# Patient Record
Sex: MALE | Race: WHITE
[De-identification: names, ages, dates, MRNs, and addresses within clinical notes are randomized per-mention and may not be internally consistent; named-entity substitution may affect disease eponyms.]

---

## 2017-07-02 NOTE — HP
CC:  Dr. Vicente*

 

HISTORY AND PHYSICAL:

 

DATE OF ADMISSION:  07/02/17

 

PRIMARY CARE PROVIDER:  Dr. Vicente.

 

ATTENDING PHYSICIAN WHILE IN THE HOSPITAL:  Disha Duncan DO* (report 
dictated by Manolo Garcia NP).

 

CHIEF COMPLAINT:  Altered mental status.

 

HISTORY OF PRESENT ILLNESS:  Mr. Pryor is a 72-year-old male patient.  He has 
a history of ependymoma tumor of the spinal cord, wheelchair bound and self-
cathed and has chronic pain secondary to this and is on chronic narcotics.  He 
has a history of hypertension, GERD, anemia, BONIFACIO.  In addition to this, he has 
a history of carotid artery disease, hyperlipidemia, and BPH.  He came into the 
ER today. Wednesday, he has been complaining of having increasing pain in his 
back.  He saw his primary pain specialist and the patient was changed from 
Opana to morphine ER 30 mg b.i.d.  The patient did well on Wednesday according 
to the wife, he was tolerating it well but then on Friday, she noticed that he 
was a little bit more confused, having trouble with the month which he normally 
does, but he was not as quick to respond and he was not acting himself.  On 
Saturday, he was very drowsy. She felt that she probably should have brought 
him in last night, but she was hopeful that he would get better.  What really 
made her concerned is today they were talking about him self-cathing and 
apparently she believes he had not self- cathed in probably 24 hours because he 
forgot.  He could not do it and that was alarming to her because he has been 
self-cathing for at least 10 years and because of this, they were concerned.  
Yesterday, he was complaining that he just could not get warm.  He needed extra 
blankets throughout the day.  She was worried that maybe he had a fever.  There 
was no nausea or vomiting.  He denied having any changes in his urine of it 
being cloudy or having any foul odor, but nonetheless, there was concern 
because his mentation was not at his baseline.  There was no facial droop. 
There was no difficulty with word finding.  No weakness to one side.  The 
patient's wife brought him in today via the private vehicle.  He was evaluated 
and it was noted that he appeared to be in acute renal failure, it looked like 
he had a urinary tract infection and because of these findings, we were asked 
to evaluate for admission.

 

PAST MEDICAL HISTORY:  Significant for:

 

1.  Hypertension.

2.  GERD.

3.  Anemia.

4.  BONIFACIO.

5.  Carotid artery disease.

6.  Ependymoma tumor of the cord.

7.  Hyperlipidemia.

8.  BPH.

 

PAST SURGICAL HISTORY:

1.  He has had an appendectomy.

2.  Back surgery x4.

3.  Tonsillectomy.

 

HOME MEDICATIONS:  According to the list that we were able to obtain include:

 

1.  Norvasc 5 mg p.o. daily.

2.  Metamucil 500 mg p.o. 4 times a day as needed.

3.  Lyrica 150 mg p.o. t.i.d.

4.  Potassium 10 mEq p.o. daily.

5.  Prilosec 40 mg daily.

6.  Benicar 5 mg daily.

7.  Morphine ER 30 mg p.o. b.i.d.

8.  Garlic 1 capsule p.o. b.i.d.

9.  Lasix 2 tablets p.o. b.i.d.

10.  Folic acid 400 mcg p.o. daily.

11.  Fish oil 2000 units p.o. b.i.d.

12.  B12 1000 mcg p.o. daily.

13.  Vitamin D3 2000 units p.o. b.i.d.

14.  Calcium polycarbophil tablet 825 mg p.o. b.i.d.

15.  Aspirin 81 mg daily.

 

ALLERGIES:  To medications include CHICKEN, VANILLA EXTRACT and ATORVASTATIN.

 

FAMILY HISTORY:  Father did have a history of Parkinson's.  Mother who is at 
the age of 98 and is still alive, she is actually hospitalized currently 
according to the patient.

 

SOCIAL HISTORY:  The patient does not smoke.  He does not drink.  Surrogate 
decision maker is his wife.

 

REVIEW OF SYSTEMS:  There was complaints of chills.  There is a documented 
fever here.  There is no significant weight change.  There was no double 
vision.  There is no ear discharge.  No rhinorrhea.  There was no sore throat.  
There was no thyroid enlargement.  There was no report of chest pain.  There 
was no orthopnea. There was no nocturnal dyspnea.  There was no abdominal pain.
  There was no back pain.  There was no flank pain.  There was no dysuria or 
frequency.  No loss of consciousness, no pruritus and no skin ulcerations.  
Review of 14 systems completed, all others negative.

 

                               PHYSICAL EXAMINATION

 

GENERAL:  At this time, Mr. Pryor is a 72-year-old male patient, he appears to 
be well nourished, well developed.  He does not appear to be in any acute 
distress. He is awake and he is alert.

 

VITAL SIGNS:  Blood pressure 139/70, pulse 86, respirations 20, O2 sat 96%, 
initial temperature was 100.

 

HEENT:  Head is atraumatic and normocephalic.  Eyes:  EOMs are intact.  Sclerae 
anicteric and not pale.  Throat: Oral mucosa appears to be dry.  No 
oropharyngeal erythema.

 

NECK:  Supple.

 

LUNGS:  Clear to auscultation bilaterally.  No wheezes, rales, or rhonchi.

 

HEART:  Sounds S1 and S2.  Regular rate and rhythm.  No murmurs, rubs, or 
gallops.

 

ABDOMEN:  Soft.  It was flat.  Nontender.  Bowel sounds present.

 

EXTREMITIES:  Pulses +2 throughout.  He can move the lower extremities with the 
upper extremities with 5/5 strength.

 

NEUROLOGIC:  The patient is awake.  He is alert.  He is oriented x3.  Tongue is 
midline.   were equal.  No gross focal deficits.

 

SKIN:  Intact.

 

 LABORATORY DATA:  Labs today revealed WBC 7.1, RBC of 4.56, hemoglobin 12.6, 
hematocrit 38, platelet count 189,000.  The INR was 0.94.  PTT is 32.1.  Sodium 
was 135, potassium 4.5, chloride 99, bicarbonate was 26, BUN was 96, creatinine 
was 3.37 and baseline is 2.4, glucose 109, lactic 0.6, calcium 10.4.  Total 
bilirubin 0.5.  AST 13, ALT 12, alkaline phosphatase 21, troponin 0.01, albumin 
of 4.1. Urine showed 1+ leukocyte esterase, 3+ bacteria.  He did have a chest x-
ray obtained today which under my review, I do not appreciate any acute 
infiltrates. Official report is pending.  Old medical records were reviewed.

 

ASSESSMENT AND PLAN:  Mr. Pryor is a 72-year-old male patient coming into the 
ER today with complaints of altered mental status.  He will be admitted under 
observation status for:

 

1.  Altered mental status.  I suspect that this is multifactorial.  It is 
probably related to the morphine ER.  With his renal failure, the metabolites 
probably built up and then he became confused.  In addition to this, he stopped 
straight cathing, it looks like he developed a urinary tract infection.  He had 
a low grade fever here and that probably threw him into acute renal failure, so 
at this point I am going to hold his morphine ER.  We can touch base with Dr. Reynoso tomorrow to see what we should do if we should reduce this dose.  I am 
going to put him on Rocephin and hydrate the patient and we will continue to 
follow.  He is returning to his baseline slowly.  I will get neuro checks every 
4 hours, but it sounds like this was a delirium most likely secondary to 
polypharmacy and maybe urinary tract infection.

2.  Urinary tract infection.  At this point, again I will put him on Rocephin.

3.  Acute renal failure.  This probably is postobstructive because he cannot 
empty his bladder from neurogenic bladder.  I am going to send off the FeNa.  
He is draining urine now.  We will repeat his labs tomorrow.  His potassium is 
fine.  If this is not coming down, we could consider imaging the kidneys, but 
at this point he is not having any pain and he is making urine, so we will 
monitor this and get a FeNa.

4.  Hypertension.  Continue medications as prescribed.

5.  Gastroesophageal reflux disease.  Continue medications as prescribed.

6.  History of anemia.  The H and H is stable.

7.  Obstructive sleep apnea.  I have ordered CPAP.

9.  Carotid artery disease.  Continue with secondary prevention.  He is on an 
aspirin, continue this.

10.  Chronic pain secondary to the spinal tumor.  Again, we will hold the 
morphine ER today.  We will probably touch base with Dr. Reynoso tomorrow and 
see if he has any recommendations for dose reduction.

11.  Benign prostatic hypertrophy.  He can follow with Dr. Wallis.

12.  Hyperlipidemia.  Continue his current medical regimen.

13.  DVT prophylaxis.  He is high risk.  He was placed on heparin subcu.

14.  Code status.  Full code.

14.  Fluids, electrolytes, and nutrition.  He can have a regular diet.

 

TIME SPENT:  Time spent on the admission was 60 minutes, greater than half the 
time was spent face-to-face with the patient obtaining my history of physical; 
the other half time was spent going over the plan of care with the patient and 
implementing plan of care.

 

I did discuss the plan of care with my attending, Dr. Duncan, she is in 
agreement.

 

 ____________________________________ 

MANOLO GARCIA NP

 

[id number]/CPS #: [id number]

[facility] HospitalJENNIFER

## 2017-07-02 NOTE — RAD
INDICATION: Fever  



COMPARISON: Chest x-ray December 19, 2014

 

TECHNIQUE: An AP portable view obtained at 1020 hours is submitted.



FINDINGS: 



Bones/Soft Tissues: There are no acute bony findings.    



Cardiomediastinal: The cardiomediastinal silhouette is normal. 



Lungs: There are no infiltrates.



Pleura: There are no pleural effusions. 



Other: None



IMPRESSION:  NO ACTIVE DISEASE.

## 2017-07-03 NOTE — DCNOTE
Patient seen a number of times throughout the day. Pain medications changed to 

Oxycodone 15 mg as per Dr. Reynoso and then to 20 mg with good effect. Also 

tolerated flexeril. Rodriguez was removed and patient was able to straight cath 

himself. 


On exam, RRR, s1 and s2 present, no m/g/r, abd obese, mild distension, soft, BS+

, lungs CTA B/L


Renal function returned to baseline. Will discharge home with Oxycodone 20 mg 

PO q4h and low dose flexeril. Will f/u with PCP and Dr. Miller as an 

outpatient. Has appt in late July for spinal cord stimulator.

## 2017-07-04 NOTE — DS
CC:  Dr. Vicente; Dr. Luis Felipe Reynoso

 

DISCHARGE SUMMARY:

 

DATE OF ADMISSION:  07/02/17

 

DATE OF DISCHARGE:  07/03/17

 

PRIMARY CARE PHYSICIAN:  Dr. Vicente.

 

PRINCIPAL DISCHARGE DIAGNOSES:

1.  Acute on chronic kidney disease secondary to urinary retention.

2.  Altered mental status likely due to narcotics.

 

SECONDARY DIAGNOSES:

1.  Chronic back pain due to ependymoma of the spinal cord.

2.  Hypertension.

3.  Gastroesophageal reflux disease.

4.  Anemia.

5.  Obstructive sleep apnea.

6.  Coronary artery disease.

7.  Hyperlipidemia.

8.  Benign prostatic hypertrophy.

 

DISCHARGE MEDICATION REGIMEN:

1.  Tylenol 650 mg by mouth every 4 hours as needed for pain.

2.  Flexeril 5 mg by mouth 3 times daily as needed for pain.

3.  Oxycodone 20 mg by mouth every 4 hours as needed for pain.

4.  Amlodipine 5 mg by mouth daily.

5.  Potassium chloride 10 mEq by mouth daily.

6.  Lyrica 150 mg by mouth 3 times daily.

7.  Omeprazole 40 mg by mouth daily.

8.  Garlic 1 capsule by mouth 3 times daily.

9.  Lasix 40 mg by mouth 3 times daily.

10.  Aspirin 81 mg by mouth daily.

11.  Olmesartan 5 mg by mouth daily.

12.  Folic acid 400 mcg by mouth daily.

13.  Cholecalciferol 2000 units by mouth 2 times daily.

14.  Vitamin B12 1000 mcg by mouth daily.

15.  Calcium polycarbophil 825 mg by mouth 3 times daily.

16.  Psyllium 500 mg by mouth 4 times daily as needed for constipation.

17.  Fish oil 2000 mg by mouth 2 times daily.

 

STUDIES DONE DURING HOSPITALIZATION:  Chest x-ray, impression:  No active disease.

 

HISTORY OF PRESENT ILLNESS AND HOSPITAL SUMMARY:  Please see the full history and physical by Manolo vail NP for full details.  Briefly, Mr. Pryor is a 72-year- old male with a past medical histor
y as above, who presented to the hospital after a few days of increasing confusion, drowsiness, and 
inability to self-cath.  This was in the context of a recent change in patient's long-term pain medi
cations from Opana to extended release morphine.  This was after the patient's wife called Dr. Quita parr and stated that he was having significant increase in his pain.  On admission to the hospital, t
he patient's mental status was starting to clear.  He was noted to have increased BUN and creatinine
 from his baseline, which was likely due to the patient's inability to self-cath at home due to his 
altered mental status and confusion.  A Rodriguez was placed with improvement in the patient's renal fun
ction, which was nearly back to baseline.  I spoke with Dr. Reynoso on the phone and decision was m
gilma to transition the patient to oxycodone 15 mg, short acting to see how that handle the pain.  It 
seemed to make it nearly tolerable and the decision was made to increase the dose to 20 mg every 4 h
ours as the discharge regimen.  We will also send the patient home on Flexeril.

 

The patient was able to help transfer himself to his wheelchair, was able to straight cath on his ow
n after the Rodriguez was removed and also had a bowel movement here in the hospital.  The patient and h
is family are agreeable to discharge home with this current regimen, and the patient will need to fo
llow up with Dr. Reynoso as well as the PCP, Dr. Vicente as an outpatient.

 

TIME SPENT:  Total time spent on this discharge 45 minutes.

 

This is the summary of the hospitalization, please see the full medical record for further details.

 

 [id number]/CPS #: [id number]

## 2017-07-09 NOTE — ED
Keyur DUQUE Auryana, scribed for Lyle Gaines MD on 07/02/17 at 1121 .





Altered Mental Status





- HPI Summary


HPI Summary: 





72 year old male presents with AMS. Wife reports that he found the patient 

sleeping in his wheelchair by the toliet - and hadn't catheterized - reports 

that patient hasn't been catheterizing himself as much as he should. Wife is 

primary historian. Wife reports that 5 days ago his medication was changed to 

morphine PO once a day 30 mg - reports pain was worsening.  She also reports 

that the patient has a decreased appetite and chills - unsure of fever. He 

reports back pain. He denies vomiting, diarrhea, cough, SOB/trouble breathing, 

or any chest pain or tightness.  PMHx is significant for hemiplegia secondary 

due to spinal tumor.





- History Of Current Complaint


Chief Complaint: EDAltMentalStatus


Stated Complaint: AMS


Time Seen by Provider: 07/02/17 09:58


Hx Obtained From: Patient, Family/Caretaker - wife


Onset/Duration: Still Present


Timing: Constant


Severity Initially: Mild


Severity Currently: Mild


Character: Confusion, Responsiveness - INCREASED SLEEPING


Aggravating Factor(s): Medication Change - NOW ON MORPHINE


Associated Signs And Symptoms: Negative: Negative - DECREASED APPETITE, CHILLS, 

AND INCREASED SLEEPINESS AND CONFUSION, BACK PAIN





- Allergies/Home Medications


Allergies/Adverse Reactions: 


 Allergies











Allergy/AdvReac Type Severity Reaction Status Date / Time


 


Chicken Allergy Allergy Severe Hives Verified 07/02/17 10:20


 


Vanilla Allergy Severe Hives Verified 07/02/17 10:20


 


Wheat Extract Allergy Severe Hives Verified 07/02/17 10:20


 


Atorvastatin [From Lipitor] Allergy  See Comment Verified 07/02/17 10:20


 


GRAPES Allergy Severe Hives Uncoded 07/02/17 10:20











Home Medications: 


 Home Medications





Calcium Polycarbophil TAB* 825 mg PO BID 07/02/17 [History Confirmed 07/02/17]


Fish Oil 2,000 mg PO BID 07/02/17 [History Confirmed 07/02/17]











PMH/Surg Hx/FS Hx/Imm Hx


Endocrine/Hematology History: 


   Denies: Hx Diabetes


Cardiovascular History: Reports: Hx Hypercholesterolemia, Hx Hypertension


   Denies: Hx Pacemaker/ICD


Respiratory History: Reports: Hx Sleep Apnea - new DX severe BONIFACIO 8/2013, Other 

Respiratory Problems/Disorders - LATENT TB


GI History: Reports: Other GI Disorders - neurogenic bowel


 History: Reports: Hx Renal Disease, Other  Problems/Disorders - neurogenic 

bladder


Musculoskeletal History: Reports: Other Musculoskeletal History - chronic back 

pain


   Denies: Hx Rheumatoid Arthritis, Hx Osteoporosis


Sensory History: Reports: Hx Contacts or Glasses, Hx Hearing Problem


   Denies: Hx Hearing Aid


Opthamlomology History: Reports: Hx Contacts or Glasses


Neurological History: Reports: Hx Spinal Cord Injury, Other Neuro Impairments/

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


Psychiatric History: 


   Denies: Hx Panic Disorder





- Cancer History


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


Hx Chemotherapy: No


Hx Radiation Therapy: Yes - 1971





- Surgical History


Surgery Procedure, Year, and Place: SPINAL SURGERY - 4 SURGERIES FROM [id number] 

(EPENDYMOMA), APPENDECTOMY 06/15 - Rt ENDARTERECTOMY 2015, TONSILLECTOMY ( as a 

child)


Hx Anesthesia Reactions: No





- Immunization History


Date of Tetanus Vaccine: PT STATES UNSURE


Infectious Disease History: No


Infectious Disease History: Reports: Hx Hepatitis - as a child


   Denies: History Other Infectious Disease, Traveled Outside the [place] in Last 30 

Days





- Family History


Known Family History: Positive: Other - NO GI ISSUES





- Social History


Occupation: Retired


Alcohol Use: Weekly


Alcohol Amount: one a week


Substance Use Type: Reports: None


Substance Use Comment - Amount & Last Used: tramadol


Hx Tobacco Use: No


Smoking Status (MU): Never Smoked Tobacco


Have You Smoked in the Last Year: No





Review of Systems


Positive: Chills.  Negative: Fever


Eyes: Negative


Negative: Erythema


ENT: Negative


Negative: Sore Throat


Cardiovascular: Negative


Negative: Chest Pain


Respiratory: Negative


Negative: Shortness Of Breath, Cough


Positive: Other - decreased appetite .  Negative: Abdominal Pain, Vomiting, 

Nausea


Genitourinary: Negative


Positive: no symptoms reported.  Negative: dysuria, hematuria


Musculoskeletal: Negative


Negative: Myalgia, Edema


Skin: Negative


Negative: Rash


Neurological: Negative, Other - no dizziness


Positive: Other - AMS 


All Other Systems Reviewed And Are Negative: Yes





Physical Exam





- Summary


Physical Exam Summary: 





Constitutional: Well-developed, Well-nourished, Alert. (-) Distressed


Skin: Warm, Dry


HENT: Normocephalic; Atraumatic


Eyes: Conjunctiva normal


Neck: Musculoskeletal ROM normal neck. (-) JVD, (-) Stridor, (-) Tracheal 

deviation


Cardio: Rhythm regular, rate normal, Heart sounds normal; Intact distal pulses; 

The pedal pulses are 2+ and symmetric. Radial pulses are 2+ and symmetric. (-) 

Murmur


Pulmonary/Chest wall: Effort normal. (-) Respiratory distress, (-) Wheezes, (-) 

Rales


Abd: Soft, (-) Tenderness, (-) Guarding, (-) Rebound. ABD somewhat distended.


Musculoskeletal: (-) Edema. Back is hot to touch. No movement in the Lower ext.


Lymph: (-) Cervical adenopathy


Neuro: Alert, Oriented x3


Psych: Mood and affect Normal


 


Triage Information Reviewed: Yes


Vital Signs On Initial Exam: 


 Initial Vitals











Temp Pulse Resp BP Pulse Ox


 


 100 F   100   17   159/90   93 


 


 07/02/17 08:59  07/02/17 08:59  07/02/17 08:59  07/02/17 08:59  07/02/17 08:59











Vital Signs Reviewed: Yes


Head/Face: Positive: Normal Head/Face Inspection


Eyes: Positive: Normal


ENT: Positive: Normal ENT inspection


Neck: Positive: Supple, Nontender, No Lymphadenopathy


Respiratory/Lung Sounds: Positive: Clear to Auscultation


Cardiovascular: Positive: Normal


Abdomen Description: Positive: Nontender


Bowel Sounds: Positive: Present


Male Genital Exam: Positive: normal genitalia, normal prostate, no hernia


Musculoskeletal: Positive: Normal, Strength/ROM Intact


Neurological: Positive: Normal


Psychiatric: Positive: Normal





- Shameka Coma Scale


Best Eye Response: 4 - Spontaneous


Best Motor Response: 6 - Obeys Commands


Best Verbal Response: 5 - Oriented





Diagnostics





- Vital Signs


 Vital Signs











  Temp Pulse Resp BP Pulse Ox


 


 07/02/17 09:30     156/74 


 


 07/02/17 09:21   95   168/64  93


 


 07/02/17 09:19   83    90


 


 07/02/17 08:59  100 F  100  17  159/90  93














- Laboratory


Lab Results: 


 Lab Results











  07/02/17 07/02/17 07/02/17 Range/Units





  11:10 11:10 11:10 


 


WBC  7.1    (3.5-10.8)  10^3/ul


 


RBC  4.56    (4.0-5.4)  10^6/ul


 


Hgb  12.6 L    (14.0-18.0)  g/dl


 


Hct  38 L    (42-52)  %


 


MCV  83    (80-94)  fL


 


MCH  28    (27-31)  pg


 


MCHC  33    (31-36)  g/dl


 


RDW  15    (10.5-15)  %


 


Plt Count  189    (150-450)  10^3/ul


 


MPV  9    (7.4-10.4)  um3


 


Neut % (Auto)  64.6    (38-83)  %


 


Lymph % (Auto)  14.5 L    (25-47)  %


 


Mono % (Auto)  17.6 H    (1-9)  %


 


Eos % (Auto)  1.8    (0-6)  %


 


Baso % (Auto)  1.5    (0-2)  %


 


Absolute Neuts (auto)  4.6    (1.5-7.7)  10^3/ul


 


Absolute Lymphs (auto)  1.0    (1.0-4.8)  10^3/ul


 


Absolute Monos (auto)  1.2 H    (0-0.8)  10^3/ul


 


Absolute Eos (auto)  0.1    (0-0.6)  10^3/ul


 


Absolute Basos (auto)  0.1    (0-0.2)  10^3/ul


 


Absolute Nucleated RBC  0    10^3/ul


 


Nucleated RBC %  0    


 


INR (Anticoag Therapy)   0.94   (0.89-1.11)  


 


APTT   32.1   (26.0-36.3)  seconds


 


Sodium    135  (133-145)  mmol/L


 


Potassium    4.5  (3.5-5.0)  mmol/L


 


Chloride    99 L  (101-111)  mmol/L


 


Carbon Dioxide    26  (22-32)  mmol/L


 


Anion Gap    10  (2-11)  mmol/L


 


BUN    96 H  (6-24)  mg/dL


 


Creatinine    3.37 H  (0.67-1.17)  mg/dL


 


Est GFR ( Amer)    23.3  (>60)  


 


Est GFR (Non-Af Amer)    18.1  (>60)  


 


BUN/Creatinine Ratio    28.5 H  (8-20)  


 


Glucose    109 H  ()  mg/dL


 


Lactic Acid     (0.5-2.0)  mmol/L


 


Calcium    10.4 H  (8.6-10.3)  mg/dL


 


Total Bilirubin    0.50  (0.2-1.0)  mg/dL


 


AST    13  (13-39)  U/L


 


ALT    12  (7-52)  U/L


 


Alkaline Phosphatase    71  ()  U/L


 


Troponin I    0.01  (<0.04)  ng/mL


 


Total Protein    7.7  (6.4-8.9)  g/dL


 


Albumin    4.1  (3.2-5.2)  g/dL


 


Globulin    3.6  (2-4)  g/dL


 


Albumin/Globulin Ratio    1.1  (1-3)  


 


Urine Color     


 


Urine Appearance     


 


Urine pH     (5-9)  


 


Ur Specific Gravity     (1.010-1.030)  


 


Urine Protein     (Negative)  


 


Urine Ketones     (Negative)  


 


Urine Blood     (Negative)  


 


Urine Nitrate     (Negative)  


 


Urine Bilirubin     (Negative)  


 


Urine Urobilinogen     (Negative)  


 


Ur Leukocyte Esterase     (Negative)  


 


Urine WBC (Auto)     (Absent)  


 


Urine RBC (Auto)     (Absent)  


 


Ur Squamous Epith Cells     (Absent)  


 


Urine Bacteria     (Absent)  


 


Urine Glucose     (Negative)  














  07/02/17 07/02/17 Range/Units





  11:10 12:05 


 


WBC    (3.5-10.8)  10^3/ul


 


RBC    (4.0-5.4)  10^6/ul


 


Hgb    (14.0-18.0)  g/dl


 


Hct    (42-52)  %


 


MCV    (80-94)  fL


 


MCH    (27-31)  pg


 


MCHC    (31-36)  g/dl


 


RDW    (10.5-15)  %


 


Plt Count    (150-450)  10^3/ul


 


MPV    (7.4-10.4)  um3


 


Neut % (Auto)    (38-83)  %


 


Lymph % (Auto)    (25-47)  %


 


Mono % (Auto)    (1-9)  %


 


Eos % (Auto)    (0-6)  %


 


Baso % (Auto)    (0-2)  %


 


Absolute Neuts (auto)    (1.5-7.7)  10^3/ul


 


Absolute Lymphs (auto)    (1.0-4.8)  10^3/ul


 


Absolute Monos (auto)    (0-0.8)  10^3/ul


 


Absolute Eos (auto)    (0-0.6)  10^3/ul


 


Absolute Basos (auto)    (0-0.2)  10^3/ul


 


Absolute Nucleated RBC    10^3/ul


 


Nucleated RBC %    


 


INR (Anticoag Therapy)    (0.89-1.11)  


 


APTT    (26.0-36.3)  seconds


 


Sodium    (133-145)  mmol/L


 


Potassium    (3.5-5.0)  mmol/L


 


Chloride    (101-111)  mmol/L


 


Carbon Dioxide    (22-32)  mmol/L


 


Anion Gap    (2-11)  mmol/L


 


BUN    (6-24)  mg/dL


 


Creatinine    (0.67-1.17)  mg/dL


 


Est GFR ( Amer)    (>60)  


 


Est GFR (Non-Af Amer)    (>60)  


 


BUN/Creatinine Ratio    (8-20)  


 


Glucose    ()  mg/dL


 


Lactic Acid  0.6   (0.5-2.0)  mmol/L


 


Calcium    (8.6-10.3)  mg/dL


 


Total Bilirubin    (0.2-1.0)  mg/dL


 


AST    (13-39)  U/L


 


ALT    (7-52)  U/L


 


Alkaline Phosphatase    ()  U/L


 


Troponin I    (<0.04)  ng/mL


 


Total Protein    (6.4-8.9)  g/dL


 


Albumin    (3.2-5.2)  g/dL


 


Globulin    (2-4)  g/dL


 


Albumin/Globulin Ratio    (1-3)  


 


Urine Color   Yellow  


 


Urine Appearance   Clear  


 


Urine pH   5.0  (5-9)  


 


Ur Specific Gravity   1.009 L  (1.010-1.030)  


 


Urine Protein   Negative  (Negative)  


 


Urine Ketones   Negative  (Negative)  


 


Urine Blood   Negative  (Negative)  


 


Urine Nitrate   Negative  (Negative)  


 


Urine Bilirubin   Negative  (Negative)  


 


Urine Urobilinogen   Negative  (Negative)  


 


Ur Leukocyte Esterase   1+ H  (Negative)  


 


Urine WBC (Auto)   Trace(0-5/hpf)  (Absent)  


 


Urine RBC (Auto)   Absent  (Absent)  


 


Ur Squamous Epith Cells   Present H  (Absent)  


 


Urine Bacteria   3+ H  (Absent)  


 


Urine Glucose   Negative  (Negative)  











Result Diagrams: 


 07/03/17 06:18





 07/03/17 06:18


Lab Statement: Any lab studies that have been ordered have been reviewed, and 

results considered in the medical decision making process.





- Radiology


  ** CXR


Xray Interpretation: No Acute Changes


Radiology Interpretation Completed By: Radiologist





Altered Mental Statu Course/Dx





- Course


Course Of Treatment: 72 year old male presents with AMS. Wife reports that he 

found the patient sleeping in his wheelchair by the toliet - and hadn't 

catheterized - reports that patient hasn't been catheterizing himself as much 

as he should. Wife is primary historian. Wife reports that 5 days ago his 

medication was changed to morphine PO once a day 30 mg - reports pain was 

worsening.  She also reports that the patient has a decreased appetite and 

chills - unsure of fever. He reports back pain. He denies vomiting, diarrhea, 

cough, SOB/trouble breathing, or any chest pain or tightness.  PMHx is 

significant for hemiplegia secondary due to spinal tumor.  Blood work WNL 

except Low HGB/HCT, Ca 10.4, Glucose 109, Cl- 99. NML coagulation studies. NML 

lactic acid.  UA  contaminated.  CXR - NAD.  DDx: U.T.I., sepsis, obstructive 

renal failure, medication adverse effect.  Dx: post obstructive renal failure 

and delirium.  Patient will be admitted to [facility] [place] - by Manolo Garcia.  Patient 

refused Spinal tap offered by Manolo Garcia due to spinal tumor and multiple 

surgeries.





- Diagnoses


Differential Diagnosis/HQI/PQRI: Sepsis, Other - U.T.I. OBSTRUCTIVE RENAL 

FAILURE, MEDICATION ADVERSE EFFECT


Discharge Diagnoses: 


 OBSTRUCTIVE RENAL FAILURE, Delirium








- Provider Notifications


Discussed Care Of Patient With: Manolo Garcia


Time Discussed With Above Provider: 14:44 - AGREES TO ADMIT





Discharge





- Discharge Plan


Condition: Good


Disposition: ADMITTED TO [facility]





The documentation as recorded by the Keyur julien Auryana accurately 

reflects the service I personally performed and the decisions made by me, Lyle Gaines MD.

## 2017-08-07 NOTE — RAD
Indication: Weakness.



Single frontal view of the chest performed at 1005 hours was reviewed.



Comparison is made with previous exam dated July 02, 2017.



Cardiomegaly is noted. Airspace disease is noted in the right lung field which may

represent pneumonia.



IMPRESSION: SUGGESTED OF OF RIGHT-SIDED PNEUMONIA. LEFT LUNG FIELD IS CLEAR.

## 2017-08-07 NOTE — ED
Marques DUQUE Rebecca, scribed for Bari Murphy MD on 08/07/17 at 0949 .





Altered Mental Status





- HPI Summary


HPI Summary: 


Pt is a 73 y/o M BIBA who presents to ED for concerns over AMS characterized as 

disorientation. Wife reports she woke him up this morning at approximately 0700 

because he is scheduled to have a lumbar spinal stimulator put in place in 

[place] today. Upon waking up he was disoriented and wife noted chills. Wife 

states his sx have improved since onset. Denies cough and fever.  Wife reports 

that the only change in his routine is that he recently had Immodium per PCP 

orders. Has had a visitng nurse the last 4 weeks. PMHx ependymoma tumor.








- History Of Current Complaint


Chief Complaint: EDAltMentalStatus


Stated Complaint: AMS/WEAKNESS


Time Seen by Provider: 08/07/17 09:36


Hx Obtained From: Patient, Family/Caretaker - Wife


Onset/Duration: Suddenly - 0700 this morning


Timing: Constant


Severity Initially: Moderate


Severity Currently: Mild


Character: Confusion


Aggravating Factor(s): Nothing


Alleviating Factor(s): Nothing


Associated Signs And Symptoms: Negative: Fever





- Allergies/Home Medications


Allergies/Adverse Reactions: 


 Allergies











Allergy/AdvReac Type Severity Reaction Status Date / Time


 


Chicken Allergy Allergy Severe Hives Verified 07/02/17 10:20


 


Vanilla Allergy Severe Hives Verified 07/02/17 10:20


 


Wheat Extract Allergy Severe Hives Verified 07/02/17 10:20


 


Atorvastatin [From Lipitor] Allergy  See Comment Verified 07/02/17 10:20


 


GRAPES Allergy Severe Hives Uncoded 07/02/17 10:20











Home Medications: 


 Home Medications





Acetaminophen [Acetaminophen ER] 650 mg PO QID PRN 08/07/17 [History Confirmed 

08/07/17]


Cholecalciferol [Vitamin D3 Ultra Strength] 5,000 unit PO DAILY 08/07/17 [

History Confirmed 08/07/17]


Cyanocobalamin TAB* [Vitamin B12 TAB*] 1,000 mcg PO DAILY 08/07/17 [History 

Confirmed 08/07/17]


Ferrous Gluconate TAB* [Fergon TAB*] 324 mg PO BID 08/07/17 [History Confirmed 

08/07/17]


Folic Acid 400 mcg PO DAILY 08/07/17 [History Confirmed 08/07/17]


Loperamide CAP* [Imodium CAP*] 2 mg PO Q4H PRN 08/07/17 [History Confirmed 08/07 /17]


Olmesartan (NF) [Benicar (NF)] 5 mg PO DAILY 08/07/17 [History Confirmed 08/07/ 17]


Omeprazole CAP* [Prilosec CAP* 20 MG] 40 mg PO DAILY 08/07/17 [History 

Confirmed 08/07/17]


Potassium Chloride [Klor-Con Sprinkle] 10 meq PO DAILY 08/07/17 [History 

Confirmed 08/07/17]


Pregabalin CAP(*) [Lyrica CAP(*)] 150 mg PO TID 08/07/17 [History Confirmed 08/ 07/17]











PMH/Surg Hx/FS Hx/Imm Hx


Endocrine/Hematology History: 


   Denies: Hx Diabetes


Cardiovascular History: Reports: Hx Hypercholesterolemia, Hx Hypertension


   Denies: Hx Pacemaker/ICD


Respiratory History: Reports: Hx Sleep Apnea - new DX severe BONIFACIO 8/2013, Other 

Respiratory Problems/Disorders - LATENT TB


GI History: Reports: Other GI Disorders - neurogenic bowel


 History: Reports: Hx Renal Disease, Other  Problems/Disorders - neurogenic 

bladder


Musculoskeletal History: Reports: Other Musculoskeletal History - chronic back 

pain


   Denies: Hx Rheumatoid Arthritis, Hx Osteoporosis


Sensory History: Reports: Hx Contacts or Glasses, Hx Hearing Problem


   Denies: Hx Hearing Aid


Opthamlomology History: Reports: Hx Contacts or Glasses


Neurological History: Reports: Hx Spinal Cord Injury, Other Neuro Impairments/

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


Psychiatric History: 


   Denies: Hx Panic Disorder





- Cancer History


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


Hx Chemotherapy: No


Hx Radiation Therapy: Yes - 1971





- Surgical History


Surgery Procedure, Year, and Place: SPINAL SURGERY - 4 SURGERIES FROM [id number] 

(EPENDYMOMA), APPENDECTOMY 06/15 - Rt ENDARTERECTOMY 2015, TONSILLECTOMY ( as a 

child)


Hx Anesthesia Reactions: No





- Immunization History


Date of Tetanus Vaccine: PT STATES UNSURE


Infectious Disease History: Reports: Hx Hepatitis - as a child


   Denies: History Other Infectious Disease, Traveled Outside the [place] in Last 30 

Days





- Family History


Known Family History: Positive: Other - NO GI ISSUES





- Social History


Alcohol Use: Weekly


Alcohol Amount: one a week


Substance Use Type: Reports: None


Substance Use Comment - Amount & Last Used: tramadol


Hx Tobacco Use: No


Smoking Status (MU): Never Smoked Tobacco


Have You Smoked in the Last Year: No





Review of Systems


Positive: Chills.  Negative: Fever


Negative: Cough


Neurological: Other - AMS - disorientation/confusion


All Other Systems Reviewed And Are Negative: Yes





Physical Exam





- Summary


Physical Exam Summary: 


VITAL SIGNS: Reviewed.


GENERAL: ~Patient is a well-developed and nourished male who is lying 

comfortable in the stretcher. ~Patient is not in any acute respiratory distress.


HEAD AND FACE: No signs of trauma. ~No ecchymosis, hematomas or skull 

depressions. No sinus tenderness.


EYES: PERRLA, EOMI x 2, No injected conjunctiva, no nystagmus.


EARS: Hearing grossly intact. Ear canals and tympanic membranes are within 

normal limits.


MOUTH: Oropharynx within normal limits.


NECK: Supple, trachea is midline, no adenopathy, no JVD, no carotid bruit, no c-

spine tenderness, neck with full ROM.


CHEST: Symmetric, no tenderness at palpation


LUNGS: Clear to auscultation bilaterally. No wheezing or crackles.


CVS: Regular rate and rhythm, S1 and S2 present, no murmurs or gallops 

appreciated.


ABDOMEN: Soft, non-tender. No signs of distention. No rebound no guarding, and 

no masses palpated. Bowel sounds are normal. Has an indwelling london catheter. 


EXTREMITIES: FROM in all major joints, no edema, no cyanosis or clubbing.


NEURO: Alert and oriented x 2. No acute neurological deficits. Speech is normal 

and follows commands. Paralysis frm the waist down. 


SKIN: Dry and warm


GCS: 15


Triage Information Reviewed: Yes


Vital Signs On Initial Exam: 


 Initial Vitals











Temp Pulse Resp BP Pulse Ox


 


 99.9 F   97   20   102/48   90 


 


 08/07/17 09:09  08/07/17 09:09  08/07/17 09:09  08/07/17 09:09  08/07/17 09:09











Vital Signs Reviewed: Yes





Diagnostics





- Vital Signs


 Vital Signs











  Temp Pulse Resp BP Pulse Ox


 


 08/07/17 09:09  99.9 F  97  20  102/48  90














- Laboratory


Lab Results: 


 Lab Results











  08/07/17 08/07/17 08/07/17 Range/Units





  09:30 09:30 09:30 


 


WBC  9.6    (3.5-10.8)  10^3/ul


 


RBC  3.88 L    (4.0-5.4)  10^6/ul


 


Hgb  10.5 L    (14.0-18.0)  g/dl


 


Hct  32 L    (42-52)  %


 


MCV  82    (80-94)  fL


 


MCH  27    (27-31)  pg


 


MCHC  33    (31-36)  g/dl


 


RDW  16 H    (10.5-15)  %


 


Plt Count  198    (150-450)  10^3/ul


 


MPV  9    (7.4-10.4)  um3


 


Neut % (Auto)  83.1 H    (38-83)  %


 


Lymph % (Auto)  3.9 L    (25-47)  %


 


Mono % (Auto)  12.5 H    (1-9)  %


 


Eos % (Auto)  0.2    (0-6)  %


 


Baso % (Auto)  0.3    (0-2)  %


 


Absolute Neuts (auto)  8.0 H    (1.5-7.7)  10^3/ul


 


Absolute Lymphs (auto)  0.4 L    (1.0-4.8)  10^3/ul


 


Absolute Monos (auto)  1.2 H    (0-0.8)  10^3/ul


 


Absolute Eos (auto)  0    (0-0.6)  10^3/ul


 


Absolute Basos (auto)  0    (0-0.2)  10^3/ul


 


Absolute Nucleated RBC  0.01    10^3/ul


 


Nucleated RBC %  0.1    


 


ESR  83 H    (0-40)  mm/Hr


 


INR (Anticoag Therapy)   0.98   (0.89-1.11)  


 


APTT   26.9   (26.0-36.3)  seconds


 


Sodium    128 L  (133-145)  mmol/L


 


Potassium    4.7  (3.5-5.0)  mmol/L


 


Chloride    95 L  (101-111)  mmol/L


 


Carbon Dioxide    25  (22-32)  mmol/L


 


Anion Gap    8  (2-11)  mmol/L


 


BUN    79 H  (6-24)  mg/dL


 


Creatinine    3.07 H  (0.67-1.17)  mg/dL


 


Est GFR ( Amer)    26.0  (>60)  


 


Est GFR (Non-Af Amer)    20.2  (>60)  


 


BUN/Creatinine Ratio    25.7 H  (8-20)  


 


Glucose    133 H  ()  mg/dL


 


Lactic Acid     (0.5-2.0)  mmol/L


 


Calcium    9.4  (8.6-10.3)  mg/dL


 


Total Bilirubin    0.50  (0.2-1.0)  mg/dL


 


AST    25  (13-39)  U/L


 


ALT    18  (7-52)  U/L


 


Alkaline Phosphatase    123 H  ()  U/L


 


Total Creatine Kinase    19  ()  U/L


 


Troponin I    0.03  (<0.04)  ng/mL


 


C-Reactive Protein    95.45 H  (< 5.00)  mg/L


 


B-Natriuretic Peptide    ( - 100) pg/mL


 


Total Protein    5.9 L  (6.4-8.9)  g/dL


 


Albumin    2.9 L  (3.2-5.2)  g/dL


 


Globulin    3.0  (2-4)  g/dL


 


Albumin/Globulin Ratio    1.0  (1-3)  


 


Urine Color     


 


Urine Appearance     


 


Urine pH     (5-9)  


 


Ur Specific Gravity     (1.010-1.030)  


 


Urine Protein     (Negative)  


 


Urine Ketones     (Negative)  


 


Urine Blood     (Negative)  


 


Urine Nitrate     (Negative)  


 


Urine Bilirubin     (Negative)  


 


Urine Urobilinogen     (Negative)  


 


Ur Leukocyte Esterase     (Negative)  


 


Urine WBC (Auto)     (Absent)  


 


Urine RBC (Auto)     (Absent)  


 


Urine Bacteria     (Absent)  


 


Urine Glucose     (Negative)  


 


Urine Opiates Screen     (None Detect)  


 


Ur Barbiturates Screen     (None Detect)  


 


Ur Phencyclidine Scrn     (None Detect)  


 


Ur Amphetamines Screen     (None Detect)  


 


U Benzodiazepines Scrn     (None Detect)  


 


Urine Cocaine Screen     (None Detect)  


 


U Cannabinoids Screen     (None Detect)  














  08/07/17 08/07/17 08/07/17 Range/Units





  09:30 09:30 10:15 


 


WBC     (3.5-10.8)  10^3/ul


 


RBC     (4.0-5.4)  10^6/ul


 


Hgb     (14.0-18.0)  g/dl


 


Hct     (42-52)  %


 


MCV     (80-94)  fL


 


MCH     (27-31)  pg


 


MCHC     (31-36)  g/dl


 


RDW     (10.5-15)  %


 


Plt Count     (150-450)  10^3/ul


 


MPV     (7.4-10.4)  um3


 


Neut % (Auto)     (38-83)  %


 


Lymph % (Auto)     (25-47)  %


 


Mono % (Auto)     (1-9)  %


 


Eos % (Auto)     (0-6)  %


 


Baso % (Auto)     (0-2)  %


 


Absolute Neuts (auto)     (1.5-7.7)  10^3/ul


 


Absolute Lymphs (auto)     (1.0-4.8)  10^3/ul


 


Absolute Monos (auto)     (0-0.8)  10^3/ul


 


Absolute Eos (auto)     (0-0.6)  10^3/ul


 


Absolute Basos (auto)     (0-0.2)  10^3/ul


 


Absolute Nucleated RBC     10^3/ul


 


Nucleated RBC %     


 


ESR     (0-40)  mm/Hr


 


INR (Anticoag Therapy)     (0.89-1.11)  


 


APTT     (26.0-36.3)  seconds


 


Sodium     (133-145)  mmol/L


 


Potassium     (3.5-5.0)  mmol/L


 


Chloride     (101-111)  mmol/L


 


Carbon Dioxide     (22-32)  mmol/L


 


Anion Gap     (2-11)  mmol/L


 


BUN     (6-24)  mg/dL


 


Creatinine     (0.67-1.17)  mg/dL


 


Est GFR ( Amer)     (>60)  


 


Est GFR (Non-Af Amer)     (>60)  


 


BUN/Creatinine Ratio     (8-20)  


 


Glucose     ()  mg/dL


 


Lactic Acid  1.2    (0.5-2.0)  mmol/L


 


Calcium     (8.6-10.3)  mg/dL


 


Total Bilirubin     (0.2-1.0)  mg/dL


 


AST     (13-39)  U/L


 


ALT     (7-52)  U/L


 


Alkaline Phosphatase     ()  U/L


 


Total Creatine Kinase     ()  U/L


 


Troponin I     (<0.04)  ng/mL


 


C-Reactive Protein     (< 5.00)  mg/L


 


B-Natriuretic Peptide   30  ( - 100) pg/mL


 


Total Protein     (6.4-8.9)  g/dL


 


Albumin     (3.2-5.2)  g/dL


 


Globulin     (2-4)  g/dL


 


Albumin/Globulin Ratio     (1-3)  


 


Urine Color    Yellow  


 


Urine Appearance    Cloudy  


 


Urine pH    5.0  (5-9)  


 


Ur Specific Gravity    1.008 L  (1.010-1.030)  


 


Urine Protein    Negative  (Negative)  


 


Urine Ketones    Negative  (Negative)  


 


Urine Blood    1+ H  (Negative)  


 


Urine Nitrate    Negative  (Negative)  


 


Urine Bilirubin    Negative  (Negative)  


 


Urine Urobilinogen    Negative  (Negative)  


 


Ur Leukocyte Esterase    3+ H  (Negative)  


 


Urine WBC (Auto)    Trace(0-5/hpf)  (Absent)  


 


Urine RBC (Auto)    Absent  (Absent)  


 


Urine Bacteria    1+ H  (Absent)  


 


Urine Glucose    Negative  (Negative)  


 


Urine Opiates Screen     (None Detect)  


 


Ur Barbiturates Screen     (None Detect)  


 


Ur Phencyclidine Scrn     (None Detect)  


 


Ur Amphetamines Screen     (None Detect)  


 


U Benzodiazepines Scrn     (None Detect)  


 


Urine Cocaine Screen     (None Detect)  


 


U Cannabinoids Screen     (None Detect)  














  08/07/17 Range/Units





  10:15 


 


WBC   (3.5-10.8)  10^3/ul


 


RBC   (4.0-5.4)  10^6/ul


 


Hgb   (14.0-18.0)  g/dl


 


Hct   (42-52)  %


 


MCV   (80-94)  fL


 


MCH   (27-31)  pg


 


MCHC   (31-36)  g/dl


 


RDW   (10.5-15)  %


 


Plt Count   (150-450)  10^3/ul


 


MPV   (7.4-10.4)  um3


 


Neut % (Auto)   (38-83)  %


 


Lymph % (Auto)   (25-47)  %


 


Mono % (Auto)   (1-9)  %


 


Eos % (Auto)   (0-6)  %


 


Baso % (Auto)   (0-2)  %


 


Absolute Neuts (auto)   (1.5-7.7)  10^3/ul


 


Absolute Lymphs (auto)   (1.0-4.8)  10^3/ul


 


Absolute Monos (auto)   (0-0.8)  10^3/ul


 


Absolute Eos (auto)   (0-0.6)  10^3/ul


 


Absolute Basos (auto)   (0-0.2)  10^3/ul


 


Absolute Nucleated RBC   10^3/ul


 


Nucleated RBC %   


 


ESR   (0-40)  mm/Hr


 


INR (Anticoag Therapy)   (0.89-1.11)  


 


APTT   (26.0-36.3)  seconds


 


Sodium   (133-145)  mmol/L


 


Potassium   (3.5-5.0)  mmol/L


 


Chloride   (101-111)  mmol/L


 


Carbon Dioxide   (22-32)  mmol/L


 


Anion Gap   (2-11)  mmol/L


 


BUN   (6-24)  mg/dL


 


Creatinine   (0.67-1.17)  mg/dL


 


Est GFR ( Amer)   (>60)  


 


Est GFR (Non-Af Amer)   (>60)  


 


BUN/Creatinine Ratio   (8-20)  


 


Glucose   ()  mg/dL


 


Lactic Acid   (0.5-2.0)  mmol/L


 


Calcium   (8.6-10.3)  mg/dL


 


Total Bilirubin   (0.2-1.0)  mg/dL


 


AST   (13-39)  U/L


 


ALT   (7-52)  U/L


 


Alkaline Phosphatase   ()  U/L


 


Total Creatine Kinase   ()  U/L


 


Troponin I   (<0.04)  ng/mL


 


C-Reactive Protein   (< 5.00)  mg/L


 


B-Natriuretic Peptide  ( - 100) pg/mL


 


Total Protein   (6.4-8.9)  g/dL


 


Albumin   (3.2-5.2)  g/dL


 


Globulin   (2-4)  g/dL


 


Albumin/Globulin Ratio   (1-3)  


 


Urine Color   


 


Urine Appearance   


 


Urine pH   (5-9)  


 


Ur Specific Gravity   (1.010-1.030)  


 


Urine Protein   (Negative)  


 


Urine Ketones   (Negative)  


 


Urine Blood   (Negative)  


 


Urine Nitrate   (Negative)  


 


Urine Bilirubin   (Negative)  


 


Urine Urobilinogen   (Negative)  


 


Ur Leukocyte Esterase   (Negative)  


 


Urine WBC (Auto)   (Absent)  


 


Urine RBC (Auto)   (Absent)  


 


Urine Bacteria   (Absent)  


 


Urine Glucose   (Negative)  


 


Urine Opiates Screen  Presumptive positive H  (None Detect)  


 


Ur Barbiturates Screen  None detected  (None Detect)  


 


Ur Phencyclidine Scrn  None detected  (None Detect)  


 


Ur Amphetamines Screen  None detected  (None Detect)  


 


U Benzodiazepines Scrn  None detected  (None Detect)  


 


Urine Cocaine Screen  None detected  (None Detect)  


 


U Cannabinoids Screen  None detected  (None Detect)  











Result Diagrams: 


 08/07/17 09:30





 08/07/17 09:30


Lab Statement: Any lab studies that have been ordered have been reviewed, and 

results considered in the medical decision making process.





- Radiology


  ** CXR


Xray Interpretation: Positive (See Comments) - SUGGESTED OF OF RIGHT-SIDED 

PNEUMONIA. LEFT LUNG FIELD IS CLEAR.


Radiology Interpretation Completed By: Radiologist





- CT


  ** Brain CT


CT Interpretation: No Acute Changes - NO ACUTE INTRACRANIAL PATHOLOGY


CT Interpretation Completed By: Radiologist





- EKG


  ** 1052


Cardiac Rate: NL - 83 bpm


EKG Rhythm: Sinus Rhythm


EKG Interpretation: Q waves in 3 and aVF, No ST elevation





Altered Mental Statu Course/Dx





- Course


Assessment/Plan: Pt is a 73 y/o M BIBA who presents to ED for concerns over AMS 

characterized as disorientation. Wife reports she woke him up this morning at 

approximately 0700 because he is scheduled to have a lumbar spinal stimulator 

put in place in [place] today. Upon waking up he was disoriented and wife 

noted chills. Wife states his sx have improved since onset. Denies cough and 

fever.  Wife reports that the only change in his routine is that he recently 

had Immodium per PCP orders. Has had a visitng nurse the last 4 weeks. PMHx 

ependymoma tumor. Test results shows acute on chronic renal failure, 

hyponatremia of 128, CRP of 95.45. UA positive for UTI. CXR shows right lower 

lobe PNA. Head CT shows no acute intracranial pathology. In the ER course, the 

pt was started on 2 L of IV fluids and given Zosyn for the PNA and UTI. At this 

point, I disclosed the case with Dr. Gupta who accepted pt for admission. At 

this point, the patient is hemodynamically stable and A&Ox3.





- Diagnoses


Differential Diagnosis/HQI/PQRI: Hypoglycemia, Hypoxia, Sepsis, Other - UTI, 

Pneumonia


Discharge Diagnoses: 


 UTI (urinary tract infection), Sepsis, PNA (pneumonia)








- Provider Notifications


Discussed Care Of Patient With: Rolando Gupta


Time Discussed With Above Provider: 11:30


Instructed by Provider To: Other - Accepts pt for admission





Discharge





- Discharge Plan


Condition: Stable


Disposition: ADMITTED TO [facility]





The documentation as recorded by the Marques julien Rebecca accurately 

reflects the service I personally performed and the decisions made by me, Bari Murphy MD.

## 2017-08-07 NOTE — HP
CC:  Dr. Vicente *

 

[facility] MEDICINE HISTORY AND PHYSICAL:

 

DATE OF ADMISSION:  17

 

PRIMARY CARE PHYSICIAN:  Dr. Vicente.

 

ATTENDING PHYSICIAN:  Rolando Gupta MD * (dictation provided by Luigi Light NP.).

 

CHIEF COMPLAINT:  Weakness and confusion.

 

HISTORY OF PRESENT ILLNESS:  Mr. Pryor is a 72-year-old male with a past 
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 
wheelchair bound.  He also has a neurogenic bladder with chronic Rodriguez; 
hypertension; GERD; asthma; obstructive sleep apnea, on BiPAP; coronary artery 
disease, and hyperlipidemia.  He was planned to go to [facility] for the 
implantation of a nerve stimulator to his back to help with chronic back pain.  
His wife reports that over the past few months, the patient has had increasing 
back pain.  He, in the past 3 weeks, has also been having diarrhea.  She 
reports that his stool is soft, but not watery.  He is incontinent of stool and 
will fill several Depends, 3 to 5 per day.  They have reviewed this with their 
neurologists, who instructed them to start Imodium.  Despite the diarrhea, he 
was feeling okay yesterday. However, today the patient's wife noted that he was 
confused and very weak. He was unable to help with any bed mobility per usual.  
She called EMS who confirmed the patient's weakness and noted that he was 
having difficulty even holding his head up when they arrived.

 

In the emergency room, Mr. Pryor was given 2 L of IV fluids and with that his 
mentation has been improved and he is much more awake and alert.  He is 
oriented x3.  His white count is normal.  He has anemia of 10.5, which is a bit 
less than he was back on 17, when he was 12.3.  However, his stool occult 
blood is negative.  His ESR is 83.  His CRP is 95.45.  Sodium is a low at 128.  
His BUN and creatinine are slightly up from baseline at 79 and 3.07 
respectively.  His chest x-ray shows a right-sided pneumonia.  His O2 
saturation is currently normal on room air.

 

PAST MEDICAL HISTORY:

1.  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 
now with chronic pain and inability to ambulate.

2.  Chronic Rodriguez, secondary to neurogenic bladder.

3.  Hypertension.

4.  GERD.

5.  Asthma.

6.  Obstructive sleep apnea with BiPAP.

7.  Coronary artery disease.

8.  Hyperlipidemia.

9.  BPH.

 

MEDICATIONS:

1.  Tylenol 650 mg p.o. q.i.d. p.r.n.

2.  Folic acid 400 mcg p.o. daily.

3.  Furosemide 40 mg p.o. b.i.d.

4.  Olmesartan 5 mg p.o. daily.

5.  Potassium chloride 10 mEq p.o. daily.

6.  Aspirin 81 mg p.o. daily.

7.  Cholecalciferol 5000 units p.o. daily.

8.  Cyanocobalamin 1000 mcg p.o. daily.

9.  Ferrous gluconate 325 mg p.o. b.i.d.

10.  Imodium 2 mg p.o. q. 4 hours p.r.n.

11.  Omeprazole 40 mg p.o. daily.

12.  Oxycodone 20 mg p.o. q. 4 hours p.r.n.

13.  Pregabalin 150 mg p.o. t.i.d.

14.  Amlodipine 5 mg p.o. daily.

 

ALLERGIES:  To CHICKEN, VANILLA, WEED EXTRACT, ATORVASTATIN, and GRAPES.

 

FAMILY HISTORY:  Father had a history of Parkinson's.  Mother is alive at 99.

 

SOCIAL HISTORY:  No reported alcohol, tobacco, or drug use.  The patient lives 
with his wife, who is is his healthcare proxy.

 

REVIEW OF SYSTEMS:  Constitutional:  No fevers, no chills, no unintended weight 
loss.  Cardiac:  No chest pain.  Positive for chronic lower extremity edema. 
Respiratory:  No cough.  No hemoptysis.  No shortness of breath.  GI:  No nausea
, or vomiting.  Positive for soft stools.  No abdominal pain.  Tolerating oral 
intake well.  :  No gross hematuria or dysuria.  The patient has a chronic 
indwelling Rodriguez.  Neuro:  Positive for weakness to bilateral lower 
extremities.  Eyes:  No visual complaints.  ENT:  No dysphagia.  Musculoskeletal
:  No arthralgias or myalgias.  Skin:  No rashes or lesions.  Psych:  No 
depression or anxiety.

 

                               PHYSICAL EXAMINATION

 

GENERAL:  Mr. Pryor is lying in the bed, he is in no acute distress.

 

VITAL SIGNS:  Blood pressure 108/64, heart rate 90, temperature 99.1, 
respiratory rate 15, O2 saturation 98% on 3 L nasal cannula, and down on 94% on 
room air.

 

LUNGS:  The patient has rhonchi on the right side in the base.

 

HEART:  S1 and S2.  No murmur, rub, or gallop and regular.

 

ABDOMEN:  Soft and nontender with bowel sounds present x4.

 

EXTREMITIES:  Positive for 1+ edema in the feet.

 

NEUROLOGIC:  He is alert.  He is oriented x3.  He moves his upper extremities 
well and has weakness in bilateral lower extremities.  There is no fascial 
asymmetry or focal weakness.  Extraocular movements are intact.

 

SKIN:  Is intact grossly.  He does have what appears to be a healing blood 
blister to his left heel, no open areas there are drainage.

 

 DIAGNOSTIC STUDIES/LABORATORY DATA:  Sodium 128, potassium 4.7, chloride 95, 
serum bicarbonate 25, BUN 79, creatinine 3.7, glucose 133, lactic acid 1.2, alk 
phos 123, CRP 95.45.  WBC 9.6, hemoglobin 10.5, hematocrit 32, platelet count 
198, ESR 83. Urine shows 3+ leuk esterase, 1+ bacteria.  Tox screen is positive 
for opiates.

 

Chest x-ray shows right-sided pneumonia.

 

CT of the brain shows no acute abnormality.

 

EKG shows sinus rhythm with heart rate of 80s and no overt evidence of ischemia.

 

ASSESSMENT:  Mr. Pryor is a 72-year-old male with a past medical history of 
ependymoma, which was found when he was in his 20s with multiple surgeries and 
now chronic back pain and inability to ambulate as well as a chronic Rodriguez due 
to neurogenic bladder, hypertension, asthma, obstructive sleep apnea, and 
coronary artery disease, who presents to hospital today with concern for 
weakness and confusion.  Our plans are for observation in the hospital for the 
followin.  Weakness and confusion:  I suspect that this secondary to pneumonia and 
dehydration.  The patient has improved greatly since being in the emergency 
room and receiving IV fluids.  His chest x-ray notes a pneumonia on the right 
and his lungs demonstrate rhonchi to ausculation as well.  He also appears 
dehydrated with a low sodium and a slight increase in his BUN and creatinine.  
Our plans will be to treat with Levaquin.  The patient has recent admission to 
the hospital back in July; however, he is not severely ill and I think Levaquin 
is an appropriate antibiotic based on the most recent guidelines for healthcare 
associated pneumonia and our pathogen profile here in the hospital.  The 
patient is non-hypoxic and not requiring any oxygen.  Blood cultures have been 
sent.

2.  Anemia:  The patient's hemoglobin and hematocrit are down slightly from his 
baseline.  His stool occult blood is negative.  He is on iron supplementation 
at home and folic acid as well as B12.  I think, this is likely secondary to 
chronic illness and I do not see any overt evidence of bleeding.

3.  Hyponatremia:  Again, I think this is secondary to dehydration.  Plan to 
replete with normal saline and recheck in the a.m.

4.  Acute-on-chronic kidney disease stage 3:  Plan is to treat with intravenous 
fluids and recheck in the a.m.  His home furosemide will be held.

5.  Question urinary tract infection:  The patient does have 3+ leuk esterase 
and 1+ bacteria; however, he has a chronic indwelling Rodriguez making it difficult 
to interpret these findings.  Regardless, he is being started on Levaquin for a 
suspected pneumonia which should cover most urinary pathogens.  Plan to monitor 
and adjust based on clinical course.

6.  Hypertension:  The patient's blood pressure is now 90s systolically.  Plan 
to hold amlodipine, furosemide, olmesartan, and provide IV fluids.

7.  Obstructive sleep apnea:  The patient will have his home BiPAP.

8.  Coronary artery disease:  The patient will continue on aspirin.

9.  Diarrhea:  The patient reports having multiple soft stools at home.  He 
does have Imodium available p.r.n.  He has no nausea.  No abdominal pain.  He 
is tolerating oral intake well.  I do not think any imaging is indicated at 
this point, but will be vigilant and monitor his diarrhea while he is inpatient.

10.  Chronic back pain:  Continue his oxycodone and Lyrica.

11.  DVT prophylaxis with heparin subcutaneous.

12.  Disposition to the telemetry floor for observation for pneumonia. 



TIME SPENT:  Approximately 60 minutes was spent in the admission of this patient
, more than half the time was spent with the patient at the bedside reviewing 
the events leading up to his hospitalization, performing the physical 
examination, and reviewing the plan of care.

 

____________________________________ 

LUIGI LIGHT NP

 

[id number]/CPS #: [id number]

ELISABET

## 2017-08-07 NOTE — RAD
HISTORY: Altered mental status



COMPARISONS: MRI of the brain dated August 13, 2015



TECHNIQUE: Multiple contiguous axial CT scans were obtained of the head without 

intravenous contrast. 



FINDINGS: 

HEMORRHAGE/INFARCT: There is no hemorrhage or acute infarct.

MASSES/SHIFT: There is no mass or shift.



EXTRA-AXIAL SPACES: There are no extra-axial fluid collections.

SULCI AND VENTRICLES: The sulci and ventricles are normal in size and position for the

patient's stated age.



CEREBRUM: There are no focal parenchymal abnormalities.

BRAINSTEM: There are no focal parenchymal abnormalities.

CEREBELLUM: There are no focal parenchymal abnormalities.



VESSELS: The vessels are grossly normal.

PARANASAL SINUSES: The paranasal sinuses are clear.

ORBITS: The orbits are unremarkable.

BONES AND SOFT TISSUE: No bone or soft tissue abnormalities are noted.



OTHER: None



IMPRESSION: 

NO ACUTE INTRACRANIAL PATHOLOGY.

## 2017-08-08 NOTE — PN
Subjective


Date of Service: 08/08/17


Interval History: 





Mr. 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 

with concern for weakness and confusion. He reports loose to watery stools over 

the past 2 weeks; his wife states he has to change his Depends 3-5 times a day 

due to significant incontinence. She has given immodium once or twice with 

little effect. No accompanying fever, abd pain, n/v. 





In regards to the pt's weakness and confusion, his wife reports improvement 

this AM. Patient denies CP, SOB. He has had 2-3 episodes of loose stool this 

morning. No other complaints at this time.





Family History: Unchanged from Admission


Social History: Unchanged from Admission


Past Medical History: Unchanged from Admission





Objective


Active Medications: 








Aspirin (Aspirin Low Dose Tab*)  81 mg PO DAILY [facility]


   Last Admin: 08/08/17 09:14 Dose:  81 mg


Cholecalciferol (Vitamin D Tab*)  5,000 units PO DAILY [facility]


   Last Admin: 08/08/17 09:10 Dose:  5,000 units


Cyanocobalamin (Vitamin B12 Tab*)  1,000 mcg PO DAILY [facility]


   Last Admin: 08/08/17 09:13 Dose:  1,000 mcg


Ferrous Gluconate (Fergon Tab*)  324 mg PO BID [facility]


   Last Admin: 08/08/17 09:10 Dose:  324 mg


Heparin Sodium (Porcine) (Heparin Vial(*))  5,000 units SUBCUT Q8HR [facility]


   Last Admin: 08/08/17 05:31 Dose:  5,000 units


Levofloxacin/Dextrose (Levaquin 750 Mg Ivpremix(*))  750 mg in 150 mls @ 100 mls

/hr IVPB Q48H [facility]


   Last Admin: 08/07/17 14:35 Dose:  100 mls/hr


Lactated Ringer's (Lactated Ringers 1000 Ml Bag*)  1,000 mls @ 100 mls/hr IV 

ONCE ONE


   Stop: 08/08/17 20:36


Lactobacillus Rhamnosus (Culturelle*)  1 cap PO BID [facility]


Loperamide HCl (Imodium Cap*)  2 mg PO Q4H PRN


   PRN Reason: DIARRHEA


Omeprazole (Prilosec Cap*)  40 mg PO DAILY@0730 [facility]


   Last Admin: 08/08/17 09:11 Dose:  40 mg


Oxycodone HCl (Roxycodone Tab*)  20 mg PO Q4H PRN


   PRN Reason: PAIN


   Last Admin: 08/07/17 15:48 Dose:  20 mg


Potassium Chloride (Klor Con Er Tab*)  10 meq PO DAILY [facility]


   Last Admin: 08/08/17 09:14 Dose:  10 meq


Pregabalin (Lyrica Cap(*))  150 mg PO TID [facility]


   Last Admin: 08/08/17 09:12 Dose:  150 mg








 Vital Signs











  08/07/17 08/07/17 08/07/17





  13:31 14:33 15:21


 


Temperature 97.7 F  97.7 F


 


Pulse Rate 89  114


 


Respiratory 16 16 16





Rate   


 


Blood Pressure 126/50  109/54





(mmHg)   


 


O2 Sat by Pulse 95  94





Oximetry   














  08/07/17 08/07/17 08/07/17





  15:48 16:33 17:39


 


Temperature   


 


Pulse Rate   


 


Respiratory 16 16 





Rate   


 


Blood Pressure   





(mmHg)   


 


O2 Sat by Pulse   94





Oximetry   














  08/07/17 08/07/17 08/07/17





  17:48 19:12 20:00


 


Temperature  98.4 F 


 


Pulse Rate  91 


 


Respiratory 18 16 





Rate   


 


Blood Pressure  116/52 





(mmHg)   


 


O2 Sat by Pulse  93 93





Oximetry   














  08/07/17 08/07/17 08/08/17





  22:05 23:29 00:05


 


Temperature  98.8 F 


 


Pulse Rate  88 


 


Respiratory 18 20 16





Rate   


 


Blood Pressure  132/52 





(mmHg)   


 


O2 Sat by Pulse  93 





Oximetry   














  08/08/17 08/08/17





  04:31 09:12


 


Temperature  


 


Pulse Rate 81 


 


Respiratory 20 18





Rate  


 


Blood Pressure 140/58 





(mmHg)  


 


O2 Sat by Pulse 94 





Oximetry  











Oxygen Devices in Use Now: None


Appearance: Male patient, OOB to wheelchair, in NAD


Eyes: No Scleral Icterus


Ears/Nose/Mouth/Throat: Clear Oropharnyx, Mucous Membranes Moist


Neck: NL Appearance and Movements; NL JVP


Respiratory: Symmetrical Chest Expansion and Respiratory Effort, - - rhonchi in 

right base


Cardiovascular: NL Sounds; No Murmurs; No JVD, RRR


Abdominal: NL Sounds; No Tenderness; No Distention


Extremities: - - +1 pedal edema


Neurological: Alert and Oriented x 3, - - good upper extremity strength, 

patient can pull self across for transfer, BLE weakness


Lines/Tubes/Other Access: Clean, Dry and Intact Peripheral IV


Result Diagrams: 


 08/08/17 05:14





 08/08/17 05:14


Additional Lab and Data: 


 Lab Results











  08/07/17 08/07/17 08/07/17 Range/Units





  09:30 09:30 09:30 


 


WBC  9.6    (3.5-10.8)  10^3/ul


 


RBC  3.88 L    (4.0-5.4)  10^6/ul


 


Hgb  10.5 L    (14.0-18.0)  g/dl


 


Hct  32 L    (42-52)  %


 


MCV  82    (80-94)  fL


 


MCH  27    (27-31)  pg


 


MCHC  33    (31-36)  g/dl


 


RDW  16 H    (10.5-15)  %


 


Plt Count  198    (150-450)  10^3/ul


 


MPV  9    (7.4-10.4)  um3


 


Neut % (Auto)  83.1 H    (38-83)  %


 


Lymph % (Auto)  3.9 L    (25-47)  %


 


Mono % (Auto)  12.5 H    (1-9)  %


 


Eos % (Auto)  0.2    (0-6)  %


 


Baso % (Auto)  0.3    (0-2)  %


 


Absolute Neuts (auto)  8.0 H    (1.5-7.7)  10^3/ul


 


Absolute Lymphs (auto)  0.4 L    (1.0-4.8)  10^3/ul


 


Absolute Monos (auto)  1.2 H    (0-0.8)  10^3/ul


 


Absolute Eos (auto)  0    (0-0.6)  10^3/ul


 


Absolute Basos (auto)  0    (0-0.2)  10^3/ul


 


Absolute Nucleated RBC  0.01    10^3/ul


 


Nucleated RBC %  0.1    


 


ESR  83 H    (0-40)  mm/Hr


 


INR (Anticoag Therapy)   0.98   (0.89-1.11)  


 


APTT   26.9   (26.0-36.3)  seconds


 


Sodium    128 L  (133-145)  mmol/L


 


Potassium    4.7  (3.5-5.0)  mmol/L


 


Chloride    95 L  (101-111)  mmol/L


 


Carbon Dioxide    25  (22-32)  mmol/L


 


Anion Gap    8  (2-11)  mmol/L


 


BUN    79 H  (6-24)  mg/dL


 


Creatinine    3.07 H  (0.67-1.17)  mg/dL


 


Est GFR ( Amer)    26.0  (>60)  


 


Est GFR (Non-Af Amer)    20.2  (>60)  


 


BUN/Creatinine Ratio    25.7 H  (8-20)  


 


Glucose    133 H  ()  mg/dL


 


Lactic Acid     (0.5-2.0)  mmol/L


 


Calcium    9.4  (8.6-10.3)  mg/dL


 


Total Bilirubin    0.50  (0.2-1.0)  mg/dL


 


AST    25  (13-39)  U/L


 


ALT    18  (7-52)  U/L


 


Alkaline Phosphatase    123 H  ()  U/L


 


Total Creatine Kinase    19  ()  U/L


 


Troponin I    0.03  (<0.04)  ng/mL


 


C-Reactive Protein    95.45 H  (< 5.00)  mg/L


 


B-Natriuretic Peptide    ( - 100) pg/mL


 


Total Protein    5.9 L  (6.4-8.9)  g/dL


 


Albumin    2.9 L  (3.2-5.2)  g/dL


 


Globulin    3.0  (2-4)  g/dL


 


Albumin/Globulin Ratio    1.0  (1-3)  


 


Urine Color     


 


Urine Appearance     


 


Urine pH     (5-9)  


 


Ur Specific Gravity     (1.010-1.030)  


 


Urine Protein     (Negative)  


 


Urine Ketones     (Negative)  


 


Urine Blood     (Negative)  


 


Urine Nitrate     (Negative)  


 


Urine Bilirubin     (Negative)  


 


Urine Urobilinogen     (Negative)  


 


Ur Leukocyte Esterase     (Negative)  


 


Urine WBC (Auto)     (Absent)  


 


Urine RBC (Auto)     (Absent)  


 


Urine Bacteria     (Absent)  


 


Urine Glucose     (Negative)  


 


Urine Opiates Screen     (None Detect)  


 


Ur Barbiturates Screen     (None Detect)  


 


Ur Phencyclidine Scrn     (None Detect)  


 


Ur Amphetamines Screen     (None Detect)  


 


U Benzodiazepines Scrn     (None Detect)  


 


Urine Cocaine Screen     (None Detect)  


 


U Cannabinoids Screen     (None Detect)  














  08/07/17 08/07/17 08/07/17 Range/Units





  09:30 09:30 10:15 


 


WBC     (3.5-10.8)  10^3/ul


 


RBC     (4.0-5.4)  10^6/ul


 


Hgb     (14.0-18.0)  g/dl


 


Hct     (42-52)  %


 


MCV     (80-94)  fL


 


MCH     (27-31)  pg


 


MCHC     (31-36)  g/dl


 


RDW     (10.5-15)  %


 


Plt Count     (150-450)  10^3/ul


 


MPV     (7.4-10.4)  um3


 


Neut % (Auto)     (38-83)  %


 


Lymph % (Auto)     (25-47)  %


 


Mono % (Auto)     (1-9)  %


 


Eos % (Auto)     (0-6)  %


 


Baso % (Auto)     (0-2)  %


 


Absolute Neuts (auto)     (1.5-7.7)  10^3/ul


 


Absolute Lymphs (auto)     (1.0-4.8)  10^3/ul


 


Absolute Monos (auto)     (0-0.8)  10^3/ul


 


Absolute Eos (auto)     (0-0.6)  10^3/ul


 


Absolute Basos (auto)     (0-0.2)  10^3/ul


 


Absolute Nucleated RBC     10^3/ul


 


Nucleated RBC %     


 


ESR     (0-40)  mm/Hr


 


INR (Anticoag Therapy)     (0.89-1.11)  


 


APTT     (26.0-36.3)  seconds


 


Sodium     (133-145)  mmol/L


 


Potassium     (3.5-5.0)  mmol/L


 


Chloride     (101-111)  mmol/L


 


Carbon Dioxide     (22-32)  mmol/L


 


Anion Gap     (2-11)  mmol/L


 


BUN     (6-24)  mg/dL


 


Creatinine     (0.67-1.17)  mg/dL


 


Est GFR ( Amer)     (>60)  


 


Est GFR (Non-Af Amer)     (>60)  


 


BUN/Creatinine Ratio     (8-20)  


 


Glucose     ()  mg/dL


 


Lactic Acid  1.2    (0.5-2.0)  mmol/L


 


Calcium     (8.6-10.3)  mg/dL


 


Total Bilirubin     (0.2-1.0)  mg/dL


 


AST     (13-39)  U/L


 


ALT     (7-52)  U/L


 


Alkaline Phosphatase     ()  U/L


 


Total Creatine Kinase     ()  U/L


 


Troponin I     (<0.04)  ng/mL


 


C-Reactive Protein     (< 5.00)  mg/L


 


B-Natriuretic Peptide   30  ( - 100) pg/mL


 


Total Protein     (6.4-8.9)  g/dL


 


Albumin     (3.2-5.2)  g/dL


 


Globulin     (2-4)  g/dL


 


Albumin/Globulin Ratio     (1-3)  


 


Urine Color    Yellow  


 


Urine Appearance    Cloudy  


 


Urine pH    5.0  (5-9)  


 


Ur Specific Gravity    1.008 L  (1.010-1.030)  


 


Urine Protein    Negative  (Negative)  


 


Urine Ketones    Negative  (Negative)  


 


Urine Blood    1+ H  (Negative)  


 


Urine Nitrate    Negative  (Negative)  


 


Urine Bilirubin    Negative  (Negative)  


 


Urine Urobilinogen    Negative  (Negative)  


 


Ur Leukocyte Esterase    3+ H  (Negative)  


 


Urine WBC (Auto)    Trace(0-5/hpf)  (Absent)  


 


Urine RBC (Auto)    Absent  (Absent)  


 


Urine Bacteria    1+ H  (Absent)  


 


Urine Glucose    Negative  (Negative)  


 


Urine Opiates Screen     (None Detect)  


 


Ur Barbiturates Screen     (None Detect)  


 


Ur Phencyclidine Scrn     (None Detect)  


 


Ur Amphetamines Screen     (None Detect)  


 


U Benzodiazepines Scrn     (None Detect)  


 


Urine Cocaine Screen     (None Detect)  


 


U Cannabinoids Screen     (None Detect)  














  08/07/17 Range/Units





  10:15 


 


WBC   (3.5-10.8)  10^3/ul


 


RBC   (4.0-5.4)  10^6/ul


 


Hgb   (14.0-18.0)  g/dl


 


Hct   (42-52)  %


 


MCV   (80-94)  fL


 


MCH   (27-31)  pg


 


MCHC   (31-36)  g/dl


 


RDW   (10.5-15)  %


 


Plt Count   (150-450)  10^3/ul


 


MPV   (7.4-10.4)  um3


 


Neut % (Auto)   (38-83)  %


 


Lymph % (Auto)   (25-47)  %


 


Mono % (Auto)   (1-9)  %


 


Eos % (Auto)   (0-6)  %


 


Baso % (Auto)   (0-2)  %


 


Absolute Neuts (auto)   (1.5-7.7)  10^3/ul


 


Absolute Lymphs (auto)   (1.0-4.8)  10^3/ul


 


Absolute Monos (auto)   (0-0.8)  10^3/ul


 


Absolute Eos (auto)   (0-0.6)  10^3/ul


 


Absolute Basos (auto)   (0-0.2)  10^3/ul


 


Absolute Nucleated RBC   10^3/ul


 


Nucleated RBC %   


 


ESR   (0-40)  mm/Hr


 


INR (Anticoag Therapy)   (0.89-1.11)  


 


APTT   (26.0-36.3)  seconds


 


Sodium   (133-145)  mmol/L


 


Potassium   (3.5-5.0)  mmol/L


 


Chloride   (101-111)  mmol/L


 


Carbon Dioxide   (22-32)  mmol/L


 


Anion Gap   (2-11)  mmol/L


 


BUN   (6-24)  mg/dL


 


Creatinine   (0.67-1.17)  mg/dL


 


Est GFR ( Amer)   (>60)  


 


Est GFR (Non-Af Amer)   (>60)  


 


BUN/Creatinine Ratio   (8-20)  


 


Glucose   ()  mg/dL


 


Lactic Acid   (0.5-2.0)  mmol/L


 


Calcium   (8.6-10.3)  mg/dL


 


Total Bilirubin   (0.2-1.0)  mg/dL


 


AST   (13-39)  U/L


 


ALT   (7-52)  U/L


 


Alkaline Phosphatase   ()  U/L


 


Total Creatine Kinase   ()  U/L


 


Troponin I   (<0.04)  ng/mL


 


C-Reactive Protein   (< 5.00)  mg/L


 


B-Natriuretic Peptide  ( - 100) pg/mL


 


Total Protein   (6.4-8.9)  g/dL


 


Albumin   (3.2-5.2)  g/dL


 


Globulin   (2-4)  g/dL


 


Albumin/Globulin Ratio   (1-3)  


 


Urine Color   


 


Urine Appearance   


 


Urine pH   (5-9)  


 


Ur Specific Gravity   (1.010-1.030)  


 


Urine Protein   (Negative)  


 


Urine Ketones   (Negative)  


 


Urine Blood   (Negative)  


 


Urine Nitrate   (Negative)  


 


Urine Bilirubin   (Negative)  


 


Urine Urobilinogen   (Negative)  


 


Ur Leukocyte Esterase   (Negative)  


 


Urine WBC (Auto)   (Absent)  


 


Urine RBC (Auto)   (Absent)  


 


Urine Bacteria   (Absent)  


 


Urine Glucose   (Negative)  


 


Urine Opiates Screen  Presumptive positive H  (None Detect)  


 


Ur Barbiturates Screen  None detected  (None Detect)  


 


Ur Phencyclidine Scrn  None detected  (None Detect)  


 


Ur Amphetamines Screen  None detected  (None Detect)  


 


U Benzodiazepines Scrn  None detected  (None Detect)  


 


Urine Cocaine Screen  None detected  (None Detect)  


 


U Cannabinoids Screen  None detected  (None Detect)  














Assess/Plan/Problems-Billing


Assessment: 





Mr. 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 

chronic back pain and inability to ambulate), neurogenic bladder with london, HTN

, GERD, asthma, BONIFACIO with BiPAP, CAD, HLD, and BPH who presented to the ED on 8/7 /17 with concern for weakness and confusion and was found to have a right 

pneumonia.


 





- Patient Problems


(1) Community acquired pneumonia


Code(s): J18.9 - PNEUMONIA, UNSPECIFIED ORGANISM   Comment: 


Right sided pneumonia


Afebrile, no supplemental O2 needs


Continue levofloxacin   





(2) Altered mental status


Code(s): R41.82 - ALTERED MENTAL STATUS, UNSPECIFIED   Comment: 


With weakness


Now improved


Suspect secondary to new pna as well as dehydration from diarrhea


Continue IV hydration


Levofloxacin for treatment of CAP   





(3) Anemia


Code(s): D64.9 - ANEMIA, UNSPECIFIED   Comment: 


No s/s of bleeding


HH stable, suspect mild drop secondary to dilution


Stool occult negative


Suspect anemia of chronic disease


Continue iron supplementation, B12, folate


Continue outpt follow-up   





(4) Hyponatremia


Code(s): E87.1 - HYPO-OSMOLALITY AND HYPONATREMIA   Comment: 


Improving


Suspect secondary to volume depletion


Continue IVF   





(5) Acute kidney injury


Code(s): N17.9 - ACUTE KIDNEY FAILURE, UNSPECIFIED   Comment: 


Improving


Acute on chronic injury (pt with stage III CKD)


Continue IVF


Suspect secondary to hypovolemia   





(6) CKD (chronic kidney disease), stage III


Code(s): N18.3 - CHRONIC KIDNEY DISEASE, STAGE 3 (MODERATE)   Comment: 


Baseline creatinine 2.1 to 2.5


Avoid nephrotoxic medications   





(7) UTI (urinary tract infection)


Status: Acute   Comment: 


UA with positive leukocyte esterase


Patient with chronic indwelling london catheter


Await urine cx


Levofloxacin for CAP will cover urinary pathogens   





(8) HTN (hypertension)


Code(s): I10 - ESSENTIAL (PRIMARY) HYPERTENSION   Comment: 


Normotensive


Resume furosemide, olmesartan, and amlodipine when BP allows   





(9) BONIFACIO (obstructive sleep apnea)


Code(s): G47.33 - OBSTRUCTIVE SLEEP APNEA (ADULT) (PEDIATRIC)   Comment: 


Continue home BiPAP use.   





(10) CAD (coronary artery disease)


Code(s): I25.10 - ATHSCL HEART DISEASE OF NATIVE CORONARY ARTERY W/O ANG PCTRS 

  Comment: 


Continue ASA.   





(11) Diarrhea


Code(s): R19.7 - DIARRHEA, UNSPECIFIED   Comment: 


Persistent diarrhea x 2-3 weeks


No concurrent fever, abd pain, n/v


Check legionella antigen, stool culture, ova and parasites


Continue prn loperamide.   





(12) Chronic back pain


Code(s): M54.9 - DORSALGIA, UNSPECIFIED; G89.29 - OTHER CHRONIC PAIN   Comment: 


Secondary to ependymoma


Continue oxycodone and pregabalin.   





(13) Ependymoma


Code(s): C71.9 - MALIGNANT NEOPLASM OF BRAIN, UNSPECIFIED   Comment: 


To the spine (dx in patient's 20s)


With concurrent chronic back pain and inability to ambulate


S/p multiple surgeries   





(14) DVT prophylaxis


Comment: 


SQ heparin   


Status and Disposition: 





OBV admit. Dc to home when medically stable.

## 2017-08-09 NOTE — RAD
HISTORY: Altered mental status



COMPARISONS: August 17, 2017



TECHNIQUE: Multiple contiguous axial CT scans were obtained of the head without 

intravenous contrast. 



FINDINGS: 

HEMORRHAGE/INFARCT: There is no hemorrhage or acute infarct.

MASSES/SHIFT: There is no mass or shift.



EXTRA-AXIAL SPACES: There are no extra-axial fluid collections.

SULCI AND VENTRICLES: The sulci and ventricles are normal in size and position for the

patient's stated age.



CEREBRUM: There are no focal parenchymal abnormalities.

BRAINSTEM: There are no focal parenchymal abnormalities.

CEREBELLUM: There are no focal parenchymal abnormalities.



VESSELS: The vessels are grossly normal.

PARANASAL SINUSES: The paranasal sinuses are clear.

ORBITS: The orbits are unremarkable.

BONES AND SOFT TISSUE: No bone or soft tissue abnormalities are noted.



OTHER: None



IMPRESSION: 

NO ACUTE INTRACRANIAL PATHOLOGY.

## 2017-08-09 NOTE — PN
Subjective


Date of Service: 08/09/17


Interval History: 





Called to patient's bedside for report of AMS. Mr. Pryor was reportedly at 

baseline mental status around 0800. Around 0820, patient noted to have pulled 

his IVs out and was trying to stand and ambulate. He was oriented x 0. 

Confusion appeared to resolve by the time I arrived, patient has no 

recollection of event, but is able to identify his wife and that he is in the 

hospital. His wife notes that the patient has had progressive confusion at home 

but this appeared different. No significant VS changes, but patient's wife did 

note that his pain medication is not the same as at home, where he takes around 

the clock Tylenol and oxycodone. Patient currently denies any complaints, 

including pain, but he is notably restless and continues to try to readjust 

himself in bed.








Family History: Unchanged from Admission


Social History: Unchanged from Admission


Past Medical History: Unchanged from Admission





Objective


Active Medications: 








Aspirin (Aspirin Low Dose Tab*)  81 mg PO DAILY [facility]


   Last Admin: 08/08/17 09:14 Dose:  81 mg


Cholecalciferol (Vitamin D Tab*)  5,000 units PO DAILY [facility]


   Last Admin: 08/08/17 09:10 Dose:  5,000 units


Cyanocobalamin (Vitamin B12 Tab*)  1,000 mcg PO DAILY [facility]


   Last Admin: 08/08/17 09:13 Dose:  1,000 mcg


Ferrous Gluconate (Fergon Tab*)  324 mg PO BID [facility]


   Last Admin: 08/08/17 22:48 Dose:  324 mg


Heparin Sodium (Porcine) (Heparin Vial(*))  5,000 units SUBCUT Q8HR [facility]


   Last Admin: 08/09/17 05:37 Dose:  5,000 units


Levofloxacin/Dextrose (Levaquin 750 Mg Ivpremix(*))  750 mg in 150 mls @ 100 mls

/hr IVPB Q48H [facility]


   Last Admin: 08/07/17 14:35 Dose:  100 mls/hr


Lactobacillus Rhamnosus (Culturelle*)  1 cap PO BID [facility]


   Last Admin: 08/08/17 22:47 Dose:  1 cap


Loperamide HCl (Imodium Cap*)  2 mg PO Q4H PRN


   PRN Reason: DIARRHEA


Omeprazole (Prilosec Cap*)  40 mg PO DAILY@0730 [facility]


   Last Admin: 08/09/17 05:39 Dose:  40 mg


Potassium Chloride (Klor Con Er Tab*)  10 meq PO DAILY [facility]


   Last Admin: 08/08/17 09:14 Dose:  10 meq


Pregabalin (Lyrica Cap(*))  150 mg PO TID [facility]


   Last Admin: 08/08/17 22:47 Dose:  150 mg








 Vital Signs











  08/08/17 08/08/17 08/08/17





  14:55 14:56 15:23


 


Temperature   98.3 F


 


Pulse Rate   92


 


Respiratory 16 16 25





Rate   


 


Blood Pressure   159/77





(mmHg)   


 


O2 Sat by Pulse   97





Oximetry   














  08/08/17 08/08/17 08/08/17





  16:55 20:00 20:07


 


Temperature   98.8 F


 


Pulse Rate   107


 


Respiratory 15 14 22





Rate   


 


Blood Pressure   119/65





(mmHg)   


 


O2 Sat by Pulse  97 93





Oximetry   














  08/08/17 08/08/17 08/09/17





  22:47 23:14 04:14


 


Temperature   98.5 F


 


Pulse Rate  76 76


 


Respiratory 12 15 17





Rate   


 


Blood Pressure  116/49 120/45





(mmHg)   


 


O2 Sat by Pulse  95 94





Oximetry   














  08/09/17 08/09/17 08/09/17





  07:17 08:35 08:37


 


Temperature  97.5 F 


 


Pulse Rate 83 80 


 


Respiratory 19 20 





Rate   


 


Blood Pressure 122/47 126/56 





(mmHg)   


 


O2 Sat by Pulse 95 95 93





Oximetry   











Oxygen Devices in Use Now: None


Appearance: Older male patient, sitting up in bed, restless


Eyes: No Scleral Icterus


Ears/Nose/Mouth/Throat: Clear Oropharnyx, Mucous Membranes Moist


Neck: NL Appearance and Movements; NL JVP


Respiratory: Symmetrical Chest Expansion and Respiratory Effort, Clear to 

Auscultation


Cardiovascular: NL Sounds; No Murmurs; No JVD, RRR


Abdominal: NL Sounds; No Tenderness; No Distention


Skin: - - left heel blister, weeping serous fluid


Neurological: - - Alert, oriented to self, place, following commands


Nutrition: Taking PO's


Result Diagrams: 


 08/09/17 06:44





 08/09/17 06:44


Additional Lab and Data: 


 Lab Results











  08/07/17 08/07/17 08/07/17 Range/Units





  09:30 09:30 09:30 


 


WBC  9.6    (3.5-10.8)  10^3/ul


 


RBC  3.88 L    (4.0-5.4)  10^6/ul


 


Hgb  10.5 L    (14.0-18.0)  g/dl


 


Hct  32 L    (42-52)  %


 


MCV  82    (80-94)  fL


 


MCH  27    (27-31)  pg


 


MCHC  33    (31-36)  g/dl


 


RDW  16 H    (10.5-15)  %


 


Plt Count  198    (150-450)  10^3/ul


 


MPV  9    (7.4-10.4)  um3


 


Neut % (Auto)  83.1 H    (38-83)  %


 


Lymph % (Auto)  3.9 L    (25-47)  %


 


Mono % (Auto)  12.5 H    (1-9)  %


 


Eos % (Auto)  0.2    (0-6)  %


 


Baso % (Auto)  0.3    (0-2)  %


 


Absolute Neuts (auto)  8.0 H    (1.5-7.7)  10^3/ul


 


Absolute Lymphs (auto)  0.4 L    (1.0-4.8)  10^3/ul


 


Absolute Monos (auto)  1.2 H    (0-0.8)  10^3/ul


 


Absolute Eos (auto)  0    (0-0.6)  10^3/ul


 


Absolute Basos (auto)  0    (0-0.2)  10^3/ul


 


Absolute Nucleated RBC  0.01    10^3/ul


 


Nucleated RBC %  0.1    


 


ESR  83 H    (0-40)  mm/Hr


 


INR (Anticoag Therapy)   0.98   (0.89-1.11)  


 


APTT   26.9   (26.0-36.3)  seconds


 


Sodium    128 L  (133-145)  mmol/L


 


Potassium    4.7  (3.5-5.0)  mmol/L


 


Chloride    95 L  (101-111)  mmol/L


 


Carbon Dioxide    25  (22-32)  mmol/L


 


Anion Gap    8  (2-11)  mmol/L


 


BUN    79 H  (6-24)  mg/dL


 


Creatinine    3.07 H  (0.67-1.17)  mg/dL


 


Est GFR ( Amer)    26.0  (>60)  


 


Est GFR (Non-Af Amer)    20.2  (>60)  


 


BUN/Creatinine Ratio    25.7 H  (8-20)  


 


Glucose    133 H  ()  mg/dL


 


Lactic Acid     (0.5-2.0)  mmol/L


 


Calcium    9.4  (8.6-10.3)  mg/dL


 


Total Bilirubin    0.50  (0.2-1.0)  mg/dL


 


AST    25  (13-39)  U/L


 


ALT    18  (7-52)  U/L


 


Alkaline Phosphatase    123 H  ()  U/L


 


Total Creatine Kinase    19  ()  U/L


 


Troponin I    0.03  (<0.04)  ng/mL


 


C-Reactive Protein    95.45 H  (< 5.00)  mg/L


 


B-Natriuretic Peptide    ( - 100) pg/mL


 


Total Protein    5.9 L  (6.4-8.9)  g/dL


 


Albumin    2.9 L  (3.2-5.2)  g/dL


 


Globulin    3.0  (2-4)  g/dL


 


Albumin/Globulin Ratio    1.0  (1-3)  


 


Urine Color     


 


Urine Appearance     


 


Urine pH     (5-9)  


 


Ur Specific Gravity     (1.010-1.030)  


 


Urine Protein     (Negative)  


 


Urine Ketones     (Negative)  


 


Urine Blood     (Negative)  


 


Urine Nitrate     (Negative)  


 


Urine Bilirubin     (Negative)  


 


Urine Urobilinogen     (Negative)  


 


Ur Leukocyte Esterase     (Negative)  


 


Urine WBC (Auto)     (Absent)  


 


Urine RBC (Auto)     (Absent)  


 


Urine Bacteria     (Absent)  


 


Urine Glucose     (Negative)  


 


Urine Opiates Screen     (None Detect)  


 


Ur Barbiturates Screen     (None Detect)  


 


Ur Phencyclidine Scrn     (None Detect)  


 


Ur Amphetamines Screen     (None Detect)  


 


U Benzodiazepines Scrn     (None Detect)  


 


Urine Cocaine Screen     (None Detect)  


 


U Cannabinoids Screen     (None Detect)  














  08/07/17 08/07/17 08/07/17 Range/Units





  09:30 09:30 10:15 


 


WBC     (3.5-10.8)  10^3/ul


 


RBC     (4.0-5.4)  10^6/ul


 


Hgb     (14.0-18.0)  g/dl


 


Hct     (42-52)  %


 


MCV     (80-94)  fL


 


MCH     (27-31)  pg


 


MCHC     (31-36)  g/dl


 


RDW     (10.5-15)  %


 


Plt Count     (150-450)  10^3/ul


 


MPV     (7.4-10.4)  um3


 


Neut % (Auto)     (38-83)  %


 


Lymph % (Auto)     (25-47)  %


 


Mono % (Auto)     (1-9)  %


 


Eos % (Auto)     (0-6)  %


 


Baso % (Auto)     (0-2)  %


 


Absolute Neuts (auto)     (1.5-7.7)  10^3/ul


 


Absolute Lymphs (auto)     (1.0-4.8)  10^3/ul


 


Absolute Monos (auto)     (0-0.8)  10^3/ul


 


Absolute Eos (auto)     (0-0.6)  10^3/ul


 


Absolute Basos (auto)     (0-0.2)  10^3/ul


 


Absolute Nucleated RBC     10^3/ul


 


Nucleated RBC %     


 


ESR     (0-40)  mm/Hr


 


INR (Anticoag Therapy)     (0.89-1.11)  


 


APTT     (26.0-36.3)  seconds


 


Sodium     (133-145)  mmol/L


 


Potassium     (3.5-5.0)  mmol/L


 


Chloride     (101-111)  mmol/L


 


Carbon Dioxide     (22-32)  mmol/L


 


Anion Gap     (2-11)  mmol/L


 


BUN     (6-24)  mg/dL


 


Creatinine     (0.67-1.17)  mg/dL


 


Est GFR ( Amer)     (>60)  


 


Est GFR (Non-Af Amer)     (>60)  


 


BUN/Creatinine Ratio     (8-20)  


 


Glucose     ()  mg/dL


 


Lactic Acid  1.2    (0.5-2.0)  mmol/L


 


Calcium     (8.6-10.3)  mg/dL


 


Total Bilirubin     (0.2-1.0)  mg/dL


 


AST     (13-39)  U/L


 


ALT     (7-52)  U/L


 


Alkaline Phosphatase     ()  U/L


 


Total Creatine Kinase     ()  U/L


 


Troponin I     (<0.04)  ng/mL


 


C-Reactive Protein     (< 5.00)  mg/L


 


B-Natriuretic Peptide   30  ( - 100) pg/mL


 


Total Protein     (6.4-8.9)  g/dL


 


Albumin     (3.2-5.2)  g/dL


 


Globulin     (2-4)  g/dL


 


Albumin/Globulin Ratio     (1-3)  


 


Urine Color    Yellow  


 


Urine Appearance    Cloudy  


 


Urine pH    5.0  (5-9)  


 


Ur Specific Gravity    1.008 L  (1.010-1.030)  


 


Urine Protein    Negative  (Negative)  


 


Urine Ketones    Negative  (Negative)  


 


Urine Blood    1+ H  (Negative)  


 


Urine Nitrate    Negative  (Negative)  


 


Urine Bilirubin    Negative  (Negative)  


 


Urine Urobilinogen    Negative  (Negative)  


 


Ur Leukocyte Esterase    3+ H  (Negative)  


 


Urine WBC (Auto)    Trace(0-5/hpf)  (Absent)  


 


Urine RBC (Auto)    Absent  (Absent)  


 


Urine Bacteria    1+ H  (Absent)  


 


Urine Glucose    Negative  (Negative)  


 


Urine Opiates Screen     (None Detect)  


 


Ur Barbiturates Screen     (None Detect)  


 


Ur Phencyclidine Scrn     (None Detect)  


 


Ur Amphetamines Screen     (None Detect)  


 


U Benzodiazepines Scrn     (None Detect)  


 


Urine Cocaine Screen     (None Detect)  


 


U Cannabinoids Screen     (None Detect)  














  08/07/17 Range/Units





  10:15 


 


WBC   (3.5-10.8)  10^3/ul


 


RBC   (4.0-5.4)  10^6/ul


 


Hgb   (14.0-18.0)  g/dl


 


Hct   (42-52)  %


 


MCV   (80-94)  fL


 


MCH   (27-31)  pg


 


MCHC   (31-36)  g/dl


 


RDW   (10.5-15)  %


 


Plt Count   (150-450)  10^3/ul


 


MPV   (7.4-10.4)  um3


 


Neut % (Auto)   (38-83)  %


 


Lymph % (Auto)   (25-47)  %


 


Mono % (Auto)   (1-9)  %


 


Eos % (Auto)   (0-6)  %


 


Baso % (Auto)   (0-2)  %


 


Absolute Neuts (auto)   (1.5-7.7)  10^3/ul


 


Absolute Lymphs (auto)   (1.0-4.8)  10^3/ul


 


Absolute Monos (auto)   (0-0.8)  10^3/ul


 


Absolute Eos (auto)   (0-0.6)  10^3/ul


 


Absolute Basos (auto)   (0-0.2)  10^3/ul


 


Absolute Nucleated RBC   10^3/ul


 


Nucleated RBC %   


 


ESR   (0-40)  mm/Hr


 


INR (Anticoag Therapy)   (0.89-1.11)  


 


APTT   (26.0-36.3)  seconds


 


Sodium   (133-145)  mmol/L


 


Potassium   (3.5-5.0)  mmol/L


 


Chloride   (101-111)  mmol/L


 


Carbon Dioxide   (22-32)  mmol/L


 


Anion Gap   (2-11)  mmol/L


 


BUN   (6-24)  mg/dL


 


Creatinine   (0.67-1.17)  mg/dL


 


Est GFR ( Amer)   (>60)  


 


Est GFR (Non-Af Amer)   (>60)  


 


BUN/Creatinine Ratio   (8-20)  


 


Glucose   ()  mg/dL


 


Lactic Acid   (0.5-2.0)  mmol/L


 


Calcium   (8.6-10.3)  mg/dL


 


Total Bilirubin   (0.2-1.0)  mg/dL


 


AST   (13-39)  U/L


 


ALT   (7-52)  U/L


 


Alkaline Phosphatase   ()  U/L


 


Total Creatine Kinase   ()  U/L


 


Troponin I   (<0.04)  ng/mL


 


C-Reactive Protein   (< 5.00)  mg/L


 


B-Natriuretic Peptide  ( - 100) pg/mL


 


Total Protein   (6.4-8.9)  g/dL


 


Albumin   (3.2-5.2)  g/dL


 


Globulin   (2-4)  g/dL


 


Albumin/Globulin Ratio   (1-3)  


 


Urine Color   


 


Urine Appearance   


 


Urine pH   (5-9)  


 


Ur Specific Gravity   (1.010-1.030)  


 


Urine Protein   (Negative)  


 


Urine Ketones   (Negative)  


 


Urine Blood   (Negative)  


 


Urine Nitrate   (Negative)  


 


Urine Bilirubin   (Negative)  


 


Urine Urobilinogen   (Negative)  


 


Ur Leukocyte Esterase   (Negative)  


 


Urine WBC (Auto)   (Absent)  


 


Urine RBC (Auto)   (Absent)  


 


Urine Bacteria   (Absent)  


 


Urine Glucose   (Negative)  


 


Urine Opiates Screen  Presumptive positive H  (None Detect)  


 


Ur Barbiturates Screen  None detected  (None Detect)  


 


Ur Phencyclidine Scrn  None detected  (None Detect)  


 


Ur Amphetamines Screen  None detected  (None Detect)  


 


U Benzodiazepines Scrn  None detected  (None Detect)  


 


Urine Cocaine Screen  None detected  (None Detect)  


 


U Cannabinoids Screen  None detected  (None Detect)  














Assess/Plan/Problems-Billing


Assessment: 





Mr. 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 

chronic back pain and inability to ambulate), neurogenic bladder with london, HTN

, GERD, asthma, BONIFACIO with BiPAP, CAD, HLD, and BPH who presented to the ED on 8/7 /17 with concern for weakness and confusion and was found to have a right 

pneumonia.


 





- Patient Problems


(1) Community acquired pneumonia


Code(s): J18.9 - PNEUMONIA, UNSPECIFIED ORGANISM   Comment: 


Right sided pneumonia


Afebrile, no supplemental O2 needs


Continue levofloxacin   





(2) Altered mental status


Code(s): R41.82 - ALTERED MENTAL STATUS, UNSPECIFIED   Comment: 


Recurrence of AMS


Suspect secondary to pain


Check CT brain, neuro checks


Continue supportive care


Levofloxacin for treatment of CAP   





(3) Anemia


Code(s): D64.9 - ANEMIA, UNSPECIFIED   Comment: 


No s/s of bleeding


HH stable, suspect mild drop secondary to dilution


Stool occult negative


Suspect anemia of chronic disease


Continue iron supplementation, B12, folate


Continue outpt follow-up   





(4) Hyponatremia


Code(s): E87.1 - HYPO-OSMOLALITY AND HYPONATREMIA   Comment: 


Resolved


Suspect secondary to volume depletion   





(5) Acute kidney injury


Code(s): N17.9 - ACUTE KIDNEY FAILURE, UNSPECIFIED   Comment: 


Resolved


Acute on chronic injury (pt with stage III CKD)


Suspect secondary to hypovolemia   





(6) CKD (chronic kidney disease), stage III


Code(s): N18.3 - CHRONIC KIDNEY DISEASE, STAGE 3 (MODERATE)   Comment: 


Creatinine within baseline


Baseline creatinine 2.1 to 2.5


Avoid nephrotoxic medications   





(7) UTI (urinary tract infection)


Status: Acute   Comment: 


UA with positive leukocyte esterase


Patient with chronic indwelling london catheter


Await urine cx


Levofloxacin for CAP will cover urinary pathogens   





(8) HTN (hypertension)


Code(s): I10 - ESSENTIAL (PRIMARY) HYPERTENSION   Comment: 


Normotensive


Resume furosemide, olmesartan, and amlodipine when BP allows   





(9) BONIFACIO (obstructive sleep apnea)


Code(s): G47.33 - OBSTRUCTIVE SLEEP APNEA (ADULT) (PEDIATRIC)   Comment: 


Continue home BiPAP use.   





(10) CAD (coronary artery disease)


Code(s): I25.10 - ATHSCL HEART DISEASE OF NATIVE CORONARY ARTERY W/O ANG PCTRS 

  Comment: 


Continue ASA.   





(11) Diarrhea


Code(s): R19.7 - DIARRHEA, UNSPECIFIED   Comment: 


Persistent diarrhea x 2-3 weeks


No concurrent fever, abd pain, n/v


Legionella antigen negative


Stool culture, ova and parasites pending


Continue prn loperamide.   





(12) Chronic back pain


Code(s): M54.9 - DORSALGIA, UNSPECIFIED; G89.29 - OTHER CHRONIC PAIN   Comment: 


Secondary to ependymoma


Continue oxycodone and pregabalin.


Patient takes standing acetaminophen and oxycodone q4h at home.   





(13) Ependymoma


Code(s): C71.9 - MALIGNANT NEOPLASM OF BRAIN, UNSPECIFIED   Comment: 


To the spine (dx in patient's 20s)


With concurrent chronic back pain and inability to ambulate


S/p multiple surgeries   





(14) DVT prophylaxis


Comment: 


SQ heparin   


Status and Disposition: 





Inpatient admit. Dc to home when medically stable.

## 2017-08-10 NOTE — DS
CC:  Dr. Vicente *

 

DISCHARGE SUMMARY:

 

DATE OF ADMISSION:  08/07/17

 

DATE OF DISCHARGE:  08/09/17

 

PROVIDER:  Fern Marino NP.

 

ATTENDING PHYSICIAN:  Coral Ramirez MD * (dictated by Fern Marino NP).

 

PRIMARY CARE PROVIDER:  Dr. Vicente.

 

PRIMARY DISCHARGE DIAGNOSES:

1.  Right-sided pneumonia.

2.  Weakness and confusion.

3.  Acute on chronic kidney injury.

 

SECONDARY DISCHARGE DIAGNOSES:

1.  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

2.  Neurogenic bladder with chronic London.

3.  Hypertension.

4.  Gastroesophageal reflux disease.

5.  Asthma.

6.  Obstructive sleep apnea with BiPAP.

7.  Coronary artery disease.

8.  Hyperlipidemia.

9.  Benign prostatic hypertrophy.

10.  Chronic kidney disease.

 

HOME MEDICATIONS AT DISCHARGE:

1.  Loperamide 2 mg q. 4 hours p.r.n.

2.  Vitamin B12 of 1000 mcg daily.

3.  Aspirin 81 mg daily.

4.  Potassium 10 mEq daily.

5.  Cholecalciferol 5000 units daily.

6.  Folic acid 400 mcg daily.

7.  Ferrous gluconate 324 mg b.i.d.

8.  Omeprazole 40 mg daily.

9.  Amlodipine 5 mg daily.

10.  Olmesartan 5 mg daily.

11.  Pregabalin 150 mg t.i.d.

12.  Oxycodone 20 mg q.4 hours around the clock.

13.  Acetaminophen 650 mg q.4 hours around the clock.

 

NEW MEDICATIONS AT DISCHARGE:  Levofloxacin 750 mg q.48 hours x1 additional 
dose.

 

Note:  The patient was previously on furosemide 40 mg b.i.d., which has been 
held.  At his discharge, the patient is to review resumption of this medication 
when he follows up with his PCP, given that he has diarrhea and has had reduced 
p.o. intake.  The patient has been normotensive here in the hospital, and there 
was concern for further dehydration with the furosemide.

 

HOSPITAL COURSE OF STAY:  For full details, please refer to the H and P 
provided by Yomaira Light on 08/07/17.  In summary, Mr. Pryor is a gentleman with 
a past medical history, as previously stated, who has had increasing back pain 
and, over the past 3 weeks, also been having diarrhea.  Prior to admission, the 
patient's wife noted that he was confused and very weak.  In the emergency room
, the patient was given 2 L of fluids, and it was noted his mentation improved.
  He had a negative stool occult; however, his CRP and ESR were elevated and he 
had a low sodium at 128.  The patient also had increase in renal function from 
his baseline and, hence, the patient was admitted with concern for weakness and 
confusion secondary to pneumonia and dehydration.  The patient was started on 
Levaquin, which has been renally dosed for q.48 hours.  He has been non-hypoxic 
on room air and not requiring any oxygen.  He has been afebrile.  No 
leukocytosis has been noted.

 

Here in the hospital, patient had some recurrent episodes of diarrhea.  We did 
send for some stool studies which included a stool culture with no growth thus 
far.  It was negative for cryptosporidium and Giardia as well as ova and 
parasites.  We also checked the Legionella and S-pneumoniae antigen from the 
urine which were both negative.  The patient has no abdominal pain, no 
associated nausea, vomiting and, again, no fever or leukocytosis.  It seems to 
be persistent diarrhea with an unclear etiology.  The patient has been given 
Imodium, which has decreased the frequency and severity of the diarrhea, and 
the patient has started a probiotic.  The patient's presentation is not 
consistent with a Clostridium difficile infection.  The patient is nontoxic 
appearing.

 

On the morning of 08/09/17, patient was noted to be alert and oriented.  Then a 
few minutes later, nurse came back in room and noted that the patient had 
pulled out his IVs and was markedly disoriented.  This lasted for a few minutes
, and the patient was able to be reoriented and returned back to his baseline 
state with no recollection of the incident.  CT of the brain was negative 
following this event and  further neurological checks over the course of the 
day have been negative as well.  The patient has remained within his baseline.  
The patient's wife states that he does have periods of confusion like this at 
home and he does not typically like tubes such as IVs and London catheters and 
frequently picks at these on a regular basis. Initially the plan was to keep 
the patient overnight until 08/10/17; however, his wife did request for him to 
come home.  She feels that the episode this morning is consistent with his 
previous episodes at home and, with the negative CT of the brain and no further 
episodes over the course of the day, she feels safe bringing him home.  She 
feels like he will recover better there, as opposed to a continued stay here in 
the hospital.

 

In regards to pneumonia, patient will be continued on Levaquin to complete a 5-
day course.  Prescription was sent to the patient's pharmacy.  In terms of 
patient's anemia, he has a mildly decreased H and H which may be reflective of 
some dilutional anemia here in the hospital secondary to fluids, but they are 
roughly within patient's baseline.  Again, he has a negative stool occult, this 
appears to be anemia of chronic disease.  The patient is already on iron 
supplementation as well as vitamin B12 and London which he should continue.  The 
patient's hyponatremia has resolved.  This is likely secondary to dehydration.  
The patient's creatinine has returned to baseline.  There was a question of UTI 
given positive leukocyte esterase and bacteria in his UA; the patient's urine 
culture showed few (1 to 10,000) colony count of citrobacter, which likely 
represents a colonization, given the patient's chronic london and previous UTI 
with the same bacteria. Sensitivities shows intermediate susceptibility with 
levofloxacin. However, in weighing out the clinical significance of this 
finding with the patient's diarrhea, further antibiotic treatment for the urine 
culture has been held at this time. Also, the patient's wife feels that the 
patient's diarrhea may have coincided with discharge from his last hospital 
admission in July, where the patient received antibiotics and subsequently has 
had persistent diarrhea since then.

 

In regards to patient's hypertension, he has been normotensive and slightly 
hypotensive here.  We have held his home antihypertensives since his blood 
pressure started to drift back up to normal.  Patient's wife was advised to 
check his blood pressures at home and to call the PCP if concern for low blood 
pressure.  Again, we have held his furosemide, given his recent dehydration and 
low p.o. intake as well as diarrhea.

 

CONCERNS AT DISCHARGE:  Mr. Pryor will be discharged home under the care of 
his wife.  He is to follow up with his PCP within next 5 to 7 days.  Patient 
should continue his home Levaquin dosing until completion.

 

DIET:  May resume previous diet.

 

ACTIVITY:  As tolerated.

 

CONDITION:  Improved, stable.

 

DISPOSITION:  To home.

 

TIME SPENT:  Time spent on this discharge was approximately 45 minutes.

 

Again, this is only a brief summary of the patient's hospital course of stay.  
For full details, please refer to the full medical record.  If you have any 
further questions or need further assistance, please feel free to contact me at 
[phone].

 

____________________________________ 

FERN MARINO NP

 

[id number]/CPS #: [id number]

ELISABET

## 2017-09-01 ENCOUNTER — HOSPITAL ENCOUNTER (INPATIENT)
Dept: HOSPITAL 25 - ED | Age: 72
LOS: 12 days | Discharge: NURSING, REHAB OR LONG-TERM CARE | DRG: 853 | End: 2017-09-13
Attending: INTERNAL MEDICINE | Admitting: HOSPITALIST
Payer: MEDICARE

## 2017-09-01 DIAGNOSIS — Z74.01: ICD-10-CM

## 2017-09-01 DIAGNOSIS — G81.94: ICD-10-CM

## 2017-09-01 DIAGNOSIS — Z86.19: ICD-10-CM

## 2017-09-01 DIAGNOSIS — G62.9: ICD-10-CM

## 2017-09-01 DIAGNOSIS — J45.909: ICD-10-CM

## 2017-09-01 DIAGNOSIS — N31.9: ICD-10-CM

## 2017-09-01 DIAGNOSIS — E78.5: ICD-10-CM

## 2017-09-01 DIAGNOSIS — H91.90: ICD-10-CM

## 2017-09-01 DIAGNOSIS — B96.6: ICD-10-CM

## 2017-09-01 DIAGNOSIS — E86.0: ICD-10-CM

## 2017-09-01 DIAGNOSIS — A04.7: ICD-10-CM

## 2017-09-01 DIAGNOSIS — N40.0: ICD-10-CM

## 2017-09-01 DIAGNOSIS — I25.10: ICD-10-CM

## 2017-09-01 DIAGNOSIS — Z79.01: ICD-10-CM

## 2017-09-01 DIAGNOSIS — A41.9: Primary | ICD-10-CM

## 2017-09-01 DIAGNOSIS — C41.2: ICD-10-CM

## 2017-09-01 DIAGNOSIS — G89.29: ICD-10-CM

## 2017-09-01 DIAGNOSIS — B95.2: ICD-10-CM

## 2017-09-01 DIAGNOSIS — Z88.8: ICD-10-CM

## 2017-09-01 DIAGNOSIS — Z86.11: ICD-10-CM

## 2017-09-01 DIAGNOSIS — D53.9: ICD-10-CM

## 2017-09-01 DIAGNOSIS — K59.2: ICD-10-CM

## 2017-09-01 DIAGNOSIS — I12.9: ICD-10-CM

## 2017-09-01 DIAGNOSIS — Z92.3: ICD-10-CM

## 2017-09-01 DIAGNOSIS — L89.629: ICD-10-CM

## 2017-09-01 DIAGNOSIS — N18.3: ICD-10-CM

## 2017-09-01 DIAGNOSIS — M86.18: ICD-10-CM

## 2017-09-01 DIAGNOSIS — M54.9: ICD-10-CM

## 2017-09-01 DIAGNOSIS — E66.3: ICD-10-CM

## 2017-09-01 DIAGNOSIS — L89.154: ICD-10-CM

## 2017-09-01 DIAGNOSIS — G47.33: ICD-10-CM

## 2017-09-01 DIAGNOSIS — G82.20: ICD-10-CM

## 2017-09-01 DIAGNOSIS — L89.153: ICD-10-CM

## 2017-09-01 DIAGNOSIS — Z99.3: ICD-10-CM

## 2017-09-01 DIAGNOSIS — H53.50: ICD-10-CM

## 2017-09-01 DIAGNOSIS — Z79.82: ICD-10-CM

## 2017-09-01 DIAGNOSIS — J18.9: ICD-10-CM

## 2017-09-01 DIAGNOSIS — K21.9: ICD-10-CM

## 2017-09-01 DIAGNOSIS — R20.8: ICD-10-CM

## 2017-09-01 LAB
ADD DIFF/SLIDE REVIEW?: (no result)
ADD PATH REVIEW?: YES
ALBUMIN SERPL BCG-MCNC: 3.1 G/DL (ref 3.2–5.2)
ALP SERPL-CCNC: 190 U/L (ref 34–104)
ALT SERPL W P-5'-P-CCNC: 9 U/L (ref 7–52)
ANION GAP SERPL CALC-SCNC: 7 MMOL/L (ref 2–11)
AST SERPL-CCNC: 12 U/L (ref 13–39)
BUN SERPL-MCNC: 42 MG/DL (ref 6–24)
BUN/CREAT SERPL: 23.7 (ref 8–20)
CALCIUM SERPL-MCNC: 10 MG/DL (ref 8.6–10.3)
CHLORIDE SERPL-SCNC: 97 MMOL/L (ref 101–111)
GLOBULIN SER CALC-MCNC: 3.4 G/DL (ref 2–4)
GLUCOSE SERPL-MCNC: 125 MG/DL (ref 70–100)
HCO3 SERPL-SCNC: 29 MMOL/L (ref 22–32)
HCT VFR BLD AUTO: 29 % (ref 42–52)
HGB BLD-MCNC: 9.6 G/DL (ref 14–18)
MCH RBC QN AUTO: 26 PG (ref 27–31)
MCHC RBC AUTO-ENTMCNC: 33 G/DL (ref 31–36)
MCV RBC AUTO: 79 FL (ref 80–94)
MICROCYTES BLD QL SMEAR: (no result)
POTASSIUM SERPL-SCNC: 4.2 MMOL/L (ref 3.5–5)
PROT SERPL-MCNC: 6.5 G/DL (ref 6.4–8.9)
RBC # BLD AUTO: 3.72 10^6/UL (ref 4–5.4)
SODIUM SERPL-SCNC: 133 MMOL/L (ref 133–145)
TROPONIN I SERPL-MCNC: 0.01 NG/ML (ref ?–0.04)
WBC # BLD AUTO: 16 10^3/UL (ref 3.5–10.8)

## 2017-09-01 PROCEDURE — 87186 SC STD MICRODIL/AGAR DIL: CPT

## 2017-09-01 PROCEDURE — 94640 AIRWAY INHALATION TREATMENT: CPT

## 2017-09-01 PROCEDURE — P9040 RBC LEUKOREDUCED IRRADIATED: HCPCS

## 2017-09-01 PROCEDURE — 36415 COLL VENOUS BLD VENIPUNCTURE: CPT

## 2017-09-01 PROCEDURE — 86900 BLOOD TYPING SEROLOGIC ABO: CPT

## 2017-09-01 PROCEDURE — 93005 ELECTROCARDIOGRAM TRACING: CPT

## 2017-09-01 PROCEDURE — 83605 ASSAY OF LACTIC ACID: CPT

## 2017-09-01 PROCEDURE — 71010: CPT

## 2017-09-01 PROCEDURE — 80202 ASSAY OF VANCOMYCIN: CPT

## 2017-09-01 PROCEDURE — 85014 HEMATOCRIT: CPT

## 2017-09-01 PROCEDURE — 84484 ASSAY OF TROPONIN QUANT: CPT

## 2017-09-01 PROCEDURE — 87070 CULTURE OTHR SPECIMN AEROBIC: CPT

## 2017-09-01 PROCEDURE — 87185 SC STD ENZYME DETCJ PER NZM: CPT

## 2017-09-01 PROCEDURE — 87205 SMEAR GRAM STAIN: CPT

## 2017-09-01 PROCEDURE — 81015 MICROSCOPIC EXAM OF URINE: CPT

## 2017-09-01 PROCEDURE — 82270 OCCULT BLOOD FECES: CPT

## 2017-09-01 PROCEDURE — 87640 STAPH A DNA AMP PROBE: CPT

## 2017-09-01 PROCEDURE — 87077 CULTURE AEROBIC IDENTIFY: CPT

## 2017-09-01 PROCEDURE — 87076 CULTURE ANAEROBE IDENT EACH: CPT

## 2017-09-01 PROCEDURE — 87641 MR-STAPH DNA AMP PROBE: CPT

## 2017-09-01 PROCEDURE — C1751 CATH, INF, PER/CENT/MIDLINE: HCPCS

## 2017-09-01 PROCEDURE — 86850 RBC ANTIBODY SCREEN: CPT

## 2017-09-01 PROCEDURE — 86140 C-REACTIVE PROTEIN: CPT

## 2017-09-01 PROCEDURE — 82272 OCCULT BLD FECES 1-3 TESTS: CPT

## 2017-09-01 PROCEDURE — 85610 PROTHROMBIN TIME: CPT

## 2017-09-01 PROCEDURE — 86901 BLOOD TYPING SEROLOGIC RH(D): CPT

## 2017-09-01 PROCEDURE — 81003 URINALYSIS AUTO W/O SCOPE: CPT

## 2017-09-01 PROCEDURE — 87899 AGENT NOS ASSAY W/OPTIC: CPT

## 2017-09-01 PROCEDURE — 80048 BASIC METABOLIC PNL TOTAL CA: CPT

## 2017-09-01 PROCEDURE — 71250 CT THORAX DX C-: CPT

## 2017-09-01 PROCEDURE — 87086 URINE CULTURE/COLONY COUNT: CPT

## 2017-09-01 PROCEDURE — 86922 COMPATIBILITY TEST ANTIGLOB: CPT

## 2017-09-01 PROCEDURE — 80053 COMPREHEN METABOLIC PANEL: CPT

## 2017-09-01 PROCEDURE — 85060 BLOOD SMEAR INTERPRETATION: CPT

## 2017-09-01 PROCEDURE — 87040 BLOOD CULTURE FOR BACTERIA: CPT

## 2017-09-01 PROCEDURE — 85018 HEMOGLOBIN: CPT

## 2017-09-01 PROCEDURE — 83735 ASSAY OF MAGNESIUM: CPT

## 2017-09-01 PROCEDURE — 94760 N-INVAS EAR/PLS OXIMETRY 1: CPT

## 2017-09-01 PROCEDURE — 85025 COMPLETE CBC W/AUTO DIFF WBC: CPT

## 2017-09-01 PROCEDURE — 87493 C DIFF AMPLIFIED PROBE: CPT

## 2017-09-01 NOTE — ED
Valentina DUQUE Alfonso, scribed for Donnie Archuleta MD on 09/01/17 at 2110 .





Complex/Multi-Sys Presentation





- HPI Summary


HPI Summary: 


 This patient is a 72 year [facility] wife [facility] 

since this morning. Per wife, he was completely unresponsive between 1200 and 

1900 and barely made sense. The patient rates the pain 0/10 in severity. 

Symptoms alleviated by removing fentanyl patch. Wife reports fever, 

intermittent unresponsiveness, and bed sores. Pt started on fentanyl patches 

one week ago.





- History Of Current Complaint


Chief Complaint: EDGeneral


Time Seen by Provider: 09/01/17 20:52


Hx Obtained From: Patient, Family/Caretaker


Onset/Duration: Sudden Onset, Lasting Hours, Still Present


Timing: Constant


Severity Currently: Moderate


Severity Initially: Mild


Alleviating Factor(s): removing fentanyl patch


Associated Signs And Symptoms: Positive: Other - fever, intermittent 

unresponsiveness, and bed sores


Related History: Recent Hospitalization





- Allergies/Home Medications


Allergies/Adverse Reactions: 


 Allergies











Allergy/AdvReac Type Severity Reaction Status Date / Time


 


Atorvastatin [From Lipitor] Allergy  See Comment Verified 08/23/17 14:18














PMH/Surg Hx/FS Hx/Imm Hx


Endocrine/Hematology History: 


   Denies: Hx Diabetes


Cardiovascular History: Reports: Hx Hypercholesterolemia, Hx Hypertension


   Denies: Hx Pacemaker/ICD


Respiratory History: Reports: Hx Sleep Apnea - new DX severe BONIFACIO 8/2013, Other 

Respiratory Problems/Disorders - LATENT TB


GI History: Reports: Hx Gastroesophageal Reflux Disease, Other GI Disorders - 

neurogenic bowel


 History: Reports: Hx Renal Disease, Other  Problems/Disorders - neurogenic 

bladder


Musculoskeletal History: Reports: Hx Back Problems, Other Musculoskeletal 

History - chronic back pain


   Denies: Hx Rheumatoid Arthritis, Hx Osteoporosis


Sensory History: Reports: Hx Contacts or Glasses, Hx Hearing Problem, Other 

Sensory Impairments - color blind


   Denies: Hx Hearing Aid


Opthamlomology History: Reports: Hx Contacts or Glasses, Other Sensory 

Impairments - color blind


Neurological History: Reports: Hx Spinal Cord Injury, Other Neuro Impairments/

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


Psychiatric History: 


   Denies: Hx Panic Disorder





- Cancer History


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


Hx Chemotherapy: No


Hx Radiation Therapy: Yes - 1971





- Surgical History


Surgery Procedure, Year, and Place: SPINAL SURGERY - 4 SURGERIES FROM [id number] 

(EPENDYMOMA), APPENDECTOMY 06/15 - Rt ENDARTERECTOMY 2015, TONSILLECTOMY ( as a 

child)


Hx Anesthesia Reactions: No





- Immunization History


Date of Tetanus Vaccine: PT STATES UNSURE


Infectious Disease History: No


Infectious Disease History: Reports: Hx Hepatitis - as a child


   Denies: Hx of Known/Suspected MRSA, History Other Infectious Disease, 

Traveled Outside the [place] in Last 30 Days





- Family History


Known Family History: Positive: Other - NO GI ISSUES





- Social History


Alcohol Use: None


Alcohol Amount: one a week


Substance Use Type: Reports: Prescribed


Substance Use Comment - Amount & Last Used: tramadol


Hx Tobacco Use: No


Smoking Status (MU): Unknown if Ever Smoked


Have You Smoked in the Last Year: No





Review of Systems


Positive: Fever


Positive: Other - bed sores


Neurological: Other - AMS, intermittent unresponsiveness


All Other Systems Reviewed And Are Negative: Yes





Physical Exam


Triage Information Reviewed: Yes


Vital Signs On Initial Exam: 


 Initial Vitals











Temp


 


 101.5 F 


 


 09/01/17 20:47











Vital Signs Reviewed: Yes


Appearance: Positive: Well-Appearing, No Pain Distress


Skin: Positive: Warm, Skin Color Reflects Adequate Perfusion, Dry, Other - 

Extensive sacral decubitus ulcer


Head/Face: Positive: Normal Head/Face Inspection


Eyes: Positive: Normal


ENT: Positive: Normal ENT inspection


Neck: Positive: Supple, Nontender


Respiratory/Lung Sounds: Positive: Clear to Auscultation, Breath Sounds Present


Cardiovascular: Positive: RRR


Abdomen Description: Positive: Nontender, Soft


Bowel Sounds: Positive: Present


Musculoskeletal: Positive: Normal


Neurological: Positive: Normal, Alert, Oriented to Person Place, Time, CN 

Intact II-III


Psychiatric: Positive: Affect/Mood Appropriate





Diagnostics





- Vital Signs


 Vital Signs











  Temp Pulse Resp BP Pulse Ox


 


 09/01/17 20:55  102.1 F  106  21  103/40  93


 


 09/01/17 20:47  101.5 F    














- Laboratory


Lab Results: 


 Lab Results











  09/01/17 09/01/17 09/01/17 Range/Units





  21:35 21:35 21:35 


 


WBC   16.0 H   (3.5-10.8)  10^3/ul


 


RBC   3.72 L   (4.0-5.4)  10^6/ul


 


Hgb   9.6 L   (14.0-18.0)  g/dl


 


Hct   29 L   (42-52)  %


 


MCV   79 L   (80-94)  fL


 


MCH   26 L   (27-31)  pg


 


MCHC   33   (31-36)  g/dl


 


RDW   16 H   (10.5-15)  %


 


Plt Count   437   (150-450)  10^3/ul


 


MPV   7 L   (7.4-10.4)  um3


 


Immature Gran % (Auto)   1   (0-9)  %


 


Absolute Neuts (auto)   12.3 H   (1.5-7.7)  10^3/ul


 


Absolute Lymphs (auto)   1.3   (1.0-4.8)  10^3/ul


 


Absolute Monos (auto)   2.1 H   (0-0.8)  10^3/ul


 


Absolute Eos (auto)   0.2   (0-0.6)  10^3/ul


 


Absolute Basos (auto)   0.1   (0-0.2)  10^3/ul


 


Absolute Nucleated RBC   0   10^3/ul


 


Neutrophils %   79   (38-83)  %


 


Band Neutrophils %   1   (0-8)  %


 


Lymphocytes %   7 L   (25-47)  %


 


Monocytes %   11   (0-13)  %


 


Eosinophils %   2   (0-6)  %


 


Metamyelocytes %   Not Reportable   


 


Normal RBC Morphology   Not Reportable   


 


Microcytosis   1+   


 


Hem Pathologist Commnt   Pending   


 


INR (Anticoag Therapy)    1.05  (0.89-1.11)  


 


Sodium  133    (133-145)  mmol/L


 


Potassium  4.2    (3.5-5.0)  mmol/L


 


Chloride  97 L    (101-111)  mmol/L


 


Carbon Dioxide  29    (22-32)  mmol/L


 


Anion Gap  7    (2-11)  mmol/L


 


BUN  42 H    (6-24)  mg/dL


 


Creatinine  1.77 H    (0.67-1.17)  mg/dL


 


Est GFR ( Amer)  49.0    (>60)  


 


Est GFR (Non-Af Amer)  38.1    (>60)  


 


BUN/Creatinine Ratio  23.7 H    (8-20)  


 


Glucose  125 H    ()  mg/dL


 


Lactic Acid     (0.5-2.0)  mmol/L


 


Calcium  10.0    (8.6-10.3)  mg/dL


 


Total Bilirubin  0.60    (0.2-1.0)  mg/dL


 


AST  12 L    (13-39)  U/L


 


ALT  9    (7-52)  U/L


 


Alkaline Phosphatase  190 H    ()  U/L


 


Troponin I  0.01    (<0.04)  ng/mL


 


C-Reactive Protein  285.10 H    (< 5.00)  mg/L


 


Total Protein  6.5    (6.4-8.9)  g/dL


 


Albumin  3.1 L    (3.2-5.2)  g/dL


 


Globulin  3.4    (2-4)  g/dL


 


Albumin/Globulin Ratio  0.9 L    (1-3)  


 


Urine Color     


 


Urine Appearance     


 


Urine pH     (5-9)  


 


Ur Specific Gravity     (1.010-1.030)  


 


Urine Protein     (Negative)  


 


Urine Ketones     (Negative)  


 


Urine Blood     (Negative)  


 


Urine Nitrate     (Negative)  


 


Urine Bilirubin     (Negative)  


 


Urine Urobilinogen     (Negative)  


 


Ur Leukocyte Esterase     (Negative)  


 


Urine WBC (Auto)     (Absent)  


 


Urine RBC (Auto)     (Absent)  


 


Urine Bacteria     (Absent)  


 


Hyaline Casts     (Absent)  


 


Urine Glucose     (Negative)  














  09/01/17 09/01/17 Range/Units





  21:53 22:13 


 


WBC    (3.5-10.8)  10^3/ul


 


RBC    (4.0-5.4)  10^6/ul


 


Hgb    (14.0-18.0)  g/dl


 


Hct    (42-52)  %


 


MCV    (80-94)  fL


 


MCH    (27-31)  pg


 


MCHC    (31-36)  g/dl


 


RDW    (10.5-15)  %


 


Plt Count    (150-450)  10^3/ul


 


MPV    (7.4-10.4)  um3


 


Immature Gran % (Auto)    (0-9)  %


 


Absolute Neuts (auto)    (1.5-7.7)  10^3/ul


 


Absolute Lymphs (auto)    (1.0-4.8)  10^3/ul


 


Absolute Monos (auto)    (0-0.8)  10^3/ul


 


Absolute Eos (auto)    (0-0.6)  10^3/ul


 


Absolute Basos (auto)    (0-0.2)  10^3/ul


 


Absolute Nucleated RBC    10^3/ul


 


Neutrophils %    (38-83)  %


 


Band Neutrophils %    (0-8)  %


 


Lymphocytes %    (25-47)  %


 


Monocytes %    (0-13)  %


 


Eosinophils %    (0-6)  %


 


Metamyelocytes %    


 


Normal RBC Morphology    


 


Microcytosis    


 


Hem Pathologist Commnt    


 


INR (Anticoag Therapy)    (0.89-1.11)  


 


Sodium    (133-145)  mmol/L


 


Potassium    (3.5-5.0)  mmol/L


 


Chloride    (101-111)  mmol/L


 


Carbon Dioxide    (22-32)  mmol/L


 


Anion Gap    (2-11)  mmol/L


 


BUN    (6-24)  mg/dL


 


Creatinine    (0.67-1.17)  mg/dL


 


Est GFR ( Amer)    (>60)  


 


Est GFR (Non-Af Amer)    (>60)  


 


BUN/Creatinine Ratio    (8-20)  


 


Glucose    ()  mg/dL


 


Lactic Acid  0.8   (0.5-2.0)  mmol/L


 


Calcium    (8.6-10.3)  mg/dL


 


Total Bilirubin    (0.2-1.0)  mg/dL


 


AST    (13-39)  U/L


 


ALT    (7-52)  U/L


 


Alkaline Phosphatase    ()  U/L


 


Troponin I    (<0.04)  ng/mL


 


C-Reactive Protein    (< 5.00)  mg/L


 


Total Protein    (6.4-8.9)  g/dL


 


Albumin    (3.2-5.2)  g/dL


 


Globulin    (2-4)  g/dL


 


Albumin/Globulin Ratio    (1-3)  


 


Urine Color   Yellow  


 


Urine Appearance   Clear  


 


Urine pH   5.0  (5-9)  


 


Ur Specific Gravity   1.009 L  (1.010-1.030)  


 


Urine Protein   Negative  (Negative)  


 


Urine Ketones   Negative  (Negative)  


 


Urine Blood   Negative  (Negative)  


 


Urine Nitrate   Negative  (Negative)  


 


Urine Bilirubin   Negative  (Negative)  


 


Urine Urobilinogen   Negative  (Negative)  


 


Ur Leukocyte Esterase   1+ H  (Negative)  


 


Urine WBC (Auto)   2+(11-20/hpf) H  (Absent)  


 


Urine RBC (Auto)   Trace(0-2/hpf)  (Absent)  


 


Urine Bacteria   1+ H  (Absent)  


 


Hyaline Casts   Present H  (Absent)  


 


Urine Glucose   Negative  (Negative)  











Result Diagrams: 


 09/01/17 21:35





 09/01/17 21:35


Lab Statement: Any lab studies that have been ordered have been reviewed, and 

results considered in the medical decision making process.





- Radiology


  ** CXR


Radiology Interpretation Completed By: Radiologist - CARDIOMEGALY. NO DEFINITE 

PNEUMONIA IS IDENTIFIED. ED physician has reviewed this radiology report and 

agrees.





Complex Multi-Symp Course/Dx


Course Of Treatment: Mr. Pryor was brought into the ED by his wife.  He met 

sepsis criteria and was given IV NS as well as broad spectrum antibiotics.  He 

has bad sacral decubiti and this may be the source.





- Diagnoses


Provider Diagnoses: 


 Severe sepsis








- Physician Notifications


Discussed Care Of Patient With: Chito Harvey


Time Discussed With Above Provider: 22:39


Instructed by Provider To: Other - Consulted Dr. Harvey (hospitalist) who agrees 

to admit.





- Critical Care Time


Critical Care Time: 30-74 min





Discharge





- Discharge Plan


Condition: Stable


Disposition: ADMITTED TO [facility]


Referrals: 


Rafy Vicente MD [Primary Care Provider] - 





The documentation as recorded by the Valentina julien Alfonso accurately reflects 

the service I personally performed and the decisions made by me, Donnie Archuleta MD.

## 2017-09-01 NOTE — RAD
Indication: Fever, sepsis.



Single frontal view of the chest performed at 2118 hours was reviewed.



Comparison is made with previous exam dated August 07, 2017.



No mediastinal shift is noted. There is cardiomegaly noted. Lung fields appear clear.



IMPRESSION: CARDIOMEGALY. NO DEFINITE PNEUMONIA IS IDENTIFIED.

## 2017-09-02 PROCEDURE — 0JB70ZZ EXCISION OF BACK SUBCUTANEOUS TISSUE AND FASCIA, OPEN APPROACH: ICD-10-PCS | Performed by: SURGERY

## 2017-09-02 RX ADMIN — WATER SCH NOTE: 100 INJECTION, SOLUTION INTRAVENOUS at 18:52

## 2017-09-02 RX ADMIN — CYANOCOBALAMIN TAB 500 MCG SCH MCG: 500 TAB at 09:24

## 2017-09-02 RX ADMIN — PREGABALIN SCH MG: 50 CAPSULE ORAL at 21:56

## 2017-09-02 RX ADMIN — Medication SCH MG: at 21:56

## 2017-09-02 RX ADMIN — ENOXAPARIN SODIUM SCH MG: 40 INJECTION SUBCUTANEOUS at 03:07

## 2017-09-02 RX ADMIN — Medication SCH MG: at 09:25

## 2017-09-02 RX ADMIN — FENTANYL TRANSDERMAL SCH MCG: 75 PATCH, EXTENDED RELEASE TRANSDERMAL at 09:28

## 2017-09-02 RX ADMIN — OMEPRAZOLE SCH MG: 20 CAPSULE, DELAYED RELEASE ORAL at 09:24

## 2017-09-02 RX ADMIN — PREGABALIN SCH: 50 CAPSULE ORAL at 14:50

## 2017-09-02 RX ADMIN — ASPIRIN SCH MG: 81 TABLET, CHEWABLE ORAL at 09:24

## 2017-09-02 RX ADMIN — WATER SCH NOTE: 100 INJECTION, SOLUTION INTRAVENOUS at 07:06

## 2017-09-02 RX ADMIN — SODIUM CHLORIDE SCH MLS/HR: 900 IRRIGANT IRRIGATION at 11:35

## 2017-09-02 RX ADMIN — PREGABALIN SCH MG: 50 CAPSULE ORAL at 09:26

## 2017-09-02 RX ADMIN — Medication SCH: at 09:32

## 2017-09-02 RX ADMIN — Medication SCH UNITS: at 09:29

## 2017-09-02 RX ADMIN — SODIUM CHLORIDE SCH MLS/HR: 900 IRRIGANT IRRIGATION at 21:56

## 2017-09-02 RX ADMIN — SODIUM CHLORIDE SCH MLS/HR: 900 IRRIGANT IRRIGATION at 03:25

## 2017-09-02 RX ADMIN — POTASSIUM CHLORIDE SCH MEQ: 750 TABLET, FILM COATED, EXTENDED RELEASE ORAL at 09:29

## 2017-09-02 NOTE — PN
Subjective


Date of Service: 09/02/17


Interval History: 





Patient seen and examined at bedside. Pt states that he is feeling well today, 

Pt's wife reports that he had decreased responsiveness when she called EMS. 

Denies fever, chills, shortness of breath, chest discomfort, N/V. Pt has 

frequent lose stools that he is unaware of.





Pt's wife has been cleaning area with saline and then using Silvadene or Santyl 

on the wound.





Family History: Unchanged from Admission


Social History: Unchanged from Admission


Past Medical History: Unchanged from Admission





Objective


Active Medications: 





Acetaminophen (Tylenol Tab*)  650 mg PO Q4H PRN Reason: PAIN


Aspirin (Aspirin Low Dose Tab*)  81 mg PO DAILY [facility]SEA


Cholecalciferol (Vitamin D Tab*)  5,000 units PO DAILY [facility]


Cyanocobalamin (Vitamin B12 Tab*)  1,000 mcg PO DAILY [facility]


Enoxaparin Sodium (Lovenox(*))  40 mg SUBCUT Q24H [facility]


Fentanyl (Duragesic  Patch 75 Mcg/Hr*)  75 mcg TRANSDERM Q72HR [facility]


Ferrous Gluconate (Fergon Tab*)  324 mg PO BID [facility]SEA


Sodium Chloride (Ns 0.9% 1000 Ml*)  1,000 mls @ 125 mls/hr IV PER RATE [facility]


Piperacillin Sod/Tazobactam (Sod 3.375 gm/ Sodium Chloride)  100 mls @ 25 mls/

hr IVPB Q8H [facility]


Non Formulary Med* ( (Folic Acid 400 Mcg))  400 mcg PO DAILY [facility]HELSEA


Omeprazole (Prilosec Cap*)  40 mg PO DAILY@0730 [facility]


Oxycodone HCl (Roxycodone Tab*)  10 mg PO Q4H PRN Reason: PAIN


Pharmacy Profile Note (Fentanyl Patch Check Q Shift)  0 note N/A 0700,1900 [facility]


Potassium Chloride (Klor Con Er Tab*)  10 meq PO DAILY [facility]


Pregabalin (Lyrica Cap(*))  150 mg PO TID CHELSEA





 Vital Signs











  09/02/17 09/02/17 09/02/17





  01:00 01:22 01:31


 


Temperature   99.9 F


 


Pulse Rate 101 97 


 


Respiratory 17 16 





Rate   


 


Blood Pressure  98/35 





(mmHg)   


 


O2 Sat by Pulse 96 94 





Oximetry   














  09/02/17 09/02/17 09/02/17





  02:22 02:45 07:39


 


Temperature 98.6 F  99.0 F


 


Pulse Rate 104  88


 


Respiratory 20 20 16





Rate   


 


Blood Pressure 127/56  139/57





(mmHg)   


 


O2 Sat by Pulse 98  100





Oximetry   














  09/02/17 09/02/17 09/02/17





  09:26 09:30 11:27


 


Temperature   99.0 F


 


Pulse Rate   86


 


Respiratory 16 16 16





Rate   


 


Blood Pressure   146/61





(mmHg)   


 


O2 Sat by Pulse  100 99





Oximetry   











Oxygen Devices in Use Now: Nasal Cannula - 2 L


Appearance: NAD, laying in bed


Ears/Nose/Mouth/Throat: Mucous Membranes Moist


Respiratory: Symmetrical Chest Expansion and Respiratory Effort, Clear to 

Auscultation


Cardiovascular: NL Sounds; No Murmurs; No JVD, RRR


Abdominal: NL Sounds; No Tenderness; No Distention


Extremities: No Edema


Skin: - - Deep sacral wound ~ 8 (h) x 3 (w at the widest point) cm, with yellow 

sough and areas of eschar. Larger open area ~ 11 (h) x 10.5 (w) cm. Area of 

eschar at ~ 1 oclock 2 x2 cm. Erythema around open area. Left heel with 

healing ulcer and blackened callus. 


Neurological: Alert and Oriented x 3


Lines/Tubes/Other Access: Clean, Dry and Intact Peripheral IV - site benign


Nutrition: Taking PO's


Result Diagrams: 


 09/01/17 21:35





 09/01/17 21:35


Additional Lab and Data: 











Assess/Plan/Problems-Billing


Assessment: 





Mr. 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

now with chronic and inability to ambulate), neurogenic bladder with chronic 

indwelling [place], HTN, GERD, BONIFACIO with BiPAP, CAD, HLD and BPH who presented to 

the emergency room with concern for a sacral decubitus ulcer. 





- Patient Problems


(1) Sepsis


Comment: 


- Meeting SIRS criteria on admission (Tachycardia, tachypnea, fever, 

leukocytosis)


- Pt not meeting qSofa criteria on admission or at this time


- No longer meeting SIRS criteria (tachycardia and tachypnea resolved, afebrile

)   





(2) Pressure ulcer of sacral region, stage 3


Code(s): L89.153 - PRESSURE ULCER OF SACRAL REGION, STAGE 3   SNOMED Code(s): 

506740322


   Comment: 


- Wound consult pending


- Surgical consult pending


- Continue Zosyn   





(3) CKD (chronic kidney disease), stage III


Code(s): N18.3 - CHRONIC KIDNEY DISEASE, STAGE 3 (MODERATE)   SNOMED Code(s): 

319238998


   Comment: 


- Creatinine within baseline


- Baseline creatinine 2.1 to 2.5


- Avoid nephrotoxic medications   





(4) Anemia


Code(s): D64.9 - ANEMIA, UNSPECIFIED   SNOMED Code(s): 585936452


   Comment: 


- No s/s of bleeding


- Suspect anemia of chronic disease


- Continue iron supplementation, B12, folate   





(5) CAD (coronary artery disease)


Code(s): I25.10 - ATHSCL HEART DISEASE OF NATIVE CORONARY ARTERY W/O ANG PCTRS 

  SNOMED Code(s): 89041979


   Comment: 


- Continue ASA.   





(6) Ependymoma


Code(s): C71.9 - MALIGNANT NEOPLASM OF BRAIN, UNSPECIFIED   SNOMED Code(s): 

266640631


   Comment: 


- To the spine (dx in patient's 20s)


- With concurrent chronic back pain and inability to ambulate


- S/p multiple surgeries   





(7) Chronic back pain


Code(s): M54.9 - DORSALGIA, UNSPECIFIED; G89.29 - OTHER CHRONIC PAIN   SNOMED 

Code(s): 413388196


   Comment: 


- Secondary to ependymoma


- Continue oxycodone, pregabalin and fentanyl patch   





(8) HTN (hypertension)


Code(s): I10 - ESSENTIAL (PRIMARY) HYPERTENSION   SNOMED Code(s): 99533729


   Comment: 


- Normotensive


- Continue furosemide, olmesartan, and amlodipine   





(9) BONIFACIO (obstructive sleep apnea)


Code(s): G47.33 - OBSTRUCTIVE SLEEP APNEA (ADULT) (PEDIATRIC)   SNOMED Code(s): 

23027752


   Comment: 


- Continue home BiPAP use.   





(10) DVT prophylaxis


Code(s): HAA3297 -    SNOMED Code(s): 973925090


   Comment: 


- Lovenox   





(11) Full code status


Code(s): Z78.9 - OTHER SPECIFIED HEALTH STATUS   SNOMED Code(s): 326951041


   


Status and Disposition: 





Inpatient. Estimated length of stay >3 days, discharge to home when medically 

stable.

## 2017-09-02 NOTE — OP
CC:  Jose Miller MD; Dr. Vicente

 

OPERATIVE REPORT:

 

DATE OF OPERATION:  09/02/17

 

DATE OF BIRTH:  [birth year]

 

SURGEON:  Jose Miller MD.

 

PRE-OP DIAGNOSIS:  Sacral decubitus ulcer.

 

POST-OP DIAGNOSIS:  Sacral decubitus ulcer.

 

OPERATIVE PROCEDURE:  Debridement of sacral decubitus ulcer.

 

INDICATIONS:  The patient is a 72-year-old male, who has had multiple spinal problems and multiple s
urgeries in the past, who had a recent hospitalization, did spend a lot of time in bed and is now br
ought back with fevers and a decubitus ulcer.  I have been asked to consult on the wound by the hosp
italist service.  He has exhibited some signs of sepsis and so there was concern for underlying infe
ction.

 

On examination, he is an overweight appearing male with a sacral decubitus ulcer of approximately 3 
x 5 cm, although there are multiple areas surrounding this that have erythema and an area to the rig
ht of this that looks like an ulcer may form. At the center of this ulcer is an area of approximatel
y 2.5 x 4 cm of gray necrotic tissue that feels soft and soupy underneath.

 

I discussed this with the patient and with his wife and I think it is worth debriding this tissue to
 ensure that there is not an undrained pocket beneath, therefore sharp debridement is carried out ov
er approximately 2 x 4 cm and this piece of tissue is sent for culture.  Underneath, I encountered f
iber connective tissue.  The wound tracks laterally to the right side to a maximum of about 3 cm tow
ards this secondary area noted above.  It does not completely communicate with this area as yet.  I 
discussed with the hospitalist service the placement of appropriate wound care and we will have the 
wound clinic service weigh in, in the next couple of days.

 

Additionally, consideration should be given to MRI of the region to assess for possible osteomyeliti
s unless he seems to show a dramatic clinical improvement.

 

 [id number]/[facility] #: [id number]

## 2017-09-02 NOTE — HP
CC:  Dr. Vicente

 

HISTORY AND PHYSICAL:

 

DATE OF ADMISSION:  17

 

CHIEF COMPLAINT:  Confusion.

 

HISTORY OF PRESENT ILLNESS:  Mr. Pryor is a 72-year-old man with long history 
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 
extremities.  He is currently wheelchair bound.  Apparently, this whole process 
began in his 20s.  He lives at home with his wife who cares for him.  She is 
the main source of history.  He was brought in by ambulance this evening to the 
emergency department because of decreased responsiveness and decreased p.o. 
intake over about a 2-day period.  He has had also increased weakness and 
inability to transfer with transfer slide that she normally can use with him.  
She has not noticed any fevers at home.  He has not had any new pains but he is 
essentially insensate from the waist down.  His wife reports that he developed 
decubitus ulcers that began during his hospital stay on  less than a month 
ago here.  Visiting nurse have been helping with the pressure ulcers at home.  
He has been referred to the wound clinic but that visit has not occurred yet.

 

Patient has chronic pain and sees Dr. Reynoso at the pain clinic.  He is on a 
fentanyl patch and recently was started on oral oxycodone liquid.  She states 
the pain is 9/10 with movement and 4/10 to 5/10 at rest.

 

Review of the chart showed he was admitted from  to  this hospital 
with potential sepsis due to urinary tract infection caused by indwelling Rodriguez 
catheter.  He also had diarrhea at the time and acute kidney injury, which 
responded to fluid.

 

PAST MEDICAL HISTORY:

1.  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

2.  Hypertension.

3.  GERD.

4.  Obstructive sleep apnea, on BIPAP.

5.  Coronary artery disease.

6.  Hyperlipidemia.

7.  BPH.

8.  Asthma.

9.  Chronic kidney disease, stage 3.  Last creatinine clearance is 44.

 

PAST SURGICAL HISTORY:  He has had 4 spinal surgeries at [facility] for this ependymoma over the years.  The patient also had an 
appendectomy after a ruptured appendix and during that operation his bladder 
was nicked and required repair.  He had a right carotid endarterectomy in .

 

MEDICATIONS ON ADMISSION:

1.  Acetaminophen 650 q. 4 hours p.r.n. pain or fever.

2.  Aspirin 81 mg p.o. daily.

3.  Vitamin D 5000 units p.o. daily.

4.  Vitamin B12 1000 mcg p.o. daily.

5.  Ferrous gluconate 324 mg p.o. b.i.d.

6.  Folic acid 400 mcg p.o. daily.

7.  Furosemide 40 mg p.o. b.i.d.

8.  Omeprazole 40 mg p.o. daily.

9.  Oxycodone 10 mg p.o. q. 4 hours p.r.n. breakthrough pain.

10.  Potassium chloride 10 mEq p.o. daily.

11.  Lyrica 150 mg p.o. t.i.d.

12.  Fentanyl patch 75 mcg topically daily.

 

ALLERGIES:  LIPITOR, CHICKEN, VANILLA, and GRAPES.

 

FAMILY HISTORY:  Notable for father who  of Parkinson's disease.  Mother is 
alive at age 99.

 

SOCIAL HISTORY:  He is disabled.  He is .  His wife is his healthcare 
proxy. He does not smoke, no alcohol or drug use.

 

REVIEW OF SYSTEMS:  Patient denies any fevers, anorexia.  Patient denies any 
chest pain or palpitations.  Patient denies any cough or shortness of breath.  
Patient does admit to diarrhea for 6 weeks but denies any nausea, vomiting, or 
abdominal pain.  His neurologic complaints are stable.  He is insensate from 
the waist down. Remainder of all 14-point review of systems negative other than 
mentioned in the HPI.

 

                               PHYSICAL EXAMINATION

 

GENERAL:  He is alert, in no acute distress.

 

VITAL SIGNS:  Temperature is 38.9, pulse 106, respirations 21, blood pressure 
is 103/40, oxygen saturation is 92% on room air.

 

HEENT:  Head is normocephalic, atraumatic.  Sclerae anicteric.  Pupils equal, 
round, and reactive to light and accommodation.  Oropharynx is dry.  No lesions.

 

NECK:  No carotid bruit.  No thyromegaly.  No cervical adenopathy.

 

LUNGS:  Clear to auscultation and percussion bilaterally.

 

HEART:  Tachycardic.  Regular rhythm without murmurs.

 

ABDOMEN:  Soft, nontender, positive bowel sounds.  No hepatosplenomegaly.

 

EXTREMITIES:  There is trace edema in bilateral lower extremities.

 

NEUROLOGIC:  Muscle strength is absent in the lower extremities.  He has no 
sensation to light touch or pinprick from about L3 down.  He is alert.  He is 
oriented to hospital, his name, and date.

 

SKIN:  Notable for 2 x 5 x 1 cm deep sacral decubitus ulcer that appears to be 
stage III with purulent discharge.  There is also an approximately 7 x 7 cm 
area on the left heel that with a combination of a healing ulcer and callus 
with no open moist areas.

 

 ASSESSMENT AND PLAN:  A 72-year-old man presenting with sepsis.  The source of 
this sepsis may be the sacral decubitus ulcer versus urinary tract infection 
versus abdominal infection such as colitis or diverticulitis.  The patient will 
be admitted inpatient and given 30 mg/kg infusion of crystalloid fluid and then 
continuous maintenance fluids.  He is started empirically on Zosyn for 
polymicrobial infection and this will continue until wound or urine or blood 
cultures prove helpful.

 

The wound needs to be addressed by a wound consult in the hospital and referral 
to wound clinic on discharge.  The heel ulcer could also be debrided and 
addressed while he is here.

 

For his chronic kidney disease, his creatinine appears within his normal limit 
at this point.  We will continue his antihypertensives and he gets plenty of 
fluids, but hold off on diuretics.

 

Code status is full. He is high risk of deep venous thrombosis given his 
bedbound status and his weight, so he will have to continue his Lovenox while 
he is here in the hospital to prevent deep venous thrombosis.

 

 

 

[id number]/[facility] #: [id number]

ELISABET

## 2017-09-03 LAB
HCT VFR BLD AUTO: 24 % (ref 42–52)
HGB BLD-MCNC: 7.8 G/DL (ref 14–18)
MCH RBC QN AUTO: 26 PG (ref 27–31)
MCHC RBC AUTO-ENTMCNC: 33 G/DL (ref 31–36)
MCV RBC AUTO: 80 FL (ref 80–94)
RBC # BLD AUTO: 2.97 10^6/UL (ref 4–5.4)
WBC # BLD AUTO: 12.4 10^3/UL (ref 3.5–10.8)

## 2017-09-03 PROCEDURE — 30233N1 TRANSFUSION OF NONAUTOLOGOUS RED BLOOD CELLS INTO PERIPHERAL VEIN, PERCUTANEOUS APPROACH: ICD-10-PCS | Performed by: INTERNAL MEDICINE

## 2017-09-03 RX ADMIN — Medication SCH: at 08:49

## 2017-09-03 RX ADMIN — PREGABALIN SCH MG: 50 CAPSULE ORAL at 21:46

## 2017-09-03 RX ADMIN — Medication SCH MG: at 08:49

## 2017-09-03 RX ADMIN — SODIUM CHLORIDE SCH MLS/HR: 900 IRRIGANT IRRIGATION at 16:45

## 2017-09-03 RX ADMIN — ENOXAPARIN SODIUM SCH MG: 40 INJECTION SUBCUTANEOUS at 01:58

## 2017-09-03 RX ADMIN — OXYCODONE HYDROCHLORIDE PRN MG: 5 CAPSULE ORAL at 02:31

## 2017-09-03 RX ADMIN — ASPIRIN SCH MG: 81 TABLET, CHEWABLE ORAL at 08:49

## 2017-09-03 RX ADMIN — FOLIC ACID SCH MG: 1 TABLET ORAL at 10:36

## 2017-09-03 RX ADMIN — POTASSIUM CHLORIDE SCH MEQ: 750 TABLET, FILM COATED, EXTENDED RELEASE ORAL at 08:49

## 2017-09-03 RX ADMIN — PREGABALIN SCH MG: 50 CAPSULE ORAL at 08:49

## 2017-09-03 RX ADMIN — WATER SCH NOTE: 100 INJECTION, SOLUTION INTRAVENOUS at 18:46

## 2017-09-03 RX ADMIN — WATER SCH NOTE: 100 INJECTION, SOLUTION INTRAVENOUS at 07:27

## 2017-09-03 RX ADMIN — SODIUM CHLORIDE SCH MLS/HR: 900 IRRIGANT IRRIGATION at 06:05

## 2017-09-03 RX ADMIN — PREGABALIN SCH MG: 50 CAPSULE ORAL at 14:17

## 2017-09-03 RX ADMIN — Medication SCH UNITS: at 08:49

## 2017-09-03 RX ADMIN — OMEPRAZOLE SCH MG: 20 CAPSULE, DELAYED RELEASE ORAL at 08:48

## 2017-09-03 RX ADMIN — CYANOCOBALAMIN TAB 500 MCG SCH MCG: 500 TAB at 08:49

## 2017-09-03 RX ADMIN — Medication SCH MG: at 21:47

## 2017-09-03 NOTE — PN
Subjective


Date of Service: 09/03/17


Interval History: 





Patient seen and examined at bedside. No complaints at this time. Denies fever, 

chills, shortness of breath, chest discomfort, N/V/D. Pt is INC stool and has 

no sensation that he is going. He is being turned frequently. 








Family History: Unchanged from Admission


Social History: Unchanged from Admission


Past Medical History: Unchanged from Admission





Objective


Active Medications: 





Acetaminophen (Tylenol Tab*)  650 mg PO Q4H PRN Reason: PAIN


Aspirin (Aspirin Low Dose Tab*)  81 mg PO DAILY CHELSEA


Cholecalciferol (Vitamin D Tab*)  5,000 units PO DAILY CHELSEA


Cyanocobalamin (Vitamin B12 Tab*)  1,000 mcg PO DAILY CHELSEA


Diphenoxylate HCl/Atropine (Lomotil Tab*)  1 tab PO BID PRN Reason: DIARRHEA


Enoxaparin Sodium (Lovenox(*))  40 mg SUBCUT Q24H CHELSEA


Fentanyl (Duragesic  Patch 75 Mcg/Hr*)  75 mcg TRANSDERM Q72HR CHELSEA


Ferrous Gluconate (Fergon Tab*)  324 mg PO BID CHELSEA


Folic Acid (Folvite Tab*)  0.5 mg PO DAILY CHELSEA


Sodium Chloride (Ns 0.9% 1000 Ml*)  1,000 mls @ 125 mls/hr IV PER RATE CHELSEA


Piperacillin Sod/Tazobactam (Sod 3.375 gm/ Sodium Chloride)  100 mls @ 25 mls/

hr IVPB Q8H CHELSEA


Omeprazole (Prilosec Cap*)  40 mg PO DAILY@0730 CHELSEA


Oxycodone HCl (Roxycodone Tab*)  10 mg PO Q4H PRN Reason: PAIN


Pharmacy Profile Note (Fentanyl Patch Check Q Shift)  0 note N/A 0700,1900 CHELSEA


Potassium Chloride (Klor Con Er Tab*)  10 meq PO DAILY CHELSEA


Pregabalin (Lyrica Cap(*))  150 mg PO TID CHELSEA





 Vital Signs











  09/02/17 09/02/17 09/02/17





  14:50 15:38 19:38


 


Temperature  99.2 F 99.8 F


 


Pulse Rate  84 94


 


Respiratory 16 17 16





Rate   


 


Blood Pressure  124/53 125/38





(mmHg)   


 


O2 Sat by Pulse  98 98





Oximetry   

















  09/02/17 09/02/17 09/02/17





  22:49 23:26 23:56


 


Temperature  99.7 F 


 


Pulse Rate  85 


 


Respiratory 16 18 16





Rate   


 


Blood Pressure  127/47 





(mmHg)   


 


O2 Sat by Pulse  98 





Oximetry   














  09/03/17 09/03/17 09/03/17





  02:31 04:13 04:35


 


Temperature   99.4 F


 


Pulse Rate   73


 


Respiratory 16 18 18





Rate   


 


Blood Pressure   96/31





(mmHg)   


 


O2 Sat by Pulse   96





Oximetry   














  09/03/17 09/03/17 09/03/17





  07:24 08:35 08:49


 


Temperature 98.1 F  


 


Pulse Rate 78  


 


Respiratory 16 16 16





Rate   


 


Blood Pressure 131/50  





(mmHg)   


 


O2 Sat by Pulse 95  





Oximetry   














  09/03/17 09/03/17





  10:49 11:27


 


Temperature  98.4 F


 


Pulse Rate  77


 


Respiratory 16 16





Rate  


 


Blood Pressure  111/47





(mmHg)  


 


O2 Sat by Pulse  96





Oximetry  











Oxygen Devices in Use Now: None


Appearance: NAD, laying in bed


Ears/Nose/Mouth/Throat: Mucous Membranes Moist


Respiratory: Symmetrical Chest Expansion and Respiratory Effort, Clear to 

Auscultation


Cardiovascular: NL Sounds; No Murmurs; No JVD, RRR


Abdominal: NL Sounds; No Tenderness; No Distention


Extremities: No Edema


Neurological: Alert and Oriented x 3, NL Muscle Strength and Tone


Lines/Tubes/Other Access: Clean, Dry and Intact Peripheral IV - site benign


Nutrition: Taking PO's


Result Diagrams: 


 09/03/17 06:54





 09/01/17 21:35


Additional Lab and Data: 





 Picture taken 9/3 prior to bedside debridement


Microbiology and Other Data: 


 Microbiology











 09/02/17 13:40 Wound Gram Stain - Final





 Tissue - Other Tissue Culture - Preliminary





    Bacteroides Vulgatus





 Skin and Soft Tissue MRSA/MSSA (PCR - Final





    Mrsa Negative





    S.aureus Negative














Assess/Plan/Problems-Billing


Assessment: 





Mr. Pryor is a 73 yo male with PMH significant for ependymoma to the spine (

now with chronic and inability to ambulate), neurogenic bladder with chronic 

indwelling london, HTN, GERD, BONIFACIO with BiPAP, CAD, HLD and BPH who presented to 

the emergency room with concern for a sacral decubitus ulcer. 





- Patient Problems


(1) Sepsis


Comment: 


- Meeting SIRS criteria on admission (Tachycardia, tachypnea, fever, 

leukocytosis)


- Pt not meeting qSofa criteria on admission or at this time


- No longer meeting SIRS criteria (tachycardia and tachypnea resolved, afebrile)


- Leukocytosis improving   





(2) Pressure ulcer of sacral region, stage 3


Code(s): L89.153 - PRESSURE ULCER OF SACRAL REGION, STAGE 3   SNOMED Code(s): 

940604657


   Comment: 


- S/P bedside debridement 9/2


- Wound consult pending


- Surgical consult, appreciate input


- Continue Zosyn   





(3) CKD (chronic kidney disease), stage III


Code(s): N18.3 - CHRONIC KIDNEY DISEASE, STAGE 3 (MODERATE)   SNOMED Code(s): 

510367783


   Comment: 


- Creatinine within baseline


- Baseline creatinine 2.1 to 2.5


- Avoid nephrotoxic medications   





(4) Anemia


Code(s): D64.9 - ANEMIA, UNSPECIFIED   SNOMED Code(s): 042755665


   Comment: 


- No s/s of bleeding


- Suspect anemia of chronic disease


- Continue iron supplementation, B12, folate   





(5) CAD (coronary artery disease)


Code(s): I25.10 - ATHSCL HEART DISEASE OF NATIVE CORONARY ARTERY W/O ANG PCTRS 

  SNOMED Code(s): 97313332


   Comment: 


- Continue ASA.   





(6) Ependymoma


Code(s): C71.9 - MALIGNANT NEOPLASM OF BRAIN, UNSPECIFIED   SNOMED Code(s): 

441368145


   Comment: 


- To the spine (dx in patient's 20s)


- With concurrent chronic back pain and inability to ambulate


- S/p multiple surgeries   





(7) Chronic back pain


Code(s): M54.9 - DORSALGIA, UNSPECIFIED; G89.29 - OTHER CHRONIC PAIN   SNOMED 

Code(s): 965860916


   Comment: 


- Secondary to ependymoma


- Continue oxycodone, pregabalin and fentanyl patch   





(8) HTN (hypertension)


Code(s): I10 - ESSENTIAL (PRIMARY) HYPERTENSION   SNOMED Code(s): 36265880


   Comment: 


- Normotensive


- Continue furosemide and amlodipine   





(9) BONIFACIO (obstructive sleep apnea)


Code(s): G47.33 - OBSTRUCTIVE SLEEP APNEA (ADULT) (PEDIATRIC)   SNOMED Code(s): 

66206203


   Comment: 


- Continue home BiPAP use.   





(10) DVT prophylaxis


Code(s): FXX2793 -    SNOMED Code(s): 326458408


   Comment: 


- Lovenox   





(11) Full code status


Code(s): Z78.9 - OTHER SPECIFIED HEALTH STATUS   SNOMED Code(s): 344927599


   


Status and Disposition: 





Inpatient. Estimated length of stay >3 days, discharge to home when medically 

stable.

## 2017-09-04 LAB
ANION GAP SERPL CALC-SCNC: 7 MMOL/L (ref 2–11)
BUN SERPL-MCNC: 29 MG/DL (ref 6–24)
BUN/CREAT SERPL: 17.1 (ref 8–20)
CALCIUM SERPL-MCNC: 9.8 MG/DL (ref 8.6–10.3)
CHLORIDE SERPL-SCNC: 110 MMOL/L (ref 101–111)
GLUCOSE SERPL-MCNC: 102 MG/DL (ref 70–100)
HCO3 SERPL-SCNC: 25 MMOL/L (ref 22–32)
HCT VFR BLD AUTO: 26 % (ref 42–52)
HGB BLD-MCNC: 8.7 G/DL (ref 14–18)
MCH RBC QN AUTO: 26 PG (ref 27–31)
MCHC RBC AUTO-ENTMCNC: 33 G/DL (ref 31–36)
MCV RBC AUTO: 80 FL (ref 80–94)
POTASSIUM SERPL-SCNC: 3.9 MMOL/L (ref 3.5–5)
RBC # BLD AUTO: 3.3 10^6/UL (ref 4–5.4)
SODIUM SERPL-SCNC: 142 MMOL/L (ref 133–145)
WBC # BLD AUTO: 11.8 10^3/UL (ref 3.5–10.8)

## 2017-09-04 RX ADMIN — PREGABALIN SCH MG: 50 CAPSULE ORAL at 09:24

## 2017-09-04 RX ADMIN — FOLIC ACID SCH MG: 1 TABLET ORAL at 09:27

## 2017-09-04 RX ADMIN — SODIUM CHLORIDE SCH MLS/HR: 900 IRRIGANT IRRIGATION at 15:47

## 2017-09-04 RX ADMIN — PREGABALIN SCH MG: 50 CAPSULE ORAL at 21:16

## 2017-09-04 RX ADMIN — OXYCODONE HYDROCHLORIDE PRN MG: 5 CAPSULE ORAL at 09:27

## 2017-09-04 RX ADMIN — Medication SCH UNITS: at 09:26

## 2017-09-04 RX ADMIN — Medication SCH MG: at 21:15

## 2017-09-04 RX ADMIN — ENOXAPARIN SODIUM SCH MG: 40 INJECTION SUBCUTANEOUS at 00:37

## 2017-09-04 RX ADMIN — CYANOCOBALAMIN TAB 500 MCG SCH MCG: 500 TAB at 09:25

## 2017-09-04 RX ADMIN — OXYCODONE HYDROCHLORIDE PRN MG: 5 CAPSULE ORAL at 17:59

## 2017-09-04 RX ADMIN — POTASSIUM CHLORIDE SCH MEQ: 750 TABLET, FILM COATED, EXTENDED RELEASE ORAL at 09:26

## 2017-09-04 RX ADMIN — Medication SCH MG: at 09:31

## 2017-09-04 RX ADMIN — ASPIRIN SCH MG: 81 TABLET, CHEWABLE ORAL at 09:26

## 2017-09-04 RX ADMIN — WATER SCH NOTE: 100 INJECTION, SOLUTION INTRAVENOUS at 06:53

## 2017-09-04 RX ADMIN — OMEPRAZOLE SCH MG: 20 CAPSULE, DELAYED RELEASE ORAL at 09:26

## 2017-09-04 RX ADMIN — PREGABALIN SCH MG: 50 CAPSULE ORAL at 15:41

## 2017-09-04 RX ADMIN — WATER SCH NOTE: 100 INJECTION, SOLUTION INTRAVENOUS at 19:23

## 2017-09-04 NOTE — PN
Subjective


Date of Service: 09/04/17


Interval History: 





Patient seen and examined at bedside. Pt states that he feels ok. Denies fever, 

chills, shortness of breath, chest discomfort, N/V. Pt has frequent soft 

stools. 





Family History: Unchanged from Admission


Social History: Unchanged from Admission


Past Medical History: Unchanged from Admission





Objective


Active Medications: 





Acetaminophen (Tylenol Tab*)  650 mg PO Q4H PRN Reason: PAIN


Aspirin (Aspirin Low Dose Tab*)  81 mg PO DAILY CHELSEA


Cholecalciferol (Vitamin D Tab*)  5,000 units PO DAILY CHELSEA


Cyanocobalamin (Vitamin B12 Tab*)  1,000 mcg PO DAILY CHELSEA


Diphenoxylate HCl/Atropine (Lomotil Tab*)  1 tab PO BID PRN Reason: DIARRHEA


Enoxaparin Sodium (Lovenox(*))  40 mg SUBCUT Q24H CHELSEA


Fentanyl (Duragesic  Patch 75 Mcg/Hr*)  75 mcg TRANSDERM Q72HR CHELSEA


Ferrous Gluconate (Fergon Tab*)  324 mg PO BID CHELSEA


Folic Acid (Folvite Tab*)  0.5 mg PO DAILY CHELSEA


Piperacillin Sod/Tazobactam (Sod 3.375 gm/ Sodium Chloride)  100 mls @ 25 mls/

hr IVPB Q8H CHELSEA


Sodium Chloride (Ns 0.9% 1000 Ml*)  1,000 mls @ 75 mls/hr IV PER RATE CHELSEA


Omeprazole (Prilosec Cap*)  40 mg PO DAILY@0730 CHELSEA


Oxycodone HCl (Roxycodone Tab*)  10 mg PO Q4H PRN Reason: PAIN


Pharmacy Profile Note (Fentanyl Patch Check Q Shift)  0 note N/A 0700,1900 CHELSEA


Potassium Chloride (Klor Con Er Tab*)  10 meq PO DAILY CHELSEA


Pregabalin (Lyrica Cap(*))  150 mg PO TID CHELSEA





 Vital Signs











  09/03/17 09/03/17 09/03/17





  08:49 10:49 11:27


 


Temperature   98.4 F


 


Pulse Rate   77


 


Respiratory 16 16 16





Rate   


 


Blood Pressure   111/47





(mmHg)   


 


O2 Sat by Pulse   96





Oximetry   














  09/03/17 09/03/17 09/03/17





  14:17 15:36 16:17


 


Temperature  100.9 F 


 


Pulse Rate  92 


 


Respiratory 16 18 16





Rate   


 


Blood Pressure  146/55 





(mmHg)   


 


O2 Sat by Pulse  95 





Oximetry   














  09/03/17 09/03/17 09/03/17





  19:30 20:58 21:18


 


Temperature  99.6 F 100.0 F


 


Pulse Rate  87 86


 


Respiratory 16 16 16





Rate   


 


Blood Pressure  135/55 129/51





(mmHg)   


 


O2 Sat by Pulse  95 96





Oximetry   














  09/03/17 09/03/17 09/03/17





  21:46 23:46 23:53


 


Temperature   99.1 F


 


Pulse Rate   79


 


Respiratory 16 16 16





Rate   


 


Blood Pressure   134/41





(mmHg)   


 


O2 Sat by Pulse   95





Oximetry   














  09/04/17





  03:48


 


Temperature 98.5 F


 


Pulse Rate 78


 


Respiratory 16





Rate 


 


Blood Pressure 151/53





(mmHg) 


 


O2 Sat by Pulse 96





Oximetry 








Oxygen Devices in Use Now: None


Appearance: NAD, laying in bed


Ears/Nose/Mouth/Throat: Mucous Membranes Moist


Respiratory: Symmetrical Chest Expansion and Respiratory Effort, Clear to 

Auscultation


Cardiovascular: NL Sounds; No Murmurs; No JVD, RRR


Abdominal: NL Sounds; No Tenderness; No Distention


Extremities: No Edema


Skin: - - Large sacral wound, see attached pictures


Neurological: Alert and Oriented x 3


Lines/Tubes/Other Access: Clean, Dry and Intact Peripheral IV - site benign


Nutrition: Taking PO's


Result Diagrams: 


 09/04/17 06:29





 09/04/17 06:29


Additional Lab and Data: 








 Picture taken 9/4 





Microbiology and Other Data: 


 Microbiology











 09/02/17 13:40 Wound Gram Stain - Final





 Tissue - Other Tissue Culture - Preliminary





    Bacteroides Vulgatus





 Skin and Soft Tissue MRSA/MSSA (PCR - Final





    Mrsa Negative





    S.aureus Negative














Assess/Plan/Problems-Billing


Assessment: 





Mr. Pryor is a 73 yo male with PMH significant for ependymoma to the spine (

now with chronic and inability to ambulate), neurogenic bladder with chronic 

indwelling london, HTN, GERD, BONIFACIO with BiPAP, CAD, HLD and BPH who presented to 

the emergency room with concern for a sacral decubitus ulcer. 





- Patient Problems


(1) Sepsis


Comment: 


- Meeting SIRS criteria on admission (Tachycardia, tachypnea, fever, 

leukocytosis)


- Pt not meeting qSofa criteria on admission or at this time


- No longer meeting SIRS criteria (tachycardia and tachypnea resolved, afebrile)


- Leukocytosis and CRP improving   





(2) Pressure ulcer of sacral region, stage 3


Code(s): L89.153 - PRESSURE ULCER OF SACRAL REGION, STAGE 3   SNOMED Code(s): 

316178603


   Comment: 


- S/P bedside debridement 9/2


- Wound consult pending


- Surgical consult, appreciate input


- Continue Zosyn   





(3) CKD (chronic kidney disease), stage III


Code(s): N18.3 - CHRONIC KIDNEY DISEASE, STAGE 3 (MODERATE)   SNOMED Code(s): 

972390361


   Comment: 


- Creatinine within baseline


- Baseline creatinine 2.1 to 2.5


- Avoid nephrotoxic medications   





(4) Anemia


Code(s): D64.9 - ANEMIA, UNSPECIFIED   SNOMED Code(s): 544015948


   Comment: 


- No s/s of bleeding


- Suspect anemia of chronic disease


- Received 1 U PRBCs yesterday


- Continue iron supplementation, B12, folate   





(5) CAD (coronary artery disease)


Code(s): I25.10 - ATHSCL HEART DISEASE OF NATIVE CORONARY ARTERY W/O ANG PCTRS 

  SNOMED Code(s): 11589273


   Comment: 


- Continue ASA.   





(6) Ependymoma


Code(s): C71.9 - MALIGNANT NEOPLASM OF BRAIN, UNSPECIFIED   SNOMED Code(s): 

467640686


   Comment: 


- To the spine (dx in patient's 20s)


- With concurrent chronic back pain and inability to ambulate


- S/p multiple surgeries   





(7) Chronic back pain


Code(s): M54.9 - DORSALGIA, UNSPECIFIED; G89.29 - OTHER CHRONIC PAIN   SNOMED 

Code(s): 993096166


   Comment: 


- Secondary to ependymoma


- Continue oxycodone, pregabalin and fentanyl patch   





(8) HTN (hypertension)


Code(s): I10 - ESSENTIAL (PRIMARY) HYPERTENSION   SNOMED Code(s): 84412045


   Comment: 


- Normotensive


- Continue furosemide and amlodipine   





(9) BONIFACIO (obstructive sleep apnea)


Code(s): G47.33 - OBSTRUCTIVE SLEEP APNEA (ADULT) (PEDIATRIC)   SNOMED Code(s): 

43263426


   Comment: 


- Continue home BiPAP use.   





(10) DVT prophylaxis


Code(s): UHH5204 -    SNOMED Code(s): 467212271


   Comment: 


- Lovenox   





(11) Full code status


Code(s): Z78.9 - OTHER SPECIFIED HEALTH STATUS   SNOMED Code(s): 918878214


   


Status and Disposition: 





Inpatient. Estimated length of stay >3 days, discharge to home when medically 

stable.

## 2017-09-04 NOTE — CONS
CC:  [facility]; Dr. Vicente in [place] *

 

CONSULTATION REPORT:

 

DATE OF CONSULT:  09/04/17

 

REFERRING PROVIDER:  Chito Harvey MD

 

REASON FOR CONSULTATION:  Sacral decubitus.

 

HISTORY OF PRESENT ILLNESS:  Mr. Jose Pryor is a 72-year-old gentleman 
with a long history of an ependymoma of the lower spine that has resulted in 
chronic low back pain and paresthesia and neuropathy of the lower extremities.  
He is currently wheelchair bound.  He has had multiple lower back surgeries in 
[place] in [place]; however, most recently back in the 1980's.

 

He lives at home with his wife.  He says for the last 4 years now, he has been 
wheelchair bound and transfers only from bed to chair with her assistance as 
well as his upper body strength and a slider.

 

He, apparently, was admitted and treated for dehydration and diarrhea earlier 
this summer here at [facility] and developed a sacral decubitus.

 

He was at home and she noted that he had some mental status change with fevers 
and poor oral intake over a 48-hour period, presented to the emergency room, 
and was found to have septic parameters.

 

He was admitted and started on IV antibiotics, cultures were taken.  He was 
noted to have a sacral decubitus.

 

Dr. Miller performed a limited decubitus debridement upon admission.  This 
showed no undrained abscess.  There is still some nonviable tissue; however, 
and undermining.  Cultures were obtained, which showed multi-organisms and can 
be seen in the microbiology report.

 

He is presently on Zosyn and the wound is being dressed daily with an Aquacel 
silver dressing.

 

PAST MEDICAL HISTORY:

1.  Ependymoma of the lower spine.

2.  Neurogenic bladder.

3.  Hypertension.

4.  Gastroesophageal reflux.

5.  Obstructive sleep apnea.

6.  History of coronary artery disease.

7.  Hyperlipidemia.

8.  Asthma.

9.  Chronic kidney disease, stage 3.

 

PAST SURGICAL HISTORY:

1.  Four spinal surgeries at [facility] and [facility] in the 1980s.

2.  Appendectomy.

3.  Right carotid endarterectomy.

 

MEDICATIONS:  Included:

1.  Tylenol.

2.  Aspirin.

3.  Vitamin D.

4.  Lasix.

5.  Omeprazole.

6.  Oxycodone.

7.  Potassium.

8.  Lyrica.

9.  Fentanyl.

 

ALLERGIES:  LIPITOR, CHICKEN, VANILLA, and GRAPES.

 

SOCIAL HISTORY:  He is disabled.  He is .  His wife is his healthcare 
proxy. He is a retired .  He does not smoke or use alcohol.

 

PHYSICAL EXAM:  Temperature 99, pulse 74, blood pressure 144/56.  General:  He 
is an elderly male.  He is awake, alert, conversive, quite pleasant, appears to 
be in no apparent distress.  On the lower back, he has several midline 
incisions from about the L2 level down to the coccygeal area, which are quite 
deep and furrowed. There is a sacral decubitus at the lower midline with some 
necrotic nonviable tissue as well as some eschar as well as undermining.  There 
is no significant surrounding erythema or redness.  There is a mild odor, but 
there is no purulence noted or what appears to be undrained abscess.  There is 
no tracking that extends down towards the anus.

 

IMPRESSION:  Sacral decubitus.  This, apparently, developed from a recent 
hospitalization.  He is wheelchair bound and non-ambulatory and has no 
sensation from the upper waist area down.  The wound was minimally debrided, 
still has nonviable tissue, but this was done initially to rule out any 
underlying abscess or cause of his sepsis.

 

PLAN:  I discussed all the findings with the patient and his wife at the 
bedside in detail.  This is a complicated and chronic problem made worse by his 
neuropathy and wheelchair bound/bedbound status.

 

I emphasized to them that this will be a difficult and challenging wound to 
treat and what I would recommend initially would be a more extensive 
debridement to remove all of the nonviable tissue and adequately stage the 
ulcer so that we can begin adequate wound care such as either a wound VAC or 
more intensive wound care dressings.  This also will most certainly require a  
plastic surgical consultation, especially in light of his bedbound status to 
try to prevent further recurrence.

 

For now, the wound will be dressed with wet-to-dry 4x4 gauze.  Continue the 
offloading program at present.

 

We will keep him n.p.o. after midnight and hold his subcutaneous heparin and 
plan for an operative debridement tomorrow with limited anesthesia.

 

All of the above was discussed with the patient and his wife and their 
questions were answered.

 

 [id number]/[facility] #: [id number]

ELISABET

## 2017-09-04 NOTE — PN
Hospitalist Progress Note





1810: Received a call from the [facility] staff that the patient was complaining of 

shortness of breath. VS obtained: Pt noted to be febrile. Lung sounds with 

wheezing noted throughout. Pt in NAD.





Pt received an albuterol treatment and stated improvement in his SOB.





Chest xray obtained - RADIOLOGIST's IMPRESSION: The constellation of findings 

given the clinical context is most consistent


with bronchopneumonia.





Will start the patient on Vanco in addition to Zosyn for possible hospital 

acquired PNA. Will also check urine for S. Pneumo and Legionella antigens and 

attempt to obtain a sputum culture.

## 2017-09-04 NOTE — RAD
Indication: Fever, dyspnea, wheezing.



Comparison: September 01, 2017



Technique: Upright AP 1845 hours



Report: Bilateral alveolar consolidation most confluent in the RIGHT mid and bilateral

medial lower lung zones. Negative for pleural effusions. The heart, pulmonary vasculature,

and mediastinal contours are unremarkable.



IMPRESSION: The constellation of findings given the clinical context is most consistent

with bronchopneumonia.

## 2017-09-05 LAB
ANION GAP SERPL CALC-SCNC: 6 MMOL/L (ref 2–11)
BUN SERPL-MCNC: 26 MG/DL (ref 6–24)
BUN/CREAT SERPL: 17 (ref 8–20)
CALCIUM SERPL-MCNC: 9.7 MG/DL (ref 8.6–10.3)
CHLORIDE SERPL-SCNC: 112 MMOL/L (ref 101–111)
GLUCOSE SERPL-MCNC: 109 MG/DL (ref 70–100)
HCO3 SERPL-SCNC: 26 MMOL/L (ref 22–32)
HCT VFR BLD AUTO: 26 % (ref 42–52)
HGB BLD-MCNC: 8.6 G/DL (ref 14–18)
MCH RBC QN AUTO: 26 PG (ref 27–31)
MCHC RBC AUTO-ENTMCNC: 33 G/DL (ref 31–36)
MCV RBC AUTO: 79 FL (ref 80–94)
POTASSIUM SERPL-SCNC: 3.9 MMOL/L (ref 3.5–5)
RBC # BLD AUTO: 3.26 10^6/UL (ref 4–5.4)
SODIUM SERPL-SCNC: 144 MMOL/L (ref 133–145)
WBC # BLD AUTO: 11.5 10^3/UL (ref 3.5–10.8)

## 2017-09-05 PROCEDURE — 0KBN0ZZ EXCISION OF RIGHT HIP MUSCLE, OPEN APPROACH: ICD-10-PCS | Performed by: SURGERY

## 2017-09-05 RX ADMIN — OXYCODONE HYDROCHLORIDE PRN MG: 5 CAPSULE ORAL at 16:08

## 2017-09-05 RX ADMIN — FOLIC ACID SCH: 1 TABLET ORAL at 09:33

## 2017-09-05 RX ADMIN — WATER SCH NOTE: 100 INJECTION, SOLUTION INTRAVENOUS at 19:03

## 2017-09-05 RX ADMIN — ASPIRIN SCH: 81 TABLET, CHEWABLE ORAL at 09:33

## 2017-09-05 RX ADMIN — Medication SCH MG: at 21:34

## 2017-09-05 RX ADMIN — PREGABALIN SCH MG: 50 CAPSULE ORAL at 21:31

## 2017-09-05 RX ADMIN — WATER SCH NOTE: 100 INJECTION, SOLUTION INTRAVENOUS at 07:01

## 2017-09-05 RX ADMIN — PREGABALIN SCH MG: 50 CAPSULE ORAL at 09:46

## 2017-09-05 RX ADMIN — FENTANYL TRANSDERMAL SCH MCG: 75 PATCH, EXTENDED RELEASE TRANSDERMAL at 10:27

## 2017-09-05 RX ADMIN — Medication SCH: at 09:33

## 2017-09-05 RX ADMIN — ENOXAPARIN SODIUM SCH MG: 40 INJECTION SUBCUTANEOUS at 21:36

## 2017-09-05 RX ADMIN — VANCOMYCIN HYDROCHLORIDE SCH MLS/HR: 1 INJECTION, POWDER, LYOPHILIZED, FOR SOLUTION INTRAVENOUS at 11:10

## 2017-09-05 RX ADMIN — VANCOMYCIN HYDROCHLORIDE SCH MLS/HR: 1 INJECTION, POWDER, LYOPHILIZED, FOR SOLUTION INTRAVENOUS at 22:22

## 2017-09-05 RX ADMIN — CYANOCOBALAMIN TAB 500 MCG SCH: 500 TAB at 09:33

## 2017-09-05 RX ADMIN — OXYCODONE HYDROCHLORIDE PRN MG: 5 CAPSULE ORAL at 21:34

## 2017-09-05 RX ADMIN — POTASSIUM CHLORIDE SCH: 750 TABLET, FILM COATED, EXTENDED RELEASE ORAL at 09:33

## 2017-09-05 RX ADMIN — IPRATROPIUM BROMIDE AND ALBUTEROL SULFATE PRN NEB: .5; 3 SOLUTION RESPIRATORY (INHALATION) at 15:51

## 2017-09-05 RX ADMIN — OMEPRAZOLE SCH MG: 20 CAPSULE, DELAYED RELEASE ORAL at 09:46

## 2017-09-05 RX ADMIN — PREGABALIN SCH: 50 CAPSULE ORAL at 16:06

## 2017-09-05 NOTE — PN
Subjective


Date of Service: 09/05/17


Interval History: 





Started on vancomycin last night after SOB and CXR with right and b/l medial 

lower lobe alveolar infiltrates. No current SOB or coughing. to OR today for 

sharp debridement


Family History: Unchanged from Admission


Social History: Unchanged from Admission


Past Medical History: Unchanged from Admission





Objective


Active Medications: 








Acetaminophen (Tylenol Tab*)  650 mg PO Q4H PRN


   PRN Reason: PAIN


Albuterol/Ipratropium (Duoneb (Albuterol 2.5 Mg/Ipratropium 0.5 Mg))  1 neb INH 

Q4H PRN


   PRN Reason: SOB/WHEEZING


   Last Admin: 09/05/17 15:51 Dose:  1 neb


Aspirin (Aspirin Low Dose Tab*)  81 mg PO DAILY [facility]


   Last Admin: 09/05/17 09:33 Dose:  Not Given


Cholecalciferol (Vitamin D Tab*)  5,000 units PO DAILY [facility]


   Last Admin: 09/05/17 09:33 Dose:  Not Given


Cyanocobalamin (Vitamin B12 Tab*)  1,000 mcg PO DAILY [facility]


   Last Admin: 09/05/17 09:33 Dose:  Not Given


Diphenoxylate HCl/Atropine (Lomotil Tab*)  1 tab PO BID PRN


   PRN Reason: DIARRHEA


   Last Admin: 09/02/17 20:49 Dose:  1 tab


Enoxaparin Sodium (Lovenox(*))  40 mg SUBCUT Q24H [facility]


Fentanyl (Duragesic  Patch 75 Mcg/Hr*)  75 mcg TRANSDERM Q72HR [facility]


   Last Admin: 09/05/17 10:27 Dose:  75 mcg


Fentanyl Citrate (Fentanyl*)  25 mcg IV Q2M PRN


   PRN Reason: PAIN - MODERATE


   Stop: 09/06/17 17:00


Ferrous Gluconate (Fergon Tab*)  324 mg PO BID [facility]


   Last Admin: 09/05/17 09:33 Dose:  Not Given


Folic Acid (Folvite Tab*)  0.5 mg PO DAILY [facility]


   Last Admin: 09/05/17 09:33 Dose:  Not Given


Piperacillin Sod/Tazobactam (Sod 3.375 gm/ Sodium Chloride)  100 mls @ 200 mls/

hr IVPB Q6H [facility]


   Last Admin: 09/05/17 16:08 Dose:  200 mls/hr


Vancomycin HCl 1,000 mg/ (Sodium Chloride)  250 mls @ 166.667 mls/hr IVPB Q12H 

[facility]


   Last Admin: 09/05/17 11:10 Dose:  166.667 mls/hr


Omeprazole (Prilosec Cap*)  40 mg PO DAILY@0730 [facility]


   Last Admin: 09/05/17 09:46 Dose:  40 mg


Oxycodone HCl (Roxycodone Tab*)  10 mg PO Q4H PRN


   PRN Reason: PAIN


   Last Admin: 09/05/17 16:08 Dose:  10 mg


Pharmacy Consult (Vancomycin Per Pharmacy*)  1 note FOLLOW UP . PRN


   PRN Reason: PER PROTOCOL


Pharmacy Profile Note (Fentanyl Patch Check Q Shift)  0 note N/A 0700,1900 [facility]


   Last Admin: 09/05/17 07:01 Dose:  1 note


Pharmacy Profile Note (Vancomycin Trough Check)  1 note FOLLOW UP 0930 ONE


   Stop: 09/06/17 09:31


Potassium Chloride (Klor Con Er Tab*)  10 meq PO DAILY [facility]


   Last Admin: 09/05/17 09:33 Dose:  Not Given


Pregabalin (Lyrica Cap(*))  150 mg PO TID [facility]


   Last Admin: 09/05/17 16:06 Dose:  Not Given








 Vital Signs











  09/04/17 09/04/17 09/04/17





  17:41 17:59 18:11


 


Temperature   


 


Pulse Rate   98


 


Respiratory 16 16 18





Rate   


 


Blood Pressure   155/57





(mmHg)   


 


O2 Sat by Pulse   95





Oximetry   














  09/04/17 09/04/17 09/04/17





  18:35 19:44 19:59


 


Temperature  99.8 F 


 


Pulse Rate 100 91 


 


Respiratory  24 16





Rate   


 


Blood Pressure  145/57 





(mmHg)   


 


O2 Sat by Pulse  98 





Oximetry   














  09/04/17 09/04/17 09/04/17





  21:10 21:16 22:58


 


Temperature   


 


Pulse Rate   


 


Respiratory 16 18 15





Rate   


 


Blood Pressure   





(mmHg)   


 


O2 Sat by Pulse   





Oximetry   














  09/05/17 09/05/17 09/05/17





  00:10 03:29 07:20


 


Temperature 99.7 F 100.0 F 97.7 F


 


Pulse Rate 78 75 70


 


Respiratory 16 16 10





Rate   


 


Blood Pressure 147/54 152/54 157/59





(mmHg)   


 


O2 Sat by Pulse 96 94 95





Oximetry   














  09/05/17 09/05/17 09/05/17





  08:00 09:46 13:15


 


Temperature   99.1 F


 


Pulse Rate   76


 


Respiratory 16 16 16





Rate   


 


Blood Pressure   140/69





(mmHg)   


 


O2 Sat by Pulse   90





Oximetry   














  09/05/17 09/05/17 09/05/17





  13:30 14:00 14:15


 


Temperature   


 


Pulse Rate 77 76 80


 


Respiratory 18 16 16





Rate   


 


Blood Pressure 152/68 157/67 165/68





(mmHg)   


 


O2 Sat by Pulse 98 95 95





Oximetry   














  09/05/17 09/05/17 09/05/17





  14:42 15:06 15:17


 


Temperature  98.2 F 98.3 F


 


Pulse Rate 81 87 80


 


Respiratory 16 16 20





Rate   


 


Blood Pressure 173/79 153/78 175/80





(mmHg)   


 


O2 Sat by Pulse 95 96 100





Oximetry   














  09/05/17 09/05/17 09/05/17





  15:33 15:54 16:08


 


Temperature 98.3 F  


 


Pulse Rate 80 88 


 


Respiratory 20 18 16





Rate   


 


Blood Pressure 175/80  





(mmHg)   


 


O2 Sat by Pulse 100 95 





Oximetry   














  09/05/17





  16:24


 


Temperature 100.4 F


 


Pulse Rate 88


 


Respiratory 15





Rate 


 


Blood Pressure 151/71





(mmHg) 


 


O2 Sat by Pulse 97





Oximetry 











Oxygen Devices in Use Now: None


Appearance: chronically ill appearing. in pre-op wedged to right side. Pleasant 

in no acute distress


Eyes: No Scleral Icterus, PERRLA


Ears/Nose/Mouth/Throat: Mucous Membranes Moist


Neck: NL Appearance and Movements; NL JVP


Respiratory: Symmetrical Chest Expansion and Respiratory Effort, Clear to 

Auscultation, - - Anteriorly clear to auscultation bilaterally, no wheezing 

rales or rhonchi


Cardiovascular: NL Sounds; No Murmurs; No JVD, RRR


Abdominal: NL Sounds; No Tenderness; No Distention, No Hepatosplenomegaly


Extremities: No Edema, No Clubbing, Cyanosis


Skin: - - sacral decub. see photo from previous day


Neurological: Alert and Oriented x 3, NL Muscle Strength and Tone


Lines/Tubes/Other Access: Clean, Dry and Intact [place]


Nutrition: Taking PO's


Result Diagrams: 


 09/05/17 06:53





 09/05/17 06:53


Additional Lab and Data: 








 Picture taken 9/4 





Microbiology and Other Data: 


 Microbiology











 09/02/17 13:40 Wound Gram Stain - Final





 Tissue - Other Tissue Culture - Preliminary





    Bacteroides Vulgatus





 Skin and Soft Tissue MRSA/MSSA (PCR - Final





    Mrsa Negative





    S.aureus Negative














Assess/Plan/Problems-Billing


Assessment: 





Mr. 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 

4 surgeries c/b progressive paraparaplegia and debilitating parasthesias, 

neurogenic bladder with chronic indwelling london, HTN, GERD, BONIFACIO with BiPAP, CAD

, HLD and BPH who presented to the emergency room with stage IV sacral 

decubitus ulcer.  PNA. On zosyn and vancomycin





- Patient Problems


(1) Sacral decubitus ulcer, stage IV


Current Visit: Yes   Status: Acute   Code(s): L89.154 - PRESSURE ULCER OF 

SACRAL REGION, STAGE 4   SNOMED Code(s): 082188939


   Comment: - S/P OR sharp debridement 9/5 and bedside debridement 9/2


- Wound consult 


- appreciate Surgical assistance. May need plastic surgery 


- Continue Zosyn (also on vancomycin), f/u deep tissue cultures





- bacteroides, clostridium and E Faecalis from initial cultures (stool)   





(2) Pneumonia


Current Visit: Yes   Status: Acute   Code(s): J18.9 - PNEUMONIA, UNSPECIFIED 

ORGANISM   SNOMED Code(s): 774914188


   Comment: Continue vancomycin and zosyn for HCAP. 


Consider sputum culture.   





(3) Paraplegia


Current Visit: Yes   Status: Acute   Code(s): G82.20 - PARAPLEGIA, UNSPECIFIED 

  SNOMED Code(s): 66797465


   Comment: frequent turning, skin care. 


Pain control with home fentanyl 75mcg patch and oxycodone 10mg q4 prn, 

acetaminophen 650mg q6 prn.   





(4) Sepsis


Current Visit: Yes   Status: Acute   Comment: - Meeting SIRS criteria on 

admission (Tachycardia, tachypnea, fever, leukocytosis)


- Pt not meeting qSofa criteria on admission or at this time


- No longer meeting SIRS criteria (tachycardia and tachypnea resolved, afebrile)


- Leukocytosis and CRP improving   





(5) Anemia


Current Visit: No   Status: Chronic   Code(s): D64.9 - ANEMIA, UNSPECIFIED   

SNOMED Code(s): 246725788


   Comment: - No s/s of bleeding


- Suspect anemia of chronic disease


- Received 1 U PRBCs 9/3


- Continue iron supplementation, B12, folate   


Status and Disposition: 





Inpatient. Estimated length of stay >3 days, discharge to home when medically 

stable.





Attending: Nathaniel Coates

## 2017-09-05 NOTE — SURGPN
Brief Operative Note





- Surgery


Procedures: 


Procedures





OPERATIVE REPORT





PRE-OP: Sacral pressure ulcer





POST-OP: Same-grade IV





PROCEDURE: Debridement of sacral pressure ulcer with deep tissue culture





SURGEON: MD Leydi


ANESTHESIA:Local with MAC         Dr. Goodman


ASST: none


IVF: min


EBL: min


SPECIMEN: Deep tissue ulcer for culture


DRAIN: none


WOUND CLASS: 4


COMPLICATIONS: none


TO PACU

## 2017-09-05 NOTE — PN
Progress Note





- Progress Note


Date of Service: 09/05/17


SOAP: 


Subjective:





No changes overnight








Objective:





 











Temp Pulse Resp BP Pulse Ox


 


 97.7 F   70   16   157/59   95 


 


 09/05/17 07:20  09/05/17 07:20  09/05/17 09:46  09/05/17 07:20  09/05/17 07:20








Sacral dressing intact








Assessment:


Sacral decubitus








Plan:


Debridement of sacral decubitus today.  The procedure was discussed with the 

patient and his wife and the risks of, but not limited to, of bleeding, 

infection, pain, further surgical intervention depending on clinical course, 

the risks of anesthesia and DVT were all discussed.





I once again emphasized to them the challenging nature of treating and healing 

sacral pressure ulcers and prolonged course involved to achieve healing of 

these ulcers.

## 2017-09-06 LAB
ANION GAP SERPL CALC-SCNC: 8 MMOL/L (ref 2–11)
BUN SERPL-MCNC: 24 MG/DL (ref 6–24)
BUN/CREAT SERPL: 16.4 (ref 8–20)
CALCIUM SERPL-MCNC: 9.4 MG/DL (ref 8.6–10.3)
CHLORIDE SERPL-SCNC: 112 MMOL/L (ref 101–111)
GLUCOSE SERPL-MCNC: 139 MG/DL (ref 70–100)
HCO3 SERPL-SCNC: 22 MMOL/L (ref 22–32)
HCT VFR BLD AUTO: 28 % (ref 42–52)
HGB BLD-MCNC: 9.1 G/DL (ref 14–18)
MCH RBC QN AUTO: 27 PG (ref 27–31)
MCHC RBC AUTO-ENTMCNC: 33 G/DL (ref 31–36)
MCV RBC AUTO: 82 FL (ref 80–94)
POTASSIUM SERPL-SCNC: 3.9 MMOL/L (ref 3.5–5)
RBC # BLD AUTO: 3.42 10^6/UL (ref 4–5.4)
SODIUM SERPL-SCNC: 142 MMOL/L (ref 133–145)
WBC # BLD AUTO: 9.9 10^3/UL (ref 3.5–10.8)

## 2017-09-06 RX ADMIN — OXYCODONE HYDROCHLORIDE PRN MG: 5 CAPSULE ORAL at 17:52

## 2017-09-06 RX ADMIN — IPRATROPIUM BROMIDE AND ALBUTEROL SULFATE PRN NEB: .5; 3 SOLUTION RESPIRATORY (INHALATION) at 17:55

## 2017-09-06 RX ADMIN — OMEPRAZOLE SCH MG: 20 CAPSULE, DELAYED RELEASE ORAL at 07:50

## 2017-09-06 RX ADMIN — Medication SCH CAP: at 21:15

## 2017-09-06 RX ADMIN — FOLIC ACID SCH MG: 1 TABLET ORAL at 07:49

## 2017-09-06 RX ADMIN — ASPIRIN SCH MG: 81 TABLET, CHEWABLE ORAL at 07:50

## 2017-09-06 RX ADMIN — Medication SCH UNITS: at 07:49

## 2017-09-06 RX ADMIN — Medication SCH MG: at 07:50

## 2017-09-06 RX ADMIN — Medication SCH MG: at 21:15

## 2017-09-06 RX ADMIN — VANCOMYCIN HYDROCHLORIDE SCH MLS/HR: 1 INJECTION, POWDER, LYOPHILIZED, FOR SOLUTION INTRAVENOUS at 10:40

## 2017-09-06 RX ADMIN — WATER SCH NOTE: 100 INJECTION, SOLUTION INTRAVENOUS at 18:58

## 2017-09-06 RX ADMIN — VANCOMYCIN HYDROCHLORIDE SCH MLS/HR: 1 INJECTION, POWDER, LYOPHILIZED, FOR SOLUTION INTRAVENOUS at 23:11

## 2017-09-06 RX ADMIN — POTASSIUM CHLORIDE SCH MEQ: 750 TABLET, FILM COATED, EXTENDED RELEASE ORAL at 07:50

## 2017-09-06 RX ADMIN — WATER SCH NOTE: 100 INJECTION, SOLUTION INTRAVENOUS at 07:11

## 2017-09-06 RX ADMIN — PREGABALIN SCH MG: 50 CAPSULE ORAL at 07:50

## 2017-09-06 RX ADMIN — PREGABALIN SCH MG: 50 CAPSULE ORAL at 21:15

## 2017-09-06 RX ADMIN — ENOXAPARIN SODIUM SCH MG: 40 INJECTION SUBCUTANEOUS at 21:16

## 2017-09-06 RX ADMIN — PREGABALIN SCH MG: 50 CAPSULE ORAL at 15:30

## 2017-09-06 RX ADMIN — OXYCODONE HYDROCHLORIDE PRN MG: 5 CAPSULE ORAL at 07:50

## 2017-09-06 RX ADMIN — CYANOCOBALAMIN TAB 500 MCG SCH MCG: 500 TAB at 07:50

## 2017-09-06 NOTE — OP
CC:  Dr. Vicente *

 

DATE OF OPERATION:  09/05/17 - ROOM #349

 

DATE OF BIRTH:  [birth year]

 

SURGEON:  Alberto Holley MD.

 

ANESTHESIA:  Local with monitored anesthesia care.

 

ANESTHESIOLOGIST:  Dr. Goodman.

 

PRE-OP DIAGNOSIS:  Unstaged sacral pressure ulcer.

 

POST-OP DIAGNOSIS:  Stage IV sacral pressure ulcer.

 

OPERATIVE PROCEDURE:  Debridement of stage IV pressure ulcer with cultures.

 

ESTIMATED BLOOD LOSS:  Minimal.

 

SPECIMEN:  Deep tissue for Gram stain and culture.

 

DRAINS:  None.

 

WOUND CLASSIFICATION:  IV.

 

COMPLICATIONS:  None.

 

BRIEF HISTORY:  Mr. Jose Pryor is a 72-year-old gentleman with history of 
a benign lower spinal cord tumor, which has resulted in him becoming wheelchair 
and bedbound.  He developed a sacral pressure ulcer in the last 6 weeks, was 
admitted with sepsis, does have an open unstaged sacral decubitus, which is now 
to be taken to the operating room for debridement as initial portion of his 
treatment plan.

 

The procedure was discussed with the patient and his wife, the risk but not 
limited to bleeding, infection, recurrence, discomfort, further surgical 
intervention depending on clinical course and findings, prolonged healing with 
the understanding that plastic surgical consultation will most likely be 
necessary and once again emphasized them the difficulty and frustration of 
achieving healing in this type of situation.

 

DESCRIPTION OF PROCEDURE:  Written informed consent was obtained, preoperative 
antibiotics had been administered.  He was taken to the operating room.  I did 
not rick the site due to its midline location.

 

The patient was placed in the left lateral decubitus position and the lower 
back and buttocks were prepped and draped in the usual sterile fashion.

 

Time-out verification was completed.

 

I next proceeded to debride sharply as well as with cautery the eschar and 
nonviable tissue over the low midline.  There was a separate area of eschar to 
the right several centimeters and these tunneled together.  I thus removed the 
skin bridge between the two and carried down the debridement to some healthy 
muscle and fat which bled.  Several 3-0 Polysorb sutures were used to control 
bleeding throughout the dissection.  The wound extended down more inferiorly as 
well.  This was all debrided to healthy bleeding tissue.

 

I did not expose bone at the lower sacral coccygeal area; however, there was 
fibrous tissue at the base with scar tissue from his previous surgeries.  I did 
take a deep culture to send for Gram stain and culture.

 

Once I felt that an adequate debridement was performed and hemostasis was 
assured, the wound was irrigated with saline. It was then packed with a moist 4-
inch Kerlix and covered with dry ABD pads.

 

The patient tolerated the procedure well, was taken to the recovery room in 
stable condition.

 

 [id number]/CPS #: [id number]

MTDD

## 2017-09-06 NOTE — PN
Subjective


Date of Service: 09/06/17


Interval History: 





Tolerated sharp debridement. Attests to adequate pain control. No nausea. Hgb 

improved to 9.1/ Deep tissue positive for E Faecalis and Bacteroides vulgatus 

to date. GS with GPC in chains. SOB last night and got albuterol (wife notice 

head to be leaning to side)


Family History: Unchanged from Admission


Social History: Unchanged from Admission


Past Medical History: Unchanged from Admission





Objective


Active Medications: 








Acetaminophen (Tylenol Tab*)  650 mg PO Q4H PRN


   PRN Reason: PAIN


Albuterol/Ipratropium (Duoneb (Albuterol 2.5 Mg/Ipratropium 0.5 Mg))  1 neb INH 

Q4H PRN


   PRN Reason: SOB/WHEEZING


   Last Admin: 09/05/17 15:51 Dose:  1 neb


Aspirin (Aspirin Low Dose Tab*)  81 mg PO DAILY [facility]


   Last Admin: 09/06/17 07:50 Dose:  81 mg


Cholecalciferol (Vitamin D Tab*)  5,000 units PO DAILY [facility]


   Last Admin: 09/06/17 07:49 Dose:  5,000 units


Cyanocobalamin (Vitamin B12 Tab*)  1,000 mcg PO DAILY [facility]


   Last Admin: 09/06/17 07:50 Dose:  1,000 mcg


Diphenoxylate HCl/Atropine (Lomotil Tab*)  1 tab PO BID PRN


   PRN Reason: DIARRHEA


   Last Admin: 09/02/17 20:49 Dose:  1 tab


Enoxaparin Sodium (Lovenox(*))  40 mg SUBCUT Q24H [facility]


   Last Admin: 09/05/17 21:36 Dose:  40 mg


Fentanyl (Duragesic  Patch 75 Mcg/Hr*)  75 mcg TRANSDERM Q72HR [facility]


   Last Admin: 09/05/17 10:27 Dose:  75 mcg


Ferrous Gluconate (Fergon Tab*)  324 mg PO BID [facility]


   Last Admin: 09/06/17 07:50 Dose:  324 mg


Folic Acid (Folvite Tab*)  0.5 mg PO DAILY [facility]


   Last Admin: 09/06/17 07:49 Dose:  0.5 mg


Piperacillin Sod/Tazobactam (Sod 3.375 gm/ Sodium Chloride)  100 mls @ 200 mls/

hr IVPB Q6H [facility]


   Last Admin: 09/06/17 15:30 Dose:  200 mls/hr


Vancomycin HCl 1,000 mg/ (Sodium Chloride)  250 mls @ 166.667 mls/hr IVPB Q12H 

[facility]


   Last Admin: 09/06/17 10:40 Dose:  166.667 mls/hr


Omeprazole (Prilosec Cap*)  40 mg PO DAILY@0730 [facility]


   Last Admin: 09/06/17 07:50 Dose:  40 mg


Oxycodone HCl (Roxycodone Tab*)  10 mg PO Q4H PRN


   PRN Reason: PAIN


   Last Admin: 09/06/17 07:50 Dose:  10 mg


Pharmacy Consult (Vancomycin Per Pharmacy*)  1 note FOLLOW UP . PRN


   PRN Reason: PER PROTOCOL


Pharmacy Profile Note (Fentanyl Patch Check Q Shift)  0 note N/A 0700,1900 [facility]


   Last Admin: 09/06/17 07:11 Dose:  1 note


Pharmacy Profile Note (Vancomycin Trough Check)  1 note FOLLOW UP ONCE ONE


   Stop: 09/08/17 09:31


Potassium Chloride (Klor Con Er Tab*)  10 meq PO DAILY [facility]


   Last Admin: 09/06/17 07:50 Dose:  10 meq


Pregabalin (Lyrica Cap(*))  150 mg PO TID [facility]


   Last Admin: 09/06/17 15:30 Dose:  150 mg








 Vital Signs











  09/05/17 09/05/17 09/05/17





  17:16 18:08 19:35


 


Temperature 100.8 F  99.5 F


 


Pulse Rate 80  92


 


Respiratory 12 18 14





Rate   


 


Blood Pressure 137/59  161/68





(mmHg)   


 


O2 Sat by Pulse 96  93





Oximetry   














  09/05/17 09/05/17 09/05/17





  21:00 21:16 21:31


 


Temperature  99.4 F 


 


Pulse Rate  88 


 


Respiratory 18 20 18





Rate   


 


Blood Pressure  161/54 





(mmHg)   


 


O2 Sat by Pulse  93 





Oximetry   














  09/05/17 09/05/17 09/06/17





  21:34 23:31 00:42


 


Temperature   100.0 F


 


Pulse Rate   78


 


Respiratory 18 18 20





Rate   


 


Blood Pressure   157/72





(mmHg)   


 


O2 Sat by Pulse   93





Oximetry   














  09/06/17 09/06/17 09/06/17





  03:18 07:15 07:28


 


Temperature 100.3 F 98.5 F 


 


Pulse Rate 76  73


 


Respiratory 18 18 





Rate   


 


Blood Pressure 158/60 149/58 





(mmHg)   


 


O2 Sat by Pulse 94 94 94





Oximetry   














  09/06/17 09/06/17 09/06/17





  07:50 08:00 09:50


 


Temperature   


 


Pulse Rate   


 


Respiratory 16 16 16





Rate   


 


Blood Pressure   





(mmHg)   


 


O2 Sat by Pulse  94 





Oximetry   














  09/06/17 09/06/17 09/06/17





  11:39 15:30 15:39


 


Temperature 98.4 F  98.5 F


 


Pulse Rate 78  85


 


Respiratory 18 16 16





Rate   


 


Blood Pressure 126/40  123/45





(mmHg)   


 


O2 Sat by Pulse 93  94





Oximetry   











Oxygen Devices in Use Now: None


Appearance: chronically ill appearing


Eyes: No Scleral Icterus, PERRLA


Ears/Nose/Mouth/Throat: NL Teeth, Lips, Gums, Mucous Membranes Moist


Neck: NL Appearance and Movements; NL JVP, Trachea Midline


Respiratory: Symmetrical Chest Expansion and Respiratory Effort, Clear to 

Auscultation, - - no wheezing, rales or rhonchi appreciated. 


Cardiovascular: NL Sounds; No Murmurs; No JVD, RRR


Abdominal: NL Sounds; No Tenderness; No Distention


Extremities: No Edema, No Clubbing, Cyanosis


Skin: - - wound vac covering sacrum ~5cm diameter


Neurological: Alert and Oriented x 3, - - paraplegic at waist with no sensation 

or muscle stength. 


Lines/Tubes/Other Access: Clean, Dry and Intact [place], Clean, Dry and Intact 

Other Access - Wound vac


Result Diagrams: 


 09/06/17 13:30





 09/06/17 13:30


Additional Lab and Data: 





 Laboratory Results - last 24 hr











  09/06/17 09/06/17 09/06/17





  09:33 13:30 13:30


 


WBC   9.9 


 


RBC   3.42 L 


 


Hgb   9.1 L 


 


Hct   28 L 


 


MCV   82 


 


MCH   27 


 


MCHC   33 


 


RDW   17 H 


 


Plt Count   365 


 


MPV   8 


 


Neut % (Auto)   62.0 


 


Lymph % (Auto)   20.5 L 


 


Mono % (Auto)   12.6 H 


 


Eos % (Auto)   3.7 


 


Baso % (Auto)   1.2 


 


Absolute Neuts (auto)   6.2 


 


Absolute Lymphs (auto)   2.0 


 


Absolute Monos (auto)   1.3 H 


 


Absolute Eos (auto)   0.4 


 


Absolute Basos (auto)   0.1 


 


Absolute Nucleated RBC   0.01 


 


Nucleated RBC %   0.1 


 


Sodium    142


 


Potassium    3.9


 


Chloride    112 H


 


Carbon Dioxide    22


 


Anion Gap    8


 


BUN    24


 


Creatinine    1.46 H


 


Est GFR ( Amer)    61.2


 


Est GFR (Non-Af Amer)    47.6


 


BUN/Creatinine Ratio    16.4


 


Glucose    139 H


 


Calcium    9.4


 


Vancomycin Trough  15.0  











Microbiology and Other Data: 


 





 Microbiology











 09/05/17 12:52 Wound Gram Stain - Final





 Tissue - Other Tissue Culture - Preliminary





    Enterococcus Faecalis





    Bacteroides Vulgatus





 Skin and Soft Tissue MRSA/MSSA (PCR - Final





    Mrsa Negative





    S.aureus Negative


 


 09/01/17 22:00 Aerobic Blood Culture - Preliminary





 Blood Venous    No Growth Day 4





 Anaerobic Blood Culture - Preliminary





    No Growth Day 4





 Blood Culture - Final


 


 09/01/17 21:53 Aerobic Blood Culture - Preliminary





 Blood Venous    No Growth Day 4





 Anaerobic Blood Culture - Preliminary





    No Growth Day 4





 Blood Culture - Final




















Assess/Plan/Problems-Billing


Assessment: 





Mr. Pryor is a 71 yo male with PMH significant for ependymoma s/p 4 surgeries c

/b progressive paraparaplegia and debilitating parasthesias, neurogenic bladder 

with chronic indwelling london, HTN, GERD, BONIFACIO with BiPAP, CAD, HLD and BPH with 

recent copious diarrhea since tx with cipro for UTI, who presented to the 

emergency room with sepsis and stage IV sacral decubitus ulcer.  bilateral 

HCAP. On zosyn and vancomycin. s/p debridement and now with wound vac.





- Patient Problems


(1) Sacral decubitus ulcer, stage IV


Current Visit: Yes   Status: Acute   Code(s): L89.154 - PRESSURE ULCER OF 

SACRAL REGION, STAGE 4   SNOMED Code(s): 928101049


   Comment: - S/P OR sharp debridement 9/5 and bedside debridement 9/2


- Wound consult appreciated. Wound vacc applied, will try "veriflow" saline 

washes tomorrow,


- appreciate Surgical assistance.


- Plastic surgery Dr. Phillips consulted, will talkd to Dr. Holley. However 

seems like for wound flap may have to go to [place] or [facility] 


- Continue Zosyn (also on vancomycin), f/u deep tissue cultures


- bacteroides, clostridium and E Faecalis from initial cultures and E Faecalis 

+ Bacteroidies vulgatus in deep culture. 


- ID consulted (Mehnaz) as likely will need long course of antibiotics. 


- appreciate Nutrition recs to support healing   





(2) Pneumonia


Current Visit: Yes   Status: Acute   Code(s): J18.9 - PNEUMONIA, UNSPECIFIED 

ORGANISM   SNOMED Code(s): 173604605


   Comment: Continue vancomycin and zosyn for HCAP. 


MRSA nares pending   





(3) Diarrhea


Current Visit: No   Status: Acute   Code(s): R19.7 - DIARRHEA, UNSPECIFIED   

SNOMED Code(s): 05141589


   Comment: Persistent diarrhea ~6 weeks after ciprofloxacin course for UTI. 

Prior to that was never an issue and patient was not incontinent of stool. 


Given sepsis presentation and continued low grade fevers will check for C-diff. 


Continue prn loperamide for now, stop if C-diff positive.


probiotics/Culturelle


Consider rectal tube if interferring with wound vacc and healing   





(4) Paraplegia


Current Visit: Yes   Status: Acute   Code(s): G82.20 - PARAPLEGIA, UNSPECIFIED 

  SNOMED Code(s): 80663475


   Comment: frequent turning, skin care. 


Pain control with home fentanyl 75mcg patch and oxycodone 10mg q4 prn, 

acetaminophen 650mg q6 prn, lyrica.


Planned for spinal stimulator implantation with Dr. Huerta ([facility]) 

eventually   





(5) Sepsis


Current Visit: Yes   Status: Acute   Comment: - Meeting SIRS criteria on 

admission (Tachycardia, tachypnea, fever, leukocytosis)


- Pt not meeting qSofa criteria on admission or at this time


- tachycardia and tachypnea resolved, has had low grade fevers


- Leukocytosis improving   





(6) Anemia


Current Visit: No   Status: Chronic   Code(s): D64.9 - ANEMIA, UNSPECIFIED   

SNOMED Code(s): 012452103


   Comment: - No s/s of bleeding


- Received 1 U PRBCs 9/3


- Continue iron supplementation, B12, folate. However likely anemia of chronic 

diseases with elevated ferritin 365, iron 29 last month.   


Status and Disposition: 





Inpatient, surgical floor. 





Attending: Nathaniel Coates

## 2017-09-06 NOTE — PN
Progress Note





- Progress Note


Date of Service: 09/06/17


Note: 





cross cover note





notified for +c. diff


added IV flagyl TID to medications to begin now

## 2017-09-07 LAB
ANION GAP SERPL CALC-SCNC: 5 MMOL/L (ref 2–11)
BUN SERPL-MCNC: 23 MG/DL (ref 6–24)
BUN/CREAT SERPL: 17 (ref 8–20)
CALCIUM SERPL-MCNC: 9.8 MG/DL (ref 8.6–10.3)
CHLORIDE SERPL-SCNC: 112 MMOL/L (ref 101–111)
GLUCOSE SERPL-MCNC: 103 MG/DL (ref 70–100)
HCO3 SERPL-SCNC: 26 MMOL/L (ref 22–32)
HCT VFR BLD AUTO: 26 % (ref 42–52)
HGB BLD-MCNC: 8.8 G/DL (ref 14–18)
MCH RBC QN AUTO: 27 PG (ref 27–31)
MCHC RBC AUTO-ENTMCNC: 33 G/DL (ref 31–36)
MCV RBC AUTO: 80 FL (ref 80–94)
POTASSIUM SERPL-SCNC: 3.7 MMOL/L (ref 3.5–5)
RBC # BLD AUTO: 3.29 10^6/UL (ref 4–5.4)
SODIUM SERPL-SCNC: 143 MMOL/L (ref 133–145)
WBC # BLD AUTO: 10 10^3/UL (ref 3.5–10.8)

## 2017-09-07 RX ADMIN — Medication SCH CAP: at 21:14

## 2017-09-07 RX ADMIN — ASPIRIN SCH MG: 81 TABLET, CHEWABLE ORAL at 10:19

## 2017-09-07 RX ADMIN — WATER SCH NOTE: 100 INJECTION, SOLUTION INTRAVENOUS at 06:58

## 2017-09-07 RX ADMIN — Medication SCH UNITS: at 10:19

## 2017-09-07 RX ADMIN — Medication SCH CAP: at 10:19

## 2017-09-07 RX ADMIN — PREGABALIN SCH MG: 50 CAPSULE ORAL at 21:15

## 2017-09-07 RX ADMIN — PREGABALIN SCH MG: 50 CAPSULE ORAL at 13:44

## 2017-09-07 RX ADMIN — OMEPRAZOLE SCH MG: 20 CAPSULE, DELAYED RELEASE ORAL at 08:16

## 2017-09-07 RX ADMIN — WATER SCH NOTE: 100 INJECTION, SOLUTION INTRAVENOUS at 18:47

## 2017-09-07 RX ADMIN — VANCOMYCIN HYDROCHLORIDE SCH MLS/HR: 1 INJECTION, POWDER, LYOPHILIZED, FOR SOLUTION INTRAVENOUS at 10:15

## 2017-09-07 RX ADMIN — FOLIC ACID SCH MG: 1 TABLET ORAL at 10:19

## 2017-09-07 RX ADMIN — METRONIDAZOLE SCH MLS/HR: 5 INJECTION, SOLUTION INTRAVENOUS at 00:57

## 2017-09-07 RX ADMIN — Medication SCH MG: at 10:20

## 2017-09-07 RX ADMIN — Medication SCH MG: at 21:15

## 2017-09-07 RX ADMIN — CYANOCOBALAMIN TAB 500 MCG SCH MCG: 500 TAB at 10:20

## 2017-09-07 RX ADMIN — POTASSIUM CHLORIDE SCH MEQ: 750 TABLET, FILM COATED, EXTENDED RELEASE ORAL at 10:17

## 2017-09-07 RX ADMIN — VANCOMYCIN HYDROCHLORIDE SCH MLS/HR: 1 INJECTION, POWDER, LYOPHILIZED, FOR SOLUTION INTRAVENOUS at 22:24

## 2017-09-07 RX ADMIN — METRONIDAZOLE SCH MLS/HR: 5 INJECTION, SOLUTION INTRAVENOUS at 08:15

## 2017-09-07 RX ADMIN — PREGABALIN SCH MG: 50 CAPSULE ORAL at 10:20

## 2017-09-07 RX ADMIN — ENOXAPARIN SODIUM SCH MG: 40 INJECTION SUBCUTANEOUS at 19:55

## 2017-09-07 NOTE — PN
Progress Note





- Progress Note


Date of Service: 09/07/17


Note: 





Surgery Progress:





S: no c/o; had + C diff (though wife states his BMs are less frequent); VAC in 

place- plan for change later this afternoon





 Current Medications





Acetaminophen (Tylenol Tab*)  650 mg PO Q4H PRN


   PRN Reason: PAIN


Albuterol/Ipratropium (Duoneb (Albuterol 2.5 Mg/Ipratropium 0.5 Mg))  1 neb INH 

Q4H PRN


   PRN Reason: SOB/WHEEZING


   Last Admin: 09/06/17 17:55 Dose:  1 neb


Aspirin (Aspirin Low Dose Tab*)  [facility] mg PO DAILY [facility]


   Last Admin: 09/07/17 10:19 Dose:  81 mg


Cholecalciferol (Vitamin D Tab*)  5,000 units PO DAILY [facility]


   Last Admin: 09/07/17 10:19 Dose:  5,000 units


Cyanocobalamin (Vitamin B12 Tab*)  1,000 mcg PO DAILY [facility]


   Last Admin: 09/07/17 10:20 Dose:  1,000 mcg


Diphenoxylate HCl/Atropine (Lomotil Tab*)  1 tab PO BID PRN


   PRN Reason: DIARRHEA


   Last Admin: 09/02/17 20:49 Dose:  1 tab


Enoxaparin Sodium (Lovenox(*))  40 mg SUBCUT Q24H [facility]


   Last Admin: 09/06/17 21:16 Dose:  40 mg


Fentanyl (Duragesic  Patch 75 Mcg/Hr*)  75 mcg TRANSDERM Q72HR [facility]


   Last Admin: 09/05/17 10:27 Dose:  75 mcg


Ferrous Gluconate (Fergon Tab*)  324 mg PO BID [facility]


   Last Admin: 09/07/17 10:20 Dose:  324 mg


Folic Acid (Folvite Tab*)  0.5 mg PO DAILY [facility]


   Last Admin: 09/07/17 10:19 Dose:  0.5 mg


Piperacillin Sod/Tazobactam (Sod 3.375 gm/ Sodium Chloride)  100 mls @ 200 mls/

hr IVPB Q6H [facility]


   Last Admin: 09/07/17 10:26 Dose:  200 mls/hr


Vancomycin HCl 1,000 mg/ (Sodium Chloride)  250 mls @ 166.667 mls/hr IVPB Q12H 

[facility]


   Last Admin: 09/07/17 10:15 Dose:  166.667 mls/hr


Metronidazole/Sodium Chloride (Flagyl 500 Mg Ivpb*)  500 mg in 100 mls @ 100 mls

/hr IVPB Q8H [facility]


   Last Admin: 09/07/17 08:15 Dose:  100 mls/hr


Lactobacillus Rhamnosus (Culturelle*)  1 cap PO BID [facility]


   Last Admin: 09/07/17 10:19 Dose:  1 cap


Omeprazole (Prilosec Cap*)  40 mg PO DAILY@0730 [facility]


   Last Admin: 09/07/17 08:16 Dose:  40 mg


Oxycodone HCl (Roxycodone Tab*)  10 mg PO Q4H PRN


   PRN Reason: PAIN


   Last Admin: 09/06/17 17:52 Dose:  10 mg


Pharmacy Consult (Vancomycin Per Pharmacy*)  1 note FOLLOW UP . PRN


   PRN Reason: PER PROTOCOL


Pharmacy Profile Note (Fentanyl Patch Check Q Shift)  0 note N/A 0700,1900 [facility]


   Last Admin: 09/07/17 06:58 Dose:  1 note


Pharmacy Profile Note (Vancomycin Trough Check)  1 note FOLLOW UP ONCE ONE


   Stop: 09/08/17 09:31


Potassium Chloride (Klor Con Er Tab*)  10 meq PO DAILY [facility]


   Last Admin: 09/07/17 10:17 Dose:  10 meq


Pregabalin (Lyrica Cap(*))  150 mg PO TID [facility]


   Last Admin: 09/07/17 10:20 Dose:  150 mg











O:


 Vital Signs - 8 hr











  09/07/17 09/07/17 09/07/17





  04:24 07:50 10:20


 


Temperature 98.2 F  


 


Pulse Rate 70  


 


Respiratory 16 18 18





Rate   


 


Blood Pressure 145/67  





(mmHg)   


 


O2 Sat by Pulse 98  





Oximetry   








No PE performed (I will try to be available to look at wound at the time of VAC 

change)





A/P: s/p debridement of sacral decub


   Cont abx per hosp


   Wound care- VAC

## 2017-09-07 NOTE — CONS
CONSULTATION REPORT:

 

DATE OF CONSULTATION:  09/07/17.

 

REQUESTING PHYSICIAN:  Dr. Coates.

 

CONSULTING SERVICE:  Infectious Disease.

 

REASON FOR CONSULTATION:  Decubitus ulcer.

 

IMPRESSION:

1.  Sacral decubitus ulcer in the setting of neurologic deficits of the lower 
extremity.  He has been less mobile over approximately the last 6 or 8 weeks. 
There is a concern that these developed during his last hospital stay, I would 
say it is more likely this is something that developed over or longer period of 
time and may have become more evident soon after the last hospital stay.  He 
has had debridement of necrotic tissue, cultures deeper have grown Enterococcus 
bacteroides.  I do suspect a wound infection and acute osteomyelitis.

2.  He has had 6 to 8 weeks of diarrhea that has been getting better over the 
last couple of weeks.  C. diff test was done last night was positive, I think 
is a false positive given the consistency of small semi-formed stools, which is 
not consistent with C. diff diarrhea.

3.  Citrobacter in the urine is likely always colonized by his Rodriguez catheter.

4.  Right-sided infiltrate, persistent since August, this could be a resolving 
infiltrate, could be underlying mass.

 

RECOMMENDATIONS:  Continue Zosyn, although decrease it to every 8 hours.  We 
will stop his IV Flagyl.  Follow his bowel movements.  Also stop his 
antimotility agents.  I discussed with his wife that it would be reasonable to 
consider a course of IV antibiotics initially for acute osteomyelitis of the 
sacrum.

 

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 
spine with multiple surgeries, now with paresis and numbness of lower 
extremities, using wheelchair.  He is able to transfer, but over the last 6 to 
8 weeks he has had difficulty transferring and he has been less mobile, he has 
had episodes of unresponsiveness.  This required initially hospital stay, was 
felt to be morphine- related sedation.  He was discharged on oral antibiotics 
for a possible urinary tract infection, came back again in early August with 
unresponsiveness, treated for pneumonia with Levaquin.  Throughout that time, 
he had been having diarrhea, which was quite voluminous and liquid.  It is 
actually getting better over the last few days or couple of weeks actually with 
smaller semi-formed stools.  He came into the hospital again with weakness.  
Chest x-ray showed a right-sided infiltrate.  Urine culture grew citrobacter.  
He had a large decubitus ulcer, which has been debrided twice, once at the 
bedside and once in the OR.  Cultures as above.  It was near to bone, but 
clearly not all the way to bone.

 

PAST MEDICAL HISTORY:

1.  Ependymoma of the spine, status post multiple spinal surgeries.

2.  Hypertension.

3.  Gastroesophageal reflux disease.

4.  Obstructive sleep apnea, on BiPAP.

5.  Coronary artery disease.

6.  Hyperlipidemia.

7.  Benign prostatic hypertrophy.

8.  Chronic Rodriguez catheter.

9.  Asthma

10.  Chronic kidney disease stage 3.

11.  Status post 4 spine surgeries for ependymoma.

12.  Status post appendectomy, complicated by bladder injury, status post 
repair.

13.  Status post right carotid endarterectomy in 2015.

14.  Left extremity paresis.

 

MEDICATIONS:

1.  Tylenol.

2.  Albuterol.

3.  Aspirin.

4.  Cholecalciferol.

5.  Cyanocobalamin.

6.  Ferrous gluconate.

7.  Folic acid.

8.  Omeprazole.

9.  Lactobacillus.

10.  Zosyn 3.375 g IV every 6 hours.

11.  Vancomycin 1 g every 12 hours.

 

ALLERGIES:  LIPITOR.

 

FAMILY HISTORY:  Noncontributory.

 

SOCIAL HISTORY:  He lives outside of [place] with his wife.  No travel.

 

REVIEW OF SYSTEMS:  All negative to full review of systems, except as noted 
above.

 

PHYSICAL EXAM:  Vital Signs:  Temperature 36.8, heart rate 70, respiratory rate 
16, blood pressure 145/67, and O2 sat 98% on room air.  In general, he is awake 
and not in distress.  HEENT:  There is no conjunctival hemorrhage.  Oropharynx 
without lesions.  Neck is supple without nuchal rigidity.  Lymph Nodes:  No 
inguinal, axillary, or epitrochlear lymphadenopathy.  Heart has regular rate 
and rhythm without murmurs, rubs, or gallops.  Lungs:  Clear to auscultation 
bilaterally. Abdomen:  Soft, nontender, nondistended.  Bowel sounds present.  
Skin:  There is no rash or splinter hemorrhages.  There is a sacral decubitus 
ulcer, which is obscured by VAC dressing.  There is no surrounding erythema.  
Neurologic:  He does not have sensation in his lower extremities.

 

LABORATORY DATA:  Creatinine 1.3, , white blood cell count 10, 
hemoglobin 8, platelets 356.  Urinalysis shows no blood, nitrites, there is 1+ 
leukocyte esterase.  Vancomycin trough 15.

 

Please see impressions and recommendations as outlined above, which I have 
discussed with Dr. Coates.

 

Thanks for asking me to see Mr. Billingsson in consultation.

 

 [id number] #: [id number]

ELISABET

## 2017-09-07 NOTE — RAD
INDICATION:  Pneumonia versus mass.



COMPARISON: Comparison is made with a prior CT angiogram of the chest from May 04, 2015

and a prior chest x-ray study from September 04, 2017.



TECHNIQUE: A CT scan of the chest was performed without intravenous contrast. Contiguous

axial sections were obtained from the lung apices through the lung bases. Images were

reconstructed in the coronal and sagittal planes.



FINDINGS: There are small bilateral pleural effusions and small dependent bilateral lower

lobe infiltrates.



There are mildly enlarged mediastinal lymph nodes measuring up to 1.5 cm in transverse

dimension in the aorticopulmonary window region. These are slightly more prominent than on

the prior study.



The heart is within normal limits in size. There are coronary artery calcifications

present. No pericardial effusion is present.  The thoracic aorta is normal in caliber.

There is mild calcific plaque present.



Images of the upper abdomen demonstrate cholelithiasis.



No significant focal osseous abnormality is seen.



IMPRESSION:  

1. SMALL BILATERAL PLEURAL EFFUSIONS AND DEPENDENT BILATERAL LOWER LOBE INFILTRATES

SUGGESTIVE OF ATELECTASIS.

2. MILDLY ENLARGED MEDIASTINAL LYMPH NODES.

3. CHOLELITHIASIS.

## 2017-09-07 NOTE — PN
Subjective


Date of Service: 09/07/17


Interval History: 





Cdiff positive. IV flagyl started by crosscover. ID consultation obtained who 

thought false positive. CT chest obtained. 


Family History: Unchanged from Admission


Social History: Unchanged from Admission


Past Medical History: Unchanged from Admission





Objective


Active Medications: 








Acetaminophen (Tylenol Tab*)  650 mg PO Q4H PRN


   PRN Reason: PAIN


Albuterol/Ipratropium (Duoneb (Albuterol 2.5 Mg/Ipratropium 0.5 Mg))  1 neb INH 

Q4H PRN


   PRN Reason: SOB/WHEEZING


   Last Admin: 09/06/17 17:55 Dose:  1 neb


Aspirin (Aspirin Low Dose Tab*)  81 mg PO DAILY [facility]


   Last Admin: 09/07/17 10:19 Dose:  81 mg


Cholecalciferol (Vitamin D Tab*)  5,000 units PO DAILY [facility]


   Last Admin: 09/07/17 10:19 Dose:  5,000 units


Cyanocobalamin (Vitamin B12 Tab*)  1,000 mcg PO DAILY [facility]


   Last Admin: 09/07/17 10:20 Dose:  1,000 mcg


Enoxaparin Sodium (Lovenox(*))  40 mg SUBCUT Q24H [facility]


   Last Admin: 09/06/17 21:16 Dose:  40 mg


Fentanyl (Duragesic  Patch 75 Mcg/Hr*)  75 mcg TRANSDERM Q72HR [facility]


   Last Admin: 09/05/17 10:27 Dose:  75 mcg


Ferrous Gluconate (Fergon Tab*)  324 mg PO BID [facility]


   Last Admin: 09/07/17 10:20 Dose:  324 mg


Folic Acid (Folvite Tab*)  0.5 mg PO DAILY [facility]


   Last Admin: 09/07/17 10:19 Dose:  0.5 mg


Piperacillin Sod/Tazobactam (Sod 3.375 gm/ Sodium Chloride)  100 mls @ 200 mls/

hr IVPB Q6H [facility]


   Last Admin: 09/07/17 16:23 Dose:  200 mls/hr


Vancomycin HCl 1,000 mg/ (Sodium Chloride)  250 mls @ 166.667 mls/hr IVPB Q12H 

[facility]


   Last Admin: 09/07/17 10:15 Dose:  166.667 mls/hr


Lactobacillus Rhamnosus (Culturelle*)  1 cap PO BID [facility]


   Last Admin: 09/07/17 10:19 Dose:  1 cap


Omeprazole (Prilosec Cap*)  40 mg PO DAILY@0730 [facility]


   Last Admin: 09/07/17 08:16 Dose:  40 mg


Oxycodone HCl (Roxycodone Tab*)  10 mg PO Q4H PRN


   PRN Reason: PAIN


   Last Admin: 09/06/17 17:52 Dose:  10 mg


Pharmacy Consult (Vancomycin Per Pharmacy*)  1 note FOLLOW UP . PRN


   PRN Reason: PER PROTOCOL


Pharmacy Profile Note (Fentanyl Patch Check Q Shift)  0 note N/A 0700,1900 [facility]


   Last Admin: 09/07/17 18:47 Dose:  1 note


Pharmacy Profile Note (Vancomycin Trough Check)  1 note FOLLOW UP ONCE ONE


   Stop: 09/08/17 09:31


Potassium Chloride (Klor Con Er Tab*)  10 meq PO DAILY [facility]


   Last Admin: 09/07/17 10:17 Dose:  10 meq


Pregabalin (Lyrica Cap(*))  150 mg PO TID [facility]


   Last Admin: 09/07/17 13:44 Dose:  150 mg








 Vital Signs











  09/06/17 09/06/17 09/06/17





  19:22 19:41 19:52


 


Temperature  98.1 F 


 


Pulse Rate  87 


 


Respiratory 16 16 16





Rate   


 


Blood Pressure  148/52 





(mmHg)   


 


O2 Sat by Pulse  94 





Oximetry   














  09/06/17 09/06/17 09/06/17





  21:15 23:15 23:40


 


Temperature   98.5 F


 


Pulse Rate   78


 


Respiratory 16 20 20





Rate   


 


Blood Pressure   168/72





(mmHg)   


 


O2 Sat by Pulse   96





Oximetry   














  09/07/17 09/07/17 09/07/17





  04:24 07:50 08:00


 


Temperature 98.2 F  97.7 F


 


Pulse Rate 70  


 


Respiratory 16 18 





Rate   


 


Blood Pressure 145/67  





(mmHg)   


 


O2 Sat by Pulse 98  





Oximetry   














  09/07/17 09/07/17 09/07/17





  08:23 10:20 11:39


 


Temperature   


 


Pulse Rate 82  70


 


Respiratory 17 18 16





Rate   


 


Blood Pressure 140/57  





(mmHg)   


 


O2 Sat by Pulse 96  98





Oximetry   














  09/07/17 09/07/17 09/07/17





  11:40 12:19 13:44


 


Temperature  98.5 F 


 


Pulse Rate  84 


 


Respiratory  16 18





Rate   


 


Blood Pressure  156/61 





(mmHg)   


 


O2 Sat by Pulse 98 95 





Oximetry   














  09/07/17 09/07/17 09/07/17





  15:44 15:51 16:03


 


Temperature   100.5 F


 


Pulse Rate  79 


 


Respiratory 18 16 





Rate   


 


Blood Pressure  171/79 





(mmHg)   


 


O2 Sat by Pulse  95 





Oximetry   











Oxygen Devices in Use Now: None


Appearance: chronically ill appearing in no acute distress


Ears/Nose/Mouth/Throat: NL Teeth, Lips, Gums, Mucous Membranes Moist


Neck: NL Appearance and Movements; NL JVP, Trachea Midline


Respiratory: Symmetrical Chest Expansion and Respiratory Effort, Clear to 

Auscultation


Cardiovascular: NL Sounds; No Murmurs; No JVD, RRR, No Edema


Abdominal: NL Sounds; No Tenderness; No Distention


Extremities: No Edema, No Clubbing, Cyanosis


Skin: - - wound vacc on sacral decub


Neurological: Alert and Oriented x 3, - - paraplegic waist down


Nutrition: Taking PO's


Result Diagrams: 


 09/07/17 05:59





 09/07/17 05:59


Additional Lab and Data: 





 


 Laboratory Results - last 24 hr











  09/07/17 09/07/17





  05:59 05:59


 


WBC  10.0 


 


RBC  3.29 L 


 


Hgb  8.8 L 


 


Hct  26 L 


 


MCV  80 


 


MCH  27 


 


MCHC  33 


 


RDW  16 H 


 


Plt Count  356 


 


MPV  7 L 


 


Neut % (Auto)  60.8 


 


Lymph % (Auto)  18.9 L 


 


Mono % (Auto)  13.0 H 


 


Eos % (Auto)  6.1 H 


 


Baso % (Auto)  1.2 


 


Absolute Neuts (auto)  6.1 


 


Absolute Lymphs (auto)  1.9 


 


Absolute Monos (auto)  1.3 H 


 


Absolute Eos (auto)  0.6 


 


Absolute Basos (auto)  0.1 


 


Absolute Nucleated RBC  0 


 


Nucleated RBC %  0 


 


Sodium   143


 


Potassium   3.7


 


Chloride   112 H


 


Carbon Dioxide   26


 


Anion Gap   5


 


BUN   23


 


Creatinine   1.35 H


 


Est GFR ( Amer)   67.0


 


Est GFR (Non-Af Amer)   52.1


 


BUN/Creatinine Ratio   17.0


 


Glucose   103 H


 


Calcium   9.8














Microbiology and Other Data: 


 





 Microbiology











 09/05/17 12:52 Wound Gram Stain - Final





 Tissue - Other Tissue Culture - Preliminary





    Enterococcus Faecalis





    Bacteroides Vulgatus





 Skin and Soft Tissue MRSA/MSSA (PCR - Final





    Mrsa Negative





    S.aureus Negative


 


 09/06/17 21:10 Stool Gross Appearance - Final





 Stool C. difficile DNA Amplification - Final





    027 Presumptive NEGATIVE





    Toxigenic C.diff POSITIVE


 


 09/01/17 22:00 Aerobic Blood Culture - Final





 Blood Venous    No Growth Day 5





 Anaerobic Blood Culture - Final





    No Growth Day 5





 Blood Culture - Final


 


 09/01/17 21:53 Aerobic Blood Culture - Final





 Blood Venous    No Growth Day 5





 Anaerobic Blood Culture - Final





    No Growth Day 5





 Blood Culture - Final

















Assess/Plan/Problems-Billing


Assessment: 





Mr. Pryor is a 71 yo male with PMH significant for ependymoma s/p 4 surgeries c

/b progressive paraparaplegia and debilitating parasthesias, neurogenic bladder 

with chronic indwelling london, HTN, GERD, BONIFACIO with BiPAP, CAD, HLD and BPH with 

recent copious diarrhea since tx with cipro for UTI, who presented to the 

emergency room with sepsis and stage IV sacral decubitus ulcer.  Bilateral HCAP 

(improved on CT scan). On zosyn and vancomycin. s/p debridement and now with 

wound vac. Cdiff was positive but ID thinks false positive





- Patient Problems


(1) Sacral decubitus ulcer, stage IV


Current Visit: Yes   Status: Acute   Code(s): L89.154 - PRESSURE ULCER OF 

SACRAL REGION, STAGE 4   SNOMED Code(s): 774932466


   Comment: - S/P OR sharp debridement 9/5 and bedside debridement 9/2


- Wound consult appreciated. Wound vacc applied, will try "veriflow" saline 

washes today


- appreciate Surgical assistance.


- Plastic surgery Dr. Phillips consulted, will talkd to Dr. Holley. However 

seems like for wound flap may have to go to [facility] or [facility] in [place] 


- Continue Zosyn (also on vancomycin), f/u deep tissue cultures


- bacteroides, clostridium and E Faecalis from initial cultures and E Faecalis 

+ Bacteroidies vulgatus in deep culture. 


- appreciate ID recs, likely will need long course of IV antibiotics for 

potential osteo. 


- appreciate Nutrition recs to support healing   





(2) Pneumonia


Current Visit: Yes   Status: Acute   Code(s): J18.9 - PNEUMONIA, UNSPECIFIED 

ORGANISM   SNOMED Code(s): 282136460


   Comment: Continue vancomycin and zosyn for HCAP. 


MRSA nares pending   





(3) Diarrhea


Current Visit: No   Status: Acute   Code(s): R19.7 - DIARRHEA, UNSPECIFIED   

SNOMED Code(s): 17977113


   Comment: Persistent diarrhea ~6 weeks after ciprofloxacin course for UTI. 

Prior to that was never an issue and patient was not incontinent of stool. 


Given sepsis presentation and continued low grade fevers C-diff was checked and 

postive. ID thinks false positive, stopped flagyl. monitor stool. 


probiotics/Culturelle


Consider rectal tube if interferring with wound vacc and healing   





(4) Paraplegia


Current Visit: Yes   Status: Acute   Code(s): G82.20 - PARAPLEGIA, UNSPECIFIED 

  SNOMED Code(s): 79224269


   Comment: frequent turning, skin care. 


Pain control with home fentanyl 75mcg patch and oxycodone 10mg q4 prn, 

acetaminophen 650mg q6 prn, lyrica.


Planned for spinal stimulator implantation with Dr. Huerta ([facility]) 

eventually   





(5) Sepsis


Current Visit: Yes   Status: Acute   Comment: - Meeting SIRS criteria on 

admission (Tachycardia, tachypnea, fever, leukocytosis)


- Pt not meeting qSofa criteria on admission or at this time


- tachycardia and tachypnea resolved, has had low grade fevers


- Leukocytosis resolved   





(6) Anemia


Current Visit: No   Status: Chronic   Code(s): D64.9 - ANEMIA, UNSPECIFIED   

SNOMED Code(s): 588649391


   Comment: - No s/s of bleeding


- Received 1 U PRBCs 9/3


- Continue iron supplementation, B12, folate. However likely anemia of chronic 

diseases with elevated ferritin 365, iron 29 last month.   


Status and Disposition: 





Inpatient, surgical floor. [facility] discharge ?monday





Attending: Nathaniel Coates

## 2017-09-08 LAB
ANION GAP SERPL CALC-SCNC: 8 MMOL/L (ref 2–11)
BUN SERPL-MCNC: 22 MG/DL (ref 6–24)
BUN/CREAT SERPL: 16.3 (ref 8–20)
CALCIUM SERPL-MCNC: 10.3 MG/DL (ref 8.6–10.3)
CHLORIDE SERPL-SCNC: 110 MMOL/L (ref 101–111)
GLUCOSE SERPL-MCNC: 138 MG/DL (ref 70–100)
HCO3 SERPL-SCNC: 25 MMOL/L (ref 22–32)
HCT VFR BLD AUTO: 33 % (ref 42–52)
HGB BLD-MCNC: 10.8 G/DL (ref 14–18)
MCH RBC QN AUTO: 26 PG (ref 27–31)
MCHC RBC AUTO-ENTMCNC: 33 G/DL (ref 31–36)
MCV RBC AUTO: 80 FL (ref 80–94)
POTASSIUM SERPL-SCNC: 3.7 MMOL/L (ref 3.5–5)
RBC # BLD AUTO: 4.16 10^6/UL (ref 4–5.4)
SODIUM SERPL-SCNC: 143 MMOL/L (ref 133–145)
WBC # BLD AUTO: 11.3 10^3/UL (ref 3.5–10.8)

## 2017-09-08 RX ADMIN — PREGABALIN SCH MG: 50 CAPSULE ORAL at 09:01

## 2017-09-08 RX ADMIN — FENTANYL TRANSDERMAL SCH MCG: 75 PATCH, EXTENDED RELEASE TRANSDERMAL at 09:32

## 2017-09-08 RX ADMIN — Medication SCH CAP: at 21:42

## 2017-09-08 RX ADMIN — WATER SCH: 100 INJECTION, SOLUTION INTRAVENOUS at 11:39

## 2017-09-08 RX ADMIN — PREGABALIN SCH MG: 50 CAPSULE ORAL at 14:07

## 2017-09-08 RX ADMIN — ENOXAPARIN SODIUM SCH MG: 40 INJECTION SUBCUTANEOUS at 21:43

## 2017-09-08 RX ADMIN — Medication SCH MG: at 21:42

## 2017-09-08 RX ADMIN — FUROSEMIDE SCH MG: 40 TABLET ORAL at 16:49

## 2017-09-08 RX ADMIN — FOLIC ACID SCH MG: 1 TABLET ORAL at 09:02

## 2017-09-08 RX ADMIN — OMEPRAZOLE SCH MG: 20 CAPSULE, DELAYED RELEASE ORAL at 09:01

## 2017-09-08 RX ADMIN — VANCOMYCIN HYDROCHLORIDE SCH MLS/HR: 1 INJECTION, POWDER, LYOPHILIZED, FOR SOLUTION INTRAVENOUS at 09:38

## 2017-09-08 RX ADMIN — Medication SCH CAP: at 09:02

## 2017-09-08 RX ADMIN — ASPIRIN SCH MG: 81 TABLET, CHEWABLE ORAL at 09:02

## 2017-09-08 RX ADMIN — Medication SCH UNITS: at 09:02

## 2017-09-08 RX ADMIN — PREGABALIN SCH MG: 50 CAPSULE ORAL at 21:42

## 2017-09-08 RX ADMIN — PREGABALIN SCH: 50 CAPSULE ORAL at 16:44

## 2017-09-08 RX ADMIN — Medication SCH MG: at 09:02

## 2017-09-08 RX ADMIN — CYANOCOBALAMIN TAB 500 MCG SCH MCG: 500 TAB at 09:02

## 2017-09-08 RX ADMIN — WATER SCH NOTE: 100 INJECTION, SOLUTION INTRAVENOUS at 19:01

## 2017-09-08 RX ADMIN — HYDRALAZINE HYDROCHLORIDE PRN MG: 20 INJECTION INTRAMUSCULAR; INTRAVENOUS at 16:49

## 2017-09-08 RX ADMIN — HYDRALAZINE HYDROCHLORIDE PRN MG: 20 INJECTION INTRAMUSCULAR; INTRAVENOUS at 05:17

## 2017-09-08 RX ADMIN — FUROSEMIDE SCH MG: 40 TABLET ORAL at 09:03

## 2017-09-08 RX ADMIN — POTASSIUM CHLORIDE SCH MEQ: 750 TABLET, FILM COATED, EXTENDED RELEASE ORAL at 09:02

## 2017-09-08 NOTE — PN
Progress Note





- Progress Note


Date of Service: 09/07/17


Note: 





Surgery Note Addendum:





S: wound check at time of VAC change 9/7/17





O: wound was not measured by myself, but is approximately 8 x 12 cm w/ ~ 3cm 

undermining in the inferior aspect. There is some shaggy debris over the sacrum

; otherwise the wound is generally clean and viable. The surrounding skin does 

have some mild erythema and maceration. Nursing is applying barrier cream for 

protection.

## 2017-09-08 NOTE — PN
Subjective


Date of Service: 09/08/17


Interval History: 





Veriflow applied yesterday. 3 loose but not watery stools. CT chest w/ 

atelectasis but no clear evidence of pneumonia (vancomycin stopped). Denies SOB 

or cough. No chest pain. Tmax 100.3 overnight. Hgb improved to 10.8. Wife 

reports hx of h pylori infection last year that was treated. Was planning f/u 

with [facility]. Hypertensive to 198/98 and got prn hydralazine. 


Family History: Unchanged from Admission


Social History: Unchanged from Admission


Past Medical History: Unchanged from Admission





Objective


Active Medications: 








Acetaminophen (Tylenol Tab*)  650 mg PO Q4H PRN


   PRN Reason: PAIN


   Last Admin: 09/07/17 21:41 Dose:  650 mg


Albuterol/Ipratropium (Duoneb (Albuterol 2.5 Mg/Ipratropium 0.5 Mg))  1 neb INH 

Q4H PRN


   PRN Reason: SOB/WHEEZING


   Last Admin: 09/06/17 17:55 Dose:  1 neb


Aspirin (Aspirin Low Dose Tab*)  81 mg PO DAILY [facility]


   Last Admin: 09/08/17 09:02 Dose:  81 mg


Cholecalciferol (Vitamin D Tab*)  5,000 units PO DAILY [facility]


   Last Admin: 09/08/17 09:02 Dose:  5,000 units


Cyanocobalamin (Vitamin B12 Tab*)  1,000 mcg PO DAILY [facility]


   Last Admin: 09/08/17 09:02 Dose:  1,000 mcg


Enoxaparin Sodium (Lovenox(*))  40 mg SUBCUT Q24H [facility]


   Last Admin: 09/07/17 19:55 Dose:  40 mg


Fentanyl (Duragesic  Patch 75 Mcg/Hr*)  75 mcg TRANSDERM Q72HR [facility]


   Last Admin: 09/08/17 09:32 Dose:  75 mcg


Ferrous Gluconate (Fergon Tab*)  324 mg PO BID [facility]


   Last Admin: 09/08/17 09:02 Dose:  324 mg


Folic Acid (Folvite Tab*)  0.5 mg PO DAILY [facility]


   Last Admin: 09/08/17 09:02 Dose:  0.5 mg


Furosemide (Lasix Tab*)  40 mg PO 0800,1700 [facility]


   Last Admin: 09/08/17 09:03 Dose:  40 mg


Hydralazine HCl (Apresoline Iv*)  10 mg IV Q4H PRN


   PRN Reason: Systolic >170


   Last Admin: 09/08/17 05:17 Dose:  10 mg


Piperacillin Sod/Tazobactam (Sod 3.375 gm/ Sodium Chloride)  100 mls @ 25 mls/

hr IVPB 0600,1400,2200 [facility]


   Last Admin: 09/08/17 14:07 Dose:  25 mls/hr


Lactobacillus Rhamnosus (Culturelle*)  1 cap PO BID [facility]


   Last Admin: 09/08/17 09:02 Dose:  1 cap


Omeprazole (Prilosec Cap*)  40 mg PO DAILY@0730 [facility]


   Last Admin: 09/08/17 09:01 Dose:  40 mg


Ondansetron HCl (Zofran Inj*)  4 mg IV Q6H PRN


   PRN Reason: NAUSEA


Oxycodone HCl (Roxycodone Tab*)  10 mg PO Q4H PRN


   PRN Reason: PAIN


   Last Admin: 09/06/17 17:52 Dose:  10 mg


Pharmacy Profile Note (Fentanyl Patch Check Q Shift)  0 note N/A 0700,1900 [facility]


   Last Admin: 09/08/17 11:39 Dose:  Not Given


Potassium Chloride (Klor Con Er Tab*)  10 meq PO DAILY [facility]


   Last Admin: 09/08/17 09:02 Dose:  10 meq


Pregabalin (Lyrica Cap(*))  150 mg PO TID [facility]


   Last Admin: 09/08/17 16:44 Dose:  Not Given








 Vital Signs











  09/07/17 09/07/17 09/07/17





  20:00 21:15 21:37


 


Temperature   100.2 F


 


Pulse Rate   93


 


Respiratory 16 16 





Rate   


 


Blood Pressure   





(mmHg)   


 


O2 Sat by Pulse   97





Oximetry   














  09/07/17 09/08/17 09/08/17





  23:15 00:17 04:04


 


Temperature  98.2 F 


 


Pulse Rate  84 94


 


Respiratory 16  





Rate   


 


Blood Pressure  161/76 





(mmHg)   


 


O2 Sat by Pulse  91 95





Oximetry   














  09/08/17 09/08/17 09/08/17





  04:27 04:35 06:08


 


Temperature 100.3 F  


 


Pulse Rate 89  


 


Respiratory 17  





Rate   


 


Blood Pressure  198/98 175/78





(mmHg)   


 


O2 Sat by Pulse 94  





Oximetry   














  09/08/17 09/08/17 09/08/17





  08:00 09:01 11:01


 


Temperature 98.4 F  


 


Pulse Rate 77  


 


Respiratory 16 16 16





Rate   


 


Blood Pressure 142/52  





(mmHg)   


 


O2 Sat by Pulse 96  





Oximetry   














  09/08/17 09/08/17 09/08/17





  11:37 15:01 15:02


 


Temperature 98.0 F  


 


Pulse Rate 94 95 


 


Respiratory 16 16 





Rate   


 


Blood Pressure 150/41  





(mmHg)   


 


O2 Sat by Pulse 95 94 95





Oximetry   











Oxygen Devices in Use Now: None


Appearance: Chronically ill appearing, in no acute distress.


Eyes: No Scleral Icterus, PERRLA


Ears/Nose/Mouth/Throat: NL Teeth, Lips, Gums, Mucous Membranes Moist


Neck: NL Appearance and Movements; NL JVP, Trachea Midline


Respiratory: Symmetrical Chest Expansion and Respiratory Effort - slight rales 

at right base (laterally)


Cardiovascular: NL Sounds; No Murmurs; No JVD, RRR


Extremities: - - slight edema


Skin: - - ~5cm wound fac on sacral decub. Saline infusing. 


Neurological: Alert and Oriented x 3, - - paraplegic below waist. 


Lines/Tubes/Other Access: Clean, Dry and Intact [place]


Nutrition: Taking PO's


Result Diagrams: 


 09/08/17 09:49





 09/08/17 09:49


Additional Lab and Data: 





 


 


 Laboratory Results - last 24 hr











  09/08/17 09/08/17 09/08/17





  09:49 09:49 09:49


 


WBC    11.3 H


 


RBC    4.16


 


Hgb    10.8 L


 


Hct    33 L


 


MCV    80


 


MCH    26 L


 


MCHC    33


 


RDW    17 H


 


Plt Count    483 H D


 


MPV    7 L


 


Neut % (Auto)    67.5


 


Lymph % (Auto)    19.1 L


 


Mono % (Auto)    9.6 H


 


Eos % (Auto)    2.6


 


Baso % (Auto)    1.2


 


Absolute Neuts (auto)    7.7


 


Absolute Lymphs (auto)    2.2


 


Absolute Monos (auto)    1.1 H


 


Absolute Eos (auto)    0.3


 


Absolute Basos (auto)    0.1


 


Absolute Nucleated RBC    0.01


 


Nucleated RBC %    0.1


 


Sodium  143  


 


Potassium  3.7  


 


Chloride  110  


 


Carbon Dioxide  25  


 


Anion Gap  8  


 


BUN  22  


 


Creatinine  1.35 H  


 


Est GFR ( Amer)  67.0  


 


Est GFR (Non-Af Amer)  52.1  


 


BUN/Creatinine Ratio  16.3  


 


Glucose  138 H  


 


Calcium  10.3  


 


Vancomycin Trough   24.4 

















Microbiology and Other Data: 


 





 Microbiology











 09/05/17 12:52 Wound Gram Stain - Final





 Tissue - Other Tissue Culture - Preliminary





    Enterococcus Faecalis





    Bacteroides Vulgatus





 Skin and Soft Tissue MRSA/MSSA (PCR - Final





    Mrsa Negative





    S.aureus Negative

















Assess/Plan/Problems-Billing


Assessment: 





Mr. Pryor is a 71 yo male with PMH significant for ependymoma s/p 4 surgeries 

and spinal fractures after fall c/b progressive paraplegia and debilitating 

parasthesias, neurogenic bladder with chronic indwelling london, HTN, GERD, BONIFACIO 

with BiPAP, CAD, HLD and BPH with recent diarrhea since tx with cipro for UTI, 

who presented to the emergency room with sepsis and stage IV sacral decubitus 

ulcer.  On zosyn. s/p vancomycin for suspected HCAP, recent CT chest w/ 

atelectasis. s/p debridement and now with wound vac w/ saline infusion. Cdiff 

was positive but ID thinks false positive given character of stool. Low grade 

fevers. 





- Patient Problems


(1) Sacral decubitus ulcer, stage IV


Current Visit: Yes   Status: Acute   Code(s): L89.154 - PRESSURE ULCER OF 

SACRAL REGION, STAGE 4   SNOMED Code(s): 953366592


   Comment: - S/P OR sharp debridement 9/5 and bedside debridement 9/2


- Wound consult appreciated. Wound vacc applied w/ "veriflow" saline washes (

placed 9/7), possible change 9/9 vs 9/11. 


- appreciate Surgical assistance.


- Plastic surgery Dr. Phillips consulted, will talk to Dr. Holley. However 

seems like for wound flap may have to go to [place] or [place] in [place] 


- Continue Zosyn, likely multiweek course for potential osteo. PICC before 

discharge


- bacteroides, clostridium and E Faecalis from initial cultures and E Faecalis 

+ Bacteroidies vulgatus in deep culture. 


- appreciate Nutrition recs to support healing   





(2) Pneumonia


Current Visit: Yes   Status: Acute   Code(s): J18.9 - PNEUMONIA, UNSPECIFIED 

ORGANISM   SNOMED Code(s): 229970663


   Comment: suspected HCAP based on SOB and CXR R>L infiltrates. s/p 3 days 

vancomycin  (already on zosyn) but stopped 9/8 after CT chest more consistent 

with atelectasis. Appreciate ID recs.  


MRSA nares pending (?still)   





(3) Diarrhea


Current Visit: No   Status: Acute   Code(s): R19.7 - DIARRHEA, UNSPECIFIED   

SNOMED Code(s): 06423558


   Comment: Persistent diarrhea ~6 weeks after ciprofloxacin course for UTI. 

Prior to that was never an issue and patient was not incontinent of stool. 


Given sepsis presentation and continued low grade fevers C-diff was checked and 

postive. ID thinks false positive, stopped flagyl. monitor stool 


probiotics/Culturelle


Consider rectal tube if interferring with wound vacc and healing   





(4) Paraplegia


Current Visit: Yes   Status: Acute   Code(s): G82.20 - PARAPLEGIA, UNSPECIFIED 

  SNOMED Code(s): 86900229


   Comment: frequent turning, skin care. 


Pain control with home fentanyl 75mcg patch and oxycodone 10mg q4 prn, 

acetaminophen 650mg q6 prn, lyrica.


Planned for spinal stimulator implantation with Dr. Huerta ([place]) 

eventually   





(5) Sepsis


Current Visit: Yes   Status: Acute   Comment: - Meeting SIRS criteria on 

admission (Tachycardia, tachypnea, fever, leukocytosis)


- Pt not meeting qSofa criteria on admission or at this time


- tachycardia and tachypnea resolved 


- Leukocytosis and fevers (low grade) currently.   





(6) Anemia


Current Visit: No   Status: Chronic   Code(s): D64.9 - ANEMIA, UNSPECIFIED   

SNOMED Code(s): 170693826


   Comment: - improved


- Hx of h pyrlori infection last year s/p treatment


- No s/s of bleeding


- Received 1 U PRBCs 9/3


- Continue iron supplementation, B12, folate. However likely anemia of chronic 

diseases with elevated ferritin 365, iron 29 last month.   


Status and Disposition: 





Inpatient, surgical floor. [facility] discharge Monday 9/11. PMRU declined so 

likely to home. 





Attending: Nathaniel Coates

## 2017-09-09 LAB
ANION GAP SERPL CALC-SCNC: 6 MMOL/L (ref 2–11)
BUN SERPL-MCNC: 21 MG/DL (ref 6–24)
BUN/CREAT SERPL: 15.3 (ref 8–20)
CALCIUM SERPL-MCNC: 10 MG/DL (ref 8.6–10.3)
CHLORIDE SERPL-SCNC: 108 MMOL/L (ref 101–111)
GLUCOSE SERPL-MCNC: 114 MG/DL (ref 70–100)
HCO3 SERPL-SCNC: 28 MMOL/L (ref 22–32)
HCT VFR BLD AUTO: 30 % (ref 42–52)
HGB BLD-MCNC: 9.9 G/DL (ref 14–18)
MCH RBC QN AUTO: 26 PG (ref 27–31)
MCHC RBC AUTO-ENTMCNC: 33 G/DL (ref 31–36)
MCV RBC AUTO: 80 FL (ref 80–94)
POTASSIUM SERPL-SCNC: 3.7 MMOL/L (ref 3.5–5)
RBC # BLD AUTO: 3.73 10^6/UL (ref 4–5.4)
SODIUM SERPL-SCNC: 142 MMOL/L (ref 133–145)
WBC # BLD AUTO: 10.5 10^3/UL (ref 3.5–10.8)

## 2017-09-09 RX ADMIN — Medication SCH MG: at 08:54

## 2017-09-09 RX ADMIN — FOLIC ACID SCH MG: 1 TABLET ORAL at 08:51

## 2017-09-09 RX ADMIN — HYDRALAZINE HYDROCHLORIDE PRN MG: 20 INJECTION INTRAMUSCULAR; INTRAVENOUS at 00:02

## 2017-09-09 RX ADMIN — Medication SCH CAP: at 20:23

## 2017-09-09 RX ADMIN — PREGABALIN SCH MG: 50 CAPSULE ORAL at 14:47

## 2017-09-09 RX ADMIN — OMEPRAZOLE SCH MG: 20 CAPSULE, DELAYED RELEASE ORAL at 07:38

## 2017-09-09 RX ADMIN — ASPIRIN SCH MG: 81 TABLET, CHEWABLE ORAL at 08:58

## 2017-09-09 RX ADMIN — Medication SCH MG: at 20:22

## 2017-09-09 RX ADMIN — PREGABALIN SCH MG: 50 CAPSULE ORAL at 08:57

## 2017-09-09 RX ADMIN — Medication SCH UNITS: at 08:58

## 2017-09-09 RX ADMIN — WATER SCH NOTE: 100 INJECTION, SOLUTION INTRAVENOUS at 18:53

## 2017-09-09 RX ADMIN — POTASSIUM CHLORIDE SCH MEQ: 750 TABLET, FILM COATED, EXTENDED RELEASE ORAL at 08:56

## 2017-09-09 RX ADMIN — PREGABALIN SCH MG: 50 CAPSULE ORAL at 20:22

## 2017-09-09 RX ADMIN — FUROSEMIDE SCH MG: 40 TABLET ORAL at 17:41

## 2017-09-09 RX ADMIN — CYANOCOBALAMIN TAB 500 MCG SCH MCG: 500 TAB at 08:55

## 2017-09-09 RX ADMIN — FUROSEMIDE SCH MG: 40 TABLET ORAL at 08:52

## 2017-09-09 RX ADMIN — WATER SCH NOTE: 100 INJECTION, SOLUTION INTRAVENOUS at 07:23

## 2017-09-09 RX ADMIN — Medication SCH CAP: at 08:51

## 2017-09-09 RX ADMIN — ENOXAPARIN SODIUM SCH MG: 40 INJECTION SUBCUTANEOUS at 20:22

## 2017-09-09 NOTE — PN
Subjective


Date of Service: 09/09/17


Interval History: 








patient reports he is feeling better today - he experienced an episode of 

vomiting last night with nausea that resolved overnight with some mild nausea 

returning this am (no further vomiting), now nausea resolved this evening. 

Denies abdominal pain. Diarrhea is resolving - 2 formed small stools today. 

Reports little appetite. No fever or chills. Denies SOB/CP. Reports his pain is 

controlled





wife at bedside during visit with lots of questions. She reports hx of bleeding 

ulcer in the past. No current signs of bleeding - denies bloody/melena stools. 








Family History: Unchanged from Admission


Social History: Unchanged from Admission


Past Medical History: Unchanged from Admission





Objective


Active Medications: 








Acetaminophen (Tylenol Tab*)  650 mg PO Q4H PRN


   PRN Reason: PAIN


   Last Admin: 09/07/17 21:41 Dose:  650 mg


Albuterol/Ipratropium (Duoneb (Albuterol 2.5 Mg/Ipratropium 0.5 Mg))  1 neb INH 

Q4H PRN


   PRN Reason: SOB/WHEEZING


   Last Admin: 09/06/17 17:55 Dose:  1 neb


Aspirin (Aspirin Low Dose Tab*)  81 mg PO DAILY [facility]


   Last Admin: 09/09/17 08:58 Dose:  81 mg


Cholecalciferol (Vitamin D Tab*)  5,000 units PO DAILY [facility]


   Last Admin: 09/09/17 08:58 Dose:  5,000 units


Cyanocobalamin (Vitamin B12 Tab*)  1,000 mcg PO DAILY [facility]


   Last Admin: 09/09/17 08:55 Dose:  1,000 mcg


Enoxaparin Sodium (Lovenox(*))  40 mg SUBCUT Q24H [facility]


   Last Admin: 09/08/17 21:43 Dose:  40 mg


Ferrous Gluconate (Fergon Tab*)  324 mg PO BID [facility]


   Last Admin: 09/09/17 08:54 Dose:  324 mg


Folic Acid (Folvite Tab*)  0.5 mg PO DAILY [facility]


   Last Admin: 09/09/17 08:51 Dose:  0.5 mg


Furosemide (Lasix Tab*)  40 mg PO 0800,1700 [facility]


   Last Admin: 09/09/17 08:52 Dose:  40 mg


Hydralazine HCl (Apresoline Iv*)  10 mg IV Q4H PRN


   PRN Reason: Systolic >170


   Last Admin: 09/09/17 00:02 Dose:  10 mg


Piperacillin Sod/Tazobactam (Sod 3.375 gm/ Sodium Chloride)  100 mls @ 25 mls/

hr IVPB 0600,1400,2200 [facility]


   Last Admin: 09/09/17 14:47 Dose:  25 mls/hr


Lactobacillus Rhamnosus (Culturelle*)  1 cap PO BID [facility]


   Last Admin: 09/09/17 08:51 Dose:  1 cap


Omeprazole (Prilosec Cap*)  40 mg PO DAILY@0730 [facility]


   Last Admin: 09/09/17 07:38 Dose:  40 mg


Ondansetron HCl (Zofran Inj*)  4 mg IV Q6H PRN


   PRN Reason: NAUSEA


   Last Admin: 09/08/17 22:26 Dose:  4 mg


Pharmacy Profile Note (Fentanyl Patch Check Q Shift)  0 note N/A 0700,1900 [facility]


   Last Admin: 09/09/17 07:23 Dose:  1 note


Potassium Chloride (Klor Con Er Tab*)  10 meq PO DAILY [facility]


   Last Admin: 09/09/17 08:56 Dose:  10 meq


Pregabalin (Lyrica Cap(*))  150 mg PO TID [facility]


   Last Admin: 09/09/17 14:47 Dose:  150 mg








 Vital Signs











  09/08/17 09/08/17 09/08/17





  19:32 21:30 21:42


 


Temperature 98.6 F  


 


Pulse Rate 88  


 


Respiratory 18 16 16





Rate   


 


Blood Pressure 153/55  





(mmHg)   


 


O2 Sat by Pulse 96  





Oximetry   














  09/08/17 09/08/17 09/08/17





  23:16 23:23 23:42


 


Temperature 99.1 F  


 


Pulse Rate 93 92 


 


Respiratory 14  14





Rate   


 


Blood Pressure 191/85 176/71 





(mmHg)   


 


O2 Sat by Pulse 93  





Oximetry   














  09/09/17 09/09/17 09/09/17





  01:25 03:41 03:43


 


Temperature  98.6 F 


 


Pulse Rate 93 78 


 


Respiratory  16 





Rate   


 


Blood Pressure 154/62 178/78 160/72





(mmHg)   


 


O2 Sat by Pulse  95 





Oximetry   














  09/09/17 09/09/17 09/09/17





  08:00 08:06 08:57


 


Temperature  98.7 F 


 


Pulse Rate  84 


 


Respiratory 16 14 17





Rate   


 


Blood Pressure  185/85 





(mmHg)   


 


O2 Sat by Pulse  97 





Oximetry   














  09/09/17 09/09/17 09/09/17





  10:57 12:20 14:47


 


Temperature  98.1 F 


 


Pulse Rate  71 


 


Respiratory 16 16 16





Rate   


 


Blood Pressure  145/51 





(mmHg)   


 


O2 Sat by Pulse  96 





Oximetry   














  09/09/17





  15:47


 


Temperature 98.7 F


 


Pulse Rate 79


 


Respiratory 14





Rate 


 


Blood Pressure 161/63





(mmHg) 


 


O2 Sat by Pulse 97





Oximetry 











Oxygen Devices in Use Now: None


Appearance: chronically ill appearing 71 yo male A+O x3 in NAD


Eyes: No Scleral Icterus, PERRLA


Ears/Nose/Mouth/Throat: NL Teeth, Lips, Gums, Mucous Membranes Moist


Respiratory: Symmetrical Chest Expansion and Respiratory Effort, Clear to 

Auscultation


Cardiovascular: NL Sounds; No Murmurs; No JVD, RRR, - - trace LE edema b/l


Abdominal: - - obese, soft, nondistended, NL BS x4


Extremities: No Clubbing, Cyanosis


Skin: - - sacral decub with wound vac intact - no signs of redness surround 

wound vac noted


Neurological: Alert and Oriented x 3, - - paraplegia


Lines/Tubes/Other Access: Clean, Dry and Intact [place] - clear yellow urine, 

Clean, Dry and Intact Peripheral IV


Nutrition: Taking PO's


Result Diagrams: 


 09/09/17 07:47





 09/09/17 07:47


Additional Lab and Data: 





 


 


 Laboratory Results - last 24 hr











  09/08/17 09/08/17 09/08/17





  09:49 09:49 09:49


 


WBC    11.3 H


 


RBC    4.16


 


Hgb    10.8 L


 


Hct    33 L


 


MCV    80


 


MCH    26 L


 


MCHC    33


 


RDW    17 H


 


Plt Count    483 H D


 


MPV    7 L


 


Neut % (Auto)    67.5


 


Lymph % (Auto)    19.1 L


 


Mono % (Auto)    9.6 H


 


Eos % (Auto)    2.6


 


Baso % (Auto)    1.2


 


Absolute Neuts (auto)    7.7


 


Absolute Lymphs (auto)    2.2


 


Absolute Monos (auto)    1.1 H


 


Absolute Eos (auto)    0.3


 


Absolute Basos (auto)    0.1


 


Absolute Nucleated RBC    0.01


 


Nucleated RBC %    0.1


 


Sodium  143  


 


Potassium  3.7  


 


Chloride  110  


 


Carbon Dioxide  25  


 


Anion Gap  8  


 


BUN  22  


 


Creatinine  1.35 H  


 


Est GFR ( Amer)  67.0  


 


Est GFR (Non-Af Amer)  52.1  


 


BUN/Creatinine Ratio  16.3  


 


Glucose  138 H  


 


Calcium  10.3  


 


Vancomycin Trough   24.4 

















Microbiology and Other Data: 


 





 Microbiology











 09/05/17 12:52 Wound Gram Stain - Final





 Tissue - Other Tissue Culture - Preliminary





    Enterococcus Faecalis





    Bacteroides Vulgatus





 Skin and Soft Tissue MRSA/MSSA (PCR - Final





    Mrsa Negative





    S.aureus Negative

















Assess/Plan/Problems-Billing


Assessment: 





Mr. Pryor is a 71 yo male with PMH significant for ependymoma s/p 4 surgeries 

and spinal fractures after fall c/b progressive paraplegia and debilitating 

parasthesias, neurogenic bladder with chronic indwelling london, HTN, GERD, BONIFACIO 

with BiPAP, CAD, HLD and BPH with recent diarrhea since tx with cipro for UTI, 

who presented to the emergency room with sepsis and stage IV sacral decubitus 

ulcer.  On zosyn. s/p vancomycin for suspected HCAP, recent CT chest w/ 

atelectasis. s/p debridement and now with wound vac w/ saline infusion. Cdiff 

was positive but ID thinks false positive given character of stool. Low grade 

fevers. 





- Patient Problems


(1) Sepsis


Comment: - Meeting SIRS criteria on admission (Tachycardia, tachypnea, fever, 

leukocytosis)


- Pt not meeting qSofa criteria on admission or at this time


- tachycardia and tachypnea resolved 


- Leukocytosis and fevers resolved   





(2) Sacral decubitus ulcer, stage IV


Comment: - S/P OR sharp debridement 9/5 and bedside debridement 9/2


- Wound consult appreciated. Wound vacc applied w/ "veriflow" saline washes (

placed 9/7), possible change 9/10


- appreciate Surgical assistance.


- Plastic surgery Dr. Phillips consulted, will talk to Dr. Holley. However 

seems like for wound flap may have to go to [place] or [place] in [place] 


- Appreciate ID consult. Continue Zosyn, likely multiweek course for potential 

osteo. PICC before discharge


- bacteroides, clostridium and E Faecalis from initial cultures and E Faecalis 

+ Bacteroidies vulgatus in deep culture. 


- appreciate Nutrition recs to support healing   





(3) Diarrhea


Comment: Persistent diarrhea ~6 weeks after ciprofloxacin course for UTI. Prior 

to that was never an issue and patient was not incontinent of stool. 


Given sepsis presentation and continued low grade fevers C-diff was checked and 

postive. ID thinks false positive, stopped flagyl. monitor stool which are 

currently improving


Continue probiotics/Culturelle


   





(4) CAD (coronary artery disease)


Comment: 


- asymptomatic


- Continue ASA.   





(5) Pneumonia


Comment: suspected HCAP based on SOB and CXR R>L infiltrates. s/p 3 days 

vancomycin  (already on zosyn) but stopped 9/8 after CT chest more consistent 

with atelectasis. Appreciate ID consult  


   





(6) CKD (chronic kidney disease), stage III


Comment: 


- Creatinine within baseline


- Baseline creatinine 2.1 to 2.5


- Avoid nephrotoxic medications   





(7) Chronic back pain


Comment: 


- Secondary to ependymoma


- Continue oxycodone, pregabalin and fentanyl patch   





(8) HTN (hypertension)


Comment: 


- Normotensive


- Continue furosemide and amlodipine   





(9) BONIFACIO (obstructive sleep apnea)


Comment: 


- Continue home BiPAP use.   





(10) Paraplegia


Comment: frequent turning, skin care. 


Pain control with home fentanyl 75mcg patch and oxycodone 10mg q4 prn, 

acetaminophen 650mg q6 prn, lyrica.


Planned for spinal stimulator implantation with Dr. Huerta ([facility]) 

eventually   





(11) Anemia


Comment: - improved


- Hx of h pyrlori infection last year s/p treatment


- No s/s of bleeding


- Received 1 U PRBCs 9/3


- Continue iron supplementation, B12, folate. However likely anemia of chronic 

diseases with elevated ferritin 365, iron 29 last month.


-send stool for occult blood with hx of bleeding gastric ulcer   





(12) Full code status








(13) DVT prophylaxis


Comment: 


- Lovenox   


Status and Disposition: 





Inpatient, surgical floor. [facility] discharge Monday 9/11. PMRU declined; per 

wife she has a meeting with Dr. Marina Monday to discuss PMRU decline. Wife 

does not feel that she can care for him at home by herself and refuses nursing 

home placement for rehab

## 2017-09-10 LAB
ANION GAP SERPL CALC-SCNC: 9 MMOL/L (ref 2–11)
BUN SERPL-MCNC: 27 MG/DL (ref 6–24)
BUN/CREAT SERPL: 17.6 (ref 8–20)
CALCIUM SERPL-MCNC: 10.4 MG/DL (ref 8.6–10.3)
CHLORIDE SERPL-SCNC: 105 MMOL/L (ref 101–111)
GLUCOSE SERPL-MCNC: 112 MG/DL (ref 70–100)
HCO3 SERPL-SCNC: 24 MMOL/L (ref 22–32)
HCT VFR BLD AUTO: 36 % (ref 42–52)
HGB BLD-MCNC: 11.8 G/DL (ref 14–18)
MAGNESIUM SERPL-MCNC: 2.6 MG/DL (ref 1.9–2.7)
MCH RBC QN AUTO: 26 PG (ref 27–31)
MCHC RBC AUTO-ENTMCNC: 33 G/DL (ref 31–36)
MCV RBC AUTO: 81 FL (ref 80–94)
POTASSIUM SERPL-SCNC: 3.9 MMOL/L (ref 3.5–5)
RBC # BLD AUTO: 4.46 10^6/UL (ref 4–5.4)
SODIUM SERPL-SCNC: 138 MMOL/L (ref 133–145)
WBC # BLD AUTO: 10.8 10^3/UL (ref 3.5–10.8)

## 2017-09-10 RX ADMIN — Medication SCH MG: at 20:37

## 2017-09-10 RX ADMIN — FOLIC ACID SCH MG: 1 TABLET ORAL at 10:37

## 2017-09-10 RX ADMIN — WATER SCH NOTE: 100 INJECTION, SOLUTION INTRAVENOUS at 07:18

## 2017-09-10 RX ADMIN — CYANOCOBALAMIN TAB 500 MCG SCH MCG: 500 TAB at 10:37

## 2017-09-10 RX ADMIN — Medication SCH UNITS: at 10:37

## 2017-09-10 RX ADMIN — FUROSEMIDE SCH MG: 40 TABLET ORAL at 11:03

## 2017-09-10 RX ADMIN — OMEPRAZOLE SCH MG: 20 CAPSULE, DELAYED RELEASE ORAL at 10:37

## 2017-09-10 RX ADMIN — PREGABALIN SCH MG: 50 CAPSULE ORAL at 10:38

## 2017-09-10 RX ADMIN — ENOXAPARIN SODIUM SCH MG: 40 INJECTION SUBCUTANEOUS at 20:37

## 2017-09-10 RX ADMIN — HYDRALAZINE HYDROCHLORIDE PRN MG: 20 INJECTION INTRAMUSCULAR; INTRAVENOUS at 20:07

## 2017-09-10 RX ADMIN — Medication SCH CAP: at 20:37

## 2017-09-10 RX ADMIN — OXYCODONE HYDROCHLORIDE PRN MG: 5 CAPSULE ORAL at 23:31

## 2017-09-10 RX ADMIN — Medication SCH CAP: at 10:37

## 2017-09-10 RX ADMIN — POTASSIUM CHLORIDE SCH MEQ: 750 TABLET, FILM COATED, EXTENDED RELEASE ORAL at 10:38

## 2017-09-10 RX ADMIN — ASPIRIN SCH MG: 81 TABLET, CHEWABLE ORAL at 10:37

## 2017-09-10 RX ADMIN — PREGABALIN SCH MG: 50 CAPSULE ORAL at 20:37

## 2017-09-10 RX ADMIN — PREGABALIN SCH MG: 50 CAPSULE ORAL at 14:46

## 2017-09-10 RX ADMIN — FUROSEMIDE SCH MG: 40 TABLET ORAL at 17:45

## 2017-09-10 RX ADMIN — Medication SCH MG: at 10:37

## 2017-09-10 RX ADMIN — WATER SCH NOTE: 100 INJECTION, SOLUTION INTRAVENOUS at 18:34

## 2017-09-10 NOTE — PN
Progress Note





- Progress Note


Date of Service: 09/10/17


Note: 





Surgery





Mr. Pryor had the VAC changed today.


 Vital Signs











  09/09/17 09/09/17 09/09/17





  08:57 10:57 12:20


 


Temperature   98.1 F


 


Pulse Rate   71


 


Respiratory 17 16 16





Rate   


 


Blood Pressure   145/51





(mmHg)   


 


O2 Sat by Pulse   96





Oximetry   














  09/09/17 09/09/17 09/09/17





  14:47 15:47 16:00


 


Temperature  98.7 F 


 


Pulse Rate  79 


 


Respiratory 16 14 





Rate   


 


Blood Pressure  161/63 





(mmHg)   


 


O2 Sat by Pulse  97 97





Oximetry   














  09/09/17 09/09/17 09/09/17





  16:47 19:42 20:22


 


Temperature  98.7 F 


 


Pulse Rate  69 


 


Respiratory 16 14 14





Rate   


 


Blood Pressure  159/67 





(mmHg)   


 


O2 Sat by Pulse  97 





Oximetry   














  09/09/17 09/10/17 09/10/17





  21:53 00:16 03:07


 


Temperature  99.1 F 99.3 F


 


Pulse Rate  67 64


 


Respiratory 14 17 16





Rate   


 


Blood Pressure  163/75 138/51





(mmHg)   


 


O2 Sat by Pulse  98 96





Oximetry   











Wound has some adherent slough on base with granulation at wound edges.  

Compared to photo Mrs. Pryor had in phone, this is an improvement.  No 

undrained collections.  Measurements ~8 x 12 x 2 cm.


 Intake & Output











 09/09/17 09/10/17 09/10/17





 22:59 06:59 14:59


 


Intake Total 600 50 


 


Output Total 1600 1350 


 


Balance -1000 -1300 


 


Intake:   


 


  Oral 600 50 


 


Output:   


 


  Wound Vac  500 


 


  Rodriguez 1600 850 


 


Other:   


 


  Estimated Stool Amount  Small 











A/P:  Some improvement; continue current management.

## 2017-09-10 NOTE — PN
Subjective


Date of Service: 09/10/17


Interval History: 








Patient offers no complaints today. Wife at bedside, states he had a "good day"

. Pt reports less pain. Wound vac was changed and Dr. Wright surgeon evaluated 

wound - appears to be improving per wifes previous photo of wound. Plan to 

continue course of treatment. Wife is hoping he will be admitted to [facility] as he 

is too weak to go home and she refuses other rehabs. 





No fevers or chills. No further N/V. Reports decreased appetite but states this 

is not unusual for him. Little fluid PO intake - per wife she has to really 

encourage him. Appears dry on todays labs, pt reports his mouth is dry and 

tacky today. Denies SOB/CP. 








Family History: Unchanged from Admission


Social History: Unchanged from Admission


Past Medical History: Unchanged from Admission





Objective


Active Medications: 








Acetaminophen (Tylenol Tab*)  650 mg PO Q4H PRN


   PRN Reason: PAIN


   Last Admin: 09/07/17 21:41 Dose:  650 mg


Albuterol/Ipratropium (Duoneb (Albuterol 2.5 Mg/Ipratropium 0.5 Mg))  1 neb INH 

Q4H PRN


   PRN Reason: SOB/WHEEZING


   Last Admin: 09/06/17 17:55 Dose:  1 neb


Aspirin (Aspirin Low Dose Tab*)  81 mg PO DAILY [facility]


   Last Admin: 09/10/17 10:37 Dose:  81 mg


Cholecalciferol (Vitamin D Tab*)  5,000 units PO DAILY [facility]


   Last Admin: 09/10/17 10:37 Dose:  5,000 units


Cyanocobalamin (Vitamin B12 Tab*)  1,000 mcg PO DAILY [facility]


   Last Admin: 09/10/17 10:37 Dose:  1,000 mcg


Enoxaparin Sodium (Lovenox(*))  40 mg SUBCUT Q24H [facility]


   Last Admin: 09/09/17 20:22 Dose:  40 mg


Ferrous Gluconate (Fergon Tab*)  324 mg PO BID [facility]


   Last Admin: 09/10/17 10:37 Dose:  324 mg


Folic Acid (Folvite Tab*)  0.5 mg PO DAILY [facility]


   Last Admin: 09/10/17 10:37 Dose:  0.5 mg


Furosemide (Lasix Tab*)  40 mg PO 0800,1700 [facility]


   Last Admin: 09/10/17 17:45 Dose:  40 mg


Hydralazine HCl (Apresoline Iv*)  10 mg IV Q4H PRN


   PRN Reason: Systolic >170


   Last Admin: 09/09/17 00:02 Dose:  10 mg


Piperacillin Sod/Tazobactam (Sod 3.375 gm/ Sodium Chloride)  100 mls @ 25 mls/

hr IVPB 0600,1400,2200 [facility]


   Last Admin: 09/10/17 14:46 Dose:  25 mls/hr


Sodium Chloride (Ns 0.9% 1000 Ml*)  1,000 mls @ 100 mls/hr IV PER RATE [facility]


   Stop: 09/10/17 23:59


   Last Admin: 09/10/17 14:46 Dose:  100 mls/hr


Lactobacillus Rhamnosus (Culturelle*)  1 cap PO BID [facility]


   Last Admin: 09/10/17 10:37 Dose:  1 cap


Omeprazole (Prilosec Cap*)  40 mg PO DAILY@0730 [facility]


   Last Admin: 09/10/17 10:37 Dose:  40 mg


Ondansetron HCl (Zofran Inj*)  4 mg IV Q6H PRN


   PRN Reason: NAUSEA


   Last Admin: 09/08/17 22:26 Dose:  4 mg


Pharmacy Profile Note (Fentanyl Patch Check Q Shift)  0 note N/A 0700,1900 [facility]


   Last Admin: 09/10/17 07:18 Dose:  1 note


Potassium Chloride (Klor Con Er Tab*)  10 meq PO DAILY [facility]


   Last Admin: 09/10/17 10:38 Dose:  10 meq


Pregabalin (Lyrica Cap(*))  150 mg PO TID [facility]


   Last Admin: 09/10/17 14:46 Dose:  150 mg








 Vital Signs











  09/09/17 09/09/17 09/09/17





  19:42 20:22 21:53


 


Temperature 98.7 F  


 


Pulse Rate 69  


 


Respiratory 14 14 14





Rate   


 


Blood Pressure 159/67  





(mmHg)   


 


O2 Sat by Pulse 97  





Oximetry   














  09/10/17 09/10/17 09/10/17





  00:16 03:07 07:54


 


Temperature 99.1 F 99.3 F 98.5 F


 


Pulse Rate 67 64 63


 


Respiratory 17 16 14





Rate   


 


Blood Pressure 163/75 138/51 150/66





(mmHg)   


 


O2 Sat by Pulse 98 96 96





Oximetry   














  09/10/17 09/10/17 09/10/17





  08:00 10:38 11:23


 


Temperature   99.0 F


 


Pulse Rate   81


 


Respiratory 14 18 18





Rate   


 


Blood Pressure   169/51





(mmHg)   


 


O2 Sat by Pulse 96  96





Oximetry   














  09/10/17 09/10/17 09/10/17





  12:38 14:46 15:21


 


Temperature   98.6 F


 


Pulse Rate   108


 


Respiratory 18 16 18





Rate   


 


Blood Pressure   146/44





(mmHg)   


 


O2 Sat by Pulse   97





Oximetry   














  09/10/17 09/10/17





  16:00 16:46


 


Temperature  


 


Pulse Rate  


 


Respiratory  16





Rate  


 


Blood Pressure  





(mmHg)  


 


O2 Sat by Pulse 97 





Oximetry  











Oxygen Devices in Use Now: None


Appearance: chronically ill appearing 71 yo male laying in bed in NAD; A+O x3


Eyes: No Scleral Icterus, PERRLA


Ears/Nose/Mouth/Throat: NL Teeth, Lips, Gums, - - Dry MM


Neck: NL Appearance and Movements; NL JVP


Respiratory: Symmetrical Chest Expansion and Respiratory Effort, Clear to 

Auscultation


Cardiovascular: NL Sounds; No Murmurs; No JVD, RRR, No Edema


Abdominal: NL Sounds; No Tenderness; No Distention


Extremities: - - trace LE edema b/l 


Skin: - - sacral wound with wound vac intact - no noted foul odor or 

surrounding erythema. 


Neurological: Alert and Oriented x 3, - - paraplegia to LE's


Lines/Tubes/Other Access: Clean, Dry and Intact Peripheral IV


Nutrition: Taking PO's


Result Diagrams: 


 09/10/17 09:45





 09/10/17 09:45


Additional Lab and Data: 





 


 


 Laboratory Results - last 24 hr











  09/08/17 09/08/17 09/08/17





  09:49 09:49 09:49


 


WBC    11.3 H


 


RBC    4.16


 


Hgb    10.8 L


 


Hct    33 L


 


MCV    80


 


MCH    26 L


 


MCHC    33


 


RDW    17 H


 


Plt Count    483 H D


 


MPV    7 L


 


Neut % (Auto)    67.5


 


Lymph % (Auto)    19.1 L


 


Mono % (Auto)    9.6 H


 


Eos % (Auto)    2.6


 


Baso % (Auto)    1.2


 


Absolute Neuts (auto)    7.7


 


Absolute Lymphs (auto)    2.2


 


Absolute Monos (auto)    1.1 H


 


Absolute Eos (auto)    0.3


 


Absolute Basos (auto)    0.1


 


Absolute Nucleated RBC    0.01


 


Nucleated RBC %    0.1


 


Sodium  143  


 


Potassium  3.7  


 


Chloride  110  


 


Carbon Dioxide  25  


 


Anion Gap  8  


 


BUN  22  


 


Creatinine  1.35 H  


 


Est GFR ( Amer)  67.0  


 


Est GFR (Non-Af Amer)  52.1  


 


BUN/Creatinine Ratio  16.3  


 


Glucose  138 H  


 


Calcium  10.3  


 


Vancomycin Trough   24.4 

















Microbiology and Other Data: 


 





 Microbiology











 09/05/17 12:52 Wound Gram Stain - Final





 Tissue - Other Tissue Culture - Preliminary





    Enterococcus Faecalis





    Bacteroides Vulgatus





 Skin and Soft Tissue MRSA/MSSA (PCR - Final





    Mrsa Negative





    S.aureus Negative

















Assess/Plan/Problems-Billing


Assessment: 





Mr. Pryor is a 71 yo male with PMH significant for ependymoma s/p 4 surgeries 

and spinal fractures after fall c/b progressive paraplegia and debilitating 

parasthesias, neurogenic bladder with chronic indwelling london, HTN, GERD, OBNIFACIO 

with BiPAP, CAD, HLD and BPH with recent diarrhea since tx with cipro for UTI, 

who presented to the emergency room with sepsis and stage IV sacral decubitus 

ulcer.  On zosyn. s/p vancomycin for suspected HCAP, recent CT chest w/ 

atelectasis. s/p debridement and now with wound vac w/ saline infusion. Cdiff 

was positive but ID thinks false positive given character of stool. Low grade 

fevers. 





- Patient Problems


(1) Sepsis


Comment: - Resolved. Meeting SIRS criteria on admission (Tachycardia, tachypnea

, fever, leukocytosis)


- tachycardia and tachypnea resolved 


- Leukocytosis and fevers resolved   





(2) Sacral decubitus ulcer, stage IV


Comment: - S/P OR sharp debridement 9/5 and bedside debridement 9/2


- Wound consult appreciated. Wound vacc applied w/ "veriflow" saline washes (

placed 9/7), Change today 9/10 - wound appears to be improving


- appreciate Surgical assistance.


- Plastic surgery Dr. Phillips consulted, will talk to surgery. However seems 

like for wound flap may have to go to [place] or [place] in [place] 


- Appreciate ID consult. Continue Zosyn, likely multiweek course for potential 

osteo. PICC to be placed tomorrow


- bacteroides, clostridium and E Faecalis from initial cultures and E Faecalis 

+ Bacteroidies vulgatus in deep culture. 


- appreciate Nutrition recs to support healing   





(3) Dehydration


Comment: 


patient appaers dry today with Dry mm, elevated HH. platlets, calcium, 

creatinine - plan to hold lasix and given 1L NS. Recheck labs in am   





(4) Diarrhea


Comment: Persistent diarrhea ~6 weeks after ciprofloxacin course for UTI. Prior 

to that was never an issue and patient was not incontinent of stool. 


Given sepsis presentation and continued low grade fevers C-diff was checked and 

postive. ID thinks false positive, stopped flagyl. monitor stool which are 

currently improving and diarhhea has resolved


Continue probiotics/Culturelle


   





(5) CAD (coronary artery disease)


Comment: 


- asymptomatic


- Continue ASA.   





(6) Pneumonia


Comment: suspected HCAP based on SOB and CXR R>L infiltrates. s/p 3 days 

vancomycin  (already on zosyn) but stopped 9/8 after CT chest more consistent 

with atelectasis. Appreciate ID consult  


   





(7) CKD (chronic kidney disease), stage III


Comment: 


- Creatinine within baseline


- Baseline creatinine 2.1 to 2.5


- Avoid nephrotoxic medications   





(8) Chronic back pain


Comment: 


- Secondary to ependymoma


- Continue oxycodone, pregabalin and fentanyl patch   





(9) HTN (hypertension)


Comment: 


- Normotensive


- Home meds furosemide and amlodipine - hold lasix appears dry today    





(10) BONIFACIO (obstructive sleep apnea)


Comment: 


- Continue home BiPAP use.   





(11) Paraplegia


Comment: frequent turning, skin care. 


Pain control with home fentanyl 75mcg patch and oxycodone 10mg q4 prn, 

acetaminophen 650mg q6 prn, lyrica.


Planned for spinal stimulator implantation with Dr. Huerta ([place]) 

eventually   





(12) Anemia


Comment: - improved


- Hx of h pyrlori infection last year s/p treatment


- No s/s of bleeding


- Received 1 U PRBCs 9/3


- Continue iron supplementation, B12, folate. However likely anemia of chronic 

diseases with elevated ferritin 365, iron 29 last month.


-send stool for occult blood with hx of bleeding gastric ulcer   





(13) Full code status








(14) DVT prophylaxis


Comment: 


- Lovenox   


Status and Disposition: 





Inpatient, surgical floor. [facility] discharge 9/11 or 9/12?  PMRU declined; per 

wife she has a meeting with Dr. Marina Monday to discuss PMRU decline. Wife 

does not feel that she can care for him at home by herself and refuses nursing 

home placement for rehab

## 2017-09-11 LAB
ANION GAP SERPL CALC-SCNC: 8 MMOL/L (ref 2–11)
BUN SERPL-MCNC: 30 MG/DL (ref 6–24)
BUN/CREAT SERPL: 19.4 (ref 8–20)
CALCIUM SERPL-MCNC: 10.1 MG/DL (ref 8.6–10.3)
CHLORIDE SERPL-SCNC: 104 MMOL/L (ref 101–111)
GLUCOSE SERPL-MCNC: 104 MG/DL (ref 70–100)
HCO3 SERPL-SCNC: 27 MMOL/L (ref 22–32)
HCT VFR BLD AUTO: 32 % (ref 42–52)
HGB BLD-MCNC: 10.7 G/DL (ref 14–18)
MCH RBC QN AUTO: 26 PG (ref 27–31)
MCHC RBC AUTO-ENTMCNC: 33 G/DL (ref 31–36)
MCV RBC AUTO: 80 FL (ref 80–94)
POTASSIUM SERPL-SCNC: 3.2 MMOL/L (ref 3.5–5)
RBC # BLD AUTO: 4.03 10^6/UL (ref 4–5.4)
SODIUM SERPL-SCNC: 139 MMOL/L (ref 133–145)
WBC # BLD AUTO: 10.1 10^3/UL (ref 3.5–10.8)

## 2017-09-11 RX ADMIN — Medication SCH MG: at 09:29

## 2017-09-11 RX ADMIN — WATER SCH NOTE: 100 INJECTION, SOLUTION INTRAVENOUS at 07:07

## 2017-09-11 RX ADMIN — OXYCODONE HYDROCHLORIDE PRN MG: 5 CAPSULE ORAL at 20:32

## 2017-09-11 RX ADMIN — POTASSIUM CHLORIDE SCH MEQ: 750 TABLET, FILM COATED, EXTENDED RELEASE ORAL at 09:29

## 2017-09-11 RX ADMIN — Medication SCH: at 18:04

## 2017-09-11 RX ADMIN — Medication SCH CAP: at 09:29

## 2017-09-11 RX ADMIN — CYANOCOBALAMIN TAB 500 MCG SCH MCG: 500 TAB at 09:29

## 2017-09-11 RX ADMIN — Medication SCH CAP: at 20:33

## 2017-09-11 RX ADMIN — ASPIRIN SCH MG: 81 TABLET, CHEWABLE ORAL at 09:29

## 2017-09-11 RX ADMIN — PREGABALIN SCH MG: 50 CAPSULE ORAL at 09:29

## 2017-09-11 RX ADMIN — Medication SCH UNITS: at 09:29

## 2017-09-11 RX ADMIN — Medication SCH MG: at 20:33

## 2017-09-11 RX ADMIN — ENOXAPARIN SODIUM SCH MG: 40 INJECTION SUBCUTANEOUS at 20:33

## 2017-09-11 RX ADMIN — FOLIC ACID SCH MG: 1 TABLET ORAL at 09:30

## 2017-09-11 RX ADMIN — PREGABALIN SCH MG: 50 CAPSULE ORAL at 20:32

## 2017-09-11 RX ADMIN — HYDRALAZINE HYDROCHLORIDE PRN MG: 20 INJECTION INTRAMUSCULAR; INTRAVENOUS at 03:23

## 2017-09-11 RX ADMIN — PREGABALIN SCH MG: 50 CAPSULE ORAL at 14:31

## 2017-09-11 RX ADMIN — OMEPRAZOLE SCH MG: 20 CAPSULE, DELAYED RELEASE ORAL at 07:41

## 2017-09-11 NOTE — PN
Subjective


Date of Service: 09/11/17


Interval History: 





Patient seen and examined at bedside. Mr. Pryor reportedly had some nausea and 

vomiting overnight but is now resting comfortably. His wife, Kimmy Pryor, is 

at bedside. He denies any chest pain, difficulty breathing. He reports minimal 

pain to the wound vac site. 





Ms. Pryor expressed concern for patient's deconditioning; she has a plan to 

meet with Dr. Reynoso today to discuss PMRU admission possibility. She is in 

agreement with PT/OT but would like to see patient participate more. She is 

concerned that she will not be able to manage him independently at home if he 

is unable to help her. She does not want him in a nursing home.








Family History: Unchanged from Admission


Social History: Unchanged from Admission


Past Medical History: Unchanged from Admission





Objective


Active Medications: 








Acetaminophen (Tylenol Tab*)  650 mg PO Q4H PRN


   PRN Reason: PAIN


   Last Admin: 09/07/17 21:41 Dose:  650 mg


Albuterol/Ipratropium (Duoneb (Albuterol 2.5 Mg/Ipratropium 0.5 Mg))  1 neb INH 

Q4H PRN


   PRN Reason: SOB/WHEEZING


   Last Admin: 09/06/17 17:55 Dose:  1 neb


Aspirin (Aspirin Low Dose Tab*)  81 mg PO DAILY [facility]


   Last Admin: 09/10/17 10:37 Dose:  81 mg


Cholecalciferol (Vitamin D Tab*)  5,000 units PO DAILY [facility]


   Last Admin: 09/10/17 10:37 Dose:  5,000 units


Cyanocobalamin (Vitamin B12 Tab*)  1,000 mcg PO DAILY [facility]


   Last Admin: 09/10/17 10:37 Dose:  1,000 mcg


Enoxaparin Sodium (Lovenox(*))  40 mg SUBCUT Q24H [facility]


   Last Admin: 09/10/17 20:37 Dose:  40 mg


Ferrous Gluconate (Fergon Tab*)  324 mg PO BID [facility]


   Last Admin: 09/10/17 20:37 Dose:  324 mg


Folic Acid (Folvite Tab*)  0.5 mg PO DAILY [facility]


   Last Admin: 09/10/17 10:37 Dose:  0.5 mg


Hydralazine HCl (Apresoline Iv*)  10 mg IV Q4H PRN


   PRN Reason: Systolic >170


   Last Admin: 09/11/17 03:23 Dose:  10 mg


Piperacillin Sod/Tazobactam (Sod 3.375 gm/ Sodium Chloride)  100 mls @ 25 mls/

hr IVPB 0600,1400,2200 [facility]


   Last Admin: 09/11/17 05:39 Dose:  25 mls/hr


Lactobacillus Rhamnosus (Culturelle*)  1 cap PO BID [facility]


   Last Admin: 09/10/17 20:37 Dose:  1 cap


Omeprazole (Prilosec Cap*)  40 mg PO DAILY@0730 [facility]


   Last Admin: 09/11/17 07:41 Dose:  40 mg


Ondansetron HCl (Zofran Inj*)  4 mg IV Q6H PRN


   PRN Reason: NAUSEA


   Last Admin: 09/08/17 22:26 Dose:  4 mg


Oxycodone HCl (Roxycodone Tab*)  5 mg PO Q4H PRN


   PRN Reason: PAIN


   Last Admin: 09/10/17 23:31 Dose:  5 mg


Pharmacy Profile Note (Fentanyl Patch Check Q Shift)  0 note N/A 0700,1900 [facility]


   Last Admin: 09/11/17 07:07 Dose:  1 note


Potassium Chloride (Klor Con Er Tab*)  10 meq PO DAILY [facility]


   Last Admin: 09/10/17 10:38 Dose:  10 meq


Pregabalin (Lyrica Cap(*))  150 mg PO TID [facility]


   Last Admin: 09/10/17 20:37 Dose:  150 mg








 Vital Signs











  09/10/17 09/10/17 09/10/17





  10:38 11:23 12:38


 


Temperature  99.0 F 


 


Pulse Rate  81 


 


Respiratory 18 18 18





Rate   


 


Blood Pressure  169/51 





(mmHg)   


 


O2 Sat by Pulse  96 





Oximetry   














  09/10/17 09/10/17 09/10/17





  14:46 15:21 16:00


 


Temperature  98.6 F 


 


Pulse Rate  108 


 


Respiratory 16 18 





Rate   


 


Blood Pressure  146/44 





(mmHg)   


 


O2 Sat by Pulse  97 97





Oximetry   














  09/10/17 09/10/17 09/10/17





  16:46 20:01 20:37


 


Temperature  99.4 F 


 


Pulse Rate  100 


 


Respiratory 16 16 15





Rate   


 


Blood Pressure  205/88 





(mmHg)   


 


O2 Sat by Pulse  97 





Oximetry   














  09/10/17 09/10/17 09/10/17





  20:38 22:37 23:31


 


Temperature   


 


Pulse Rate 102  


 


Respiratory  15 15





Rate   


 


Blood Pressure 179/65  





(mmHg)   


 


O2 Sat by Pulse   





Oximetry   














  09/10/17 09/11/17 09/11/17





  23:37 01:31 03:13


 


Temperature 98.4 F  98.8 F


 


Pulse Rate 86  75


 


Respiratory 16 14 16





Rate   


 


Blood Pressure 160/65  190/75





(mmHg)   


 


O2 Sat by Pulse 97  97





Oximetry   














  09/11/17 09/11/17





  04:15 08:00


 


Temperature 98.5 F 


 


Pulse Rate 71 


 


Respiratory 16 16





Rate  


 


Blood Pressure 145/46 





(mmHg)  


 


O2 Sat by Pulse 97 97





Oximetry  











Oxygen Devices in Use Now: None


Appearance: Older male patient, lying in bed, in NAD


Eyes: No Scleral Icterus


Ears/Nose/Mouth/Throat: Clear Oropharnyx, - - oral mucosa somewhat dry


Neck: NL Appearance and Movements; NL JVP


Respiratory: Symmetrical Chest Expansion and Respiratory Effort, Clear to 

Auscultation


Cardiovascular: NL Sounds; No Murmurs; No JVD, RRR


Abdominal: NL Sounds; No Tenderness; No Distention


Extremities: No Clubbing, Cyanosis, - - trace LE edema


Skin: - - sacral wound with wound vac intact; serous drainage noted in wound 

vac collection container, no foul order or surrounding erythema noted


Neurological: - - Alert, oriented x 3 but forgetful. Paraplegia - BLE


Lines/Tubes/Other Access: Clean, Dry and Intact Peripheral IV


Nutrition: Taking PO's


Result Diagrams: 


 09/11/17 08:16





 09/11/17 08:16


Additional Lab and Data: 





 


 


 Laboratory Results - last 24 hr











  09/08/17 09/08/17 09/08/17





  09:49 09:49 09:49


 


WBC    11.3 H


 


RBC    4.16


 


Hgb    10.8 L


 


Hct    33 L


 


MCV    80


 


MCH    26 L


 


MCHC    33


 


RDW    17 H


 


Plt Count    483 H D


 


MPV    7 L


 


Neut % (Auto)    67.5


 


Lymph % (Auto)    19.1 L


 


Mono % (Auto)    9.6 H


 


Eos % (Auto)    2.6


 


Baso % (Auto)    1.2


 


Absolute Neuts (auto)    7.7


 


Absolute Lymphs (auto)    2.2


 


Absolute Monos (auto)    1.1 H


 


Absolute Eos (auto)    0.3


 


Absolute Basos (auto)    0.1


 


Absolute Nucleated RBC    0.01


 


Nucleated RBC %    0.1


 


Sodium  143  


 


Potassium  3.7  


 


Chloride  110  


 


Carbon Dioxide  25  


 


Anion Gap  8  


 


BUN  22  


 


Creatinine  1.35 H  


 


Est GFR ( Amer)  67.0  


 


Est GFR (Non-Af Amer)  52.1  


 


BUN/Creatinine Ratio  16.3  


 


Glucose  138 H  


 


Calcium  10.3  


 


Vancomycin Trough   24.4 

















Microbiology and Other Data: 


 





 Microbiology











 09/05/17 12:52 Wound Gram Stain - Final





 Tissue - Other Tissue Culture - Preliminary





    Enterococcus Faecalis





    Bacteroides Vulgatus





 Skin and Soft Tissue MRSA/MSSA (PCR - Final





    Mrsa Negative





    S.aureus Negative

















Assess/Plan/Problems-Billing


Assessment: 





Mr. Pryor is a 73 yo male with PMH significant for ependymoma s/p 4 surgeries 

and spinal fractures after fall c/b progressive paraplegia and debilitating 

parasthesias, neurogenic bladder with chronic indwelling london, HTN, GERD, BONIFACIO 

with BiPAP, CAD, HLD and BPH with recent diarrhea since tx with cipro for UTI, 

who presented to the emergency room with sepsis and stage IV sacral decubitus 

ulcer.  On zosyn. s/p vancomycin for suspected HCAP, recent CT chest w/ 

atelectasis. s/p debridement and now with wound vac w/ saline infusion. Cdiff 

was positive but ID thinks false positive given character of stool. Low grade 

fevers. 





- Patient Problems


(1) Sacral decubitus ulcer, stage IV


Code(s): L89.154 - PRESSURE ULCER OF SACRAL REGION, STAGE 4   Comment: 


s/p OR sharp debridement 9/5 and bedside debridement 9/2


Wound consult appreciated. Wound vac applied w/ "veriflow" saline washes (

placed 9/7) Wound appears to be improving, per re-eval 9/10


Appreciate surgery consult


Plastic surgery Dr. Phillips consulted, will talk to surgery. However, pt will 

likely need to go to [place] or [facility] for wound flap.


Appreciate ID consult - bacteroides, clostridium, and E. faecalis from wound cx


Continue Zosyn, likely multiweek course for potential osteo.


PICC placed.


Appreciate nutrition recommendations to support healing.   





(2) Sepsis


Comment: 


Resolved. 


Meets SIRS criteria on admission (tachycardia, tachypnea, fever, leukocytosis)


Meets SOFA criteria on admission with qSOFA of 2 for low SBP, tachypnea.   





(3) Dehydration


Code(s): E86.0 - DEHYDRATION   Comment: 


Patient still appears to be somewhat dry, given oral mucosa


Labs with concern for elevated HH, thrombocytosis, calcium, creatinine


Continue to hold furosemide, give additional liter fluid today.   





(4) Diarrhea


Code(s): R19.7 - DIARRHEA, UNSPECIFIED   Comment: 


Persistent diarrhea ~6 weeks after ciprofloxacin course for UTI. 


Prior to that was never an issue and patient was not incontinent of stool. 


Given sepsis presentation and cont low grade fevers C-diff was checked and 

postive. 


ID thinks false positive, stopped metronidazole. 


Stool is improving, repeat C. diff PCR sent.


Continue probiotic.


   





(5) CAD (coronary artery disease)


Code(s): I25.10 - ATHSCL HEART DISEASE OF NATIVE CORONARY ARTERY W/O ANG PCTRS 

  Comment: 


Asymptomatic


Continue ASA.   





(6) Pneumonia


Code(s): J18.9 - PNEUMONIA, UNSPECIFIED ORGANISM   Comment: 


Suspicion for HCAP, secondary to SOB and CXR R>L infiltrates. 


S/p 3 days vancomycin (already on Zosyn) but stopped 9/8 after CT chest more 

consistent with atelectasis. 


Appreciate ID consult.  


   





(7) CKD (chronic kidney disease), stage III


Code(s): N18.3 - CHRONIC KIDNEY DISEASE, STAGE 3 (MODERATE)   Comment: 


Creatinine within baseline


Baseline creatinine appears to be between 1.3 and 1.5


Avoid nephrotoxic medications   





(8) Chronic back pain


Code(s): M54.9 - DORSALGIA, UNSPECIFIED; G89.29 - OTHER CHRONIC PAIN   Comment: 


Secondary to ependymoma


Continue oxycodone, pregabalin, and acetaminophen.   





(9) HTN (hypertension)


Code(s): I10 - ESSENTIAL (PRIMARY) HYPERTENSION   Comment: 


Somewhat hypertensive


Furosemide held with concern for dehydration


Continue amlodipine


Prn hydralazine for SBP >180


Resume furosemide when patient hemodynamically stable   





(10) BONIFACIO (obstructive sleep apnea)


Code(s): G47.33 - OBSTRUCTIVE SLEEP APNEA (ADULT) (PEDIATRIC)   Comment: 


Continue home BiPAP use.   





(11) Paraplegia


Code(s): G82.20 - PARAPLEGIA, UNSPECIFIED   Comment: 


Secondary to ependymoma


Cont with skin precautions - frequent turning, skin care. 


Cont pain management


Plan for spinal stimulator implantation with Dr. Huerta ([place]) 

eventually   





(12) Anemia


Code(s): D64.9 - ANEMIA, UNSPECIFIED   Comment: 


Improved, s/p 1 unit PRBC 9/3


Hx of h pyrlori infection last year s/p treatment


No s/s of bleeding


Continue iron supplementation, B12, folate. 


Likely anemia of chronic disease, with elevated ferritin 365, iron 29 last 

month.


Stool occult negative 9/3   





(13) Ependymoma


Code(s): C71.9 - MALIGNANT NEOPLASM OF BRAIN, UNSPECIFIED   Comment: 


To the spine (dx in patient's 20s)


With concurrent chronic back pain and inability to ambulate


S/p multiple surgeries   





(14) DVT prophylaxis


Comment: 


Lovenox   





(15) Full code status


Code(s): Z78.9 - OTHER SPECIFIED HEALTH STATUS   


Status and Disposition: 





Inpatient, surgical floor. Discharge planning in progress; wife to meet with 

Dr. Reynoso regarding PMRU admission. Doesn't feel she can manage him at home 

by herself currently. Refusing nursing home placement.

## 2017-09-12 ENCOUNTER — HOSPITAL ENCOUNTER (INPATIENT)
Dept: HOSPITAL 25 - PMRU | Age: 72
LOS: 9 days | Discharge: NURSING, REHAB OR LONG-TERM CARE | DRG: 570 | End: 2017-09-21
Attending: PHYSICAL MEDICINE & REHABILITATION | Admitting: PHYSICAL MEDICINE & REHABILITATION
Payer: MEDICARE

## 2017-09-12 DIAGNOSIS — Z88.8: ICD-10-CM

## 2017-09-12 DIAGNOSIS — C41.2: ICD-10-CM

## 2017-09-12 DIAGNOSIS — K21.9: ICD-10-CM

## 2017-09-12 DIAGNOSIS — M46.28: ICD-10-CM

## 2017-09-12 DIAGNOSIS — Z79.82: ICD-10-CM

## 2017-09-12 DIAGNOSIS — G82.20: ICD-10-CM

## 2017-09-12 DIAGNOSIS — G47.33: ICD-10-CM

## 2017-09-12 DIAGNOSIS — L89.153: Primary | ICD-10-CM

## 2017-09-12 DIAGNOSIS — L89.154: ICD-10-CM

## 2017-09-12 DIAGNOSIS — K59.2: ICD-10-CM

## 2017-09-12 DIAGNOSIS — B96.89: ICD-10-CM

## 2017-09-12 DIAGNOSIS — N31.9: ICD-10-CM

## 2017-09-12 DIAGNOSIS — J45.909: ICD-10-CM

## 2017-09-12 DIAGNOSIS — Z79.891: ICD-10-CM

## 2017-09-12 DIAGNOSIS — L08.9: ICD-10-CM

## 2017-09-12 DIAGNOSIS — I25.10: ICD-10-CM

## 2017-09-12 DIAGNOSIS — N18.3: ICD-10-CM

## 2017-09-12 DIAGNOSIS — R21: ICD-10-CM

## 2017-09-12 DIAGNOSIS — I13.10: ICD-10-CM

## 2017-09-12 DIAGNOSIS — Z79.899: ICD-10-CM

## 2017-09-12 DIAGNOSIS — R19.7: ICD-10-CM

## 2017-09-12 DIAGNOSIS — B96.6: ICD-10-CM

## 2017-09-12 DIAGNOSIS — B95.2: ICD-10-CM

## 2017-09-12 LAB
ANION GAP SERPL CALC-SCNC: 4 MMOL/L (ref 2–11)
BUN SERPL-MCNC: 29 MG/DL (ref 6–24)
BUN/CREAT SERPL: 17.8 (ref 8–20)
CALCIUM SERPL-MCNC: 9.6 MG/DL (ref 8.6–10.3)
CHLORIDE SERPL-SCNC: 108 MMOL/L (ref 101–111)
GLUCOSE SERPL-MCNC: 98 MG/DL (ref 70–100)
HCO3 SERPL-SCNC: 27 MMOL/L (ref 22–32)
HCT VFR BLD AUTO: 28 % (ref 42–52)
HGB BLD-MCNC: 9.3 G/DL (ref 14–18)
MCH RBC QN AUTO: 27 PG (ref 27–31)
MCHC RBC AUTO-ENTMCNC: 33 G/DL (ref 31–36)
MCV RBC AUTO: 81 FL (ref 80–94)
POTASSIUM SERPL-SCNC: 3.8 MMOL/L (ref 3.5–5)
RBC # BLD AUTO: 3.48 10^6/UL (ref 4–5.4)
SODIUM SERPL-SCNC: 139 MMOL/L (ref 133–145)
WBC # BLD AUTO: 9.9 10^3/UL (ref 3.5–10.8)

## 2017-09-12 PROCEDURE — 90686 IIV4 VACC NO PRSV 0.5 ML IM: CPT

## 2017-09-12 PROCEDURE — 80048 BASIC METABOLIC PNL TOTAL CA: CPT

## 2017-09-12 PROCEDURE — 36415 COLL VENOUS BLD VENIPUNCTURE: CPT

## 2017-09-12 PROCEDURE — 85025 COMPLETE CBC W/AUTO DIFF WBC: CPT

## 2017-09-12 PROCEDURE — 86140 C-REACTIVE PROTEIN: CPT

## 2017-09-12 PROCEDURE — 80053 COMPREHEN METABOLIC PANEL: CPT

## 2017-09-12 RX ADMIN — Medication SCH MG: at 09:17

## 2017-09-12 RX ADMIN — Medication SCH CAP: at 20:38

## 2017-09-12 RX ADMIN — Medication SCH ML: at 23:49

## 2017-09-12 RX ADMIN — WATER SCH NOTE: 100 INJECTION, SOLUTION INTRAVENOUS at 19:12

## 2017-09-12 RX ADMIN — Medication SCH ML: at 22:29

## 2017-09-12 RX ADMIN — Medication SCH UNITS: at 09:16

## 2017-09-12 RX ADMIN — OXYCODONE HYDROCHLORIDE PRN MG: 5 CAPSULE ORAL at 11:50

## 2017-09-12 RX ADMIN — Medication SCH CAP: at 09:17

## 2017-09-12 RX ADMIN — OXYCODONE HYDROCHLORIDE PRN MG: 5 CAPSULE ORAL at 22:27

## 2017-09-12 RX ADMIN — POTASSIUM CHLORIDE SCH MEQ: 750 TABLET, FILM COATED, EXTENDED RELEASE ORAL at 09:17

## 2017-09-12 RX ADMIN — PREGABALIN SCH MG: 50 CAPSULE ORAL at 09:16

## 2017-09-12 RX ADMIN — WATER SCH NOTE: 100 INJECTION, SOLUTION INTRAVENOUS at 02:47

## 2017-09-12 RX ADMIN — CYANOCOBALAMIN TAB 500 MCG SCH MCG: 500 TAB at 09:16

## 2017-09-12 RX ADMIN — FOLIC ACID SCH MG: 1 TABLET ORAL at 09:17

## 2017-09-12 RX ADMIN — PREGABALIN SCH MG: 50 CAPSULE ORAL at 20:09

## 2017-09-12 RX ADMIN — PREGABALIN SCH MG: 50 CAPSULE ORAL at 14:18

## 2017-09-12 RX ADMIN — Medication SCH ML: at 10:30

## 2017-09-12 RX ADMIN — Medication SCH ML: at 16:51

## 2017-09-12 RX ADMIN — ASPIRIN SCH MG: 81 TABLET, CHEWABLE ORAL at 09:17

## 2017-09-12 RX ADMIN — Medication SCH ML: at 11:08

## 2017-09-12 RX ADMIN — OMEPRAZOLE SCH MG: 20 CAPSULE, DELAYED RELEASE ORAL at 07:09

## 2017-09-12 RX ADMIN — Medication SCH MG: at 20:09

## 2017-09-12 RX ADMIN — ENOXAPARIN SODIUM SCH MG: 40 INJECTION SUBCUTANEOUS at 20:10

## 2017-09-12 RX ADMIN — WATER SCH NOTE: 100 INJECTION, SOLUTION INTRAVENOUS at 07:14

## 2017-09-12 RX ADMIN — Medication SCH ML: at 05:40

## 2017-09-12 NOTE — PN
Subjective


Date of Service: 09/12/17


Interval History: 





Patient seen and examined at bedside.


Mr. Pryor is participating with OT in AM care.


Denies any significant pain, CP, SOB. No n/v overnight.


Discussed dc planning with wife. Possibility for PMRU admission, depending on PT

/OT evals this AM.





Family History: Unchanged from Admission


Social History: Unchanged from Admission


Past Medical History: Unchanged from Admission





Objective


Active Medications: 








Acetaminophen (Tylenol Tab*)  650 mg PO Q4H PRN


   PRN Reason: PAIN


   Last Admin: 09/07/17 21:41 Dose:  650 mg


Albuterol/Ipratropium (Duoneb (Albuterol 2.5 Mg/Ipratropium 0.5 Mg))  1 neb INH 

Q4H PRN


   PRN Reason: SOB/WHEEZING


   Last Admin: 09/06/17 17:55 Dose:  1 neb


Aspirin (Aspirin Low Dose Tab*)  81 mg PO DAILY [facility]


   Last Admin: 09/11/17 09:29 Dose:  81 mg


Cholecalciferol (Vitamin D Tab*)  5,000 units PO DAILY [facility]


   Last Admin: 09/11/17 09:29 Dose:  5,000 units


Cyanocobalamin (Vitamin B12 Tab*)  1,000 mcg PO DAILY [facility]


   Last Admin: 09/11/17 09:29 Dose:  1,000 mcg


Enoxaparin Sodium (Lovenox(*))  40 mg SUBCUT Q24H [facility]


   Last Admin: 09/11/17 20:33 Dose:  40 mg


Ferrous Gluconate (Fergon Tab*)  324 mg PO BID [facility]


   Last Admin: 09/11/17 20:33 Dose:  324 mg


Folic Acid (Folvite Tab*)  0.5 mg PO DAILY [facility]


   Last Admin: 09/11/17 09:30 Dose:  0.5 mg


Heparin Sodium (Porcine) (Heparin Flush Picc/Ml/Cvc(*))  1 - 3 ml FLUSH 0600,

1800 [facility]


   PRN Reason: Protocol


   Last Admin: 09/12/17 05:40 Dose:  1 ml


Hydralazine HCl (Apresoline Iv*)  10 mg IV Q4H PRN


   PRN Reason: Systolic >170


   Last Admin: 09/11/17 03:23 Dose:  10 mg


Piperacillin Sod/Tazobactam (Sod 3.375 gm/ Sodium Chloride)  100 mls @ 200 mls/

hr IVPB Q6H [facility]


   Last Admin: 09/12/17 03:09 Dose:  200 mls/hr


Lactobacillus Rhamnosus (Culturelle*)  1 cap PO BID [facility]


   Last Admin: 09/11/17 20:33 Dose:  1 cap


Omeprazole (Prilosec Cap*)  40 mg PO DAILY@0730 [facility]


   Last Admin: 09/12/17 07:09 Dose:  40 mg


Ondansetron HCl (Zofran Inj*)  4 mg IV Q6H PRN


   PRN Reason: NAUSEA


   Last Admin: 09/08/17 22:26 Dose:  4 mg


Oxycodone HCl (Roxycodone Tab*)  5 mg PO Q4H PRN


   PRN Reason: PAIN


   Last Admin: 09/11/17 20:32 Dose:  5 mg


Pharmacy Profile Note (Fentanyl Patch Check Q Shift)  0 note N/A 0700,1900 [facility]


   Last Admin: 09/12/17 07:14 Dose:  1 note


Potassium Chloride (Klor Con Er Tab*)  10 meq PO DAILY [facility]


   Last Admin: 09/11/17 09:29 Dose:  10 meq


Pregabalin (Lyrica Cap(*))  150 mg PO TID [facility]


   Last Admin: 09/11/17 20:32 Dose:  150 mg








 Vital Signs











  09/11/17 09/11/17 09/11/17





  09:29 11:12 11:29


 


Temperature  98.7 F 


 


Pulse Rate  90 


 


Respiratory 18 16 14





Rate   


 


Blood Pressure  143/39 





(mmHg)   


 


O2 Sat by Pulse  98 





Oximetry   














  09/11/17 09/11/17 09/11/17





  14:31 15:43 16:00


 


Temperature  99.3 F 


 


Pulse Rate  88 


 


Respiratory 16 16 





Rate   


 


Blood Pressure  145/54 





(mmHg)   


 


O2 Sat by Pulse  95 95





Oximetry   














  09/11/17 09/11/17 09/11/17





  16:31 19:58 20:00


 


Temperature  99.6 F 


 


Pulse Rate  96 


 


Respiratory 16 18 18





Rate   


 


Blood Pressure  131/58 





(mmHg)   


 


O2 Sat by Pulse  97 





Oximetry   














  09/11/17 09/11/17 09/11/17





  20:32 22:32 23:30


 


Temperature   98.7 F


 


Pulse Rate   88


 


Respiratory 18 14 14





Rate   


 


Blood Pressure   161/69





(mmHg)   


 


O2 Sat by Pulse   99





Oximetry   














  09/12/17 09/12/17 09/12/17





  03:47 04:22 08:26


 


Temperature 97.8 F  97.9 F


 


Pulse Rate 70 88 79


 


Respiratory 16 16 16





Rate   


 


Blood Pressure 138/56  147/51





(mmHg)   


 


O2 Sat by Pulse 98 99 100





Oximetry   











Oxygen Devices in Use Now: None


Appearance: Male patient, lying in bed, NAD


Eyes: No Scleral Icterus


Ears/Nose/Mouth/Throat: Mucous Membranes Moist


Neck: NL Appearance and Movements; NL JVP


Respiratory: Symmetrical Chest Expansion and Respiratory Effort, Clear to 

Auscultation


Cardiovascular: NL Sounds; No Murmurs; No JVD, RRR


Abdominal: NL Sounds; No Tenderness; No Distention


Extremities: No Edema


Skin: - - Wound vac in place with serous drainage


Neurological: Alert and Oriented x 3


Lines/Tubes/Other Access: Clean, Dry and Intact Peripheral IV


Nutrition: Taking PO's


Result Diagrams: 


 09/12/17 05:47





 09/12/17 05:47


Additional Lab and Data: 





 


 


 Laboratory Results - last 24 hr











  09/08/17 09/08/17 09/08/17





  09:49 09:49 09:49


 


WBC    11.3 H


 


RBC    4.16


 


Hgb    10.8 L


 


Hct    33 L


 


MCV    80


 


MCH    26 L


 


MCHC    33


 


RDW    17 H


 


Plt Count    483 H D


 


MPV    7 L


 


Neut % (Auto)    67.5


 


Lymph % (Auto)    19.1 L


 


Mono % (Auto)    9.6 H


 


Eos % (Auto)    2.6


 


Baso % (Auto)    1.2


 


Absolute Neuts (auto)    7.7


 


Absolute Lymphs (auto)    2.2


 


Absolute Monos (auto)    1.1 H


 


Absolute Eos (auto)    0.3


 


Absolute Basos (auto)    0.1


 


Absolute Nucleated RBC    0.01


 


Nucleated RBC %    0.1


 


Sodium  143  


 


Potassium  3.7  


 


Chloride  110  


 


Carbon Dioxide  25  


 


Anion Gap  8  


 


BUN  22  


 


Creatinine  1.35 H  


 


Est GFR ( Amer)  67.0  


 


Est GFR (Non-Af Amer)  52.1  


 


BUN/Creatinine Ratio  16.3  


 


Glucose  138 H  


 


Calcium  10.3  


 


Vancomycin Trough   24.4 

















Microbiology and Other Data: 


 





 Microbiology











 09/05/17 12:52 Wound Gram Stain - Final





 Tissue - Other Tissue Culture - Preliminary





    Enterococcus Faecalis





    Bacteroides Vulgatus





 Skin and Soft Tissue MRSA/MSSA (PCR - Final





    Mrsa Negative





    S.aureus Negative

















Assess/Plan/Problems-Billing


Assessment: 





Mr. Pryor is a 73 yo male with PMH significant for ependymoma s/p 4 surgeries 

and spinal fractures after fall c/b progressive paraplegia and debilitating 

parasthesias, neurogenic bladder with chronic indwelling london, HTN, GERD, BONIFACIO 

with BiPAP, CAD, HLD and BPH with recent diarrhea since tx with cipro for UTI, 

who presented to the emergency room with sepsis and stage IV sacral decubitus 

ulcer.  On zosyn. s/p vancomycin for suspected HCAP, recent CT chest w/ 

atelectasis. s/p debridement and now with wound vac w/ saline infusion. Cdiff 

was positive but ID thinks false positive given character of stool. Low grade 

fevers. 





- Patient Problems


(1) Sacral decubitus ulcer, stage IV


Code(s): L89.154 - PRESSURE ULCER OF SACRAL REGION, STAGE 4   Comment: 


s/p OR sharp debridement 9/5 and bedside debridement 9/2


Wound consult appreciated. Wound vac applied w/ "veriflow" saline washes (

placed 9/7) Wound appears to be improving, per re-eval 9/10


Appreciate surgery consult


Plastic surgery Dr. Phillips consulted, will talk to surgery. However, pt will 

likely need to go to [place] or [facility] in [place] for wound flap.


Appreciate ID consult - bacteroides, clostridium, and E. faecalis from wound cx


Continue Zosyn, likely multiweek course for potential osteo.


PICC placed.


Appreciate nutrition recommendations to support healing.   





(2) Sepsis


Comment: 


Resolved. 


Meets SIRS criteria on admission (tachycardia, tachypnea, fever, leukocytosis)


Meets SOFA criteria on admission with qSOFA of 2 for low SBP, tachypnea.   





(3) Dehydration


Code(s): E86.0 - DEHYDRATION   Comment: 


Resolving


Will hold furosemide


Resume tomorrow at daily dosing (previously BID)   





(4) Diarrhea


Code(s): R19.7 - DIARRHEA, UNSPECIFIED   Comment: 


Persistent diarrhea ~6 weeks after ciprofloxacin course for UTI. 


Prior to that was never an issue and patient was not incontinent of stool. 


Given sepsis presentation and cont low grade fevers C-diff was checked and 

postive. 


ID thinks false positive, stopped metronidazole. 


Stool is improving


Continue probiotic.


   





(5) CAD (coronary artery disease)


Code(s): I25.10 - ATHSCL HEART DISEASE OF NATIVE CORONARY ARTERY W/O ANG PCTRS 

  Comment: 


Asymptomatic


Continue ASA.   





(6) Pneumonia


Code(s): J18.9 - PNEUMONIA, UNSPECIFIED ORGANISM   Comment: 


Suspicion for HCAP, secondary to SOB and CXR R>L infiltrates. 


S/p 3 days vancomycin (already on Zosyn) but stopped 9/8 after CT chest more 

consistent with atelectasis. 


Appreciate ID consult.  


   





(7) CKD (chronic kidney disease), stage III


Code(s): N18.3 - CHRONIC KIDNEY DISEASE, STAGE 3 (MODERATE)   Comment: 


Creatinine within baseline


Baseline creatinine appears to be between 1.3 and 1.5


Avoid nephrotoxic medications   





(8) Chronic back pain


Code(s): M54.9 - DORSALGIA, UNSPECIFIED; G89.29 - OTHER CHRONIC PAIN   Comment: 


Secondary to ependymoma


Continue oxycodone, pregabalin, and acetaminophen.   





(9) HTN (hypertension)


Code(s): I10 - ESSENTIAL (PRIMARY) HYPERTENSION   Comment: 


Mostly normotensive


Continue amlodipine


Prn hydralazine for SBP >180


Resume furosemide when patient hemodynamically stable   





(10) BONIFACIO (obstructive sleep apnea)


Code(s): G47.33 - OBSTRUCTIVE SLEEP APNEA (ADULT) (PEDIATRIC)   Comment: 


Continue home BiPAP use.   





(11) Paraplegia


Code(s): G82.20 - PARAPLEGIA, UNSPECIFIED   Comment: 


Secondary to ependymoma


Cont with skin precautions - frequent turning, skin care. 


Cont pain management


Plan for spinal stimulator implantation with Dr. Huerta ([place]) 

eventually   





(12) Anemia


Code(s): D64.9 - ANEMIA, UNSPECIFIED   Comment: 


Improved, s/p 1 unit PRBC 9/3


Hx of h pyrlori infection last year s/p treatment


No s/s of bleeding


Continue iron supplementation, B12, folate. 


Likely anemia of chronic disease, with elevated ferritin 365, iron 29 last 

month.


Stool occult negative 9/3   





(13) Ependymoma


Code(s): C71.9 - MALIGNANT NEOPLASM OF BRAIN, UNSPECIFIED   Comment: 


To the spine (dx in patient's 20s)


With concurrent chronic back pain and inability to ambulate


S/p multiple surgeries   





(14) DVT prophylaxis


Comment: 


Lovenox   





(15) Full code status


Code(s): Z78.9 - OTHER SPECIFIED HEALTH STATUS   


Status and Disposition: 





Inpatient, surgical floor. Discharge planning in progress; wife to meet with 

Dr. Reynoso regarding PMRU admission. Doesn't feel she can manage him at home 

by herself currently. Refusing nursing home placement.

## 2017-09-13 VITALS — DIASTOLIC BLOOD PRESSURE: 60 MMHG | SYSTOLIC BLOOD PRESSURE: 186 MMHG

## 2017-09-13 LAB
HCT VFR BLD AUTO: 30 % (ref 42–52)
HGB BLD-MCNC: 9.9 G/DL (ref 14–18)

## 2017-09-13 PROCEDURE — 0JB70ZZ EXCISION OF BACK SUBCUTANEOUS TISSUE AND FASCIA, OPEN APPROACH: ICD-10-PCS | Performed by: SURGERY

## 2017-09-13 PROCEDURE — F08Z1ZZ DRESSING TECHNIQUES TREATMENT: ICD-10-PCS | Performed by: PHYSICAL MEDICINE & REHABILITATION

## 2017-09-13 PROCEDURE — F08Z0ZZ BATHING/SHOWERING TECHNIQUES TREATMENT: ICD-10-PCS | Performed by: PHYSICAL MEDICINE & REHABILITATION

## 2017-09-13 PROCEDURE — F07Z4ZZ WHEELCHAIR MOBILITY TREATMENT: ICD-10-PCS | Performed by: PHYSICAL MEDICINE & REHABILITATION

## 2017-09-13 PROCEDURE — 02HV33Z INSERTION OF INFUSION DEVICE INTO SUPERIOR VENA CAVA, PERCUTANEOUS APPROACH: ICD-10-PCS

## 2017-09-13 PROCEDURE — F07Z5ZZ BED MOBILITY TREATMENT: ICD-10-PCS | Performed by: PHYSICAL MEDICINE & REHABILITATION

## 2017-09-13 PROCEDURE — 2W15X6Z COMPRESSION OF BACK USING PRESSURE DRESSING: ICD-10-PCS | Performed by: SURGERY

## 2017-09-13 PROCEDURE — F07Z8ZZ TRANSFER TRAINING TREATMENT: ICD-10-PCS | Performed by: PHYSICAL MEDICINE & REHABILITATION

## 2017-09-13 PROCEDURE — F08Z3ZZ FEEDING/EATING TREATMENT: ICD-10-PCS | Performed by: PHYSICAL MEDICINE & REHABILITATION

## 2017-09-13 RX ADMIN — PREGABALIN SCH MG: 50 CAPSULE ORAL at 20:15

## 2017-09-13 RX ADMIN — ASPIRIN SCH MG: 81 TABLET, CHEWABLE ORAL at 08:24

## 2017-09-13 RX ADMIN — Medication SCH ML: at 04:15

## 2017-09-13 RX ADMIN — HYDRALAZINE HYDROCHLORIDE PRN MG: 20 INJECTION INTRAMUSCULAR; INTRAVENOUS at 14:15

## 2017-09-13 RX ADMIN — OMEPRAZOLE SCH MG: 20 CAPSULE, DELAYED RELEASE ORAL at 08:25

## 2017-09-13 RX ADMIN — Medication SCH ML: at 21:28

## 2017-09-13 RX ADMIN — Medication SCH CAP: at 21:28

## 2017-09-13 RX ADMIN — PREGABALIN SCH MG: 50 CAPSULE ORAL at 14:14

## 2017-09-13 RX ADMIN — OXYCODONE HYDROCHLORIDE PRN MG: 5 CAPSULE ORAL at 02:34

## 2017-09-13 RX ADMIN — FOLIC ACID SCH MG: 1 TABLET ORAL at 08:26

## 2017-09-13 RX ADMIN — Medication SCH MG: at 08:24

## 2017-09-13 RX ADMIN — POTASSIUM CHLORIDE SCH MEQ: 750 TABLET, FILM COATED, EXTENDED RELEASE ORAL at 08:25

## 2017-09-13 RX ADMIN — WATER SCH NOTE: 100 INJECTION, SOLUTION INTRAVENOUS at 23:12

## 2017-09-13 RX ADMIN — Medication SCH UNITS: at 08:25

## 2017-09-13 RX ADMIN — CYANOCOBALAMIN TAB 500 MCG SCH MCG: 500 TAB at 08:24

## 2017-09-13 RX ADMIN — Medication SCH CAP: at 08:24

## 2017-09-13 RX ADMIN — PREGABALIN SCH MG: 50 CAPSULE ORAL at 08:24

## 2017-09-13 NOTE — PN
Progress Note





- Progress Note


Date of Service: 09/13/17


SOAP: 


Subjective:


He is without complaint today








Objective:





 











Temp Pulse Resp BP Pulse Ox


 


 98.5 F   75   16   170/76   97 


 


 09/13/17 07:25  09/13/17 07:25  09/13/17 08:24  09/13/17 07:25  09/13/17 07:25








PEX:


Wound vac removed from sacral pressure ulcer.


Wound bed noted to have some dry and dessicated tissue at base overlying the mid

-line bony area and some necrotic fat more laterally that was debrided to 

healthy tissue.  There is bone exposed at the midline. No new tracking or 

abscess noted. No odor.








Assessment:


Sacral decubitus








Plan:


Continue Wound VAC


IV antibiotics


Transfer to rehab today


He is OK to sit up for periods of time in a chair for upper extremity exercises.

## 2017-09-13 NOTE — PN
Subjective


Date of Service: 09/13/17


Interval History: 





Patient seen and examined at bedside. Wife, Kimmy, is also at bedside.


Patient reportedly missed a Fentanyl patch change and became agitated overnight 

and pulled out his PICC line.


Fentanyl patch replaced and patient now reports good pain control.


Denies n/v. No other complaint. 





Family History: Unchanged from Admission


Social History: Unchanged from Admission


Past Medical History: Unchanged from Admission





Objective


Active Medications: 








Acetaminophen (Tylenol Tab*)  650 mg PO Q4H PRN


   PRN Reason: PAIN


   Last Admin: 09/07/17 21:41 Dose:  650 mg


Albuterol/Ipratropium (Duoneb (Albuterol 2.5 Mg/Ipratropium 0.5 Mg))  1 neb INH 

Q4H PRN


   PRN Reason: SOB/WHEEZING


   Last Admin: 09/06/17 17:55 Dose:  1 neb


Aspirin (Aspirin Low Dose Tab*)  [facility]ILY [facility]


   Last Admin: 09/13/17 08:24 Dose:  81 mg


Cholecalciferol (Vitamin D Tab*)  5,000 units PO DAILY [facility]


   Last Admin: 09/13/17 08:25 Dose:  5,000 units


Cyanocobalamin (Vitamin B12 Tab*)  1,000 mcg PO DAILY [facility]


   Last Admin: 09/13/17 08:24 Dose:  1,000 mcg


Enoxaparin Sodium (Lovenox(*))  40 mg SUBCUT Q24H [facility]


   Last Admin: 09/12/17 20:10 Dose:  40 mg


Fentanyl (Duragesic  Patch 75 Mcg/Hr*)  75 mcg TRANSDERM Q72H [facility]


   Last Admin: 09/12/17 20:35 Dose:  75 mcg


Ferrous Gluconate (Fergon Tab*)  324 mg PO BID [facility]


   Last Admin: 09/13/17 08:24 Dose:  324 mg


Folic Acid (Folvite Tab*)  0.5 mg PO DAILY [facility]


   Last Admin: 09/13/17 08:26 Dose:  0.5 mg


Furosemide (Lasix Tab*)  40 mg PO DAILY [facility]


   Last Admin: 09/13/17 08:25 Dose:  40 mg


Heparin Sodium (Porcine) (Heparin Flush Picc/Ml/Cvc(*))  1 - 3 ml FLUSH 0600,

1800 [facility]


   PRN Reason: Protocol


   Last Admin: 09/13/17 04:15 Dose:  1 ml


Hydralazine HCl (Apresoline Iv*)  10 mg IV Q4H PRN


   PRN Reason: Systolic >170


   Last Admin: 09/11/17 03:23 Dose:  10 mg


Piperacillin Sod/Tazobactam (Sod 3.375 gm/ Sodium Chloride)  100 mls @ 200 mls/

hr IVPB [facility]6H [facility]


   Last Admin: 09/13/17 02:35 Dose:  200 mls/hr


Lactobacillus Rhamnosus (Culturelle*)  1 cap PO BID [facility]


   Last Admin: 09/13/17 08:24 Dose:  1 cap


Omeprazole (Prilosec Cap*)  40 mg PO DAILY@0730 [facility]


   Last Admin: 09/13/17 08:25 Dose:  40 mg


Ondansetron HCl (Zofran Inj*)  4 mg IV Q6H PRN


   PRN Reason: NAUSEA


   Last Admin: 09/08/17 22:26 Dose:  4 mg


Oxycodone HCl (Roxycodone Tab*)  5 mg PO Q4H PRN


   PRN Reason: PAIN


   Last Admin: 09/13/17 02:34 Dose:  5 mg


Pharmacy Profile Note (Fentanyl Patch Check Q Shift)  1 note N/A 0700,1900 [facility]


Potassium Chloride (Klor Con Er Tab*)  10 meq PO DAILY [facility]


   Last Admin: 09/13/17 08:25 Dose:  10 meq


Pregabalin (Lyrica Cap(*))  150 mg PO TID [facility]


   Last Admin: 09/13/17 08:24 Dose:  150 mg








 Vital Signs











  09/12/17 09/12/17 09/12/17





  09:16 11:11 11:50


 


Temperature   


 


Pulse Rate   


 


Respiratory 16 16 16





Rate   


 


Blood Pressure   





(mmHg)   


 


O2 Sat by Pulse   





Oximetry   














  09/12/17 09/12/17 09/12/17





  12:00 13:50 14:18


 


Temperature 97.5 F  


 


Pulse Rate 72  


 


Respiratory 16 16 16





Rate   


 


Blood Pressure 138/50  





(mmHg)   


 


O2 Sat by Pulse 98  





Oximetry   














  09/12/17 09/12/17 09/12/17





  15:48 16:18 20:00


 


Temperature 99.9 F  


 


Pulse Rate 94  


 


Respiratory 14 16 16





Rate   


 


Blood Pressure 132/44  





(mmHg)   


 


O2 Sat by Pulse 96  





Oximetry   














  09/12/17 09/12/17 09/12/17





  20:09 20:15 22:09


 


Temperature  99.1 F 


 


Pulse Rate  91 


 


Respiratory 16 16 18





Rate   


 


Blood Pressure  163/55 





(mmHg)   


 


O2 Sat by Pulse  95 





Oximetry   














  09/12/17 09/12/17 09/13/17





  22:27 23:46 00:32


 


Temperature   99.4 F


 


Pulse Rate   80


 


Respiratory 18 16 18





Rate   


 


Blood Pressure   166/76





(mmHg)   


 


O2 Sat by Pulse   97





Oximetry   














  09/13/17 09/13/17 09/13/17





  00:46 02:34 02:40


 


Temperature   


 


Pulse Rate   80


 


Respiratory 16 18 16





Rate   


 


Blood Pressure   





(mmHg)   


 


O2 Sat by Pulse   97





Oximetry   














  09/13/17 09/13/17 09/13/17





  04:29 04:34 07:25


 


Temperature 98.5 F  98.5 F


 


Pulse Rate 79  75


 


Respiratory 18 18 16





Rate   


 


Blood Pressure 165/57  170/76





(mmHg)   


 


O2 Sat by Pulse 97  97





Oximetry   














  09/13/17





  08:24


 


Temperature 


 


Pulse Rate 


 


Respiratory 16





Rate 


 


Blood Pressure 





(mmHg) 


 


O2 Sat by Pulse 





Oximetry 











Oxygen Devices in Use Now: None


Appearance: Pleasant male patient, lying in bed, responding appropriately, NAD


Eyes: No Scleral Icterus


Ears/Nose/Mouth/Throat: Clear Oropharnyx, Mucous Membranes Moist


Neck: NL Appearance and Movements; NL JVP


Respiratory: Symmetrical Chest Expansion and Respiratory Effort, Clear to 

Auscultation


Cardiovascular: NL Sounds; No Murmurs; No JVD, RRR


Skin: - - Stage IV sacral ulcer with wound vac in place, serous drainage noted 

in wound vac collection container


Neurological: Alert and Oriented x 3


Nutrition: Taking PO's


Result Diagrams: 


 09/13/17 04:10





 09/12/17 05:47


Additional Lab and Data: 





 


 


 Laboratory Results - last 24 hr











  09/08/17 09/08/17 09/08/17





  09:49 09:49 09:49


 


WBC    11.3 H


 


RBC    4.16


 


Hgb    10.8 L


 


Hct    33 L


 


MCV    80


 


MCH    26 L


 


MCHC    33


 


RDW    17 H


 


Plt Count    483 H D


 


MPV    7 L


 


Neut % (Auto)    67.5


 


Lymph % (Auto)    19.1 L


 


Mono % (Auto)    9.6 H


 


Eos % (Auto)    2.6


 


Baso % (Auto)    1.2


 


Absolute Neuts (auto)    7.7


 


Absolute Lymphs (auto)    2.2


 


Absolute Monos (auto)    1.1 H


 


Absolute Eos (auto)    0.3


 


Absolute Basos (auto)    0.1


 


Absolute Nucleated RBC    0.01


 


Nucleated RBC %    0.1


 


Sodium  143  


 


Potassium  3.7  


 


Chloride  110  


 


Carbon Dioxide  25  


 


Anion Gap  8  


 


BUN  22  


 


Creatinine  1.35 H  


 


Est GFR ( Amer)  67.0  


 


Est GFR (Non-Af Amer)  52.1  


 


BUN/Creatinine Ratio  16.3  


 


Glucose  138 H  


 


Calcium  10.3  


 


Vancomycin Trough   24.4 

















Microbiology and Other Data: 


 





 Microbiology











 09/05/17 12:52 Wound Gram Stain - Final





 Tissue - Other Tissue Culture - Preliminary





    Enterococcus Faecalis





    Bacteroides Vulgatus





 Skin and Soft Tissue MRSA/MSSA (PCR - Final





    Mrsa Negative





    S.aureus Negative

















Assess/Plan/Problems-Billing


Assessment: 





Mr. Pryor is a 71 yo male with PMH significant for ependymoma s/p 4 surgeries 

and spinal fractures after fall c/b progressive paraplegia and debilitating 

parasthesias, neurogenic bladder with chronic indwelling london, HTN, GERD, BONIFACIO 

with BiPAP, CAD, HLD and BPH with recent diarrhea since tx with cipro for UTI, 

who presented to the emergency room with sepsis and stage IV sacral decubitus 

ulcer.  On zosyn. s/p vancomycin for suspected HCAP, recent CT chest w/ 

atelectasis. s/p debridement and now with wound vac w/ saline infusion. Cdiff 

was positive but ID thinks false positive given character of stool. Low grade 

fevers. 





- Patient Problems


(1) Sacral decubitus ulcer, stage IV


Code(s): L89.154 - PRESSURE ULCER OF SACRAL REGION, STAGE 4   Comment: 


s/p OR sharp debridement 9/5 and bedside debridement 9/2


Wound consult appreciated. Wound vac applied w/ "veriflow" saline washes (

placed 9/7) Wound appears to be improving


Appreciate surgery consult


Plastic surgery Dr. Phillips consulted, will talk to surgery. However, pt will 

likely need to go to [place] or [facility] for wound flap.


Appreciate ID consult - bacteroides, clostridium, and E. faecalis from wound cx


Continue Zosyn, likely multiweek course for potential osteo.


PICC placed.


Appreciate nutrition recommendations to support healing.   





(2) Sepsis


Comment: 


Resolved. 


Meets SIRS criteria on admission (tachycardia, tachypnea, fever, leukocytosis)


Meets SOFA criteria on admission with qSOFA of 2 for low SBP, tachypnea.   





(3) Diarrhea


Code(s): R19.7 - DIARRHEA, UNSPECIFIED   Comment: 


Persistent diarrhea ~6 weeks after ciprofloxacin course for UTI. 


Prior to that was never an issue and patient was not incontinent of stool. 


Given sepsis presentation and cont low grade fevers C-diff was checked and 

postive. 


ID thinks false positive, stopped metronidazole. 


Stool is improving


Continue probiotic.


   





(4) CAD (coronary artery disease)


Code(s): I25.10 - ATHSCL HEART DISEASE OF NATIVE CORONARY ARTERY W/O ANG PCTRS 

  Comment: 


Asymptomatic


Continue ASA.   





(5) CKD (chronic kidney disease), stage III


Code(s): N18.3 - CHRONIC KIDNEY DISEASE, STAGE 3 (MODERATE)   Comment: 


Creatinine within baseline


Baseline creatinine appears to be between 1.3 and 1.5


Avoid nephrotoxic medications   





(6) Chronic back pain


Code(s): M54.9 - DORSALGIA, UNSPECIFIED; G89.29 - OTHER CHRONIC PAIN   Comment: 


Secondary to ependymoma


Continue oxycodone, pregabalin, and acetaminophen.   





(7) HTN (hypertension)


Code(s): I10 - ESSENTIAL (PRIMARY) HYPERTENSION   Comment: 


Mostly normotensive


Continue amlodipine


Prn hydralazine for SBP >180


Resume furosemide, dosing decreased from BID to daily - monitor for dehydration

   





(8) BONIFACIO (obstructive sleep apnea)


Code(s): G47.33 - OBSTRUCTIVE SLEEP APNEA (ADULT) (PEDIATRIC)   Comment: 


Continue home BiPAP use.   





(9) Paraplegia


Code(s): G82.20 - PARAPLEGIA, UNSPECIFIED   Comment: 


Secondary to ependymoma


Cont with skin precautions - frequent turning, skin care. 


Cont pain management


Plan for spinal stimulator implantation with Dr. Huerta ([place]) 

eventually   





(10) Anemia


Code(s): D64.9 - ANEMIA, UNSPECIFIED   Comment: 


Stable HH


s/p 1 unit PRBC 9/3


Hx of h pyrlori infection last year s/p treatment


No s/s of bleeding


Continue iron supplementation, B12, folate. 


Likely anemia of chronic disease, with elevated ferritin 365, iron 29 last 

month.


Stool occult negative 9/3   





(11) Ependymoma


Code(s): C71.9 - MALIGNANT NEOPLASM OF BRAIN, UNSPECIFIED   Comment: 


To the spine (dx in patient's 20s)


With concurrent chronic back pain and inability to ambulate


S/p multiple surgeries   





(12) DVT prophylaxis


Comment: 


Lovenox   





(13) Full code status


Code(s): Z78.9 - OTHER SPECIFIED HEALTH STATUS   


Status and Disposition: 





Inpatient, surgical floor. Plan for dc to [facility] pending approval.

## 2017-09-13 NOTE — HP
ADMISSION HISTORY AND PHYSICAL:

 

DATE OF ADMISSION:  09/13/17

 

REASON FOR ADMISSION:  Sepsis secondary to an infected decubitus ulcer in a 
paraplegic.

 

HISTORY OF PRESENT ILLNESS:  Jose Pryor is a 72-year-old man.  He 
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 
was originally diagnosed in 1971.  He had debulking surgeries done many years 
ago.  He had 2 debulking surgeries.  Radiation and chemotherapy were tried, 
which did not help.  He then had 2 subsequent debulking surgeries.  He has been 
known to me since 2012.  He has paraplegia secondary to the tumor.  When I 
first met him in 2012, he was able to walk, but over the years, has become less 
and less ambulatory as the tumor has progressed.  He has significant pain in 
his back, which radiates down both legs.  This past summer, his pain has gotten 
significantly worse.  I had seen him in late June and had increased his pain 
medications.  Unfortunately, the increase in pain medications left him confused 
and the patient stopped catheterizing himself.  He went into acute renal 
failure and was hospitalized over July 4th weekend.  His medications were 
adjusted and he had an indwelling Rodriguez shortly thereafter.  The patient has 
had a trial of a spinal cord stimulator, which was effective.  The permanent 
pacemaker was supposed to be placed, but because of his illnesses this summer, 
he was not able to get to [place] to have the implantable stim done.  The 
patient has seen multiple pain clinics including the one in [place], as well as 
Dr. Palma, and the pain clinic in [place]. When he last saw me on 08/23/17, 
he was still having significant pain and we decided to try him on a Duragesic 
patch.  We initially tried him on 25 mcg an hour.  His primary care doctor then 
doubled the dose to 50 mcg an hour.  He was then increased again to 75 mcg an 
hour.  This seemed to provide him some level of relief.  The patient, however, 
developed a bedsore in early August 2017.  The bedsore increased in size as he 
became more bedridden when it became very very painful for him to transfer out 
of bed.  The patient was brought to the hospital acutely on 09/02/17 by his 
wife when he was febrile and confused.  The wife was having trouble keeping the 
bedsore clean as the patient was having diarrhea at home.  The patient was 
brought to the emergency room on 09/02/17 and was admitted with probable sepsis
, which was felt to be secondary to an infected decubitus ulcer.  The patient 
was admitted to [facility] and started on broad-spectrum 
antibiotics.  He was seen by the surgeons as well as by Dr. Aaron Darby.  
The surgeons debrided the wound on 3 separate occasions.  A wound VAC was 
placed.  The patient was positive for C. difficile on the acute medical service
, but Dr. Darby felt that it was a false positive given that his stools were 
becoming more formed.  Dr. Darby recommended decreasing the Zosyn to every 8 
hours and stopping his IV Flagyl.  The patient was weak and given the course of 
the past 2 weeks and lost a great deal of strength in his upper body and was 
not able to transfer him out of bed.  He is now being admitted to inpatient 
rehab to work on transfers and sitting balance so they might go home.  His wife 
will be trained in helping manage the patient in bed, in keeping his bottom 
clean.

 

PAST MEDICAL HISTORY:  As mentioned is significant for the ependymoma.  He also 
has hypertension, gastro-esophageal reflux disease, obstructive sleep apnea, 
coronary artery disease, chronic kidney disease stage 3.  He has had carotid 
endarterectomy on the right side in 2015.  He is paraplegic with neurogenic 
bowel and bladder as a result of his tumor.

 

CURRENT MEDICATIONS:  Include:

1.  Aspirin a day.

2.  He is on Culturelle.

3.  Duragesic patch.

4.  He is on folic acid.

5.  Lasix.

6.  Klor-Con.

7.  Lovenox.

8.  Lyrica.

9.  Prilosec.

10.  Zosyn.

11.  He is also on oxycodone for pain.

 

ALLERGIES:  To LIPITOR.

 

SOCIAL HISTORY:  He is a nonsmoker, nondrinker.  Lives with his wife in a one-
story house.  His wife does most of the caregiving, but Raad prior to 
admission was independent in transferring.  He has a wheelchair at home as well 
as a Roho cushion and a gel cushion.  They were planning to get an electric 
wheelchair.

 

REVIEW OF SYSTEMS:  The patient reports no current shortness of breath or chest 
pain.

 

                               PHYSICAL EXAMINATION

 

VITAL SIGNS:  The patient's temperature is 100.1, blood pressure is 159/64, 
pulse is 88, respirations 16.

 

HEENT:  His extraocular movements are intact.  Tongue is midline.

 

NECK:  Supple.

 

LUNGS:  Sound clear to auscultation bilaterally.

 

HEART:  Sounds are regular.  S1 and S2 audible.

 

ABDOMEN:  Soft and nontender.

 

BACK:  His back was examined.  He has a wound VAC in place over his buttock 
region.

 

EXTREMITIES:  They have decreased tone in his lower extremities.  Upper 
extremities have normal muscle, bulk and tone.  Peripheral pulses are intact.

 

NEUROLOGIC:  He is awake, alert and oriented. Muscle strength in his upper 
extremities is about 4+/5. Lower extremities is trace to 0.

 

FUNCTIONAL EXAM:  The patient transfers independent.

 

 ASSESSMENT:  Paraplegia with superimposed weakness secondary to sepsis from an 
infected decubitus ulcer.

 

PLAN:  Integrate him into a comprehensive and therapeutic rehab program with 
the following goals:

 

1.  Physical Therapy will see the patient.  They are going to work on 
functional transfer training, wheelchair mobilities and train the wife on use 
of a Bill.

2.  Occupational Therapy will see the patient, work on his activities of daily 
living, upper body strengthening and again training the wife in activities of 
daily living and keeping his bottom clean.

3.  Lovenox for DVT prophylaxis.

4.  Adequate analgesia, we are going to continue his fentanyl as well as his 
p.r.n. oxycodone.

5.  Continue the Lyrica for neuropathic pain.

6.  Continue IV Zosyn for now.

7.  ID followup as indicated.

8.   will be closely involved to make sure any equipment and 
home IV antibiotics were in place prior to discharge.

9.  Training of the wife and other nursing needs such as changing Rodriguez 
catheter and flushing PICC line.

10.  Advance directives:  The patient's wife is his surrogate decision maker.  
He is a full code.

11.  Home with appropriate services.

 

ESTIMATED LENGTH OF STAY:  14 days.

 

[id number]/CPS #: [id number]

[facility]

## 2017-09-14 RX ADMIN — PREGABALIN SCH MG: 50 CAPSULE ORAL at 09:21

## 2017-09-14 RX ADMIN — OMEPRAZOLE SCH MG: 20 CAPSULE, DELAYED RELEASE ORAL at 06:44

## 2017-09-14 RX ADMIN — WATER SCH NOTE: 100 INJECTION, SOLUTION INTRAVENOUS at 15:38

## 2017-09-14 RX ADMIN — CYANOCOBALAMIN TAB 500 MCG SCH MCG: 500 TAB at 09:21

## 2017-09-14 RX ADMIN — FOLIC ACID SCH MG: 1 TABLET ORAL at 09:22

## 2017-09-14 RX ADMIN — Medication SCH ML: at 19:13

## 2017-09-14 RX ADMIN — FUROSEMIDE SCH MG: 40 TABLET ORAL at 09:21

## 2017-09-14 RX ADMIN — WATER SCH NOTE: 100 INJECTION, SOLUTION INTRAVENOUS at 07:04

## 2017-09-14 RX ADMIN — Medication SCH CAP: at 20:52

## 2017-09-14 RX ADMIN — WATER SCH NOTE: 100 INJECTION, SOLUTION INTRAVENOUS at 22:58

## 2017-09-14 RX ADMIN — Medication SCH MG: at 09:21

## 2017-09-14 RX ADMIN — Medication SCH ML: at 01:39

## 2017-09-14 RX ADMIN — METRONIDAZOLE SCH MLS/HR: 5 INJECTION, SOLUTION INTRAVENOUS at 17:59

## 2017-09-14 RX ADMIN — Medication SCH UNITS: at 09:21

## 2017-09-14 RX ADMIN — POTASSIUM CHLORIDE SCH MEQ: 750 TABLET, FILM COATED, EXTENDED RELEASE ORAL at 09:22

## 2017-09-14 RX ADMIN — Medication SCH MG: at 20:51

## 2017-09-14 RX ADMIN — PREGABALIN SCH MG: 50 CAPSULE ORAL at 14:23

## 2017-09-14 RX ADMIN — Medication SCH CAP: at 09:22

## 2017-09-14 RX ADMIN — Medication SCH: at 05:47

## 2017-09-14 RX ADMIN — ENOXAPARIN SODIUM SCH MG: 40 INJECTION SUBCUTANEOUS at 09:19

## 2017-09-14 RX ADMIN — ASPIRIN SCH MG: 81 TABLET, COATED ORAL at 09:22

## 2017-09-14 RX ADMIN — PREGABALIN SCH MG: 50 CAPSULE ORAL at 20:51

## 2017-09-14 NOTE — DS
CC:  Dr. Vicente *

 

MEDICINE DISCHARGE SUMMARY:

 

DATE OF ADMISSION:  09/02/17

 

DATE OF DISCHARGE:  09/13/17

 

PRIMARY CARE PHYSICIAN:  Dr. Vicente.

 

PROVIDER:  Fern Marino NP

 

ATTENDING PHYSICIAN:  Marj Mcclain MD *(as dictated by Fern Marino NP
).

 

CONSULTING PHYSICIANS:

1.  Dr. Alberto Holley, Surgery.

2.  Dr. Araon Darby, Infectious Disease.

3.  Dr. Luis Felipe Reynoso, Physiatry.

 

PRIMARY DISCHARGE DIAGNOSES:

1.  Stage 4 decubitus ulcer.

2.  Sepsis.

3.  Persistent diarrhea.

 

SECONDARY DISCHARGE DIAGNOSES:

1.  History of ependymoma, status post multiple surgeries.

2.  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

3.  Neurogenic bladder.

4.  Chronic kidney disease, stage 3.

5.  Hypertension.

6.  Gastroesophageal reflux disease.

7.  Obstructive sleep apnea, on BiPAP.

8.  Coronary artery disease.

9.  Hyperlipidemia.

10.  Benign prostatic hypertrophy.

11.  Asthma.

 

CURRENT MEDICATIONS:

1.  Tylenol 650 mg q.4 hours p.r.n.

2.  DuoNebs 1 nebulizer treatment q.4 hours p.r.n.

3.  Aspirin 81 mg daily.

4.  Cholecalciferol 5000 units daily.

5.  Cyanocobalamin 1000 mcg daily.

6.  Lovenox 40 mg q.24 hours for DVT prophylaxis.

7.  Fentanyl 75 mcg transdermally q.72 hours.

8.  Ferrous gluconate 324 mg b.i.d.

9.  Folic acid 0.5 mg daily.

10.  Furosemide 40 mg daily.

11.  Heparin flushes per protocol for PICC line.

12.  Hydralazine 10 mg q.4 hours IV p.r.n. blood pressure.

13.  Culturelle 1 capsule b.i.d.

14.  Omeprazole 40 mg daily.

15.  Ondansetron 4 mg q.6 hours p.r.n.

16.  Oxycodone 5 mg q.4 hours p.r.n.

17.  Zosyn 3.375 g q.6 hours.

18.  Potassium chloride 10 mEq daily.

19.  Pregabalin 150 mg b.i.d.

 

NOTE:  These are medications patient received in the hospital.

 

HOSPITAL COURSE OF STAY:  For full details, please refer to the H and P 
provided by Dr. Harvey on 09/02/17.  In summary, Mr. Pryor is a 72-year-old male 
who has a history as previously indicated.  The patient is wheelchair bound.  
The patient has had recurrent diarrhea at home and was recently treated in the 
hospital from August 7th to 9th with UTI and pneumonia.  The patient went home 
with persistent diarrhea, came back to the hospital with concern for confusion 
and noted worsening wound to the sacrum.  Upon admission, the patient was noted 
to be septic and was started on antibiotic therapy.  Surgery was consulted for 
the patient's sacral wound, which had concern secondary to the extensive size 
and depth of the wound.  He was taken to the OR for debridement and was started 
on a wound VAC.  ID also consulted given the patient's multiple issues, which 
included persistent diarrhea that has been ongoing since July as well as the 
sacral decubitus ulcer and concern for urinary bacteria and a right-sided 
infiltrate.  He was treated with Flagyl for positive Clostridium difficile PCR 
but this was thought to be a false positive as patient's diarrhea has been 
improving spontaneously. Mr. Pryor has been continued on Zosyn primarily for 
treatment of his sacral decubitus ulcer as there is also concern for acute 
osteomyelitis of the sacrum.  The patient at this point continued antibiotic 
treatment and with the wound VAC, the wound has been improving.  The patient's 
sepsis has resolved with antibiotics and fluid resuscitation.  Plan is now for 
the patient to go to [facility] for further rehabilitation and to help maximize and 
optimize the patient's upper body strength, so that he may return home.  The 
patient's wife does not want the patient to go to a nursing home and is 
insistent that he go home and is in agreement with [facility] in the interim until 
the patient is able to be discharged to home.

 

DIET:  Heart healthy diet.

 

ACTIVITY:  As tolerated.

 

CONDITION:  Improved.

 

DISPOSITION:  To [facility].

 

TIME SPENT:  Time spent on this discharge was approximately 60 minutes.

 

This is only a brief summarization of a very complex and extended length of 
stay. Please feel free to call me with any questions.

 

____________________________________ 

FERN MARINO NP

 

[id number]/CPS #: [id number]

ELIS[facility]

## 2017-09-14 NOTE — PN
Progress Note





- Progress Note


Date of Service: 09/14/17


SOAP: 


Subjective:


CC: sacral wound


HPI: 72 year old man with sacral decubitus ulcer, s/p debridement and vac.  

Loose stool x1 today and yesterday, decreased appetite.  No fever or rash.  








Objective:


[]


 Vital Signs











Temp  37.2 C   09/14/17 06:26


 


Pulse  75   09/14/17 06:26


 


Resp  18   09/14/17 09:21


 


BP  148/62   09/14/17 06:26


 


Pulse Ox  98   09/14/17 06:26








 Intake & Output











 09/13/17 09/14/17 09/14/17





 18:59 06:59 18:59


 


Intake Total 100 125 360


 


Output Total 750 850 


 


Balance -650 -725 360


 


Weight 210 lb  


 


Intake:   


 


  IV Fluids  20 


 


    ABX - ZOSYN  20 


 


  IVPB  105 


 


    ABX - ZOSYN  105 


 


  Oral 100  360


 


Output:   


 


  Rodriguez 750 850 


 


Other:   


 


  Estimated Void  Medium 


 


  # Bowel Movements 1 1 


 


  Estimated Stool Amount Medium Small 


 


  # Voids 1  








Gen:awake, no distress


HEENT:PERRL, MMM


Neck:Supple


Heart:RRR no murmur


Lungs:CTA BL


Abd:+BS soft non distended


Skin: no rash


MSK: sacral vac dressing





sacral cx: Bacteroides, Enterococcus





Assessment:


1. polymicrobial sacral wound infection with acute osteomyelitis


2. diarrhea, most likely zosyn side effect


3. paraplegia








Plan:


1. change zosyn to ceftriaxone 2gm daily and flagyl 500 mg IV Q12hrs ; day 11/21

-28 of IV antibiotics followed by PO treatment


2. vacc dressing per surgery


25 minutes floor time >50% face to face counseling regarding diarrhea mgmt and 

abx treatment, coordinating with Dr Reynoso

## 2017-09-15 LAB
ALBUMIN SERPL BCG-MCNC: 3 G/DL (ref 3.2–5.2)
ALP SERPL-CCNC: 103 U/L (ref 34–104)
ALT SERPL W P-5'-P-CCNC: 18 U/L (ref 7–52)
ANION GAP SERPL CALC-SCNC: 6 MMOL/L (ref 2–11)
AST SERPL-CCNC: 13 U/L (ref 13–39)
BUN SERPL-MCNC: 32 MG/DL (ref 6–24)
BUN/CREAT SERPL: 21.6 (ref 8–20)
CALCIUM SERPL-MCNC: 9.8 MG/DL (ref 8.6–10.3)
CHLORIDE SERPL-SCNC: 107 MMOL/L (ref 101–111)
GLOBULIN SER CALC-MCNC: 2.8 G/DL (ref 2–4)
GLUCOSE SERPL-MCNC: 102 MG/DL (ref 70–100)
HCO3 SERPL-SCNC: 27 MMOL/L (ref 22–32)
HCT VFR BLD AUTO: 29 % (ref 42–52)
HGB BLD-MCNC: 9.6 G/DL (ref 14–18)
MCH RBC QN AUTO: 27 PG (ref 27–31)
MCHC RBC AUTO-ENTMCNC: 33 G/DL (ref 31–36)
MCV RBC AUTO: 82 FL (ref 80–94)
POTASSIUM SERPL-SCNC: 3.8 MMOL/L (ref 3.5–5)
PROT SERPL-MCNC: 5.8 G/DL (ref 6.4–8.9)
RBC # BLD AUTO: 3.57 10^6/UL (ref 4–5.4)
SODIUM SERPL-SCNC: 140 MMOL/L (ref 133–145)
WBC # BLD AUTO: 9.5 10^3/UL (ref 3.5–10.8)

## 2017-09-15 RX ADMIN — WATER SCH NOTE: 100 INJECTION, SOLUTION INTRAVENOUS at 06:55

## 2017-09-15 RX ADMIN — Medication SCH MG: at 07:59

## 2017-09-15 RX ADMIN — OMEPRAZOLE SCH MG: 20 CAPSULE, DELAYED RELEASE ORAL at 06:45

## 2017-09-15 RX ADMIN — ASPIRIN SCH MG: 81 TABLET, COATED ORAL at 07:58

## 2017-09-15 RX ADMIN — WATER SCH NOTE: 100 INJECTION, SOLUTION INTRAVENOUS at 15:18

## 2017-09-15 RX ADMIN — METRONIDAZOLE SCH MLS/HR: 5 INJECTION, SOLUTION INTRAVENOUS at 05:24

## 2017-09-15 RX ADMIN — OXYCODONE HYDROCHLORIDE PRN MG: 5 CAPSULE ORAL at 19:37

## 2017-09-15 RX ADMIN — Medication SCH CAP: at 07:58

## 2017-09-15 RX ADMIN — Medication SCH CAP: at 20:50

## 2017-09-15 RX ADMIN — FOLIC ACID SCH MG: 1 TABLET ORAL at 07:58

## 2017-09-15 RX ADMIN — FUROSEMIDE SCH MG: 40 TABLET ORAL at 07:59

## 2017-09-15 RX ADMIN — POTASSIUM CHLORIDE SCH MEQ: 750 TABLET, FILM COATED, EXTENDED RELEASE ORAL at 07:58

## 2017-09-15 RX ADMIN — Medication SCH UNITS: at 07:58

## 2017-09-15 RX ADMIN — CYANOCOBALAMIN TAB 500 MCG SCH MCG: 500 TAB at 07:58

## 2017-09-15 RX ADMIN — Medication SCH ML: at 06:45

## 2017-09-15 RX ADMIN — OXYCODONE HYDROCHLORIDE PRN MG: 5 CAPSULE ORAL at 13:37

## 2017-09-15 RX ADMIN — Medication SCH ML: at 19:36

## 2017-09-15 RX ADMIN — PREGABALIN SCH MG: 50 CAPSULE ORAL at 13:36

## 2017-09-15 RX ADMIN — PREGABALIN SCH MG: 50 CAPSULE ORAL at 20:50

## 2017-09-15 RX ADMIN — Medication SCH MG: at 20:50

## 2017-09-15 RX ADMIN — ENOXAPARIN SODIUM SCH MG: 40 INJECTION SUBCUTANEOUS at 07:56

## 2017-09-15 RX ADMIN — FENTANYL TRANSDERMAL SCH MCG: 75 PATCH, EXTENDED RELEASE TRANSDERMAL at 20:44

## 2017-09-15 RX ADMIN — WATER SCH NOTE: 100 INJECTION, SOLUTION INTRAVENOUS at 22:58

## 2017-09-15 RX ADMIN — METRONIDAZOLE SCH MLS/HR: 5 INJECTION, SOLUTION INTRAVENOUS at 18:03

## 2017-09-15 RX ADMIN — PREGABALIN SCH MG: 50 CAPSULE ORAL at 07:59

## 2017-09-16 RX ADMIN — Medication SCH MG: at 09:12

## 2017-09-16 RX ADMIN — Medication SCH ML: at 19:10

## 2017-09-16 RX ADMIN — Medication SCH CAP: at 20:49

## 2017-09-16 RX ADMIN — METRONIDAZOLE SCH MLS/HR: 5 INJECTION, SOLUTION INTRAVENOUS at 05:39

## 2017-09-16 RX ADMIN — Medication SCH ML: at 10:34

## 2017-09-16 RX ADMIN — PREGABALIN SCH MG: 50 CAPSULE ORAL at 09:12

## 2017-09-16 RX ADMIN — Medication SCH PKT: at 09:11

## 2017-09-16 RX ADMIN — FOLIC ACID SCH MG: 1 TABLET ORAL at 09:13

## 2017-09-16 RX ADMIN — CYANOCOBALAMIN TAB 500 MCG SCH MCG: 500 TAB at 09:12

## 2017-09-16 RX ADMIN — POTASSIUM CHLORIDE SCH MEQ: 750 TABLET, FILM COATED, EXTENDED RELEASE ORAL at 09:12

## 2017-09-16 RX ADMIN — Medication SCH UNITS: at 09:13

## 2017-09-16 RX ADMIN — OXYCODONE HYDROCHLORIDE PRN MG: 5 SOLUTION ORAL at 07:52

## 2017-09-16 RX ADMIN — PREGABALIN SCH MG: 50 CAPSULE ORAL at 20:48

## 2017-09-16 RX ADMIN — FUROSEMIDE SCH MG: 40 TABLET ORAL at 09:12

## 2017-09-16 RX ADMIN — PREGABALIN SCH MG: 50 CAPSULE ORAL at 13:56

## 2017-09-16 RX ADMIN — METRONIDAZOLE SCH MLS/HR: 5 INJECTION, SOLUTION INTRAVENOUS at 17:43

## 2017-09-16 RX ADMIN — WATER SCH: 100 INJECTION, SOLUTION INTRAVENOUS at 14:53

## 2017-09-16 RX ADMIN — Medication SCH MG: at 20:49

## 2017-09-16 RX ADMIN — WATER SCH NOTE: 100 INJECTION, SOLUTION INTRAVENOUS at 07:13

## 2017-09-16 RX ADMIN — WATER SCH: 100 INJECTION, SOLUTION INTRAVENOUS at 22:47

## 2017-09-16 RX ADMIN — ENOXAPARIN SODIUM SCH MG: 40 INJECTION SUBCUTANEOUS at 07:52

## 2017-09-16 RX ADMIN — WATER SCH NOTE: 100 INJECTION, SOLUTION INTRAVENOUS at 19:11

## 2017-09-16 RX ADMIN — Medication SCH ML: at 07:04

## 2017-09-16 RX ADMIN — Medication SCH CAP: at 09:12

## 2017-09-16 RX ADMIN — OXYCODONE HYDROCHLORIDE PRN MG: 5 CAPSULE ORAL at 12:21

## 2017-09-16 RX ADMIN — OMEPRAZOLE SCH MG: 20 CAPSULE, DELAYED RELEASE ORAL at 07:04

## 2017-09-16 RX ADMIN — ASPIRIN SCH MG: 81 TABLET, COATED ORAL at 09:12

## 2017-09-17 RX ADMIN — METRONIDAZOLE SCH MLS/HR: 5 INJECTION, SOLUTION INTRAVENOUS at 05:28

## 2017-09-17 RX ADMIN — FOLIC ACID SCH MG: 1 TABLET ORAL at 08:44

## 2017-09-17 RX ADMIN — Medication SCH ML: at 09:54

## 2017-09-17 RX ADMIN — POTASSIUM CHLORIDE SCH MEQ: 750 TABLET, FILM COATED, EXTENDED RELEASE ORAL at 08:44

## 2017-09-17 RX ADMIN — ENOXAPARIN SODIUM SCH MG: 40 INJECTION SUBCUTANEOUS at 08:42

## 2017-09-17 RX ADMIN — ASPIRIN SCH MG: 81 TABLET, COATED ORAL at 08:45

## 2017-09-17 RX ADMIN — Medication SCH ML: at 06:43

## 2017-09-17 RX ADMIN — OMEPRAZOLE SCH MG: 20 CAPSULE, DELAYED RELEASE ORAL at 06:43

## 2017-09-17 RX ADMIN — OXYCODONE HYDROCHLORIDE PRN MG: 5 SOLUTION ORAL at 17:13

## 2017-09-17 RX ADMIN — Medication SCH CAP: at 20:54

## 2017-09-17 RX ADMIN — OXYCODONE HYDROCHLORIDE PRN MG: 5 CAPSULE ORAL at 20:52

## 2017-09-17 RX ADMIN — Medication SCH UNITS: at 08:45

## 2017-09-17 RX ADMIN — WATER SCH NOTE: 100 INJECTION, SOLUTION INTRAVENOUS at 23:04

## 2017-09-17 RX ADMIN — FUROSEMIDE SCH MG: 40 TABLET ORAL at 08:45

## 2017-09-17 RX ADMIN — PREGABALIN SCH MG: 50 CAPSULE ORAL at 08:45

## 2017-09-17 RX ADMIN — PREGABALIN SCH MG: 50 CAPSULE ORAL at 13:26

## 2017-09-17 RX ADMIN — PREGABALIN SCH MG: 50 CAPSULE ORAL at 20:54

## 2017-09-17 RX ADMIN — Medication SCH CAP: at 08:45

## 2017-09-17 RX ADMIN — Medication SCH MG: at 20:54

## 2017-09-17 RX ADMIN — CYANOCOBALAMIN TAB 500 MCG SCH MCG: 500 TAB at 08:45

## 2017-09-17 RX ADMIN — WATER SCH NOTE: 100 INJECTION, SOLUTION INTRAVENOUS at 06:52

## 2017-09-17 RX ADMIN — Medication SCH PKT: at 08:43

## 2017-09-17 RX ADMIN — Medication SCH ML: at 19:16

## 2017-09-17 RX ADMIN — Medication SCH MG: at 08:45

## 2017-09-17 RX ADMIN — WATER SCH: 100 INJECTION, SOLUTION INTRAVENOUS at 14:18

## 2017-09-17 RX ADMIN — METRONIDAZOLE SCH MLS/HR: 5 INJECTION, SOLUTION INTRAVENOUS at 17:58

## 2017-09-18 RX ADMIN — NYSTATIN SCH APPLIC: 100000 CREAM TOPICAL at 21:38

## 2017-09-18 RX ADMIN — OMEPRAZOLE SCH MG: 20 CAPSULE, DELAYED RELEASE ORAL at 06:51

## 2017-09-18 RX ADMIN — Medication SCH UNITS: at 09:05

## 2017-09-18 RX ADMIN — OXYCODONE HYDROCHLORIDE PRN MG: 5 SOLUTION ORAL at 12:30

## 2017-09-18 RX ADMIN — CYANOCOBALAMIN TAB 500 MCG SCH MCG: 500 TAB at 09:04

## 2017-09-18 RX ADMIN — WATER SCH NOTE: 100 INJECTION, SOLUTION INTRAVENOUS at 07:10

## 2017-09-18 RX ADMIN — METRONIDAZOLE SCH MLS/HR: 5 INJECTION, SOLUTION INTRAVENOUS at 05:27

## 2017-09-18 RX ADMIN — OXYCODONE HYDROCHLORIDE PRN MG: 5 CAPSULE ORAL at 21:01

## 2017-09-18 RX ADMIN — PREGABALIN SCH MG: 50 CAPSULE ORAL at 19:58

## 2017-09-18 RX ADMIN — POTASSIUM CHLORIDE SCH MEQ: 750 TABLET, FILM COATED, EXTENDED RELEASE ORAL at 09:03

## 2017-09-18 RX ADMIN — METRONIDAZOLE SCH MLS/HR: 5 INJECTION, SOLUTION INTRAVENOUS at 17:56

## 2017-09-18 RX ADMIN — ASPIRIN SCH MG: 81 TABLET, COATED ORAL at 09:03

## 2017-09-18 RX ADMIN — Medication SCH ML: at 19:34

## 2017-09-18 RX ADMIN — WATER SCH NOTE: 100 INJECTION, SOLUTION INTRAVENOUS at 16:53

## 2017-09-18 RX ADMIN — WATER SCH NOTE: 100 INJECTION, SOLUTION INTRAVENOUS at 23:28

## 2017-09-18 RX ADMIN — Medication SCH PKT: at 09:01

## 2017-09-18 RX ADMIN — PREGABALIN SCH MG: 50 CAPSULE ORAL at 15:54

## 2017-09-18 RX ADMIN — NYSTATIN SCH APPLIC: 100000 CREAM TOPICAL at 12:23

## 2017-09-18 RX ADMIN — Medication SCH CAP: at 09:03

## 2017-09-18 RX ADMIN — FUROSEMIDE SCH MG: 40 TABLET ORAL at 09:04

## 2017-09-18 RX ADMIN — Medication SCH MG: at 09:03

## 2017-09-18 RX ADMIN — Medication SCH ML: at 11:18

## 2017-09-18 RX ADMIN — Medication SCH ML: at 06:51

## 2017-09-18 RX ADMIN — Medication SCH CAP: at 19:58

## 2017-09-18 RX ADMIN — FENTANYL TRANSDERMAL SCH MCG: 75 PATCH, EXTENDED RELEASE TRANSDERMAL at 19:40

## 2017-09-18 RX ADMIN — OXYCODONE HYDROCHLORIDE PRN MG: 5 CAPSULE ORAL at 17:04

## 2017-09-18 RX ADMIN — TRIAMCINOLONE ACETONIDE SCH APPLIC: 0.25 CREAM TOPICAL at 12:23

## 2017-09-18 RX ADMIN — Medication SCH MG: at 19:58

## 2017-09-18 RX ADMIN — FOLIC ACID SCH MG: 1 TABLET ORAL at 09:04

## 2017-09-18 RX ADMIN — PREGABALIN SCH MG: 50 CAPSULE ORAL at 09:05

## 2017-09-18 RX ADMIN — TRIAMCINOLONE ACETONIDE SCH APPLIC: 0.25 CREAM TOPICAL at 21:38

## 2017-09-18 RX ADMIN — ENOXAPARIN SODIUM SCH MG: 40 INJECTION SUBCUTANEOUS at 09:02

## 2017-09-18 NOTE — PN
Progress Note





- Progress Note


Date of Service: 09/18/17


SOAP: 


Subjective:


CC: sacral wound


HPI: 72 year old man with sacral decubitus ulcer, s/p debridement and vac.  

Loose stools , less frequent.  Rash on left hip and abdomen, not itchy.  Wife 

thinks fungal.  








Objective:


[]


 Vital Signs











Temp  36.8 C   09/18/17 05:54


 


Pulse  79   09/18/17 05:54


 


Resp  18   09/18/17 09:05


 


BP  137/55   09/18/17 05:54


 


Pulse Ox  97   09/18/17 05:54








 Intake & Output











 09/17/17 09/18/17 09/18/17





 18:59 06:59 18:59


 


Intake Total 993 358 230


 


Output Total 900 1300 


 


Balance 93 -942 230


 


Intake:   


 


  IV Fluids 15 60 


 


    ABX - CEFTRIAXONE  20 


 


    Flagyl 15 40 


 


  IVPB 98 298 


 


    ABX - CEFTRIAXONE  100 


 


    Flagyl 98 198 


 


  Oral 880  230


 


Output:   


 


  Rodriguez 900 1300 


 


Other:   


 


  # Bowel Movements  1 


 


  Estimated Stool Amount  Small 








 


Gen:awake, no distress


HEENT:PERRL, MMM


Neck:Supple


Heart:RRR no murmur


Lungs:CTA BL


Abd:+BS soft non distended


Skin: left hip there is an erythematous patch w scale, a couple of scattered 

erythematous macules on abdomen


MSK: sacral vac dressing





sacral cx: Bacteroides, Enterococcus





Assessment:


1. polymicrobial sacral wound infection with acute osteomyelitis


2. diarrhea, most likely zosyn side effect, improving


3. paraplegia


4. rash, dermatitis ?fungal








Plan:


1. ceftriaxone 2gm daily and flagyl 500 mg IV Q12hrs ; day 14/28 of IV 

antibiotics followed by PO treatment


2. vac dressing per surgery


3. antifungal/corticosteroid ointment


35 minutes floor time >50% face to face counseling regarding abx treatment

## 2017-09-19 LAB
ANION GAP SERPL CALC-SCNC: 6 MMOL/L (ref 2–11)
BUN SERPL-MCNC: 39 MG/DL (ref 6–24)
BUN/CREAT SERPL: 26.9 (ref 8–20)
CALCIUM SERPL-MCNC: 10.2 MG/DL (ref 8.6–10.3)
CHLORIDE SERPL-SCNC: 106 MMOL/L (ref 101–111)
GLUCOSE SERPL-MCNC: 90 MG/DL (ref 70–100)
HCO3 SERPL-SCNC: 29 MMOL/L (ref 22–32)
HCT VFR BLD AUTO: 30 % (ref 42–52)
HGB BLD-MCNC: 10 G/DL (ref 14–18)
MCH RBC QN AUTO: 27 PG (ref 27–31)
MCHC RBC AUTO-ENTMCNC: 33 G/DL (ref 31–36)
MCV RBC AUTO: 82 FL (ref 80–94)
POTASSIUM SERPL-SCNC: 4.1 MMOL/L (ref 3.5–5)
RBC # BLD AUTO: 3.69 10^6/UL (ref 4–5.4)
SODIUM SERPL-SCNC: 141 MMOL/L (ref 133–145)
WBC # BLD AUTO: 8.4 10^3/UL (ref 3.5–10.8)

## 2017-09-19 RX ADMIN — FOLIC ACID SCH MG: 1 TABLET ORAL at 08:54

## 2017-09-19 RX ADMIN — CYANOCOBALAMIN TAB 500 MCG SCH MCG: 500 TAB at 08:55

## 2017-09-19 RX ADMIN — PREGABALIN SCH MG: 50 CAPSULE ORAL at 08:53

## 2017-09-19 RX ADMIN — OXYCODONE HYDROCHLORIDE PRN MG: 5 CAPSULE ORAL at 13:07

## 2017-09-19 RX ADMIN — NYSTATIN SCH APPLIC: 100000 CREAM TOPICAL at 09:00

## 2017-09-19 RX ADMIN — METRONIDAZOLE SCH MLS/HR: 5 INJECTION, SOLUTION INTRAVENOUS at 18:40

## 2017-09-19 RX ADMIN — Medication SCH MG: at 08:52

## 2017-09-19 RX ADMIN — Medication SCH CAP: at 20:08

## 2017-09-19 RX ADMIN — NYSTATIN SCH APPLIC: 100000 CREAM TOPICAL at 22:05

## 2017-09-19 RX ADMIN — WATER SCH NOTE: 100 INJECTION, SOLUTION INTRAVENOUS at 07:23

## 2017-09-19 RX ADMIN — Medication SCH MG: at 08:54

## 2017-09-19 RX ADMIN — Medication SCH PKT: at 08:53

## 2017-09-19 RX ADMIN — FUROSEMIDE SCH MG: 40 TABLET ORAL at 08:54

## 2017-09-19 RX ADMIN — POTASSIUM CHLORIDE SCH MEQ: 750 TABLET, FILM COATED, EXTENDED RELEASE ORAL at 08:54

## 2017-09-19 RX ADMIN — ENOXAPARIN SODIUM SCH MG: 40 INJECTION SUBCUTANEOUS at 08:56

## 2017-09-19 RX ADMIN — OXYCODONE HYDROCHLORIDE AND ACETAMINOPHEN SCH MG: 500 TABLET ORAL at 08:54

## 2017-09-19 RX ADMIN — TRIAMCINOLONE ACETONIDE SCH APPLIC: 0.25 CREAM TOPICAL at 22:05

## 2017-09-19 RX ADMIN — PREGABALIN SCH MG: 50 CAPSULE ORAL at 20:08

## 2017-09-19 RX ADMIN — TRIAMCINOLONE ACETONIDE SCH APPLIC: 0.25 CREAM TOPICAL at 09:00

## 2017-09-19 RX ADMIN — Medication SCH UNITS: at 08:54

## 2017-09-19 RX ADMIN — WATER SCH NOTE: 100 INJECTION, SOLUTION INTRAVENOUS at 15:42

## 2017-09-19 RX ADMIN — METRONIDAZOLE SCH MLS/HR: 5 INJECTION, SOLUTION INTRAVENOUS at 05:33

## 2017-09-19 RX ADMIN — WATER SCH NOTE: 100 INJECTION, SOLUTION INTRAVENOUS at 23:19

## 2017-09-19 RX ADMIN — Medication SCH CAP: at 08:54

## 2017-09-19 RX ADMIN — Medication SCH MG: at 20:09

## 2017-09-19 RX ADMIN — Medication SCH ML: at 10:03

## 2017-09-19 RX ADMIN — Medication SCH ML: at 20:15

## 2017-09-19 RX ADMIN — PREGABALIN SCH MG: 50 CAPSULE ORAL at 13:08

## 2017-09-19 RX ADMIN — OXYCODONE HYDROCHLORIDE PRN MG: 5 SOLUTION ORAL at 17:26

## 2017-09-19 RX ADMIN — OMEPRAZOLE SCH MG: 20 CAPSULE, DELAYED RELEASE ORAL at 07:21

## 2017-09-19 RX ADMIN — ASPIRIN SCH MG: 81 TABLET, COATED ORAL at 08:55

## 2017-09-19 RX ADMIN — OXYCODONE HYDROCHLORIDE PRN MG: 5 SOLUTION ORAL at 08:58

## 2017-09-19 NOTE — PN
Progress Note





- Progress Note


Date of Service: 09/19/17


SOAP: 


Subjective:


CC: sacral wound


HPI: 72 year old man with sacral decubitus ulcer, s/p debridement and vac.  

Loose stools , more solid, 1-3 times per day.  Rash on left hip and abdomen, 

about the same.  Vac removed by Dr Holley today.  No fever or rash.








Objective:


[]


 


 Vital Signs











Temp  36.7 C   09/19/17 06:04


 


Pulse  96   09/19/17 15:48


 


Resp  16   09/19/17 15:48


 


BP  149/70   09/19/17 06:04


 


Pulse Ox  98   09/19/17 09:00








 Intake & Output











 09/18/17 09/19/17 09/19/17





 18:59 06:59 18:59


 


Intake Total 820 120 460


 


Output Total 1350 925 


 


Balance -530 -805 460


 


Intake:   


 


  IV Fluids  20 


 


    Flagyl  20 


 


  IVPB  100 


 


    Flagyl  100 


 


  Oral 820  460


 


Output:   


 


  Rodriguez 1350 925 


 


Other:   


 


  Estimated Void  Medium 


 


  # Bowel Movements 1 1 


 


  Estimated Stool Amount Small Small 


 


  # Voids  1 








 


Gen:awake, no distress


HEENT:PERRL, MMM


Neck:Supple


Heart:RRR no murmur


Lungs:CTA BL


Abd:+BS soft non distended


Skin: left hip there is an erythematous patch w scale


MSK: sacral ulcer no erythema or drainage





sacral cx: Bacteroides, Enterococcus





Assessment:


1. polymicrobial sacral wound infection with acute osteomyelitis


2. diarrhea, most likely zosyn side effect, improving


3. paraplegia


4. rash, dermatitis ?fungal








Plan:


1. ceftriaxone 2gm daily and flagyl 500 mg IV Q12hrs ; day 15/28 of IV 

antibiotics followed by PO treatment.  Orders written for outpatient IV 

antibiotics when ready for discharge.


2.wound dressing per surgery


3. antifungal/corticosteroid ointment

## 2017-09-19 NOTE — PN
Progress Note





- Progress Note


Date of Service: 09/18/17


SOAP: 


Subjective:





Out of sequence note-patient seen on 9/18/17, this note written 9/19





He is without complaint today


Was OOB for a little while today


His wife is present at the bedside








Objective:


Afebrile





Sacral decubiti--wound is clean without odor or purulence.  The surrounding 

skin shows some irritation.  The base of the wound and some of the walls are 

dry with dessicated tissue that is dry and fibrous. Exposed bone is present.





The ulcer was debrided sharply to bleeding tissue in some areas








Assessment:





Sacral decubitus with osteomyelitis.


I think the ulcer needs more frequent debridement and the VAC is not keeping 

the ulcer clean and it is drying out the ulcer bed.








Plan:


D/C VAC for now and start BIB wet to dry NS packing for wound care.


All discussed with wife at the bedside.

## 2017-09-20 PROCEDURE — 0JB70ZZ EXCISION OF BACK SUBCUTANEOUS TISSUE AND FASCIA, OPEN APPROACH: ICD-10-PCS | Performed by: SURGERY

## 2017-09-20 RX ADMIN — Medication SCH MG: at 09:37

## 2017-09-20 RX ADMIN — METRONIDAZOLE SCH MLS/HR: 5 INJECTION, SOLUTION INTRAVENOUS at 05:36

## 2017-09-20 RX ADMIN — PREGABALIN SCH MG: 50 CAPSULE ORAL at 21:43

## 2017-09-20 RX ADMIN — Medication SCH CAP: at 21:43

## 2017-09-20 RX ADMIN — OMEPRAZOLE SCH MG: 20 CAPSULE, DELAYED RELEASE ORAL at 09:38

## 2017-09-20 RX ADMIN — OXYCODONE HYDROCHLORIDE AND ACETAMINOPHEN SCH MG: 500 TABLET ORAL at 09:38

## 2017-09-20 RX ADMIN — Medication SCH MG: at 09:38

## 2017-09-20 RX ADMIN — NYSTATIN SCH APPLIC: 100000 CREAM TOPICAL at 09:46

## 2017-09-20 RX ADMIN — OXYCODONE HYDROCHLORIDE PRN MG: 5 SOLUTION ORAL at 17:13

## 2017-09-20 RX ADMIN — FOLIC ACID SCH MG: 1 TABLET ORAL at 09:38

## 2017-09-20 RX ADMIN — Medication SCH ML: at 19:15

## 2017-09-20 RX ADMIN — NYSTATIN SCH APPLIC: 100000 CREAM TOPICAL at 21:44

## 2017-09-20 RX ADMIN — WATER SCH NOTE: 100 INJECTION, SOLUTION INTRAVENOUS at 15:00

## 2017-09-20 RX ADMIN — PREGABALIN SCH MG: 50 CAPSULE ORAL at 14:23

## 2017-09-20 RX ADMIN — PREGABALIN SCH MG: 50 CAPSULE ORAL at 09:39

## 2017-09-20 RX ADMIN — Medication SCH UNITS: at 09:37

## 2017-09-20 RX ADMIN — Medication SCH ML: at 07:45

## 2017-09-20 RX ADMIN — WATER SCH NOTE: 100 INJECTION, SOLUTION INTRAVENOUS at 23:13

## 2017-09-20 RX ADMIN — WATER SCH NOTE: 100 INJECTION, SOLUTION INTRAVENOUS at 06:51

## 2017-09-20 RX ADMIN — OXYCODONE HYDROCHLORIDE PRN MG: 5 CAPSULE ORAL at 12:27

## 2017-09-20 RX ADMIN — FUROSEMIDE SCH MG: 40 TABLET ORAL at 09:38

## 2017-09-20 RX ADMIN — Medication SCH PKT: at 09:39

## 2017-09-20 RX ADMIN — ENOXAPARIN SODIUM SCH MG: 40 INJECTION SUBCUTANEOUS at 09:42

## 2017-09-20 RX ADMIN — Medication SCH CAP: at 09:38

## 2017-09-20 RX ADMIN — METRONIDAZOLE SCH MLS/HR: 5 INJECTION, SOLUTION INTRAVENOUS at 18:05

## 2017-09-20 RX ADMIN — CYANOCOBALAMIN TAB 500 MCG SCH MCG: 500 TAB at 09:38

## 2017-09-20 RX ADMIN — Medication SCH MG: at 21:43

## 2017-09-20 RX ADMIN — TRIAMCINOLONE ACETONIDE SCH APPLIC: 0.25 CREAM TOPICAL at 09:46

## 2017-09-20 RX ADMIN — ASPIRIN SCH MG: 81 TABLET, COATED ORAL at 09:37

## 2017-09-20 RX ADMIN — OXYCODONE HYDROCHLORIDE PRN MG: 5 SOLUTION ORAL at 07:48

## 2017-09-20 RX ADMIN — TRIAMCINOLONE ACETONIDE SCH APPLIC: 0.25 CREAM TOPICAL at 21:44

## 2017-09-20 RX ADMIN — POTASSIUM CHLORIDE SCH MEQ: 750 TABLET, FILM COATED, EXTENDED RELEASE ORAL at 09:38

## 2017-09-20 NOTE — PN
Progress Note





- Progress Note


Date of Service: 09/20/17


SOAP: 


Subjective:





Patient seen in Rehab Unit with wife at bedside


He has been tolerating dressing changes and has no complaints.





He is sitting up to eat and for short periods of exercise.








Objective:





 











Temp Pulse Resp BP Pulse Ox


 


 98.1 F   76   18   127/69   96 


 


 09/20/17 05:49  09/20/17 05:49  09/20/17 09:46  09/20/17 05:49  09/20/17 09:45











PEX:


Sacral wound is much  with less fibrin and less dessication of the 

tissues than noted on Monday.  There remains some areas of necrotic fat mainly 

on the right side.


There is no odor or purulence and there is no tracking noted.


There is bone exposed over the midline.


The surrounding skin is healthy without redness or breakdown.





Wound was sharply debrided-bleeding controlled with sutures and pressure. Ulcer 

repacked with wet to dry gauze and covered with ABD pad.





Assessment:


Sacral pressure ulcer.  The ulcer appears much healthier after 48 hours of wet 

to dry dressing and discontinuation of the wound vac. 








Plan:


Will continue wet to dry dressings bid for now as this seems to be debriding 

the ulcer well.


Continue IV antibiotics


Offloading-allowing sitting up only for meals and exercise.





I discussed all of the above with the patient and his wife and impressed upon 

them once again the complicated nature of wound care involved with pressure 

ulcers and the expected time frame for healing which can be up to 18 months 

with slow progress which can be frustrating to both patient and surgeon. I also 

discussed the importance of early Plastic Surgical consultation for opinion 

regarding wound care and his candidacy for possible wound flap or 

reconstruction.  This service is not available [place] at [facility] and he would need to 

be seen at a tertiary care center.





For now we will continue present daily wound care with sharp debridements as 

necessary and he will continue with his rehabilitation plan.





I discussed his care with both Dr. Darby and Dr. Faulkner this morning.

## 2017-09-20 NOTE — PN
Progress Note





- Progress Note


Date of Service: 09/20/17


SOAP: 


Subjective:


CC: sacral wound


HPI: 72 year old man with sacral decubitus ulcer, s/p debridement and vac.  

Soft stools 1-3 times per day.  Rash on left hip and abdomen, about the same.  

Seen and examined with Dr Holley.  No fever or rash.








Objective:


[]


 


 


Gen:awake, no distress


HEENT:PERRL, MMM


Neck:Supple


Heart:RRR no murmur


Lungs:CTA BL


Abd:+BS soft non distended


Skin: left hip there is an erythematous patch w scale


MSK: right sacral ulcer no erythema or drainage





sacral cx: Bacteroides, Enterococcus


 Laboratory Results - last 24 hr











  09/19/17





  05:35


 


C-Reactive Protein  28.74 H











Assessment:


1. polymicrobial sacral wound infection with acute osteomyelitis


2. diarrhea, improving


3. paraplegia


4. rash, dermatitis ?fungal








Plan:


1. ceftriaxone 2gm daily and flagyl 500 mg IV Q12hrs ; day 16/28 of IV 

antibiotics followed by PO treatment.  Orders written for outpatient IV 

antibiotics when ready for discharge.


2.imodium prn


3. antifungal/corticosteroid ointment


35 minutes floor time >50% with patient and wife in counseling regarding next 

steps in treatment including plastics evaluation.

## 2017-09-21 ENCOUNTER — HOSPITAL ENCOUNTER (INPATIENT)
Dept: HOSPITAL 25 - SSU | Age: 72
LOS: 21 days | Discharge: NURSING, REHAB OR LONG-TERM CARE | DRG: 463 | End: 2017-10-12
Attending: INTERNAL MEDICINE | Admitting: HOSPITALIST
Payer: MEDICARE

## 2017-09-21 VITALS — DIASTOLIC BLOOD PRESSURE: 61 MMHG | SYSTOLIC BLOOD PRESSURE: 129 MMHG

## 2017-09-21 DIAGNOSIS — B35.4: ICD-10-CM

## 2017-09-21 DIAGNOSIS — K21.9: ICD-10-CM

## 2017-09-21 DIAGNOSIS — L89.619: ICD-10-CM

## 2017-09-21 DIAGNOSIS — I25.10: ICD-10-CM

## 2017-09-21 DIAGNOSIS — C41.2: ICD-10-CM

## 2017-09-21 DIAGNOSIS — M31.9: ICD-10-CM

## 2017-09-21 DIAGNOSIS — I12.9: ICD-10-CM

## 2017-09-21 DIAGNOSIS — R19.7: ICD-10-CM

## 2017-09-21 DIAGNOSIS — J45.909: ICD-10-CM

## 2017-09-21 DIAGNOSIS — Z79.82: ICD-10-CM

## 2017-09-21 DIAGNOSIS — K59.2: ICD-10-CM

## 2017-09-21 DIAGNOSIS — G82.20: ICD-10-CM

## 2017-09-21 DIAGNOSIS — L89.629: ICD-10-CM

## 2017-09-21 DIAGNOSIS — E86.0: ICD-10-CM

## 2017-09-21 DIAGNOSIS — G89.29: ICD-10-CM

## 2017-09-21 DIAGNOSIS — G47.33: ICD-10-CM

## 2017-09-21 DIAGNOSIS — L89.154: ICD-10-CM

## 2017-09-21 DIAGNOSIS — M54.9: ICD-10-CM

## 2017-09-21 DIAGNOSIS — M46.28: Primary | ICD-10-CM

## 2017-09-21 DIAGNOSIS — N18.3: ICD-10-CM

## 2017-09-21 DIAGNOSIS — Z79.01: ICD-10-CM

## 2017-09-21 PROCEDURE — 80053 COMPREHEN METABOLIC PANEL: CPT

## 2017-09-21 PROCEDURE — 85027 COMPLETE CBC AUTOMATED: CPT

## 2017-09-21 PROCEDURE — 86140 C-REACTIVE PROTEIN: CPT

## 2017-09-21 PROCEDURE — 80048 BASIC METABOLIC PNL TOTAL CA: CPT

## 2017-09-21 PROCEDURE — 36415 COLL VENOUS BLD VENIPUNCTURE: CPT

## 2017-09-21 PROCEDURE — 85025 COMPLETE CBC W/AUTO DIFF WBC: CPT

## 2017-09-21 PROCEDURE — 97530 THERAPEUTIC ACTIVITIES: CPT

## 2017-09-21 RX ADMIN — PREGABALIN SCH MG: 50 CAPSULE ORAL at 09:03

## 2017-09-21 RX ADMIN — FENTANYL TRANSDERMAL SCH MCG: 75 PATCH, EXTENDED RELEASE TRANSDERMAL at 19:18

## 2017-09-21 RX ADMIN — Medication SCH MG: at 09:04

## 2017-09-21 RX ADMIN — OMEPRAZOLE SCH MG: 20 CAPSULE, DELAYED RELEASE ORAL at 06:45

## 2017-09-21 RX ADMIN — NYSTATIN SCH APPLIC: 100000 CREAM TOPICAL at 12:04

## 2017-09-21 RX ADMIN — OXYCODONE HYDROCHLORIDE AND ACETAMINOPHEN SCH MG: 500 TABLET ORAL at 09:06

## 2017-09-21 RX ADMIN — METRONIDAZOLE SCH MLS/HR: 5 INJECTION, SOLUTION INTRAVENOUS at 05:37

## 2017-09-21 RX ADMIN — TRIAMCINOLONE ACETONIDE SCH APPLIC: 0.25 CREAM TOPICAL at 12:04

## 2017-09-21 RX ADMIN — Medication SCH ML: at 19:22

## 2017-09-21 RX ADMIN — PREGABALIN SCH MG: 50 CAPSULE ORAL at 14:08

## 2017-09-21 RX ADMIN — WATER SCH NOTE: 100 INJECTION, SOLUTION INTRAVENOUS at 16:41

## 2017-09-21 RX ADMIN — TRIAMCINOLONE ACETONIDE SCH APPLIC: 0.25 CREAM TOPICAL at 21:45

## 2017-09-21 RX ADMIN — OXYCODONE HYDROCHLORIDE PRN MG: 5 SOLUTION ORAL at 09:11

## 2017-09-21 RX ADMIN — Medication SCH UNITS: at 09:06

## 2017-09-21 RX ADMIN — ASPIRIN SCH MG: 81 TABLET, COATED ORAL at 09:02

## 2017-09-21 RX ADMIN — Medication SCH MG: at 21:42

## 2017-09-21 RX ADMIN — WATER SCH NOTE: 100 INJECTION, SOLUTION INTRAVENOUS at 06:46

## 2017-09-21 RX ADMIN — METRONIDAZOLE SCH MG: 250 TABLET ORAL at 21:43

## 2017-09-21 RX ADMIN — FUROSEMIDE SCH MG: 40 TABLET ORAL at 09:04

## 2017-09-21 RX ADMIN — CYANOCOBALAMIN TAB 500 MCG SCH MCG: 500 TAB at 09:05

## 2017-09-21 RX ADMIN — WATER SCH NOTE: 100 INJECTION, SOLUTION INTRAVENOUS at 19:21

## 2017-09-21 RX ADMIN — NYSTATIN SCH APPLIC: 100000 CREAM TOPICAL at 21:45

## 2017-09-21 RX ADMIN — Medication SCH MG: at 12:02

## 2017-09-21 RX ADMIN — FOLIC ACID SCH MG: 1 TABLET ORAL at 09:03

## 2017-09-21 RX ADMIN — POTASSIUM CHLORIDE SCH MEQ: 750 TABLET, FILM COATED, EXTENDED RELEASE ORAL at 09:06

## 2017-09-21 RX ADMIN — Medication SCH ML: at 10:05

## 2017-09-21 RX ADMIN — PREGABALIN SCH MG: 50 CAPSULE ORAL at 21:43

## 2017-09-21 RX ADMIN — OXYCODONE HYDROCHLORIDE PRN MG: 5 SOLUTION ORAL at 08:09

## 2017-09-21 RX ADMIN — ENOXAPARIN SODIUM SCH MG: 40 INJECTION SUBCUTANEOUS at 09:09

## 2017-09-21 RX ADMIN — Medication SCH CAP: at 09:08

## 2017-09-21 RX ADMIN — Medication SCH ML: at 06:45

## 2017-09-21 RX ADMIN — Medication SCH CAP: at 21:42

## 2017-09-21 NOTE — PN
Progress Note





- Progress Note


Date of Service: 09/21/17


SOAP: 


Subjective:


CC: sacral wound


HPI: 72 year old man with sacral decubitus ulcer, s/p debridement and vac.  

Soft stools few times per day, no liquid stools.  Rash on left hip smaller.  No 

fever or rash.








Objective:


[]


 Vital Signs











Temp  36.9 C   09/21/17 06:51


 


Pulse  73   09/21/17 06:51


 


Resp  18   09/21/17 09:11


 


BP  129/61   09/21/17 06:51


 


Pulse Ox  97   09/21/17 06:51








 Intake & Output











 09/20/17 09/21/17 09/21/17





 18:59 06:59 18:59


 


Intake Total 280  200


 


Output Total  1250 


 


Balance 280 -1250 200


 


Intake:   


 


  Oral 280  200


 


Output:   


 


  Rodriguez  1250 


 


Other:   


 


  # Bowel Movements  2 


 


  Estimated Stool Amount  Small 








 


 


Gen:awake, no distress


HEENT:PERRL, MMM


Neck:Supple


Heart:RRR no murmur


Lungs:CTA BL


Abd:+BS soft non distended


Skin: left hip there is an erythematous patch w scale


MSK: right sacral ulcer no erythema or drainage





sacral cx: Bacteroides, Enterococcus


 Laboratory Results - last 24 hr











  09/19/17





  05:35


 


C-Reactive Protein  28.74 H











Assessment:


1. polymicrobial sacral wound infection with acute osteomyelitis


2. paraplegia


3. rash, dermatitis ?fungal








Plan:


1. ceftriaxone 2gm daily and flagyl 500 mg IV Q12hrs ; day 17/28 of IV 

antibiotics followed by PO treatment.  


2.imodium prn


3. antifungal/corticosteroid ointment


35 minutes floor time >50% with patient and wife in counseling regarding next 

steps in treatment and wound management.  Discussed with Dr Holley.

## 2017-09-22 LAB
ALBUMIN SERPL BCG-MCNC: 3 G/DL (ref 3.2–5.2)
ALP SERPL-CCNC: 73 U/L (ref 34–104)
ALT SERPL W P-5'-P-CCNC: 11 U/L (ref 7–52)
ANION GAP SERPL CALC-SCNC: 5 MMOL/L (ref 2–11)
AST SERPL-CCNC: 12 U/L (ref 13–39)
BUN SERPL-MCNC: 39 MG/DL (ref 6–24)
BUN/CREAT SERPL: 31 (ref 8–20)
CALCIUM SERPL-MCNC: 9.9 MG/DL (ref 8.6–10.3)
CHLORIDE SERPL-SCNC: 105 MMOL/L (ref 101–111)
GLOBULIN SER CALC-MCNC: 2.7 G/DL (ref 2–4)
GLUCOSE SERPL-MCNC: 95 MG/DL (ref 70–100)
HCO3 SERPL-SCNC: 31 MMOL/L (ref 22–32)
HCT VFR BLD AUTO: 30 % (ref 42–52)
HGB BLD-MCNC: 9.9 G/DL (ref 14–18)
MCH RBC QN AUTO: 27 PG (ref 27–31)
MCHC RBC AUTO-ENTMCNC: 33 G/DL (ref 31–36)
MCV RBC AUTO: 82 FL (ref 80–94)
POTASSIUM SERPL-SCNC: 4.1 MMOL/L (ref 3.5–5)
PROT SERPL-MCNC: 5.7 G/DL (ref 6.4–8.9)
RBC # BLD AUTO: 3.64 10^6/UL (ref 4–5.4)
SODIUM SERPL-SCNC: 141 MMOL/L (ref 133–145)
WBC # BLD AUTO: 7.9 10^3/UL (ref 3.5–10.8)

## 2017-09-22 RX ADMIN — METRONIDAZOLE SCH MG: 250 TABLET ORAL at 09:18

## 2017-09-22 RX ADMIN — PREGABALIN SCH MG: 50 CAPSULE ORAL at 20:52

## 2017-09-22 RX ADMIN — Medication SCH CAP: at 20:07

## 2017-09-22 RX ADMIN — Medication SCH ML: at 11:04

## 2017-09-22 RX ADMIN — NYSTATIN SCH APPLIC: 100000 CREAM TOPICAL at 09:27

## 2017-09-22 RX ADMIN — Medication SCH UNITS: at 09:17

## 2017-09-22 RX ADMIN — Medication SCH ML: at 05:48

## 2017-09-22 RX ADMIN — TRIAMCINOLONE ACETONIDE SCH APPLIC: 0.25 CREAM TOPICAL at 20:09

## 2017-09-22 RX ADMIN — LOPERAMIDE HYDROCHLORIDE PRN MG: 2 CAPSULE ORAL at 20:08

## 2017-09-22 RX ADMIN — Medication SCH PKT: at 09:19

## 2017-09-22 RX ADMIN — OXYCODONE HYDROCHLORIDE PRN MG: 5 CAPSULE ORAL at 18:26

## 2017-09-22 RX ADMIN — POTASSIUM CHLORIDE SCH MEQ: 750 TABLET, FILM COATED, EXTENDED RELEASE ORAL at 09:18

## 2017-09-22 RX ADMIN — Medication SCH MG: at 09:17

## 2017-09-22 RX ADMIN — CYANOCOBALAMIN TAB 500 MCG SCH MCG: 500 TAB at 09:18

## 2017-09-22 RX ADMIN — METRONIDAZOLE SCH MG: 250 TABLET ORAL at 20:07

## 2017-09-22 RX ADMIN — Medication SCH MG: at 20:07

## 2017-09-22 RX ADMIN — Medication SCH MG: at 09:25

## 2017-09-22 RX ADMIN — OXYCODONE HYDROCHLORIDE PRN MG: 5 SOLUTION ORAL at 20:08

## 2017-09-22 RX ADMIN — PREGABALIN SCH MG: 50 CAPSULE ORAL at 13:18

## 2017-09-22 RX ADMIN — OMEPRAZOLE SCH MG: 20 CAPSULE, DELAYED RELEASE ORAL at 05:48

## 2017-09-22 RX ADMIN — Medication SCH: at 17:49

## 2017-09-22 RX ADMIN — ASPIRIN SCH MG: 81 TABLET, COATED ORAL at 09:17

## 2017-09-22 RX ADMIN — OXYCODONE HYDROCHLORIDE PRN MG: 5 SOLUTION ORAL at 13:17

## 2017-09-22 RX ADMIN — TRIAMCINOLONE ACETONIDE SCH APPLIC: 0.25 CREAM TOPICAL at 09:27

## 2017-09-22 RX ADMIN — FUROSEMIDE SCH MG: 40 TABLET ORAL at 09:18

## 2017-09-22 RX ADMIN — WATER SCH NOTE: 100 INJECTION, SOLUTION INTRAVENOUS at 07:13

## 2017-09-22 RX ADMIN — WATER SCH NOTE: 100 INJECTION, SOLUTION INTRAVENOUS at 18:41

## 2017-09-22 RX ADMIN — FOLIC ACID SCH MG: 1 TABLET ORAL at 09:24

## 2017-09-22 RX ADMIN — PREGABALIN SCH MG: 50 CAPSULE ORAL at 09:18

## 2017-09-22 RX ADMIN — NYSTATIN SCH APPLIC: 100000 CREAM TOPICAL at 20:10

## 2017-09-22 RX ADMIN — Medication SCH CAP: at 09:18

## 2017-09-22 NOTE — TRS
TRANSFER SUMMARY:

 

DATE OF ADMISSION:  09/13/17

 

DATE OF TRANSFER:  09/21/17

 

DISCHARGE DIAGNOSES:

1.  Ependymoma with paraplegia.

2.  Decubitus ulcer.

3.  Sepsis.

4.  Neurogenic bladder.

5.  Neurogenic bowel.

6.  Diarrhea.

7.  Obstructive sleep apnea.

8.  Chronic kidney disease stage 3.

9.  Asthma.

 

HISTORY OF PRESENT ILLNESS AND HOSPITAL COURSE:  For complete history of the 
events leading up to his rehab stay, please see the history and physical 
dictated by me on 09/13/17.  While on the rehab unit, the patient was seen in 
followup by Dr. Darby from Infectious Disease.  His antibiotics were changed 
to ceftriaxone and Flagyl.  This was to help deal with his diarrhea.  This was 
successful in helping slow his diarrhea.  In addition, Metamucil was added as a 
bulking agent.  This also seemed to help with his diarrhea.  His wound did not 
change greatly while on the rehab unit.  It remained a large deep decubitus 
ulcer but looked clean in appearance.  His VAC dressing was discontinued by Dr. Holley who saw him in followup while on the rehab unit.  He was changed to 
wet to dry normal saline packing for wound care.  The patient had developed a 
rash over his skin which appeared to be either allergic or fungal in 
appearance.  He was given a steroid cream, triamcinolone cream, and later was 
tried on an antifungal agent for this. While on the rehab unit the patient 
worked on transferring out of bed and sitting up.  Because of pain he was only 
able to sit up for about half an hour.  Transfers were dependent using a Bill 
lift.  He did attempt a slide board transfer, but it was felt with decubitus 
ulcers slide boards were not in his best interest.  The patient worked with 
physical therapy and occupational therapy while on the rehab unit.  He made 
modest gains.  As mentioned, he was able to sit up for about a half an hour 
with therapists.  The patient worked on his activities of daily living as well.
  He was able to make sudden gains with upper body dressing, but remained 
totally dependent for lower body dressing.  Due to his diarrhea, he had a lot 
of difficulty with toileting himself and was not able to make gains in that 
area.  He remained with a Rodriguez catheter while on the rehab unit.  It was 
decided that the patient should go to a swing bed in order to undergo continued 
nursing care, as it did not seem that the patient's wife would be able to take 
him home at the present time.  Further arrangements for pressure relieving bed 
will be made on the swing bed unit.  The patient will continue on IV 
antibiotics as well as oral Flagyl.  He will also continue on wet to dry 
dressings.

 

DISCHARGE DIET:  Regular.

 

DISCHARGE MEDICATIONS:

1.  Vitamin C 500 mg daily.

2.  Aspirin 81 mg daily.

3.  Ceftriaxone 2 g intravenously daily.

4.  Vitamin D 5000 units daily.

5.  Vitamin B12 of 1000 units daily.

6.  Lovenox 40 mg subcutaneously every day.

7.  Fentanyl patch 75 mcg an hour applying to the skin and changing every 72 
hours.

8.  Folic acid 0.5 mg daily.

9.  Iron, ferrous gluconate 324 mg twice a day.

10.  Lasix 40 mg daily.

11.  Culturelle 1 capsule twice a day.

12.  Imodium 2 mg every 4 hours as needed.

13.  Flagyl 500 mg twice a day.

14.  Nystatin cream to his rash twice a day.

15.  Prilosec 40 mg daily.

16.  Oxycodone oral solution 5 mg every 6 hours prior to transfers and 
oxycodone 5 mg every 4 hours as needed for moderate to severe pain.

17.  Potassium chloride tablets 10 mEq daily.

18.  Lyrica 150 mg 3 times a day.

19.  Metamucil 1 packet daily.

20.  Zinc sulfate 220 mg daily.

21.  Triamcinolone cream to the rash twice a day.

 

SERVICES AFTER DISCHARGE:  Physical therapy will work with the patient at wound 
care.

 

FOLLOWUP:  The patient can follow up with Dr. Vicente his primary care 
doctor after discharge from the swing bed unit.  He will be seen by Dr. Holley and Dr. Darby as well as by the hospitalist service.

 

 [id number]/[facility] #: [id number]

MTDD

## 2017-09-22 NOTE — PN
Subjective


Date of Service: 09/22/17


Interval History: 


.


Extensive meetings with patient and family





Now on [facility] for continued rehabilitation, pain control, wound care





Nursing staff is vigilantly turning patient





Discussed a few items


- goal to use wheelchair to bring patient outside -- will discuss with rehab 

team


- ongoing wound care supervised/led by Dr. Holley


- ongoing PT/OT daily


- frequent (1-2 hr max) turning and positioning


- vigilant nutrition - protein is a must!


- DME arrangements for discharge -- I discussed a mobile wheelchair with 

patient today.


.





Family History: Unchanged from Admission


Social History: Unchanged from Admission


Past Medical History: Unchanged from Admission





Objective


Active Medications: 


.


Acetaminophen (Tylenol Tab*)  650 mg PO Q6H PRN


   PRN Reason: FEVER/PAIN


Aspirin (Aspirin Ec Low Dose*)  81 mg PO DAILY [facility]


   Last Admin: 09/22/17 09:17 Dose:  81 mg


Bisacodyl (Dulcolax Supp*)  10 mg MI DAILY PRN


   PRN Reason: CONSTIPATION


Cholecalciferol (Vitamin D Tab*)  5,000 units PO DAILY [facility]


   Last Admin: 09/22/17 09:17 Dose:  5,000 units


Cyanocobalamin (Vitamin B12 Tab*)  1,000 mcg PO DAILY [facility]


   Last Admin: 09/22/17 09:18 Dose:  1,000 mcg


Docusate Sodium (Colace Cap*)  100 mg PO BID PRN


   PRN Reason: CONSTIPATION


Fentanyl (Duragesic  Patch 75 Mcg/Hr*)  75 mcg TRANSDERM Q72H [facility]


   Last Admin: 09/21/17 19:18 Dose:  75 mcg


Ferrous Gluconate (Fergon Tab*)  324 mg PO BID [facility]


   Last Admin: 09/22/17 09:25 Dose:  324 mg


Folic Acid (Folvite Tab*)  0.5 mg PO DAILY [facility]


   Last Admin: 09/22/17 09:24 Dose:  0.5 mg


Furosemide (Lasix Tab*)  40 mg PO DAILY [facility]


   Last Admin: 09/22/17 09:18 Dose:  40 mg


Heparin Sodium (Porcine) (Heparin Flush Picc/Ml/Cvc(*))  1 ml FLUSH 0600,1800 

[facility]


   PRN Reason: Protocol


   Last Admin: 09/22/17 17:49 Dose:  Not Given


Ceftriaxone Sodium 2 gm/ (Sodium Chloride)  100 mls @ 200 mls/hr IVPB DAILY [facility]


   Last Admin: 09/22/17 09:18 Dose:  200 mls/hr


Lactobacillus Rhamnosus (Culturelle*)  1 cap PO BID [facility]


   Last Admin: 09/22/17 09:18 Dose:  1 cap


Loperamide HCl (Imodium Cap*)  2 mg PO .SEE DIRECTIONS PRN


   PRN Reason: DIARRHEA


Metronidazole (Flagyl Tab*)  500 mg PO BID [facility]


   Last Admin: 09/22/17 09:18 Dose:  500 mg


Nystatin (Nystatin Cream*)  1 applic TOPICAL BID [facility]


   Last Admin: 09/22/17 09:27 Dose:  1 applic


Omeprazole (Prilosec Cap*)  40 mg PO DAILY@0600 [facility]


   Last Admin: 09/22/17 05:48 Dose:  40 mg


Ondansetron HCl (Zofran Odt Tab*)  4 mg PO Q8H PRN


   PRN Reason: NAUSEA/VOMITING


Oxycodone HCl (Oxycodone Oral.Soln*)  5 mg PO Q6H PRN


   PRN Reason: PAIN - MODERATE TO SEVERE


   Last Admin: 09/22/17 13:17 Dose:  5 mg


Oxycodone HCl (Roxycodone Tab*)  5 mg PO Q4H PRN


   PRN Reason: PAIN - MODERATE TO SEVERE


Pharmacy Profile Note (Fentanyl Patch Check Q Shift)  1 note N/A 0700,1900 [facility]


   Last Admin: 09/22/17 07:13 Dose:  1 note


Potassium Chloride (Klor Con Er Tab*)  10 meq PO DAILY [facility]


   Last Admin: 09/22/17 09:18 Dose:  10 meq


Pregabalin (Lyrica Cap(*))  150 mg PO TID [facility]


   Last Admin: 09/22/17 13:18 Dose:  150 mg


Psyllium Hydrophilic Mucilloid (Metamucil Romain*)  1 pkt PO DAILY [facility]


   Last Admin: 09/22/17 09:19 Dose:  1 pkt


Triamcinolone Acetonide (Triamcinolone 0.025% Oint *)  1 applic TOPICAL BID [facility]


   Last Admin: 09/22/17 09:27 Dose:  1 applic


Zinc Sulfate (Zinc-220 Cap*)  220 mg PO DAILY [facility]


   Last Admin: 09/22/17 09:17 Dose:  220 mg


.


 Vital Signs











  09/21/17 09/21/17 09/21/17





  18:29 19:48 21:25


 


Temperature 98.6 F 99.0 F 


 


Pulse Rate 85 84 


 


Respiratory 18 16 16





Rate   


 


Blood Pressure 130/48 154/60 





(mmHg)   


 


O2 Sat by Pulse 97 97 





Oximetry   














  09/21/17 09/21/17 09/21/17





  21:43 21:50 23:40


 


Temperature   98.5 F


 


Pulse Rate   85


 


Respiratory 16 16 14





Rate   


 


Blood Pressure   133/52





(mmHg)   


 


O2 Sat by Pulse   93





Oximetry   








Oxygen Devices in Use Now: Nasal Cannula


Appearance: better spirits today. A&Ox3.


Eyes: No Scleral Icterus


Ears/Nose/Mouth/Throat: Clear Oropharnyx


Neck: Trachea Midline


Respiratory: Symmetrical Chest Expansion and Respiratory Effort


Cardiovascular: NL Sounds; No Murmurs; No JVD


Abdominal: NL Sounds; No Tenderness; No Distention


Lymphatic: No Cervical Adenopathy


Extremities: - - poor lower extremity muscle bulk.


Skin: - - large covered sacral decubitus ulcer. Not undressed on my visit. 


Neurological: Alert and Oriented x 3, NL Sensation


Lines/Tubes/Other Access: Clean, Dry and Intact PICC Line


Nutrition: Taking PO's


Result Diagrams: 


 09/22/17 05:40





 09/22/17 05:40





Assess/Plan/Problems-Billing


.


Assessment: 


71 yo man with extreme pain chronically secondary to ependymoma. Now with large 

sacral decubitus ulcer associated with pressure injury. Accompanying 

debilitation associated with recent hospitalization.








- Patient Problems


(1) Back pain


Current Visit: No   Status: Chronic   Priority: High   Code(s): M54.9 - 

DORSALGIA, UNSPECIFIED   Comment: 


- continue opiate regimen.


- assess for over sedation daily   





(2) Sacral decubitus ulcer, stage IV


Current Visit: No   Status: Chronic   Priority: High   Code(s): L89.154 - 

PRESSURE ULCER OF SACRAL REGION, STAGE 4   Comment: 


- continuing wound care


- appreciate nutrition recommendations to support healing.   





(3) CKD (chronic kidney disease), stage III


Current Visit: No   Status: Chronic   Code(s): N18.3 - CHRONIC KIDNEY DISEASE, 

STAGE 3 (MODERATE)   SNOMED Code(s): 034573985


   Comment: 


Creatinine within baseline


Baseline creatinine appears to be between 1.3 and 1.5


Avoid nephrotoxic medications   





(4) Ependymoma


Current Visit: No   Status: Chronic   Priority: High   Code(s): C71.9 - 

MALIGNANT NEOPLASM OF BRAIN, UNSPECIFIED   Comment: 


- To the spine (dx in patient's 20s)


- With concurrent chronic back pain and inability to ambulate


- s/p multiple surgeries


- back anatomy puts patient at much higher risk for pressure ulcer formation, 

as does high opiate requirements and fragility with easily atained infections (

pneumonia / urinary tract infections). UTI's in particular are a high risk 

given his neurogenic bladder and indwelling london catheter.

## 2017-09-23 RX ADMIN — FOLIC ACID SCH MG: 1 TABLET ORAL at 09:18

## 2017-09-23 RX ADMIN — METRONIDAZOLE SCH MG: 250 TABLET ORAL at 20:56

## 2017-09-23 RX ADMIN — FUROSEMIDE SCH MG: 40 TABLET ORAL at 09:19

## 2017-09-23 RX ADMIN — ASPIRIN SCH MG: 81 TABLET, COATED ORAL at 09:18

## 2017-09-23 RX ADMIN — Medication SCH ML: at 10:54

## 2017-09-23 RX ADMIN — Medication SCH MG: at 09:18

## 2017-09-23 RX ADMIN — POTASSIUM CHLORIDE SCH MEQ: 750 TABLET, FILM COATED, EXTENDED RELEASE ORAL at 09:18

## 2017-09-23 RX ADMIN — TRIAMCINOLONE ACETONIDE SCH APPLIC: 0.25 CREAM TOPICAL at 09:25

## 2017-09-23 RX ADMIN — PREGABALIN SCH MG: 50 CAPSULE ORAL at 13:31

## 2017-09-23 RX ADMIN — Medication SCH CAP: at 20:56

## 2017-09-23 RX ADMIN — WATER SCH NOTE: 100 INJECTION, SOLUTION INTRAVENOUS at 18:51

## 2017-09-23 RX ADMIN — CYANOCOBALAMIN TAB 500 MCG SCH MCG: 500 TAB at 09:18

## 2017-09-23 RX ADMIN — Medication SCH UNITS: at 09:18

## 2017-09-23 RX ADMIN — OXYCODONE HYDROCHLORIDE PRN MG: 5 SOLUTION ORAL at 13:30

## 2017-09-23 RX ADMIN — Medication SCH ML: at 06:04

## 2017-09-23 RX ADMIN — WATER SCH NOTE: 100 INJECTION, SOLUTION INTRAVENOUS at 06:49

## 2017-09-23 RX ADMIN — NYSTATIN SCH APPLIC: 100000 CREAM TOPICAL at 21:25

## 2017-09-23 RX ADMIN — METRONIDAZOLE SCH MG: 250 TABLET ORAL at 09:18

## 2017-09-23 RX ADMIN — NYSTATIN SCH APPLIC: 100000 CREAM TOPICAL at 09:24

## 2017-09-23 RX ADMIN — OMEPRAZOLE SCH MG: 20 CAPSULE, DELAYED RELEASE ORAL at 06:04

## 2017-09-23 RX ADMIN — Medication SCH CAP: at 09:18

## 2017-09-23 RX ADMIN — PREGABALIN SCH MG: 50 CAPSULE ORAL at 21:26

## 2017-09-23 RX ADMIN — Medication SCH MG: at 20:56

## 2017-09-23 RX ADMIN — Medication SCH: at 09:24

## 2017-09-23 RX ADMIN — Medication SCH MG: at 09:19

## 2017-09-23 RX ADMIN — PREGABALIN SCH MG: 50 CAPSULE ORAL at 09:19

## 2017-09-23 RX ADMIN — OXYCODONE HYDROCHLORIDE PRN MG: 5 SOLUTION ORAL at 20:10

## 2017-09-23 RX ADMIN — TRIAMCINOLONE ACETONIDE SCH APPLIC: 0.25 CREAM TOPICAL at 21:25

## 2017-09-23 RX ADMIN — Medication SCH: at 17:37

## 2017-09-23 NOTE — PN
Subjective


Date of Service: 09/23/17


Interval History: 


.


Rounded on patient in early AM.





Was sleeping peacefully -- had just been turned by nursing -- wound looked 

about the same, by report (did not see it today)





Plan for possibly using wheelchair to go outside for a short while...honoring 

the pressure-avoidance goal. He does have a wheelchair cushion that is quite 

effective, and that would be used.





Re-iterated that goal for next week is to work out how to attain plastic 

surgery consult as outpatient.


.


Family History: Unchanged from Admission


Social History: Unchanged from Admission - no changes noted


Past Medical History: Unchanged from Admission





Objective


Active Medications: 


.


Acetaminophen (Tylenol Tab*)  650 mg PO Q6H PRN


   PRN Reason: FEVER/PAIN


Aspirin (Aspirin Ec Low Dose*)  81 mg PO DAILY [facility]


   Last Admin: 09/23/17 09:18 Dose:  81 mg


Bisacodyl (Dulcolax Supp*)  10 mg IN DAILY PRN


   PRN Reason: CONSTIPATION


Cholecalciferol (Vitamin D Tab*)  5,000 units PO DAILY [facility]


   Last Admin: 09/23/17 09:18 Dose:  5,000 units


Cyanocobalamin (Vitamin B12 Tab*)  1,000 mcg PO DAILY [facility]


   Last Admin: 09/23/17 09:18 Dose:  1,000 mcg


Docusate Sodium (Colace Cap*)  100 mg PO BID PRN


   PRN Reason: CONSTIPATION


Fentanyl (Duragesic  Patch 75 Mcg/Hr*)  75 mcg TRANSDERM Q72H [facility]


   Last Admin: 09/21/17 19:18 Dose:  75 mcg


Ferrous Gluconate (Fergon Tab*)  324 mg PO BID [facility]


   Last Admin: 09/23/17 09:19 Dose:  324 mg


Folic Acid (Folvite Tab*)  0.5 mg PO DAILY [facility]


   Last Admin: 09/23/17 09:18 Dose:  0.5 mg


Furosemide (Lasix Tab*)  40 mg PO DAILY [facility]


   Last Admin: 09/23/17 09:19 Dose:  40 mg


Heparin Sodium (Porcine) (Heparin Flush Picc/Ml/Cvc(*))  1 ml FLUSH 0600,1800 

[facility]


   PRN Reason: Protocol


   Last Admin: 09/23/17 10:54 Dose:  1 ml


Ceftriaxone Sodium 2 gm/ (Sodium Chloride)  100 mls @ 200 mls/hr IVPB DAILY [facility]


   Last Admin: 09/23/17 09:24 Dose:  200 mls/hr


Lactobacillus Rhamnosus (Culturelle*)  1 cap PO BID [facility]


   Last Admin: 09/23/17 09:18 Dose:  1 cap


Loperamide HCl (Imodium Cap*)  2 mg PO .SEE DIRECTIONS PRN


   PRN Reason: DIARRHEA


   Last Admin: 09/22/17 20:08 Dose:  2 mg


Metronidazole (Flagyl Tab*)  500 mg PO BID [facility]


   Last Admin: 09/23/17 09:18 Dose:  500 mg


Nystatin (Nystatin Cream*)  1 applic TOPICAL BID [facility]


   Last Admin: 09/23/17 09:24 Dose:  1 applic


Omeprazole (Prilosec Cap*)  40 mg PO DAILY@0600 [facility]


   Last Admin: 09/23/17 06:04 Dose:  40 mg


Ondansetron HCl (Zofran Odt Tab*)  4 mg PO Q8H PRN


   PRN Reason: NAUSEA/VOMITING


Oxycodone HCl (Oxycodone Oral.Soln*)  5 mg PO Q6H PRN


   PRN Reason: PAIN - MODERATE TO SEVERE


   Last Admin: 09/22/17 20:08 Dose:  5 mg


Oxycodone HCl (Roxycodone Tab*)  5 mg PO Q4H PRN


   PRN Reason: PAIN - MODERATE TO SEVERE


   Last Admin: 09/22/17 18:26 Dose:  5 mg


Pharmacy Profile Note (Fentanyl Patch Check Q Shift)  1 note N/A 0700,1900 [facility]


   Last Admin: 09/23/17 06:49 Dose:  1 note


Potassium Chloride (Klor Con Er Tab*)  10 meq PO DAILY [facility]


   Last Admin: 09/23/17 09:18 Dose:  10 meq


Pregabalin (Lyrica Cap(*))  150 mg PO TID [facility]


   Last Admin: 09/23/17 09:19 Dose:  150 mg


Psyllium Hydrophilic Mucilloid (Metamucil Romain*)  1 pkt PO DAILY [facility]


   Last Admin: 09/23/17 09:24 Dose:  Not Given


Triamcinolone Acetonide (Triamcinolone 0.025% Oint *)  1 applic TOPICAL BID [facility]


   Last Admin: 09/23/17 09:25 Dose:  1 applic


Zinc Sulfate (Zinc-220 Cap*)  220 mg PO DAILY [facility]


   Last Admin: 09/23/17 09:18 Dose:  220 mg


.


 Vital Signs











  09/23/17 09/23/17 09/23/17





  00:08 03:20 07:27


 


Temperature 97.7 F 97.5 F 


 


Pulse Rate 74 72 


 


Respiratory 16 16 18





Rate   


 


Blood Pressure 120/56 111/55 





(mmHg)   


 


O2 Sat by Pulse 93 98 





Oximetry   














  09/23/17 09/23/17 09/23/17





  07:57 09:19 10:58


 


Temperature 98.2 F  


 


Pulse Rate 72  


 


Respiratory 14 18 18





Rate   


 


Blood Pressure 129/60  





(mmHg)   


 


O2 Sat by Pulse 91  





Oximetry   











Oxygen Devices in Use Now: Nasal Cannula


Appearance: NAD; lying on side, sleeping


Eyes: No Scleral Icterus


Ears/Nose/Mouth/Throat: Clear Oropharnyx


Neck: Trachea Midline


Respiratory: Symmetrical Chest Expansion and Respiratory Effort


Cardiovascular: NL Sounds; No Murmurs; No JVD


Abdominal: NL Sounds; No Tenderness; No Distention


Lymphatic: No Cervical Adenopathy


Extremities: No Edema, - - paraplegic


Skin: - - large Stage IV sacaral ulcer- dressed


Neurological: Alert and Oriented x 3 - but sleeping when I entered


Lines/Tubes/Other Access: Clean, Dry and Intact PICC Line


Nutrition: Taking PO's


Result Diagrams: 


 09/22/17 05:40





 09/22/17 05:40





Assess/Plan/Problems-Billing


.


Assessment: 


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 Now with large 

sacral decubitus ulcer associated with pressure injury. Accompanying 

debilitation associated with recent hospitalization.








- Patient Problems


(1) Back pain


Current Visit: No   Status: Chronic   Priority: High   Code(s): M54.9 - 

DORSALGIA, UNSPECIFIED   Comment: 


- continue opiate regimen.


- assess for over sedation daily   





(2) Sacral decubitus ulcer, stage IV


Current Visit: No   Status: Chronic   Priority: High   Code(s): L89.154 - 

PRESSURE ULCER OF SACRAL REGION, STAGE 4   Comment: 


- continuing wound care


- appreciate nutrition recommendations to support healing.   





(3) CKD (chronic kidney disease), stage III


Current Visit: No   Status: Chronic   Code(s): N18.3 - CHRONIC KIDNEY DISEASE, 

STAGE 3 (MODERATE)   SNOMED Code(s): 755220115


   Comment: 


Creatinine within baseline


Baseline creatinine appears to be between 1.3 and 1.5


Avoid nephrotoxic medications   





(4) Ependymoma


Current Visit: No   Status: Chronic   Priority: High   Code(s): C71.9 - 

MALIGNANT NEOPLASM OF BRAIN, UNSPECIFIED   Comment: 


- To the spine (dx in patient's 20s)


- With concurrent chronic back pain and inability to ambulate


- s/p multiple surgeries


- back anatomy puts patient at much higher risk for pressure ulcer formation, 

as does high opiate requirements and fragility with easily acquired infections (

pneumonia / urinary tract infections). UTI's in particular are a high risk 

given his neurogenic bladder and indwelling london catheter.   





(5) Neurogenic bowel


Current Visit: Yes   Status: Chronic   Priority: High   Code(s): K59.2 - 

NEUROGENIC BOWEL, NOT ELSEWHERE CLASSIFIED   Comment: 


- vigilant efforts to keep sacral area clean, conducive to wound healing.   





(6) Neurogenic bladder


Current Visit: Yes   Status: Chronic   Priority: High   Code(s): N31.9 - 

NEUROMUSCULAR DYSFUNCTION OF BLADDER, UNSPECIFIED   Comment: 


- Indwelling urinary catheter.   





(7) Chronic indwelling [place] catheter


Current Visit: Yes   Status: Chronic   Priority: High   Code(s): Z92.89 - 

PERSONAL HISTORY OF OTHER MEDICAL TREATMENT   Comment: 


- high risk of UTI - currently on ABX

## 2017-09-23 NOTE — ADMNOTE
Subjective


Date of Service: 09/21/17


Interval History: 


.


Extensive meeting with patient's wife, Kimmy Pryor, to outline the goals of 

this hospitalization.





In essence, the patient will receive ongoing wound care and engage in ongoing 

rehabilitation efforts for the ultimate goal of returning to the ambulatory 

environment. He is currently requiring a level of care that is not possible to 

deliver with his current home resources.





Large pressure ulcer in sacral area, well defined prior to now with aggressive 

nursing and surgical involvement. 





At this time, Mr. Pryor is depressed and debilitated, but otherwise at his 

baseline. He has a current pain regimen that is effective at achieving adequate 

pain control while not altering his sensorium and putting him at risk for 

associated complications.





His supine positioning has created tendency for vomiting, and so his daily 

intake will be split into 6 smaller meals.





Also, nutrition efforts ongoing to increase amount of protein for better wound 

healing.


he is on an array of vitamins to promote skin healing.





He is on an aggressive turning and positioning schedule (no longer than every 2 

hours, but more frequently if possible).





Uses night BiPAP for BONIFACIO.





Patient may be experiencing some adjustment disorder / demoralization / 

depression relating to his current situation. His wife reports she has never 

seen him cry in over 40 years, but has now seen him very tearful recently.





      PAST MEDICAL HISTORY





- Ependymoma


- Large, Stage IV sacral decubitus ulcer


- Sepsis secondary to decubitus ulcer, now resolved


- Neurogenic bowel and bladder


- History of multiple back surgeries


- Obstructive Sleep Apnea - on home BiPAP


- Chronic Renal failure - Stage III


- Intrinsic Asthma


- Diarrhea associated with antibiotics (not C. Diff)





Family History: Unchanged from Admission


Social History: Unchanged from Admission - no changes noted


Past Medical History: Unchanged from Admission





Review of Systems





- Measurements


Intake and Output: 


.


Intake and Output Last 24 Hours











 09/21/17 09/22/17 09/23/17 09/24/17





 06:59 06:59 06:59 06:59


 


Intake Total  600 1580 780


 


Output Total  725 1950 


 


Balance  -125 -370 780


 


Weight  97.522 kg  


 


Intake:    


 


  IV Fluids   40 


 


    NS (0.9%)   40 


 


  IVPB   50 


 


    ABX - CEFTRIAXONE   50 


 


  Oral  600 1490 780


 


Output:    


 


  [place]  725 1950 


 


Other:    


 


  Date of Last Bowel   9/22/17 





  Movement    


 


  # Bowel Movements   1 


 


  Estimated Stool Amount   Small 














- Review of Systems


Constitutional Symptoms: 


   Negative: Weight Gain


Dermatology: Positive: Skin Lesions - large sacral debubitus ulcer


HEENT: 


   Negative: Change in Hearing


Eyes: Positive: Normal


Thyroid: Positive: Normal


Pulmonary: Positive: Asthma


   Negative: Cough, Hemoptysis, Wheezing


Cardiology: Positive: Normal


Gastroenterology: Positive: Other - loose stools


Genital - Urinary: Positive: Other - urinary catheter in place


Endocrinology: Positive: Normal


Hematologic/Lymphatic: Positive: Anemia - baseline


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 

ependymoma


Allergic/Immunologic: Positive: Athsma


   Negative: Hx Anaphylaxis, Hx Angioedema





Objective


Active Medications: 








Acetaminophen (Tylenol Tab*)  650 mg PO Q6H PRN


   PRN Reason: FEVER/PAIN


Aspirin (Aspirin Ec Low Dose*)  81 mg PO DAILY [facility]


   Last Admin: 09/23/17 09:18 Dose:  81 mg


Bisacodyl (Dulcolax Supp*)  10 mg DC DAILY PRN


   PRN Reason: CONSTIPATION


Cholecalciferol (Vitamin D Tab*)  5,000 units PO DAILY [facility]


   Last Admin: 09/23/17 09:18 Dose:  5,000 units


Cyanocobalamin (Vitamin B12 Tab*)  1,000 mcg PO DAILY [facility]


   Last Admin: 09/23/17 09:18 Dose:  1,000 mcg


Docusate Sodium (Colace Cap*)  100 mg PO BID PRN


   PRN Reason: CONSTIPATION


Fentanyl (Duragesic  Patch 75 Mcg/Hr*)  75 mcg TRANSDERM Q72H [facility]


   Last Admin: 09/21/17 19:18 Dose:  75 mcg


Ferrous Gluconate (Fergon Tab*)  324 mg PO BID [facility]


   Last Admin: 09/23/17 09:19 Dose:  324 mg


Folic Acid (Folvite Tab*)  0.5 mg PO DAILY [facility]


   Last Admin: 09/23/17 09:18 Dose:  0.5 mg


Furosemide (Lasix Tab*)  40 mg PO DAILY [facility]


   Last Admin: 09/23/17 09:19 Dose:  40 mg


Heparin Sodium (Porcine) (Heparin Flush Picc/Ml/Cvc(*))  1 ml FLUSH 0600,1800 

[facility]


   PRN Reason: Protocol


   Last Admin: 09/23/17 10:54 Dose:  1 ml


Ceftriaxone Sodium 2 gm/ (Sodium Chloride)  100 mls @ 200 mls/hr IVPB DAILY [facility]


   Last Admin: 09/23/17 09:24 Dose:  200 mls/hr


Lactobacillus Rhamnosus (Culturelle*)  1 cap PO BID [facility]


   Last Admin: 09/23/17 09:18 Dose:  1 cap


Loperamide HCl (Imodium Cap*)  2 mg PO .SEE DIRECTIONS PRN


   PRN Reason: DIARRHEA


   Last Admin: 09/22/17 20:08 Dose:  2 mg


Metronidazole (Flagyl Tab*)  500 mg PO BID [facility]


   Last Admin: 09/23/17 09:18 Dose:  500 mg


Nystatin (Nystatin Cream*)  1 applic TOPICAL BID [facility]


   Last Admin: 09/23/17 09:24 Dose:  1 applic


Omeprazole (Prilosec Cap*)  40 mg PO DAILY@0600 [facility]


   Last Admin: 09/23/17 06:04 Dose:  40 mg


Ondansetron HCl (Zofran Odt Tab*)  4 mg PO Q8H PRN


   PRN Reason: NAUSEA/VOMITING


Oxycodone HCl (Oxycodone Oral.Soln*)  5 mg PO Q6H PRN


   PRN Reason: PAIN - MODERATE TO SEVERE


   Last Admin: 09/22/17 20:08 Dose:  5 mg


Oxycodone HCl (Roxycodone Tab*)  5 mg PO Q4H PRN


   PRN Reason: PAIN - MODERATE TO SEVERE


   Last Admin: 09/22/17 18:26 Dose:  5 mg


Pharmacy Profile Note (Fentanyl Patch Check Q Shift)  1 note N/A 0700,1900 [facility]


   Last Admin: 09/23/17 06:49 Dose:  1 note


Potassium Chloride (Klor Con Er Tab*)  10 meq PO DAILY [facility]


   Last Admin: 09/23/17 09:18 Dose:  10 meq


Pregabalin (Lyrica Cap(*))  150 mg PO TID [facility]


   Last Admin: 09/23/17 09:19 Dose:  150 mg


Psyllium Hydrophilic Mucilloid (Metamucil Romain*)  1 pkt PO DAILY [facility]


   Last Admin: 09/23/17 09:24 Dose:  Not Given


Triamcinolone Acetonide (Triamcinolone 0.025% Oint *)  1 applic TOPICAL BID [facility]


   Last Admin: 09/23/17 09:25 Dose:  1 applic


Zinc Sulfate (Zinc-220 Cap*)  220 mg PO DAILY [facility]


   Last Admin: 09/23/17 09:18 Dose:  220 mg








 Vital Signs











  09/22/17 09/22/17 09/22/17





  13:17 13:18 15:17


 


Temperature   


 


Pulse Rate   


 


Respiratory 18 18 18





Rate   


 


Blood Pressure   





(mmHg)   


 


O2 Sat by Pulse   





Oximetry   














  09/22/17 09/22/17 09/22/17





  15:18 15:57 18:26


 


Temperature  98.6 F 


 


Pulse Rate  81 


 


Respiratory 18 15 18





Rate   


 


Blood Pressure  145/55 





(mmHg)   


 


O2 Sat by Pulse  97 





Oximetry   








Oxygen Devices in Use Now: Nasal Cannula


Eyes: No Scleral Icterus


Ears/Nose/Mouth/Throat: Clear Oropharnyx


Neck: Trachea Midline


Respiratory: Symmetrical Chest Expansion and Respiratory Effort


Cardiovascular: NL Sounds; No Murmurs; No JVD


Abdominal: NL Sounds; No Tenderness; No Distention


Lymphatic: No Cervical Adenopathy


Extremities: - - sascral decubitus ulcer - stage IV


Skin: - - sacral decubitus ulcer- stage IV


Neurological: Alert and Oriented x 3


Lines/Tubes/Other Access: Clean, Dry and Intact Peripheral IV


Nutrition: Taking PO's


Result Diagrams: 


 09/22/17 05:40





 09/22/17 05:40





Assess/Plan/Problems-Billing


.


Assessment: 


73 yo man with extreme pain chronically secondary to ependymoma. Now with large 

sacral decubitus ulcer associated with pressure injury. Accompanying 

debilitation associated with recent hospitalization.








- Patient Problems


(1) Back pain


Current Visit: No   Status: Chronic   Priority: High   Code(s): M54.9 - 

DORSALGIA, UNSPECIFIED   Comment: 


- continue opiate regimen.


- assess for over sedation daily   





(2) Sacral decubitus ulcer, stage IV


Current Visit: No   Status: Chronic   Priority: High   Code(s): L89.154 - 

PRESSURE ULCER OF SACRAL REGION, STAGE 4   Comment: 


- continuing wound care


- appreciate nutrition recommendations to support healing.   





(3) CKD (chronic kidney disease), stage III


Current Visit: No   Status: Chronic   Code(s): N18.3 - CHRONIC KIDNEY DISEASE, 

STAGE 3 (MODERATE)   SNOMED Code(s): 772079709


   Comment: 


Creatinine within baseline


Baseline creatinine appears to be between 1.3 and 1.5


Avoid nephrotoxic medications   





(4) Ependymoma


Current Visit: No   Status: Chronic   Priority: High   Code(s): C71.9 - 

MALIGNANT NEOPLASM OF BRAIN, UNSPECIFIED   Comment: 


- To the spine (dx in patient's 20s)


- With concurrent chronic back pain and inability to ambulate


- s/p multiple surgeries


- back anatomy puts patient at much higher risk for pressure ulcer formation, 

as does high opiate requirements and fragility with easily acquired infections (

pneumonia / urinary tract infections). UTI's in particular are a high risk 

given his neurogenic bladder and indwelling [place] catheter.

## 2017-09-24 RX ADMIN — OXYCODONE HYDROCHLORIDE PRN MG: 5 SOLUTION ORAL at 17:28

## 2017-09-24 RX ADMIN — TRIAMCINOLONE ACETONIDE SCH APPLIC: 0.25 CREAM TOPICAL at 08:53

## 2017-09-24 RX ADMIN — OXYCODONE HYDROCHLORIDE PRN MG: 5 CAPSULE ORAL at 18:29

## 2017-09-24 RX ADMIN — NYSTATIN SCH APPLIC: 100000 CREAM TOPICAL at 08:53

## 2017-09-24 RX ADMIN — POTASSIUM CHLORIDE SCH MEQ: 750 TABLET, FILM COATED, EXTENDED RELEASE ORAL at 08:51

## 2017-09-24 RX ADMIN — Medication SCH ML: at 06:24

## 2017-09-24 RX ADMIN — ASPIRIN SCH MG: 81 TABLET, COATED ORAL at 08:51

## 2017-09-24 RX ADMIN — Medication SCH: at 16:19

## 2017-09-24 RX ADMIN — FOLIC ACID SCH MG: 1 TABLET ORAL at 08:51

## 2017-09-24 RX ADMIN — OXYCODONE HYDROCHLORIDE PRN MG: 5 SOLUTION ORAL at 06:32

## 2017-09-24 RX ADMIN — TRIAMCINOLONE ACETONIDE SCH APPLIC: 0.25 CREAM TOPICAL at 21:58

## 2017-09-24 RX ADMIN — WATER SCH NOTE: 100 INJECTION, SOLUTION INTRAVENOUS at 18:56

## 2017-09-24 RX ADMIN — OXYCODONE HYDROCHLORIDE PRN MG: 5 CAPSULE ORAL at 13:55

## 2017-09-24 RX ADMIN — PREGABALIN SCH MG: 50 CAPSULE ORAL at 08:51

## 2017-09-24 RX ADMIN — FENTANYL TRANSDERMAL SCH MCG: 75 PATCH, EXTENDED RELEASE TRANSDERMAL at 17:22

## 2017-09-24 RX ADMIN — OMEPRAZOLE SCH MG: 20 CAPSULE, DELAYED RELEASE ORAL at 06:24

## 2017-09-24 RX ADMIN — Medication SCH CAP: at 22:07

## 2017-09-24 RX ADMIN — Medication SCH MG: at 22:08

## 2017-09-24 RX ADMIN — NYSTATIN SCH APPLIC: 100000 CREAM TOPICAL at 21:58

## 2017-09-24 RX ADMIN — WATER SCH NOTE: 100 INJECTION, SOLUTION INTRAVENOUS at 07:28

## 2017-09-24 RX ADMIN — Medication SCH PKT: at 08:50

## 2017-09-24 RX ADMIN — METRONIDAZOLE SCH MG: 250 TABLET ORAL at 08:51

## 2017-09-24 RX ADMIN — OXYCODONE HYDROCHLORIDE PRN MG: 5 SOLUTION ORAL at 11:56

## 2017-09-24 RX ADMIN — Medication SCH UNITS: at 08:51

## 2017-09-24 RX ADMIN — Medication SCH ML: at 10:18

## 2017-09-24 RX ADMIN — Medication SCH MG: at 08:51

## 2017-09-24 RX ADMIN — FUROSEMIDE SCH MG: 40 TABLET ORAL at 08:52

## 2017-09-24 RX ADMIN — PREGABALIN SCH MG: 50 CAPSULE ORAL at 13:54

## 2017-09-24 RX ADMIN — METRONIDAZOLE SCH MG: 250 TABLET ORAL at 22:08

## 2017-09-24 RX ADMIN — CYANOCOBALAMIN TAB 500 MCG SCH MCG: 500 TAB at 08:50

## 2017-09-24 RX ADMIN — PREGABALIN SCH MG: 50 CAPSULE ORAL at 22:08

## 2017-09-24 RX ADMIN — Medication SCH CAP: at 08:51

## 2017-09-24 NOTE — PN
Progress Note





- Progress Note


Date of Service: 09/24/17


SOAP: 


Subjective:





Without complaint


Ate a good breakfast








Objective:





 











Temp Pulse Resp BP Pulse Ox


 


 98.3 F   77   20   153/71   96 


 


 09/24/17 07:22  09/24/17 07:22  09/24/17 08:51  09/24/17 07:22  09/24/17 07:22








PEX:


Sacral ulcer clean without odor or purulence.  Much improved granulation tissue 

and less necrotic fat. Still with bony exposure at midline.


Dimensions about the same.


Packing changed.








Assessment:


Sacral pressure ulcer








Plan:


Continue BID packing change


Will need some more debridement this week


IV abx

## 2017-09-24 NOTE — PN
Subjective


Date of Service: 09/24/17


Interval History: 


.


Pt in good spirits


sitting at side of bed eating breakfast


pain reasonably well-controlled





ulcer looked good as per surgery





will have family meeting later today.





may go outside daily in wheelchair...








Family History: Unchanged from Admission


Social History: Unchanged from Admission - no changes noted


Past Medical History: Unchanged from Admission





Objective


Active Medications: 


.


Acetaminophen (Tylenol Tab*)  650 mg PO Q6H PRN


   PRN Reason: FEVER/PAIN


Aspirin (Aspirin Ec Low Dose*)  81 mg PO DAILY [facility] include 234 beds at the [facility]


   Last Admin: 09/24/17 08:51 Dose:  81 mg


Bisacodyl (Dulcolax Supp*)  10 mg AL DAILY PRN


   PRN Reason: CONSTIPATION


Cholecalciferol (Vitamin D Tab*)  5,000 units PO DAILY Counts include 234 beds at the [facility]


   Last Admin: 09/24/17 08:51 Dose:  5,000 units


Cyanocobalamin (Vitamin B12 Tab*)  1,000 mcg PO DAILY Counts include 234 beds at the [facility]


   Last Admin: 09/24/17 08:50 Dose:  1,000 mcg


Docusate Sodium (Colace Cap*)  100 mg PO BID PRN


   PRN Reason: CONSTIPATION


Fentanyl (Duragesic  Patch 75 Mcg/Hr*)  75 mcg TRANSDERM Q72H Counts include 234 beds at the [facility]


   Last Admin: 09/21/17 19:18 Dose:  75 mcg


Ferrous Gluconate (Fergon Tab*)  324 mg PO BID Counts include 234 beds at the [facility]


   Last Admin: 09/24/17 08:51 Dose:  324 mg


Folic Acid (Folvite Tab*)  0.5 mg PO DAILY Counts include 234 beds at the [facility]


   Last Admin: 09/24/17 08:51 Dose:  0.5 mg


Furosemide (Lasix Tab*)  40 mg PO DAILY Counts include 234 beds at the [facility]


   Last Admin: 09/24/17 08:52 Dose:  40 mg


Heparin Sodium (Porcine) (Heparin Flush Picc/Ml/Cvc(*))  1 ml FLUSH 0600,1800 

Counts include 234 beds at the [facility]


   PRN Reason: Protocol


   Last Admin: 09/24/17 10:18 Dose:  1 ml


Ceftriaxone Sodium 2 gm/ (Sodium Chloride)  100 mls @ 200 mls/hr IVPB DAILY Counts include 234 beds at the [facility]


   Last Admin: 09/24/17 08:45 Dose:  200 mls/hr


Lactobacillus Rhamnosus (Culturelle*)  1 cap PO BID Counts include 234 beds at the [facility]


   Last Admin: 09/24/17 08:51 Dose:  1 cap


Loperamide HCl (Imodium Cap*)  2 mg PO .SEE DIRECTIONS PRN


   PRN Reason: DIARRHEA


   Last Admin: 09/22/17 20:08 Dose:  2 mg


Metronidazole (Flagyl Tab*)  500 mg PO BID Counts include 234 beds at the [facility]


   Last Admin: 09/24/17 08:51 Dose:  500 mg


Nystatin (Nystatin Cream*)  1 applic TOPICAL BID Counts include 234 beds at the [facility]


   Last Admin: 09/24/17 08:53 Dose:  1 applic


Omeprazole (Prilosec Cap*)  40 mg PO DAILY@0600 Counts include 234 beds at the [facility]


   Last Admin: 09/24/17 06:24 Dose:  40 mg


Ondansetron HCl (Zofran Odt Tab*)  4 mg PO Q8H PRN


   PRN Reason: NAUSEA/VOMITING


Oxycodone HCl (Oxycodone Oral.Soln*)  5 mg PO Q6H PRN


   PRN Reason: PAIN - MODERATE TO SEVERE


   Last Admin: 09/24/17 11:56 Dose:  5 mg


Oxycodone HCl (Roxycodone Tab*)  5 mg PO Q4H PRN


   PRN Reason: PAIN - MODERATE TO SEVERE


   Last Admin: 09/22/17 18:26 Dose:  5 mg


Pharmacy Profile Note (Fentanyl Patch Check Q Shift)  1 note N/A 0700,1900 Counts include 234 beds at the [facility]


   Last Admin: 09/24/17 07:28 Dose:  1 note


Potassium Chloride (Klor Con Er Tab*)  10 meq PO DAILY Counts include 234 beds at the [facility]


   Last Admin: 09/24/17 08:51 Dose:  10 meq


Pregabalin (Lyrica Cap(*))  150 mg PO TID Counts include 234 beds at the [facility]


   Last Admin: 09/24/17 08:51 Dose:  150 mg


Psyllium Hydrophilic Mucilloid (Metamucil Romain*)  1 pkt PO DAILY Counts include 234 beds at the [facility]


   Last Admin: 09/24/17 08:50 Dose:  1 pkt


Triamcinolone Acetonide (Triamcinolone 0.025% Oint *)  1 applic TOPICAL BID Counts include 234 beds at the [facility]


   Last Admin: 09/24/17 08:53 Dose:  1 applic


Zinc Sulfate (Zinc-220 Cap*)  220 mg PO DAILY Counts include 234 beds at the [facility]


   Last Admin: 09/24/17 08:51 Dose:  220 mg


.


 Vital Signs











  09/24/17 09/24/17 09/24/17





  08:29 08:51 10:47


 


Temperature   


 


Pulse Rate   


 


Respiratory 18 20 20





Rate   


 


Blood Pressure   





(mmHg)   


 


O2 Sat by Pulse   





Oximetry   














  09/24/17





  11:56


 


Temperature 


 


Pulse Rate 


 


Respiratory 18





Rate 


 


Blood Pressure 





(mmHg) 


 


O2 Sat by Pulse 





Oximetry 











Oxygen Devices in Use Now: Nasal Cannula


Appearance: NAD today


Eyes: No Scleral Icterus


Ears/Nose/Mouth/Throat: Clear Oropharnyx


Neck: NL Appearance and Movements; NL JVP


Respiratory: Symmetrical Chest Expansion and Respiratory Effort


Cardiovascular: NL Sounds; No Murmurs; No JVD


Extremities: - - paraplegic


Skin: - - sacral decubitus ulcer described by surgical team - packed - wet - 

dry dressing changes


Neurological: Alert and Oriented x 3


Lines/Tubes/Other Access: Clean, Dry and Intact PICC Line


Nutrition: Taking PO's


Result Diagrams: 


 09/22/17 05:40





 09/22/17 05:40





Assess/Plan/Problems-Billing


.


Assessment: 


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 Now with large 

sacral decubitus ulcer associated with pressure injury. Accompanying 

debilitation associated with recent hospitalization.








- Patient Problems


(1) Back pain


Current Visit: No   Status: Chronic   Priority: High   Code(s): M54.9 - 

DORSALGIA, UNSPECIFIED   Comment: 


- continue opiate regimen.


- assess for over sedation daily


- may give 7.5 mg oxycodone before wheelchair transfer   





(2) Sacral decubitus ulcer, stage IV


Current Visit: No   Status: Chronic   Priority: High   Code(s): L89.154 - 

PRESSURE ULCER OF SACRAL REGION, STAGE 4   Comment: 


- continuing wound care


- appreciate nutrition recommendations to support healing.   





(3) CKD (chronic kidney disease), stage III


Current Visit: No   Status: Chronic   Code(s): N18.3 - CHRONIC KIDNEY DISEASE, 

STAGE 3 (MODERATE)   SNOMED Code(s): 667453582


   Comment: 


Creatinine within baseline


Baseline creatinine appears to be between 1.3 and 1.5


Avoid nephrotoxic medications   





(4) Ependymoma


Current Visit: No   Status: Chronic   Priority: High   Code(s): C71.9 - 

MALIGNANT NEOPLASM OF BRAIN, UNSPECIFIED   Comment: 


- To the spine (dx in patient's 20s)


- With concurrent chronic back pain and inability to ambulate


- s/p multiple surgeries


- back anatomy puts patient at much higher risk for pressure ulcer formation, 

as does high opiate requirements and fragility with easily acquired infections (

pneumonia / urinary tract infections). UTI's in particular are a high risk 

given his neurogenic bladder and indwelling [place] catheter.   





(5) Neurogenic bowel


Current Visit: Yes   Status: Chronic   Priority: High   Code(s): K59.2 - 

NEUROGENIC BOWEL, NOT ELSEWHERE CLASSIFIED   Comment: 


- vigilant efforts to keep sacral area clean, conducive to wound healing.   





(6) Neurogenic bladder


Current Visit: Yes   Status: Chronic   Priority: High   Code(s): N31.9 - 

NEUROMUSCULAR DYSFUNCTION OF BLADDER, UNSPECIFIED   Comment: 


- Indwelling urinary catheter.   





(7) Chronic indwelling [place] catheter


Current Visit: Yes   Status: Chronic   Priority: High   Code(s): Z92.89 - 

PERSONAL HISTORY OF OTHER MEDICAL TREATMENT   Comment: 


- high risk of UTI - currently on ABX

## 2017-09-25 RX ADMIN — FUROSEMIDE SCH MG: 40 TABLET ORAL at 09:20

## 2017-09-25 RX ADMIN — FOLIC ACID SCH MG: 1 TABLET ORAL at 09:21

## 2017-09-25 RX ADMIN — NYSTATIN SCH APPLIC: 100000 CREAM TOPICAL at 11:52

## 2017-09-25 RX ADMIN — PREGABALIN SCH MG: 50 CAPSULE ORAL at 09:22

## 2017-09-25 RX ADMIN — ASPIRIN SCH MG: 81 TABLET, COATED ORAL at 09:21

## 2017-09-25 RX ADMIN — Medication SCH ML: at 06:16

## 2017-09-25 RX ADMIN — WATER SCH NOTE: 100 INJECTION, SOLUTION INTRAVENOUS at 06:56

## 2017-09-25 RX ADMIN — ENOXAPARIN SODIUM SCH MG: 40 INJECTION SUBCUTANEOUS at 18:21

## 2017-09-25 RX ADMIN — Medication SCH PKT: at 09:19

## 2017-09-25 RX ADMIN — Medication SCH ML: at 10:27

## 2017-09-25 RX ADMIN — METRONIDAZOLE SCH MG: 250 TABLET ORAL at 21:29

## 2017-09-25 RX ADMIN — TRIAMCINOLONE ACETONIDE SCH APPLIC: 0.25 CREAM TOPICAL at 11:52

## 2017-09-25 RX ADMIN — PREGABALIN SCH MG: 50 CAPSULE ORAL at 14:03

## 2017-09-25 RX ADMIN — Medication SCH CAP: at 21:30

## 2017-09-25 RX ADMIN — PREGABALIN SCH MG: 50 CAPSULE ORAL at 21:29

## 2017-09-25 RX ADMIN — TRIAMCINOLONE ACETONIDE SCH APPLIC: 0.25 CREAM TOPICAL at 21:35

## 2017-09-25 RX ADMIN — OXYCODONE HYDROCHLORIDE PRN MG: 5 SOLUTION ORAL at 12:32

## 2017-09-25 RX ADMIN — Medication SCH MG: at 09:21

## 2017-09-25 RX ADMIN — CYANOCOBALAMIN TAB 500 MCG SCH MCG: 500 TAB at 09:20

## 2017-09-25 RX ADMIN — OXYCODONE HYDROCHLORIDE PRN MG: 5 SOLUTION ORAL at 06:15

## 2017-09-25 RX ADMIN — Medication SCH UNITS: at 09:22

## 2017-09-25 RX ADMIN — METRONIDAZOLE SCH MG: 250 TABLET ORAL at 09:21

## 2017-09-25 RX ADMIN — Medication SCH ML: at 18:22

## 2017-09-25 RX ADMIN — OMEPRAZOLE SCH MG: 20 CAPSULE, DELAYED RELEASE ORAL at 06:16

## 2017-09-25 RX ADMIN — OXYCODONE HYDROCHLORIDE PRN MG: 5 SOLUTION ORAL at 00:13

## 2017-09-25 RX ADMIN — WATER SCH NOTE: 100 INJECTION, SOLUTION INTRAVENOUS at 18:42

## 2017-09-25 RX ADMIN — Medication SCH MG: at 21:29

## 2017-09-25 RX ADMIN — POTASSIUM CHLORIDE SCH MEQ: 750 TABLET, FILM COATED, EXTENDED RELEASE ORAL at 09:20

## 2017-09-25 RX ADMIN — NYSTATIN SCH APPLIC: 100000 CREAM TOPICAL at 21:35

## 2017-09-25 RX ADMIN — Medication SCH CAP: at 09:22

## 2017-09-25 RX ADMIN — Medication SCH MG: at 09:20

## 2017-09-25 NOTE — PN
Subjective


Date of Service: 09/25/17


Interval History: 





Patient has no acute complaints overnight. Pain in sacral wound well 

controlled. Patient denies CP, SOB, Abdominal Pain, N/V. Patient unable to feel 

body from waist down. Plan discussed at length with family and they are 

anxiously waiting the plastic surgery telemedicine consult and are moderately 

satisfied with the care here, but would like more turning and repositioning and 

would like a more concerted effort to accomplish the 6 meals a day plan.


Family History: Unchanged from Admission


Social History: Unchanged from Admission - no changes noted


Past Medical History: Unchanged from Admission





Objective


Active Medications: 








Acetaminophen (Tylenol Tab*)  650 mg PO Q6H PRN


   PRN Reason: FEVER/PAIN


Aspirin (Aspirin Ec Low Dose*)  81 mg PO DAILY [facility]


   Last Admin: 09/25/17 09:21 Dose:  81 mg


Bisacodyl (Dulcolax Supp*)  10 mg [place] DAILY PRN


   PRN Reason: CONSTIPATION


Cholecalciferol (Vitamin D Tab*)  5,000 units PO DAILY [facility]


   Last Admin: 09/25/17 09:22 Dose:  5,000 units


Cyanocobalamin (Vitamin B12 Tab*)  1,000 mcg PO DAILY [facility]


   Last Admin: 09/25/17 09:20 Dose:  1,000 mcg


Docusate Sodium (Colace Cap*)  100 mg PO BID PRN


   PRN Reason: CONSTIPATION


Fentanyl (Duragesic  Patch 75 Mcg/Hr*)  75 mcg TRANSDERM Q72H [facility]


   Last Admin: 09/24/17 17:22 Dose:  75 mcg


Ferrous Gluconate (Fergon Tab*)  324 mg PO BID [facility]


   Last Admin: 09/25/17 09:20 Dose:  324 mg


Folic Acid (Folvite Tab*)  0.5 mg PO DAILY [facility]


   Last Admin: 09/25/17 09:21 Dose:  0.5 mg


Furosemide (Lasix Tab*)  40 mg PO DAILY [facility]


   Last Admin: 09/25/17 09:20 Dose:  40 mg


Heparin Sodium (Porcine) (Heparin Flush Picc/Ml/Cvc(*))  1 ml FLUSH 0600,1800 

[facility]


   PRN Reason: Protocol


   Last Admin: 09/25/17 10:27 Dose:  1 ml


Ceftriaxone Sodium 2 gm/ (Sodium Chloride)  100 mls @ 200 mls/hr IVPB DAILY [facility]


   Last Admin: 09/25/17 09:19 Dose:  200 mls/hr


Lactobacillus Rhamnosus (Culturelle*)  1 cap PO BID [facility]


   Last Admin: 09/25/17 09:22 Dose:  1 cap


Loperamide HCl (Imodium Cap*)  2 mg PO .SEE DIRECTIONS PRN


   PRN Reason: DIARRHEA


   Last Admin: 09/22/17 20:08 Dose:  2 mg


Metronidazole (Flagyl Tab*)  500 mg PO BID [facility]


   Last Admin: 09/25/17 09:21 Dose:  500 mg


Nystatin (Nystatin Cream*)  1 applic TOPICAL BID [facility]


   Last Admin: 09/25/17 11:52 Dose:  1 applic


Omeprazole (Prilosec Cap*)  40 mg PO DAILY@0600 [facility]


   Last Admin: 09/25/17 06:16 Dose:  40 mg


Ondansetron HCl (Zofran Odt Tab*)  4 mg PO Q8H PRN


   PRN Reason: NAUSEA/VOMITING


Oxycodone HCl (Oxycodone Oral.Soln*)  5 mg PO Q6H PRN


   PRN Reason: PAIN - MODERATE TO SEVERE


   Last Admin: 09/25/17 12:32 Dose:  5 mg


Oxycodone HCl (Roxycodone Tab*)  5 mg PO Q4H PRN


   PRN Reason: PAIN - MODERATE TO SEVERE


   Last Admin: 09/24/17 18:29 Dose:  5 mg


Oxycodone HCl (Oxycodone Oral.Soln*)  7.5 mg PO Q24H PRN


   PRN Reason: SEE COMMENT


Pharmacy Profile Note (Fentanyl Patch Check Q Shift)  1 note N/A 0700,1900 [facility]


   Last Admin: 09/25/17 06:56 Dose:  1 note


Potassium Chloride (Klor Con Er Tab*)  10 meq PO DAILY [facility]


   Last Admin: 09/25/17 09:20 Dose:  10 meq


Pregabalin (Lyrica Cap(*))  150 mg PO TID [facility]


   Last Admin: 09/25/17 14:03 Dose:  150 mg


Psyllium Hydrophilic Mucilloid (Metamucil Romain*)  1 pkt PO DAILY [facility]


   Last Admin: 09/25/17 09:19 Dose:  1 pkt


Triamcinolone Acetonide (Triamcinolone 0.025% Oint *)  1 applic TOPICAL BID [facility]


   Last Admin: 09/25/17 11:52 Dose:  1 applic


Zinc Sulfate (Zinc-220 Cap*)  220 mg PO DAILY [facility]


   Last Admin: 09/25/17 09:21 Dose:  220 mg








 Vital Signs











  09/24/17 09/24/17 09/24/17





  18:29 19:25 19:28


 


Temperature   


 


Pulse Rate   


 


Respiratory 18 16 16





Rate   


 


Blood Pressure   





(mmHg)   


 


O2 Sat by Pulse   





Oximetry   














  09/24/17 09/24/17 09/24/17





  20:29 22:08 23:29


 


Temperature   98.6 F


 


Pulse Rate   91


 


Respiratory 16 16 16





Rate   


 


Blood Pressure   133/62





(mmHg)   


 


O2 Sat by Pulse   96





Oximetry   














  09/25/17 09/25/17 09/25/17





  00:08 00:13 02:13


 


Temperature   


 


Pulse Rate   


 


Respiratory 16 16 14





Rate   


 


Blood Pressure   





(mmHg)   


 


O2 Sat by Pulse   





Oximetry   














  09/25/17 09/25/17 09/25/17





  04:00 06:15 07:20


 


Temperature 98.9 F  98.0 F


 


Pulse Rate 76  71


 


Respiratory 14 14 18





Rate   


 


Blood Pressure 161/66  136/66





(mmHg)   


 


O2 Sat by Pulse 97  94





Oximetry   














  09/25/17 09/25/17 09/25/17





  07:58 08:15 09:22


 


Temperature   


 


Pulse Rate   


 


Respiratory 18 18 18





Rate   


 


Blood Pressure   





(mmHg)   


 


O2 Sat by Pulse   





Oximetry   














  09/25/17 09/25/17 09/25/17





  11:11 11:22 12:32


 


Temperature 97.2 F  


 


Pulse Rate 75  


 


Respiratory 18 18 18





Rate   


 


Blood Pressure 134/55  





(mmHg)   


 


O2 Sat by Pulse 97  





Oximetry   














  09/25/17 09/25/17 09/25/17





  14:03 14:32 16:23


 


Temperature   98.9 F


 


Pulse Rate   82


 


Respiratory 18 18 16





Rate   


 


Blood Pressure   111/34





(mmHg)   


 


O2 Sat by Pulse   95





Oximetry   











Oxygen Devices in Use Now: Nasal Cannula


Appearance: Patient is a 71yo male who appears stated age and is sitting 

alertly in the hospital bed propped up on pillows.


Eyes: No Scleral Icterus, PERRLA


Ears/Nose/Mouth/Throat: NL Teeth, Lips, Gums, Mucous Membranes Moist


Neck: NL Appearance and Movements; NL JVP


Respiratory: Symmetrical Chest Expansion and Respiratory Effort, Clear to 

Auscultation


Cardiovascular: NL Sounds; No Murmurs; No JVD, RRR, No Edema


Abdominal: No Hepatosplenomegaly, - - BS present and normoactive in all 4 

quadrants. Abdomen moderately distended. Nontender to palpation.


Lymphatic: No Cervical Adenopathy


Skin: - - Patient has an approximately 1tyt8tz (Did not measure) ulcer on his 

lower back with granulation tissue and a small amount of slough. Patient has an 

unstageable ulcer covered by black dissicated skin on his right foot. 


Neurological: Alert and Oriented x 3, - -  strength preserved in B/L upper 

extremities. Patient unable to move his legs or feel anything from the waist 

down.


Result Diagrams: 


 09/22/17 05:40





 09/22/17 05:40





Assess/Plan/Problems-Billing


.


Assessment: 


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 Now with large 

sacral decubitus ulcer which has been debrided and is healing nicely. 

Accompanying debilitation associated with recent hospitalization having a 

negative effect on patient's mental state. Patient currently euthymic.








- Patient Problems


(1) Acute osteomyelitis of sacrum


Current Visit: Yes   Status: Acute   Code(s): M46.28 - OSTEOMYELITIS OF VERTEBRA

, SACRAL AND SACROCOCCYGEAL REGION   SNOMED Code(s): 171559958


   Comment: 


Continue Ceftriaxone and flagyl at current doses per ID.   





(2) Chronic indwelling Rodriguez catheter


Current Visit: Yes   Status: Chronic   Priority: High   Code(s): Z92.89 - 

PERSONAL HISTORY OF OTHER MEDICAL TREATMENT   SNOMED Code(s): 751198842


   Comment: 


High risk of UTI - currently on ABX, no current evidence of UTI.   





(3) Neurogenic bladder


Current Visit: Yes   Status: Chronic   Priority: High   Code(s): N31.9 - 

NEUROMUSCULAR DYSFUNCTION OF BLADDER, UNSPECIFIED   SNOMED Code(s): 532872852


   Comment: 


Indwelling urinary catheter, no sign of current UTI.   





(4) Neurogenic bowel


Current Visit: Yes   Status: Chronic   Priority: High   Code(s): K59.2 - 

NEUROGENIC BOWEL, NOT ELSEWHERE CLASSIFIED   SNOMED Code(s): 412665293


   Comment: 


Having frequent loose bowel movements. Frequent pericare to prevent skin 

breakdown. Flagyl to help with diarrhea.   





(5) Sacral decubitus ulcer, stage IV


Current Visit: Yes   Status: Chronic   Priority: High   Code(s): L89.154 - 

PRESSURE ULCER OF SACRAL REGION, STAGE 4   SNOMED Code(s): 066954508


   Comment: 


Continuing wound care, will need continued debridement per surgery


Appreciate nutrition recommendations to support healing, attempt for 6 small 

protein rich meals daily and continue vitamins. 


   





(6) DVT prophylaxis


Current Visit: No   Status: Acute   Code(s): UTE5573 -    SNOMED Code(s): 

510833058


   Comment: 


Lovenox


SCDs in bed.   





(7) Diarrhea


Current Visit: No   Status: Acute   Code(s): R19.7 - DIARRHEA, UNSPECIFIED   

SNOMED Code(s): 71838565


   Comment: 


Persistent diarrhea, Likely ABX associated but not Cdiff. Continue Flagyl, 

Probiotic, Metamucil, and Immodium.


   





(8) Chronic back pain


Current Visit: No   Status: Chronic   Code(s): M54.9 - DORSALGIA, UNSPECIFIED; 

G89.29 - OTHER CHRONIC PAIN   SNOMED Code(s): 375423624


   Comment: 


Secondary to ependymoma


Continue pregabalin, and acetaminophen.


Continue opiate regimen. Assess for oversedation frequently.


May have hydrocodone 7.5mg for transfers.   





(9) BONIFACIO (obstructive sleep apnea)


Current Visit: No   Status: Chronic   Code(s): G47.33 - OBSTRUCTIVE SLEEP APNEA 

(ADULT) (PEDIATRIC)   SNOMED Code(s): 14528523


   Comment: 


Continue home BiPAP use.   





(10) Paraplegia


Current Visit: No   Status: Chronic   Code(s): G82.20 - PARAPLEGIA, UNSPECIFIED

   SNOMED Code(s): 04375709


   Comment: 


Secondary to ependymoma


Cont with skin precautions - frequent turning, skin care. 


Cont pain management


Plan for spinal stimulator implantation with Dr. Huerta ([place]) 

eventually   





(11) Full code status


Current Visit: No   Status: Acute   Code(s): Z78.9 - OTHER SPECIFIED HEALTH 

STATUS   SNOMED Code(s): 047247900


   


Status and Disposition: 





Awaiting plastic surgery consult. Plan is to return home when able to do so 

functionally. 


Attending: Disha Duncan

## 2017-09-25 NOTE — PN
Subjective





- Subjective


Reason for Note: Progress Note


History: 





72 year old man with sacral decubitus ulcer, no sacral pain, fever, rash.  

Bowel movements soft.  Eating fine.  No problems with PICC.  


Active Problems: 


 Active Problems





Acute osteomyelitis of sacrum (Acute) M46.28


Chronic indwelling Rodriguez catheter (Chronic) Z92.89


  - high risk of UTI - currently on ABX 


Neurogenic bladder (Chronic) N31.9


  - Indwelling urinary catheter. 


Neurogenic bowel (Chronic) K59.2


  - vigilant efforts to keep sacral area clean, conducive to wound healing. 


Sacral decubitus ulcer, stage IV (Chronic) L89.154


  - continuing wound care - appreciate nutrition recommendations to support 

healing. 








Current Medications: 


 Current Medications





Acetaminophen (Tylenol Tab*)  650 mg PO Q6H PRN


   PRN Reason: FEVER/PAIN


Aspirin (Aspirin Ec Low Dose*)  81 mg PO DAILY [facility]


   Last Admin: 09/25/17 09:21 Dose:  81 mg


Bisacodyl (Dulcolax Supp*)  10 mg [place] DAILY PRN


   PRN Reason: CONSTIPATION


Cholecalciferol (Vitamin D Tab*)  5,000 units PO DAILY [facility]


   Last Admin: 09/25/17 09:22 Dose:  5,000 units


Cyanocobalamin (Vitamin B12 Tab*)  1,000 mcg PO DAILY [facility]


   Last Admin: 09/25/17 09:20 Dose:  1,000 mcg


Docusate Sodium (Colace Cap*)  100 mg PO BID PRN


   PRN Reason: CONSTIPATION


Fentanyl (Duragesic  Patch 75 Mcg/Hr*)  75 mcg TRANSDERM Q72H [facility]


   Last Admin: 09/24/17 17:22 Dose:  75 mcg


Ferrous Gluconate (Fergon Tab*)  324 mg PO BID [facility]


   Last Admin: 09/25/17 09:20 Dose:  324 mg


Folic Acid (Folvite Tab*)  0.5 mg PO DAILY [facility]


   Last Admin: 09/25/17 09:21 Dose:  0.5 mg


Furosemide (Lasix Tab*)  40 mg PO DAILY [facility]


   Last Admin: 09/25/17 09:20 Dose:  40 mg


Heparin Sodium (Porcine) (Heparin Flush Picc/Ml/Cvc(*))  1 ml FLUSH 0600,1800 

[facility]


   PRN Reason: Protocol


   Last Admin: 09/25/17 10:27 Dose:  1 ml


Ceftriaxone Sodium 2 gm/ (Sodium Chloride)  100 mls @ 200 mls/hr IVPB DAILY [facility]


   Last Admin: 09/25/17 09:19 Dose:  200 mls/hr


Lactobacillus Rhamnosus (Culturelle*)  1 cap PO BID [facility]


   Last Admin: 09/25/17 09:22 Dose:  1 cap


Loperamide HCl (Imodium Cap*)  2 mg PO .SEE DIRECTIONS PRN


   PRN Reason: DIARRHEA


   Last Admin: 09/22/17 20:08 Dose:  2 mg


Metronidazole (Flagyl Tab*)  500 mg PO BID [facility]


   Last Admin: 09/25/17 09:21 Dose:  500 mg


Nystatin (Nystatin Cream*)  1 applic TOPICAL BID [facility]


   Last Admin: 09/25/17 11:52 Dose:  1 applic


Omeprazole (Prilosec Cap*)  40 mg PO DAILY@0600 [facility]


   Last Admin: 09/25/17 06:16 Dose:  40 mg


Ondansetron HCl (Zofran Odt Tab*)  4 mg PO Q8H PRN


   PRN Reason: NAUSEA/VOMITING


Oxycodone HCl (Oxycodone Oral.Soln*)  5 mg PO Q6H PRN


   PRN Reason: PAIN - MODERATE TO SEVERE


   Last Admin: 09/25/17 06:15 Dose:  5 mg


Oxycodone HCl (Roxycodone Tab*)  5 mg PO Q4H PRN


   PRN Reason: PAIN - MODERATE TO SEVERE


   Last Admin: 09/24/17 18:29 Dose:  5 mg


Oxycodone HCl (Oxycodone Oral.Soln*)  7.5 mg PO Q24H PRN


   PRN Reason: SEE COMMENT


Pharmacy Profile Note (Fentanyl Patch Check Q Shift)  1 note N/A 0700,1900 [facility]


   Last Admin: 09/25/17 06:56 Dose:  1 note


Potassium Chloride (Klor Con Er Tab*)  10 meq PO DAILY [facility]


   Last Admin: 09/25/17 09:20 Dose:  10 meq


Pregabalin (Lyrica Cap(*))  150 mg PO TID [facility]


   Last Admin: 09/25/17 09:22 Dose:  150 mg


Psyllium Hydrophilic Mucilloid (Metamucil Chuck*)  1 pkt PO DAILY [facility]


   Last Admin: 09/25/17 09:19 Dose:  1 pkt


Triamcinolone Acetonide (Triamcinolone 0.025% Oint *)  1 applic TOPICAL BID [facility]


   Last Admin: 09/25/17 11:52 Dose:  1 applic


Zinc Sulfate (Zinc-220 Cap*)  220 mg PO DAILY [facility]


   Last Admin: 09/25/17 09:21 Dose:  220 mg











- Review of Systems


Constitutional Symptoms: Yes: Weakness, No: Weight Gain


Dermatology: Normal: Yes, Rash: No


HEENT: Yes Normal, No Change in Hearing


Eyes: Positive: Normal


Thyroid: Positive: Normal


Pulmonary: Positive: Normal


Cardiology: Positive: Normal


Genital - Urinary: Positive: Normal


Endocrinology: Positive: Normal


Neurology: Positive: Normal


Psychiatry: Positive: Normal - Continue ceftriaxone/flagyl; CRP, CBC, CMP 

ordered for 9/26.


Home Medications: 


 Home Medications











 Medication  Instructions  Recorded  Confirmed  Type


 


Aspirin Low Dose CHEW TAB* 81 mg PO DAILY 09/12/14 09/22/17 History





[Aspirin Low Dose TAB*]    


 


Acetaminophen [Acetaminophen ER] 650 mg PO Q4HR PRN 08/07/17 09/22/17 History


 


Cholecalciferol [Vitamin D3 Ultra 5,000 unit PO DAILY 08/07/17 09/22/17 History





Strength]    


 


Cyanocobalamin TAB* [Vitamin B12 1,000 mcg PO DAILY 08/07/17 09/22/17 History





TAB*]    


 


Ferrous Gluconate TAB* [Fergon 324 mg PO BID 08/07/17 09/22/17 History





TAB*]    


 


Folic Acid 400 mcg PO DAILY 08/07/17 09/22/17 History


 


Omeprazole CAP* [Prilosec CAP* 20 40 mg PO DAILY 08/07/17 09/22/17 History





MG]    


 


Potassium Chloride [Klor-Con 10 meq PO DAILY 08/07/17 09/22/17 History





Sprinkle]    


 


Pregabalin CAP(*) [Lyrica CAP(*)] 150 mg PO TID 08/07/17 09/22/17 History


 


Furosemide TAB* [Lasix TAB*] 40 mg PO BID 08/23/17 09/22/17 History


 


Oxycodone TAB(NF) [Oxycodone HCl 10 mg PO Q4H PRN  mg 09/01/17 09/22/17 

History





10 MG]    


 


fentaNYL PATCH 75 MCG/HR* 1 applic TRANSDERM Q72HR 09/01/17 09/22/17 History





[Duragesic  PATCH 75 Mcg/Hr*]    


 


Acetaminophen TAB* [Tylenol TAB*] 650 mg PO Q6H PRN #0 tab 09/21/17 09/22/17 Rx


 


Ascorbic Acid TAB* [Vitamin C 500 mg PO DAILY  tab 09/21/17 09/22/17 Rx





TAB*]    


 


Aspirin EC Low Dose* [Ecotrin EC 81 mg PO DAILY  tab.ec 09/21/17 09/22/17 Rx





Low Dose 81 MG*]    


 


Cholecalciferol TAB* [Vitamin D 5,000 units PO DAILY  tab 09/21/17 09/22/17 Rx





TAB*]    


 


Cyanocobalamin TAB* [Vitamin B12 1,000 mcg PO DAILY  tab 09/21/17 09/22/17 Rx





TAB*]    


 


Docusate CAP* [Colace Cap*] 100 mg PO BID PRN #0 cap 09/21/17 09/22/17 Rx


 


Enoxaparin(*) [Lovenox(*)] 40 mg SUBCUT Q24H  syringe 09/21/17 09/22/17 Rx


 


Ferrous Gluconate TAB* [Fergon 324 mg PO BID  tab 09/21/17 09/22/17 Rx





TAB*]    


 


Folic Acid TAB* [Folvite TAB*] 0.5 mg PO DAILY  tab 09/21/17 09/22/17 Rx


 


Furosemide TAB* [Lasix TAB*] 40 mg PO DAILY  tab 09/21/17 09/22/17 Rx


 


Heparin FLUSH PICC/ML/CVC(*) 1 ml FLUSH 0600,1800  syringe 09/21/17 09/22/17 Rx


 


Lactobacillus Acidophilu (GG)* 1 cap PO BID  cap 09/21/17 09/22/17 Rx





[Culturelle*]    


 


Loperamide CAP* [Imodium CAP*] 2 mg PO .SEE DIRECTIONS PRN #0 cap 09/21/17 09/22 /17 Rx


 


Nystatin CREAM* 1 applic TOPICAL BID  tube 09/21/17 09/22/17 Rx


 


Omeprazole CAP* [Prilosec CAP* 20 40 mg PO DAILY@0700  cap 09/21/17 09/22/17 Rx





MG]    


 


Ondansetron ODT TAB* [Zofran 4 MG 4 mg PO Q8H PRN #0 tab 09/21/17 09/22/17 Rx





Odt TAB*]    


 


Potassium Chlor TAB* [Klor Con ER 10 meq PO DAILY  tab.er 09/21/17 09/22/17 Rx





TAB 10 MEQ*]    


 


Pregabalin CAP(*) [Lyrica CAP(*)] 150 mg PO TID  cap  mg 09/21/17 09/22/ 17 Rx


 


Psyllium CHUCK* [Metamucil CHUCK*] 1 pkt PO DAILY  packet 09/21/17 09/22/17 Rx


 


Triamcinolone 0.025% OINT * 1 applic TOPICAL BID  gm 09/21/17 09/22/17 Rx


 


Zinc Sulfate CAP* [Zinc-220 CAP*] 220 mg PO DAILY  cap 09/21/17 09/22/17 Rx


 


fentaNYL PATCH 75 MCG/HR* 75 mcg TRANSDERM Q72H  patch MDD 1 09/21/17 09/22/17 

Rx





[Duragesic  PATCH 75 Mcg/Hr*] patch   


 


fentaNYL Patch Check Q Shift 1 note N/A 0700,1500,2300  note 09/21/17 09/22/17 

Rx





[Fentanyl Patch Check Q Shift]    


 


metroNIDAZOLE TAB* [Flagyl 250 mg 500 mg PO BID  tab 09/21/17 09/22/17 Rx





TAB*]    


 


oxyCODONE TAB* [Roxycodone TAB 5 5 mg PO Q4H PRN #0 tab MDD 6 pills 09/21/17 09/ 22/17 Rx





mg*]    











Allergies: 


 Allergies











Allergy/AdvReac Type Severity Reaction Status Date / Time


 


Atorvastatin [From Lipitor] Allergy  See Comment Verified 08/23/17 14:18














Objective





- Vital Signs


Vital Signs: 


 Vital Signs











  09/24/17 09/24/17 09/24/17





  13:54 13:55 13:56


 


Temperature   


 


Pulse Rate   


 


Respiratory 18 18 18





Rate   


 


Blood Pressure   





(mmHg)   


 


O2 Sat by Pulse   





Oximetry   














  09/24/17 09/24/17 09/24/17





  15:26 15:54 15:55


 


Temperature 37.3 C  


 


Pulse Rate 89  


 


Respiratory 18 18 18





Rate   


 


Blood Pressure 140/49  





(mmHg)   


 


O2 Sat by Pulse 96  





Oximetry   














  09/24/17 09/24/17 09/24/17





  17:28 18:29 19:25


 


Temperature   


 


Pulse Rate   


 


Respiratory 18 18 16





Rate   


 


Blood Pressure   





(mmHg)   


 


O2 Sat by Pulse   





Oximetry   














  09/24/17 09/24/17 09/24/17





  19:28 20:29 22:08


 


Temperature   


 


Pulse Rate   


 


Respiratory 16 16 16





Rate   


 


Blood Pressure   





(mmHg)   


 


O2 Sat by Pulse   





Oximetry   














  09/24/17 09/25/17 09/25/17





  23:29 00:08 00:13


 


Temperature 37.0 C  


 


Pulse Rate 91  


 


Respiratory 16 16 16





Rate   


 


Blood Pressure 133/62  





(mmHg)   


 


O2 Sat by Pulse 96  





Oximetry   














  09/25/17 09/25/17 09/25/17





  02:13 04:00 06:15


 


Temperature  37.2 C 


 


Pulse Rate  76 


 


Respiratory 14 14 14





Rate   


 


Blood Pressure  161/66 





(mmHg)   


 


O2 Sat by Pulse  97 





Oximetry   














  09/25/17 09/25/17 09/25/17





  07:20 07:58 08:15


 


Temperature 36.7 C  


 


Pulse Rate 71  


 


Respiratory 18 18 18





Rate   


 


Blood Pressure 136/66  





(mmHg)   


 


O2 Sat by Pulse 94  





Oximetry   














  09/25/17 09/25/17





  09:22 11:22


 


Temperature  


 


Pulse Rate  


 


Respiratory 18 18





Rate  


 


Blood Pressure  





(mmHg)  


 


O2 Sat by Pulse  





Oximetry  














- Intake and Output


Intake and Output: 


 Intake & Output











 09/23/17 09/24/17 09/25/17 09/26/17





 06:59 06:59 06:59 06:59


 


Intake Total 1580 1950 1940 620


 


Output Total 1950 1900 1700 


 


Balance -370 50 240 620


 


Intake:    


 


  IV Fluids 40   


 


    NS (0.9%) 40   


 


  IVPB 50   


 


    ABX - CEFTRIAXONE 50   


 


  Oral 1490 1950 1940 620


 


Output:    


 


  Rodriguez 1950 1900 1700 


 


Other:    


 


  Date of Last Bowel 9/22/17   





  Movement    


 


  # Bowel Movements 1 3 3 


 


  Estimated Stool Amount Small Small Medium Small





 





ADLs: Meal  Record                                         Start:  09/21/17 18:

29


Freq:                                                      Status: Active      

  


 Created      09/21/17 18:29  System  (Rec: 09/21/17 18:29  System  [id number])


 Document     09/22/17 09:55  [id number]  (Rec: 09/22/17 09:55  [id number]  SSU-C19)


 Document     09/22/17 13:42  [id number]  (Rec: 09/22/17 13:42  [id number]  SSU-C19)


 Document     09/23/17 10:00  [id number]  (Rec: 09/23/17 10:16  [id number]  SSU-C06)


 Document     09/24/17 10:39  [id number]  (Rec: 09/24/17 10:39  [id number]  SSU-C11)


 Document     09/24/17 14:01  [id number]  (Rec: 09/24/17 14:02  [id number]  SSU-C11)


 Document     09/24/17 18:57  [id number]  (Rec: 09/24/17 18:57  [id number]  SSU-C19)


 Document     09/25/17 09:28  [id number]  (Rec: 09/25/17 09:29  [id number]  SSU-C01)


Intake and Output                                          Start:  09/21/17 18:

29


Freq:   DAILY@0600,1400,2200                               Status: Active      

  


 Created      09/21/17 18:29  System  (Rec: 09/21/17 18:29  System  [id number])


 Document     09/21/17 22:35  [id number]  (Rec: 09/21/17 22:35  [id number]  SSU-C19)


 Document     09/21/17 22:45  [id number]  (Rec: 09/22/17 02:40  [id number]  SSU-C44)


 Document     09/22/17 01:45  [id number]  (Rec: 09/22/17 02:39  [id number]  SSU-C44)


 Document     09/22/17 05:56  [id number]  (Rec: 09/22/17 05:57  [id number]  SSU-C03)


 Document     09/22/17 08:00  [id number]  (Rec: 09/22/17 12:33  [id number]  [facility]-C19)


 Document     09/22/17 12:45  [id number]  (Rec: 09/22/17 12:46  [id number]  [facility]C19)


 Document     09/22/17 14:00  [id number]  (Rec: 09/22/17 14:40  [id number]  [facility]C19)


 Document     09/22/17 20:00  [id number]  (Rec: 09/22/17 20:57  [id number]  [facility]-C01)


 Document     09/22/17 22:00  [id number]  (Rec: 09/22/17 22:23  [id number]  [facility]-C11)


 Document     09/23/17 06:00  [id number]  (Rec: 09/23/17 06:22  [id number]  [facility]-M13)


 Document     09/23/17 11:15  [id number]  (Rec: 09/23/17 14:12  [id number]  [facility]-C06)


 Document     09/23/17 17:42  [id number]  (Rec: 09/23/17 17:43  [id number]  [facility]-C04)


 Document     09/23/17 22:00  [id number]  (Rec: 09/23/17 22:18  [id number]  [facility]C19)


 Document     09/24/17 05:35  [id number]  (Rec: 09/24/17 05:35  [id number]  [facility]-C20)


 Document     09/24/17 14:00  [id number]  (Rec: 09/24/17 14:26  [id number]  [facility]-C11)


 Document     09/24/17 22:00  [id number]  (Rec: 09/24/17 22:51  [id number]  [facility]C19)


 Document     09/25/17 06:00  [id number]  (Rec: 09/25/17 06:18  [id number]  [facility]-C03)











Assessment





- Problem List


Assessment: 


Patient Problems





Acute osteomyelitis of sacrum (Acute)


Chronic indwelling Rodriguez catheter (Chronic)


Neurogenic bladder (Chronic)


Neurogenic bowel (Chronic)


Sacral decubitus ulcer, stage IV (Chronic)


DVT prophylaxis (Acute)


Dehydration (Acute)


Diarrhea (Acute)


Fever (Acute)


Full code status (Acute)


Hyponatremia (Acute)


Pneumonia (Acute)


Pressure ulcer of sacral region, stage 3 (Acute)


Sepsis (Acute)


Anemia (Chronic)


Back pain (Chronic)


CAD (coronary artery disease) (Chronic)


CKD (chronic kidney disease), stage III (Chronic)


Chronic back pain (Chronic)


Ependymoma (Chronic)


HTN (hypertension) (Chronic)


BONIFACIO (obstructive sleep apnea) (Chronic)


Paraplegia (Chronic)











Assessment/Plan





- Plan


Plan: 





same antibiotics, labs 9/26


35 minutes floor time >50% face to face discussing antibiotic treatment plans

## 2017-09-26 LAB
ALBUMIN SERPL BCG-MCNC: 3.2 G/DL (ref 3.2–5.2)
ALP SERPL-CCNC: 69 U/L (ref 34–104)
ALT SERPL W P-5'-P-CCNC: 9 U/L (ref 7–52)
ANION GAP SERPL CALC-SCNC: 6 MMOL/L (ref 2–11)
AST SERPL-CCNC: 12 U/L (ref 13–39)
BUN SERPL-MCNC: 39 MG/DL (ref 6–24)
BUN/CREAT SERPL: 28.1 (ref 8–20)
CALCIUM SERPL-MCNC: 9.9 MG/DL (ref 8.6–10.3)
CHLORIDE SERPL-SCNC: 104 MMOL/L (ref 101–111)
GLOBULIN SER CALC-MCNC: 2.7 G/DL (ref 2–4)
GLUCOSE SERPL-MCNC: 103 MG/DL (ref 70–100)
HCO3 SERPL-SCNC: 29 MMOL/L (ref 22–32)
HCT VFR BLD AUTO: 34 % (ref 42–52)
HGB BLD-MCNC: 11 G/DL (ref 14–18)
MCH RBC QN AUTO: 27 PG (ref 27–31)
MCHC RBC AUTO-ENTMCNC: 33 G/DL (ref 31–36)
MCV RBC AUTO: 83 FL (ref 80–94)
POTASSIUM SERPL-SCNC: 4 MMOL/L (ref 3.5–5)
PROT SERPL-MCNC: 5.9 G/DL (ref 6.4–8.9)
RBC # BLD AUTO: 4.07 10^6/UL (ref 4–5.4)
SODIUM SERPL-SCNC: 139 MMOL/L (ref 133–145)
WBC # BLD AUTO: 8.1 10^3/UL (ref 3.5–10.8)

## 2017-09-26 RX ADMIN — NYSTATIN SCH APPLIC: 100000 CREAM TOPICAL at 11:15

## 2017-09-26 RX ADMIN — OXYCODONE HYDROCHLORIDE SCH MG: 5 SOLUTION ORAL at 16:57

## 2017-09-26 RX ADMIN — PREGABALIN SCH MG: 50 CAPSULE ORAL at 09:27

## 2017-09-26 RX ADMIN — NYSTATIN SCH APPLIC: 100000 CREAM TOPICAL at 22:32

## 2017-09-26 RX ADMIN — FUROSEMIDE SCH MG: 40 TABLET ORAL at 09:25

## 2017-09-26 RX ADMIN — PREGABALIN SCH MG: 50 CAPSULE ORAL at 14:07

## 2017-09-26 RX ADMIN — Medication SCH MG: at 09:27

## 2017-09-26 RX ADMIN — LOPERAMIDE HYDROCHLORIDE PRN MG: 2 CAPSULE ORAL at 14:06

## 2017-09-26 RX ADMIN — METRONIDAZOLE SCH MG: 250 TABLET ORAL at 09:28

## 2017-09-26 RX ADMIN — Medication SCH MG: at 22:32

## 2017-09-26 RX ADMIN — LOPERAMIDE HYDROCHLORIDE PRN MG: 2 CAPSULE ORAL at 09:25

## 2017-09-26 RX ADMIN — OXYCODONE HYDROCHLORIDE PRN MG: 5 CAPSULE ORAL at 09:48

## 2017-09-26 RX ADMIN — Medication SCH MG: at 09:29

## 2017-09-26 RX ADMIN — POTASSIUM CHLORIDE SCH MEQ: 750 TABLET, FILM COATED, EXTENDED RELEASE ORAL at 09:25

## 2017-09-26 RX ADMIN — Medication SCH ML: at 10:45

## 2017-09-26 RX ADMIN — OXYCODONE HYDROCHLORIDE PRN MG: 5 SOLUTION ORAL at 04:26

## 2017-09-26 RX ADMIN — WATER SCH NOTE: 100 INJECTION, SOLUTION INTRAVENOUS at 06:59

## 2017-09-26 RX ADMIN — Medication SCH PKT: at 09:27

## 2017-09-26 RX ADMIN — Medication SCH CAP: at 09:25

## 2017-09-26 RX ADMIN — METRONIDAZOLE SCH MG: 250 TABLET ORAL at 22:31

## 2017-09-26 RX ADMIN — OXYCODONE HYDROCHLORIDE PRN MG: 5 CAPSULE ORAL at 14:08

## 2017-09-26 RX ADMIN — FENTANYL TRANSDERMAL SCH MCG: 75 PATCH, EXTENDED RELEASE TRANSDERMAL at 18:47

## 2017-09-26 RX ADMIN — Medication SCH: at 16:31

## 2017-09-26 RX ADMIN — ASPIRIN SCH MG: 81 TABLET, COATED ORAL at 09:25

## 2017-09-26 RX ADMIN — LOPERAMIDE HYDROCHLORIDE PRN MG: 2 CAPSULE ORAL at 22:31

## 2017-09-26 RX ADMIN — TRIAMCINOLONE ACETONIDE SCH APPLIC: 0.25 CREAM TOPICAL at 11:10

## 2017-09-26 RX ADMIN — FOLIC ACID SCH MG: 1 TABLET ORAL at 09:47

## 2017-09-26 RX ADMIN — TRIAMCINOLONE ACETONIDE SCH APPLIC: 0.25 CREAM TOPICAL at 22:33

## 2017-09-26 RX ADMIN — FENTANYL SCH MCG: 100 PATCH TRANSDERMAL at 18:35

## 2017-09-26 RX ADMIN — Medication SCH ML: at 06:10

## 2017-09-26 RX ADMIN — CYANOCOBALAMIN TAB 500 MCG SCH MCG: 500 TAB at 09:25

## 2017-09-26 RX ADMIN — Medication SCH UNITS: at 09:28

## 2017-09-26 RX ADMIN — OXYCODONE HYDROCHLORIDE SCH MG: 5 SOLUTION ORAL at 12:20

## 2017-09-26 RX ADMIN — ENOXAPARIN SODIUM SCH MG: 40 INJECTION SUBCUTANEOUS at 18:54

## 2017-09-26 RX ADMIN — FENTANYL SCH MCG: 100 PATCH TRANSDERMAL at 14:40

## 2017-09-26 RX ADMIN — OMEPRAZOLE SCH MG: 20 CAPSULE, DELAYED RELEASE ORAL at 06:08

## 2017-09-26 RX ADMIN — PREGABALIN SCH MG: 50 CAPSULE ORAL at 22:31

## 2017-09-26 RX ADMIN — WATER SCH NOTE: 100 INJECTION, SOLUTION INTRAVENOUS at 19:03

## 2017-09-26 RX ADMIN — OXYCODONE HYDROCHLORIDE PRN MG: 5 SOLUTION ORAL at 10:37

## 2017-09-26 RX ADMIN — Medication SCH CAP: at 22:31

## 2017-09-27 LAB
ANION GAP SERPL CALC-SCNC: 5 MMOL/L (ref 2–11)
BUN SERPL-MCNC: 40 MG/DL (ref 6–24)
BUN/CREAT SERPL: 30.5 (ref 8–20)
CALCIUM SERPL-MCNC: 9.5 MG/DL (ref 8.6–10.3)
CHLORIDE SERPL-SCNC: 106 MMOL/L (ref 101–111)
GLUCOSE SERPL-MCNC: 90 MG/DL (ref 70–100)
HCO3 SERPL-SCNC: 31 MMOL/L (ref 22–32)
HCT VFR BLD AUTO: 31 % (ref 42–52)
HGB BLD-MCNC: 10.1 G/DL (ref 14–18)
MCH RBC QN AUTO: 28 PG (ref 27–31)
MCHC RBC AUTO-ENTMCNC: 33 G/DL (ref 31–36)
MCV RBC AUTO: 83 FL (ref 80–94)
POTASSIUM SERPL-SCNC: 4.1 MMOL/L (ref 3.5–5)
RBC # BLD AUTO: 3.68 10^6/UL (ref 4–5.4)
SODIUM SERPL-SCNC: 142 MMOL/L (ref 133–145)
WBC # BLD AUTO: 8.2 10^3/UL (ref 3.5–10.8)

## 2017-09-27 RX ADMIN — WATER SCH NOTE: 100 INJECTION, SOLUTION INTRAVENOUS at 19:11

## 2017-09-27 RX ADMIN — PREGABALIN SCH MG: 50 CAPSULE ORAL at 14:39

## 2017-09-27 RX ADMIN — DIPHENOXYLATE HYDROCHLORIDE AND ATROPINE SULFATE SCH TAB: 2.5; .025 TABLET ORAL at 14:40

## 2017-09-27 RX ADMIN — Medication SCH CAP: at 20:09

## 2017-09-27 RX ADMIN — CYANOCOBALAMIN TAB 500 MCG SCH MCG: 500 TAB at 09:11

## 2017-09-27 RX ADMIN — LOPERAMIDE HYDROCHLORIDE PRN MG: 2 CAPSULE ORAL at 08:27

## 2017-09-27 RX ADMIN — WATER SCH NOTE: 100 INJECTION, SOLUTION INTRAVENOUS at 07:00

## 2017-09-27 RX ADMIN — TRIAMCINOLONE ACETONIDE SCH APPLIC: 0.25 CREAM TOPICAL at 09:27

## 2017-09-27 RX ADMIN — Medication SCH UNITS: at 09:11

## 2017-09-27 RX ADMIN — Medication SCH CAP: at 09:11

## 2017-09-27 RX ADMIN — Medication SCH ML: at 17:55

## 2017-09-27 RX ADMIN — TRIAMCINOLONE ACETONIDE SCH APPLIC: 0.25 CREAM TOPICAL at 20:11

## 2017-09-27 RX ADMIN — Medication SCH ML: at 05:54

## 2017-09-27 RX ADMIN — OXYCODONE HYDROCHLORIDE PRN MG: 5 CAPSULE ORAL at 18:27

## 2017-09-27 RX ADMIN — LOPERAMIDE HYDROCHLORIDE PRN MG: 2 CAPSULE ORAL at 12:50

## 2017-09-27 RX ADMIN — Medication SCH MG: at 09:11

## 2017-09-27 RX ADMIN — POTASSIUM CHLORIDE SCH MEQ: 750 TABLET, FILM COATED, EXTENDED RELEASE ORAL at 09:11

## 2017-09-27 RX ADMIN — NYSTATIN SCH APPLIC: 100000 CREAM TOPICAL at 20:11

## 2017-09-27 RX ADMIN — PREGABALIN SCH MG: 50 CAPSULE ORAL at 09:26

## 2017-09-27 RX ADMIN — NYSTATIN SCH APPLIC: 100000 CREAM TOPICAL at 09:27

## 2017-09-27 RX ADMIN — FOLIC ACID SCH MG: 1 TABLET ORAL at 09:11

## 2017-09-27 RX ADMIN — Medication SCH MG: at 09:12

## 2017-09-27 RX ADMIN — Medication SCH ML: at 10:06

## 2017-09-27 RX ADMIN — OXYCODONE HYDROCHLORIDE SCH MG: 5 SOLUTION ORAL at 07:33

## 2017-09-27 RX ADMIN — ENOXAPARIN SODIUM SCH MG: 40 INJECTION SUBCUTANEOUS at 17:54

## 2017-09-27 RX ADMIN — OXYCODONE HYDROCHLORIDE SCH MG: 5 SOLUTION ORAL at 16:33

## 2017-09-27 RX ADMIN — OXYCODONE HYDROCHLORIDE SCH MG: 5 SOLUTION ORAL at 11:53

## 2017-09-27 RX ADMIN — DIPHENOXYLATE HYDROCHLORIDE AND ATROPINE SULFATE SCH TAB: 2.5; .025 TABLET ORAL at 17:00

## 2017-09-27 RX ADMIN — PREGABALIN SCH MG: 50 CAPSULE ORAL at 20:09

## 2017-09-27 RX ADMIN — Medication SCH MG: at 20:10

## 2017-09-27 RX ADMIN — METRONIDAZOLE SCH MG: 250 TABLET ORAL at 09:11

## 2017-09-27 RX ADMIN — OXYCODONE HYDROCHLORIDE PRN MG: 5 CAPSULE ORAL at 09:54

## 2017-09-27 RX ADMIN — FUROSEMIDE SCH MG: 40 TABLET ORAL at 09:12

## 2017-09-27 RX ADMIN — DIPHENOXYLATE HYDROCHLORIDE AND ATROPINE SULFATE SCH TAB: 2.5; .025 TABLET ORAL at 20:10

## 2017-09-27 RX ADMIN — METRONIDAZOLE SCH MG: 250 TABLET ORAL at 20:10

## 2017-09-27 RX ADMIN — ASPIRIN SCH MG: 81 TABLET, COATED ORAL at 09:11

## 2017-09-27 RX ADMIN — OXYCODONE HYDROCHLORIDE AND ACETAMINOPHEN SCH MG: 500 TABLET ORAL at 11:53

## 2017-09-27 RX ADMIN — OMEPRAZOLE SCH MG: 20 CAPSULE, DELAYED RELEASE ORAL at 05:54

## 2017-09-27 RX ADMIN — Medication SCH PKT: at 09:10

## 2017-09-27 RX ADMIN — COLLAGENASE SANTYL SCH APPLIC: 250 OINTMENT TOPICAL at 20:10

## 2017-09-27 NOTE — PN
Subjective


Date of Service: 09/27/17


Interval History: 





Called in evening on 6/27 about oversedation in patient with decreases in 

appetite around the evening meal. Fentanyl patch decreased mendez to 75mcg at 

that time. No new complaints overnight, pain levels with movement consistent 

with previous days with no subjective improvement with increase in oxycodone 

dose. Wife in agreement with increase in dose again.


Family History: Unchanged from Admission


Social History: Unchanged from Admission - no changes noted


Past Medical History: Unchanged from Admission





Objective


Active Medications: 








Acetaminophen (Tylenol Tab*)  650 mg PO Q6H PRN


   PRN Reason: FEVER/PAIN


Ascorbic Acid (Vitamin C  Tab*)  500 mg PO DAILY [facility]


   Last Admin: 09/27/17 11:53 Dose:  500 mg


Aspirin (Aspirin Ec Low Dose*)  81 mg PO DAILY [facility]


   Last Admin: 09/27/17 09:11 Dose:  81 mg


Bisacodyl (Dulcolax Supp*)  10 mg [place] DAILY PRN


   PRN Reason: CONSTIPATION


Cholecalciferol (Vitamin D Tab*)  5,000 units PO DAILY [facility]


   Last Admin: 09/27/17 09:11 Dose:  5,000 units


Collagenase (Santyl 250 Mg/Gm Oint*)  1 applic TOPICAL .SEE INSTRUCTIONS [facility]


Cyanocobalamin (Vitamin B12 Tab*)  1,000 mcg PO DAILY [facility]


   Last Admin: 09/27/17 09:11 Dose:  1,000 mcg


Docusate Sodium (Colace Cap*)  100 mg PO BID PRN


   PRN Reason: CONSTIPATION


Enoxaparin Sodium (Lovenox(*))  40 mg SUBCUT Q24H [facility]


   Last Admin: 09/26/17 18:54 Dose:  40 mg


Fentanyl (Duragesic  Patch 75 Mcg/Hr*)  75 mcg TRANSDERM Q72H [facility]


   Last Admin: 09/26/17 18:47 Dose:  75 mcg


Ferrous Gluconate (Fergon Tab*)  324 mg PO BID [facility]


   Last Admin: 09/27/17 09:12 Dose:  324 mg


Folic Acid (Folvite Tab*)  0.5 mg PO DAILY [facility]


   Last Admin: 09/27/17 09:11 Dose:  0.5 mg


Furosemide (Lasix Tab*)  40 mg PO DAILY [facility]


   Last Admin: 09/27/17 09:12 Dose:  40 mg


Heparin Sodium (Porcine) (Heparin Flush Picc/Ml/Cvc(*))  1 ml FLUSH 0600,1800 

[facility]


   PRN Reason: Protocol


   Last Admin: 09/27/17 10:06 Dose:  1 ml


Ceftriaxone Sodium 2 gm/ (Sodium Chloride)  100 mls @ 200 mls/hr IVPB DAILY [facility]


   Last Admin: 09/27/17 09:12 Dose:  200 mls/hr


Lactobacillus Rhamnosus (Culturelle*)  1 cap PO BID [facility]


   Last Admin: 09/27/17 09:11 Dose:  1 cap


Loperamide HCl (Imodium Cap*)  2 mg PO .SEE DIRECTIONS PRN


   PRN Reason: DIARRHEA


   Last Admin: 09/27/17 08:27 Dose:  2 mg


Metronidazole (Flagyl Tab*)  500 mg PO BID [facility]


   Last Admin: 09/27/17 09:11 Dose:  500 mg


Nystatin (Nystatin Cream*)  1 applic TOPICAL BID [facility]


   Last Admin: 09/27/17 09:27 Dose:  1 applic


Omeprazole (Prilosec Cap*)  40 mg PO DAILY@0600 [facility]


   Last Admin: 09/27/17 05:54 Dose:  40 mg


Ondansetron HCl (Zofran Odt Tab*)  4 mg PO Q8H PRN


   PRN Reason: NAUSEA/VOMITING


Oxycodone HCl (Roxycodone Tab*)  5 mg PO Q4H PRN


   PRN Reason: PAIN - MODERATE TO SEVERE


   Last Admin: 09/27/17 09:54 Dose:  5 mg


Oxycodone HCl (Oxycodone Oral.Soln*)  10 mg PO DAILY PRN


   PRN Reason: PAIN


Oxycodone HCl (Oxycodone Oral.Soln*)  10 mg PO AC [facility]


   Last Admin: 09/27/17 11:53 Dose:  10 mg


Pharmacy Profile Note (Fentanyl Patch Check Q Shift)  1 note N/A 0700,1900 [facility]


   Last Admin: 09/27/17 07:00 Dose:  1 note


Potassium Chloride (Klor Con Er Tab*)  10 meq PO DAILY [facility]


   Last Admin: 09/27/17 09:11 Dose:  10 meq


Pregabalin (Lyrica Cap(*))  150 mg PO TID [facility]


   Last Admin: 09/27/17 09:26 Dose:  150 mg


Psyllium Hydrophilic Mucilloid (Metamucil Romain*)  1 pkt PO DAILY [facility]


   Last Admin: 09/27/17 09:10 Dose:  1 pkt


Triamcinolone Acetonide (Triamcinolone 0.025% Oint *)  1 applic TOPICAL BID [facility]


   Last Admin: 09/27/17 09:27 Dose:  1 applic


Zinc Sulfate (Zinc-220 Cap*)  220 mg PO DAILY [facility]


   Last Admin: 09/27/17 09:11 Dose:  220 mg








 Vital Signs











  09/26/17 09/26/17 09/26/17





  13:50 14:06 14:07


 


Temperature 99.4 F  


 


Pulse Rate 93  


 


Respiratory 18 16 16





Rate   


 


Blood Pressure 161/78  





(mmHg)   


 


O2 Sat by Pulse 98  





Oximetry   














  09/26/17 09/26/17 09/26/17





  14:08 14:40 15:37


 


Temperature   98.3 F


 


Pulse Rate   88


 


Respiratory 16 16 14





Rate   


 


Blood Pressure   131/62





(mmHg)   


 


O2 Sat by Pulse   94





Oximetry   














  09/26/17 09/26/17 09/26/17





  16:08 16:57 18:35


 


Temperature   


 


Pulse Rate   


 


Respiratory 16 14 16





Rate   


 


Blood Pressure   





(mmHg)   


 


O2 Sat by Pulse   





Oximetry   














  09/26/17 09/26/17 09/26/17





  18:57 20:15 20:39


 


Temperature  98.9 F 


 


Pulse Rate  92 


 


Respiratory 16 15 16





Rate   


 


Blood Pressure  149/67 





(mmHg)   


 


O2 Sat by Pulse  96 





Oximetry   














  09/26/17 09/27/17 09/27/17





  22:31 00:09 00:31


 


Temperature  98.4 F 


 


Pulse Rate  85 


 


Respiratory 16 16 14





Rate   


 


Blood Pressure  139/47 





(mmHg)   


 


O2 Sat by Pulse  93 





Oximetry   














  09/27/17 09/27/17 09/27/17





  04:38 07:32 07:33


 


Temperature 98.1 F 97.8 F 


 


Pulse Rate 70 81 


 


Respiratory 16 16 18





Rate   


 


Blood Pressure 131/55 135/66 





(mmHg)   


 


O2 Sat by Pulse 94 96 





Oximetry   














  09/27/17 09/27/17 09/27/17





  08:00 08:27 09:26


 


Temperature   


 


Pulse Rate   


 


Respiratory 18 16 16





Rate   


 


Blood Pressure   





(mmHg)   


 


O2 Sat by Pulse   





Oximetry   














  09/27/17 09/27/17 09/27/17





  09:33 09:54 10:10


 


Temperature   


 


Pulse Rate   


 


Respiratory 18 18 18





Rate   


 


Blood Pressure   





(mmHg)   


 


O2 Sat by Pulse   





Oximetry   














  09/27/17 09/27/17 09/27/17





  11:04 11:26 11:53


 


Temperature 97.7 F  


 


Pulse Rate 81  


 


Respiratory 18 16 18





Rate   


 


Blood Pressure 151/55  





(mmHg)   


 


O2 Sat by Pulse 97  





Oximetry   














  09/27/17





  11:54


 


Temperature 


 


Pulse Rate 


 


Respiratory 18





Rate 


 


Blood Pressure 





(mmHg) 


 


O2 Sat by Pulse 





Oximetry 











Oxygen Devices in Use Now: None


Appearance: Patient is a 73yo male who appears stated age sitting comfortably 

in the hospital bed in [facility].


Eyes: No Scleral Icterus, PERRLA


Ears/Nose/Mouth/Throat: NL Teeth, Lips, Gums, Clear Oropharnyx, Mucous 

Membranes Moist, - - No carotid bruit auscultated.


Neck: NL Appearance and Movements; NL JVP, Trachea Midline


Respiratory: Symmetrical Chest Expansion and Respiratory Effort, Clear to 

Auscultation


Cardiovascular: NL Sounds; No Murmurs; No JVD, RRR, No Edema


Abdominal: No Hepatosplenomegaly, - - Abdomen distended with normal BS in all 4 

quadrants. Non-tender to palpation.


Lymphatic: No Cervical Adenopathy


Extremities: No Edema, No Clubbing, Cyanosis


Skin: No Nodules or Sclerosis, - - Unable to see wound today. Ecchymosis noted 

on abdomen. Skin on heels beginning to peel without bleeding or breakdown.


Neurological: Alert and Oriented x 3, - - Patient paralyzed and insensate from 

waist down.


Result Diagrams: 


 09/27/17 06:00





 09/27/17 06:00


Additional Lab and Data: 





 











  09/26/17 09/26/17





  06:37 07:56


 


WBC   8.1


 


RBC   4.07


 


Hgb   11.0 L


 


Hct   34 L


 


MCV   83


 


MCH   27


 


MCHC   33


 


RDW   20 H


 


Plt Count   210


 


MPV   8


 


Sodium  139 


 


Potassium  4.0 


 


Chloride  104 


 


Carbon Dioxide  29 


 


Anion Gap  6 


 


BUN  39 H 


 


Creatinine  1.39 H 


 


Est GFR ( Amer)  64.8 


 


Est GFR (Non-Af Amer)  50.4 


 


BUN/Creatinine Ratio  28.1 H 


 


Glucose  103 H 


 


Calcium  9.9 


 


Total Bilirubin  0.30 


 


AST  12 L 


 


ALT  9 


 


Alkaline Phosphatase  69 


 


C-Reactive Protein  16.92 H 


 


Total Protein  5.9 L 


 


Albumin  3.2 


 


Globulin  2.7 


 


Albumin/Globulin Ratio  1.2 














Assess/Plan/Problems-Billing


.


Assessment: 


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 Now with large 

sacral decubitus ulcer which has been debrided and is healing nicely. 

Accompanying debilitation associated with recent hospitalization having a 

negative effect on patient's mental state. Patient currently euthymic.








- Patient Problems


(1) Acute osteomyelitis of sacrum


Current Visit: Yes   Status: Acute   Code(s): M46.28 - OSTEOMYELITIS OF VERTEBRA

, SACRAL AND SACROCOCCYGEAL REGION   SNOMED Code(s): 351912736


   Comment: 


Continue Ceftriaxone and flagyl at current doses per ID.


no signs of systemic or local infection    





(2) Chronic indwelling Rodriguez catheter


Current Visit: Yes   Status: Chronic   Priority: High   Code(s): Z92.89 - 

PERSONAL HISTORY OF OTHER MEDICAL TREATMENT   SNOMED Code(s): 481845828


   Comment: 


High risk of UTI - currently on ABX, no current evidence of UTI.   





(3) Neurogenic bladder


Current Visit: Yes   Status: Chronic   Priority: High   Code(s): N31.9 - 

NEUROMUSCULAR DYSFUNCTION OF BLADDER, UNSPECIFIED   SNOMED Code(s): 770618857


   Comment: 


Indwelling urinary catheter, no sign of current UTI.   





(4) Neurogenic bowel


Current Visit: Yes   Status: Chronic   Priority: High   Code(s): K59.2 - 

NEUROGENIC BOWEL, NOT ELSEWHERE CLASSIFIED   SNOMED Code(s): 240058467


   Comment: 


Having frequent loose bowel movements. Frequent pericare to prevent skin 

breakdown. Flagyl to help with diarrhea. Immodium after loose bowel movements. 

Metamucil for bulking. Stool becoming more formed per nursing staff.   





(5) Sacral decubitus ulcer, stage IV


Current Visit: Yes   Status: Chronic   Priority: High   Code(s): L89.154 - 

PRESSURE ULCER OF SACRAL REGION, STAGE 4   SNOMED Code(s): 018124721


   Comment: 


Continuing wound care, debrided today by Dr Holley, will start Santyl 

treatment. Continue wet to dry dressings. Appreciate input from surgery.


Appreciate nutrition recommendations to support healing, attempt for 6 small 

protein rich meals daily and continue vitamins. Vitamin C started today per 

nutrition and wife.


   





(6) DVT prophylaxis


Current Visit: No   Status: Acute   Code(s): QON0374 -    SNOMED Code(s): 

512889852


   Comment: 


Lovenox


SCDs in bed.   





(7) Diarrhea


Current Visit: No   Status: Acute   Code(s): R19.7 - DIARRHEA, UNSPECIFIED   

SNOMED Code(s): 22267119


   Comment: 


Persistent diarrhea, Likely ABX associated but not Cdiff. Continue Flagyl, 

Probiotic, Metamucil, and Immodium.


   





(8) Chronic back pain


Current Visit: No   Status: Chronic   Code(s): M54.9 - DORSALGIA, UNSPECIFIED; 

G89.29 - OTHER CHRONIC PAIN   SNOMED Code(s): 886186961


   Comment: 


Secondary to ependymoma


Continue pregabalin, and acetaminophen.


Change oxycodone liquid to 10mg AC with one dose PRN for transfer.


Reduce Fentanyl patch to 75mcg. 


   





(9) BONIFACIO (obstructive sleep apnea)


Current Visit: No   Status: Chronic   Code(s): G47.33 - OBSTRUCTIVE SLEEP APNEA 

(ADULT) (PEDIATRIC)   SNOMED Code(s): 36149628


   Comment: 


Continue home BiPAP use.   





(10) Paraplegia


Current Visit: No   Status: Chronic   Code(s): G82.20 - PARAPLEGIA, UNSPECIFIED

   SNOMED Code(s): 35614753


   Comment: 


Secondary to ependymoma


Cont with skin precautions - frequent turning, skin care. 


Cont pain management


Plan for spinal stimulator implantation with Dr. Huerta ([facility]) 

eventually   





(11) Full code status


Current Visit: No   Status: Acute   Code(s): Z78.9 - OTHER SPECIFIED HEALTH 

STATUS   SNOMED Code(s): 880613863


   


Status and Disposition: 





Awaiting plastic surgery consult. Plan is to return home when able to do so 

functionally.

## 2017-09-27 NOTE — PN
Progress Note





- Progress Note


Date of Service: 09/27/17


SOAP: 


Subjective:





He is without complaint today


Nurses report no new problems








Objective:





 











Temp Pulse Resp BP Pulse Ox


 


 97.8 F   81   18   135/66   96 


 


 09/27/17 07:32  09/27/17 07:32  09/27/17 10:10  09/27/17 07:32  09/27/17 07:32








PEX:


Sacral pressure ulcer is clean without odor or purulence.  The surrounding skin 

is clean and pink without maceration or breakdown.


At the midline there is exposed fascia over the bone which is clean and shows 

no evidence of granulation tissue overlying this area.


The remainder of the ulcer has some areas of dried and necrotic fat that 

requires debridement but is superficial and does not track or significantly 

undermine.





Sharp debridement was performed using scissors of any non-viable tissue and 

bleeding was moderate and was controlled with pressure.





 Laboratory Results - last 24 hr











  09/27/17 09/27/17





  06:00 06:00


 


WBC   8.2


 


RBC   3.68 L


 


Hgb   10.1 L


 


Hct   31 L


 


MCV   83


 


MCH   28


 


MCHC   33


 


RDW   20 H


 


Plt Count   208


 


MPV   9


 


Neut % (Auto)   49.5


 


Lymph % (Auto)   31.1


 


Mono % (Auto)   14.2 H


 


Eos % (Auto)   3.8


 


Baso % (Auto)   1.4


 


Absolute Neuts (auto)   4.0


 


Absolute Lymphs (auto)   2.5


 


Absolute Monos (auto)   1.2 H


 


Absolute Eos (auto)   0.3


 


Absolute Basos (auto)   0.1


 


Absolute Nucleated RBC   0


 


Nucleated RBC %   0


 


Sodium  142 


 


Potassium  4.1 


 


Chloride  106 


 


Carbon Dioxide  31 


 


Anion Gap  5 


 


BUN  40 H 


 


Creatinine  1.31 H 


 


Est GFR ( Amer)  69.4 


 


Est GFR (Non-Af Amer)  53.9 


 


BUN/Creatinine Ratio  30.5 H 


 


Glucose  90 


 


Calcium  9.5 

















Assessment:


Sacral pressure ulcer-Stage 4--slow continued improvement.








Plan:


Will add Santyl twice daily to wound bed to assist with debridement and 

continue wet-to-dry NS moist gauze packing bid.


The ulcer shows continued improvement and size measurements were also taken at 

this time.


I will discuss with the patient's wife.

## 2017-09-28 LAB
ANION GAP SERPL CALC-SCNC: 3 MMOL/L (ref 2–11)
BUN SERPL-MCNC: 37 MG/DL (ref 6–24)
BUN/CREAT SERPL: 26.8 (ref 8–20)
CALCIUM SERPL-MCNC: 9.6 MG/DL (ref 8.6–10.3)
CHLORIDE SERPL-SCNC: 105 MMOL/L (ref 101–111)
GLUCOSE SERPL-MCNC: 98 MG/DL (ref 70–100)
HCO3 SERPL-SCNC: 32 MMOL/L (ref 22–32)
HCT VFR BLD AUTO: 30 % (ref 42–52)
HGB BLD-MCNC: 10.1 G/DL (ref 14–18)
MCH RBC QN AUTO: 28 PG (ref 27–31)
MCHC RBC AUTO-ENTMCNC: 34 G/DL (ref 31–36)
MCV RBC AUTO: 83 FL (ref 80–94)
POTASSIUM SERPL-SCNC: 4 MMOL/L (ref 3.5–5)
RBC # BLD AUTO: 3.6 10^6/UL (ref 4–5.4)
SODIUM SERPL-SCNC: 140 MMOL/L (ref 133–145)
WBC # BLD AUTO: 7.9 10^3/UL (ref 3.5–10.8)

## 2017-09-28 RX ADMIN — WATER SCH NOTE: 100 INJECTION, SOLUTION INTRAVENOUS at 19:11

## 2017-09-28 RX ADMIN — Medication SCH MG: at 09:37

## 2017-09-28 RX ADMIN — ASPIRIN SCH MG: 81 TABLET, COATED ORAL at 09:37

## 2017-09-28 RX ADMIN — OXYCODONE HYDROCHLORIDE AND ACETAMINOPHEN SCH MG: 500 TABLET ORAL at 09:39

## 2017-09-28 RX ADMIN — OXYCODONE HYDROCHLORIDE SCH MG: 5 SOLUTION ORAL at 16:55

## 2017-09-28 RX ADMIN — Medication SCH ML: at 17:41

## 2017-09-28 RX ADMIN — NYSTATIN SCH APPLIC: 100000 CREAM TOPICAL at 20:30

## 2017-09-28 RX ADMIN — Medication SCH UNITS: at 09:37

## 2017-09-28 RX ADMIN — Medication SCH ML: at 05:55

## 2017-09-28 RX ADMIN — NYSTATIN SCH APPLIC: 100000 CREAM TOPICAL at 08:06

## 2017-09-28 RX ADMIN — DIPHENOXYLATE HYDROCHLORIDE AND ATROPINE SULFATE SCH TAB: 2.5; .025 TABLET ORAL at 13:52

## 2017-09-28 RX ADMIN — OXYCODONE HYDROCHLORIDE SCH MG: 5 SOLUTION ORAL at 07:48

## 2017-09-28 RX ADMIN — Medication SCH ML: at 10:48

## 2017-09-28 RX ADMIN — WATER SCH NOTE: 100 INJECTION, SOLUTION INTRAVENOUS at 06:49

## 2017-09-28 RX ADMIN — DIPHENOXYLATE HYDROCHLORIDE AND ATROPINE SULFATE SCH TAB: 2.5; .025 TABLET ORAL at 16:54

## 2017-09-28 RX ADMIN — Medication SCH CAP: at 20:05

## 2017-09-28 RX ADMIN — Medication SCH MG: at 20:05

## 2017-09-28 RX ADMIN — DIPHENOXYLATE HYDROCHLORIDE AND ATROPINE SULFATE SCH TAB: 2.5; .025 TABLET ORAL at 09:38

## 2017-09-28 RX ADMIN — Medication SCH CAP: at 09:37

## 2017-09-28 RX ADMIN — OXYCODONE HYDROCHLORIDE SCH MG: 5 SOLUTION ORAL at 11:58

## 2017-09-28 RX ADMIN — COLLAGENASE SANTYL SCH APPLIC: 250 OINTMENT TOPICAL at 08:07

## 2017-09-28 RX ADMIN — POTASSIUM CHLORIDE SCH MEQ: 750 TABLET, FILM COATED, EXTENDED RELEASE ORAL at 09:38

## 2017-09-28 RX ADMIN — PREGABALIN SCH MG: 50 CAPSULE ORAL at 13:52

## 2017-09-28 RX ADMIN — OMEPRAZOLE SCH MG: 20 CAPSULE, DELAYED RELEASE ORAL at 05:55

## 2017-09-28 RX ADMIN — TRIAMCINOLONE ACETONIDE SCH APPLIC: 0.25 CREAM TOPICAL at 08:05

## 2017-09-28 RX ADMIN — DIPHENOXYLATE HYDROCHLORIDE AND ATROPINE SULFATE SCH TAB: 2.5; .025 TABLET ORAL at 20:05

## 2017-09-28 RX ADMIN — METRONIDAZOLE SCH MG: 250 TABLET ORAL at 20:05

## 2017-09-28 RX ADMIN — Medication SCH MG: at 09:39

## 2017-09-28 RX ADMIN — FUROSEMIDE SCH MG: 40 TABLET ORAL at 09:39

## 2017-09-28 RX ADMIN — CYANOCOBALAMIN TAB 500 MCG SCH MCG: 500 TAB at 09:39

## 2017-09-28 RX ADMIN — FOLIC ACID SCH MG: 1 TABLET ORAL at 09:38

## 2017-09-28 RX ADMIN — TRIAMCINOLONE ACETONIDE SCH APPLIC: 0.25 CREAM TOPICAL at 20:30

## 2017-09-28 RX ADMIN — PREGABALIN SCH MG: 50 CAPSULE ORAL at 20:05

## 2017-09-28 RX ADMIN — ENOXAPARIN SODIUM SCH MG: 40 INJECTION SUBCUTANEOUS at 17:40

## 2017-09-28 RX ADMIN — PREGABALIN SCH MG: 50 CAPSULE ORAL at 09:39

## 2017-09-28 RX ADMIN — METRONIDAZOLE SCH MG: 250 TABLET ORAL at 09:39

## 2017-09-28 RX ADMIN — COLLAGENASE SANTYL SCH APPLIC: 250 OINTMENT TOPICAL at 20:30

## 2017-09-28 NOTE — PN
Progress Note





- Progress Note


Date of Service: 09/28/17


SOAP: 


Subjective:


CC: decubitus ulcer


HPI: 72 year old man with decubitus ulcer, feeling better and eating.  No fever

, rash, or diarrhea.  








Objective:


[]


 Vital Signs











Temp  36.8 C   09/28/17 06:51


 


Pulse  75   09/28/17 06:51


 


Resp  18   09/28/17 09:39


 


BP  157/62   09/28/17 06:51


 


Pulse Ox  98   09/28/17 06:51








 Intake & Output











 09/27/17 09/28/17 09/28/17





 18:59 06:59 18:59


 


Intake Total 1260 200 129


 


Output Total 650 800 


 


Balance 610 -600 129


 


Intake:   


 


  IV Fluids 20  129


 


    ABX - CEFTRIAXONE   109


 


    NS (0.9%) 20  20


 


  IVPB 110  


 


    ABX - CEFTRIAXONE 110  


 


  Oral 1130 200 


 


Output:   


 


  Rodriguez 650 800 


 


Other:   


 


  # Bowel Movements 1  


 


  Estimated Stool Amount Medium  








Gen:awake, no distress


HEENT:no thrush


Heart:RRR no murmur


Lungs:CTA BL


Abd:+BS NTND soft


Skin: no rash


 Laboratory Results - last 24 hr











  09/28/17 09/28/17





  06:00 06:00


 


WBC  7.9 


 


RBC  3.60 L 


 


Hgb  10.1 L 


 


Hct  30 L 


 


MCV  83 


 


MCH  28 


 


MCHC  34 


 


RDW  20 H 


 


Plt Count  174 


 


MPV  8 


 


Neut % (Auto)  46.3 


 


Lymph % (Auto)  34.0 


 


Mono % (Auto)  14.0 H 


 


Eos % (Auto)  4.2 


 


Baso % (Auto)  1.5 


 


Absolute Neuts (auto)  3.6 


 


Absolute Lymphs (auto)  2.7 


 


Absolute Monos (auto)  1.1 H 


 


Absolute Eos (auto)  0.3 


 


Absolute Basos (auto)  0.1 


 


Absolute Nucleated RBC  0 


 


Nucleated RBC %  0.1 


 


Sodium   140


 


Potassium   4.0


 


Chloride   105


 


Carbon Dioxide   32


 


Anion Gap   3


 


BUN   37 H


 


Creatinine   1.38 H


 


Est GFR ( Amer)   65.3


 


Est GFR (Non-Af Amer)   50.8


 


BUN/Creatinine Ratio   26.8 H


 


Glucose   98


 


Calcium   9.6

















Assessment:


1. acute osteomyelitis, sacrum


2. sacral decubitus ulcer


3. elevated CRP, improving








Plan:


1. continue ceftriaxone and flagyl day 21/28 and then PO antibiotics.

## 2017-09-29 PROCEDURE — 0JB70ZZ EXCISION OF BACK SUBCUTANEOUS TISSUE AND FASCIA, OPEN APPROACH: ICD-10-PCS | Performed by: INTERNAL MEDICINE

## 2017-09-29 RX ADMIN — DIPHENOXYLATE HYDROCHLORIDE AND ATROPINE SULFATE SCH TAB: 2.5; .025 TABLET ORAL at 09:23

## 2017-09-29 RX ADMIN — PREGABALIN SCH MG: 50 CAPSULE ORAL at 13:40

## 2017-09-29 RX ADMIN — WATER SCH NOTE: 100 INJECTION, SOLUTION INTRAVENOUS at 06:58

## 2017-09-29 RX ADMIN — DIPHENOXYLATE HYDROCHLORIDE AND ATROPINE SULFATE SCH TAB: 2.5; .025 TABLET ORAL at 13:39

## 2017-09-29 RX ADMIN — CYANOCOBALAMIN TAB 500 MCG SCH MCG: 500 TAB at 09:25

## 2017-09-29 RX ADMIN — COLLAGENASE SANTYL SCH APPLIC: 250 OINTMENT TOPICAL at 11:20

## 2017-09-29 RX ADMIN — FUROSEMIDE SCH MG: 40 TABLET ORAL at 09:24

## 2017-09-29 RX ADMIN — DIPHENOXYLATE HYDROCHLORIDE AND ATROPINE SULFATE SCH TAB: 2.5; .025 TABLET ORAL at 21:58

## 2017-09-29 RX ADMIN — Medication SCH ML: at 18:27

## 2017-09-29 RX ADMIN — Medication SCH UNITS: at 09:25

## 2017-09-29 RX ADMIN — NYSTATIN SCH APPLIC: 100000 CREAM TOPICAL at 22:00

## 2017-09-29 RX ADMIN — Medication SCH CAP: at 22:00

## 2017-09-29 RX ADMIN — METRONIDAZOLE SCH MG: 250 TABLET ORAL at 09:25

## 2017-09-29 RX ADMIN — TRIAMCINOLONE ACETONIDE SCH APPLIC: 0.25 CREAM TOPICAL at 10:22

## 2017-09-29 RX ADMIN — OMEPRAZOLE SCH MG: 20 CAPSULE, DELAYED RELEASE ORAL at 05:49

## 2017-09-29 RX ADMIN — FOLIC ACID SCH MG: 1 TABLET ORAL at 09:26

## 2017-09-29 RX ADMIN — NYSTATIN SCH APPLIC: 100000 CREAM TOPICAL at 10:22

## 2017-09-29 RX ADMIN — PREGABALIN SCH MG: 50 CAPSULE ORAL at 21:59

## 2017-09-29 RX ADMIN — METRONIDAZOLE SCH MG: 250 TABLET ORAL at 21:58

## 2017-09-29 RX ADMIN — POTASSIUM CHLORIDE SCH MEQ: 750 TABLET, FILM COATED, EXTENDED RELEASE ORAL at 09:25

## 2017-09-29 RX ADMIN — ECONAZOLE NITRATE SCH: 10 CREAM TOPICAL at 17:34

## 2017-09-29 RX ADMIN — CLOBETASOL PROPIONATE SCH: 0.5 OINTMENT TOPICAL at 17:34

## 2017-09-29 RX ADMIN — Medication SCH MG: at 09:23

## 2017-09-29 RX ADMIN — OXYCODONE HYDROCHLORIDE SCH MG: 5 SOLUTION ORAL at 11:47

## 2017-09-29 RX ADMIN — WATER SCH NOTE: 100 INJECTION, SOLUTION INTRAVENOUS at 18:53

## 2017-09-29 RX ADMIN — OXYCODONE HYDROCHLORIDE SCH MG: 5 SOLUTION ORAL at 16:45

## 2017-09-29 RX ADMIN — ASPIRIN SCH MG: 81 TABLET, COATED ORAL at 09:23

## 2017-09-29 RX ADMIN — HYDROCORTISONE SCH: 25 CREAM TOPICAL at 23:01

## 2017-09-29 RX ADMIN — Medication SCH CAP: at 09:25

## 2017-09-29 RX ADMIN — ENOXAPARIN SODIUM SCH MG: 40 INJECTION SUBCUTANEOUS at 18:27

## 2017-09-29 RX ADMIN — Medication SCH ML: at 10:23

## 2017-09-29 RX ADMIN — Medication SCH MG: at 21:59

## 2017-09-29 RX ADMIN — DIPHENOXYLATE HYDROCHLORIDE AND ATROPINE SULFATE SCH TAB: 2.5; .025 TABLET ORAL at 16:45

## 2017-09-29 RX ADMIN — PREGABALIN SCH MG: 50 CAPSULE ORAL at 09:25

## 2017-09-29 RX ADMIN — FENTANYL TRANSDERMAL SCH MCG: 75 PATCH, EXTENDED RELEASE TRANSDERMAL at 18:24

## 2017-09-29 RX ADMIN — Medication SCH ML: at 05:49

## 2017-09-29 RX ADMIN — OXYCODONE HYDROCHLORIDE SCH MG: 5 SOLUTION ORAL at 08:32

## 2017-09-29 RX ADMIN — OXYCODONE HYDROCHLORIDE AND ACETAMINOPHEN SCH MG: 500 TABLET ORAL at 09:25

## 2017-09-29 NOTE — PN
Subjective


Date of Service: 09/29/17


Interval History: 





minimal sharp debridement at bedside by Dr. Holley. no exposed bone seen.  

Wife brought in some home creams for his hemorrhoids, chest rash and toe 

fungus. 


Family History: Unchanged from Admission


Social History: Unchanged from Admission - no changes noted


Past Medical History: Unchanged from Admission





Objective


Active Medications: 








Acetaminophen (Tylenol Tab*)  650 mg PO Q6H PRN


   PRN Reason: FEVER/PAIN


Ascorbic Acid (Vitamin C  Tab*)  500 mg PO DAILY [facility]


   Last Admin: 09/29/17 09:25 Dose:  500 mg


Aspirin (Aspirin Ec Low Dose*)  81 mg PO DAILY [facility]


   Last Admin: 09/29/17 09:23 Dose:  81 mg


Bisacodyl (Dulcolax Supp*)  10 mg SD DAILY PRN


   PRN Reason: CONSTIPATION


Cholecalciferol (Vitamin D Tab*)  5,000 units PO DAILY [facility]


   Last Admin: 09/29/17 09:25 Dose:  5,000 units


Clobetasol Propionate (Clobetasol 0.05% Oint*)  1 applic TOPICAL DAILY [facility]


   Last Admin: 09/29/17 17:34 Dose:  Not Given


Collagenase (Santyl 250 Mg/Gm Oint*)  1 applic TOPICAL .SEE INSTRUCTIONS [facility]


   Last Admin: 09/29/17 11:20 Dose:  1 applic


Cyanocobalamin (Vitamin B12 Tab*)  1,000 mcg PO DAILY [facility]


   Last Admin: 09/29/17 09:25 Dose:  1,000 mcg


Diphenoxylate HCl/Atropine (Lomotil Tab*)  1 tab PO QID [facility]


   Last Admin: 09/29/17 16:45 Dose:  1 tab


Docusate Sodium (Colace Cap*)  100 mg PO BID PRN


   PRN Reason: CONSTIPATION


Econazole Nitrate (Econazole 1 % Cream (Nf))  1 applic TOPICAL DAILY [facility]


   Last Admin: 09/29/17 17:34 Dose:  Not Given


Enoxaparin Sodium (Lovenox(*))  40 mg SUBCUT Q24H [facility]


   Last Admin: 09/29/17 18:27 Dose:  40 mg


Fentanyl (Duragesic  Patch 75 Mcg/Hr*)  75 mcg TRANSDERM Q72H [facility]


   Last Admin: 09/29/17 18:24 Dose:  75 mcg


Ferrous Gluconate (Fergon Tab*)  324 mg PO BID [facility]


   Last Admin: 09/29/17 09:23 Dose:  324 mg


Folic Acid (Folvite Tab*)  0.5 mg PO DAILY [facility]


   Last Admin: 09/29/17 09:26 Dose:  0.5 mg


Furosemide (Lasix Tab*)  40 mg PO DAILY [facility]


   Last Admin: 09/29/17 09:24 Dose:  40 mg


Heparin Sodium (Porcine) (Heparin Flush Picc/Ml/Cvc(*))  1 ml FLUSH 0600,1800 

[facility]


   PRN Reason: Protocol


   Last Admin: 09/29/17 18:27 Dose:  1 ml


Hydrocortisone (Hydrocortisone 2.5% Cream(Nf))  1 applic TOPICAL TID [facility]


Ceftriaxone Sodium 2 gm/ (Sodium Chloride)  100 mls @ 200 mls/hr IVPB DAILY [facility]


   Last Admin: 09/29/17 09:19 Dose:  200 mls/hr


Lactobacillus Rhamnosus (Culturelle*)  1 cap PO BID [facility]


   Last Admin: 09/29/17 09:25 Dose:  1 cap


Metronidazole (Flagyl Tab*)  500 mg PO BID [facility]


   Last Admin: 09/29/17 09:25 Dose:  500 mg


Nystatin (Nystatin Cream*)  1 applic TOPICAL BID [facility]


   Last Admin: 09/29/17 10:22 Dose:  1 applic


Omeprazole (Prilosec Cap*)  40 mg PO DAILY@0600 [facility]


   Last Admin: 09/29/17 05:49 Dose:  40 mg


Ondansetron HCl (Zofran Odt Tab*)  4 mg PO Q8H PRN


   PRN Reason: NAUSEA/VOMITING


Oxycodone HCl (Oxycodone Oral.Soln*)  10 mg PO DAILY [facility]


   PRN Reason: PAIN


Oxycodone HCl (Oxycodone Oral.Soln*)  10 mg PO AC [facility]


   Last Admin: 09/29/17 16:45 Dose:  10 mg


Oxycodone HCl (Roxycodone Tab*)  5 mg PO Q4H PRN


   PRN Reason: PAIN


Pharmacy Profile Note (Fentanyl Patch Check Q Shift)  1 note N/A 0700,1900 [facility]


   Last Admin: 09/29/17 18:53 Dose:  1 note


Potassium Chloride (Klor Con Er Tab*)  10 meq PO DAILY [facility]


   Last Admin: 09/29/17 09:25 Dose:  10 meq


Pregabalin (Lyrica Cap(*))  150 mg PO TID [facility]


   Last Admin: 09/29/17 13:40 Dose:  150 mg


Zinc Sulfate (Zinc-220 Cap*)  220 mg PO DAILY [facility]


   Last Admin: 09/29/17 09:23 Dose:  220 mg








 Vital Signs











  09/28/17 09/28/17 09/28/17





  20:00 20:05 20:08


 


Temperature   98.6 F


 


Pulse Rate   83


 


Respiratory 18 16 18





Rate   


 


Blood Pressure   142/55





(mmHg)   


 


O2 Sat by Pulse   97





Oximetry   














  09/28/17 09/29/17 09/29/17





  22:05 00:10 05:55


 


Temperature  98.4 F 98.2 F


 


Pulse Rate  83 73


 


Respiratory 16 16 16





Rate   


 


Blood Pressure  159/67 137/60





(mmHg)   


 


O2 Sat by Pulse  95 95





Oximetry   














  09/29/17 09/29/17 09/29/17





  06:01 07:45 08:20


 


Temperature 98.2 F 98.1 F 


 


Pulse Rate 73 74 


 


Respiratory 16 15 18





Rate   


 


Blood Pressure 137/60 126/52 





(mmHg)   


 


O2 Sat by Pulse 95 96 





Oximetry   














  09/29/17 09/29/17 09/29/17





  08:32 09:23 09:25


 


Temperature   


 


Pulse Rate   


 


Respiratory 18 16 16





Rate   


 


Blood Pressure   





(mmHg)   


 


O2 Sat by Pulse   





Oximetry   














  09/29/17 09/29/17 09/29/17





  10:32 11:23 11:47


 


Temperature   


 


Pulse Rate   


 


Respiratory 16 18 18





Rate   


 


Blood Pressure   





(mmHg)   


 


O2 Sat by Pulse   





Oximetry   














  09/29/17 09/29/17 09/29/17





  12:07 13:39 13:40


 


Temperature 98.0 F  


 


Pulse Rate 76  


 


Respiratory 16 18 18





Rate   


 


Blood Pressure 155/68  





(mmHg)   


 


O2 Sat by Pulse 98  





Oximetry   














  09/29/17 09/29/17 09/29/17





  15:35 15:37 16:45


 


Temperature  97.7 F 


 


Pulse Rate  81 


 


Respiratory 16 17 16





Rate   


 


Blood Pressure  132/47 





(mmHg)   


 


O2 Sat by Pulse  98 





Oximetry   














  09/29/17





  18:45


 


Temperature 


 


Pulse Rate 


 


Respiratory 18





Rate 


 


Blood Pressure 





(mmHg) 


 


O2 Sat by Pulse 





Oximetry 











Oxygen Devices in Use Now: None


Appearance: No acute distress.


Ears/Nose/Mouth/Throat: NL Teeth, Lips, Gums, Mucous Membranes Moist


Respiratory: Symmetrical Chest Expansion and Respiratory Effort, Clear to 

Auscultation


Cardiovascular: NL Sounds; No Murmurs; No JVD, RRR, - - 1+ edema


Abdominal: NL Sounds; No Tenderness; No Distention


Extremities: - - 1+ edema


Skin: - - lacy faint rash on abdomen. pink rectangular scaley patch on left 

hip. sacral stage IV decub


Neurological: Alert and Oriented x 3, - - paraplegic


Result Diagrams: 


 09/28/17 06:00





 09/28/17 06:00


Additional Lab and Data: 





 


 Laboratory Results - last 24 hr











  09/28/17 09/28/17





  06:00 06:00


 


WBC  7.9 


 


RBC  3.60 L 


 


Hgb  10.1 L 


 


Hct  30 L 


 


MCV  83 


 


MCH  28 


 


MCHC  34 


 


RDW  20 H 


 


Plt Count  174 


 


MPV  8 


 


Neut % (Auto)  46.3 


 


Lymph % (Auto)  34.0 


 


Mono % (Auto)  14.0 H 


 


Eos % (Auto)  4.2 


 


Baso % (Auto)  1.5 


 


Absolute Neuts (auto)  3.6 


 


Absolute Lymphs (auto)  2.7 


 


Absolute Monos (auto)  1.1 H 


 


Absolute Eos (auto)  0.3 


 


Absolute Basos (auto)  0.1 


 


Absolute Nucleated RBC  0 


 


Nucleated RBC %  0.1 


 


Sodium   140


 


Potassium   4.0


 


Chloride   105


 


Carbon Dioxide   32


 


Anion Gap   3


 


BUN   37 H


 


Creatinine   1.38 H


 


Est GFR ( Amer)   65.3


 


Est GFR (Non-Af Amer)   50.8


 


BUN/Creatinine Ratio   26.8 H


 


Glucose   98


 


Calcium   9.6

















Assess/Plan/Problems-Billing


.


Assessment: 


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 

Now with large Stage IV sacral decubitus ulcer which has been debrided and is 

slowly healing. Plastic surgery consultation at [facility] for possible 

flap.  








- Patient Problems


(1) Acute osteomyelitis of sacrum


Current Visit: Yes   Status: Acute   Code(s): M46.28 - OSTEOMYELITIS OF VERTEBRA

, SACRAL AND SACROCOCCYGEAL REGION   SNOMED Code(s): 721128189


   Comment: Continue Ceftriaxone and flagyl at current doses per ID.


no signs of systemic or local infection    





(2) Chronic back pain


Current Visit: No   Status: Chronic   Code(s): M54.9 - DORSALGIA, UNSPECIFIED; 

G89.29 - OTHER CHRONIC PAIN   SNOMED Code(s): 434179105


   Comment: 


Secondary to ependymoma and sacral decub


Continue pregabalin, and acetaminophen.


oxycodone liquid to 10mg AC with one dose PRN for transfer.


Fentanyl patch to 75mcg. 


   





(3) Chronic indwelling Rodriguez catheter


Current Visit: Yes   Status: Chronic   Priority: High   Code(s): Z92.89 - 

PERSONAL HISTORY OF OTHER MEDICAL TREATMENT   SNOMED Code(s): 571442849


   Comment: 


High risk of UTI - currently on ABX, no current evidence of UTI.   





(4) Neurogenic bladder


Current Visit: Yes   Status: Chronic   Priority: High   Code(s): N31.9 - 

NEUROMUSCULAR DYSFUNCTION OF BLADDER, UNSPECIFIED   SNOMED Code(s): 714526195


   Comment: 


Indwelling urinary catheter, no sign of current UTI.   





(5) Neurogenic bowel


Current Visit: Yes   Status: Chronic   Priority: High   Code(s): K59.2 - 

NEUROGENIC BOWEL, NOT ELSEWHERE CLASSIFIED   SNOMED Code(s): 064793247


   Comment: 


loose bowel movements with repositiong. Frequent pericare to prevent skin 

breakdown. Flagyl to help with diarrhea. Immodium after loose bowel movements. 

Metamucil for bulking.    





(6) Sacral decubitus ulcer, stage IV


Current Visit: Yes   Status: Chronic   Priority: High   Code(s): L89.154 - 

PRESSURE ULCER OF SACRAL REGION, STAGE 4   SNOMED Code(s): 047776525


   Comment: Seeking plastic surgery consultation to [facility] (Luis Felipe Chirinos MD)


Continuing wound care, debrided 9/27 by Dr Hloley, continue Santyl 

treatment. Continue wet to dry dressings. Appreciate input from surgery.


Appreciate nutrition recommendations to support healing, attempt for 6 small 

protein rich meals daily and continue vitamins. Vitamin C started per nutrition 

and wife.


   





(7) DVT prophylaxis


Current Visit: No   Status: Acute   Code(s): HLC4329 -    SNOMED Code(s): 

217379000


   Comment: 


Lovenox


SCDs in bed.   





(8) Diarrhea


Current Visit: No   Status: Acute   Code(s): R19.7 - DIARRHEA, UNSPECIFIED   

SNOMED Code(s): 49879639


   Comment: 


Persistent diarrhea, Likely ABX associated. ID thinks not Cdiff. Continue Flagyl

, Probiotic, Metamucil, and Immodium.


   


Status and Disposition: 





f/u plastic surgery consult. Plan is to return home when able to do so 

functionally. 


Attending: Nathaniel Coates

## 2017-09-29 NOTE — PN
Progress Note





- Progress Note


Date of Service: 09/29/17


SOAP: 


Subjective:


CC: decubitus ulcer


HPI: 72 year old man with sacral decubitus ulcer, feeling better and eating.  

No fever, rash, or diarrhea, small soft stool 2-3 times per day.  Appetite 

good.  Bilateral heel pressure ulcers, present  a few weeks, no pain or 

redness.  








Objective:


[]


 Vital Signs











Temp  36.8 C   09/29/17 06:01


 


Pulse  74   09/29/17 07:45


 


Resp  16   09/29/17 09:25


 


BP  126/52   09/29/17 07:45


 


Pulse Ox  96   09/29/17 07:45








 Intake & Output











 09/28/17 09/29/17 09/29/17





 18:59 06:59 18:59


 


Intake Total 1509 240 


 


Output Total 1250 500 


 


Balance 259 -260 


 


Intake:   


 


  IV Fluids 129  


 


    ABX - CEFTRIAXONE 109  


 


    NS (0.9%) 20  


 


  Oral 1380 240 


 


Output:   


 


  Rodriguez 1250 500 


 


Other:   


 


  # Bowel Movements 1  


 


  Estimated Stool Amount Small  








 


Gen:awake, no distress


HEENT:no thrush


Heart:RRR no murmur


Lungs:CTA BL


Abd:+BS NTND soft


Skin: no rash


MSK: sacrum bandaged; BL medial heal 4 cm purple discoloration and eschar


 


Assessment:


1. acute osteomyelitis, sacrum


2. 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


3. elevated CRP, improving








Plan:


1. continue ceftriaxone and flagyl day 22/28 and then PO antibiotics.  Labs 

next week.  Wound care.

## 2017-09-29 NOTE — PN
Progress Note





- Progress Note


Date of Service: 09/29/17


SOAP: 


Subjective:





No complaints today








Objective:





 











Temp Pulse Resp BP Pulse Ox


 


 98.2 F   74   16   126/52   96 


 


 09/29/17 06:01  09/29/17 07:45  09/29/17 10:32  09/29/17 07:45  09/29/17 07:45








PEX:


Sacral decubitus remains clean with evidence of granulation tissue formation.  

No odor or purulence.  Midline still with fibrous tissue overlying bone, no 

exposed bone noted now. Still with some fibrin in areas.  





Minimal sharp debridement today at bedside. Minimal bleeding controlled with 

pressure.














Assessment:


Sacral pressure ulcer








Plan:


Continue present wound care regimen-Santyl and wet to dry BID.


IV anbx.

## 2017-09-30 LAB
ALBUMIN SERPL BCG-MCNC: 2.9 G/DL (ref 3.2–5.2)
ALP SERPL-CCNC: 60 U/L (ref 34–104)
ALT SERPL W P-5'-P-CCNC: 8 U/L (ref 7–52)
ANION GAP SERPL CALC-SCNC: 6 MMOL/L (ref 2–11)
AST SERPL-CCNC: 12 U/L (ref 13–39)
BUN SERPL-MCNC: 37 MG/DL (ref 6–24)
BUN/CREAT SERPL: 28 (ref 8–20)
CALCIUM SERPL-MCNC: 9.5 MG/DL (ref 8.6–10.3)
CHLORIDE SERPL-SCNC: 104 MMOL/L (ref 101–111)
GLOBULIN SER CALC-MCNC: 2.7 G/DL (ref 2–4)
GLUCOSE SERPL-MCNC: 92 MG/DL (ref 70–100)
HCO3 SERPL-SCNC: 30 MMOL/L (ref 22–32)
HCT VFR BLD AUTO: 30 % (ref 42–52)
HGB BLD-MCNC: 10 G/DL (ref 14–18)
MCH RBC QN AUTO: 27 PG (ref 27–31)
MCHC RBC AUTO-ENTMCNC: 33 G/DL (ref 31–36)
MCV RBC AUTO: 83 FL (ref 80–94)
POTASSIUM SERPL-SCNC: 4.1 MMOL/L (ref 3.5–5)
PROT SERPL-MCNC: 5.6 G/DL (ref 6.4–8.9)
RBC # BLD AUTO: 3.64 10^6/UL (ref 4–5.4)
SODIUM SERPL-SCNC: 140 MMOL/L (ref 133–145)
WBC # BLD AUTO: 8.3 10^3/UL (ref 3.5–10.8)

## 2017-09-30 RX ADMIN — Medication SCH MG: at 09:28

## 2017-09-30 RX ADMIN — Medication SCH MG: at 21:01

## 2017-09-30 RX ADMIN — WATER SCH NOTE: 100 INJECTION, SOLUTION INTRAVENOUS at 07:19

## 2017-09-30 RX ADMIN — HYDROCORTISONE SCH APPLIC: 25 CREAM TOPICAL at 08:22

## 2017-09-30 RX ADMIN — OXYCODONE HYDROCHLORIDE SCH MG: 5 SOLUTION ORAL at 07:59

## 2017-09-30 RX ADMIN — NYSTATIN SCH APPLIC: 100000 CREAM TOPICAL at 21:10

## 2017-09-30 RX ADMIN — DIPHENOXYLATE HYDROCHLORIDE AND ATROPINE SULFATE SCH TAB: 2.5; .025 TABLET ORAL at 09:30

## 2017-09-30 RX ADMIN — Medication SCH CAP: at 21:00

## 2017-09-30 RX ADMIN — DIPHENOXYLATE HYDROCHLORIDE AND ATROPINE SULFATE SCH TAB: 2.5; .025 TABLET ORAL at 17:07

## 2017-09-30 RX ADMIN — Medication SCH CAP: at 09:27

## 2017-09-30 RX ADMIN — METRONIDAZOLE SCH MG: 250 TABLET ORAL at 21:01

## 2017-09-30 RX ADMIN — POTASSIUM CHLORIDE SCH MEQ: 750 TABLET, FILM COATED, EXTENDED RELEASE ORAL at 09:30

## 2017-09-30 RX ADMIN — OXYCODONE HYDROCHLORIDE SCH MG: 5 SOLUTION ORAL at 17:13

## 2017-09-30 RX ADMIN — METRONIDAZOLE SCH MG: 250 TABLET ORAL at 09:30

## 2017-09-30 RX ADMIN — ASPIRIN SCH MG: 81 TABLET, COATED ORAL at 09:28

## 2017-09-30 RX ADMIN — PREGABALIN SCH MG: 50 CAPSULE ORAL at 14:33

## 2017-09-30 RX ADMIN — OXYCODONE HYDROCHLORIDE SCH MG: 5 SOLUTION ORAL at 11:50

## 2017-09-30 RX ADMIN — FOLIC ACID SCH MG: 1 TABLET ORAL at 09:27

## 2017-09-30 RX ADMIN — Medication SCH ML: at 06:13

## 2017-09-30 RX ADMIN — PREGABALIN SCH MG: 50 CAPSULE ORAL at 21:01

## 2017-09-30 RX ADMIN — PREGABALIN SCH MG: 50 CAPSULE ORAL at 09:28

## 2017-09-30 RX ADMIN — ECONAZOLE NITRATE SCH APPLIC: 10 CREAM TOPICAL at 08:12

## 2017-09-30 RX ADMIN — ENOXAPARIN SODIUM SCH MG: 40 INJECTION SUBCUTANEOUS at 18:20

## 2017-09-30 RX ADMIN — Medication SCH UNITS: at 09:30

## 2017-09-30 RX ADMIN — OMEPRAZOLE SCH MG: 20 CAPSULE, DELAYED RELEASE ORAL at 06:29

## 2017-09-30 RX ADMIN — OXYCODONE HYDROCHLORIDE AND ACETAMINOPHEN SCH MG: 500 TABLET ORAL at 09:30

## 2017-09-30 RX ADMIN — Medication SCH: at 17:51

## 2017-09-30 RX ADMIN — NYSTATIN SCH APPLIC: 100000 CREAM TOPICAL at 08:14

## 2017-09-30 RX ADMIN — HYDROCORTISONE SCH APPLIC: 25 CREAM TOPICAL at 15:55

## 2017-09-30 RX ADMIN — DIPHENOXYLATE HYDROCHLORIDE AND ATROPINE SULFATE SCH TAB: 2.5; .025 TABLET ORAL at 22:09

## 2017-09-30 RX ADMIN — WATER SCH NOTE: 100 INJECTION, SOLUTION INTRAVENOUS at 18:46

## 2017-09-30 RX ADMIN — CYANOCOBALAMIN TAB 500 MCG SCH MCG: 500 TAB at 09:29

## 2017-09-30 RX ADMIN — FUROSEMIDE SCH MG: 40 TABLET ORAL at 09:29

## 2017-09-30 RX ADMIN — COLLAGENASE SANTYL SCH APPLIC: 250 OINTMENT TOPICAL at 09:55

## 2017-09-30 RX ADMIN — Medication SCH ML: at 10:28

## 2017-09-30 RX ADMIN — CLOBETASOL PROPIONATE SCH APPLIC: 0.5 OINTMENT TOPICAL at 08:17

## 2017-09-30 RX ADMIN — DIPHENOXYLATE HYDROCHLORIDE AND ATROPINE SULFATE SCH TAB: 2.5; .025 TABLET ORAL at 12:54

## 2017-09-30 RX ADMIN — HYDROCORTISONE SCH APPLIC: 25 CREAM TOPICAL at 22:08

## 2017-09-30 NOTE — PN
Subjective


Date of Service: 09/30/17


Interval History: 





Bowel movements slightly firmer, still incontinent with any transfer. Home 

creams being applied by RNs. 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 

regimen. 


Family History: Unchanged from Admission


Social History: Unchanged from Admission - no changes noted


Past Medical History: Unchanged from Admission





Objective


Active Medications: 








Acetaminophen (Tylenol Tab*)  650 mg PO Q6H PRN


   PRN Reason: FEVER/PAIN


Ascorbic Acid (Vitamin C  Tab*)  500 mg PO DAILY [facility]


   Last Admin: 09/30/17 09:30 Dose:  500 mg


Aspirin (Aspirin Ec Low Dose*)  81 mg PO DAILY [facility]


   Last Admin: 09/30/17 09:28 Dose:  81 mg


Bisacodyl (Dulcolax Supp*)  10 mg [place] DAILY [facility]


   PRN Reason: CONSTIPATION


Cholecalciferol (Vitamin D Tab*)  5,000 units PO DAILY [facility]


   Last Admin: 09/30/17 09:30 Dose:  5,000 units


Clobetasol Propionate (Clobetasol 0.05% Oint*)  1 applic TOPICAL DAILY [facility]


   Last Admin: 09/30/17 08:17 Dose:  1 applic


Collagenase (Santyl 250 Mg/Gm Oint*)  1 applic TOPICAL .SEE INSTRUCTIONS [facility]


   Last Admin: 09/30/17 09:55 Dose:  1 applic


Cyanocobalamin (Vitamin B12 Tab*)  1,000 mcg PO DAILY [facility]


   Last Admin: 09/30/17 09:29 Dose:  1,000 mcg


Diphenoxylate HCl/Atropine (Lomotil Tab*)  1 tab PO QID [facility]


   Last Admin: 09/30/17 12:54 Dose:  1 tab


Docusate Sodium (Colace Cap*)  100 mg PO BID [facility]


   PRN Reason: CONSTIPATION


Econazole Nitrate (Econazole 1 % Cream (Nf))  1 applic TOPICAL DAILY [facility]


   Last Admin: 09/30/17 08:12 Dose:  1 applic


Enoxaparin Sodium (Lovenox(*))  40 mg SUBCUT Q24H [facility]


   Last Admin: 09/29/17 18:27 Dose:  40 mg


Fentanyl (Duragesic  Patch 75 Mcg/Hr*)  75 mcg TRANSDERM Q72H [facility]


   Last Admin: 09/29/17 18:24 Dose:  75 mcg


Ferrous Gluconate (Fergon Tab*)  324 mg PO BID [facility]


   Last Admin: 09/30/17 09:28 Dose:  324 mg


Folic Acid (Folvite Tab*)  0.5 mg PO DAILY [facility]


   Last Admin: 09/30/17 09:27 Dose:  0.5 mg


Furosemide (Lasix Tab*)  40 mg PO DAILY [facility]


   Last Admin: 09/30/17 09:29 Dose:  40 mg


Heparin Sodium (Porcine) (Heparin Flush Picc/Ml/Cvc(*))  1 ml FLUSH 0600,1800 

[facility]


   PRN Reason: Protocol


   Last Admin: 09/30/17 10:28 Dose:  1 ml


Hydrocortisone (Hydrocortisone 2.5% Cream(Nf))  1 applic TOPICAL TID [facility]


   Last Admin: 09/30/17 15:55 Dose:  1 applic


Ceftriaxone Sodium 2 gm/ (Sodium Chloride)  100 mls @ 200 mls/hr IVPB DAILY [facility]


   Last Admin: 09/30/17 09:28 Dose:  200 mls/hr


Lactobacillus Rhamnosus (Culturelle*)  1 cap PO BID [facility]


   Last Admin: 09/30/17 09:27 Dose:  1 cap


Metronidazole (Flagyl Tab*)  500 mg PO BID [facility]


   Last Admin: 09/30/17 09:30 Dose:  500 mg


Nystatin (Nystatin Cream*)  1 applic TOPICAL BID [facility]


   Last Admin: 09/30/17 08:14 Dose:  1 applic


Omeprazole (Prilosec Cap*)  40 mg PO DAILY@0600 [facility]


   Last Admin: 09/30/17 06:29 Dose:  40 mg


Ondansetron HCl (Zofran Odt Tab*)  4 mg PO Q8H PRN


   PRN Reason: NAUSEA/VOMITING


Oxycodone HCl (Oxycodone Oral.Soln*)  10 mg PO DAILY [facility]


   PRN Reason: PAIN


Oxycodone HCl (Oxycodone Oral.Soln*)  10 mg PO AC [facility]


   Last Admin: 09/30/17 11:50 Dose:  10 mg


Oxycodone HCl (Roxycodone Tab*)  5 mg PO Q4H PRN


   PRN Reason: PAIN


Pharmacy Profile Note (Fentanyl Patch Check Q Shift)  1 note N/A 0700,1900 [facility]


   Last Admin: 09/30/17 07:19 Dose:  1 note


Potassium Chloride (Klor Con Er Tab*)  10 meq PO DAILY [facility]


   Last Admin: 09/30/17 09:30 Dose:  10 meq


Pregabalin (Lyrica Cap(*))  150 mg PO TID [facility]


   Last Admin: 09/30/17 14:33 Dose:  150 mg


Zinc Sulfate (Zinc-220 Cap*)  220 mg PO DAILY [facility]


   Last Admin: 09/30/17 09:28 Dose:  220 mg








 Vital Signs











  09/29/17 09/29/17 09/29/17





  16:45 18:45 20:00


 


Temperature   


 


Pulse Rate   


 


Respiratory 16 18 18





Rate   


 


Blood Pressure   





(mmHg)   


 


O2 Sat by Pulse   





Oximetry   














  09/29/17 09/29/17 09/29/17





  21:58 21:59 23:52


 


Temperature   98.7 F


 


Pulse Rate   74


 


Respiratory 18 18 18





Rate   


 


Blood Pressure   137/55





(mmHg)   


 


O2 Sat by Pulse   96





Oximetry   














  09/29/17 09/29/17 09/30/17





  23:58 23:59 00:06


 


Temperature   97.7 F


 


Pulse Rate   74


 


Respiratory 16 16 15





Rate   


 


Blood Pressure   152/69





(mmHg)   


 


O2 Sat by Pulse   97





Oximetry   














  09/30/17 09/30/17 09/30/17





  03:56 07:34 07:59


 


Temperature 97.9 F 98.2 F 


 


Pulse Rate 74 72 


 


Respiratory 16 16 16





Rate   


 


Blood Pressure 140/57 136/63 





(mmHg)   


 


O2 Sat by Pulse 94 97 





Oximetry   














  09/30/17 09/30/17 09/30/17





  09:28 09:30 09:59


 


Temperature   


 


Pulse Rate   


 


Respiratory 16 16 16





Rate   


 


Blood Pressure   





(mmHg)   


 


O2 Sat by Pulse   





Oximetry   














  09/30/17 09/30/17 09/30/17





  11:50 12:54 13:50


 


Temperature   


 


Pulse Rate   


 


Respiratory 16 16 16





Rate   


 


Blood Pressure   





(mmHg)   


 


O2 Sat by Pulse   





Oximetry   














  09/30/17





  14:33


 


Temperature 


 


Pulse Rate 


 


Respiratory 16





Rate 


 


Blood Pressure 





(mmHg) 


 


O2 Sat by Pulse 





Oximetry 











Oxygen Devices in Use Now: None


Appearance: No acute distress.


Ears/Nose/Mouth/Throat: NL Teeth, Lips, Gums, Mucous Membranes Moist


Neck: NL Appearance and Movements; NL JVP, Trachea Midline


Respiratory: Symmetrical Chest Expansion and Respiratory Effort, Clear to 

Auscultation


Cardiovascular: NL Sounds; No Murmurs; No JVD, RRR


Abdominal: NL Sounds; No Tenderness; No Distention, No Hepatosplenomegaly


Extremities: No Clubbing, Cyanosis, - - trace edema, wearing NEW hose. 


Skin: - - sacral decub stage IV. improved rash on abdomen. 


Neurological: Alert and Oriented x 3, - - paraplegia below waist. 


Result Diagrams: 


 09/30/17 06:15





 09/30/17 06:15


Additional Lab and Data: 





 


 Laboratory Results - last 24 hr











  09/28/17 09/28/17





  06:00 06:00


 


WBC  7.9 


 


RBC  3.60 L 


 


Hgb  10.1 L 


 


Hct  30 L 


 


MCV  83 


 


MCH  28 


 


MCHC  34 


 


RDW  20 H 


 


Plt Count  174 


 


MPV  8 


 


Neut % (Auto)  46.3 


 


Lymph % (Auto)  34.0 


 


Mono % (Auto)  14.0 H 


 


Eos % (Auto)  4.2 


 


Baso % (Auto)  1.5 


 


Absolute Neuts (auto)  3.6 


 


Absolute Lymphs (auto)  2.7 


 


Absolute Monos (auto)  1.1 H 


 


Absolute Eos (auto)  0.3 


 


Absolute Basos (auto)  0.1 


 


Absolute Nucleated RBC  0 


 


Nucleated RBC %  0.1 


 


Sodium   140


 


Potassium   4.0


 


Chloride   105


 


Carbon Dioxide   32


 


Anion Gap   3


 


BUN   37 H


 


Creatinine   1.38 H


 


Est GFR ( Amer)   65.3


 


Est GFR (Non-Af Amer)   50.8


 


BUN/Creatinine Ratio   26.8 H


 


Glucose   98


 


Calcium   9.6

















Assess/Plan/Problems-Billing


.


Assessment: 


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 

P/w large Stage IV sacral decubitus ulcer which has been debrided multiple 

times. Failed wound vac in [facility] and back inpatient. Plastic surgery 

consultation at [facility] for possible flap.  





- Patient Problems


(1) Acute osteomyelitis of sacrum


Current Visit: Yes   Status: Acute   Code(s): M46.28 - OSTEOMYELITIS OF VERTEBRA

, SACRAL AND SACROCOCCYGEAL REGION   SNOMED Code(s): 171569016


   Comment: Continue Ceftriaxone and flagyl at current doses per ID.


no signs of systemic or local infection    





(2) Chronic back pain


Current Visit: No   Status: Chronic   Code(s): M54.9 - DORSALGIA, UNSPECIFIED; 

G89.29 - OTHER CHRONIC PAIN   SNOMED Code(s): 905719520


   Comment: 


Secondary to ependymoma and sacral decub


Continue pregabalin, and acetaminophen.


oxycodone liquid to 10mg AC with one dose PRN for transfer.


Fentanyl patch to 75mcg. 


   





(3) Chronic indwelling Rodriguez catheter


Current Visit: Yes   Status: Chronic   Priority: High   Code(s): Z92.89 - 

PERSONAL HISTORY OF OTHER MEDICAL TREATMENT   SNOMED Code(s): 552179861


   Comment: 


High risk of UTI - currently on ABX, no current evidence of UTI.   





(4) Neurogenic bladder


Current Visit: Yes   Status: Chronic   Priority: High   Code(s): N31.9 - 

NEUROMUSCULAR DYSFUNCTION OF BLADDER, UNSPECIFIED   SNOMED Code(s): 938149020


   Comment: 


Indwelling urinary catheter, no sign of current UTI.   





(5) Neurogenic bowel


Current Visit: Yes   Status: Chronic   Priority: High   Code(s): K59.2 - 

NEUROGENIC BOWEL, NOT ELSEWHERE CLASSIFIED   SNOMED Code(s): 595576607


   Comment: 


loose bowel movements with repositioning. Frequent pericare to prevent skin 

breakdown. Flagyl to help with diarrhea. Immodium after loose bowel movements. 

Metamucil for bulking.    





(6) Sacral decubitus ulcer, stage IV


Current Visit: Yes   Status: Chronic   Priority: High   Code(s): L89.154 - 

PRESSURE ULCER OF SACRAL REGION, STAGE 4   SNOMED Code(s): 044843766


   Comment: Plastic surgery consultation to [facility] (Luis Felipe Chirinos MD)


Continuing wound care, debrided 9/27 by Dr Holley, continue Santyl 

treatment. Continue wet to dry dressings. Appreciate input from surgery.


Appreciate nutrition recommendations to support healing, attempt for 6 small 

protein rich meals daily and continue vitamins. Vitamin C started per nutrition 

and wife.


   





(7) DVT prophylaxis


Current Visit: No   Status: Acute   Code(s): JCS5227 -    SNOMED Code(s): 

267569938


   Comment: 


Lovenox


SCDs in bed.   





(8) Diarrhea


Current Visit: No   Status: Acute   Code(s): R19.7 - DIARRHEA, UNSPECIFIED   

SNOMED Code(s): 72362521


   Comment: 


Persistent loose BMs though more formed today, Likely ABX associated. ID thinks 

not Cdiff. Continue Flagyl, Probiotic, Metamucil, and Immodium.


   


Status and Disposition: 





f/u plastic surgery consult. Plan is to return home vs potential transfer for 

plastic surgery flap surgery in [place]. 


Attending: Nathaniel Coates

## 2017-10-01 RX ADMIN — Medication SCH CAP: at 08:07

## 2017-10-01 RX ADMIN — CYANOCOBALAMIN TAB 500 MCG SCH MCG: 500 TAB at 08:10

## 2017-10-01 RX ADMIN — Medication SCH UNITS: at 08:06

## 2017-10-01 RX ADMIN — OXYCODONE HYDROCHLORIDE SCH MG: 5 SOLUTION ORAL at 08:03

## 2017-10-01 RX ADMIN — Medication SCH MG: at 20:56

## 2017-10-01 RX ADMIN — FUROSEMIDE SCH MG: 40 TABLET ORAL at 08:09

## 2017-10-01 RX ADMIN — FOLIC ACID SCH MG: 1 TABLET ORAL at 08:13

## 2017-10-01 RX ADMIN — ECONAZOLE NITRATE SCH APPLIC: 10 CREAM TOPICAL at 08:28

## 2017-10-01 RX ADMIN — Medication SCH MG: at 08:09

## 2017-10-01 RX ADMIN — CLOBETASOL PROPIONATE SCH APPLIC: 0.5 OINTMENT TOPICAL at 08:29

## 2017-10-01 RX ADMIN — PREGABALIN SCH MG: 50 CAPSULE ORAL at 08:10

## 2017-10-01 RX ADMIN — DIPHENOXYLATE HYDROCHLORIDE AND ATROPINE SULFATE SCH TAB: 2.5; .025 TABLET ORAL at 12:59

## 2017-10-01 RX ADMIN — POTASSIUM CHLORIDE SCH MEQ: 750 TABLET, FILM COATED, EXTENDED RELEASE ORAL at 08:08

## 2017-10-01 RX ADMIN — HYDROCORTISONE SCH APPLIC: 25 CREAM TOPICAL at 14:03

## 2017-10-01 RX ADMIN — DIPHENOXYLATE HYDROCHLORIDE AND ATROPINE SULFATE SCH TAB: 2.5; .025 TABLET ORAL at 08:08

## 2017-10-01 RX ADMIN — Medication SCH CAP: at 20:56

## 2017-10-01 RX ADMIN — Medication SCH ML: at 18:19

## 2017-10-01 RX ADMIN — ENOXAPARIN SODIUM SCH MG: 40 INJECTION SUBCUTANEOUS at 18:19

## 2017-10-01 RX ADMIN — OXYCODONE HYDROCHLORIDE SCH MG: 5 SOLUTION ORAL at 16:40

## 2017-10-01 RX ADMIN — METRONIDAZOLE SCH MG: 250 TABLET ORAL at 08:07

## 2017-10-01 RX ADMIN — OXYCODONE HYDROCHLORIDE SCH MG: 5 SOLUTION ORAL at 11:52

## 2017-10-01 RX ADMIN — WATER SCH NOTE: 100 INJECTION, SOLUTION INTRAVENOUS at 19:04

## 2017-10-01 RX ADMIN — HYDROCORTISONE SCH APPLIC: 25 CREAM TOPICAL at 20:57

## 2017-10-01 RX ADMIN — Medication SCH ML: at 06:02

## 2017-10-01 RX ADMIN — OXYCODONE HYDROCHLORIDE AND ACETAMINOPHEN SCH MG: 500 TABLET ORAL at 08:13

## 2017-10-01 RX ADMIN — PREGABALIN SCH MG: 50 CAPSULE ORAL at 20:56

## 2017-10-01 RX ADMIN — HYDROCORTISONE SCH APPLIC: 25 CREAM TOPICAL at 08:34

## 2017-10-01 RX ADMIN — PREGABALIN SCH MG: 50 CAPSULE ORAL at 14:07

## 2017-10-01 RX ADMIN — METRONIDAZOLE SCH MG: 250 TABLET ORAL at 20:56

## 2017-10-01 RX ADMIN — DIPHENOXYLATE HYDROCHLORIDE AND ATROPINE SULFATE SCH TAB: 2.5; .025 TABLET ORAL at 20:56

## 2017-10-01 RX ADMIN — WATER SCH NOTE: 100 INJECTION, SOLUTION INTRAVENOUS at 07:04

## 2017-10-01 RX ADMIN — Medication SCH ML: at 11:17

## 2017-10-01 RX ADMIN — ASPIRIN SCH MG: 81 TABLET, COATED ORAL at 08:10

## 2017-10-01 RX ADMIN — OMEPRAZOLE SCH MG: 20 CAPSULE, DELAYED RELEASE ORAL at 06:02

## 2017-10-01 RX ADMIN — DIPHENOXYLATE HYDROCHLORIDE AND ATROPINE SULFATE SCH TAB: 2.5; .025 TABLET ORAL at 16:38

## 2017-10-01 RX ADMIN — NYSTATIN SCH APPLIC: 100000 CREAM TOPICAL at 20:59

## 2017-10-01 RX ADMIN — NYSTATIN SCH APPLIC: 100000 CREAM TOPICAL at 08:32

## 2017-10-01 NOTE — PN
Subjective


Date of Service: 10/01/17


Interval History: 





Patient has no acute complaints. Pain 4/10 when seated on 2 different 

occasions. Patient states that his diarrhea has much improved. Patient will 

talk [facility] tomorrow and they are pleased with the plan of 

action. 


Family History: Unchanged from Admission


Social History: Unchanged from Admission - no changes noted


Past Medical History: Unchanged from Admission





Objective


Active Medications: 








Acetaminophen (Tylenol Tab*)  650 mg PO Q6H PRN


   PRN Reason: FEVER/PAIN


Ascorbic Acid (Vitamin C  Tab*)  500 mg PO DAILY [facility]


   Last Admin: 10/01/17 08:13 Dose:  500 mg


Aspirin (Aspirin Ec Low Dose*)  81 mg PO DAILY [facility]


   Last Admin: 10/01/17 08:10 Dose:  81 mg


Bisacodyl (Dulcolax Supp*)  10 mg [place] DAILY PRN


   PRN Reason: CONSTIPATION


Cholecalciferol (Vitamin D Tab*)  5,000 units PO DAILY [facility]


   Last Admin: 10/01/17 08:06 Dose:  5,000 units


Clobetasol Propionate (Clobetasol 0.05% Oint*)  1 applic TOPICAL DAILY [facility]


   Last Admin: 10/01/17 08:29 Dose:  1 applic


Collagenase (Santyl 250 Mg/Gm Oint*)  1 applic TOPICAL .SEE INSTRUCTIONS [facility]


   Last Admin: 09/30/17 09:55 Dose:  1 applic


Cyanocobalamin (Vitamin B12 Tab*)  1,000 mcg PO DAILY [facility]


   Last Admin: 10/01/17 08:10 Dose:  1,000 mcg


Diphenoxylate HCl/Atropine (Lomotil Tab*)  1 tab PO QID [facility]


   Last Admin: 10/01/17 12:59 Dose:  1 tab


Docusate Sodium (Colace Cap*)  100 mg PO BID PRN


   PRN Reason: CONSTIPATION


Econazole Nitrate (Econazole 1 % Cream (Nf))  1 applic TOPICAL DAILY [facility]


   Last Admin: 10/01/17 08:28 Dose:  1 applic


Enoxaparin Sodium (Lovenox(*))  40 mg SUBCUT Q24H [facility]


   Last Admin: 09/30/17 18:20 Dose:  40 mg


Fentanyl (Duragesic  Patch 75 Mcg/Hr*)  75 mcg TRANSDERM Q72H [facility]


   Last Admin: 09/29/17 18:24 Dose:  75 mcg


Ferrous Gluconate (Fergon Tab*)  324 mg PO BID [facility]


   Last Admin: 10/01/17 08:09 Dose:  324 mg


Folic Acid (Folvite Tab*)  0.5 mg PO DAILY [facility]


   Last Admin: 10/01/17 08:13 Dose:  0.5 mg


Furosemide (Lasix Tab*)  40 mg PO DAILY [facility]


   Last Admin: 10/01/17 08:09 Dose:  40 mg


Heparin Sodium (Porcine) (Heparin Flush Picc/Ml/Cvc(*))  1 ml FLUSH 0600,1800 

[facility]


   PRN Reason: Protocol


   Last Admin: 10/01/17 11:17 Dose:  1 ml


Hydrocortisone (Hydrocortisone 2.5% Cream(Nf))  1 applic TOPICAL TID [facility]


   Last Admin: 10/01/17 14:03 Dose:  1 applic


Ceftriaxone Sodium 2 gm/ (Sodium Chloride)  100 mls @ 200 mls/hr IVPB DAILY [facility]


   Last Admin: 10/01/17 08:23 Dose:  200 mls/hr


Lactobacillus Rhamnosus (Culturelle*)  1 cap PO BID [facility]


   Last Admin: 10/01/17 08:07 Dose:  1 cap


Metronidazole (Flagyl Tab*)  500 mg PO BID [facility]


   Last Admin: 10/01/17 08:07 Dose:  500 mg


Nystatin (Nystatin Cream*)  1 applic TOPICAL BID [facility]


   Last Admin: 10/01/17 08:32 Dose:  1 applic


Omeprazole (Prilosec Cap*)  40 mg PO DAILY@0600 [facility]


   Last Admin: 10/01/17 06:02 Dose:  40 mg


Ondansetron HCl (Zofran Odt Tab*)  4 mg PO Q8H PRN


   PRN Reason: NAUSEA/VOMITING


Oxycodone HCl (Oxycodone Oral.Soln*)  10 mg PO DAILY PRN


   PRN Reason: PAIN


Oxycodone HCl (Oxycodone Oral.Soln*)  10 mg PO AC [facility]


   Last Admin: 10/01/17 11:52 Dose:  10 mg


Oxycodone HCl (Roxycodone Tab*)  5 mg PO Q4H PRN


   PRN Reason: PAIN


Pharmacy Profile Note (Fentanyl Patch Check Q Shift)  1 note N/A 0700,1900 [facility]


   Last Admin: 10/01/17 07:04 Dose:  1 note


Potassium Chloride (Klor Con Er Tab*)  10 meq PO DAILY [facility]


   Last Admin: 10/01/17 08:08 Dose:  10 meq


Pregabalin (Lyrica Cap(*))  150 mg PO TID [facility]


   Last Admin: 10/01/17 14:07 Dose:  150 mg


Zinc Sulfate (Zinc-220 Cap*)  220 mg PO DAILY [facility]


   Last Admin: 10/01/17 08:09 Dose:  220 mg








 Vital Signs











  09/30/17 09/30/17 09/30/17





  15:29 17:07 17:13


 


Temperature 98.6 F  


 


Pulse Rate 82  


 


Respiratory 12 16 18





Rate   


 


Blood Pressure 137/65  





(mmHg)   


 


O2 Sat by Pulse 97  





Oximetry   














  09/30/17 09/30/17 09/30/17





  19:07 19:13 19:36


 


Temperature   98.0 F


 


Pulse Rate   80


 


Respiratory 17 17 18





Rate   


 


Blood Pressure   156/66





(mmHg)   


 


O2 Sat by Pulse   96





Oximetry   














  09/30/17 09/30/17 09/30/17





  20:00 21:01 22:09


 


Temperature   


 


Pulse Rate   


 


Respiratory 17 17 17





Rate   


 


Blood Pressure   





(mmHg)   


 


O2 Sat by Pulse   





Oximetry   














  09/30/17 10/01/17 10/01/17





  23:01 00:09 00:26


 


Temperature   98.7 F


 


Pulse Rate   77


 


Respiratory 16 16 16





Rate   


 


Blood Pressure   118/64





(mmHg)   


 


O2 Sat by Pulse   98





Oximetry   














  10/01/17 10/01/17 10/01/17





  04:46 07:59 08:00


 


Temperature 97.9 F 98.8 F 


 


Pulse Rate 72 72 


 


Respiratory 17 16 16





Rate   


 


Blood Pressure 143/67 147/73 





(mmHg)   


 


O2 Sat by Pulse 96 98 





Oximetry   














  10/01/17 10/01/17 10/01/17





  08:03 08:08 08:10


 


Temperature   


 


Pulse Rate   


 


Respiratory 16 16 16





Rate   


 


Blood Pressure   





(mmHg)   


 


O2 Sat by Pulse   





Oximetry   














  10/01/17 10/01/17 10/01/17





  10:03 11:24 11:52


 


Temperature  98.5 F 


 


Pulse Rate  81 


 


Respiratory 16 16 16





Rate   


 


Blood Pressure  158/65 





(mmHg)   


 


O2 Sat by Pulse  96 





Oximetry   














  10/01/17 10/01/17 10/01/17





  12:59 13:52 14:07


 


Temperature   


 


Pulse Rate   


 


Respiratory 16 16 16





Rate   


 


Blood Pressure   





(mmHg)   


 


O2 Sat by Pulse   





Oximetry   











Oxygen Devices in Use Now: None


Appearance: Patient is a 71yo male who appears stated age laying confortably in 

the bed in NAD.


Eyes: No Scleral Icterus, PERRLA


Ears/Nose/Mouth/Throat: NL Teeth, Lips, Gums, Clear Oropharnyx, Mucous 

Membranes Moist


Neck: NL Appearance and Movements; NL JVP


Respiratory: Symmetrical Chest Expansion and Respiratory Effort, Clear to 

Auscultation


Cardiovascular: NL Sounds; No Murmurs; No JVD, RRR, No Edema


Abdominal: NL Sounds; No Tenderness; No Distention, No Hepatosplenomegaly


Lymphatic: No Cervical Adenopathy


Extremities: No Edema, No Clubbing, Cyanosis, - - CDI bandage on lower back. 


Neurological: Alert and Oriented x 3, - - paralyzed and insensate from waist 

down.


Result Diagrams: 


 09/30/17 06:15





 09/30/17 06:15


Additional Lab and Data: 





 


 Laboratory Results - last 24 hr











  09/28/17 09/28/17





  06:00 06:00


 


WBC  7.9 


 


RBC  3.60 L 


 


Hgb  10.1 L 


 


Hct  30 L 


 


MCV  83 


 


MCH  28 


 


MCHC  34 


 


RDW  20 H 


 


Plt Count  174 


 


MPV  8 


 


Neut % (Auto)  46.3 


 


Lymph % (Auto)  34.0 


 


Mono % (Auto)  14.0 H 


 


Eos % (Auto)  4.2 


 


Baso % (Auto)  1.5 


 


Absolute Neuts (auto)  3.6 


 


Absolute Lymphs (auto)  2.7 


 


Absolute Monos (auto)  1.1 H 


 


Absolute Eos (auto)  0.3 


 


Absolute Basos (auto)  0.1 


 


Absolute Nucleated RBC  0 


 


Nucleated RBC %  0.1 


 


Sodium   140


 


Potassium   4.0


 


Chloride   105


 


Carbon Dioxide   32


 


Anion Gap   3


 


BUN   37 H


 


Creatinine   1.38 H


 


Est GFR ( Amer)   65.3


 


Est GFR (Non-Af Amer)   50.8


 


BUN/Creatinine Ratio   26.8 H


 


Glucose   98


 


Calcium   9.6

















Assess/Plan/Problems-Billing


.


Assessment: 


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 

P/w large Stage IV sacral decubitus ulcer which has been debrided multiple 

times. Failed wound vac in PMRU and back inpatient. Plastic surgery 

consultation at [facility] for possible flap.  





- Patient Problems


(1) Acute osteomyelitis of sacrum


Current Visit: Yes   Status: Acute   Code(s): M46.28 - OSTEOMYELITIS OF VERTEBRA

, SACRAL AND SACROCOCCYGEAL REGION   SNOMED Code(s): 034676854


   Comment: Continue Ceftriaxone and flagyl at current doses per ID.


no signs of systemic or local infection    





(2) Chronic indwelling Rodriguez catheter


Current Visit: Yes   Status: Chronic   Priority: High   Code(s): Z92.89 - 

PERSONAL HISTORY OF OTHER MEDICAL TREATMENT   SNOMED Code(s): 362508881


   Comment: 


High risk of UTI - currently on ABX, no current evidence of UTI.   





(3) Neurogenic bladder


Current Visit: Yes   Status: Chronic   Priority: High   Code(s): N31.9 - 

NEUROMUSCULAR DYSFUNCTION OF BLADDER, UNSPECIFIED   SNOMED Code(s): 606835430


   Comment: 


Indwelling urinary catheter, no sign of current UTI.   





(4) Neurogenic bowel


Current Visit: Yes   Status: Chronic   Priority: High   Code(s): K59.2 - 

NEUROGENIC BOWEL, NOT ELSEWHERE CLASSIFIED   SNOMED Code(s): 013868484


   Comment: 


loose bowel movements with repositioning. Frequent pericare to prevent skin 

breakdown. Flagyl to help with diarrhea. Lomotil after loose bowel movements.  

  





(5) Sacral decubitus ulcer, stage IV


Current Visit: Yes   Status: Chronic   Priority: High   Code(s): L89.154 - 

PRESSURE ULCER OF SACRAL REGION, STAGE 4   SNOMED Code(s): 065770098


   Comment: Plastic surgery consultation to [facility] (Luis Felipe Chirinos MD)


Continuing wound care, debrided 9/27 by Dr Holley, continue Santyl 

treatment. Continue wet to dry dressings. Appreciate input from surgery.


Appreciate nutrition recommendations to support healing, attempt for 6 small 

protein rich meals daily and continue vitamins. Vitamin C started per nutrition 

and wife.


   





(6) DVT prophylaxis


Current Visit: No   Status: Acute   Code(s): YDY9124 -    SNOMED Code(s): 

441068653


   Comment: 


Lovenox


TEDs in bed.   





(7) Diarrhea


Current Visit: No   Status: Acute   Code(s): R19.7 - DIARRHEA, UNSPECIFIED   

SNOMED Code(s): 25225960


   Comment: 


BMs more formed per patient. Diarrhea Likely ABX associated. ID thinks not 

Cdiff. Continue Flagyl, Probiotic, and Lomotil


   





(8) Chronic back pain


Current Visit: No   Status: Chronic   Code(s): M54.9 - DORSALGIA, UNSPECIFIED; 

G89.29 - OTHER CHRONIC PAIN   SNOMED Code(s): 485479471


   Comment: 


Secondary to ependymoma and sacral decub


Continue pregabalin, and acetaminophen.


oxycodone liquid to 10mg AC with one dose PRN for transfer.


Fentanyl patch to 75mcg. 


Pain better controlled with sitting


   





(9) BONIFACIO (obstructive sleep apnea)


Current Visit: No   Status: Chronic   Code(s): G47.33 - OBSTRUCTIVE SLEEP APNEA 

(ADULT) (PEDIATRIC)   SNOMED Code(s): 07936888


   Comment: 


Continue home BiPAP use.   





(10) Paraplegia


Current Visit: No   Status: Chronic   Code(s): G82.20 - PARAPLEGIA, UNSPECIFIED

   SNOMED Code(s): 74322184


   Comment: 


Secondary to ependymoma


Cont with skin precautions - frequent turning, skin care. 


Cont pain management


Plan for spinal stimulator implantation with Dr. Huerta ([place]) 

eventually   





(11) Full code status


Current Visit: No   Status: Acute   Code(s): Z78.9 - OTHER SPECIFIED HEALTH 

STATUS   SNOMED Code(s): 795692561


   


Status and Disposition: 





f/u plastic surgery consult. Plan is to return home vs potential transfer for 

plastic surgery flap surgery in [place].

## 2017-10-02 RX ADMIN — Medication SCH ML: at 06:10

## 2017-10-02 RX ADMIN — Medication SCH ML: at 17:32

## 2017-10-02 RX ADMIN — Medication SCH UNITS: at 09:03

## 2017-10-02 RX ADMIN — CYANOCOBALAMIN TAB 500 MCG SCH MCG: 500 TAB at 09:01

## 2017-10-02 RX ADMIN — FOLIC ACID SCH MG: 1 TABLET ORAL at 09:03

## 2017-10-02 RX ADMIN — HYDROCORTISONE SCH APPLIC: 25 CREAM TOPICAL at 10:54

## 2017-10-02 RX ADMIN — FENTANYL TRANSDERMAL SCH MCG: 75 PATCH, EXTENDED RELEASE TRANSDERMAL at 17:28

## 2017-10-02 RX ADMIN — PREGABALIN SCH MG: 50 CAPSULE ORAL at 09:03

## 2017-10-02 RX ADMIN — DIPHENOXYLATE HYDROCHLORIDE AND ATROPINE SULFATE SCH TAB: 2.5; .025 TABLET ORAL at 13:44

## 2017-10-02 RX ADMIN — OXYCODONE HYDROCHLORIDE AND ACETAMINOPHEN SCH MG: 500 TABLET ORAL at 09:01

## 2017-10-02 RX ADMIN — PREGABALIN SCH MG: 50 CAPSULE ORAL at 13:44

## 2017-10-02 RX ADMIN — DIPHENOXYLATE HYDROCHLORIDE AND ATROPINE SULFATE SCH TAB: 2.5; .025 TABLET ORAL at 21:27

## 2017-10-02 RX ADMIN — Medication SCH ML: at 10:11

## 2017-10-02 RX ADMIN — WATER SCH NOTE: 100 INJECTION, SOLUTION INTRAVENOUS at 19:09

## 2017-10-02 RX ADMIN — ASPIRIN SCH MG: 81 TABLET, COATED ORAL at 09:01

## 2017-10-02 RX ADMIN — METRONIDAZOLE SCH MG: 250 TABLET ORAL at 21:29

## 2017-10-02 RX ADMIN — CLOBETASOL PROPIONATE SCH APPLIC: 0.5 OINTMENT TOPICAL at 10:42

## 2017-10-02 RX ADMIN — ECONAZOLE NITRATE SCH APPLIC: 10 CREAM TOPICAL at 10:54

## 2017-10-02 RX ADMIN — ENOXAPARIN SODIUM SCH MG: 40 INJECTION SUBCUTANEOUS at 17:31

## 2017-10-02 RX ADMIN — WATER SCH NOTE: 100 INJECTION, SOLUTION INTRAVENOUS at 07:03

## 2017-10-02 RX ADMIN — DIPHENOXYLATE HYDROCHLORIDE AND ATROPINE SULFATE SCH TAB: 2.5; .025 TABLET ORAL at 09:02

## 2017-10-02 RX ADMIN — OMEPRAZOLE SCH MG: 20 CAPSULE, DELAYED RELEASE ORAL at 06:09

## 2017-10-02 RX ADMIN — METRONIDAZOLE SCH MG: 250 TABLET ORAL at 09:01

## 2017-10-02 RX ADMIN — FUROSEMIDE SCH MG: 40 TABLET ORAL at 09:01

## 2017-10-02 RX ADMIN — PREGABALIN SCH MG: 50 CAPSULE ORAL at 21:29

## 2017-10-02 RX ADMIN — Medication SCH MG: at 21:29

## 2017-10-02 RX ADMIN — ECONAZOLE NITRATE SCH APPLIC: 10 CREAM TOPICAL at 17:00

## 2017-10-02 RX ADMIN — OXYCODONE HYDROCHLORIDE SCH MG: 5 SOLUTION ORAL at 12:01

## 2017-10-02 RX ADMIN — OXYCODONE HYDROCHLORIDE SCH MG: 5 SOLUTION ORAL at 07:25

## 2017-10-02 RX ADMIN — HYDROCORTISONE SCH APPLIC: 25 CREAM TOPICAL at 21:34

## 2017-10-02 RX ADMIN — DIPHENOXYLATE HYDROCHLORIDE AND ATROPINE SULFATE SCH TAB: 2.5; .025 TABLET ORAL at 16:56

## 2017-10-02 RX ADMIN — Medication SCH MG: at 09:02

## 2017-10-02 RX ADMIN — POTASSIUM CHLORIDE SCH MEQ: 750 TABLET, FILM COATED, EXTENDED RELEASE ORAL at 09:02

## 2017-10-02 RX ADMIN — Medication SCH CAP: at 09:01

## 2017-10-02 RX ADMIN — Medication SCH CAP: at 21:28

## 2017-10-02 RX ADMIN — NYSTATIN SCH APPLIC: 100000 CREAM TOPICAL at 21:42

## 2017-10-02 RX ADMIN — COLLAGENASE SANTYL SCH APPLIC: 250 OINTMENT TOPICAL at 10:54

## 2017-10-02 RX ADMIN — HYDROCORTISONE SCH APPLIC: 25 CREAM TOPICAL at 13:44

## 2017-10-02 RX ADMIN — OXYCODONE HYDROCHLORIDE SCH MG: 5 SOLUTION ORAL at 16:56

## 2017-10-02 RX ADMIN — NYSTATIN SCH APPLIC: 100000 CREAM TOPICAL at 10:55

## 2017-10-02 NOTE — PN
Subjective


Date of Service: 10/02/17


Interval History: 





Patient has no acute complaints. Patient's pain is consistent with previous 

days with no sedation. Patient's wound is healing well and the process of 

consulting with the plastic surgeon in [place] is ongoing through Dr. Faulkner.


Family History: Unchanged from Admission


Social History: Unchanged from Admission - no changes noted


Past Medical History: Unchanged from Admission





Objective


Active Medications: 








Acetaminophen (Tylenol Tab*)  650 mg PO Q6H PRN


   PRN Reason: FEVER/PAIN


Ascorbic Acid (Vitamin C  Tab*)  500 mg PO DAILY [facility]


   Last Admin: 10/02/17 09:01 Dose:  500 mg


Aspirin (Aspirin Ec Low Dose*)  81 mg PO DAILY [facility]


   Last Admin: 10/02/17 09:01 Dose:  81 mg


Bisacodyl (Dulcolax Supp*)  10 mg [place] DAILY PRN


   PRN Reason: CONSTIPATION


Cholecalciferol (Vitamin D Tab*)  5,000 units PO DAILY [facility]


   Last Admin: 10/02/17 09:03 Dose:  5,000 units


Clobetasol Propionate (Clobetasol 0.05% Oint*)  1 applic TOPICAL DAILY [facility]


   Last Admin: 10/02/17 10:42 Dose:  1 applic


Collagenase (Santyl 250 Mg/Gm Oint*)  1 applic TOPICAL .SEE INSTRUCTIONS [facility]


   Last Admin: 10/02/17 10:54 Dose:  1 applic


Cyanocobalamin (Vitamin B12 Tab*)  1,000 mcg PO DAILY [facility]


   Last Admin: 10/02/17 09:01 Dose:  1,000 mcg


Diphenoxylate HCl/Atropine (Lomotil Tab*)  1 tab PO QID [facility]


   Last Admin: 10/02/17 13:44 Dose:  1 tab


Docusate Sodium (Colace Cap*)  100 mg PO BID PRN


   PRN Reason: CONSTIPATION


Econazole Nitrate (Econazole 1 % Cream (Nf))  1 applic TOPICAL DAILY@1700 [facility]


Enoxaparin Sodium (Lovenox(*))  40 mg SUBCUT Q24H [facility]


   Last Admin: 10/01/17 18:19 Dose:  40 mg


Fentanyl (Duragesic  Patch 75 Mcg/Hr*)  75 mcg TRANSDERM Q72H [facility]


   Last Admin: 09/29/17 18:24 Dose:  75 mcg


Ferrous Gluconate (Fergon Tab*)  324 mg PO BID [facility]


   Last Admin: 10/02/17 09:02 Dose:  324 mg


Folic Acid (Folvite Tab*)  0.5 mg PO DAILY [facility]


   Last Admin: 10/02/17 09:03 Dose:  0.5 mg


Furosemide (Lasix Tab*)  40 mg PO DAILY [facility]


   Last Admin: 10/02/17 09:01 Dose:  40 mg


Heparin Sodium (Porcine) (Heparin Flush Picc/Ml/Cvc(*))  1 ml FLUSH 0600,1800 

[facility]


   PRN Reason: Protocol


   Last Admin: 10/02/17 10:11 Dose:  1 ml


Hydrocortisone (Hydrocortisone 2.5% Cream(Nf))  1 applic TOPICAL TID [facility]


   Last Admin: 10/02/17 13:44 Dose:  1 applic


Ceftriaxone Sodium 2 gm/ (Sodium Chloride)  100 mls @ 200 mls/hr IVPB DAILY [facility]


   Last Admin: 10/02/17 09:04 Dose:  200 mls/hr


Lactobacillus Rhamnosus (Culturelle*)  1 cap PO BID [facility]


   Last Admin: 10/02/17 09:01 Dose:  1 cap


Metronidazole (Flagyl Tab*)  500 mg PO BID [facility]


   Last Admin: 10/02/17 09:01 Dose:  500 mg


Nystatin (Nystatin Cream*)  1 applic TOPICAL BID [facility]


   Last Admin: 10/02/17 10:55 Dose:  1 applic


Omeprazole (Prilosec Cap*)  40 mg PO DAILY@0600 [facility]


   Last Admin: 10/02/17 06:09 Dose:  40 mg


Ondansetron HCl (Zofran Odt Tab*)  4 mg PO Q8H PRN


   PRN Reason: NAUSEA/VOMITING


Oxycodone HCl (Oxycodone Oral.Soln*)  10 mg PO DAILY PRN


   PRN Reason: PAIN


Oxycodone HCl (Oxycodone Oral.Soln*)  10 mg PO AC [facility]


   Last Admin: 10/02/17 12:01 Dose:  10 mg


Oxycodone HCl (Roxycodone Tab*)  5 mg PO Q4H PRN


   PRN Reason: PAIN


Pharmacy Profile Note (Fentanyl Patch Check Q Shift)  1 note N/A 0700,1900 [facility]


   Last Admin: 10/02/17 07:03 Dose:  1 note


Potassium Chloride (Klor Con Er Tab*)  10 meq PO DAILY [facility]


   Last Admin: 10/02/17 09:02 Dose:  10 meq


Pregabalin (Lyrica Cap(*))  150 mg PO TID [facility]


   Last Admin: 10/02/17 13:44 Dose:  150 mg


Zinc Sulfate (Zinc-220 Cap*)  220 mg PO DAILY [facility]


   Last Admin: 10/02/17 09:02 Dose:  220 mg








 Vital Signs











  10/01/17 10/01/17 10/01/17





  14:52 15:37 16:07


 


Temperature  98.6 F 


 


Pulse Rate  85 


 


Respiratory 16 16 16





Rate   


 


Blood Pressure  151/57 





(mmHg)   


 


O2 Sat by Pulse  96 





Oximetry   














  10/01/17 10/01/17 10/01/17





  16:38 16:40 18:38


 


Temperature   


 


Pulse Rate   


 


Respiratory 16 16 16





Rate   


 


Blood Pressure   





(mmHg)   


 


O2 Sat by Pulse   





Oximetry   














  10/01/17 10/01/17 10/01/17





  20:00 20:56 21:36


 


Temperature   98.6 F


 


Pulse Rate   82


 


Respiratory 16 16 16





Rate   


 


Blood Pressure   148/65





(mmHg)   


 


O2 Sat by Pulse   95





Oximetry   














  10/01/17 10/02/17 10/02/17





  22:56 00:12 04:08


 


Temperature  99.0 F 97.9 F


 


Pulse Rate  77 71


 


Respiratory 16 16 16





Rate   


 


Blood Pressure  166/79 133/59





(mmHg)   


 


O2 Sat by Pulse  94 95





Oximetry   














  10/02/17 10/02/17 10/02/17





  07:25 07:43 08:10


 


Temperature   98.0 F


 


Pulse Rate   70


 


Respiratory 16 16 16





Rate   


 


Blood Pressure   149/70





(mmHg)   


 


O2 Sat by Pulse   98





Oximetry   














  10/02/17 10/02/17 10/02/17





  09:02 09:03 09:25


 


Temperature   


 


Pulse Rate   


 


Respiratory 18 18 18





Rate   


 


Blood Pressure   





(mmHg)   


 


O2 Sat by Pulse   





Oximetry   














  10/02/17 10/02/17 10/02/17





  11:02 11:03 12:01


 


Temperature   


 


Pulse Rate   


 


Respiratory 18 18 18





Rate   


 


Blood Pressure   





(mmHg)   


 


O2 Sat by Pulse   





Oximetry   














  10/02/17 10/02/17





  13:44 14:01


 


Temperature  


 


Pulse Rate  


 


Respiratory 18 18





Rate  


 


Blood Pressure  





(mmHg)  


 


O2 Sat by Pulse  





Oximetry  











Oxygen Devices in Use Now: None


Appearance: Patient is a 73yo male who appears stated age sitting comfortably 

in bed in NAD.


Eyes: No Scleral Icterus, PERRLA


Ears/Nose/Mouth/Throat: NL Teeth, Lips, Gums, Clear Oropharnyx, Mucous 

Membranes Moist


Neck: NL Appearance and Movements; NL JVP, Trachea Midline


Respiratory: Symmetrical Chest Expansion and Respiratory Effort, Clear to 

Auscultation


Cardiovascular: NL Sounds; No Murmurs; No JVD, RRR, No Edema


Abdominal: No Hepatosplenomegaly, - - Normal sounds, nontender, slightly 

distended with no interval change from previous examinations.


Lymphatic: No Cervical Adenopathy


Extremities: No Edema


Skin: - - Intertriginous redness in groin. Scaly annular ulcers on left hip.


Neurological: Alert and Oriented x 3, - - Paralyzed and insensate from waist 

down. CN II-XII intact. No other weakness.


Result Diagrams: 


 09/30/17 06:15





 09/30/17 06:15


Additional Lab and Data: 





 


 Laboratory Results - last 24 hr











  09/28/17 09/28/17





  06:00 06:00


 


WBC  7.9 


 


RBC  3.60 L 


 


Hgb  10.1 L 


 


Hct  30 L 


 


MCV  83 


 


MCH  28 


 


MCHC  34 


 


RDW  20 H 


 


Plt Count  174 


 


MPV  8 


 


Neut % (Auto)  46.3 


 


Lymph % (Auto)  34.0 


 


Mono % (Auto)  14.0 H 


 


Eos % (Auto)  4.2 


 


Baso % (Auto)  1.5 


 


Absolute Neuts (auto)  3.6 


 


Absolute Lymphs (auto)  2.7 


 


Absolute Monos (auto)  1.1 H 


 


Absolute Eos (auto)  0.3 


 


Absolute Basos (auto)  0.1 


 


Absolute Nucleated RBC  0 


 


Nucleated RBC %  0.1 


 


Sodium   140


 


Potassium   4.0


 


Chloride   105


 


Carbon Dioxide   32


 


Anion Gap   3


 


BUN   37 H


 


Creatinine   1.38 H


 


Est GFR ( Amer)   65.3


 


Est GFR (Non-Af Amer)   50.8


 


BUN/Creatinine Ratio   26.8 H


 


Glucose   98


 


Calcium   9.6

















Assess/Plan/Problems-Billing


.


Assessment: 


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 

P/w large Stage IV sacral decubitus ulcer which has been debrided multiple 

times. Failed wound vac in PMRU and back inpatient. Plastic surgery 

consultation at [facility] for possible flap.  





- Patient Problems


(1) Acute osteomyelitis of sacrum


Current Visit: Yes   Status: Acute   Code(s): M46.28 - OSTEOMYELITIS OF VERTEBRA

, SACRAL AND SACROCOCCYGEAL REGION   SNOMED Code(s): 212333578


   Comment: Continue Ceftriaxone and flagyl at current doses per ID.


no signs of systemic or local infection    





(2) Chronic indwelling Rodriguez catheter


Current Visit: Yes   Status: Chronic   Priority: High   Code(s): Z92.89 - 

PERSONAL HISTORY OF OTHER MEDICAL TREATMENT   SNOMED Code(s): 306660416


   Comment: 


High risk of UTI - currently on ABX, no current evidence of UTI.   





(3) Neurogenic bladder


Current Visit: Yes   Status: Chronic   Priority: High   Code(s): N31.9 - 

NEUROMUSCULAR DYSFUNCTION OF BLADDER, UNSPECIFIED   SNOMED Code(s): 501913458


   Comment: 


Indwelling urinary catheter, no sign of current UTI.   





(4) Neurogenic bowel


Current Visit: Yes   Status: Chronic   Priority: High   Code(s): K59.2 - 

NEUROGENIC BOWEL, NOT ELSEWHERE CLASSIFIED   SNOMED Code(s): 177856784


   Comment: 


loose bowel movements with repositioning. Frequent pericare to prevent skin 

breakdown. Flagyl to help with diarrhea. Lomotil after loose bowel movements. 

Patient states that he feels the diarrhea is slowing down, but this is not the 

impression of the nursing staff.   





(5) Sacral decubitus ulcer, stage IV


Current Visit: Yes   Status: Chronic   Priority: High   Code(s): L89.154 - 

PRESSURE ULCER OF SACRAL REGION, STAGE 4   SNOMED Code(s): 977462133


   Comment: Plastic surgery consultation to [facility] (Luis Felipe Chirinos MD)


Continuing wound care, debrided 9/29 by Dr Holley, continue Santyl 

treatment. Continue wet to dry dressings. Appreciate input from surgery.


Appreciate nutrition recommendations to support healing, attempt for 6 small 

protein rich meals daily and continue vitamins. Vitamin C started per nutrition 

and wife.


   





(6) DVT prophylaxis


Current Visit: No   Status: Acute   Code(s): VRC3452 -    SNOMED Code(s): 

648335114


   Comment: 


Lovenox


TEDs in bed.   





(7) Diarrhea


Current Visit: No   Status: Acute   Code(s): R19.7 - DIARRHEA, UNSPECIFIED   

SNOMED Code(s): 61695717


   Comment: 


BMs more formed per patient. Diarrhea Likely ABX associated. ID thinks not 

Cdiff. Continue Flagyl, Probiotic, and Lomotil


   





(8) Chronic back pain


Current Visit: No   Status: Chronic   Code(s): M54.9 - DORSALGIA, UNSPECIFIED; 

G89.29 - OTHER CHRONIC PAIN   SNOMED Code(s): 360802284


   Comment: 


Secondary to ependymoma and sacral decub


Continue pregabalin, and acetaminophen.


oxycodone liquid to 10mg AC with one dose PRN for transfer.


Fentanyl patch to 75mcg. 


Pain better controlled with sitting


   





(9) BONIFACIO (obstructive sleep apnea)


Current Visit: No   Status: Chronic   Code(s): G47.33 - OBSTRUCTIVE SLEEP APNEA 

(ADULT) (PEDIATRIC)   SNOMED Code(s): 22948787


   Comment: 


Continue home BiPAP use.   





(10) Paraplegia


Current Visit: No   Status: Chronic   Code(s): G82.20 - PARAPLEGIA, UNSPECIFIED

   SNOMED Code(s): 46447004


   Comment: 


Secondary to ependymoma


Cont with skin precautions - frequent turning, skin care. 


Cont pain management


Plan for spinal stimulator implantation with Dr. Huerta ([place]) 

eventually   





(11) Full code status


Current Visit: No   Status: Acute   Code(s): Z78.9 - OTHER SPECIFIED HEALTH 

STATUS   SNOMED Code(s): 507289080


   





(12) Tinea corporis


Current Visit: Yes   Status: Acute   Code(s): B35.4 - TINEA CORPORIS   SNOMED 

Code(s): 93413346


   Comment: 


Annular scaly lesion on hip spreading consistent with tinea corporis. Nystatin 

cream not effective against lesion. Will trial patient's home antifungal cream.

   


Status and Disposition: 





f/u plastic surgery consult. Plan is to return home vs potential transfer for 

plastic surgery flap surgery in [place].

## 2017-10-03 RX ADMIN — Medication SCH ML: at 17:16

## 2017-10-03 RX ADMIN — NYSTATIN SCH APPLIC: 100000 CREAM TOPICAL at 22:54

## 2017-10-03 RX ADMIN — HYDROCORTISONE SCH APPLIC: 25 CREAM TOPICAL at 10:45

## 2017-10-03 RX ADMIN — NYSTATIN SCH APPLIC: 100000 CREAM TOPICAL at 10:46

## 2017-10-03 RX ADMIN — METRONIDAZOLE SCH MG: 250 TABLET ORAL at 22:38

## 2017-10-03 RX ADMIN — WATER SCH NOTE: 100 INJECTION, SOLUTION INTRAVENOUS at 18:37

## 2017-10-03 RX ADMIN — Medication SCH ML: at 05:28

## 2017-10-03 RX ADMIN — DIPHENOXYLATE HYDROCHLORIDE AND ATROPINE SULFATE SCH TAB: 2.5; .025 TABLET ORAL at 17:15

## 2017-10-03 RX ADMIN — DIPHENOXYLATE HYDROCHLORIDE AND ATROPINE SULFATE SCH TAB: 2.5; .025 TABLET ORAL at 22:39

## 2017-10-03 RX ADMIN — Medication SCH CAP: at 22:39

## 2017-10-03 RX ADMIN — ENOXAPARIN SODIUM SCH MG: 40 INJECTION SUBCUTANEOUS at 17:16

## 2017-10-03 RX ADMIN — PREGABALIN SCH MG: 50 CAPSULE ORAL at 08:14

## 2017-10-03 RX ADMIN — Medication SCH ML: at 10:22

## 2017-10-03 RX ADMIN — Medication SCH MG: at 08:15

## 2017-10-03 RX ADMIN — WATER SCH NOTE: 100 INJECTION, SOLUTION INTRAVENOUS at 07:17

## 2017-10-03 RX ADMIN — DIPHENOXYLATE HYDROCHLORIDE AND ATROPINE SULFATE SCH TAB: 2.5; .025 TABLET ORAL at 14:21

## 2017-10-03 RX ADMIN — Medication SCH CAP: at 08:13

## 2017-10-03 RX ADMIN — HYDROCORTISONE SCH APPLIC: 25 CREAM TOPICAL at 14:30

## 2017-10-03 RX ADMIN — Medication SCH MG: at 08:12

## 2017-10-03 RX ADMIN — CLOBETASOL PROPIONATE SCH APPLIC: 0.5 OINTMENT TOPICAL at 10:22

## 2017-10-03 RX ADMIN — CYANOCOBALAMIN TAB 500 MCG SCH MCG: 500 TAB at 08:13

## 2017-10-03 RX ADMIN — OXYCODONE HYDROCHLORIDE AND ACETAMINOPHEN SCH MG: 500 TABLET ORAL at 08:12

## 2017-10-03 RX ADMIN — PREGABALIN SCH MG: 50 CAPSULE ORAL at 14:21

## 2017-10-03 RX ADMIN — OMEPRAZOLE SCH MG: 20 CAPSULE, DELAYED RELEASE ORAL at 05:25

## 2017-10-03 RX ADMIN — ECONAZOLE NITRATE SCH APPLIC: 10 CREAM TOPICAL at 17:24

## 2017-10-03 RX ADMIN — OXYCODONE HYDROCHLORIDE SCH MG: 5 SOLUTION ORAL at 08:08

## 2017-10-03 RX ADMIN — FOLIC ACID SCH MG: 1 TABLET ORAL at 08:13

## 2017-10-03 RX ADMIN — Medication SCH MG: at 22:39

## 2017-10-03 RX ADMIN — Medication SCH UNITS: at 08:12

## 2017-10-03 RX ADMIN — OXYCODONE HYDROCHLORIDE SCH MG: 5 SOLUTION ORAL at 11:26

## 2017-10-03 RX ADMIN — DIPHENOXYLATE HYDROCHLORIDE AND ATROPINE SULFATE SCH TAB: 2.5; .025 TABLET ORAL at 08:13

## 2017-10-03 RX ADMIN — ASPIRIN SCH MG: 81 TABLET, COATED ORAL at 08:13

## 2017-10-03 RX ADMIN — HYDROCORTISONE SCH APPLIC: 25 CREAM TOPICAL at 22:46

## 2017-10-03 RX ADMIN — FUROSEMIDE SCH MG: 40 TABLET ORAL at 08:14

## 2017-10-03 RX ADMIN — POTASSIUM CHLORIDE SCH MEQ: 750 TABLET, FILM COATED, EXTENDED RELEASE ORAL at 08:15

## 2017-10-03 RX ADMIN — OXYCODONE HYDROCHLORIDE SCH MG: 5 SOLUTION ORAL at 17:15

## 2017-10-03 RX ADMIN — PREGABALIN SCH MG: 50 CAPSULE ORAL at 22:38

## 2017-10-03 RX ADMIN — METRONIDAZOLE SCH MG: 250 TABLET ORAL at 08:13

## 2017-10-03 NOTE — PN
Subjective


Date of Service: 10/03/17


Interval History: 





This is a 71 yo paraplegic gentleman with a prolonged hospital stay for 

treatment of a large sacral decubitus ulcer complicated by sacral 

osteomyelitis.  s/p debridement by Dr Holley 9/29.  Pending plastics 

consultation at [facility] for possible skin flap.





Patient offers no new complaints today.  His pain is adequately controlled. He 

has started to take his meals outside his room which he seems to appreciate.





Recent wound pictures reviewed with his wife.





Objective


Active Medications: 








Acetaminophen (Tylenol Tab*)  650 mg PO Q6H PRN


   PRN Reason: FEVER/PAIN


Ascorbic Acid (Vitamin C  Tab*)  500 mg PO DAILY [facility]


   Last Admin: 10/03/17 08:12 Dose:  500 mg


Aspirin (Aspirin Ec Low Dose*)  81 mg PO DAILY [facility]


   Last Admin: 10/03/17 08:13 Dose:  81 mg


Bisacodyl (Dulcolax Supp*)  10 mg ND DAILY PRN


   PRN Reason: CONSTIPATION


Cholecalciferol (Vitamin D Tab*)  5,000 units PO DAILY [facility]


   Last Admin: 10/03/17 08:12 Dose:  5,000 units


Clobetasol Propionate (Clobetasol 0.05% Oint*)  1 applic TOPICAL DAILY [facility]


   Last Admin: 10/03/17 10:22 Dose:  1 applic


Collagenase (Santyl 250 Mg/Gm Oint*)  1 applic TOPICAL .SEE INSTRUCTIONS [facility]


   Last Admin: 10/02/17 10:54 Dose:  1 applic


Cyanocobalamin (Vitamin B12 Tab*)  1,000 mcg PO DAILY [facility]


   Last Admin: 10/03/17 08:13 Dose:  1,000 mcg


Diphenoxylate HCl/Atropine (Lomotil Tab*)  1 tab PO QID [facility]


   Last Admin: 10/03/17 08:13 Dose:  1 tab


Docusate Sodium (Colace Cap*)  100 mg PO BID PRN


   PRN Reason: CONSTIPATION


Econazole Nitrate (Econazole 1 % Cream (Nf))  1 applic TOPICAL DAILY@1700 [facility]


   Last Admin: 10/02/17 17:00 Dose:  1 applic


Enoxaparin Sodium (Lovenox(*))  40 mg SUBCUT Q24H [facility]


   Last Admin: 10/02/17 17:31 Dose:  40 mg


Fentanyl (Duragesic  Patch 75 Mcg/Hr*)  75 mcg TRANSDERM Q72H [facility]


   Last Admin: 10/02/17 17:28 Dose:  75 mcg


Ferrous Gluconate (Fergon Tab*)  324 mg PO BID [facility]


   Last Admin: 10/03/17 08:12 Dose:  324 mg


Folic Acid (Folvite Tab*)  0.5 mg PO DAILY [facility]


   Last Admin: 10/03/17 08:13 Dose:  0.5 mg


Furosemide (Lasix Tab*)  40 mg PO DAILY [facility]


   Last Admin: 10/03/17 08:14 Dose:  40 mg


Heparin Sodium (Porcine) (Heparin Flush Picc/Ml/Cvc(*))  1 ml FLUSH 0600,1800 

[facility]


   PRN Reason: Protocol


   Last Admin: 10/03/17 10:22 Dose:  1 ml


Hydrocortisone (Hydrocortisone 2.5% Cream(Nf))  1 applic TOPICAL TID [facility]


   Last Admin: 10/03/17 10:45 Dose:  1 applic


Ceftriaxone Sodium 2 gm/ (Sodium Chloride)  100 mls @ 200 mls/hr IVPB DAILY [facility]


   Last Admin: 10/03/17 08:24 Dose:  200 mls/hr


Lactobacillus Rhamnosus (Culturelle*)  1 cap PO BID [facility]


   Last Admin: 10/03/17 08:13 Dose:  1 cap


Metronidazole (Flagyl Tab*)  500 mg PO BID [facility]


   Last Admin: 10/03/17 08:13 Dose:  500 mg


Nystatin (Nystatin Cream*)  1 applic TOPICAL BID [facility]


   Last Admin: 10/03/17 10:46 Dose:  1 applic


Omeprazole (Prilosec Cap*)  40 mg PO DAILY@0600 [facility]


   Last Admin: 10/03/17 05:25 Dose:  40 mg


Ondansetron HCl (Zofran Odt Tab*)  4 mg PO Q8H PRN


   PRN Reason: NAUSEA/VOMITING


Oxycodone HCl (Oxycodone Oral.Soln*)  10 mg PO DAILY PRN


   PRN Reason: PAIN


Oxycodone HCl (Oxycodone Oral.Soln*)  10 mg PO AC [facility]


   Last Admin: 10/03/17 11:26 Dose:  10 mg


Oxycodone HCl (Roxycodone Tab*)  5 mg PO Q4H PRN


   PRN Reason: PAIN


Pharmacy Profile Note (Fentanyl Patch Check Q Shift)  1 note N/A 0700,1900 [facility]


   Last Admin: 10/03/17 07:17 Dose:  1 note


Potassium Chloride (Klor Con Er Tab*)  10 meq PO DAILY [facility]


   Last Admin: 10/03/17 08:15 Dose:  10 meq


Pregabalin (Lyrica Cap(*))  150 mg PO TID [facility]


   Last Admin: 10/03/17 08:14 Dose:  150 mg


Zinc Sulfate (Zinc-220 Cap*)  220 mg PO DAILY [facility]


   Last Admin: 10/03/17 08:15 Dose:  220 mg











Vital Signs:











Temp Pulse Resp BP Pulse Ox


 


 98.5 F   79   16   145/71   95 


 


 10/03/17 03:16  10/03/17 03:16  10/03/17 11:26  10/03/17 03:16  10/03/17 03:16











Oxygen Devices in Use Now: None


Appearance: Well appearing elderly gentleman in [facility].  Accompanied by his wife 

and other family members


Respiratory: Symmetrical Chest Expansion and Respiratory Effort, Clear to 

Auscultation


Cardiovascular: NL Sounds; No Murmurs; No JVD, RRR


Abdominal: NL Sounds; No Tenderness; No Distention


Extremities: No Edema


Skin: - - wound pictures reviewed, but not directly visualized


Neurological: Alert and Oriented x 3


Result Diagrams: 


 09/30/17 06:15





 09/30/17 06:15


Additional Lab and Data: 





.





Assess/Plan/Problems-Billing


.


Assessment: 


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 

P/w large Stage IV sacral decubitus ulcer which has been debrided multiple 

times. Failed wound vac in PMRU and back inpatient. Plastic surgery 

consultation at [facility] for possible flap.  





- Patient Problems


(1) Acute osteomyelitis of sacrum


Comment: 


Continue Ceftriaxone and flagyl at current doses per ID.


No evidence of new sepsis   





(2) Sacral decubitus ulcer, stage IV


Comment: 


Plastic surgery consultation to [facility] (Luis Felipe Chirinos MD)


Continuing wound care, debrided 9/29 by Dr Holley, continue Santyl 

treatment. 


Continue wet to dry dressings. Appreciate input from surgery.


Patient's wife had a question about using Medihoney as opposed to Santyl, 

deferred to surgery to answer


Appreciate nutrition recommendations to support healing, attempt for 6 small 

protein rich meals daily and continue vitamins. 


Vitamin C started per nutrition and wife.


   





(3) Neurogenic bladder


Comment: 


With chronic indwelling urinary catheter   





(4) Neurogenic bowel


Comment: 


loose bowel movements with repositioning. 


Frequent pericare to prevent skin breakdown. 


Flagyl and prn lomotil for diarrhea


Patient states that he feels the diarrhea is slowing down   





(5) CKD (chronic kidney disease), stage III


Comment: 


Cr near baseline, no acute exacerbation   





(6) Chronic back pain


Comment: 


Secondary to ependymoma and sacral decub


Continue pregabalin, and acetaminophen.


oxycodone liquid to 10mg AC with one dose PRN for transfer.


Fentanyl patch to 75mcg. 


Pain better controlled with sitting


   





(7) HTN (hypertension)


Comment: 


Normotensive, cont home anthypertensive medications   





(8) BONIFACIO (obstructive sleep apnea)


Comment: 


Continue home BiPAP use.   





(9) Paraplegia


Comment: 


Secondary to ependymoma   





(10) DVT prophylaxis


Comment: 


Lovenox


TEDs in bed.   





(11) Full code status





Status and Disposition: 





Inpatient. Pending plastic surgery consult

## 2017-10-04 LAB
ANION GAP SERPL CALC-SCNC: 5 MMOL/L (ref 2–11)
BUN SERPL-MCNC: 40 MG/DL (ref 6–24)
BUN/CREAT SERPL: 32.5 (ref 8–20)
CALCIUM SERPL-MCNC: 9.6 MG/DL (ref 8.6–10.3)
CHLORIDE SERPL-SCNC: 105 MMOL/L (ref 101–111)
GLUCOSE SERPL-MCNC: 101 MG/DL (ref 70–100)
HCO3 SERPL-SCNC: 31 MMOL/L (ref 22–32)
HCT VFR BLD AUTO: 32 % (ref 42–52)
HGB BLD-MCNC: 10.5 G/DL (ref 14–18)
MCH RBC QN AUTO: 28 PG (ref 27–31)
MCHC RBC AUTO-ENTMCNC: 33 G/DL (ref 31–36)
MCV RBC AUTO: 84 FL (ref 80–94)
POTASSIUM SERPL-SCNC: 4 MMOL/L (ref 3.5–5)
RBC # BLD AUTO: 3.76 10^6/UL (ref 4–5.4)
SODIUM SERPL-SCNC: 141 MMOL/L (ref 133–145)
WBC # BLD AUTO: 7.7 10^3/UL (ref 3.5–10.8)

## 2017-10-04 RX ADMIN — ASPIRIN SCH MG: 81 TABLET, COATED ORAL at 09:41

## 2017-10-04 RX ADMIN — CYANOCOBALAMIN TAB 500 MCG SCH MCG: 500 TAB at 09:41

## 2017-10-04 RX ADMIN — DIPHENOXYLATE HYDROCHLORIDE AND ATROPINE SULFATE SCH TAB: 2.5; .025 TABLET ORAL at 17:10

## 2017-10-04 RX ADMIN — OXYCODONE HYDROCHLORIDE SCH MG: 5 SOLUTION ORAL at 17:09

## 2017-10-04 RX ADMIN — DIPHENOXYLATE HYDROCHLORIDE AND ATROPINE SULFATE SCH TAB: 2.5; .025 TABLET ORAL at 14:06

## 2017-10-04 RX ADMIN — Medication SCH CAP: at 20:11

## 2017-10-04 RX ADMIN — DIPHENOXYLATE HYDROCHLORIDE AND ATROPINE SULFATE SCH TAB: 2.5; .025 TABLET ORAL at 20:10

## 2017-10-04 RX ADMIN — OMEPRAZOLE SCH MG: 20 CAPSULE, DELAYED RELEASE ORAL at 06:10

## 2017-10-04 RX ADMIN — ECONAZOLE NITRATE SCH APPLIC: 10 CREAM TOPICAL at 17:13

## 2017-10-04 RX ADMIN — CLOBETASOL PROPIONATE SCH APPLIC: 0.5 OINTMENT TOPICAL at 09:47

## 2017-10-04 RX ADMIN — HYDROCORTISONE SCH APPLIC: 25 CREAM TOPICAL at 09:47

## 2017-10-04 RX ADMIN — FUROSEMIDE SCH MG: 40 TABLET ORAL at 09:41

## 2017-10-04 RX ADMIN — DIPHENOXYLATE HYDROCHLORIDE AND ATROPINE SULFATE SCH TAB: 2.5; .025 TABLET ORAL at 09:40

## 2017-10-04 RX ADMIN — Medication SCH CAP: at 09:40

## 2017-10-04 RX ADMIN — Medication SCH UNITS: at 09:39

## 2017-10-04 RX ADMIN — ENOXAPARIN SODIUM SCH MG: 40 INJECTION SUBCUTANEOUS at 18:36

## 2017-10-04 RX ADMIN — Medication SCH MG: at 20:11

## 2017-10-04 RX ADMIN — PREGABALIN SCH MG: 50 CAPSULE ORAL at 20:11

## 2017-10-04 RX ADMIN — HYDROCORTISONE SCH APPLIC: 25 CREAM TOPICAL at 20:10

## 2017-10-04 RX ADMIN — Medication SCH ML: at 06:10

## 2017-10-04 RX ADMIN — NYSTATIN SCH APPLIC: 100000 CREAM TOPICAL at 20:10

## 2017-10-04 RX ADMIN — METRONIDAZOLE SCH MG: 250 TABLET ORAL at 09:41

## 2017-10-04 RX ADMIN — HYDROCORTISONE SCH APPLIC: 25 CREAM TOPICAL at 13:55

## 2017-10-04 RX ADMIN — Medication SCH ML: at 18:36

## 2017-10-04 RX ADMIN — NYSTATIN SCH APPLIC: 100000 CREAM TOPICAL at 11:21

## 2017-10-04 RX ADMIN — PREGABALIN SCH MG: 50 CAPSULE ORAL at 14:06

## 2017-10-04 RX ADMIN — Medication SCH MG: at 09:40

## 2017-10-04 RX ADMIN — Medication SCH ML: at 10:58

## 2017-10-04 RX ADMIN — OXYCODONE HYDROCHLORIDE SCH MG: 5 SOLUTION ORAL at 07:45

## 2017-10-04 RX ADMIN — PREGABALIN SCH MG: 50 CAPSULE ORAL at 09:41

## 2017-10-04 RX ADMIN — METRONIDAZOLE SCH MG: 250 TABLET ORAL at 20:11

## 2017-10-04 RX ADMIN — OXYCODONE HYDROCHLORIDE AND ACETAMINOPHEN SCH MG: 500 TABLET ORAL at 09:42

## 2017-10-04 RX ADMIN — WATER SCH NOTE: 100 INJECTION, SOLUTION INTRAVENOUS at 18:58

## 2017-10-04 RX ADMIN — FOLIC ACID SCH MG: 1 TABLET ORAL at 09:42

## 2017-10-04 RX ADMIN — WATER SCH NOTE: 100 INJECTION, SOLUTION INTRAVENOUS at 06:56

## 2017-10-04 RX ADMIN — OXYCODONE HYDROCHLORIDE SCH MG: 5 SOLUTION ORAL at 11:45

## 2017-10-04 RX ADMIN — POTASSIUM CHLORIDE SCH MEQ: 750 TABLET, FILM COATED, EXTENDED RELEASE ORAL at 09:42

## 2017-10-04 NOTE — PN
Progress Note





- Progress Note


Date of Service: 10/04/17


SOAP: 


Subjective:





Without complaint








Objective:





 











Temp Pulse Resp BP Pulse Ox


 


 98.1 F   79   16   159/80   100 


 


 10/04/17 08:12  10/04/17 08:12  10/04/17 09:41  10/04/17 08:12  10/04/17 08:12








 Intake & Output











 10/02/17 10/03/17 10/04/17 10/05/17





 06:59 06:59 06:59 06:59


 


Intake Total 1230 1810 2120 


 


Output Total 1700 2050 1200 


 


Balance -470 -240 920 


 


Intake:    


 


  IVPB  130  


 


    ABX - CEFTRIAXONE  100  


 


    NS (0.9%)  30  


 


  Oral 1230 1680 2120 


 


Output:    


 


  Urine 750   


 


  Rodriguez 950 2050 1200 


 


Other:    


 


  Date of Last Bowel 10/1/17   





  Movement    


 


  # Bowel Movements 1 2 1 


 


  Estimated Stool Amount Small Medium Medium Small





 





PEX:


Comfortable


Sacral decubitus ulcer is clean without odor, non-viable tissue.  There is now 

evidence of granulation tissue present. Midline overlying the bony prominence 

remains with exposed fibrous tissue and a small amount of exposed bone.





Ulcer debrided sharply of a small amount of slough along the walls. Minimal 

bleeding controlled with pressure.











Assessment:


Sacral decubitus








Plan:


Will stop Santyl and start Medihoney to wound bed and cover with gauze packing 

and ABD pad. I do not think that Santyl is required anymore.


Will continue close observation


IV abx


The ulcer is now ready for wound vac that I think would be a good option but 

the patient's wife is reluctant to use the vac at this time.


Case discussed with Dr. Darby.





Patient's wife present in room during exam and care discussed with her.

## 2017-10-04 NOTE — PN
Subjective


Date of Service: 10/04/17


Interval History: 





Patient offers no acute complaints.  Pain control is adequate.





Objective


Active Medications: 








Acetaminophen (Tylenol Tab*)  650 mg PO Q6H PRN


   PRN Reason: FEVER/PAIN


Ascorbic Acid (Vitamin C  Tab*)  500 mg PO DAILY [facility]


   Last Admin: 10/04/17 09:42 Dose:  500 mg


Aspirin (Aspirin Ec Low Dose*)  81 mg PO DAILY [facility]


   Last Admin: 10/04/17 09:41 Dose:  81 mg


Bisacodyl (Dulcolax Supp*)  10 mg [place] DAILY PRN


   PRN Reason: CONSTIPATION


Cholecalciferol (Vitamin D Tab*)  5,000 units PO DAILY [facility]


   Last Admin: 10/04/17 09:39 Dose:  5,000 units


Clobetasol Propionate (Clobetasol 0.05% Oint*)  1 applic TOPICAL DAILY [facility]


   Last Admin: 10/04/17 09:47 Dose:  1 applic


Cyanocobalamin (Vitamin B12 Tab*)  1,000 mcg PO DAILY [facility]


   Last Admin: 10/04/17 09:41 Dose:  1,000 mcg


Diphenoxylate HCl/Atropine (Lomotil Tab*)  1 tab PO QID [facility]


   Last Admin: 10/04/17 09:40 Dose:  1 tab


Docusate Sodium (Colace Cap*)  100 mg PO BID PRN


   PRN Reason: CONSTIPATION


Econazole Nitrate (Econazole 1 % Cream (Nf))  1 applic TOPICAL DAILY@1700 [facility]


   Last Admin: 10/03/17 17:24 Dose:  1 applic


Enoxaparin Sodium (Lovenox(*))  40 mg SUBCUT Q24H [facility]


   Last Admin: 10/03/17 17:16 Dose:  40 mg


Fentanyl (Duragesic  Patch 75 Mcg/Hr*)  75 mcg TRANSDERM Q72H [facility]


   Last Admin: 10/02/17 17:28 Dose:  75 mcg


Ferrous Gluconate (Fergon Tab*)  324 mg PO BID [facility]


   Last Admin: 10/04/17 09:40 Dose:  324 mg


Folic Acid (Folvite Tab*)  0.5 mg PO DAILY [facility]


   Last Admin: 10/04/17 09:42 Dose:  0.5 mg


Furosemide (Lasix Tab*)  40 mg PO DAILY [facility]


   Last Admin: 10/04/17 09:41 Dose:  40 mg


Heparin Sodium (Porcine) (Heparin Flush Picc/Ml/Cvc(*))  1 ml FLUSH 0600,1800 

[facility]


   PRN Reason: Protocol


   Last Admin: 10/04/17 10:58 Dose:  1 ml


Hydrocortisone (Hydrocortisone 2.5% Cream(Nf))  1 applic TOPICAL TID [facility]


   Last Admin: 10/04/17 09:47 Dose:  1 applic


Ceftriaxone Sodium 2 gm/ (Sodium Chloride)  100 mls @ 200 mls/hr IVPB DAILY [facility]


   Last Admin: 10/04/17 09:42 Dose:  200 mls/hr


Lactobacillus Rhamnosus (Culturelle*)  1 cap PO BID [facility]


   Last Admin: 10/04/17 09:40 Dose:  1 cap


Metronidazole (Flagyl Tab*)  500 mg PO BID [facility]


   Last Admin: 10/04/17 09:41 Dose:  500 mg


Nystatin (Nystatin Cream*)  1 applic TOPICAL BID [facility]


   Last Admin: 10/04/17 11:21 Dose:  1 applic


Omeprazole (Prilosec Cap*)  40 mg PO DAILY@0600 [facility]


   Last Admin: 10/04/17 06:10 Dose:  40 mg


Ondansetron HCl (Zofran Odt Tab*)  4 mg PO Q8H PRN


   PRN Reason: NAUSEA/VOMITING


Oxycodone HCl (Oxycodone Oral.Soln*)  10 mg PO AC [facility]


   Last Admin: 10/04/17 11:45 Dose:  10 mg


Oxycodone HCl (Roxycodone Tab*)  5 mg PO Q4H PRN


   PRN Reason: PAIN


Pharmacy Profile Note (Fentanyl Patch Check Q Shift)  1 note N/A 0700,1900 [facility]


   Last Admin: 10/04/17 06:56 Dose:  1 note


Potassium Chloride (Klor Con Er Tab*)  10 meq PO DAILY [facility]


   Last Admin: 10/04/17 09:42 Dose:  10 meq


Pregabalin (Lyrica Cap(*))  150 mg PO TID [facility]


   Last Admin: 10/04/17 09:41 Dose:  150 mg


Zinc Sulfate (Zinc-220 Cap*)  220 mg PO DAILY [facility]


   Last Admin: 10/04/17 09:40 Dose:  220 mg











Vital Signs:











Temp Pulse Resp BP Pulse Ox


 


 98.1 F   80   16   148/67   99 


 


 10/04/17 12:03  10/04/17 12:03  10/04/17 12:03  10/04/17 12:03  10/04/17 12:03











Oxygen Devices in Use Now: None


Appearance: Well appearing elderly gentleman in [facility] accompanied by his wife


Respiratory: Symmetrical Chest Expansion and Respiratory Effort, Clear to 

Auscultation


Cardiovascular: NL Sounds; No Murmurs; No JVD, RRR


Abdominal: NL Sounds; No Tenderness; No Distention


Extremities: No Edema


Skin: - - see surgery notes, would not directly visualized


Neurological: Alert and Oriented x 3


Result Diagrams: 


 10/04/17 06:32





 10/04/17 06:32


Additional Lab and Data: 





.





Assess/Plan/Problems-Billing


.


Assessment: 


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 

P/w large Stage IV sacral decubitus ulcer which has been debrided multiple 

times. Failed wound vac in PMRU and back inpatient. Plastic surgery 

consultation at [facility] for possible flap.  





- Patient Problems


(1) Acute osteomyelitis of sacrum


Comment: 


Continue Ceftriaxone and flagyl at current doses per ID.


No evidence of new sepsis   





(2) Sacral decubitus ulcer, stage IV


Comment: 


Plastic surgery consultation to [facility] (Luis Felipe Chirinos MD) is pending


Continuing wound care per surgery recommendations, debrided 10/4 by Dr Holley

, recommend changing from Santyl to Medihoney, no residual non-viable tissue


Appreciate nutrition recommendations to support healing, attempt for 6 small 

protein rich meals daily and continue vitamins. 


Vitamin C started per nutrition and wife.


   





(3) Neurogenic bladder


Comment: 


With chronic indwelling urinary catheter   





(4) Neurogenic bowel


Comment: 


loose bowel movements with repositioning. 


Frequent pericare to prevent skin breakdown. 


Flagyl and prn lomotil for diarrhea


Patient states that he feels the diarrhea is slowing down   





(5) CKD (chronic kidney disease), stage III


Comment: 


Cr near baseline, no acute exacerbation   





(6) Chronic back pain


Comment: 


Secondary to ependymoma and sacral decub


Continue pregabalin, and acetaminophen.


oxycodone liquid to 10mg AC with one dose PRN for transfer.


Fentanyl patch to 75mcg. 


Pain better controlled with sitting


   





(7) HTN (hypertension)


Comment: 


Normotensive, cont home anthypertensive medications   





(8) BONIFACIO (obstructive sleep apnea)


Comment: 


Continue home BiPAP use.   





(9) Paraplegia


Comment: 


Secondary to ependymoma   





(10) DVT prophylaxis


Comment: 


Lovenox


TEDs in bed.   





(11) Full code status





Status and Disposition: 





Inpatient. Pending plastic surgery consult

## 2017-10-04 NOTE — PN
Progress Note





- Progress Note


Date of Service: 10/04/17


SOAP: 


Subjective:


CC: decubitus ulcer


HPI: 72 year old man with sacral decubitus ulcer, feeling better and eating.  

No fever, rash, or diarrhea.  Appetite good.  Wound care continues for sacral 

and calacaneal wounds.  Not painful in those locations.  








Objective:


[]


 Vital Signs











Temp  36.7 C   10/04/17 08:12


 


Pulse  79   10/04/17 08:12


 


Resp  18   10/04/17 11:45


 


BP  159/80   10/04/17 08:12


 


Pulse Ox  100   10/04/17 08:12








 Intake & Output











 10/03/17 10/04/17 10/04/17





 18:59 06:59 18:59


 


Intake Total 740 1380 


 


Output Total 300 900 


 


Balance 440 480 


 


Intake:   


 


  Oral 740 1380 


 


Output:   


 


  Rodriguez 300 900 


 


Other:   


 


  # Bowel Movements  1 


 


  Estimated Stool Amount  Medium Small








 


Gen:awake, no distress


HEENT:no thrush


Heart:RRR no murmur


Lungs:CTA BL


Abd:+BS NTND soft


Skin: no rash


MSK: sacral decubitus ulcer with granulation tissue; lateral fibrinous material 

over bone, no surrounding erythema, minimum drainage; BL medial heal 4 cm 

purple discoloration and eschar


 


Assessment:


1. acute osteomyelitis, sacrum


2. 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


3. elevated CRP








Plan:


1. continue ceftriaxone and flagyl day 28 today, had planned to change to oral 

antibiotics, case being reviewed by plastics in [place], if they are planning 

intervention, they may have a preference on IV vs PO antibiotics.  Seen and 

discussed with Dr Holley.

## 2017-10-05 RX ADMIN — ASPIRIN SCH MG: 81 TABLET, COATED ORAL at 09:27

## 2017-10-05 RX ADMIN — ENOXAPARIN SODIUM SCH MG: 40 INJECTION SUBCUTANEOUS at 16:52

## 2017-10-05 RX ADMIN — METRONIDAZOLE SCH MG: 250 TABLET ORAL at 09:27

## 2017-10-05 RX ADMIN — OXYCODONE HYDROCHLORIDE SCH MG: 5 SOLUTION ORAL at 16:51

## 2017-10-05 RX ADMIN — METRONIDAZOLE SCH MG: 250 TABLET ORAL at 21:56

## 2017-10-05 RX ADMIN — POTASSIUM CHLORIDE SCH MEQ: 750 TABLET, FILM COATED, EXTENDED RELEASE ORAL at 09:27

## 2017-10-05 RX ADMIN — Medication SCH ML: at 11:13

## 2017-10-05 RX ADMIN — HYDROCORTISONE SCH APPLIC: 25 CREAM TOPICAL at 08:10

## 2017-10-05 RX ADMIN — Medication SCH: at 16:48

## 2017-10-05 RX ADMIN — CYANOCOBALAMIN TAB 500 MCG SCH MCG: 500 TAB at 09:26

## 2017-10-05 RX ADMIN — DIPHENOXYLATE HYDROCHLORIDE AND ATROPINE SULFATE SCH TAB: 2.5; .025 TABLET ORAL at 09:26

## 2017-10-05 RX ADMIN — NYSTATIN SCH APPLIC: 100000 CREAM TOPICAL at 21:56

## 2017-10-05 RX ADMIN — Medication SCH CAP: at 09:29

## 2017-10-05 RX ADMIN — Medication SCH MG: at 09:29

## 2017-10-05 RX ADMIN — FENTANYL TRANSDERMAL SCH MCG: 75 PATCH, EXTENDED RELEASE TRANSDERMAL at 19:24

## 2017-10-05 RX ADMIN — PREGABALIN SCH MG: 50 CAPSULE ORAL at 21:57

## 2017-10-05 RX ADMIN — Medication SCH MG: at 21:57

## 2017-10-05 RX ADMIN — FUROSEMIDE SCH MG: 40 TABLET ORAL at 09:26

## 2017-10-05 RX ADMIN — WATER SCH NOTE: 100 INJECTION, SOLUTION INTRAVENOUS at 19:27

## 2017-10-05 RX ADMIN — OXYCODONE HYDROCHLORIDE AND ACETAMINOPHEN SCH MG: 500 TABLET ORAL at 09:27

## 2017-10-05 RX ADMIN — OXYCODONE HYDROCHLORIDE SCH MG: 5 SOLUTION ORAL at 08:04

## 2017-10-05 RX ADMIN — NYSTATIN SCH: 100000 CREAM TOPICAL at 08:10

## 2017-10-05 RX ADMIN — DIPHENOXYLATE HYDROCHLORIDE AND ATROPINE SULFATE SCH TAB: 2.5; .025 TABLET ORAL at 21:57

## 2017-10-05 RX ADMIN — Medication SCH UNITS: at 09:26

## 2017-10-05 RX ADMIN — DIPHENOXYLATE HYDROCHLORIDE AND ATROPINE SULFATE SCH TAB: 2.5; .025 TABLET ORAL at 14:10

## 2017-10-05 RX ADMIN — HYDROCORTISONE SCH APPLIC: 25 CREAM TOPICAL at 15:55

## 2017-10-05 RX ADMIN — ECONAZOLE NITRATE SCH APPLIC: 10 CREAM TOPICAL at 22:22

## 2017-10-05 RX ADMIN — PREGABALIN SCH MG: 50 CAPSULE ORAL at 14:10

## 2017-10-05 RX ADMIN — PREGABALIN SCH MG: 50 CAPSULE ORAL at 09:26

## 2017-10-05 RX ADMIN — WATER SCH NOTE: 100 INJECTION, SOLUTION INTRAVENOUS at 07:14

## 2017-10-05 RX ADMIN — Medication SCH ML: at 05:52

## 2017-10-05 RX ADMIN — OMEPRAZOLE SCH MG: 20 CAPSULE, DELAYED RELEASE ORAL at 05:52

## 2017-10-05 RX ADMIN — FOLIC ACID SCH MG: 1 TABLET ORAL at 09:26

## 2017-10-05 RX ADMIN — Medication SCH CAP: at 21:57

## 2017-10-05 RX ADMIN — HYDROCORTISONE SCH APPLIC: 25 CREAM TOPICAL at 21:55

## 2017-10-05 RX ADMIN — Medication SCH MG: at 09:27

## 2017-10-05 RX ADMIN — DIPHENOXYLATE HYDROCHLORIDE AND ATROPINE SULFATE SCH TAB: 2.5; .025 TABLET ORAL at 16:51

## 2017-10-05 RX ADMIN — CLOBETASOL PROPIONATE SCH APPLIC: 0.5 OINTMENT TOPICAL at 08:10

## 2017-10-05 RX ADMIN — OXYCODONE HYDROCHLORIDE SCH MG: 5 SOLUTION ORAL at 11:51

## 2017-10-05 NOTE — PN
Subjective


Date of Service: 10/05/17


Interval History: 





Patient offers no acute complaints.  Pain control is good.  No nausea, SOB.





Objective


Active Medications: 








Acetaminophen (Tylenol Tab*)  650 mg PO Q6H PRN


   PRN Reason: FEVER/PAIN


Ascorbic Acid (Vitamin C  Tab*)  [facility]ILY [facility]


   Last Admin: 10/05/17 09:27 Dose:  500 mg


Aspirin (Aspirin Ec Low Dose*)  81 mg PO DAILY [facility]


   Last Admin: 10/05/17 09:27 Dose:  81 mg


Bisacodyl (Dulcolax Supp*)  10 mg [place] DAILY [facility]


   PRN Reason: CONSTIPATION


Cholecalciferol (Vitamin D Tab*)  5,000 units PO [facility]ILY [facility]


   Last Admin: 10/05/17 09:26 Dose:  5,000 units


Clobetasol Propionate (Clobetasol 0.05% Oint*)  1 applic TOPICAL [facility]ILY [facility]


   Last Admin: 10/05/17 08:10 Dose:  1 applic


Cyanocobalamin (Vitamin B12 Tab*)  1,000 mcg PO DAILY [facility]


   Last Admin: 10/05/17 09:26 Dose:  1,000 mcg


Diphenoxylate HCl/Atropine (Lomotil Tab*)  1 tab PO QID [facility]


   Last Admin: 10/05/17 14:10 Dose:  1 tab


Docusate Sodium (Colace Cap*)  100 mg PO BID [facility]N


   PRN Reason: CONSTIPATION


Econazole Nitrate (Econazole 1 % Cream (Nf))  1 applic TOPICAL [facility] [facility]


Enoxaparin Sodium (Lovenox(*))  40 mg SUBCUT Q24H [facility]


   Last Admin: 10/04/17 18:36 Dose:  40 mg


Fentanyl (Duragesic  Patch 75 Mcg/Hr*)  75 mcg TRANSDERM Q72H [facility]


   Last Admin: 10/02/17 17:28 Dose:  75 mcg


Ferrous Gluconate (Fergon Tab*)  324 mg PO BID [facility]


   Last Admin: 10/05/17 09:27 Dose:  324 mg


Folic Acid (Folvite Tab*)  0.5 mg PO DAILY [facility]


   Last Admin: 10/05/17 09:26 Dose:  0.5 mg


Furosemide (Lasix Tab*)  40 mg PO DAILY [facility]


   Last Admin: 10/05/17 09:26 Dose:  40 mg


Heparin Sodium (Porcine) (Heparin Flush Picc/Ml/Cvc(*))  1 ml FLUSH 0600,1800 

[facility]


   PRN Reason: Protocol


   Last Admin: 10/05/17 11:13 Dose:  1 ml


Hydrocortisone (Hydrocortisone 2.5% Cream(Nf))  1 applic TOPICAL TID [facility]


   Last Admin: 10/05/17 08:10 Dose:  1 applic


Ceftriaxone Sodium 2 gm/ (Sodium Chloride)  100 mls @ 200 mls/hr IVPB DAILY [facility]


   Last Admin: 10/05/17 09:27 Dose:  200 mls/hr


Lactobacillus Rhamnosus (Culturelle*)  1 cap PO BID [facility]


   Last Admin: 10/05/17 09:29 Dose:  1 cap


Metronidazole (Flagyl Tab*)  500 mg PO BID [facility]


   Last Admin: 10/05/17 09:27 Dose:  500 mg


Nystatin (Nystatin Cream*)  1 applic TOPICAL BID [facility]


   Last Admin: 10/05/17 08:10 Dose:  Not Given


Omeprazole (Prilosec Cap*)  40 mg PO DAILY@0600 [facility]


   Last Admin: 10/05/17 05:52 Dose:  40 mg


Ondansetron HCl (Zofran Odt Tab*)  4 mg PO Q8H PRN


   PRN Reason: NAUSEA/VOMITING


Oxycodone HCl (Oxycodone Oral.Soln*)  10 mg PO AC [facility]


   Last Admin: 10/05/17 11:51 Dose:  10 mg


Oxycodone HCl (Roxycodone Tab*)  5 mg PO Q4H PRN


   PRN Reason: PAIN


Pharmacy Profile Note (Fentanyl Patch Check Q Shift)  1 note N/A 0700,1900 [facility]


   Last Admin: 10/05/17 07:14 Dose:  1 note


Potassium Chloride (Klor Con Er Tab*)  10 meq PO DAILY [facility]


   Last Admin: 10/05/17 09:27 Dose:  10 meq


Pregabalin (Lyrica Cap(*))  150 mg PO TID [facility]


   Last Admin: 10/05/17 14:10 Dose:  150 mg


Zinc Sulfate (Zinc-220 Cap*)  220 mg PO DAILY [facility]


   Last Admin: 10/05/17 09:29 Dose:  220 mg











Vital Signs:











Temp Pulse Resp BP Pulse Ox


 


 98.5 F   76   16   136/69   97 


 


 10/05/17 08:03  10/05/17 08:03  10/05/17 14:10  10/05/17 08:03  10/05/17 08:03











Oxygen Devices in Use Now: None


Appearance: Well appearing gentleman in NAD.


Respiratory: Symmetrical Chest Expansion and Respiratory Effort, Clear to 

Auscultation


Cardiovascular: NL Sounds; No Murmurs; No JVD, RRR


Abdominal: NL Sounds; No Tenderness; No Distention


Extremities: No Edema


Skin: No Rash or Ulcers


Neurological: Alert and Oriented x 3


Result Diagrams: 


 10/04/17 06:32





 10/04/17 06:32


Additional Lab and Data: 





.





Assess/Plan/Problems-Billing


.


Assessment: 


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 

P/w large Stage IV sacral decubitus ulcer which has been debrided multiple 

times. Failed wound vac in PMRU and back inpatient. Plastic surgery 

consultation at [facility] for possible flap.  





- Patient Problems


(1) Acute osteomyelitis of sacrum


Comment: 


Continue Ceftriaxone and flagyl at current doses per ID.


No evidence of new sepsis   





(2) Sacral decubitus ulcer, stage IV


Comment: 


Plastic surgery consultation to [facility] (Luis Felipe Chirinos MD) is pending


Continuing wound care per surgery recommendations, debrided 10/4 by Dr Holley

, recommend changing from Santyl to Medihoney, no residual non-viable tissue


Appreciate nutrition recommendations to support healing, attempt for 6 small 

protein rich meals daily and continue vitamins. 


Vitamin C started per nutrition and wife.


   





(3) Neurogenic bladder


Comment: 


With chronic indwelling urinary catheter   





(4) Neurogenic bowel


Comment: 


loose bowel movements with repositioning. 


Frequent pericare to prevent skin breakdown. 


Flagyl and prn lomotil for diarrhea   





(5) CKD (chronic kidney disease), stage III


Comment: 


Cr near baseline, no acute exacerbation   





(6) Chronic back pain


Comment: 


Secondary to ependymoma and sacral decub


Continue pregabalin, and acetaminophen.


oxycodone liquid to 10mg AC with one dose PRN for transfer.


Fentanyl patch to 75mcg. 


Pain better controlled with sitting


   





(7) HTN (hypertension)


Comment: 


Normotensive, cont home anthypertensive medications   





(8) BONIFACIO (obstructive sleep apnea)


Comment: 


Continue home BiPAP use.   





(9) Paraplegia


Comment: 


Secondary to ependymoma   





(10) DVT prophylaxis


Comment: 


Lovenox


TEDs in bed.   





(11) Full code status





Status and Disposition: 





Inpatient. Pending plastic surgery consult

## 2017-10-06 RX ADMIN — NYSTATIN SCH APPLIC: 100000 CREAM TOPICAL at 20:16

## 2017-10-06 RX ADMIN — DIPHENOXYLATE HYDROCHLORIDE AND ATROPINE SULFATE SCH TAB: 2.5; .025 TABLET ORAL at 17:31

## 2017-10-06 RX ADMIN — Medication SCH MG: at 09:11

## 2017-10-06 RX ADMIN — Medication SCH CAP: at 09:24

## 2017-10-06 RX ADMIN — Medication SCH CAP: at 20:20

## 2017-10-06 RX ADMIN — CLOBETASOL PROPIONATE SCH APPLIC: 0.5 OINTMENT TOPICAL at 11:06

## 2017-10-06 RX ADMIN — OXYCODONE HYDROCHLORIDE SCH MG: 5 SOLUTION ORAL at 08:52

## 2017-10-06 RX ADMIN — WATER SCH NOTE: 100 INJECTION, SOLUTION INTRAVENOUS at 20:06

## 2017-10-06 RX ADMIN — OXYCODONE HYDROCHLORIDE SCH MG: 5 SOLUTION ORAL at 17:30

## 2017-10-06 RX ADMIN — DIPHENOXYLATE HYDROCHLORIDE AND ATROPINE SULFATE SCH TAB: 2.5; .025 TABLET ORAL at 13:54

## 2017-10-06 RX ADMIN — ASPIRIN SCH MG: 81 TABLET, COATED ORAL at 09:17

## 2017-10-06 RX ADMIN — ECONAZOLE NITRATE SCH APPLIC: 10 CREAM TOPICAL at 20:12

## 2017-10-06 RX ADMIN — PREGABALIN SCH MG: 50 CAPSULE ORAL at 09:12

## 2017-10-06 RX ADMIN — METRONIDAZOLE SCH MG: 250 TABLET ORAL at 09:16

## 2017-10-06 RX ADMIN — Medication SCH ML: at 05:53

## 2017-10-06 RX ADMIN — NYSTATIN SCH APPLIC: 100000 CREAM TOPICAL at 11:06

## 2017-10-06 RX ADMIN — OXYCODONE HYDROCHLORIDE SCH MG: 5 SOLUTION ORAL at 11:54

## 2017-10-06 RX ADMIN — HYDROCORTISONE SCH APPLIC: 25 CREAM TOPICAL at 20:16

## 2017-10-06 RX ADMIN — Medication SCH UNITS: at 09:13

## 2017-10-06 RX ADMIN — FUROSEMIDE SCH MG: 40 TABLET ORAL at 09:12

## 2017-10-06 RX ADMIN — HYDROCORTISONE SCH APPLIC: 25 CREAM TOPICAL at 14:16

## 2017-10-06 RX ADMIN — PREGABALIN SCH MG: 50 CAPSULE ORAL at 20:21

## 2017-10-06 RX ADMIN — DIPHENOXYLATE HYDROCHLORIDE AND ATROPINE SULFATE SCH TAB: 2.5; .025 TABLET ORAL at 20:21

## 2017-10-06 RX ADMIN — METRONIDAZOLE SCH MG: 250 TABLET ORAL at 20:20

## 2017-10-06 RX ADMIN — WATER SCH NOTE: 100 INJECTION, SOLUTION INTRAVENOUS at 06:58

## 2017-10-06 RX ADMIN — CYANOCOBALAMIN TAB 500 MCG SCH MCG: 500 TAB at 09:18

## 2017-10-06 RX ADMIN — HYDROCORTISONE SCH APPLIC: 25 CREAM TOPICAL at 10:37

## 2017-10-06 RX ADMIN — Medication SCH MG: at 09:24

## 2017-10-06 RX ADMIN — OXYCODONE HYDROCHLORIDE AND ACETAMINOPHEN SCH MG: 500 TABLET ORAL at 09:17

## 2017-10-06 RX ADMIN — FOLIC ACID SCH MG: 1 TABLET ORAL at 09:18

## 2017-10-06 RX ADMIN — ENOXAPARIN SODIUM SCH MG: 40 INJECTION SUBCUTANEOUS at 18:36

## 2017-10-06 RX ADMIN — POTASSIUM CHLORIDE SCH MEQ: 750 TABLET, FILM COATED, EXTENDED RELEASE ORAL at 09:11

## 2017-10-06 RX ADMIN — PREGABALIN SCH MG: 50 CAPSULE ORAL at 14:15

## 2017-10-06 RX ADMIN — OMEPRAZOLE SCH MG: 20 CAPSULE, DELAYED RELEASE ORAL at 05:53

## 2017-10-06 RX ADMIN — Medication SCH MG: at 20:21

## 2017-10-06 RX ADMIN — Medication SCH ML: at 18:37

## 2017-10-06 RX ADMIN — DIPHENOXYLATE HYDROCHLORIDE AND ATROPINE SULFATE SCH TAB: 2.5; .025 TABLET ORAL at 09:20

## 2017-10-06 NOTE — PN
Progress Note





- Progress Note


Date of Service: 10/06/17


SOAP: 


Subjective:


CC: decubitus ulcer


HPI: 72 year old man with sacral decubitus ulcer, feeling better and eating.  

No fever, rash, or diarrhea.  No pain.  Up this morning for breakfast.  








Objective:


[]


 Vital Signs











Temp  36.3 C   10/06/17 11:35


 


Pulse  82   10/06/17 11:35


 


Resp  14   10/06/17 13:54


 


BP  146/62   10/06/17 11:35


 


Pulse Ox  96   10/06/17 11:35








 Intake & Output











 10/05/17 10/06/17 10/06/17





 18:59 06:59 18:59


 


Intake Total 1460 1100 240


 


Output Total 400 1350 


 


Balance 1060 -250 240


 


Intake:   


 


  Oral 1460 1100 240


 


Output:   


 


  Rodriguez 400 1350 


 


Other:   


 


  # Bowel Movements 1 1 


 


  Estimated Stool Amount Small Medium 








 


Gen:awake, no distress


HEENT:no thrush


Heart:RRR no murmur


Lungs:CTA BL


Abd:+BS NTND soft


Skin: no rash


 


Assessment:


1. acute osteomyelitis, sacrum


2. 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


3. elevated CRP








Plan:


1. continue ceftriaxone and flagyl day 30, CRP next week if still decreasing 

will change to PO.

## 2017-10-06 NOTE — PN
Subjective


Date of Service: 10/06/17


Interval History: 





Patient has no new complaints. Pain 4/10 at rest after repositioning. Patient 

denies any change in pain overall. Plastic surgery consult still pending. 


Family History: Unchanged from Admission


Social History: Unchanged from Admission - no changes noted


Past Medical History: Unchanged from Admission





Objective


Active Medications: 








Acetaminophen (Tylenol Tab*)  650 mg PO Q6H PRN


   PRN Reason: FEVER/PAIN


Ascorbic Acid (Vitamin C  Tab*)  500 mg PO DAILY [facility]


   Last Admin: 10/06/17 09:17 Dose:  500 mg


Aspirin (Aspirin Ec Low Dose*)  81 mg PO DAILY [facility]


   Last Admin: 10/06/17 09:17 Dose:  81 mg


Bisacodyl (Dulcolax Supp*)  10 mg [place] DAILY PRN


   PRN Reason: CONSTIPATION


Cholecalciferol (Vitamin D Tab*)  5,000 units PO DAILY [facility]


   Last Admin: 10/06/17 09:13 Dose:  5,000 units


Clobetasol Propionate (Clobetasol 0.05% Oint*)  1 applic TOPICAL DAILY [facility]


   Last Admin: 10/06/17 11:06 Dose:  1 applic


Cyanocobalamin (Vitamin B12 Tab*)  1,000 mcg PO DAILY [facility]


   Last Admin: 10/06/17 09:18 Dose:  1,000 mcg


Diphenoxylate HCl/Atropine (Lomotil Tab*)  1 tab PO QID [facility]


   Last Admin: 10/06/17 13:54 Dose:  1 tab


Docusate Sodium (Colace Cap*)  100 mg PO BID PRN


   PRN Reason: CONSTIPATION


Econazole Nitrate (Econazole 1 % Cream (Nf))  1 applic TOPICAL 2100 [facility]


   Last Admin: 10/05/17 22:22 Dose:  1 applic


Enoxaparin Sodium (Lovenox(*))  40 mg SUBCUT Q24H [facility]


   Last Admin: 10/05/17 16:52 Dose:  40 mg


Fentanyl (Duragesic  Patch 75 Mcg/Hr*)  75 mcg TRANSDERM Q72H [facility]


   Last Admin: 10/05/17 19:24 Dose:  75 mcg


Ferrous Gluconate (Fergon Tab*)  324 mg PO BID [facility]


   Last Admin: 10/06/17 09:11 Dose:  324 mg


Folic Acid (Folvite Tab*)  0.5 mg PO DAILY [facility]


   Last Admin: 10/06/17 09:18 Dose:  0.5 mg


Furosemide (Lasix Tab*)  40 mg PO DAILY [facility]


   Last Admin: 10/06/17 09:12 Dose:  40 mg


Heparin Sodium (Porcine) (Heparin Flush Picc/Ml/Cvc(*))  1 ml FLUSH 0600,1800 

[facility]


   PRN Reason: Protocol


   Last Admin: 10/06/17 05:53 Dose:  1 ml


Hydrocortisone (Hydrocortisone 2.5% Cream(Nf))  1 applic TOPICAL TID [facility]


   Last Admin: 10/06/17 14:16 Dose:  1 applic


Ceftriaxone Sodium 2 gm/ (Sodium Chloride)  100 mls @ 200 mls/hr IVPB DAILY [facility]


   Last Admin: 10/06/17 09:30 Dose:  200 mls/hr


Lactobacillus Rhamnosus (Culturelle*)  1 cap PO BID [facility]


   Last Admin: 10/06/17 09:24 Dose:  1 cap


Metronidazole (Flagyl Tab*)  500 mg PO BID [facility]


   Last Admin: 10/06/17 09:16 Dose:  500 mg


Nystatin (Nystatin Cream*)  1 applic TOPICAL BID [facility]


   Last Admin: 10/06/17 11:06 Dose:  1 applic


Omeprazole (Prilosec Cap*)  40 mg PO DAILY@0600 [facility]


   Last Admin: 10/06/17 05:53 Dose:  40 mg


Ondansetron HCl (Zofran Odt Tab*)  4 mg PO Q8H PRN


   PRN Reason: NAUSEA/VOMITING


Oxycodone HCl (Oxycodone Oral.Soln*)  10 mg PO AC [facility]


   Last Admin: 10/06/17 11:54 Dose:  10 mg


Pharmacy Profile Note (Fentanyl Patch Check Q Shift)  1 note N/A 0700,1900 [facility]


   Last Admin: 10/06/17 06:58 Dose:  1 note


Potassium Chloride (Klor Con Er Tab*)  10 meq PO DAILY [facility]


   Last Admin: 10/06/17 09:11 Dose:  10 meq


Pregabalin (Lyrica Cap(*))  150 mg PO TID [facility]


   Last Admin: 10/06/17 14:15 Dose:  150 mg


Zinc Sulfate (Zinc-220 Cap*)  220 mg PO DAILY [facility]


   Last Admin: 10/06/17 09:24 Dose:  220 mg








 Vital Signs











  10/05/17 10/05/17 10/05/17





  16:05 16:51 18:51


 


Temperature   


 


Pulse Rate   


 


Respiratory 18 16 16





Rate   


 


Blood Pressure   





(mmHg)   


 


O2 Sat by Pulse   





Oximetry   














  10/05/17 10/05/17 10/05/17





  20:22 21:50 21:57


 


Temperature 98.6 F  


 


Pulse Rate 81  


 


Respiratory 18 16 16





Rate   


 


Blood Pressure 160/65  





(mmHg)   


 


O2 Sat by Pulse 94  





Oximetry   














  10/05/17 10/06/17 10/06/17





  23:57 00:16 07:21


 


Temperature  98.2 F 97.8 F


 


Pulse Rate  73 70


 


Respiratory 16 16 16





Rate   


 


Blood Pressure  150/75 129/53





(mmHg)   


 


O2 Sat by Pulse  95 97





Oximetry   














  10/06/17 10/06/17 10/06/17





  08:00 08:52 09:12


 


Temperature   


 


Pulse Rate   


 


Respiratory 16 16 16





Rate   


 


Blood Pressure   





(mmHg)   


 


O2 Sat by Pulse   





Oximetry   














  10/06/17 10/06/17 10/06/17





  09:20 10:52 11:35


 


Temperature   97.3 F


 


Pulse Rate   82


 


Respiratory 16 16 16





Rate   


 


Blood Pressure   146/62





(mmHg)   


 


O2 Sat by Pulse   96





Oximetry   














  10/06/17 10/06/17 10/06/17





  11:54 13:54 14:15


 


Temperature   


 


Pulse Rate   


 


Respiratory 16 16 14





Rate   


 


Blood Pressure   





(mmHg)   


 


O2 Sat by Pulse   





Oximetry   














  10/06/17





  15:32


 


Temperature 


 


Pulse Rate 


 


Respiratory 14





Rate 


 


Blood Pressure 





(mmHg) 


 


O2 Sat by Pulse 





Oximetry 











Oxygen Devices in Use Now: None


Appearance: Patient is a 71yo male who appears stated age sitting comfortably 

in bed in NAD.


Eyes: No Scleral Icterus, PERRLA


Ears/Nose/Mouth/Throat: NL Teeth, Lips, Gums, Clear Oropharnyx, Mucous 

Membranes Moist


Neck: NL Appearance and Movements; NL JVP


Respiratory: Symmetrical Chest Expansion and Respiratory Effort, Clear to 

Auscultation


Cardiovascular: NL Sounds; No Murmurs; No JVD, RRR, No Edema


Abdominal: No Hepatosplenomegaly, - - Distended, unchanged from previous exams. 

BS present and normoactive in all 4 quadrants.


Lymphatic: No Cervical Adenopathy


Extremities: No Edema


Skin: No Nodules or Sclerosis, - - Rash consistent with tinea corporis present 

but diminished on left hip.


Neurological: Alert and Oriented x 3, - - Paralyzed and insensate from waist 

down.


Result Diagrams: 


 10/04/17 06:32





 10/04/17 06:32


Additional Lab and Data: 





.





Assess/Plan/Problems-Billing


.


Assessment: 


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 

P/w large Stage IV sacral decubitus ulcer which has been debrided multiple 

times. Failed wound vac in PMRU and back inpatient. Plastic surgery 

consultation at [facility] for possible flap.  





- Patient Problems


(1) Acute osteomyelitis of sacrum


Current Visit: Yes   Status: Acute   Code(s): M46.28 - OSTEOMYELITIS OF VERTEBRA

, SACRAL AND SACROCOCCYGEAL REGION   SNOMED Code(s): 623768704


   Comment: 


Continue Ceftriaxone and flagyl at current doses per ID.


No evidence of new sepsis   





(2) Chronic indwelling Rodriguez catheter


Current Visit: Yes   Status: Chronic   Priority: High   Code(s): Z92.89 - 

PERSONAL HISTORY OF OTHER MEDICAL TREATMENT   SNOMED Code(s): 942318633


   Comment: 


High risk of UTI - currently on ABX, no current evidence of UTI.   





(3) Neurogenic bladder


Current Visit: Yes   Status: Chronic   Priority: High   Code(s): N31.9 - 

NEUROMUSCULAR DYSFUNCTION OF BLADDER, UNSPECIFIED   SNOMED Code(s): 583970737


   Comment: 


With chronic indwelling urinary catheter   





(4) Neurogenic bowel


Current Visit: Yes   Status: Chronic   Priority: High   Code(s): K59.2 - 

NEUROGENIC BOWEL, NOT ELSEWHERE CLASSIFIED   SNOMED Code(s): 897859875


   Comment: 


loose bowel movements with repositioning. 


Frequent pericare to prevent skin breakdown. 


Flagyl and prn lomotil for diarrhea   





(5) Sacral decubitus ulcer, stage IV


Current Visit: Yes   Status: Chronic   Priority: High   Code(s): L89.154 - 

PRESSURE ULCER OF SACRAL REGION, STAGE 4   SNOMED Code(s): 318334827


   Comment: 


Plastic surgery consultation to [facility] (Luis Felipe Chirinos MD) is pending


Continuing wound care per surgery recommendations, debrided 10/4 by Dr Holley

, recommend changed from Santyl to Medihoney. Wound not visualized by author 

today.


Appreciate nutrition recommendations to support healing, attempt for 6 small 

protein rich meals daily and continue vitamins. 


Vitamin C started per nutrition and wife.


   





(6) DVT prophylaxis


Current Visit: No   Status: Acute   Code(s): JOG5911 -    SNOMED Code(s): 

123245169


   Comment: 


Lovenox


TEDs in bed.   





(7) Diarrhea


Current Visit: No   Status: Acute   Code(s): R19.7 - DIARRHEA, UNSPECIFIED   

SNOMED Code(s): 53653480


   Comment: 


BMs more formed per patient. Diarrhea Likely ABX associated. ID thinks not 

Cdiff. Continue Flagyl, Probiotic, and Lomotil


   





(8) Chronic back pain


Current Visit: No   Status: Chronic   Code(s): M54.9 - DORSALGIA, UNSPECIFIED; 

G89.29 - OTHER CHRONIC PAIN   SNOMED Code(s): 076990476


   Comment: 


Secondary to ependymoma and sacral decub


Continue pregabalin, and acetaminophen.


oxycodone liquid to 10mg AC with one dose PRN for transfer.


Fentanyl patch at 75mcg. 


Pain stable and tolerable with sitting.


   





(9) BONIFACIO (obstructive sleep apnea)


Current Visit: No   Status: Chronic   Code(s): G47.33 - OBSTRUCTIVE SLEEP APNEA 

(ADULT) (PEDIATRIC)   SNOMED Code(s): 50398070


   Comment: 


Continue home BiPAP use.   





(10) Paraplegia


Current Visit: No   Status: Chronic   Code(s): G82.20 - PARAPLEGIA, UNSPECIFIED

   SNOMED Code(s): 76612250


   Comment: 


Secondary to ependymoma


Patient able to transfer with slide board today.   





(11) Full code status


Current Visit: No   Status: Acute   Code(s): Z78.9 - OTHER SPECIFIED HEALTH 

STATUS   SNOMED Code(s): 834546449


   





(12) Tinea corporis


Current Visit: Yes   Status: Acute   Code(s): B35.4 - TINEA CORPORIS   SNOMED 

Code(s): 22099803


   Comment: 


Annular scaly lesion on hip spreading consistent with tinea corporis. Nystatin 

cream not effective against lesion. Will trial patient's home antifungal cream.

   


Status and Disposition: 





Inpatient. Pending plastic surgery consult

## 2017-10-07 RX ADMIN — ASPIRIN SCH MG: 81 TABLET, COATED ORAL at 09:06

## 2017-10-07 RX ADMIN — CYANOCOBALAMIN TAB 500 MCG SCH MCG: 500 TAB at 09:07

## 2017-10-07 RX ADMIN — ENOXAPARIN SODIUM SCH MG: 40 INJECTION SUBCUTANEOUS at 17:18

## 2017-10-07 RX ADMIN — DIPHENOXYLATE HYDROCHLORIDE AND ATROPINE SULFATE SCH TAB: 2.5; .025 TABLET ORAL at 17:18

## 2017-10-07 RX ADMIN — HYDROCORTISONE SCH APPLIC: 25 CREAM TOPICAL at 09:34

## 2017-10-07 RX ADMIN — POTASSIUM CHLORIDE SCH MEQ: 750 TABLET, FILM COATED, EXTENDED RELEASE ORAL at 09:06

## 2017-10-07 RX ADMIN — PREGABALIN SCH MG: 50 CAPSULE ORAL at 23:04

## 2017-10-07 RX ADMIN — FOLIC ACID SCH MG: 1 TABLET ORAL at 09:07

## 2017-10-07 RX ADMIN — PREGABALIN SCH MG: 50 CAPSULE ORAL at 14:19

## 2017-10-07 RX ADMIN — HYDROCORTISONE SCH APPLIC: 25 CREAM TOPICAL at 14:22

## 2017-10-07 RX ADMIN — Medication SCH CAP: at 09:06

## 2017-10-07 RX ADMIN — Medication SCH CAP: at 23:03

## 2017-10-07 RX ADMIN — NYSTATIN SCH APPLIC: 100000 CREAM TOPICAL at 20:30

## 2017-10-07 RX ADMIN — Medication SCH ML: at 06:02

## 2017-10-07 RX ADMIN — HYDROCORTISONE SCH APPLIC: 25 CREAM TOPICAL at 20:30

## 2017-10-07 RX ADMIN — DIPHENOXYLATE HYDROCHLORIDE AND ATROPINE SULFATE SCH TAB: 2.5; .025 TABLET ORAL at 12:50

## 2017-10-07 RX ADMIN — Medication SCH ML: at 10:07

## 2017-10-07 RX ADMIN — Medication SCH MG: at 09:06

## 2017-10-07 RX ADMIN — DIPHENOXYLATE HYDROCHLORIDE AND ATROPINE SULFATE SCH TAB: 2.5; .025 TABLET ORAL at 23:03

## 2017-10-07 RX ADMIN — FUROSEMIDE SCH MG: 40 TABLET ORAL at 09:07

## 2017-10-07 RX ADMIN — WATER SCH NOTE: 100 INJECTION, SOLUTION INTRAVENOUS at 18:53

## 2017-10-07 RX ADMIN — Medication SCH UNITS: at 09:05

## 2017-10-07 RX ADMIN — METRONIDAZOLE SCH MG: 250 TABLET ORAL at 09:07

## 2017-10-07 RX ADMIN — NYSTATIN SCH APPLIC: 100000 CREAM TOPICAL at 09:34

## 2017-10-07 RX ADMIN — METRONIDAZOLE SCH MG: 250 TABLET ORAL at 23:03

## 2017-10-07 RX ADMIN — OXYCODONE HYDROCHLORIDE SCH MG: 5 SOLUTION ORAL at 17:18

## 2017-10-07 RX ADMIN — PREGABALIN SCH MG: 50 CAPSULE ORAL at 09:07

## 2017-10-07 RX ADMIN — CLOBETASOL PROPIONATE SCH APPLIC: 0.5 OINTMENT TOPICAL at 09:34

## 2017-10-07 RX ADMIN — Medication SCH ML: at 17:19

## 2017-10-07 RX ADMIN — OMEPRAZOLE SCH MG: 20 CAPSULE, DELAYED RELEASE ORAL at 06:04

## 2017-10-07 RX ADMIN — ECONAZOLE NITRATE SCH APPLIC: 10 CREAM TOPICAL at 23:07

## 2017-10-07 RX ADMIN — OXYCODONE HYDROCHLORIDE SCH MG: 5 SOLUTION ORAL at 11:45

## 2017-10-07 RX ADMIN — DIPHENOXYLATE HYDROCHLORIDE AND ATROPINE SULFATE SCH TAB: 2.5; .025 TABLET ORAL at 09:06

## 2017-10-07 RX ADMIN — OXYCODONE HYDROCHLORIDE SCH MG: 5 SOLUTION ORAL at 07:49

## 2017-10-07 RX ADMIN — OXYCODONE HYDROCHLORIDE AND ACETAMINOPHEN SCH MG: 500 TABLET ORAL at 09:06

## 2017-10-07 RX ADMIN — WATER SCH NOTE: 100 INJECTION, SOLUTION INTRAVENOUS at 06:58

## 2017-10-07 RX ADMIN — Medication SCH MG: at 23:03

## 2017-10-07 NOTE — PN
Subjective


Date of Service: 10/07/17


Interval History: 





Patient has no new complaints overnight. Plastic surgeon from [facility] had emergency 

surgery and was unable to speak to family yesterday. No other concerns at this 

time.


Family History: Unchanged from Admission


Social History: Unchanged from Admission - no changes noted


Past Medical History: Unchanged from Admission





Objective


Active Medications: 








Acetaminophen (Tylenol Tab*)  650 mg PO Q6H PRN


   PRN Reason: FEVER/PAIN


Ascorbic Acid (Vitamin C  Tab*)  500 mg PO DAILY [facility]


   Last Admin: 10/07/17 09:06 Dose:  500 mg


Aspirin (Aspirin Ec Low Dose*)  81 mg PO DAILY [facility]


   Last Admin: 10/07/17 09:06 Dose:  81 mg


Bisacodyl (Dulcolax Supp*)  10 mg [place] DAILY PRN


   PRN Reason: CONSTIPATION


Cholecalciferol (Vitamin D Tab*)  5,000 units PO DAILY [facility]


   Last Admin: 10/07/17 09:05 Dose:  5,000 units


Clobetasol Propionate (Clobetasol 0.05% Oint*)  1 applic TOPICAL DAILY [facility]


   Last Admin: 10/07/17 09:34 Dose:  1 applic


Cyanocobalamin (Vitamin B12 Tab*)  1,000 mcg PO DAILY [facility]


   Last Admin: 10/07/17 09:07 Dose:  1,000 mcg


Diphenoxylate HCl/Atropine (Lomotil Tab*)  1 tab PO QID [facility]


   Last Admin: 10/07/17 12:50 Dose:  1 tab


Docusate Sodium (Colace Cap*)  100 mg PO BID PRN


   PRN Reason: CONSTIPATION


Econazole Nitrate (Econazole 1 % Cream (Nf))  1 applic TOPICAL 2100 [facility]


   Last Admin: 10/06/17 20:12 Dose:  1 applic


Enoxaparin Sodium (Lovenox(*))  40 mg SUBCUT Q24H [facility]


   Last Admin: 10/06/17 18:36 Dose:  40 mg


Fentanyl (Duragesic  Patch 75 Mcg/Hr*)  75 mcg TRANSDERM Q72H [facility]


   Last Admin: 10/05/17 19:24 Dose:  75 mcg


Ferrous Gluconate (Fergon Tab*)  324 mg PO BID [facility]


   Last Admin: 10/07/17 09:06 Dose:  324 mg


Folic Acid (Folvite Tab*)  0.5 mg PO DAILY [facility]


   Last Admin: 10/07/17 09:07 Dose:  0.5 mg


Furosemide (Lasix Tab*)  40 mg PO DAILY [facility]


   Last Admin: 10/07/17 09:07 Dose:  40 mg


Heparin Sodium (Porcine) (Heparin Flush Picc/Ml/Cvc(*))  1 ml FLUSH 0600,1800 

[facility]


   PRN Reason: Protocol


   Last Admin: 10/07/17 10:07 Dose:  1 ml


Hydrocortisone (Hydrocortisone 2.5% Cream(Nf))  1 applic TOPICAL TID [facility]


   Last Admin: 10/07/17 14:22 Dose:  1 applic


Ceftriaxone Sodium 2 gm/ (Sodium Chloride)  100 mls @ 200 mls/hr IVPB DAILY [facility]


   Last Admin: 10/07/17 09:05 Dose:  200 mls/hr


Lactobacillus Rhamnosus (Culturelle*)  1 cap PO BID [facility]


   Last Admin: 10/07/17 09:06 Dose:  1 cap


Metronidazole (Flagyl Tab*)  500 mg PO BID [facility]


   Last Admin: 10/07/17 09:07 Dose:  500 mg


Nystatin (Nystatin Cream*)  1 applic TOPICAL BID [facility]


   Last Admin: 10/07/17 09:34 Dose:  1 applic


Omeprazole (Prilosec Cap*)  40 mg PO DAILY@0600 [facility]


   Last Admin: 10/07/17 06:04 Dose:  40 mg


Ondansetron HCl (Zofran Odt Tab*)  4 mg PO Q8H PRN


   PRN Reason: NAUSEA/VOMITING


Oxycodone HCl (Oxycodone Oral.Soln*)  10 mg PO AC [facility]


   Last Admin: 10/07/17 11:45 Dose:  10 mg


Pharmacy Profile Note (Fentanyl Patch Check Q Shift)  1 note N/A 0700,1900 [facility]


   Last Admin: 10/07/17 06:58 Dose:  1 note


Potassium Chloride (Klor Con Er Tab*)  10 meq PO DAILY [facility]


   Last Admin: 10/07/17 09:06 Dose:  10 meq


Pregabalin (Lyrica Cap(*))  150 mg PO TID [facility]


   Last Admin: 10/07/17 14:19 Dose:  150 mg


Zinc Sulfate (Zinc-220 Cap*)  220 mg PO DAILY [facility]


   Last Admin: 10/07/17 09:06 Dose:  220 mg








 Vital Signs











  10/06/17 10/06/17 10/06/17





  17:30 17:31 19:30


 


Temperature   


 


Pulse Rate   


 


Respiratory 18 16 16





Rate   


 


Blood Pressure   





(mmHg)   


 


O2 Sat by Pulse   





Oximetry   














  10/06/17 10/06/17 10/06/17





  19:31 20:00 20:21


 


Temperature   


 


Pulse Rate   


 


Respiratory 16 16 16





Rate   


 


Blood Pressure   





(mmHg)   


 


O2 Sat by Pulse   





Oximetry   














  10/06/17 10/07/17 10/07/17





  22:21 00:09 04:06


 


Temperature  98.9 F 98.4 F


 


Pulse Rate  78 69


 


Respiratory 16 16 16





Rate   


 


Blood Pressure  139/55 120/52





(mmHg)   


 


O2 Sat by Pulse  96 95





Oximetry   














  10/07/17 10/07/17 10/07/17





  07:45 07:49 08:00


 


Temperature 97.5 F  


 


Pulse Rate 73  


 


Respiratory 16 16 18





Rate   


 


Blood Pressure 127/58  





(mmHg)   


 


O2 Sat by Pulse 96  





Oximetry   














  10/07/17 10/07/17 10/07/17





  09:06 09:07 09:49


 


Temperature   


 


Pulse Rate   


 


Respiratory 18 18 18





Rate   


 


Blood Pressure   





(mmHg)   


 


O2 Sat by Pulse   





Oximetry   














  10/07/17 10/07/17 10/07/17





  11:06 11:07 11:45


 


Temperature   


 


Pulse Rate   


 


Respiratory 18 18 16





Rate   


 


Blood Pressure   





(mmHg)   


 


O2 Sat by Pulse   





Oximetry   














  10/07/17 10/07/17 10/07/17





  12:50 13:45 14:19


 


Temperature   


 


Pulse Rate   


 


Respiratory 18 18 18





Rate   


 


Blood Pressure   





(mmHg)   


 


O2 Sat by Pulse   





Oximetry   











Oxygen Devices in Use Now: None


Appearance: Patient is a 71yo male who appears stated age sitting comfortably 

in the bed in NAD.


Eyes: No Scleral Icterus, PERRLA


Ears/Nose/Mouth/Throat: NL Teeth, Lips, Gums, Clear Oropharnyx, Mucous 

Membranes Moist


Neck: NL Appearance and Movements; NL JVP, Trachea Midline


Respiratory: Symmetrical Chest Expansion and Respiratory Effort, Clear to 

Auscultation


Cardiovascular: NL Sounds; No Murmurs; No JVD, RRR, No Edema


Abdominal: No Hepatosplenomegaly, - - Normal exam except for moderate 

distention which is stable from previous exam.


Lymphatic: No Cervical Adenopathy


Extremities: No Edema


Skin: No Nodules or Sclerosis, - - Improved ulcer consistent with tinea 

corporis on left hip.


Neurological: Alert and Oriented x 3, - - Paralyzed and insensate from waist 

down.


Result Diagrams: 


 10/04/17 06:32





 10/04/17 06:32


Additional Lab and Data: 





.





Assess/Plan/Problems-Billing


.


Assessment: 


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 

P/w large Stage IV sacral decubitus ulcer which has been debrided multiple 

times. Failed wound vac in PMRU and back inpatient. Plastic surgery 

consultation at [facility] for possible flap.  





- Patient Problems


(1) Acute osteomyelitis of sacrum


Current Visit: Yes   Status: Acute   Code(s): M46.28 - OSTEOMYELITIS OF VERTEBRA

, SACRAL AND SACROCOCCYGEAL REGION   SNOMED Code(s): 957409181


   Comment: 


Continue Ceftriaxone and flagyl at current doses per ID.


No evidence of new sepsis   





(2) Chronic indwelling Rodriguez catheter


Current Visit: Yes   Status: Chronic   Priority: High   Code(s): Z92.89 - 

PERSONAL HISTORY OF OTHER MEDICAL TREATMENT   SNOMED Code(s): 630226568


   Comment: 


High risk of UTI - currently on ABX, no current evidence of UTI.   





(3) Neurogenic bladder


Current Visit: Yes   Status: Chronic   Priority: High   Code(s): N31.9 - 

NEUROMUSCULAR DYSFUNCTION OF BLADDER, UNSPECIFIED   SNOMED Code(s): 642994793


   Comment: 


With chronic indwelling urinary catheter   





(4) Neurogenic bowel


Current Visit: Yes   Status: Chronic   Priority: High   Code(s): K59.2 - 

NEUROGENIC BOWEL, NOT ELSEWHERE CLASSIFIED   SNOMED Code(s): 151909055


   Comment: 


loose bowel movements with repositioning. 


Frequent pericare to prevent skin breakdown. 


Flagyl and prn lomotil for diarrhea   





(5) Sacral decubitus ulcer, stage IV


Current Visit: Yes   Status: Chronic   Priority: High   Code(s): L89.154 - 

PRESSURE ULCER OF SACRAL REGION, STAGE 4   SNOMED Code(s): 351095199


   Comment: 


Plastic surgery consultation to [facility] (Luis Felipe Chirinos MD) is pending


Continuing wound care per surgery recommendations, debrided 10/4 by Dr Holley

, recommend changed from Santyl to Medihoney. Wound not visualized by author 

today.


Appreciate nutrition recommendations to support healing, attempt for 6 small 

protein rich meals daily and continue vitamins. 


Vitamin C started per nutrition and wife.


   





(6) DVT prophylaxis


Current Visit: No   Status: Acute   Code(s): SDT1215 -    SNOMED Code(s): 

307055509


   Comment: 


Lovenox


TEDs in bed.   





(7) Diarrhea


Current Visit: No   Status: Acute   Code(s): R19.7 - DIARRHEA, UNSPECIFIED   

SNOMED Code(s): 14625276


   Comment: 


BMs consistently loose per nursing staff. Continued assiduous lidia-care. 

Diarrhea Likely ABX associated. ID thinks not Cdiff. Continue Flagyl, Probiotic

, and Lomotil


   





(8) Chronic back pain


Current Visit: No   Status: Chronic   Code(s): M54.9 - DORSALGIA, UNSPECIFIED; 

G89.29 - OTHER CHRONIC PAIN   SNOMED Code(s): 808310102


   Comment: 


Secondary to ependymoma and sacral decub


Continue pregabalin, and acetaminophen.


oxycodone liquid to 10mg AC with one dose PRN for transfer.


Fentanyl patch at 75mcg. 


Pain stable and tolerable with sitting.


   





(9) BONIFACIO (obstructive sleep apnea)


Current Visit: No   Status: Chronic   Code(s): G47.33 - OBSTRUCTIVE SLEEP APNEA 

(ADULT) (PEDIATRIC)   SNOMED Code(s): 37291871


   Comment: 


Continue home BiPAP use.   





(10) Paraplegia


Current Visit: No   Status: Chronic   Code(s): G82.20 - PARAPLEGIA, UNSPECIFIED

   SNOMED Code(s): 02165659


   Comment: 


Secondary to ependymoma


Patient able to transfer with slide board today.   





(11) Full code status


Current Visit: No   Status: Acute   Code(s): Z78.9 - OTHER SPECIFIED HEALTH 

STATUS   SNOMED Code(s): 645066088


   





(12) Tinea corporis


Current Visit: Yes   Status: Acute   Code(s): B35.4 - TINEA CORPORIS   SNOMED 

Code(s): 29429571


   Comment: 


Improving annular scaly lesion on hip spreading consistent with tinea corporis. 

Nystatin cream not effective against lesion. Will trial patient's home 

antifungal cream.   


Status and Disposition: 





Inpatient. Pending plastic surgery consult

## 2017-10-08 RX ADMIN — Medication SCH ML: at 06:26

## 2017-10-08 RX ADMIN — Medication SCH CAP: at 08:26

## 2017-10-08 RX ADMIN — PREGABALIN SCH MG: 50 CAPSULE ORAL at 14:43

## 2017-10-08 RX ADMIN — DIPHENOXYLATE HYDROCHLORIDE AND ATROPINE SULFATE SCH TAB: 2.5; .025 TABLET ORAL at 08:26

## 2017-10-08 RX ADMIN — METRONIDAZOLE SCH MG: 250 TABLET ORAL at 20:36

## 2017-10-08 RX ADMIN — CYANOCOBALAMIN TAB 500 MCG SCH MCG: 500 TAB at 08:27

## 2017-10-08 RX ADMIN — Medication SCH: at 16:51

## 2017-10-08 RX ADMIN — PREGABALIN SCH MG: 50 CAPSULE ORAL at 20:35

## 2017-10-08 RX ADMIN — OXYCODONE HYDROCHLORIDE SCH MG: 5 SOLUTION ORAL at 08:38

## 2017-10-08 RX ADMIN — ASPIRIN SCH MG: 81 TABLET, COATED ORAL at 08:26

## 2017-10-08 RX ADMIN — HYDROCORTISONE SCH APPLIC: 25 CREAM TOPICAL at 16:00

## 2017-10-08 RX ADMIN — Medication SCH ML: at 12:11

## 2017-10-08 RX ADMIN — NYSTATIN SCH APPLIC: 100000 CREAM TOPICAL at 20:36

## 2017-10-08 RX ADMIN — WATER SCH NOTE: 100 INJECTION, SOLUTION INTRAVENOUS at 18:56

## 2017-10-08 RX ADMIN — OXYCODONE HYDROCHLORIDE SCH MG: 5 SOLUTION ORAL at 17:12

## 2017-10-08 RX ADMIN — HYDROCORTISONE SCH APPLIC: 25 CREAM TOPICAL at 08:05

## 2017-10-08 RX ADMIN — Medication SCH MG: at 08:26

## 2017-10-08 RX ADMIN — CLOBETASOL PROPIONATE SCH APPLIC: 0.5 OINTMENT TOPICAL at 08:04

## 2017-10-08 RX ADMIN — OXYCODONE HYDROCHLORIDE AND ACETAMINOPHEN SCH MG: 500 TABLET ORAL at 08:26

## 2017-10-08 RX ADMIN — OMEPRAZOLE SCH MG: 20 CAPSULE, DELAYED RELEASE ORAL at 06:26

## 2017-10-08 RX ADMIN — Medication SCH MG: at 08:27

## 2017-10-08 RX ADMIN — WATER SCH NOTE: 100 INJECTION, SOLUTION INTRAVENOUS at 06:51

## 2017-10-08 RX ADMIN — Medication SCH MG: at 20:35

## 2017-10-08 RX ADMIN — DIPHENOXYLATE HYDROCHLORIDE AND ATROPINE SULFATE SCH TAB: 2.5; .025 TABLET ORAL at 17:12

## 2017-10-08 RX ADMIN — PREGABALIN SCH MG: 50 CAPSULE ORAL at 08:37

## 2017-10-08 RX ADMIN — FOLIC ACID SCH MG: 1 TABLET ORAL at 08:25

## 2017-10-08 RX ADMIN — OXYCODONE HYDROCHLORIDE SCH MG: 5 SOLUTION ORAL at 12:11

## 2017-10-08 RX ADMIN — Medication SCH UNITS: at 08:26

## 2017-10-08 RX ADMIN — DIPHENOXYLATE HYDROCHLORIDE AND ATROPINE SULFATE SCH TAB: 2.5; .025 TABLET ORAL at 14:43

## 2017-10-08 RX ADMIN — METRONIDAZOLE SCH MG: 250 TABLET ORAL at 08:26

## 2017-10-08 RX ADMIN — ENOXAPARIN SODIUM SCH MG: 40 INJECTION SUBCUTANEOUS at 17:13

## 2017-10-08 RX ADMIN — NYSTATIN SCH APPLIC: 100000 CREAM TOPICAL at 08:05

## 2017-10-08 RX ADMIN — Medication SCH CAP: at 20:36

## 2017-10-08 RX ADMIN — FENTANYL TRANSDERMAL SCH MCG: 75 PATCH, EXTENDED RELEASE TRANSDERMAL at 17:13

## 2017-10-08 RX ADMIN — FUROSEMIDE SCH MG: 40 TABLET ORAL at 08:27

## 2017-10-08 RX ADMIN — HYDROCORTISONE SCH APPLIC: 25 CREAM TOPICAL at 20:36

## 2017-10-08 RX ADMIN — DIPHENOXYLATE HYDROCHLORIDE AND ATROPINE SULFATE SCH TAB: 2.5; .025 TABLET ORAL at 20:36

## 2017-10-08 RX ADMIN — POTASSIUM CHLORIDE SCH MEQ: 750 TABLET, FILM COATED, EXTENDED RELEASE ORAL at 08:27

## 2017-10-08 RX ADMIN — ECONAZOLE NITRATE SCH APPLIC: 10 CREAM TOPICAL at 20:37

## 2017-10-08 NOTE — PN
Subjective


Date of Service: 10/08/17


Interval History: 





No new complaints. Patient's wife not in today yet. Pain controlled. Patient in 

good spirits.


Family History: Unchanged from Admission


Social History: Unchanged from Admission - no changes noted


Past Medical History: Unchanged from Admission





Objective


Active Medications: 








Acetaminophen (Tylenol Tab*)  650 mg PO Q6H PRN


   PRN Reason: FEVER/PAIN


Ascorbic Acid (Vitamin C  Tab*)  500 mg PO DAILY [facility]


   Last Admin: 10/08/17 08:26 Dose:  500 mg


Aspirin (Aspirin Ec Low Dose*)  81 mg PO DAILY [facility]


   Last Admin: 10/08/17 08:26 Dose:  81 mg


Bisacodyl (Dulcolax Supp*)  10 mg WA DAILY PRN


   PRN Reason: CONSTIPATION


Cholecalciferol (Vitamin D Tab*)  5,000 units PO DAILY [facility]


   Last Admin: 10/08/17 08:26 Dose:  5,000 units


Clobetasol Propionate (Clobetasol 0.05% Oint*)  1 applic TOPICAL DAILY [facility]


   Last Admin: 10/08/17 08:04 Dose:  1 applic


Cyanocobalamin (Vitamin B12 Tab*)  1,000 mcg PO DAILY [facility]


   Last Admin: 10/08/17 08:27 Dose:  1,000 mcg


Diphenoxylate HCl/Atropine (Lomotil Tab*)  1 tab PO QID [facility]


   Last Admin: 10/08/17 14:43 Dose:  1 tab


Docusate Sodium (Colace Cap*)  100 mg PO BID PRN


   PRN Reason: CONSTIPATION


Econazole Nitrate (Econazole 1 % Cream (Nf))  1 applic TOPICAL 2100 [facility]


   Last Admin: 10/07/17 23:07 Dose:  1 applic


Enoxaparin Sodium (Lovenox(*))  40 mg SUBCUT Q24H [facility]


   Last Admin: 10/07/17 17:18 Dose:  40 mg


Fentanyl (Duragesic  Patch 75 Mcg/Hr*)  75 mcg TRANSDERM Q72H [facility]


   Last Admin: 10/05/17 19:24 Dose:  75 mcg


Ferrous Gluconate (Fergon Tab*)  324 mg PO BID [facility]


   Last Admin: 10/08/17 08:26 Dose:  324 mg


Folic Acid (Folvite Tab*)  0.5 mg PO DAILY [facility]


   Last Admin: 10/08/17 08:25 Dose:  0.5 mg


Furosemide (Lasix Tab*)  40 mg PO DAILY [facility]


   Last Admin: 10/08/17 08:27 Dose:  40 mg


Heparin Sodium (Porcine) (Heparin Flush Picc/Ml/Cvc(*))  1 ml FLUSH 0600,1800 

[facility]


   PRN Reason: Protocol


   Last Admin: 10/08/17 12:11 Dose:  1 ml


Hydrocortisone (Hydrocortisone 2.5% Cream(Nf))  1 applic TOPICAL TID [facility]


   Last Admin: 10/08/17 08:05 Dose:  1 applic


Ceftriaxone Sodium 2 gm/ (Sodium Chloride)  100 mls @ 200 mls/hr IVPB DAILY [facility]


   Last Admin: 10/08/17 08:38 Dose:  200 mls/hr


Lactobacillus Rhamnosus (Culturelle*)  1 cap PO BID [facility]


   Last Admin: 10/08/17 08:26 Dose:  1 cap


Metronidazole (Flagyl Tab*)  500 mg PO BID [facility]


   Last Admin: 10/08/17 08:26 Dose:  500 mg


Nystatin (Nystatin Cream*)  1 applic TOPICAL BID [facility]


   Last Admin: 10/08/17 08:05 Dose:  1 applic


Omeprazole (Prilosec Cap*)  40 mg PO DAILY@0600 [facility]


   Last Admin: 10/08/17 06:26 Dose:  40 mg


Ondansetron HCl (Zofran Odt Tab*)  4 mg PO Q8H PRN


   PRN Reason: NAUSEA/VOMITING


Oxycodone HCl (Oxycodone Oral.Soln*)  10 mg PO AC [facility]


   Last Admin: 10/08/17 12:11 Dose:  10 mg


Pharmacy Profile Note (Fentanyl Patch Check Q Shift)  1 note N/A 0700,1900 [facility]


   Last Admin: 10/08/17 06:51 Dose:  1 note


Potassium Chloride (Klor Con Er Tab*)  10 meq PO DAILY [facility]


   Last Admin: 10/08/17 08:27 Dose:  10 meq


Pregabalin (Lyrica Cap(*))  150 mg PO TID [facility]


   Last Admin: 10/08/17 14:43 Dose:  150 mg


Zinc Sulfate (Zinc-220 Cap*)  220 mg PO DAILY [facility]


   Last Admin: 10/08/17 08:27 Dose:  220 mg








 Vital Signs











  10/07/17 10/07/17 10/07/17





  16:04 17:18 19:18


 


Temperature 98.5 F  


 


Pulse Rate 81  


 


Respiratory 14 17 16





Rate   


 


Blood Pressure 148/68  





(mmHg)   


 


O2 Sat by Pulse 97  





Oximetry   














  10/07/17 10/07/17 10/07/17





  20:03 23:03 23:04


 


Temperature 98.6 F  


 


Pulse Rate 86  


 


Respiratory 16 16 16





Rate   


 


Blood Pressure 151/51  





(mmHg)   


 


O2 Sat by Pulse 96  





Oximetry   














  10/07/17 10/08/17 10/08/17





  23:15 00:01 00:40


 


Temperature  98.3 F 


 


Pulse Rate  78 


 


Respiratory 16 14 14





Rate   


 


Blood Pressure  141/54 





(mmHg)   


 


O2 Sat by Pulse  96 





Oximetry   














  10/08/17 10/08/17 10/08/17





  04:00 08:07 08:26


 


Temperature  97.9 F 


 


Pulse Rate 73 75 


 


Respiratory 16 14 16





Rate   


 


Blood Pressure 141/64 142/56 





(mmHg)   


 


O2 Sat by Pulse 99 97 





Oximetry   














  10/08/17 10/08/17 10/08/17





  08:37 08:38 10:26


 


Temperature   


 


Pulse Rate   


 


Respiratory 16 16 16





Rate   


 


Blood Pressure   





(mmHg)   


 


O2 Sat by Pulse   





Oximetry   














  10/08/17 10/08/17 10/08/17





  10:38 12:11 12:16


 


Temperature   98.0 F


 


Pulse Rate   78


 


Respiratory 16 16 18





Rate   


 


Blood Pressure   171/74





(mmHg)   


 


O2 Sat by Pulse   98





Oximetry   














  10/08/17





  14:43


 


Temperature 


 


Pulse Rate 


 


Respiratory 16





Rate 


 


Blood Pressure 





(mmHg) 


 


O2 Sat by Pulse 





Oximetry 











Oxygen Devices in Use Now: None


Appearance: Patient is a 73yo male who appears stated age sitting comfortably 

in the bed in NAD.


Eyes: No Scleral Icterus, PERRLA


Ears/Nose/Mouth/Throat: NL Teeth, Lips, Gums, Clear Oropharnyx, Mucous 

Membranes Moist


Neck: NL Appearance and Movements; NL JVP, Trachea Midline


Respiratory: Symmetrical Chest Expansion and Respiratory Effort, Clear to 

Auscultation


Cardiovascular: NL Sounds; No Murmurs; No JVD, RRR, No Edema


Abdominal: No Hepatosplenomegaly, - - Distended, otherwise normal exam.


Lymphatic: No Cervical Adenopathy


Extremities: No Edema


Skin: - - Stable rash on left hip. Area covered by black dry skin on left 

ankle. Slight blanchable red area on right ankle.


Neurological: Alert and Oriented x 3, - - Paralyzed and insensate from waist 

down.


Result Diagrams: 


 10/04/17 06:32





 10/04/17 06:32


Additional Lab and Data: 





.





Assess/Plan/Problems-Billing


.


Assessment: 


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 

P/w large Stage IV sacral decubitus ulcer which has been debrided multiple 

times. Failed wound vac in PMRU and back inpatient. Plastic surgery 

consultation at [facility] for possible flap.  





- Patient Problems


(1) Acute osteomyelitis of sacrum


Current Visit: Yes   Status: Acute   Code(s): M46.28 - OSTEOMYELITIS OF VERTEBRA

, SACRAL AND SACROCOCCYGEAL REGION   SNOMED Code(s): 826676442


   Comment: 


Continue Ceftriaxone and flagyl at current doses per ID.


No evidence of new sepsis   





(2) Chronic indwelling Rodrigeuz catheter


Current Visit: Yes   Status: Chronic   Priority: High   Code(s): Z92.89 - 

PERSONAL HISTORY OF OTHER MEDICAL TREATMENT   SNOMED Code(s): 878804489


   Comment: 


High risk of UTI - currently on ABX, no current evidence of UTI.   





(3) Neurogenic bladder


Current Visit: Yes   Status: Chronic   Priority: High   Code(s): N31.9 - 

NEUROMUSCULAR DYSFUNCTION OF BLADDER, UNSPECIFIED   SNOMED Code(s): 226025338


   Comment: 


With chronic indwelling urinary catheter   





(4) Neurogenic bowel


Current Visit: Yes   Status: Chronic   Priority: High   Code(s): K59.2 - 

NEUROGENIC BOWEL, NOT ELSEWHERE CLASSIFIED   SNOMED Code(s): 346507020


   Comment: 


loose bowel movements with repositioning. 


Frequent pericare to prevent skin breakdown. 


Flagyl and prn lomotil for diarrhea   





(5) Sacral decubitus ulcer, stage IV


Current Visit: Yes   Status: Chronic   Priority: High   Code(s): L89.154 - 

PRESSURE ULCER OF SACRAL REGION, STAGE 4   SNOMED Code(s): 080866272


   Comment: 


Plastic surgery consultation to [facility] (Luis Felipe Chirinos MD) is pending


Continuing wound care per surgery recommendations, debrided 10/4 by Dr Holley

, recommend changed from Santyl to Medihoney. Wound not visualized by author 

today.


Appreciate nutrition recommendations to support healing, attempt for 6 small 

protein rich meals daily and continue vitamins. 


Vitamin C started per nutrition and wife.


   





(6) DVT prophylaxis


Current Visit: No   Status: Acute   Code(s): NKU5970 -    SNOMED Code(s): 

431674976


   Comment: 


Lovenox


TEDs in bed.   





(7) Diarrhea


Current Visit: No   Status: Acute   Code(s): R19.7 - DIARRHEA, UNSPECIFIED   

SNOMED Code(s): 70764607


   Comment: 


BMs consistently loose per nursing staff, single formed stool. Continued 

assiduous lidia-care. Diarrhea Likely ABX associated. ID thinks not Cdiff. 

Continue Flagyl, Probiotic, and Lomotil


   





(8) Chronic back pain


Current Visit: No   Status: Chronic   Code(s): M54.9 - DORSALGIA, UNSPECIFIED; 

G89.29 - OTHER CHRONIC PAIN   SNOMED Code(s): 030036136


   Comment: 


Secondary to ependymoma and sacral decub


Continue pregabalin, and acetaminophen.


oxycodone liquid to 10mg AC with one dose PRN for transfer.


Fentanyl patch at 75mcg. 


Pain stable and tolerable with sitting.


   





(9) BONIFACIO (obstructive sleep apnea)


Current Visit: No   Status: Chronic   Code(s): G47.33 - OBSTRUCTIVE SLEEP APNEA 

(ADULT) (PEDIATRIC)   SNOMED Code(s): 38323980


   Comment: 


Continue home BiPAP use.   





(10) Paraplegia


Current Visit: No   Status: Chronic   Code(s): G82.20 - PARAPLEGIA, UNSPECIFIED

   SNOMED Code(s): 64304746


   Comment: 


Secondary to ependymoma


Patient able to transfer with slide board.   





(11) Full code status


Current Visit: No   Status: Acute   Code(s): Z78.9 - OTHER SPECIFIED HEALTH 

STATUS   SNOMED Code(s): 488067469


   





(12) Tinea corporis


Current Visit: Yes   Status: Acute   Code(s): B35.4 - TINEA CORPORIS   SNOMED 

Code(s): 89151687


   Comment: 


Improving annular scaly lesion on hip spreading consistent with tinea corporis. 

Nystatin cream not effective against lesion. Will trial patient's home 

antifungal cream.   


Status and Disposition: 





Inpatient. Pending plastic surgery consult

## 2017-10-09 RX ADMIN — HYDROCORTISONE SCH APPLIC: 25 CREAM TOPICAL at 10:29

## 2017-10-09 RX ADMIN — Medication SCH MG: at 20:57

## 2017-10-09 RX ADMIN — OMEPRAZOLE SCH MG: 20 CAPSULE, DELAYED RELEASE ORAL at 05:55

## 2017-10-09 RX ADMIN — NYSTATIN SCH APPLIC: 100000 CREAM TOPICAL at 11:40

## 2017-10-09 RX ADMIN — HYDROCORTISONE SCH APPLIC: 25 CREAM TOPICAL at 21:05

## 2017-10-09 RX ADMIN — METRONIDAZOLE SCH MG: 250 TABLET ORAL at 09:33

## 2017-10-09 RX ADMIN — OXYCODONE HYDROCHLORIDE AND ACETAMINOPHEN SCH MG: 500 TABLET ORAL at 09:33

## 2017-10-09 RX ADMIN — Medication SCH UNITS: at 09:32

## 2017-10-09 RX ADMIN — DIPHENOXYLATE HYDROCHLORIDE AND ATROPINE SULFATE SCH TAB: 2.5; .025 TABLET ORAL at 09:32

## 2017-10-09 RX ADMIN — METRONIDAZOLE SCH MG: 250 TABLET ORAL at 20:56

## 2017-10-09 RX ADMIN — WATER SCH NOTE: 100 INJECTION, SOLUTION INTRAVENOUS at 07:17

## 2017-10-09 RX ADMIN — Medication SCH ML: at 18:28

## 2017-10-09 RX ADMIN — Medication SCH MG: at 09:33

## 2017-10-09 RX ADMIN — ECONAZOLE NITRATE SCH APPLIC: 10 CREAM TOPICAL at 21:01

## 2017-10-09 RX ADMIN — WATER SCH NOTE: 100 INJECTION, SOLUTION INTRAVENOUS at 18:49

## 2017-10-09 RX ADMIN — Medication SCH ML: at 11:00

## 2017-10-09 RX ADMIN — Medication SCH CAP: at 09:37

## 2017-10-09 RX ADMIN — PREGABALIN SCH MG: 50 CAPSULE ORAL at 20:56

## 2017-10-09 RX ADMIN — Medication SCH MG: at 09:38

## 2017-10-09 RX ADMIN — DIPHENOXYLATE HYDROCHLORIDE AND ATROPINE SULFATE SCH TAB: 2.5; .025 TABLET ORAL at 16:59

## 2017-10-09 RX ADMIN — CYANOCOBALAMIN TAB 500 MCG SCH MCG: 500 TAB at 09:33

## 2017-10-09 RX ADMIN — NYSTATIN SCH APPLIC: 100000 CREAM TOPICAL at 21:05

## 2017-10-09 RX ADMIN — ENOXAPARIN SODIUM SCH MG: 40 INJECTION SUBCUTANEOUS at 18:28

## 2017-10-09 RX ADMIN — PREGABALIN SCH MG: 50 CAPSULE ORAL at 14:18

## 2017-10-09 RX ADMIN — DIPHENOXYLATE HYDROCHLORIDE AND ATROPINE SULFATE SCH TAB: 2.5; .025 TABLET ORAL at 14:18

## 2017-10-09 RX ADMIN — Medication SCH ML: at 05:57

## 2017-10-09 RX ADMIN — Medication SCH CAP: at 20:56

## 2017-10-09 RX ADMIN — OXYCODONE HYDROCHLORIDE SCH MG: 5 SOLUTION ORAL at 08:04

## 2017-10-09 RX ADMIN — ASPIRIN SCH MG: 81 TABLET, COATED ORAL at 09:32

## 2017-10-09 RX ADMIN — HYDROCORTISONE SCH APPLIC: 25 CREAM TOPICAL at 14:20

## 2017-10-09 RX ADMIN — OXYCODONE HYDROCHLORIDE SCH MG: 5 SOLUTION ORAL at 16:59

## 2017-10-09 RX ADMIN — POTASSIUM CHLORIDE SCH MEQ: 750 TABLET, FILM COATED, EXTENDED RELEASE ORAL at 09:33

## 2017-10-09 RX ADMIN — FUROSEMIDE SCH MG: 40 TABLET ORAL at 09:33

## 2017-10-09 RX ADMIN — OXYCODONE HYDROCHLORIDE SCH MG: 5 SOLUTION ORAL at 12:02

## 2017-10-09 RX ADMIN — DIPHENOXYLATE HYDROCHLORIDE AND ATROPINE SULFATE SCH TAB: 2.5; .025 TABLET ORAL at 20:56

## 2017-10-09 RX ADMIN — FOLIC ACID SCH MG: 1 TABLET ORAL at 09:32

## 2017-10-09 RX ADMIN — CLOBETASOL PROPIONATE SCH APPLIC: 0.5 OINTMENT TOPICAL at 11:40

## 2017-10-09 RX ADMIN — PREGABALIN SCH MG: 50 CAPSULE ORAL at 09:33

## 2017-10-09 NOTE — PN
Subjective


Date of Service: 10/09/17


Interval History: 





Patient has no new complaints. No new on [facility] consult, tomorrow will hopefully 

be the day to answer questions.


Family History: Unchanged from Admission


Social History: Unchanged from Admission - no changes noted


Past Medical History: Unchanged from Admission





Objective


Active Medications: 








Acetaminophen (Tylenol Tab*)  650 mg PO Q6H PRN


   PRN Reason: FEVER/PAIN


Ascorbic Acid (Vitamin C  Tab*)  500 mg PO DAILY [facility]


   Last Admin: 10/09/17 09:33 Dose:  500 mg


Aspirin (Aspirin Ec Low Dose*)  81 mg PO DAILY [facility]


   Last Admin: 10/09/17 09:32 Dose:  81 mg


Bisacodyl (Dulcolax Supp*)  10 mg MD DAILY PRN


   PRN Reason: CONSTIPATION


Cholecalciferol (Vitamin D Tab*)  5,000 units PO DAILY [facility]


   Last Admin: 10/09/17 09:32 Dose:  5,000 units


Clobetasol Propionate (Clobetasol 0.05% Oint*)  1 applic TOPICAL DAILY [facility]


   Last Admin: 10/09/17 11:40 Dose:  1 applic


Cyanocobalamin (Vitamin B12 Tab*)  1,000 mcg PO DAILY [facility]


   Last Admin: 10/09/17 09:33 Dose:  1,000 mcg


Diphenoxylate HCl/Atropine (Lomotil Tab*)  1 tab PO QID [facility]


   Last Admin: 10/09/17 14:18 Dose:  1 tab


Docusate Sodium (Colace Cap*)  100 mg PO BID PRN


   PRN Reason: CONSTIPATION


Econazole Nitrate (Econazole 1 % Cream (Nf))  1 applic TOPICAL 2100 [facility]


   Last Admin: 10/08/17 20:37 Dose:  1 applic


Enoxaparin Sodium (Lovenox(*))  40 mg SUBCUT Q24H [facility]


   Last Admin: 10/08/17 17:13 Dose:  40 mg


Fentanyl (Duragesic  Patch 75 Mcg/Hr*)  75 mcg TRANSDERM Q72H [facility]


   Last Admin: 10/08/17 17:13 Dose:  75 mcg


Ferrous Gluconate (Fergon Tab*)  324 mg PO BID [facility]


   Last Admin: 10/09/17 09:33 Dose:  324 mg


Folic Acid (Folvite Tab*)  0.5 mg PO DAILY [facility]


   Last Admin: 10/09/17 09:32 Dose:  0.5 mg


Furosemide (Lasix Tab*)  40 mg PO DAILY [facility]


   Last Admin: 10/09/17 09:33 Dose:  40 mg


Heparin Sodium (Porcine) (Heparin Flush Picc/Ml/Cvc(*))  1 ml FLUSH 0600,1800 

[facility]


   PRN Reason: Protocol


   Last Admin: 10/09/17 11:00 Dose:  1 ml


Hydrocortisone (Hydrocortisone 2.5% Cream(Nf))  1 applic TOPICAL TID [facility]


   Last Admin: 10/09/17 14:20 Dose:  1 applic


Ceftriaxone Sodium 2 gm/ (Sodium Chloride)  100 mls @ 200 mls/hr IVPB DAILY [facility]


   Last Admin: 10/09/17 10:06 Dose:  200 mls/hr


Lactobacillus Rhamnosus (Culturelle*)  1 cap PO BID [facility]


   Last Admin: 10/09/17 09:37 Dose:  1 cap


Metronidazole (Flagyl Tab*)  500 mg PO BID [facility]


   Last Admin: 10/09/17 09:33 Dose:  500 mg


Nystatin (Nystatin Cream*)  1 applic TOPICAL BID [facility]


   Last Admin: 10/09/17 11:40 Dose:  1 applic


Omeprazole (Prilosec Cap*)  40 mg PO DAILY@0600 [facility]


   Last Admin: 10/09/17 05:55 Dose:  40 mg


Ondansetron HCl (Zofran Odt Tab*)  4 mg PO Q8H PRN


   PRN Reason: NAUSEA/VOMITING


Oxycodone HCl (Oxycodone Oral.Soln*)  10 mg PO AC [facility]


   Last Admin: 10/09/17 12:02 Dose:  10 mg


Pharmacy Profile Note (Fentanyl Patch Check Q Shift)  1 note N/A 0700,1900 [facility]


   Last Admin: 10/09/17 07:17 Dose:  1 note


Potassium Chloride (Klor Con Er Tab*)  10 meq PO DAILY [facility]


   Last Admin: 10/09/17 09:33 Dose:  10 meq


Pregabalin (Lyrica Cap(*))  150 mg PO TID [facility]


   Last Admin: 10/09/17 14:18 Dose:  150 mg


Zinc Sulfate (Zinc-220 Cap*)  220 mg PO DAILY [facility]


   Last Admin: 10/09/17 09:38 Dose:  220 mg








 Vital Signs











  10/08/17 10/08/17 10/08/17





  16:15 16:39 16:43


 


Temperature 98.0 F  


 


Pulse Rate 88  


 


Respiratory 16 16 16





Rate   


 


Blood Pressure 141/51  





(mmHg)   


 


O2 Sat by Pulse 96  





Oximetry   














  10/08/17 10/08/17 10/08/17





  17:12 19:12 20:15


 


Temperature   


 


Pulse Rate   


 


Respiratory 16 14 16





Rate   


 


Blood Pressure   





(mmHg)   


 


O2 Sat by Pulse   





Oximetry   














  10/08/17 10/08/17 10/08/17





  20:35 20:36 20:50


 


Temperature   98.0 F


 


Pulse Rate   78


 


Respiratory 16 16 14





Rate   


 


Blood Pressure   172/70





(mmHg)   


 


O2 Sat by Pulse   97





Oximetry   














  10/08/17 10/09/17 10/09/17





  22:35 00:05 03:56


 


Temperature  98.2 F 98.0 F


 


Pulse Rate  78 72


 


Respiratory 12 16 16





Rate   


 


Blood Pressure  162/81 138/57





(mmHg)   


 


O2 Sat by Pulse  96 96





Oximetry   














  10/09/17 10/09/17 10/09/17





  07:51 08:00 08:04


 


Temperature 97.5 F  


 


Pulse Rate 68  


 


Respiratory 18 14 14





Rate   


 


Blood Pressure 118/51  





(mmHg)   


 


O2 Sat by Pulse 96  





Oximetry   














  10/09/17 10/09/17 10/09/17





  09:32 09:33 10:04


 


Temperature   


 


Pulse Rate   


 


Respiratory 16 16 16





Rate   


 


Blood Pressure   





(mmHg)   


 


O2 Sat by Pulse   





Oximetry   














  10/09/17 10/09/17 10/09/17





  11:14 11:32 12:02


 


Temperature 98.2 F  


 


Pulse Rate 82  


 


Respiratory 17 16 16





Rate   


 


Blood Pressure 148/51  





(mmHg)   


 


O2 Sat by Pulse 97  





Oximetry   














  10/09/17 10/09/17





  14:02 14:18


 


Temperature  


 


Pulse Rate  


 


Respiratory 16 16





Rate  


 


Blood Pressure  





(mmHg)  


 


O2 Sat by Pulse  





Oximetry  











Oxygen Devices in Use Now: None


Appearance: Patient is a 73yo male who appears stated age sitting comfortably 

in the bed in NAD.


Eyes: No Scleral Icterus, PERRLA


Ears/Nose/Mouth/Throat: NL Teeth, Lips, Gums, Clear Oropharnyx, Mucous 

Membranes Moist


Neck: NL Appearance and Movements; NL JVP, Trachea Midline


Respiratory: Symmetrical Chest Expansion and Respiratory Effort, Clear to 

Auscultation


Cardiovascular: NL Sounds; No Murmurs; No JVD, RRR, No Edema


Abdominal: No Hepatosplenomegaly, - - Stable distention, no other abnormailities


Lymphatic: No Cervical Adenopathy


Extremities: No Edema


Skin: - - Improved rash on L hip


Neurological: Alert and Oriented x 3, - - Patient is paralzyed from the waist 

down and is insensate.


Result Diagrams: 


 10/04/17 06:32





 10/04/17 06:32


Additional Lab and Data: 





.





Assess/Plan/Problems-Billing


.


Assessment: 


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 

P/w large Stage IV sacral decubitus ulcer which has been debrided multiple 

times. Failed wound vac in PMRU and back inpatient. Plastic surgery 

consultation at [facility] for possible flap.  





- Patient Problems


(1) Acute osteomyelitis of sacrum


Current Visit: Yes   Status: Acute   Code(s): M46.28 - OSTEOMYELITIS OF VERTEBRA

, SACRAL AND SACROCOCCYGEAL REGION   SNOMED Code(s): 463737419


   Comment: 


Continue Ceftriaxone and flagyl at current doses per ID.


No evidence of new sepsis, CRP stable.   





(2) Chronic indwelling Rodriguez catheter


Current Visit: Yes   Status: Chronic   Priority: High   Code(s): Z92.89 - 

PERSONAL HISTORY OF OTHER MEDICAL TREATMENT   SNOMED Code(s): 247966372


   Comment: 


High risk of UTI - currently on ABX, no current evidence of UTI.   





(3) Neurogenic bladder


Current Visit: Yes   Status: Chronic   Priority: High   Code(s): N31.9 - 

NEUROMUSCULAR DYSFUNCTION OF BLADDER, UNSPECIFIED   SNOMED Code(s): 313092944


   Comment: 


With chronic indwelling urinary catheter   





(4) Neurogenic bowel


Current Visit: Yes   Status: Chronic   Priority: High   Code(s): K59.2 - 

NEUROGENIC BOWEL, NOT ELSEWHERE CLASSIFIED   SNOMED Code(s): 836208041


   Comment: 


loose bowel movements with repositioning. Becoming more formed.


Frequent pericare to prevent skin breakdown. 


Flagyl and prn lomotil for diarrhea   





(5) Sacral decubitus ulcer, stage IV


Current Visit: Yes   Status: Chronic   Priority: High   Code(s): L89.154 - 

PRESSURE ULCER OF SACRAL REGION, STAGE 4   SNOMED Code(s): 840963041


   Comment: 


Plastic surgery consultation to [facility] (Luis Felipe Chirinos MD) is pending


Continuing wound care per surgery recommendations, debrided 10/4 by Dr Holley

, recommend changed from Santyl to Medihoney. Wound not visualized by author 

today.


Appreciate nutrition recommendations to support healing, attempt for 6 small 

protein rich meals daily and continue vitamins. 


Vitamin C started per nutrition and wife.


   





(6) DVT prophylaxis


Current Visit: No   Status: Acute   Code(s): XNV5942 -    SNOMED Code(s): 

122700608


   Comment: 


Lovenox


TEDs in bed.   





(7) Diarrhea


Current Visit: No   Status: Acute   Code(s): R19.7 - DIARRHEA, UNSPECIFIED   

SNOMED Code(s): 27412107


   Comment: 


BMs consistently loose per nursing staff, single formed stool. Continued 

assiduous lidia-care. Diarrhea Likely ABX associated. ID thinks not Cdiff. 

Continue Flagyl, Probiotic, and Lomotil


   





(8) Chronic back pain


Current Visit: No   Status: Chronic   Code(s): M54.9 - DORSALGIA, UNSPECIFIED; 

G89.29 - OTHER CHRONIC PAIN   SNOMED Code(s): 418285861


   Comment: 


Secondary to ependymoma and sacral decub


Continue pregabalin, and acetaminophen.


oxycodone liquid to 10mg AC with one dose PRN for transfer.


Fentanyl patch at 75mcg. 


Pain stable and tolerable with sitting.


   





(9) BONIFACIO (obstructive sleep apnea)


Current Visit: No   Status: Chronic   Code(s): G47.33 - OBSTRUCTIVE SLEEP APNEA 

(ADULT) (PEDIATRIC)   SNOMED Code(s): 50372947


   Comment: 


Continue home BiPAP use.   





(10) Paraplegia


Current Visit: No   Status: Chronic   Code(s): G82.20 - PARAPLEGIA, UNSPECIFIED

   SNOMED Code(s): 91198829


   Comment: 


Secondary to ependymoma


Patient able to transfer with slide board.   





(11) Full code status


Current Visit: No   Status: Acute   Code(s): Z78.9 - OTHER SPECIFIED HEALTH 

STATUS   SNOMED Code(s): 682697221


   





(12) Tinea corporis


Current Visit: Yes   Status: Acute   Code(s): B35.4 - TINEA CORPORIS   SNOMED 

Code(s): 03448355


   Comment: 


Improving annular scaly lesion on hip  consistent with tinea corporis. Nystatin 

cream not effective against lesion. Will trial patient's home antifungal cream.

   


Status and Disposition: 





Inpatient. Pending plastic surgery consult

## 2017-10-10 RX ADMIN — DIPHENOXYLATE HYDROCHLORIDE AND ATROPINE SULFATE SCH TAB: 2.5; .025 TABLET ORAL at 17:15

## 2017-10-10 RX ADMIN — ASPIRIN SCH MG: 81 TABLET, COATED ORAL at 09:35

## 2017-10-10 RX ADMIN — CLOBETASOL PROPIONATE SCH APPLIC: 0.5 OINTMENT TOPICAL at 10:16

## 2017-10-10 RX ADMIN — NYSTATIN SCH APPLIC: 100000 CREAM TOPICAL at 10:16

## 2017-10-10 RX ADMIN — NYSTATIN SCH APPLIC: 100000 CREAM TOPICAL at 20:24

## 2017-10-10 RX ADMIN — OXYCODONE HYDROCHLORIDE SCH MG: 5 SOLUTION ORAL at 17:16

## 2017-10-10 RX ADMIN — FUROSEMIDE SCH MG: 40 TABLET ORAL at 09:36

## 2017-10-10 RX ADMIN — HYDROCORTISONE SCH APPLIC: 25 CREAM TOPICAL at 10:27

## 2017-10-10 RX ADMIN — Medication SCH MG: at 20:02

## 2017-10-10 RX ADMIN — POTASSIUM CHLORIDE SCH MEQ: 750 TABLET, FILM COATED, EXTENDED RELEASE ORAL at 09:36

## 2017-10-10 RX ADMIN — DIPHENOXYLATE HYDROCHLORIDE AND ATROPINE SULFATE SCH TAB: 2.5; .025 TABLET ORAL at 20:31

## 2017-10-10 RX ADMIN — OXYCODONE HYDROCHLORIDE SCH MG: 5 SOLUTION ORAL at 08:09

## 2017-10-10 RX ADMIN — Medication SCH CAP: at 20:02

## 2017-10-10 RX ADMIN — WATER SCH NOTE: 100 INJECTION, SOLUTION INTRAVENOUS at 07:14

## 2017-10-10 RX ADMIN — Medication SCH ML: at 17:15

## 2017-10-10 RX ADMIN — OXYCODONE HYDROCHLORIDE SCH MG: 5 SOLUTION ORAL at 11:33

## 2017-10-10 RX ADMIN — DIPHENOXYLATE HYDROCHLORIDE AND ATROPINE SULFATE SCH TAB: 2.5; .025 TABLET ORAL at 15:29

## 2017-10-10 RX ADMIN — CYANOCOBALAMIN TAB 500 MCG SCH MCG: 500 TAB at 09:35

## 2017-10-10 RX ADMIN — Medication SCH MG: at 09:35

## 2017-10-10 RX ADMIN — OXYCODONE HYDROCHLORIDE AND ACETAMINOPHEN SCH MG: 500 TABLET ORAL at 09:35

## 2017-10-10 RX ADMIN — FOLIC ACID SCH MG: 1 TABLET ORAL at 09:37

## 2017-10-10 RX ADMIN — METRONIDAZOLE SCH MG: 250 TABLET ORAL at 09:35

## 2017-10-10 RX ADMIN — Medication SCH ML: at 11:32

## 2017-10-10 RX ADMIN — HYDROCORTISONE SCH APPLIC: 25 CREAM TOPICAL at 22:14

## 2017-10-10 RX ADMIN — ENOXAPARIN SODIUM SCH MG: 40 INJECTION SUBCUTANEOUS at 17:20

## 2017-10-10 RX ADMIN — HYDROCORTISONE SCH APPLIC: 25 CREAM TOPICAL at 16:40

## 2017-10-10 RX ADMIN — Medication SCH CAP: at 09:35

## 2017-10-10 RX ADMIN — Medication SCH ML: at 05:52

## 2017-10-10 RX ADMIN — ECONAZOLE NITRATE SCH APPLIC: 10 CREAM TOPICAL at 20:24

## 2017-10-10 RX ADMIN — Medication SCH UNITS: at 09:36

## 2017-10-10 RX ADMIN — PREGABALIN SCH MG: 50 CAPSULE ORAL at 20:02

## 2017-10-10 RX ADMIN — OMEPRAZOLE SCH MG: 20 CAPSULE, DELAYED RELEASE ORAL at 05:51

## 2017-10-10 RX ADMIN — DIPHENOXYLATE HYDROCHLORIDE AND ATROPINE SULFATE SCH TAB: 2.5; .025 TABLET ORAL at 09:35

## 2017-10-10 RX ADMIN — PREGABALIN SCH MG: 50 CAPSULE ORAL at 15:29

## 2017-10-10 RX ADMIN — PREGABALIN SCH MG: 50 CAPSULE ORAL at 09:36

## 2017-10-10 RX ADMIN — METRONIDAZOLE SCH MG: 250 TABLET ORAL at 20:01

## 2017-10-10 RX ADMIN — WATER SCH NOTE: 100 INJECTION, SOLUTION INTRAVENOUS at 19:06

## 2017-10-10 NOTE — PN
Subjective


Date of Service: 10/10/17


Interval History: 





Patient seen and examined at bedside. Denies fever, chills, shortness of breath

, chest discomfort, N/V/D. Pt states that his pain is controlled.








Family History: Unchanged from Admission


Social History: Unchanged from Admission


Past Medical History: Unchanged from Admission





Objective


Active Medications: 





Acetaminophen (Tylenol Tab*)  650 mg PO Q6H PRN Reason: FEVER/PAIN


Ascorbic Acid (Vitamin C  Tab*)  500 mg PO DAILY [facility]


Aspirin (Aspirin Ec Low Dose*)  81 mg PO DAILY [facility]


Bisacodyl (Dulcolax Supp*)  10 mg FL DAILY PRN Reason: CONSTIPATION


Cholecalciferol (Vitamin D Tab*)  5,000 units PO DAILY [facility]


Clobetasol Propionate (Clobetasol 0.05% Oint*)  1 applic TOPICAL DAILY [facility]


Cyanocobalamin (Vitamin B12 Tab*)  1,000 mcg PO DAILY [facility]


Diphenoxylate HCl/Atropine (Lomotil Tab*)  1 tab PO QID [facility]


Docusate Sodium (Colace Cap*)  100 mg PO BID PRN Reason: CONSTIPATION


Econazole Nitrate (Econazole 1 % Cream (Nf))  1 applic TOPICAL 2100 [facility]


Enoxaparin Sodium (Lovenox(*))  40 mg SUBCUT Q24H [facility]


Fentanyl (Duragesic  Patch 75 Mcg/Hr*)  75 mcg TRANSDERM Q72H [facility]


Ferrous Gluconate (Fergon Tab*)  324 mg PO BID [facility]


Folic Acid (Folvite Tab*)  0.5 mg PO DAILY [facility]


Furosemide (Lasix Tab*)  40 mg PO DAILY [facility]


Heparin Sodium (Porcine) (Heparin Flush Picc/Ml/Cvc(*))  1 ml FLUSH 0600,1800 

[facility]


Hydrocortisone (Hydrocortisone 2.5% Cream(Nf))  1 applic TOPICAL TID [facility]


Ceftriaxone Sodium 2 gm/ (Sodium Chloride)  100 mls @ 200 mls/hr IVPB DAILY [facility]


Lactobacillus Rhamnosus (Culturelle*)  1 cap PO BID [facility]


Metronidazole (Flagyl Tab*)  500 mg PO BID [facility]


Nystatin (Nystatin Cream*)  1 applic TOPICAL BID [facility]


Omeprazole (Prilosec Cap*)  40 mg PO DAILY@0600 [facility]


Ondansetron HCl (Zofran Odt Tab*)  4 mg PO Q8H PRN Reason: NAUSEA/VOMITING


Oxycodone HCl (Oxycodone Oral.Soln*)  10 mg PO AC [facility]


Pharmacy Profile Note (Fentanyl Patch Check Q Shift)  1 note N/A 0700,1900 [facility]


Potassium Chloride (Klor Con Er Tab*)  10 meq PO DAILY [facility]


Pregabalin (Lyrica Cap(*))  150 mg PO TID [facility]


Zinc Sulfate (Zinc-220 Cap*)  220 mg PO DAILY [facility]





 Vital Signs











  10/09/17 10/09/17 10/09/17





  16:59 18:48 19:35


 


Temperature   99.2 F


 


Pulse Rate   84


 


Respiratory 16 16 16





Rate   


 


Blood Pressure   125/67





(mmHg)   


 


O2 Sat by Pulse   96





Oximetry   

















  10/10/17 10/10/17 10/10/17





  00:06 04:10 08:05


 


Temperature 98.7 F 98.7 F 


 


Pulse Rate 84 80 


 


Respiratory 16 17 16





Rate   


 


Blood Pressure 174/77 140/65 





(mmHg)   


 


O2 Sat by Pulse 97 95 





Oximetry   














  10/10/17 10/10/17 10/10/17





  08:09 08:20 09:35


 


Temperature  97.4 F 


 


Pulse Rate  79 


 


Respiratory 16 16 16





Rate   


 


Blood Pressure  161/64 





(mmHg)   


 


O2 Sat by Pulse  97 





Oximetry   




















Oxygen Devices in Use Now: None


Appearance: NAD, laying in bed


Respiratory: Symmetrical Chest Expansion and Respiratory Effort, Clear to 

Auscultation


Cardiovascular: NL Sounds; No Murmurs; No JVD, RRR


Abdominal: NL Sounds; No Tenderness; No Distention


Neurological: Alert and Oriented x 3, NL Muscle Strength and Tone


Lines/Tubes/Other Access: Clean, Dry and Intact PICC Line - site benign


Nutrition: Taking PO's


Result Diagrams: 


 10/04/17 06:32





 10/04/17 06:32





Assess/Plan/Problems-Billing





Assessment: 





Mr. Pryor is a 73 yo male with PMH significant for BONIFACIO, CKD stage 3, asthma, 

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 Who presented 

with a large Stage IV sacral decubitus ulcer which has been debrided multiple 

times. Failed wound vac in [facility] and back inpatient. Plastic surgery 

consultation at [facility] for possible flap.  











- Patient Problems


(1) Acute osteomyelitis of sacrum


Code(s): M46.28 - OSTEOMYELITIS OF VERTEBRA, SACRAL AND SACROCOCCYGEAL REGION   

SNOMED Code(s): 245571124


   Comment: 


- No evidence of new sepsis, CRP stable.


- Continue Ceftriaxone and flagyl at current doses per ID.   





(2) Sacral decubitus ulcer, stage IV


Code(s): L89.154 - PRESSURE ULCER OF SACRAL REGION, STAGE 4   SNOMED Code(s): 

445651446


   Comment: 


- Plastic surgery consultation to [facility] (Luis Felipe Chirinos MD) today 

with Kimmy (wife)


- Appreciate nutrition recommendations to support healing, attempt for 6 small 

protein rich meals daily and continue vitamins (Vitamin C). 


- Continue wound care per surgery recommendations, last debrided 10/4 by Dr Holley, recommend changed from Santyl to Medihoney.





   





(3) Tinea corporis


Code(s): B35.4 - TINEA CORPORIS   SNOMED Code(s): 64634029


   Comment: 


- Improving annular scaly lesion on hip consistent with tinea corporis. 


- Nystatin cream not effective against lesion. 


- Will trial patient's home antifungal cream.   





(4) Diarrhea


Code(s): R19.7 - DIARRHEA, UNSPECIFIED   SNOMED Code(s): 27893946


   Comment: 


- BMs consistently loose per nursing staff, single formed stool. 


- Continued assiduous lidia-care. 


- Diarrhea Likely ABX associated. ID thinks not Cdiff. 


- Continue Flagyl, Probiotic, and Lomotil


   





(5) Chronic indwelling [place] catheter


Code(s): Z92.89 - PERSONAL HISTORY OF OTHER MEDICAL TREATMENT   SNOMED Code(s): 

003393014


   Comment: 


- High risk of UTI 


- currently on ABX, no current evidence of UTI.   





(6) Neurogenic bladder


Code(s): N31.9 - NEUROMUSCULAR DYSFUNCTION OF BLADDER, UNSPECIFIED   SNOMED Code

(s): 332668623


   Comment: 


- With chronic indwelling urinary catheter   





(7) Neurogenic bowel


Code(s): K59.2 - NEUROGENIC BOWEL, NOT ELSEWHERE CLASSIFIED   SNOMED Code(s): 

295330311


   Comment: 


- Loose bowel movements with repositioning. Becoming more formed.


- Frequent pericare to prevent skin breakdown. 


- Continue Flagyl and prn lomotil for diarrhea   





(8) CAD (coronary artery disease)


Code(s): I25.10 - ATHSCL HEART DISEASE OF NATIVE CORONARY ARTERY W/O ANG PCTRS 

  SNOMED Code(s): 12920132


   Comment: 


- Asymptomatic


- Continue ASA.   





(9) CKD (chronic kidney disease), stage III


Code(s): N18.3 - CHRONIC KIDNEY DISEASE, STAGE 3 (MODERATE)   SNOMED Code(s): 

360592864


   Comment: 


- Creatinine baseline, no acute exacerbation


- Avoid nephrotoxic medications   





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


Code(s): C71.9 - MALIGNANT NEOPLASM OF BRAIN, UNSPECIFIED   SNOMED Code(s): 

435387180


   Comment: 


- To the spine (dx in patient's 20s)


- With concurrent chronic back pain and inability to ambulate


- s/p multiple surgeries


- Back anatomy puts patient at much higher risk for pressure ulcer formation, 

as does high opiate requirements and fragility with easily acquired infections (

pneumonia / urinary tract infections). UTI's in particular are a high risk 

given his neurogenic bladder and indwelling l[place] catheter.   





(11) Chronic back pain


Code(s): M54.9 - DORSALGIA, UNSPECIFIED; G89.29 - OTHER CHRONIC PAIN   SNOMED 

Code(s): 793829101


   Comment: 


- Secondary to ependymoma and sacral decub


- Continue pregabalin, acetaminophen, oxycodone liquid to 10mg AC with one dose 

PRN for transfer, and Fentanyl patch at 75mcg. 


- Pain stable and tolerable with sitting.


   





(12) HTN (hypertension)


Code(s): I10 - ESSENTIAL (PRIMARY) HYPERTENSION   SNOMED Code(s): 87099781


   Comment: 


- -170's


- Continue lasix   





(13) BONIFACIO (obstructive sleep apnea)


Code(s): G47.33 - OBSTRUCTIVE SLEEP APNEA (ADULT) (PEDIATRIC)   SNOMED Code(s): 

08654491


   Comment: 


Continue home BiPAP use.   





(14) DVT prophylaxis


Code(s): KWY4179 -    SNOMED Code(s): 189733854


   Comment: 


Lovenox


TEDs in bed.   





(15) Full code status


Code(s): Z78.9 - OTHER SPECIFIED HEALTH STATUS   SNOMED Code(s): 154707819


   


Status and Disposition: 





Inpatient. Pending transfer to [facility] for possible skin 

flap in the next few weeks.

## 2017-10-11 RX ADMIN — CLOBETASOL PROPIONATE SCH APPLIC: 0.5 OINTMENT TOPICAL at 10:56

## 2017-10-11 RX ADMIN — OMEPRAZOLE SCH MG: 20 CAPSULE, DELAYED RELEASE ORAL at 06:06

## 2017-10-11 RX ADMIN — Medication SCH ML: at 11:54

## 2017-10-11 RX ADMIN — DIPHENOXYLATE HYDROCHLORIDE AND ATROPINE SULFATE SCH TAB: 2.5; .025 TABLET ORAL at 09:22

## 2017-10-11 RX ADMIN — ENOXAPARIN SODIUM SCH MG: 40 INJECTION SUBCUTANEOUS at 17:56

## 2017-10-11 RX ADMIN — OXYCODONE HYDROCHLORIDE SCH MG: 5 SOLUTION ORAL at 17:56

## 2017-10-11 RX ADMIN — HYDROCORTISONE SCH APPLIC: 25 CREAM TOPICAL at 16:48

## 2017-10-11 RX ADMIN — OXYCODONE HYDROCHLORIDE SCH MG: 5 SOLUTION ORAL at 11:54

## 2017-10-11 RX ADMIN — OXYCODONE HYDROCHLORIDE SCH MG: 5 SOLUTION ORAL at 08:07

## 2017-10-11 RX ADMIN — HYDROCORTISONE SCH APPLIC: 25 CREAM TOPICAL at 22:26

## 2017-10-11 RX ADMIN — PREGABALIN SCH MG: 50 CAPSULE ORAL at 15:29

## 2017-10-11 RX ADMIN — WATER SCH NOTE: 100 INJECTION, SOLUTION INTRAVENOUS at 18:42

## 2017-10-11 RX ADMIN — ECONAZOLE NITRATE SCH APPLIC: 10 CREAM TOPICAL at 20:32

## 2017-10-11 RX ADMIN — DIPHENOXYLATE HYDROCHLORIDE AND ATROPINE SULFATE SCH: 2.5; .025 TABLET ORAL at 20:39

## 2017-10-11 RX ADMIN — FOLIC ACID SCH MG: 1 TABLET ORAL at 09:24

## 2017-10-11 RX ADMIN — FUROSEMIDE SCH MG: 40 TABLET ORAL at 09:23

## 2017-10-11 RX ADMIN — WATER SCH NOTE: 100 INJECTION, SOLUTION INTRAVENOUS at 07:21

## 2017-10-11 RX ADMIN — Medication SCH CAP: at 09:23

## 2017-10-11 RX ADMIN — FENTANYL TRANSDERMAL SCH MCG: 75 PATCH, EXTENDED RELEASE TRANSDERMAL at 18:41

## 2017-10-11 RX ADMIN — POTASSIUM CHLORIDE SCH MEQ: 750 TABLET, FILM COATED, EXTENDED RELEASE ORAL at 09:23

## 2017-10-11 RX ADMIN — ASPIRIN SCH MG: 81 TABLET, COATED ORAL at 09:23

## 2017-10-11 RX ADMIN — METRONIDAZOLE SCH MG: 250 TABLET ORAL at 09:23

## 2017-10-11 RX ADMIN — NYSTATIN SCH APPLIC: 100000 CREAM TOPICAL at 20:32

## 2017-10-11 RX ADMIN — OXYCODONE HYDROCHLORIDE AND ACETAMINOPHEN SCH MG: 500 TABLET ORAL at 09:23

## 2017-10-11 RX ADMIN — Medication SCH ML: at 17:56

## 2017-10-11 RX ADMIN — DIPHENOXYLATE HYDROCHLORIDE AND ATROPINE SULFATE SCH TAB: 2.5; .025 TABLET ORAL at 20:39

## 2017-10-11 RX ADMIN — HYDROCORTISONE SCH APPLIC: 25 CREAM TOPICAL at 09:27

## 2017-10-11 RX ADMIN — DIPHENOXYLATE HYDROCHLORIDE AND ATROPINE SULFATE SCH TAB: 2.5; .025 TABLET ORAL at 15:29

## 2017-10-11 RX ADMIN — Medication SCH ML: at 06:07

## 2017-10-11 RX ADMIN — METRONIDAZOLE SCH MG: 250 TABLET ORAL at 20:37

## 2017-10-11 RX ADMIN — Medication SCH MG: at 09:23

## 2017-10-11 RX ADMIN — PREGABALIN SCH MG: 50 CAPSULE ORAL at 20:37

## 2017-10-11 RX ADMIN — CYANOCOBALAMIN TAB 500 MCG SCH MCG: 500 TAB at 09:23

## 2017-10-11 RX ADMIN — Medication SCH MG: at 20:40

## 2017-10-11 RX ADMIN — Medication SCH UNITS: at 09:23

## 2017-10-11 RX ADMIN — PREGABALIN SCH MG: 50 CAPSULE ORAL at 09:23

## 2017-10-11 RX ADMIN — NYSTATIN SCH APPLIC: 100000 CREAM TOPICAL at 10:56

## 2017-10-11 NOTE — PN
Subjective


Date of Service: 10/11/17


Interval History: 





Patient seen and examined at bedside. Denies fever, chills, shortness of breath

, chest discomfort, N/V/D.








Family History: Unchanged from Admission


Social History: Unchanged from Admission


Past Medical History: Unchanged from Admission





Objective


Active Medications: 





Acetaminophen (Tylenol Tab*)  650 mg PO Q6H PRN Reason: FEVER/PAIN


Ascorbic Acid (Vitamin C  Tab*)  500 mg PO DAILY [facility]


Aspirin (Aspirin Ec Low Dose*)  81 mg PO DAILY [facility]


Bisacodyl (Dulcolax Supp*)  10 mg ID DAILY PRN Reason: CONSTIPATION


Cholecalciferol (Vitamin D Tab*)  5,000 units PO DAILY [facility]


Clobetasol Propionate (Clobetasol 0.05% Oint*)  1 applic TOPICAL DAILY [facility]


Cyanocobalamin (Vitamin B12 Tab*)  1,000 mcg PO DAILY [facility]


Diphenoxylate HCl/Atropine (Lomotil Tab*)  1 tab PO QID [facility]


Docusate Sodium (Colace Cap*)  100 mg PO BID PRN Reason: CONSTIPATION


Econazole Nitrate (Econazole 1 % Cream (Nf))  1 applic TOPICAL 2100 [facility]


Enoxaparin Sodium (Lovenox(*))  40 mg SUBCUT Q24H [facility]


Fentanyl (Duragesic  Patch 75 Mcg/Hr*)  75 mcg TRANSDERM Q72H [facility]


Ferrous Gluconate (Fergon Tab*)  324 mg PO BID [facility]


Folic Acid (Folvite Tab*)  0.5 mg PO DAILY [facility]


Furosemide (Lasix Tab*)  40 mg PO DAILY [facility]


Heparin Sodium (Porcine) (Heparin Flush Picc/Ml/Cvc(*))  1 ml FLUSH 0600,1800 

[facility]


Hydrocortisone (Hydrocortisone 2.5% Cream(Nf))  1 applic TOPICAL TID [facility]


Ceftriaxone Sodium 2 gm/ (Sodium Chloride)  100 mls @ 200 mls/hr IVPB DAILY [facility]


Lactobacillus Rhamnosus (Culturelle*)  1 cap PO BID [facility]


Metronidazole (Flagyl Tab*)  500 mg PO BID [facility]


Nystatin (Nystatin Cream*)  1 applic TOPICAL BID [facility]


Omeprazole (Prilosec Cap*)  40 mg PO DAILY@0600 [facility]


Ondansetron HCl (Zofran Odt Tab*)  4 mg PO Q8H PRN Reason: NAUSEA/VOMITING


Oxycodone HCl (Oxycodone Oral.Soln*)  10 mg PO AC [facility]


Pharmacy Profile Note (Fentanyl Patch Check Q Shift)  1 note N/A 0700,1900 [facility]


Potassium Chloride (Klor Con Er Tab*)  10 meq PO DAILY [facility]


Pregabalin (Lyrica Cap(*))  150 mg PO TID [facility]


Zinc Sulfate (Zinc-220 Cap*)  220 mg PO DAILY [facility]





 Vital Signs











  10/10/17 10/10/17 10/10/17





  20:00 20:02 20:31


 


Temperature  99.3 F 


 


Pulse Rate  89 


 


Respiratory 16 16 16





Rate   


 


Blood Pressure  138/53 





(mmHg)   


 


O2 Sat by Pulse  96 





Oximetry   














  10/10/17 10/10/17 10/11/17





  22:02 22:31 00:13


 


Temperature   98.5 F


 


Pulse Rate   79


 


Respiratory 16 16 16





Rate   


 


Blood Pressure   162/76





(mmHg)   


 


O2 Sat by Pulse   96





Oximetry   














  10/11/17 10/11/17 10/11/17





  04:02 07:42 08:05


 


Temperature 98.5 F 98.5 F 


 


Pulse Rate 71 72 


 


Respiratory 16 16 16





Rate   


 


Blood Pressure 125/66 119/49 





(mmHg)   


 


O2 Sat by Pulse 97 97 





Oximetry   




















  10/11/17 10/11/17 10/11/17





  11:32 11:54 13:54


 


Temperature 98.2 F  


 


Pulse Rate 83  


 


Respiratory 17 16 16





Rate   


 


Blood Pressure 140/54  





(mmHg)   


 


O2 Sat by Pulse 96  





Oximetry   














Oxygen Devices in Use Now: None


Appearance: NAD, sitting up on the side of the bed


Ears/Nose/Mouth/Throat: Mucous Membranes Moist


Respiratory: Symmetrical Chest Expansion and Respiratory Effort, Clear to 

Auscultation


Cardiovascular: NL Sounds; No Murmurs; No JVD, RRR


Abdominal: NL Sounds; No Tenderness; No Distention


Neurological: Alert and Oriented x 3, NL Muscle Strength and Tone


Lines/Tubes/Other Access: Clean, Dry and Intact PICC Line - site benign


Nutrition: Taking PO's


Result Diagrams: 


 10/04/17 06:32





 10/04/17 06:32





Assess/Plan/Problems-Billing





Assessment: 





Mr. Pryor is a 71 yo male with PMH significant for BONIFACIO, CKD stage 3, asthma, 

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 Who presented 

with a large Stage IV sacral decubitus ulcer which has been debrided multiple 

times. Failed wound vac in [facility] and back inpatient. Plastic surgery 

consultation at [facility] for possible flap.  











- Patient Problems


(1) Acute osteomyelitis of sacrum


Code(s): M46.28 - OSTEOMYELITIS OF VERTEBRA, SACRAL AND SACROCOCCYGEAL REGION   

SNOMED Code(s): 245075565


   Comment: 


- No evidence of new sepsis, CRP stable.


- Continue Ceftriaxone and flagyl at current doses per ID.   





(2) Sacral decubitus ulcer, stage IV


Code(s): L89.154 - PRESSURE ULCER OF SACRAL REGION, STAGE 4   SNOMED Code(s): 

488499230


   Comment: 


- Plastic surgery consultation with Dr. Luis Felipe Chirinos at [facility], plan 

for surgery on 10/25


- Appreciate nutrition recommendations to support healing, attempt for 6 small 

protein rich meals daily and continue vitamins (Vitamin C). 


- Continue wound care per surgery recommendations, last debrided 10/4 by Dr Holley, recommend changed from Santyl to Medihoney.





   





(3) Tinea corporis


Code(s): B35.4 - TINEA CORPORIS   SNOMED Code(s): 42028029


   Comment: 


- Improving annular scaly lesion on hip consistent with tinea corporis. 


- Nystatin cream not effective against lesion. 


- Will trial patient's home antifungal cream.   





(4) Diarrhea


Code(s): R19.7 - DIARRHEA, UNSPECIFIED   SNOMED Code(s): 27425127


   Comment: 


- BMs consistently loose per nursing staff, single formed stool. 


- Continued assiduous lidia-care. 


- Diarrhea Likely ABX associated. ID thinks not Cdiff. 


- Continue Flagyl, Probiotic, and Lomotil


   





(5) Chronic indwelling [place] catheter


Code(s): Z92.89 - PERSONAL HISTORY OF OTHER MEDICAL TREATMENT   SNOMED Code(s): 

838999756


   Comment: 


- High risk of UTI 


- currently on ABX, no current evidence of UTI.   





(6) Neurogenic bladder


Code(s): N31.9 - NEUROMUSCULAR DYSFUNCTION OF BLADDER, UNSPECIFIED   SNOMED Code

(s): 329520309


   Comment: 


- With chronic indwelling urinary catheter   





(7) Neurogenic bowel


Code(s): K59.2 - NEUROGENIC BOWEL, NOT ELSEWHERE CLASSIFIED   SNOMED Code(s): 

153710625


   Comment: 


- Loose bowel movements with repositioning. Becoming more formed.


- Frequent pericare to prevent skin breakdown. 


- Continue Flagyl and prn lomotil for diarrhea   





(8) CAD (coronary artery disease)


Code(s): I25.10 - ATHSCL HEART DISEASE OF NATIVE CORONARY ARTERY W/O ANG PCTRS 

  SNOMED Code(s): 11784337


   Comment: 


- Asymptomatic


- Continue ASA.   





(9) CKD (chronic kidney disease), stage III


Code(s): N18.3 - CHRONIC KIDNEY DISEASE, STAGE 3 (MODERATE)   SNOMED Code(s): 

530680338


   Comment: 


- Creatinine baseline, no acute exacerbation


- Avoid nephrotoxic medications   





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


Code(s): C71.9 - MALIGNANT NEOPLASM OF BRAIN, UNSPECIFIED   SNOMED Code(s): 

008730422


   Comment: 


- To the spine (dx in patient's 20s)


- With concurrent chronic back pain and inability to ambulate


- s/p multiple surgeries


- Back anatomy puts patient at much higher risk for pressure ulcer formation, 

as does high opiate requirements and fragility with easily acquired infections (

pneumonia / urinary tract infections). UTI's in particular are a high risk 

given his neurogenic bladder and indwelling [place] catheter.   





(11) Chronic back pain


Code(s): M54.9 - DORSALGIA, UNSPECIFIED; G89.29 - OTHER CHRONIC PAIN   SNOMED 

Code(s): 650326503


   Comment: 


- Secondary to ependymoma and sacral decub


- Continue pregabalin, acetaminophen, oxycodone liquid to 10mg AC with one dose 

PRN for transfer, and Fentanyl patch at 75mcg. 


- Pain stable and tolerable with sitting.


   





(12) HTN (hypertension)


Code(s): I10 - ESSENTIAL (PRIMARY) HYPERTENSION   SNOMED Code(s): 94248992


   Comment: 


- -160's


- Continue lasix   





(13) BONIFACIO (obstructive sleep apnea)


Code(s): G47.33 - OBSTRUCTIVE SLEEP APNEA (ADULT) (PEDIATRIC)   SNOMED Code(s): 

28120577


   Comment: 


Continue home BiPAP use.   





(14) DVT prophylaxis


Code(s): [id number] -    SNOMED Code(s): 360697428


   Comment: 


Lovenox


TEDs in bed.   





(15) Full code status


Code(s): Z78.9 - OTHER SPECIFIED HEALTH STATUS   SNOMED Code(s): 777809992


   


Status and Disposition: 





Inpatient. Pending transfer to Health system for possible skin 

flap in the next few weeks.

## 2017-10-11 NOTE — PN
Progress Note





- Progress Note


Date of Service: 10/11/17


SOAP: 


Subjective:


CC: decubitus ulcer


HPI: 72 year old man with sacral decubitus ulcer, appetite is good, no fever, 

rash, or diarrhea.  Formed stool today.    








Objective:


[]


 Vital Signs











Temp  36.9 C   10/11/17 04:02


 


Pulse  71   10/11/17 04:02


 


Resp  16   10/11/17 09:23


 


BP  125/66   10/11/17 04:02


 


Pulse Ox  97   10/11/17 04:02








 Intake & Output











 10/10/17 10/11/17 10/11/17





 18:59 06:59 18:59


 


Intake Total 590 225 


 


Output Total  1000 


 


Balance 590 -775 


 


Intake:   


 


  Oral 590 225 


 


Output:   


 


  Rodriguez  1000 


 


Other:   


 


  Date of Last Bowel  10/11/17 





  Movement   


 


  # Bowel Movements 2 3 


 


  Estimated Stool Amount Medium Small 








 


Gen:awake, no distress


HEENT:no thrush


Heart:RRR no murmur


Lungs:CTA BL


Abd:+BS NTND soft


Skin: no rash


MSK: decubitus ulcer without surrounding erythema, minimal drainage


 


Assessment:


1. acute osteomyelitis, sacrum


2. 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


3. elevated CRP








Plan:


1. continue ceftriaxone and flagyl day 35, continue weekly CRP, CBC, CMP.  

Plastics procedure eventually.

## 2017-10-12 ENCOUNTER — HOSPITAL ENCOUNTER (INPATIENT)
Dept: HOSPITAL 25 - SSU | Age: 72
LOS: 13 days | Discharge: ANOTHER HOSPITAL | DRG: 539 | End: 2017-10-25
Attending: INTERNAL MEDICINE | Admitting: HOSPITALIST
Payer: MEDICARE

## 2017-10-12 VITALS — SYSTOLIC BLOOD PRESSURE: 159 MMHG | DIASTOLIC BLOOD PRESSURE: 71 MMHG

## 2017-10-12 DIAGNOSIS — B35.4: ICD-10-CM

## 2017-10-12 DIAGNOSIS — R19.7: ICD-10-CM

## 2017-10-12 DIAGNOSIS — N18.3: ICD-10-CM

## 2017-10-12 DIAGNOSIS — B37.89: ICD-10-CM

## 2017-10-12 DIAGNOSIS — K21.9: ICD-10-CM

## 2017-10-12 DIAGNOSIS — M46.28: Primary | ICD-10-CM

## 2017-10-12 DIAGNOSIS — L89.154: ICD-10-CM

## 2017-10-12 DIAGNOSIS — Z92.3: ICD-10-CM

## 2017-10-12 DIAGNOSIS — D64.9: ICD-10-CM

## 2017-10-12 DIAGNOSIS — I65.29: ICD-10-CM

## 2017-10-12 DIAGNOSIS — N31.9: ICD-10-CM

## 2017-10-12 DIAGNOSIS — Z85.841: ICD-10-CM

## 2017-10-12 DIAGNOSIS — G47.33: ICD-10-CM

## 2017-10-12 DIAGNOSIS — Z92.21: ICD-10-CM

## 2017-10-12 DIAGNOSIS — I25.10: ICD-10-CM

## 2017-10-12 DIAGNOSIS — G82.20: ICD-10-CM

## 2017-10-12 DIAGNOSIS — J45.909: ICD-10-CM

## 2017-10-12 DIAGNOSIS — Z79.01: ICD-10-CM

## 2017-10-12 DIAGNOSIS — Z79.82: ICD-10-CM

## 2017-10-12 DIAGNOSIS — K59.2: ICD-10-CM

## 2017-10-12 DIAGNOSIS — I12.9: ICD-10-CM

## 2017-10-12 LAB
ANION GAP SERPL CALC-SCNC: 5 MMOL/L (ref 2–11)
BUN SERPL-MCNC: 40 MG/DL (ref 6–24)
BUN/CREAT SERPL: 32.5 (ref 8–20)
CALCIUM SERPL-MCNC: 9.6 MG/DL (ref 8.6–10.3)
CHLORIDE SERPL-SCNC: 104 MMOL/L (ref 101–111)
GLUCOSE SERPL-MCNC: 97 MG/DL (ref 70–100)
HCO3 SERPL-SCNC: 29 MMOL/L (ref 22–32)
HCT VFR BLD AUTO: 33 % (ref 42–52)
HGB BLD-MCNC: 10.8 G/DL (ref 14–18)
MCH RBC QN AUTO: 28 PG (ref 27–31)
MCHC RBC AUTO-ENTMCNC: 33 G/DL (ref 31–36)
MCV RBC AUTO: 83 FL (ref 80–94)
POTASSIUM SERPL-SCNC: 3.8 MMOL/L (ref 3.5–5)
RBC # BLD AUTO: 3.91 10^6/UL (ref 4–5.4)
SODIUM SERPL-SCNC: 138 MMOL/L (ref 133–145)
WBC # BLD AUTO: 7.9 10^3/UL (ref 3.5–10.8)

## 2017-10-12 PROCEDURE — 85025 COMPLETE CBC W/AUTO DIFF WBC: CPT

## 2017-10-12 PROCEDURE — 36415 COLL VENOUS BLD VENIPUNCTURE: CPT

## 2017-10-12 PROCEDURE — 97530 THERAPEUTIC ACTIVITIES: CPT

## 2017-10-12 PROCEDURE — 86140 C-REACTIVE PROTEIN: CPT

## 2017-10-12 PROCEDURE — 93005 ELECTROCARDIOGRAM TRACING: CPT

## 2017-10-12 PROCEDURE — 80048 BASIC METABOLIC PNL TOTAL CA: CPT

## 2017-10-12 PROCEDURE — 71020: CPT

## 2017-10-12 PROCEDURE — 82272 OCCULT BLD FECES 1-3 TESTS: CPT

## 2017-10-12 RX ADMIN — Medication SCH ML: at 05:46

## 2017-10-12 RX ADMIN — Medication SCH MG: at 08:15

## 2017-10-12 RX ADMIN — Medication SCH UNITS: at 08:12

## 2017-10-12 RX ADMIN — DIPHENOXYLATE HYDROCHLORIDE AND ATROPINE SULFATE SCH TAB: 2.5; .025 TABLET ORAL at 08:13

## 2017-10-12 RX ADMIN — HYDROCORTISONE SCH APPLIC: 1 CREAM TOPICAL at 14:32

## 2017-10-12 RX ADMIN — ENOXAPARIN SODIUM SCH MG: 40 INJECTION SUBCUTANEOUS at 22:33

## 2017-10-12 RX ADMIN — OXYCODONE HYDROCHLORIDE SCH MG: 5 SOLUTION ORAL at 08:11

## 2017-10-12 RX ADMIN — Medication SCH CAP: at 22:31

## 2017-10-12 RX ADMIN — ASPIRIN SCH MG: 81 TABLET, COATED ORAL at 08:12

## 2017-10-12 RX ADMIN — NYSTATIN SCH APPLIC: 100000 CREAM TOPICAL at 09:04

## 2017-10-12 RX ADMIN — HYDROCORTISONE SCH APPLIC: 1 CREAM TOPICAL at 10:51

## 2017-10-12 RX ADMIN — Medication SCH ML: at 18:45

## 2017-10-12 RX ADMIN — WATER SCH NOTE: 100 INJECTION, SOLUTION INTRAVENOUS at 06:47

## 2017-10-12 RX ADMIN — PREGABALIN SCH MG: 50 CAPSULE ORAL at 08:14

## 2017-10-12 RX ADMIN — NYSTATIN SCH APPLIC: 100000 CREAM TOPICAL at 22:36

## 2017-10-12 RX ADMIN — OXYCODONE HYDROCHLORIDE SCH MG: 5 SOLUTION ORAL at 11:40

## 2017-10-12 RX ADMIN — OXYCODONE HYDROCHLORIDE SCH: 5 SOLUTION ORAL at 18:43

## 2017-10-12 RX ADMIN — Medication SCH CAP: at 00:32

## 2017-10-12 RX ADMIN — HYDROCORTISONE SCH: 25 CREAM TOPICAL at 14:34

## 2017-10-12 RX ADMIN — PREGABALIN SCH MG: 50 CAPSULE ORAL at 14:29

## 2017-10-12 RX ADMIN — PREGABALIN SCH MG: 50 CAPSULE ORAL at 22:30

## 2017-10-12 RX ADMIN — Medication SCH CAP: at 08:14

## 2017-10-12 RX ADMIN — CLOBETASOL PROPIONATE SCH APPLIC: 0.5 OINTMENT TOPICAL at 10:52

## 2017-10-12 RX ADMIN — HYDROCORTISONE SCH: 25 CREAM TOPICAL at 10:55

## 2017-10-12 RX ADMIN — ECONAZOLE NITRATE SCH APPLIC: 10 CREAM TOPICAL at 22:36

## 2017-10-12 RX ADMIN — DIPHENOXYLATE HYDROCHLORIDE AND ATROPINE SULFATE SCH TAB: 2.5; .025 TABLET ORAL at 18:45

## 2017-10-12 RX ADMIN — FOLIC ACID SCH MG: 1 TABLET ORAL at 08:17

## 2017-10-12 RX ADMIN — CYANOCOBALAMIN TAB 500 MCG SCH MCG: 500 TAB at 08:14

## 2017-10-12 RX ADMIN — Medication SCH MG: at 22:31

## 2017-10-12 RX ADMIN — OMEPRAZOLE SCH MG: 20 CAPSULE, DELAYED RELEASE ORAL at 05:45

## 2017-10-12 RX ADMIN — OXYCODONE HYDROCHLORIDE AND ACETAMINOPHEN SCH MG: 500 TABLET ORAL at 08:16

## 2017-10-12 RX ADMIN — FUROSEMIDE SCH MG: 40 TABLET ORAL at 08:14

## 2017-10-12 RX ADMIN — DIPHENOXYLATE HYDROCHLORIDE AND ATROPINE SULFATE SCH TAB: 2.5; .025 TABLET ORAL at 22:31

## 2017-10-12 RX ADMIN — METRONIDAZOLE SCH MG: 250 TABLET ORAL at 08:16

## 2017-10-12 RX ADMIN — Medication SCH MG: at 08:14

## 2017-10-12 RX ADMIN — METRONIDAZOLE SCH MG: 250 TABLET ORAL at 22:31

## 2017-10-12 RX ADMIN — POTASSIUM CHLORIDE SCH MEQ: 750 TABLET, FILM COATED, EXTENDED RELEASE ORAL at 08:15

## 2017-10-12 RX ADMIN — DIPHENOXYLATE HYDROCHLORIDE AND ATROPINE SULFATE SCH TAB: 2.5; .025 TABLET ORAL at 14:30

## 2017-10-12 NOTE — PN
Subjective


Date of Service: 10/12/17


Interval History: 





Patient seen and examined at bedside. Denies fever, chills, shortness of breath

, chest discomfort, N/V/D. No complaints, Pt states that his pain is 

controlled. 








Family History: Unchanged from Admission


Social History: Unchanged from Admission


Past Medical History: Unchanged from Admission





Objective


 Vital Signs











  10/11/17 10/11/17 10/12/17





  22:15 22:39 00:20


 


Temperature   98.2 F


 


Pulse Rate   74


 


Respiratory 16 16 16





Rate   


 


Blood Pressure   134/63





(mmHg)   


 


O2 Sat by Pulse   97





Oximetry   














  10/12/17 10/12/17 10/12/17





  04:02 07:25 08:11


 


Temperature 98.1 F 98.0 F 


 


Pulse Rate 73 69 


 


Respiratory 16 16 16





Rate   


 


Blood Pressure 130/63 99/35 





(mmHg)   


 


O2 Sat by Pulse 98 97 





Oximetry   

















  10/12/17 10/12/17 10/12/17





  10:11 11:40 12:15


 


Temperature   98.4 F


 


Pulse Rate   73


 


Respiratory 16 16 14





Rate   


 


Blood Pressure   113/55





(mmHg)   


 


O2 Sat by Pulse   98





Oximetry   

















  10/12/17





  15:58


 


Temperature 97.2 F


 


Pulse Rate 77


 


Respiratory 18





Rate 


 


Blood Pressure 159/71





(mmHg) 


 


O2 Sat by Pulse 97





Oximetry 











Oxygen Devices in Use Now: None


Appearance: NAD, laying in bed


Ears/Nose/Mouth/Throat: Mucous Membranes Moist


Respiratory: Symmetrical Chest Expansion and Respiratory Effort, Clear to 

Auscultation


Cardiovascular: NL Sounds; No Murmurs; No JVD, RRR


Abdominal: NL Sounds; No Tenderness; No Distention


Extremities: No Edema


Neurological: Alert and Oriented x 3 - , forgetful


Lines/Tubes/Other Access: Clean, Dry and Intact [place] - patent, urine clear 

yellow, Clean, Dry and Intact PICC Line - site beign


Nutrition: Taking PO's


Result Diagrams: 


 10/12/17 05:53





 10/12/17 05:53


Additional Lab and Data: 





.





Assess/Plan/Problems-Billing





Assessment: 





Mr. Pryor is a 73 yo male with PMH significant for BONIFACIO, CKD stage 3, asthma, 

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 Who presented 

with a large Stage IV sacral decubitus ulcer which has been debrided multiple 

times. Failed wound vac in PM and back inpatient. Plastic surgery 

consultation at [facility] for possible flap.  











- Patient Problems


(1) Acute osteomyelitis of sacrum


Code(s): M46.28 - OSTEOMYELITIS OF VERTEBRA, SACRAL AND SACROCOCCYGEAL REGION   

SNOMED Code(s): 563226772


   Comment: 


- No evidence of new sepsis, CRP stable.


- Continue Ceftriaxone and flagyl at current doses per ID.   





(2) Sacral decubitus ulcer, stage IV


Code(s): L89.154 - PRESSURE ULCER OF SACRAL REGION, STAGE 4   SNOMED Code(s): 

921668991


   Comment: 


- Plastic surgery consultation with Dr. Luis Felipe Chirinos at [facility], plan 

for surgery on 10/25


- Appreciate nutrition recommendations to support healing, attempt for 6 small 

protein rich meals daily and continue vitamins (Vitamin C). 


- Continue wound care per surgery recommendations, last debrided 10/4 by Dr Holley, recommend changed from Santyl to Medihoney.





   





(3) Tinea corporis


Code(s): B35.4 - TINEA CORPORIS   SNOMED Code(s): 30188712


   Comment: 


- Improving annular scaly lesion on hip consistent with tinea corporis. 


- Nystatin cream not effective against lesion. 


- Will trial patient's home antifungal cream.   





(4) Diarrhea


Code(s): R19.7 - DIARRHEA, UNSPECIFIED   SNOMED Code(s): 74090125


   Comment: 


- BMs consistently loose per nursing staff, single formed stool. 


- Continued assiduous lidia-care. 


- Diarrhea Likely ABX associated. ID thinks not Cdiff. 


- Continue Flagyl, Probiotic, and Lomotil


   





(5) Chronic indwelling [place] catheter


Code(s): Z92.89 - PERSONAL HISTORY OF OTHER MEDICAL TREATMENT   SNOMED Code(s): 

244625287


   Comment: 


- High risk of UTI 


- currently on ABX, no current evidence of UTI.   





(6) Neurogenic bladder


Code(s): N31.9 - NEUROMUSCULAR DYSFUNCTION OF BLADDER, UNSPECIFIED   SNOMED Code

(s): 849517945


   Comment: 


- With chronic indwelling urinary catheter   





(7) Neurogenic bowel


Code(s): K59.2 - NEUROGENIC BOWEL, NOT ELSEWHERE CLASSIFIED   SNOMED Code(s): 

229991377


   Comment: 


- Loose bowel movements with repositioning. Becoming more formed.


- Frequent pericare to prevent skin breakdown. 


- Continue Flagyl and prn lomotil for diarrhea   





(8) CAD (coronary artery disease)


Code(s): I25.10 - ATHSCL HEART DISEASE OF NATIVE CORONARY ARTERY W/O ANG PCTRS 

  SNOMED Code(s): 49840020


   Comment: 


- Asymptomatic


- Continue ASA.   





(9) CKD (chronic kidney disease), stage III


Code(s): N18.3 - CHRONIC KIDNEY DISEASE, STAGE 3 (MODERATE)   SNOMED Code(s): 

987974022


   Comment: 


- Creatinine baseline, no acute exacerbation


- Avoid nephrotoxic medications   





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


Code(s): C71.9 - MALIGNANT NEOPLASM OF BRAIN, UNSPECIFIED   SNOMED Code(s): 

657524872


   Comment: 


- To the spine (dx in patient's 20s)


- With concurrent chronic back pain and inability to ambulate


- s/p multiple surgeries


- Back anatomy puts patient at much higher risk for pressure ulcer formation, 

as does high opiate requirements and fragility with easily acquired infections (

pneumonia / urinary tract infections). UTI's in particular are a high risk 

given his neurogenic bladder and indwelling [place] catheter.   





(11) Chronic back pain


Code(s): M54.9 - DORSALGIA, UNSPECIFIED; G89.29 - OTHER CHRONIC PAIN   SNOMED 

Code(s): 661248067


   Comment: 


- Secondary to ependymoma and sacral decub


- Continue pregabalin, acetaminophen, oxycodone liquid to 10mg AC with one dose 

PRN for transfer, and Fentanyl patch at 75mcg. 


- Pain stable and tolerable with sitting.


   





(12) HTN (hypertension)


Code(s): I10 - ESSENTIAL (PRIMARY) HYPERTENSION   SNOMED Code(s): 81845257


   Comment: 


- -160's


- Continue lasix   





(13) BONIFACIO (obstructive sleep apnea)


Code(s): G47.33 - OBSTRUCTIVE SLEEP APNEA (ADULT) (PEDIATRIC)   SNOMED Code(s): 

34973265


   Comment: 


Continue home BiPAP use.   





(14) DVT prophylaxis


Code(s): GIL9228 -    SNOMED Code(s): 329113154


   Comment: 


Lovenox


TEDs in bed.   





(15) Full code status


Code(s): Z78.9 - OTHER SPECIFIED HEALTH STATUS   SNOMED Code(s): 666601428


   


Status and Disposition: 





Inpatient. Pending transfer to [facility] for possible skin 

flap in the next few weeks. Stable for discharge to [facility] status today.

## 2017-10-13 RX ADMIN — DIPHENOXYLATE HYDROCHLORIDE AND ATROPINE SULFATE SCH TAB: 2.5; .025 TABLET ORAL at 20:55

## 2017-10-13 RX ADMIN — ASPIRIN SCH MG: 81 TABLET, COATED ORAL at 09:39

## 2017-10-13 RX ADMIN — Medication SCH UNITS: at 09:38

## 2017-10-13 RX ADMIN — Medication SCH CAP: at 09:40

## 2017-10-13 RX ADMIN — WATER SCH NOTE: 100 INJECTION, SOLUTION INTRAVENOUS at 07:00

## 2017-10-13 RX ADMIN — OXYCODONE HYDROCHLORIDE SCH MG: 5 SOLUTION ORAL at 12:16

## 2017-10-13 RX ADMIN — Medication SCH MG: at 09:39

## 2017-10-13 RX ADMIN — Medication SCH MG: at 20:55

## 2017-10-13 RX ADMIN — OXYCODONE HYDROCHLORIDE AND ACETAMINOPHEN SCH MG: 500 TABLET ORAL at 09:40

## 2017-10-13 RX ADMIN — NYSTATIN SCH: 100000 CREAM TOPICAL at 11:57

## 2017-10-13 RX ADMIN — METRONIDAZOLE SCH MG: 250 TABLET ORAL at 20:55

## 2017-10-13 RX ADMIN — DIPHENOXYLATE HYDROCHLORIDE AND ATROPINE SULFATE SCH TAB: 2.5; .025 TABLET ORAL at 12:16

## 2017-10-13 RX ADMIN — NYSTATIN SCH APPLIC: 100000 CREAM TOPICAL at 19:54

## 2017-10-13 RX ADMIN — DIPHENOXYLATE HYDROCHLORIDE AND ATROPINE SULFATE SCH TAB: 2.5; .025 TABLET ORAL at 09:39

## 2017-10-13 RX ADMIN — WATER SCH NOTE: 100 INJECTION, SOLUTION INTRAVENOUS at 18:44

## 2017-10-13 RX ADMIN — Medication SCH DOSE: at 09:44

## 2017-10-13 RX ADMIN — PREGABALIN SCH MG: 50 CAPSULE ORAL at 13:46

## 2017-10-13 RX ADMIN — POTASSIUM CHLORIDE SCH MEQ: 750 TABLET, FILM COATED, EXTENDED RELEASE ORAL at 09:40

## 2017-10-13 RX ADMIN — METRONIDAZOLE SCH MG: 250 TABLET ORAL at 09:40

## 2017-10-13 RX ADMIN — PREGABALIN SCH MG: 50 CAPSULE ORAL at 20:55

## 2017-10-13 RX ADMIN — OMEPRAZOLE SCH MG: 20 CAPSULE, DELAYED RELEASE ORAL at 05:55

## 2017-10-13 RX ADMIN — CYANOCOBALAMIN TAB 500 MCG SCH MCG: 500 TAB at 09:39

## 2017-10-13 RX ADMIN — ECONAZOLE NITRATE SCH APPLIC: 10 CREAM TOPICAL at 10:00

## 2017-10-13 RX ADMIN — FOLIC ACID SCH MG: 1 TABLET ORAL at 09:39

## 2017-10-13 RX ADMIN — DIPHENOXYLATE HYDROCHLORIDE AND ATROPINE SULFATE SCH TAB: 2.5; .025 TABLET ORAL at 17:11

## 2017-10-13 RX ADMIN — Medication SCH CAP: at 20:55

## 2017-10-13 RX ADMIN — Medication SCH DOSE: at 13:40

## 2017-10-13 RX ADMIN — FUROSEMIDE SCH MG: 40 TABLET ORAL at 09:40

## 2017-10-13 RX ADMIN — OXYCODONE HYDROCHLORIDE SCH MG: 5 SOLUTION ORAL at 17:11

## 2017-10-13 RX ADMIN — Medication SCH ML: at 05:55

## 2017-10-13 RX ADMIN — PREGABALIN SCH MG: 50 CAPSULE ORAL at 09:40

## 2017-10-13 RX ADMIN — OXYCODONE HYDROCHLORIDE SCH MG: 5 SOLUTION ORAL at 08:22

## 2017-10-13 RX ADMIN — Medication SCH DOSE: at 19:57

## 2017-10-13 RX ADMIN — Medication SCH ML: at 17:15

## 2017-10-13 RX ADMIN — ENOXAPARIN SODIUM SCH MG: 40 INJECTION SUBCUTANEOUS at 20:57

## 2017-10-13 NOTE — PN
Hospitalist Progress Note





Pt seen at bedside. No complaints at this time. Denies fever, chill, shortness 

of breath, chest discomfort, N/V/D. Reports that pain is controlled.

## 2017-10-14 RX ADMIN — WATER SCH NOTE: 100 INJECTION, SOLUTION INTRAVENOUS at 18:34

## 2017-10-14 RX ADMIN — Medication SCH ML: at 18:22

## 2017-10-14 RX ADMIN — DIPHENOXYLATE HYDROCHLORIDE AND ATROPINE SULFATE SCH TAB: 2.5; .025 TABLET ORAL at 08:21

## 2017-10-14 RX ADMIN — Medication SCH ML: at 06:11

## 2017-10-14 RX ADMIN — CYANOCOBALAMIN TAB 500 MCG SCH MCG: 500 TAB at 08:21

## 2017-10-14 RX ADMIN — Medication SCH ML: at 10:00

## 2017-10-14 RX ADMIN — Medication SCH MG: at 08:21

## 2017-10-14 RX ADMIN — Medication SCH DOSE: at 14:38

## 2017-10-14 RX ADMIN — METRONIDAZOLE SCH MG: 250 TABLET ORAL at 20:08

## 2017-10-14 RX ADMIN — PREGABALIN SCH MG: 50 CAPSULE ORAL at 14:37

## 2017-10-14 RX ADMIN — DIPHENOXYLATE HYDROCHLORIDE AND ATROPINE SULFATE SCH TAB: 2.5; .025 TABLET ORAL at 20:09

## 2017-10-14 RX ADMIN — NYSTATIN SCH APPLIC: 100000 CREAM TOPICAL at 08:26

## 2017-10-14 RX ADMIN — Medication SCH UNITS: at 08:21

## 2017-10-14 RX ADMIN — PREGABALIN SCH MG: 50 CAPSULE ORAL at 08:20

## 2017-10-14 RX ADMIN — DIPHENOXYLATE HYDROCHLORIDE AND ATROPINE SULFATE SCH TAB: 2.5; .025 TABLET ORAL at 16:39

## 2017-10-14 RX ADMIN — OXYCODONE HYDROCHLORIDE SCH MG: 5 SOLUTION ORAL at 12:28

## 2017-10-14 RX ADMIN — Medication SCH CAP: at 08:21

## 2017-10-14 RX ADMIN — WATER SCH NOTE: 100 INJECTION, SOLUTION INTRAVENOUS at 06:45

## 2017-10-14 RX ADMIN — OMEPRAZOLE SCH MG: 20 CAPSULE, DELAYED RELEASE ORAL at 07:33

## 2017-10-14 RX ADMIN — OXYCODONE HYDROCHLORIDE SCH MG: 5 SOLUTION ORAL at 08:21

## 2017-10-14 RX ADMIN — Medication SCH MG: at 20:08

## 2017-10-14 RX ADMIN — PREGABALIN SCH MG: 50 CAPSULE ORAL at 20:09

## 2017-10-14 RX ADMIN — Medication SCH CAP: at 20:08

## 2017-10-14 RX ADMIN — OXYCODONE HYDROCHLORIDE AND ACETAMINOPHEN SCH MG: 500 TABLET ORAL at 08:21

## 2017-10-14 RX ADMIN — Medication SCH DOSE: at 20:08

## 2017-10-14 RX ADMIN — ECONAZOLE NITRATE SCH APPLIC: 10 CREAM TOPICAL at 08:25

## 2017-10-14 RX ADMIN — Medication SCH MG: at 08:27

## 2017-10-14 RX ADMIN — Medication SCH DOSE: at 08:25

## 2017-10-14 RX ADMIN — DIPHENOXYLATE HYDROCHLORIDE AND ATROPINE SULFATE SCH TAB: 2.5; .025 TABLET ORAL at 12:28

## 2017-10-14 RX ADMIN — METRONIDAZOLE SCH MG: 250 TABLET ORAL at 08:21

## 2017-10-14 RX ADMIN — ENOXAPARIN SODIUM SCH MG: 40 INJECTION SUBCUTANEOUS at 20:08

## 2017-10-14 RX ADMIN — ASPIRIN SCH MG: 81 TABLET, COATED ORAL at 08:21

## 2017-10-14 RX ADMIN — NYSTATIN SCH APPLIC: 100000 CREAM TOPICAL at 20:22

## 2017-10-14 RX ADMIN — OXYCODONE HYDROCHLORIDE SCH MG: 5 SOLUTION ORAL at 16:39

## 2017-10-14 RX ADMIN — FUROSEMIDE SCH MG: 40 TABLET ORAL at 08:21

## 2017-10-14 RX ADMIN — POTASSIUM CHLORIDE SCH MEQ: 750 TABLET, FILM COATED, EXTENDED RELEASE ORAL at 08:20

## 2017-10-14 RX ADMIN — FOLIC ACID SCH MG: 1 TABLET ORAL at 08:22

## 2017-10-14 RX ADMIN — FENTANYL TRANSDERMAL SCH MCG: 75 PATCH, EXTENDED RELEASE TRANSDERMAL at 21:05

## 2017-10-15 RX ADMIN — OXYCODONE HYDROCHLORIDE SCH MG: 5 SOLUTION ORAL at 17:05

## 2017-10-15 RX ADMIN — ENOXAPARIN SODIUM SCH MG: 40 INJECTION SUBCUTANEOUS at 20:34

## 2017-10-15 RX ADMIN — OXYCODONE HYDROCHLORIDE AND ACETAMINOPHEN SCH MG: 500 TABLET ORAL at 09:19

## 2017-10-15 RX ADMIN — Medication SCH MG: at 09:19

## 2017-10-15 RX ADMIN — OMEPRAZOLE SCH MG: 20 CAPSULE, DELAYED RELEASE ORAL at 07:16

## 2017-10-15 RX ADMIN — Medication SCH MG: at 20:26

## 2017-10-15 RX ADMIN — Medication SCH UNITS: at 09:18

## 2017-10-15 RX ADMIN — PREGABALIN SCH MG: 50 CAPSULE ORAL at 09:18

## 2017-10-15 RX ADMIN — CYANOCOBALAMIN TAB 500 MCG SCH MCG: 500 TAB at 09:19

## 2017-10-15 RX ADMIN — Medication SCH MG: at 09:18

## 2017-10-15 RX ADMIN — ECONAZOLE NITRATE SCH APPLIC: 10 CREAM TOPICAL at 08:05

## 2017-10-15 RX ADMIN — ASPIRIN SCH MG: 81 TABLET, COATED ORAL at 09:18

## 2017-10-15 RX ADMIN — PREGABALIN SCH MG: 50 CAPSULE ORAL at 13:56

## 2017-10-15 RX ADMIN — Medication SCH CAP: at 20:26

## 2017-10-15 RX ADMIN — HYDROCORTISONE SCH APPLIC: 1 CREAM TOPICAL at 10:08

## 2017-10-15 RX ADMIN — POTASSIUM CHLORIDE SCH MEQ: 750 TABLET, FILM COATED, EXTENDED RELEASE ORAL at 09:18

## 2017-10-15 RX ADMIN — Medication SCH ML: at 10:18

## 2017-10-15 RX ADMIN — Medication SCH ML: at 06:02

## 2017-10-15 RX ADMIN — METRONIDAZOLE SCH MG: 250 TABLET ORAL at 09:18

## 2017-10-15 RX ADMIN — DIPHENOXYLATE HYDROCHLORIDE AND ATROPINE SULFATE SCH TAB: 2.5; .025 TABLET ORAL at 20:26

## 2017-10-15 RX ADMIN — OXYCODONE HYDROCHLORIDE SCH MG: 5 SOLUTION ORAL at 07:16

## 2017-10-15 RX ADMIN — PREGABALIN SCH MG: 50 CAPSULE ORAL at 20:26

## 2017-10-15 RX ADMIN — Medication SCH CAP: at 09:19

## 2017-10-15 RX ADMIN — WATER SCH NOTE: 100 INJECTION, SOLUTION INTRAVENOUS at 06:45

## 2017-10-15 RX ADMIN — DIPHENOXYLATE HYDROCHLORIDE AND ATROPINE SULFATE SCH TAB: 2.5; .025 TABLET ORAL at 09:19

## 2017-10-15 RX ADMIN — OXYCODONE HYDROCHLORIDE SCH MG: 5 SOLUTION ORAL at 11:49

## 2017-10-15 RX ADMIN — Medication SCH: at 10:29

## 2017-10-15 RX ADMIN — NYSTATIN SCH APPLIC: 100000 CREAM TOPICAL at 20:23

## 2017-10-15 RX ADMIN — HYDROCORTISONE SCH APPLIC: 1 CREAM TOPICAL at 13:56

## 2017-10-15 RX ADMIN — FOLIC ACID SCH MG: 1 TABLET ORAL at 09:19

## 2017-10-15 RX ADMIN — METRONIDAZOLE SCH MG: 250 TABLET ORAL at 20:26

## 2017-10-15 RX ADMIN — DIPHENOXYLATE HYDROCHLORIDE AND ATROPINE SULFATE SCH TAB: 2.5; .025 TABLET ORAL at 17:04

## 2017-10-15 RX ADMIN — DIPHENOXYLATE HYDROCHLORIDE AND ATROPINE SULFATE SCH TAB: 2.5; .025 TABLET ORAL at 13:56

## 2017-10-15 RX ADMIN — Medication SCH ML: at 17:06

## 2017-10-15 RX ADMIN — HYDROCORTISONE SCH APPLIC: 1 CREAM TOPICAL at 20:23

## 2017-10-15 RX ADMIN — WATER SCH NOTE: 100 INJECTION, SOLUTION INTRAVENOUS at 18:39

## 2017-10-15 RX ADMIN — NYSTATIN SCH APPLIC: 100000 CREAM TOPICAL at 08:07

## 2017-10-15 RX ADMIN — FUROSEMIDE SCH MG: 40 TABLET ORAL at 09:19

## 2017-10-16 RX ADMIN — METRONIDAZOLE SCH MG: 250 TABLET ORAL at 20:04

## 2017-10-16 RX ADMIN — OXYCODONE HYDROCHLORIDE AND ACETAMINOPHEN SCH MG: 500 TABLET ORAL at 09:16

## 2017-10-16 RX ADMIN — HYDROCORTISONE SCH APPLIC: 1 CREAM TOPICAL at 11:18

## 2017-10-16 RX ADMIN — ASPIRIN SCH MG: 81 TABLET, COATED ORAL at 09:15

## 2017-10-16 RX ADMIN — Medication SCH CAP: at 20:04

## 2017-10-16 RX ADMIN — Medication SCH MG: at 09:16

## 2017-10-16 RX ADMIN — CYANOCOBALAMIN TAB 500 MCG SCH MCG: 500 TAB at 09:17

## 2017-10-16 RX ADMIN — PREGABALIN SCH MG: 50 CAPSULE ORAL at 09:17

## 2017-10-16 RX ADMIN — OXYCODONE HYDROCHLORIDE SCH MG: 5 SOLUTION ORAL at 08:00

## 2017-10-16 RX ADMIN — Medication SCH UNITS: at 09:17

## 2017-10-16 RX ADMIN — DIPHENOXYLATE HYDROCHLORIDE AND ATROPINE SULFATE SCH TAB: 2.5; .025 TABLET ORAL at 20:04

## 2017-10-16 RX ADMIN — DIPHENOXYLATE HYDROCHLORIDE AND ATROPINE SULFATE SCH TAB: 2.5; .025 TABLET ORAL at 12:22

## 2017-10-16 RX ADMIN — FUROSEMIDE SCH MG: 40 TABLET ORAL at 09:15

## 2017-10-16 RX ADMIN — HYDROCORTISONE SCH APPLIC: 1 CREAM TOPICAL at 14:35

## 2017-10-16 RX ADMIN — HYDROCORTISONE SCH APPLIC: 1 CREAM TOPICAL at 20:09

## 2017-10-16 RX ADMIN — Medication SCH ML: at 11:15

## 2017-10-16 RX ADMIN — OXYCODONE HYDROCHLORIDE SCH MG: 5 SOLUTION ORAL at 12:22

## 2017-10-16 RX ADMIN — METRONIDAZOLE SCH MG: 250 TABLET ORAL at 09:15

## 2017-10-16 RX ADMIN — WATER SCH NOTE: 100 INJECTION, SOLUTION INTRAVENOUS at 18:49

## 2017-10-16 RX ADMIN — DIPHENOXYLATE HYDROCHLORIDE AND ATROPINE SULFATE SCH TAB: 2.5; .025 TABLET ORAL at 09:17

## 2017-10-16 RX ADMIN — Medication SCH MG: at 09:15

## 2017-10-16 RX ADMIN — POTASSIUM CHLORIDE SCH MEQ: 750 TABLET, FILM COATED, EXTENDED RELEASE ORAL at 09:15

## 2017-10-16 RX ADMIN — DIPHENOXYLATE HYDROCHLORIDE AND ATROPINE SULFATE SCH TAB: 2.5; .025 TABLET ORAL at 17:17

## 2017-10-16 RX ADMIN — PREGABALIN SCH MG: 50 CAPSULE ORAL at 20:04

## 2017-10-16 RX ADMIN — OXYCODONE HYDROCHLORIDE SCH MG: 5 SOLUTION ORAL at 17:18

## 2017-10-16 RX ADMIN — Medication SCH ML: at 18:48

## 2017-10-16 RX ADMIN — OMEPRAZOLE SCH MG: 20 CAPSULE, DELAYED RELEASE ORAL at 07:19

## 2017-10-16 RX ADMIN — PREGABALIN SCH MG: 50 CAPSULE ORAL at 14:32

## 2017-10-16 RX ADMIN — Medication SCH CAP: at 09:17

## 2017-10-16 RX ADMIN — WATER SCH NOTE: 100 INJECTION, SOLUTION INTRAVENOUS at 07:20

## 2017-10-16 RX ADMIN — Medication SCH ML: at 06:04

## 2017-10-16 RX ADMIN — ENOXAPARIN SODIUM SCH MG: 40 INJECTION SUBCUTANEOUS at 20:05

## 2017-10-16 RX ADMIN — Medication SCH MG: at 20:05

## 2017-10-16 RX ADMIN — ECONAZOLE NITRATE SCH APPLIC: 10 CREAM TOPICAL at 09:32

## 2017-10-16 RX ADMIN — NYSTATIN SCH APPLIC: 100000 CREAM TOPICAL at 09:21

## 2017-10-16 RX ADMIN — FOLIC ACID SCH MG: 1 TABLET ORAL at 09:16

## 2017-10-16 NOTE — PN
Progress Note





- Progress Note


Date of Service: 10/16/17


SOAP: 


Subjective:





Doing well without complaint


He says he is eating better


No pain at present








Objective:





 











Temp Pulse Resp BP Pulse Ox


 


 98.3 F   74   16   160/76   97 


 


 10/16/17 07:42  10/16/17 07:42  10/16/17 14:32  10/16/17 07:42  10/16/17 07:42








PEX:


Sacral decubitus is clean with healthy granulation tissue present except at the 

upper midline where there is still exposed bone and some fibrous tissue.


No odor or drainage, no signs of infection.








Assessment:


Sacral decubitus-healthy and clean.  No debridement needed at this point








Plan:


Continue present wound care


Will discuss with wife possible return to wound vac


Plastic surgical consult next week in [place].

## 2017-10-16 NOTE — PN
Hospitalist Progress Note





S:Patient seen today with no new complaints. Per wife the rash in patient's 

groin has been getting worse and is not responding to nystatin cream. Wife is 

also concerned about rash under the tape on patient's left upper buttock which 

she is concerned will interfere with the plans for a skin flap. PCP also 

concerned about minor and stable anemia and would like FOBT testing.





O: Red wet rash in intertriginous zones of groin and scrotum without central 

clearing with small lesions away from main rash.


Small rash under tape with scaly borders and central clearing.





A/P: Intertriginous Candidal Infection of Groin: Will trial patient's econazole 

cream on groin as well even though candida should be susceptible to nystatin, 

it has been unresponsive for 3 weeks of treatment.





Tinea Corporis Left Buttock: Looks similar to rash that is being treated with 

and is responsive to econazole cream.





Anemia, Mild: Patient has no obvious source of blood loss with FOBT being 

negative. Patient has mild iron deficiency as of 8/16 and is being treated with 

Iron and Vitamin C. Patient also has a chronic infection. May be a combination 

of Iron deficiency anemia and anemia of chronic disease. Patient is 

asymptomatic at this time. Will continue to monitor.

## 2017-10-17 LAB
ANION GAP SERPL CALC-SCNC: 5 MMOL/L (ref 2–11)
BUN SERPL-MCNC: 33 MG/DL (ref 6–24)
BUN/CREAT SERPL: 28.4 (ref 8–20)
CALCIUM SERPL-MCNC: 9.7 MG/DL (ref 8.6–10.3)
CHLORIDE SERPL-SCNC: 101 MMOL/L (ref 101–111)
GLUCOSE SERPL-MCNC: 131 MG/DL (ref 70–100)
HCO3 SERPL-SCNC: 31 MMOL/L (ref 22–32)
HCT VFR BLD AUTO: 37 % (ref 42–52)
HGB BLD-MCNC: 12.2 G/DL (ref 14–18)
MCH RBC QN AUTO: 27 PG (ref 27–31)
MCHC RBC AUTO-ENTMCNC: 33 G/DL (ref 31–36)
MCV RBC AUTO: 84 FL (ref 80–94)
POTASSIUM SERPL-SCNC: 4 MMOL/L (ref 3.5–5)
RBC # BLD AUTO: 4.47 10^6/UL (ref 4–5.4)
SODIUM SERPL-SCNC: 137 MMOL/L (ref 133–145)
WBC # BLD AUTO: 10 10^3/UL (ref 3.5–10.8)

## 2017-10-17 RX ADMIN — ASPIRIN SCH MG: 81 TABLET, COATED ORAL at 10:08

## 2017-10-17 RX ADMIN — DIPHENOXYLATE HYDROCHLORIDE AND ATROPINE SULFATE SCH TAB: 2.5; .025 TABLET ORAL at 16:54

## 2017-10-17 RX ADMIN — OMEPRAZOLE SCH MG: 20 CAPSULE, DELAYED RELEASE ORAL at 07:56

## 2017-10-17 RX ADMIN — HYDROCORTISONE SCH APPLIC: 1 CREAM TOPICAL at 21:33

## 2017-10-17 RX ADMIN — PREGABALIN SCH MG: 50 CAPSULE ORAL at 21:28

## 2017-10-17 RX ADMIN — ENOXAPARIN SODIUM SCH MG: 40 INJECTION SUBCUTANEOUS at 21:29

## 2017-10-17 RX ADMIN — CYANOCOBALAMIN TAB 500 MCG SCH MCG: 500 TAB at 10:08

## 2017-10-17 RX ADMIN — Medication SCH MG: at 10:08

## 2017-10-17 RX ADMIN — Medication SCH ML: at 11:12

## 2017-10-17 RX ADMIN — Medication SCH CAP: at 10:08

## 2017-10-17 RX ADMIN — Medication SCH ML: at 16:55

## 2017-10-17 RX ADMIN — ECONAZOLE NITRATE SCH APPLIC: 10 CREAM TOPICAL at 10:20

## 2017-10-17 RX ADMIN — Medication SCH CAP: at 21:29

## 2017-10-17 RX ADMIN — DIPHENOXYLATE HYDROCHLORIDE AND ATROPINE SULFATE SCH TAB: 2.5; .025 TABLET ORAL at 21:28

## 2017-10-17 RX ADMIN — FUROSEMIDE SCH MG: 40 TABLET ORAL at 10:08

## 2017-10-17 RX ADMIN — DIPHENOXYLATE HYDROCHLORIDE AND ATROPINE SULFATE SCH TAB: 2.5; .025 TABLET ORAL at 12:21

## 2017-10-17 RX ADMIN — METRONIDAZOLE SCH MG: 250 TABLET ORAL at 21:28

## 2017-10-17 RX ADMIN — OXYCODONE HYDROCHLORIDE SCH MG: 5 SOLUTION ORAL at 16:54

## 2017-10-17 RX ADMIN — FOLIC ACID SCH MG: 1 TABLET ORAL at 10:09

## 2017-10-17 RX ADMIN — PREGABALIN SCH MG: 50 CAPSULE ORAL at 15:15

## 2017-10-17 RX ADMIN — WATER SCH NOTE: 100 INJECTION, SOLUTION INTRAVENOUS at 06:57

## 2017-10-17 RX ADMIN — OXYCODONE HYDROCHLORIDE SCH MG: 5 SOLUTION ORAL at 12:22

## 2017-10-17 RX ADMIN — OXYCODONE HYDROCHLORIDE SCH MG: 5 SOLUTION ORAL at 07:56

## 2017-10-17 RX ADMIN — OXYCODONE HYDROCHLORIDE AND ACETAMINOPHEN SCH MG: 500 TABLET ORAL at 10:08

## 2017-10-17 RX ADMIN — DIPHENOXYLATE HYDROCHLORIDE AND ATROPINE SULFATE SCH TAB: 2.5; .025 TABLET ORAL at 10:08

## 2017-10-17 RX ADMIN — Medication SCH MG: at 21:29

## 2017-10-17 RX ADMIN — HYDROCORTISONE SCH APPLIC: 1 CREAM TOPICAL at 15:16

## 2017-10-17 RX ADMIN — HYDROCORTISONE SCH APPLIC: 1 CREAM TOPICAL at 10:18

## 2017-10-17 RX ADMIN — WATER SCH NOTE: 100 INJECTION, SOLUTION INTRAVENOUS at 19:20

## 2017-10-17 RX ADMIN — POTASSIUM CHLORIDE SCH MEQ: 750 TABLET, FILM COATED, EXTENDED RELEASE ORAL at 10:08

## 2017-10-17 RX ADMIN — METRONIDAZOLE SCH MG: 250 TABLET ORAL at 10:08

## 2017-10-17 RX ADMIN — FENTANYL TRANSDERMAL SCH MCG: 75 PATCH, EXTENDED RELEASE TRANSDERMAL at 21:23

## 2017-10-17 RX ADMIN — Medication SCH ML: at 06:09

## 2017-10-17 RX ADMIN — PREGABALIN SCH MG: 50 CAPSULE ORAL at 10:09

## 2017-10-17 RX ADMIN — Medication SCH UNITS: at 10:09

## 2017-10-17 NOTE — RAD
Indication: Preop chest x-ray.



2 views of the chest and shape no mediastinal shift. Heart is of normal size and

configuration. Lung fields appear clear.



IMPRESSION: No active cardiopulmonary disease is noted.

## 2017-10-18 RX ADMIN — Medication SCH CAP: at 10:03

## 2017-10-18 RX ADMIN — WATER SCH NOTE: 100 INJECTION, SOLUTION INTRAVENOUS at 18:39

## 2017-10-18 RX ADMIN — Medication SCH ML: at 05:58

## 2017-10-18 RX ADMIN — POTASSIUM CHLORIDE SCH MEQ: 750 TABLET, FILM COATED, EXTENDED RELEASE ORAL at 10:05

## 2017-10-18 RX ADMIN — METRONIDAZOLE SCH MG: 250 TABLET ORAL at 20:03

## 2017-10-18 RX ADMIN — OXYCODONE HYDROCHLORIDE SCH MG: 5 SOLUTION ORAL at 11:43

## 2017-10-18 RX ADMIN — DIPHENOXYLATE HYDROCHLORIDE AND ATROPINE SULFATE SCH TAB: 2.5; .025 TABLET ORAL at 17:12

## 2017-10-18 RX ADMIN — Medication SCH MG: at 10:05

## 2017-10-18 RX ADMIN — Medication SCH: at 16:50

## 2017-10-18 RX ADMIN — HYDROCORTISONE SCH APPLIC: 1 CREAM TOPICAL at 14:41

## 2017-10-18 RX ADMIN — HYDROCORTISONE SCH APPLIC: 1 CREAM TOPICAL at 08:10

## 2017-10-18 RX ADMIN — Medication SCH MG: at 10:04

## 2017-10-18 RX ADMIN — ASPIRIN SCH MG: 81 TABLET, COATED ORAL at 10:04

## 2017-10-18 RX ADMIN — Medication SCH CAP: at 20:05

## 2017-10-18 RX ADMIN — DIPHENOXYLATE HYDROCHLORIDE AND ATROPINE SULFATE SCH TAB: 2.5; .025 TABLET ORAL at 20:04

## 2017-10-18 RX ADMIN — PREGABALIN SCH MG: 50 CAPSULE ORAL at 20:05

## 2017-10-18 RX ADMIN — Medication SCH UNITS: at 10:03

## 2017-10-18 RX ADMIN — OMEPRAZOLE SCH MG: 20 CAPSULE, DELAYED RELEASE ORAL at 05:58

## 2017-10-18 RX ADMIN — ECONAZOLE NITRATE SCH APPLIC: 10 CREAM TOPICAL at 08:08

## 2017-10-18 RX ADMIN — PREGABALIN SCH MG: 50 CAPSULE ORAL at 10:03

## 2017-10-18 RX ADMIN — ENOXAPARIN SODIUM SCH MG: 40 INJECTION SUBCUTANEOUS at 20:03

## 2017-10-18 RX ADMIN — CYANOCOBALAMIN TAB 500 MCG SCH MCG: 500 TAB at 10:04

## 2017-10-18 RX ADMIN — METRONIDAZOLE SCH MG: 250 TABLET ORAL at 10:05

## 2017-10-18 RX ADMIN — PREGABALIN SCH MG: 50 CAPSULE ORAL at 14:40

## 2017-10-18 RX ADMIN — DIPHENOXYLATE HYDROCHLORIDE AND ATROPINE SULFATE SCH TAB: 2.5; .025 TABLET ORAL at 14:40

## 2017-10-18 RX ADMIN — HYDROCORTISONE SCH APPLIC: 1 CREAM TOPICAL at 20:05

## 2017-10-18 RX ADMIN — DIPHENOXYLATE HYDROCHLORIDE AND ATROPINE SULFATE SCH TAB: 2.5; .025 TABLET ORAL at 10:05

## 2017-10-18 RX ADMIN — Medication SCH MG: at 20:04

## 2017-10-18 RX ADMIN — FUROSEMIDE SCH MG: 40 TABLET ORAL at 10:04

## 2017-10-18 RX ADMIN — FOLIC ACID SCH MG: 1 TABLET ORAL at 10:04

## 2017-10-18 RX ADMIN — OXYCODONE HYDROCHLORIDE AND ACETAMINOPHEN SCH MG: 500 TABLET ORAL at 10:04

## 2017-10-18 RX ADMIN — Medication SCH ML: at 10:55

## 2017-10-18 RX ADMIN — OXYCODONE HYDROCHLORIDE SCH MG: 5 SOLUTION ORAL at 17:12

## 2017-10-18 RX ADMIN — WATER SCH NOTE: 100 INJECTION, SOLUTION INTRAVENOUS at 07:11

## 2017-10-18 RX ADMIN — OXYCODONE HYDROCHLORIDE SCH MG: 5 SOLUTION ORAL at 08:06

## 2017-10-18 NOTE — PN
Progress Note





- Progress Note


Date of Service: 10/18/17


SOAP: 


Subjective:





Without new complaint


Wife is present today at bedside








Objective:





 











Temp Pulse Resp BP Pulse Ox


 


 97.9 F   79   16   148/71   96 


 


 10/18/17 00:05  10/18/17 00:05  10/18/17 11:43  10/18/17 00:05  10/18/17 00:05








PEX:


Sacral ulcer remains clean and has healthy granulation tissue present except at 

area in midline where there remains some exposed bone and fibrous tissue.


No odor or drainage.  Surrounding skin with good integrity and no irritation.








Assessment:


Sacral pressure ulcer








Plan:


Will now apply wound vac-125 mm HG suction with white foam over exposed bone.  

I think this will now expedite healing and wound care.


All discussed with wife at bedside and her questions were answered.

## 2017-10-19 RX ADMIN — PREGABALIN SCH MG: 50 CAPSULE ORAL at 09:58

## 2017-10-19 RX ADMIN — OXYCODONE HYDROCHLORIDE SCH MG: 5 SOLUTION ORAL at 07:45

## 2017-10-19 RX ADMIN — DIPHENOXYLATE HYDROCHLORIDE AND ATROPINE SULFATE SCH TAB: 2.5; .025 TABLET ORAL at 10:01

## 2017-10-19 RX ADMIN — FUROSEMIDE SCH MG: 40 TABLET ORAL at 10:00

## 2017-10-19 RX ADMIN — ENOXAPARIN SODIUM SCH MG: 40 INJECTION SUBCUTANEOUS at 21:22

## 2017-10-19 RX ADMIN — Medication SCH ML: at 11:16

## 2017-10-19 RX ADMIN — POTASSIUM CHLORIDE SCH MEQ: 750 TABLET, FILM COATED, EXTENDED RELEASE ORAL at 10:00

## 2017-10-19 RX ADMIN — PREGABALIN SCH MG: 50 CAPSULE ORAL at 15:01

## 2017-10-19 RX ADMIN — DIPHENOXYLATE HYDROCHLORIDE AND ATROPINE SULFATE SCH TAB: 2.5; .025 TABLET ORAL at 21:21

## 2017-10-19 RX ADMIN — OXYCODONE HYDROCHLORIDE AND ACETAMINOPHEN SCH MG: 500 TABLET ORAL at 10:00

## 2017-10-19 RX ADMIN — Medication SCH CAP: at 21:21

## 2017-10-19 RX ADMIN — DIPHENOXYLATE HYDROCHLORIDE AND ATROPINE SULFATE SCH TAB: 2.5; .025 TABLET ORAL at 17:44

## 2017-10-19 RX ADMIN — Medication SCH MG: at 09:58

## 2017-10-19 RX ADMIN — OMEPRAZOLE SCH MG: 20 CAPSULE, DELAYED RELEASE ORAL at 06:07

## 2017-10-19 RX ADMIN — OXYCODONE HYDROCHLORIDE SCH MG: 5 SOLUTION ORAL at 17:44

## 2017-10-19 RX ADMIN — CYANOCOBALAMIN TAB 500 MCG SCH MCG: 500 TAB at 09:59

## 2017-10-19 RX ADMIN — Medication SCH MG: at 10:00

## 2017-10-19 RX ADMIN — METRONIDAZOLE SCH MG: 250 TABLET ORAL at 21:22

## 2017-10-19 RX ADMIN — ASPIRIN SCH MG: 81 TABLET, COATED ORAL at 10:00

## 2017-10-19 RX ADMIN — Medication SCH ML: at 17:51

## 2017-10-19 RX ADMIN — DIPHENOXYLATE HYDROCHLORIDE AND ATROPINE SULFATE SCH TAB: 2.5; .025 TABLET ORAL at 12:10

## 2017-10-19 RX ADMIN — HYDROCORTISONE SCH APPLIC: 1 CREAM TOPICAL at 08:15

## 2017-10-19 RX ADMIN — ECONAZOLE NITRATE SCH APPLIC: 10 CREAM TOPICAL at 08:15

## 2017-10-19 RX ADMIN — HYDROCORTISONE SCH APPLIC: 1 CREAM TOPICAL at 15:03

## 2017-10-19 RX ADMIN — Medication SCH CAP: at 10:00

## 2017-10-19 RX ADMIN — FOLIC ACID SCH MG: 1 TABLET ORAL at 09:59

## 2017-10-19 RX ADMIN — WATER SCH NOTE: 100 INJECTION, SOLUTION INTRAVENOUS at 19:06

## 2017-10-19 RX ADMIN — HYDROCORTISONE SCH APPLIC: 1 CREAM TOPICAL at 21:59

## 2017-10-19 RX ADMIN — Medication SCH MG: at 21:22

## 2017-10-19 RX ADMIN — WATER SCH NOTE: 100 INJECTION, SOLUTION INTRAVENOUS at 06:56

## 2017-10-19 RX ADMIN — METRONIDAZOLE SCH MG: 250 TABLET ORAL at 09:58

## 2017-10-19 RX ADMIN — OXYCODONE HYDROCHLORIDE SCH MG: 5 SOLUTION ORAL at 12:09

## 2017-10-19 RX ADMIN — Medication SCH ML: at 06:09

## 2017-10-19 RX ADMIN — HYDROCORTISONE SCH APPLIC: 1 CREAM TOPICAL at 15:04

## 2017-10-19 RX ADMIN — Medication SCH UNITS: at 09:58

## 2017-10-19 RX ADMIN — PREGABALIN SCH MG: 50 CAPSULE ORAL at 21:22

## 2017-10-20 RX ADMIN — PREGABALIN SCH MG: 50 CAPSULE ORAL at 19:58

## 2017-10-20 RX ADMIN — WATER SCH NOTE: 100 INJECTION, SOLUTION INTRAVENOUS at 18:49

## 2017-10-20 RX ADMIN — PREGABALIN SCH MG: 50 CAPSULE ORAL at 14:29

## 2017-10-20 RX ADMIN — METRONIDAZOLE SCH MG: 250 TABLET ORAL at 19:58

## 2017-10-20 RX ADMIN — DIPHENOXYLATE HYDROCHLORIDE AND ATROPINE SULFATE SCH TAB: 2.5; .025 TABLET ORAL at 17:30

## 2017-10-20 RX ADMIN — Medication SCH MG: at 10:02

## 2017-10-20 RX ADMIN — FENTANYL TRANSDERMAL SCH MCG: 75 PATCH, EXTENDED RELEASE TRANSDERMAL at 20:02

## 2017-10-20 RX ADMIN — OXYCODONE HYDROCHLORIDE AND ACETAMINOPHEN SCH MG: 500 TABLET ORAL at 09:59

## 2017-10-20 RX ADMIN — OXYCODONE HYDROCHLORIDE SCH MG: 5 SOLUTION ORAL at 17:30

## 2017-10-20 RX ADMIN — WATER SCH NOTE: 100 INJECTION, SOLUTION INTRAVENOUS at 07:00

## 2017-10-20 RX ADMIN — Medication SCH MG: at 19:58

## 2017-10-20 RX ADMIN — DIPHENOXYLATE HYDROCHLORIDE AND ATROPINE SULFATE SCH TAB: 2.5; .025 TABLET ORAL at 14:28

## 2017-10-20 RX ADMIN — OXYCODONE HYDROCHLORIDE SCH MG: 5 SOLUTION ORAL at 08:43

## 2017-10-20 RX ADMIN — Medication SCH MG: at 10:06

## 2017-10-20 RX ADMIN — ASPIRIN SCH MG: 81 TABLET, COATED ORAL at 09:59

## 2017-10-20 RX ADMIN — HYDROCORTISONE SCH APPLIC: 1 CREAM TOPICAL at 20:22

## 2017-10-20 RX ADMIN — DIPHENOXYLATE HYDROCHLORIDE AND ATROPINE SULFATE SCH TAB: 2.5; .025 TABLET ORAL at 10:04

## 2017-10-20 RX ADMIN — Medication SCH CAP: at 10:04

## 2017-10-20 RX ADMIN — Medication SCH UNITS: at 10:01

## 2017-10-20 RX ADMIN — FOLIC ACID SCH MG: 1 TABLET ORAL at 10:03

## 2017-10-20 RX ADMIN — ECONAZOLE NITRATE SCH APPLIC: 10 CREAM TOPICAL at 08:00

## 2017-10-20 RX ADMIN — ENOXAPARIN SODIUM SCH MG: 40 INJECTION SUBCUTANEOUS at 19:58

## 2017-10-20 RX ADMIN — Medication SCH CAP: at 19:58

## 2017-10-20 RX ADMIN — DIPHENOXYLATE HYDROCHLORIDE AND ATROPINE SULFATE SCH TAB: 2.5; .025 TABLET ORAL at 19:58

## 2017-10-20 RX ADMIN — Medication SCH ML: at 06:07

## 2017-10-20 RX ADMIN — FUROSEMIDE SCH MG: 40 TABLET ORAL at 10:03

## 2017-10-20 RX ADMIN — CYANOCOBALAMIN TAB 500 MCG SCH MCG: 500 TAB at 10:04

## 2017-10-20 RX ADMIN — OMEPRAZOLE SCH MG: 20 CAPSULE, DELAYED RELEASE ORAL at 06:07

## 2017-10-20 RX ADMIN — POTASSIUM CHLORIDE SCH MEQ: 750 TABLET, FILM COATED, EXTENDED RELEASE ORAL at 10:00

## 2017-10-20 RX ADMIN — METRONIDAZOLE SCH MG: 250 TABLET ORAL at 10:00

## 2017-10-20 RX ADMIN — HYDROCORTISONE SCH APPLIC: 1 CREAM TOPICAL at 08:00

## 2017-10-20 RX ADMIN — Medication SCH ML: at 18:00

## 2017-10-20 RX ADMIN — PREGABALIN SCH MG: 50 CAPSULE ORAL at 10:02

## 2017-10-20 RX ADMIN — OXYCODONE HYDROCHLORIDE SCH MG: 5 SOLUTION ORAL at 12:10

## 2017-10-20 RX ADMIN — HYDROCORTISONE SCH APPLIC: 1 CREAM TOPICAL at 14:28

## 2017-10-20 NOTE — PN
Subjective


Date of Service: 10/20/17


Interval History: 





Patient seen and examined at bedside. Denies fever, chills, shortness of breath

, chest discomfort, N/V/D. Pt states that he is feeling well and offers no 

complaints.








Family History: Unchanged from Admission


Social History: Unchanged from Admission


Past Medical History: Unchanged from Admission





Objective


Active Medications: 





Acetaminophen (Tylenol Tab*)  650 mg PO Q6H PRN Reason: FEVER/PAIN


Ascorbic Acid (Vitamin C  Tab*)  500 mg PO DAILY [facility]


Aspirin (Aspirin Ec Low Dose*)  81 mg PO DAILY [facility]


Bisacodyl (Dulcolax Supp*)  10 mg [place] DAILY PRN Reason: CONSTIPATION


Cholecalciferol (Vitamin D Tab*)  5,000 units PO DAILY [facility]


Cyanocobalamin (Vitamin B12 Tab*)  1,000 mcg PO DAILY [facility]


Diphenoxylate HCl/Atropine (Lomotil Tab*)  1 tab PO QID [facility]


Docusate Sodium (Colace Cap*)  100 mg PO BID PRN Reason: CONSTIPATION


Econazole Nitrate (Econazole 1 % Cream (Nf))  1 applic TOPICAL DAILY [facility]


Enoxaparin Sodium (Lovenox(*))  40 mg SUBCUT Q24H [facility]


Fentanyl (Duragesic  Patch 75 Mcg/Hr*)  75 mcg TRANSDERM Q72H [facility]


Ferrous Gluconate (Fergon Tab*)  324 mg PO BID [facility]


Folic Acid (Folvite Tab*)  0.5 mg PO DAILY [facility]


Furosemide (Lasix Tab*)  40 mg PO DAILY [facility]


Heparin Sodium (Porcine) (Heparin Flush Picc/Ml/Cvc(*))  1 ml FLUSH 0600,1800 

[facility]


Hydrocortisone (Hytone Cream 1%*)  1 applic TOPICAL TID [facility]


Ceftriaxone Sodium 2 gm/ (Sodium Chloride)  100 mls @ 200 mls/hr IVPB Q24H [facility]


Lactobacillus Rhamnosus (Culturelle*)  1 cap PO BID [facility]


Metronidazole (Flagyl Tab*)  500 mg PO BID [facility]


Omeprazole (Prilosec Cap*)  40 mg PO 0600 [facility]


Ondansetron HCl (Zofran Odt Tab*)  4 mg PO Q8H PRN Reason: NAUSEA/VOMITING


Oxycodone HCl (Oxycodone Oral.Soln*)  10 mg PO AC [facility]


Pharmacy Profile Note (Fentanyl Patch Check Q Shift)  1 note N/A 0700,1900 [facility]


Potassium Chloride (Klor Con Er Tab*)  10 meq PO DAILY [facility]


Pregabalin (Lyrica Cap(*))  150 mg PO TID [facility]


Zinc Sulfate (Zinc-220 Cap*)  220 mg PO DAILY [facility]





 Vital Signs














  10/19/17 10/19/17 10/19/17





  21:57 22:00 23:21


 


Temperature 99.1 F  


 


Pulse Rate 81  


 


Respiratory 14 14 18





Rate   


 


Blood Pressure 126/64  





(mmHg)   


 


O2 Sat by Pulse 94  





Oximetry   














  10/20/17 10/20/17 10/20/17





  08:00 08:07 08:43


 


Temperature  98.7 F 


 


Pulse Rate  74 


 


Respiratory 14 16 14





Rate   


 


Blood Pressure  156/76 





(mmHg)   


 


O2 Sat by Pulse  99 





Oximetry   




















  10/20/17 10/20/17 10/20/17





  14:29 15:24 17:30


 


Temperature   


 


Pulse Rate  91 


 


Respiratory 14 16 16





Rate   


 


Blood Pressure  145/69 





(mmHg)   


 


O2 Sat by Pulse  95 





Oximetry   














  10/20/17





  18:53


 


Temperature 98.6 F


 


Pulse Rate 82


 


Respiratory 14





Rate 


 


Blood Pressure 154/62





(mmHg) 


 


O2 Sat by Pulse 97





Oximetry 











Oxygen Devices in Use Now: None


Appearance: NAD, laying in bed


Ears/Nose/Mouth/Throat: Mucous Membranes Moist


Respiratory: Symmetrical Chest Expansion and Respiratory Effort, Clear to 

Auscultation


Cardiovascular: NL Sounds; No Murmurs; No JVD, RRR


Abdominal: NL Sounds; No Tenderness; No Distention


Extremities: No Edema


Neurological: Alert and Oriented x 3 - , forgetful, NL Muscle Strength and Tone


Lines/Tubes/Other Access: Clean, Dry and Intact PICC Line - site benign


Nutrition: Taking PO's


Result Diagrams: 


 10/17/17 14:11





 10/17/17 14:11


Microbiology and Other Data: 


 Microbiology











 10/16/17 12:15 Stool Occult Blood ([facility]J) - Final





 Stool 














Assess/Plan/Problems-Billing


Assessment: 





Mr. Pryor is a 73 yo male with PMH significant for who presented to the 

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 

3, neurogenic bowel and bladder, who presented initially to the emergency room 

with sepsis secondary to an infected sacral decubitus ulcer on 9/2/17 and has 

been hospitalized since, he is now a swing patient awaiting surgery.











- Patient Problems


(1) Acute osteomyelitis of sacrum


Code(s): M46.28 - OSTEOMYELITIS OF VERTEBRA, SACRAL AND SACROCOCCYGEAL REGION   

SNOMED Code(s): 596595455


   Comment: 


- No evidence of new sepsis, CRP stable.


- Continue Ceftriaxone and flagyl at current doses per ID.   





(2) Sacral decubitus ulcer, stage IV


Code(s): L89.154 - PRESSURE ULCER OF SACRAL REGION, STAGE 4   SNOMED Code(s): 

742889286


   Comment: 


- Plastic surgery consultation with Dr. Luis Felipe Chirinos at [facility], plan 

for surgery on 10/25


- Appreciate nutrition recommendations to support healing, attempt for 6 small 

protein rich meals daily and continue vitamins (Vitamin C). 


- Continue wound care per surgery recommendations, last debrided 10/4 by Dr Holley, now with a wound vac





According to the RCRI the patient has 0 points, placing him at a class I risk 

and a 0.4% risk of a major cardiac event. EKG - Sinus rhythm, no signs of acute 

ischemia. METs score - less than 1 due to paraglegia. The patient is medically 

optimized for surgery and requires no further cardiac work-up.





   





(3) Anemia


Code(s): D64.9 - ANEMIA, UNSPECIFIED   SNOMED Code(s): 574157157


   Comment: 





- Suspect anemia of chronic disease and AMY.


- Stool for occult blood negative.


- No s/s of bleeding.


- Continue iron supplementation, B12, folate.    





(4) Candidiasis


Code(s): B37.9 - CANDIDIASIS, UNSPECIFIED   SNOMED Code(s): 40085399


   Comment: 


- Groin


- Continue Econazole   





(5) Diarrhea


Code(s): R19.7 - DIARRHEA, UNSPECIFIED   SNOMED Code(s): 56293145


   Comment: 


- ~ 1 BM daily


- Continued assiduous lidia-care. 


- Continue Flagyl, Probiotic, and Lomotil


   





(6) Tinea corporis


Code(s): B35.4 - TINEA CORPORIS   SNOMED Code(s): 60759309


   Comment: 


- Improving annular scaly lesion on hip and new area on left buttock consistent 

with tinea corporis. 


- Continue home antifungal cream, econazole cream.   





(7) Chronic indwelling [place] catheter


Code(s): Z92.89 - PERSONAL HISTORY OF OTHER MEDICAL TREATMENT   SNOMED Code(s): 

269454386


   Comment: 


- High risk of UTI 


- currently on ABX, no current evidence of UTI.   





(8) Ependymoma


Code(s): C71.9 - MALIGNANT NEOPLASM OF BRAIN, UNSPECIFIED   SNOMED Code(s): 

467046496


   Comment: 


- To the spine (dx in patient's 20s)


- With concurrent chronic back pain and inability to ambulate


- s/p multiple surgeries


- Back anatomy puts patient at much higher risk for pressure ulcer formation, 

as does high opiate requirements and fragility with easily acquired infections (

pneumonia / urinary tract infections). UTI's in particular are a high risk 

given his neurogenic bladder and indwelling london catheter.   





(9) HTN (hypertension)


Code(s): I10 - ESSENTIAL (PRIMARY) HYPERTENSION   SNOMED Code(s): 85905949


   Comment: 


- -160's


- Continue lasix   





(10) BONIFACIO (obstructive sleep apnea)


Code(s): G47.33 - OBSTRUCTIVE SLEEP APNEA (ADULT) (PEDIATRIC)   SNOMED Code(s): 

26945609


   Comment: 


Continue home BiPAP use.   





(11) DVT prophylaxis


Code(s): LAE2515 -    SNOMED Code(s): 244531161


   Comment: 


Lovenox


TEDs in bed.   





(12) Full code status


Code(s): Z78.9 - OTHER SPECIFIED HEALTH STATUS   


Status and Disposition: 





Inpatient. Plan for transfer to [facility] on 10/25/17 for I+D, 

flap and wound vac with Dr. Luis Felipe Chirinos.

## 2017-10-21 RX ADMIN — FUROSEMIDE SCH MG: 40 TABLET ORAL at 09:00

## 2017-10-21 RX ADMIN — OXYCODONE HYDROCHLORIDE SCH MG: 5 SOLUTION ORAL at 16:29

## 2017-10-21 RX ADMIN — OXYCODONE HYDROCHLORIDE AND ACETAMINOPHEN SCH MG: 500 TABLET ORAL at 09:01

## 2017-10-21 RX ADMIN — Medication SCH ML: at 16:29

## 2017-10-21 RX ADMIN — HYDROCORTISONE SCH APPLIC: 1 CREAM TOPICAL at 08:00

## 2017-10-21 RX ADMIN — DIPHENOXYLATE HYDROCHLORIDE AND ATROPINE SULFATE SCH TAB: 2.5; .025 TABLET ORAL at 16:29

## 2017-10-21 RX ADMIN — Medication SCH ML: at 06:02

## 2017-10-21 RX ADMIN — METRONIDAZOLE SCH MG: 250 TABLET ORAL at 09:00

## 2017-10-21 RX ADMIN — Medication SCH MG: at 09:00

## 2017-10-21 RX ADMIN — PREGABALIN SCH MG: 50 CAPSULE ORAL at 09:01

## 2017-10-21 RX ADMIN — DIPHENOXYLATE HYDROCHLORIDE AND ATROPINE SULFATE SCH TAB: 2.5; .025 TABLET ORAL at 12:28

## 2017-10-21 RX ADMIN — CYANOCOBALAMIN TAB 500 MCG SCH MCG: 500 TAB at 09:00

## 2017-10-21 RX ADMIN — FOLIC ACID SCH MG: 1 TABLET ORAL at 09:00

## 2017-10-21 RX ADMIN — ECONAZOLE NITRATE SCH APPLIC: 10 CREAM TOPICAL at 11:00

## 2017-10-21 RX ADMIN — PREGABALIN SCH MG: 50 CAPSULE ORAL at 20:52

## 2017-10-21 RX ADMIN — Medication SCH CAP: at 09:00

## 2017-10-21 RX ADMIN — WATER SCH NOTE: 100 INJECTION, SOLUTION INTRAVENOUS at 19:04

## 2017-10-21 RX ADMIN — OXYCODONE HYDROCHLORIDE SCH MG: 5 SOLUTION ORAL at 08:45

## 2017-10-21 RX ADMIN — ENOXAPARIN SODIUM SCH MG: 40 INJECTION SUBCUTANEOUS at 20:12

## 2017-10-21 RX ADMIN — ASPIRIN SCH MG: 81 TABLET, COATED ORAL at 09:00

## 2017-10-21 RX ADMIN — POTASSIUM CHLORIDE SCH MEQ: 750 TABLET, FILM COATED, EXTENDED RELEASE ORAL at 09:00

## 2017-10-21 RX ADMIN — OMEPRAZOLE SCH MG: 20 CAPSULE, DELAYED RELEASE ORAL at 06:02

## 2017-10-21 RX ADMIN — Medication SCH CAP: at 20:54

## 2017-10-21 RX ADMIN — METRONIDAZOLE SCH MG: 250 TABLET ORAL at 20:53

## 2017-10-21 RX ADMIN — HYDROCORTISONE SCH APPLIC: 1 CREAM TOPICAL at 21:57

## 2017-10-21 RX ADMIN — PREGABALIN SCH MG: 50 CAPSULE ORAL at 12:27

## 2017-10-21 RX ADMIN — DIPHENOXYLATE HYDROCHLORIDE AND ATROPINE SULFATE SCH TAB: 2.5; .025 TABLET ORAL at 20:51

## 2017-10-21 RX ADMIN — DIPHENOXYLATE HYDROCHLORIDE AND ATROPINE SULFATE SCH TAB: 2.5; .025 TABLET ORAL at 09:00

## 2017-10-21 RX ADMIN — Medication SCH MG: at 20:51

## 2017-10-21 RX ADMIN — OXYCODONE HYDROCHLORIDE SCH MG: 5 SOLUTION ORAL at 12:28

## 2017-10-21 RX ADMIN — HYDROCORTISONE SCH APPLIC: 1 CREAM TOPICAL at 14:18

## 2017-10-21 RX ADMIN — WATER SCH NOTE: 100 INJECTION, SOLUTION INTRAVENOUS at 07:01

## 2017-10-21 RX ADMIN — Medication SCH UNITS: at 09:01

## 2017-10-21 NOTE — PN
Hospitalist Progress Note





Patient was briefly visited and examined today.  No acute complaints.  Wound 

vac in place.  Using a slide board for transfers with success.  Awaiting 

transfer for flap procedure.  





Vital Signs:











Temp Pulse Resp BP Pulse Ox


 


 98.6 F   82   14   154/62   97 


 


 10/20/17 18:53  10/20/17 18:53  10/21/17 14:28  10/20/17 18:53  10/20/17 18:53








Exam:


Well appearing, in NAD


RRR, no m/r/g


Lungs CTA


Abd soft


Wound not examined





A/P:


1. Acute osteomyelitis secondary to stage IV decub - awaiting transfer for flap 

closure





Swing status

## 2017-10-22 RX ADMIN — Medication SCH ML: at 10:10

## 2017-10-22 RX ADMIN — DIPHENOXYLATE HYDROCHLORIDE AND ATROPINE SULFATE SCH TAB: 2.5; .025 TABLET ORAL at 20:13

## 2017-10-22 RX ADMIN — Medication SCH ML: at 06:09

## 2017-10-22 RX ADMIN — Medication SCH UNITS: at 08:29

## 2017-10-22 RX ADMIN — METRONIDAZOLE SCH MG: 250 TABLET ORAL at 20:14

## 2017-10-22 RX ADMIN — Medication SCH MG: at 20:13

## 2017-10-22 RX ADMIN — ECONAZOLE NITRATE SCH APPLIC: 10 CREAM TOPICAL at 08:31

## 2017-10-22 RX ADMIN — WATER SCH NOTE: 100 INJECTION, SOLUTION INTRAVENOUS at 18:58

## 2017-10-22 RX ADMIN — WATER SCH NOTE: 100 INJECTION, SOLUTION INTRAVENOUS at 07:13

## 2017-10-22 RX ADMIN — PREGABALIN SCH MG: 50 CAPSULE ORAL at 20:15

## 2017-10-22 RX ADMIN — PREGABALIN SCH: 50 CAPSULE ORAL at 10:11

## 2017-10-22 RX ADMIN — METRONIDAZOLE SCH MG: 250 TABLET ORAL at 08:30

## 2017-10-22 RX ADMIN — OXYCODONE HYDROCHLORIDE SCH MG: 5 SOLUTION ORAL at 07:44

## 2017-10-22 RX ADMIN — OXYCODONE HYDROCHLORIDE SCH MG: 5 SOLUTION ORAL at 17:08

## 2017-10-22 RX ADMIN — HYDROCORTISONE SCH APPLIC: 1 CREAM TOPICAL at 22:21

## 2017-10-22 RX ADMIN — DIPHENOXYLATE HYDROCHLORIDE AND ATROPINE SULFATE SCH TAB: 2.5; .025 TABLET ORAL at 08:30

## 2017-10-22 RX ADMIN — ASPIRIN SCH MG: 81 TABLET, COATED ORAL at 08:30

## 2017-10-22 RX ADMIN — POTASSIUM CHLORIDE SCH MEQ: 750 TABLET, FILM COATED, EXTENDED RELEASE ORAL at 08:30

## 2017-10-22 RX ADMIN — CYANOCOBALAMIN TAB 500 MCG SCH MCG: 500 TAB at 08:30

## 2017-10-22 RX ADMIN — FOLIC ACID SCH MG: 1 TABLET ORAL at 08:30

## 2017-10-22 RX ADMIN — ENOXAPARIN SODIUM SCH MG: 40 INJECTION SUBCUTANEOUS at 20:15

## 2017-10-22 RX ADMIN — HYDROCORTISONE SCH APPLIC: 1 CREAM TOPICAL at 08:31

## 2017-10-22 RX ADMIN — PREGABALIN SCH MG: 50 CAPSULE ORAL at 14:45

## 2017-10-22 RX ADMIN — OXYCODONE HYDROCHLORIDE AND ACETAMINOPHEN SCH MG: 500 TABLET ORAL at 08:30

## 2017-10-22 RX ADMIN — OXYCODONE HYDROCHLORIDE SCH MG: 5 SOLUTION ORAL at 11:48

## 2017-10-22 RX ADMIN — Medication SCH: at 16:35

## 2017-10-22 RX ADMIN — Medication SCH CAP: at 08:30

## 2017-10-22 RX ADMIN — HYDROCORTISONE SCH APPLIC: 1 CREAM TOPICAL at 14:45

## 2017-10-22 RX ADMIN — DIPHENOXYLATE HYDROCHLORIDE AND ATROPINE SULFATE SCH TAB: 2.5; .025 TABLET ORAL at 17:08

## 2017-10-22 RX ADMIN — DIPHENOXYLATE HYDROCHLORIDE AND ATROPINE SULFATE SCH TAB: 2.5; .025 TABLET ORAL at 14:44

## 2017-10-22 RX ADMIN — FUROSEMIDE SCH MG: 40 TABLET ORAL at 08:29

## 2017-10-22 RX ADMIN — OMEPRAZOLE SCH MG: 20 CAPSULE, DELAYED RELEASE ORAL at 06:10

## 2017-10-22 RX ADMIN — Medication SCH MG: at 08:30

## 2017-10-22 RX ADMIN — Medication SCH CAP: at 20:13

## 2017-10-23 RX ADMIN — FENTANYL TRANSDERMAL SCH MCG: 75 PATCH, EXTENDED RELEASE TRANSDERMAL at 20:04

## 2017-10-23 RX ADMIN — PREGABALIN SCH MG: 50 CAPSULE ORAL at 20:18

## 2017-10-23 RX ADMIN — CYANOCOBALAMIN TAB 500 MCG SCH MCG: 500 TAB at 10:02

## 2017-10-23 RX ADMIN — ASPIRIN SCH MG: 81 TABLET, COATED ORAL at 10:02

## 2017-10-23 RX ADMIN — POTASSIUM CHLORIDE SCH MEQ: 750 TABLET, FILM COATED, EXTENDED RELEASE ORAL at 10:02

## 2017-10-23 RX ADMIN — DIPHENOXYLATE HYDROCHLORIDE AND ATROPINE SULFATE SCH TAB: 2.5; .025 TABLET ORAL at 17:35

## 2017-10-23 RX ADMIN — DIPHENOXYLATE HYDROCHLORIDE AND ATROPINE SULFATE SCH TAB: 2.5; .025 TABLET ORAL at 10:02

## 2017-10-23 RX ADMIN — ECONAZOLE NITRATE SCH APPLIC: 10 CREAM TOPICAL at 08:20

## 2017-10-23 RX ADMIN — OXYCODONE HYDROCHLORIDE SCH MG: 5 CAPSULE ORAL at 12:09

## 2017-10-23 RX ADMIN — FUROSEMIDE SCH MG: 40 TABLET ORAL at 10:01

## 2017-10-23 RX ADMIN — WATER SCH NOTE: 100 INJECTION, SOLUTION INTRAVENOUS at 06:49

## 2017-10-23 RX ADMIN — Medication SCH CAP: at 20:17

## 2017-10-23 RX ADMIN — PREGABALIN SCH MG: 50 CAPSULE ORAL at 10:03

## 2017-10-23 RX ADMIN — METRONIDAZOLE SCH MG: 250 TABLET ORAL at 20:17

## 2017-10-23 RX ADMIN — HYDROCORTISONE SCH APPLIC: 1 CREAM TOPICAL at 14:08

## 2017-10-23 RX ADMIN — OMEPRAZOLE SCH MG: 20 CAPSULE, DELAYED RELEASE ORAL at 06:12

## 2017-10-23 RX ADMIN — OXYCODONE HYDROCHLORIDE SCH MG: 5 CAPSULE ORAL at 08:18

## 2017-10-23 RX ADMIN — HYDROCORTISONE SCH APPLIC: 1 CREAM TOPICAL at 10:05

## 2017-10-23 RX ADMIN — ENOXAPARIN SODIUM SCH MG: 40 INJECTION SUBCUTANEOUS at 20:20

## 2017-10-23 RX ADMIN — Medication SCH ML: at 06:23

## 2017-10-23 RX ADMIN — WATER SCH NOTE: 100 INJECTION, SOLUTION INTRAVENOUS at 18:38

## 2017-10-23 RX ADMIN — FOLIC ACID SCH MG: 1 TABLET ORAL at 10:01

## 2017-10-23 RX ADMIN — HYDROCORTISONE SCH APPLIC: 1 CREAM TOPICAL at 20:23

## 2017-10-23 RX ADMIN — OXYCODONE HYDROCHLORIDE AND ACETAMINOPHEN SCH MG: 500 TABLET ORAL at 10:01

## 2017-10-23 RX ADMIN — METRONIDAZOLE SCH MG: 250 TABLET ORAL at 10:02

## 2017-10-23 RX ADMIN — Medication SCH UNITS: at 10:02

## 2017-10-23 RX ADMIN — Medication SCH CAP: at 10:01

## 2017-10-23 RX ADMIN — OXYCODONE HYDROCHLORIDE SCH MG: 5 CAPSULE ORAL at 17:22

## 2017-10-23 RX ADMIN — PREGABALIN SCH MG: 50 CAPSULE ORAL at 14:05

## 2017-10-23 RX ADMIN — DIPHENOXYLATE HYDROCHLORIDE AND ATROPINE SULFATE SCH TAB: 2.5; .025 TABLET ORAL at 14:05

## 2017-10-23 RX ADMIN — Medication SCH ML: at 17:30

## 2017-10-23 RX ADMIN — Medication SCH MG: at 10:02

## 2017-10-23 RX ADMIN — Medication SCH MG: at 20:16

## 2017-10-23 RX ADMIN — Medication SCH ML: at 10:47

## 2017-10-23 RX ADMIN — DIPHENOXYLATE HYDROCHLORIDE AND ATROPINE SULFATE SCH TAB: 2.5; .025 TABLET ORAL at 20:16

## 2017-10-23 NOTE — PN
Progress Note





- Progress Note


Date of Service: 10/23/17


Note: 





Face to Face Wheelchair Evaluation


Jose Pryor is known to me. He is a 72 year old male with an ependymoma of 

his lumbar spine, originally diagnosed in 1971. He had debulking surgeries many 

years ago, and had radiation therapy as well. Radiation and chemotherapy were 

both tried and did not help. 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 

When I first met him in 2012, he was still ambulatory with bilateral AFOs for 

short distances and used a manual wheelchair for longer distances. As time has 

progressed, he has become less and less able to ambulate even short distances. 

He has always had pain as a result of his tumor invading the lumbar vertebrae, 

but over the past two years his pain has increased. Starting last fall the pain 

increased quite a bit and starting in May, 2017,  the pain increased 

dramatically. His pain medications were increased, and this led to a 

hospitalization over the 4th of July when he forgot to catheterize himself  for 

a few days and went into renal failure. His medications were cut back, but then 

he had more pain. He became increasingly bed bound, as propelling a wheelchair 

and sitting in a wheelchair were too painful. He had a second hospitalization 

and developed a large bedsore. He will be transferring to [place] for a 

muscle flap to cover the area. He also has lost the ability to do a stand pivot 

transfer and is using a slide board. He has a neurogenic bowel and bladder as a 

rsult of the tumor. He was emptying his bladder with intermittent 

catheterization, but now has a london to keep the bedsore clean. He has been 

using a bedpan in the hospital, but was transferring on to a commode at home. 


As a result of the tumor. Raad has limited sensation below his waist and 

almost no movement in the legs. The tumor causes significant pain and limits 

his ability to propel a manual wheelchair around the house. He gets swelling 

and edema in his legs when he sits for a while. He is no longer able to 

ambulate or stand from a chair. 





PAST MEDICAL HISTORY: Coronary artery disease, Chronic kidney disease, BONIFACIO, HTN





SOCIAL HISTORY: He lives with his wife in a one story house. They have a 

wheelchair accessible van





 Current Medications





Acetaminophen (Tylenol Tab*)  650 mg PO Q6H PRN


   PRN Reason: FEVER/PAIN


Ascorbic Acid (Vitamin C  Tab*)  500 mg PO DAILY [facility]


   Last Admin: 10/23/17 10:01 Dose:  500 mg


Aspirin (Aspirin Ec Low Dose*)  81 mg PO DAILY [facility]


   Last Admin: 10/23/17 10:02 Dose:  81 mg


Bisacodyl (Dulcolax Supp*)  10 mg [place] DAILY [facility]


   PRN Reason: CONSTIPATION


Cholecalciferol (Vitamin D Tab*)  5,000 units PO DAILY [facility]


   Last Admin: 10/23/17 10:02 Dose:  5,000 units


Cyanocobalamin (Vitamin B12 Tab*)  1,000 mcg PO DAILY [facility]


   Last Admin: 10/23/17 10:02 Dose:  1,000 mcg


Diphenoxylate HCl/Atropine (Lomotil Tab*)  1 tab PO QID [facility]


   Last Admin: 10/23/17 17:35 Dose:  1 tab


Docusate Sodium (Colace Cap*)  100 mg PO BID PRN


   PRN Reason: CONSTIPATION


Econazole Nitrate (Econazole 1 % Cream (Nf))  1 applic TOPICAL DAILY [facility]


   Last Admin: 10/23/17 08:20 Dose:  1 applic


Enoxaparin Sodium (Lovenox(*))  40 mg SUBCUT Q24H [facility]


   Stop: 10/23/17 20:01


   Last Admin: 10/22/17 20:15 Dose:  40 mg


Fentanyl (Duragesic  Patch 75 Mcg/Hr*)  75 mcg TRANSDERM Q72H [facility]


   Last Admin: 10/20/17 20:02 Dose:  75 mcg


Ferrous Gluconate (Fergon Tab*)  324 mg PO BID [facility]


   Last Admin: 10/23/17 10:02 Dose:  324 mg


Folic Acid (Folvite Tab*)  0.5 mg PO DAILY [facility]


   Last Admin: 10/23/17 10:01 Dose:  0.5 mg


Furosemide (Lasix Tab*)  40 mg PO DAILY [facility]


   Last Admin: 10/23/17 10:01 Dose:  40 mg


Heparin Sodium (Porcine) (Heparin Flush Picc/Ml/Cvc(*))  1 ml FLUSH 0600,1800 

[facility]


   PRN Reason: Protocol


   Last Admin: 10/23/17 17:30 Dose:  1 ml


Hydrocortisone (Hytone Cream 1%*)  1 applic TOPICAL TID [facility]


   Last Admin: 10/23/17 14:08 Dose:  1 applic


Ceftriaxone Sodium 2 gm/ (Sodium Chloride)  100 mls @ 200 mls/hr IVPB Q24H [facility]


   Last Admin: 10/23/17 09:55 Dose:  200 mls/hr


Lactobacillus Rhamnosus (Culturelle*)  1 cap PO BID [facility]


   Last Admin: 10/23/17 10:01 Dose:  1 cap


Metronidazole (Flagyl Tab*)  500 mg PO BID [facility]


   Last Admin: 10/23/17 10:02 Dose:  500 mg


Omeprazole (Prilosec Cap*)  40 mg PO 0600 [facility]


   Last Admin: 10/23/17 06:12 Dose:  40 mg


Ondansetron HCl (Zofran Odt Tab*)  4 mg PO Q8H PRN


   PRN Reason: NAUSEA/VOMITING


Oxycodone HCl (Roxycodone Tab*)  10 mg PO AC [facility]


   Last Admin: 10/23/17 17:22 Dose:  10 mg


Pharmacy Profile Note (Fentanyl Patch Check Q Shift)  1 note N/A 0700,1900 [facility]


   Last Admin: 10/23/17 18:38 Dose:  1 note


Potassium Chloride (Klor Con Er Tab*)  10 meq PO DAILY [facility]


   Last Admin: 10/23/17 10:02 Dose:  10 meq


Pregabalin (Lyrica Cap(*))  150 mg PO TID [facility]


   Last Admin: 10/23/17 14:05 Dose:  150 mg


Zinc Sulfate (Zinc-220 Cap*)  220 mg PO DAILY [facility]


   Last Admin: 10/23/17 10:02 Dose:  220 mg





EXAM: 


HEENT: EOMI, NC/AT


LUNGS: CTA


HEART: S1, S2


ABDOMEN: Soft


EXTREMITIES: Decreased bulk and tone in both lower extremities. Normal in UEs


NEUROLOGIC: Alert, oriented. Muscle strength 5/5 in UEs. 2/5 hip flexion, quads 

trace, absent DF and PF. decreased sensation in legs


SKIN: Large pressure ulcer over buttock area





ASSESSMENT: 


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


Large Pressure Ulcer


Neurogenic bowel and bladder





PLAN:


I believe the patients deteriorating neurologic status qualifies him for a 

power wheelchair. His pain will not allow him to use a manual wheelchair to get 

around his house and to perform his ADLs. A scooter would not provide enough 

supprt, given his pressure ulcer history and inadequate core abdominal 

strength. He will need a power wheelchair with Tilt in Space to allow for 

pressure relief. To deal with his lower extremity edema, he will need power 

elevating leg rests. He will need power recline on the seat back to relieve 

pressure over the coccyx when legs are elevating/elevated. Given his pressure 

ulcer history, I would like him to have a high profile ROHO cushion. We will 

hope to order the chair for after his flap

## 2017-10-23 NOTE — HOSP.PREOP
Subjective


Date of Service: 10/23/17


Interval History: 





Patient has no complaints. Patient was able to transfer to the wheelchair today 

with moderate assistance from the staff and a slide-board.


Family History: Unchanged from Admission


Social History: Unchanged from Admission


Past Medical History: Findings - Patient has a Past Medical History significant 

for Empendymoma, paraplegia, neurogenic bladder and bowel, Stage IV Sacral 

decubitus ulcer, BONIFACIO, CKD Stage III, Asthma, HTN, HLD, BPH and Carotid Artery 

Stenosis, Patient has no history of Coronary Artery Disease as was previously 

stated.





Review of Systems





- Measurements


Intake and Output: 


Intake and Output Last 24 Hours











 10/21/17 10/22/17 10/23/17 10/24/17





 06:59 06:59 06:59 06:59


 


Intake Total 5811 480 3919 335


 


Output Total 2050 1375 2325 


 


Balance -880 -415 -1165 335


 


Intake:    


 


  IV Fluids 130   30


 


    NS (0.9%) 30   30


 


    abx 100   


 


  IVPB    105


 


    ceftriaxone    105


 


  Oral 0974 665 7196 200


 


Output:    


 


  Urine 700  925 


 


  Rodriguez 1350 1375 1400 


 


Other:    


 


  Date of Last Bowel   10/22/17 





  Movement    


 


  # Bowel Movements 1  1 


 


  Estimated Stool Amount Medium Small Large 














- Review of Systems


Constitutional Symptoms: 


   Negative: Weight Gain, Weight Loss, Weakness, Fatigue, Fever, Night Sweats


Dermatology: Positive: Rash - Improving rash on hip and in groin.


   Negative: Skin Lesions, Cancer, Skin Lumps


HEENT: 


   Negative: Vertigo, Dental Problems


Eyes: 


   Negative: Change in Vision, Eye Pain


Thyroid: Positive: Frequent Defecation - Improving


Pulmonary: Positive: Other - Sleep Apnea


   Negative: Cough, Sputum, Wheezing, Shortness of Breath, COPD, Asthma, 

Exercise Intolerance, Home Oxygen


Cardiology: 


   Negative: Chest Pain, Shortness of Breath, Palpitations, Swelling of Ankles, 

Peripheral Vascular Dis, Edema, Faintness, Syncope, Proximal NocturnalDyspnea, 

Orthopnoea


Gastroenterology: Positive: Diarrhea


   Negative: Abdominal Pain, Nausea, Vomiting, Anorexia, Indigestion, 

Difficulty Swallowing, Heartburn, Constipation, Blood in Stools, Haematemesis, 

Melena


Genital - Urinary: Positive: Other - Chronic Indwelling Catheter


   Negative: Hematuria


Genitourinary - Male: Negative: Family Hx of Prostate Cancer


Musculoskeletal: Positive: Low Back Pain


   Negative: Joint Pain, Joint Stiffness, Osteoporosis, Joint Deformities


Endocrinology: Positive: Obesity


   Negative: Diabetes Mellitus, Hyperglycemia


Hematologic/Lymphatic: Positive: Anemia, Use of Antiplatelet Drugs - Daily 

Aspirin


   Negative: Easy Brusing, Hx Leukemia, Hx Lymphoma, Use of Anticoagulant


Neurology: Positive: Other - Longstanding paraplegia. Insensate from the waist 

down. Neurogenic Bowel and Bladder.


   Negative: Headache, Migraines, Change in Vision, Dizziness, Change in Memory

, Change in Speech, Numbness\Paresthesiae, Unexplained Weakness, Hx of Stroke\

TIA, Hx of Seizures


Psychiatry: 


   Negative: Depression, Anxiety


Allergic/Immunologic: 


   Negative: Hx Anaphylaxis, Hx HIV, Immunocompromise, Swollen Glands LymphNodes





Objective


Active Medications: 








Acetaminophen (Tylenol Tab*)  650 mg PO Q6H PRN


   PRN Reason: FEVER/PAIN


Ascorbic Acid (Vitamin C  Tab*)  500 mg PO DAILY [facility]


   Last Admin: 10/23/17 10:01 Dose:  500 mg


Aspirin (Aspirin Ec Low Dose*)  81 mg PO DAILY [facility]


   Last Admin: 10/23/17 10:02 Dose:  81 mg


Bisacodyl (Dulcolax Supp*)  10 mg MT DAILY PRN


   PRN Reason: CONSTIPATION


Cholecalciferol (Vitamin D Tab*)  5,000 units PO DAILY [facility]


   Last Admin: 10/23/17 10:02 Dose:  5,000 units


Cyanocobalamin (Vitamin B12 Tab*)  1,000 mcg PO DAILY [facility]


   Last Admin: 10/23/17 10:02 Dose:  1,000 mcg


Diphenoxylate HCl/Atropine (Lomotil Tab*)  1 tab PO QID [facility]


   Last Admin: 10/23/17 10:02 Dose:  1 tab


Docusate Sodium (Colace Cap*)  100 mg PO BID PRN


   PRN Reason: CONSTIPATION


Econazole Nitrate (Econazole 1 % Cream (Nf))  1 applic TOPICAL DAILY [facility]


   Last Admin: 10/23/17 08:20 Dose:  1 applic


Enoxaparin Sodium (Lovenox(*))  40 mg SUBCUT Q24H [facility]


   Stop: 10/23/17 20:01


   Last Admin: 10/22/17 20:15 Dose:  40 mg


Fentanyl (Duragesic  Patch 75 Mcg/Hr*)  75 mcg TRANSDERM Q72H [facility]


   Last Admin: 10/20/17 20:02 Dose:  75 mcg


Ferrous Gluconate (Fergon Tab*)  324 mg PO BID [facility]


   Last Admin: 10/23/17 10:02 Dose:  324 mg


Folic Acid (Folvite Tab*)  0.5 mg PO DAILY [facility]


   Last Admin: 10/23/17 10:01 Dose:  0.5 mg


Furosemide (Lasix Tab*)  40 mg PO DAILY [facility]


   Last Admin: 10/23/17 10:01 Dose:  40 mg


Heparin Sodium (Porcine) (Heparin Flush Picc/Ml/Cvc(*))  1 ml FLUSH 0600,1800 

[facility]


   PRN Reason: Protocol


   Last Admin: 10/23/17 10:47 Dose:  1 ml


Hydrocortisone (Hytone Cream 1%*)  1 applic TOPICAL TID [facility]


   Last Admin: 10/23/17 10:05 Dose:  1 applic


Ceftriaxone Sodium 2 gm/ (Sodium Chloride)  100 mls @ 200 mls/hr IVPB Q24H [facility]


   Last Admin: 10/23/17 09:55 Dose:  200 mls/hr


Lactobacillus Rhamnosus (Culturelle*)  1 cap PO BID [facility]


   Last Admin: 10/23/17 10:01 Dose:  1 cap


Metronidazole (Flagyl Tab*)  500 mg PO BID [facility]


   Last Admin: 10/23/17 10:02 Dose:  500 mg


Omeprazole (Prilosec Cap*)  40 mg PO 0600 [facility]


   Last Admin: 10/23/17 06:12 Dose:  40 mg


Ondansetron HCl (Zofran Odt Tab*)  4 mg PO Q8H PRN


   PRN Reason: NAUSEA/VOMITING


Oxycodone HCl (Roxycodone Tab*)  10 mg PO AC [facility]


   Last Admin: 10/23/17 12:09 Dose:  10 mg


Pharmacy Profile Note (Fentanyl Patch Check Q Shift)  1 note N/A 0700,1900 [facility]


   Last Admin: 10/23/17 06:49 Dose:  1 note


Potassium Chloride (Klor Con Er Tab*)  10 meq PO DAILY [facility]


   Last Admin: 10/23/17 10:02 Dose:  10 meq


Pregabalin (Lyrica Cap(*))  150 mg PO TID [facility]


   Last Admin: 10/23/17 10:03 Dose:  150 mg


Zinc Sulfate (Zinc-220 Cap*)  220 mg PO DAILY [facility]


   Last Admin: 10/23/17 10:02 Dose:  220 mg








 Vital Signs











  10/22/17 10/22/17 10/22/17





  13:48 14:44 14:45


 


Temperature   


 


Pulse Rate   


 


Respiratory 18 16 16





Rate   


 


Blood Pressure   





(mmHg)   


 


O2 Sat by Pulse   





Oximetry   














  10/22/17 10/22/17 10/22/17





  16:44 16:45 17:08


 


Temperature   


 


Pulse Rate   


 


Respiratory 18 18 18





Rate   


 


Blood Pressure   





(mmHg)   


 


O2 Sat by Pulse   





Oximetry   














  10/22/17 10/22/17 10/22/17





  19:08 20:00 20:13


 


Temperature   


 


Pulse Rate   


 


Respiratory 18 20 20





Rate   


 


Blood Pressure   





(mmHg)   


 


O2 Sat by Pulse   





Oximetry   














  10/22/17 10/22/17 10/22/17





  20:15 22:13 22:15


 


Temperature   


 


Pulse Rate   


 


Respiratory 20 18 18





Rate   


 


Blood Pressure   





(mmHg)   


 


O2 Sat by Pulse   





Oximetry   














  10/23/17 10/23/17 10/23/17





  08:10 08:18 09:56


 


Temperature   98.4 F


 


Pulse Rate   87


 


Respiratory 18 18 16





Rate   


 


Blood Pressure   170/67





(mmHg)   


 


O2 Sat by Pulse   95





Oximetry   














  10/23/17 10/23/17 10/23/17





  10:02 10:03 12:02


 


Temperature   


 


Pulse Rate   


 


Respiratory 16 2 19





Rate   


 


Blood Pressure   





(mmHg)   


 


O2 Sat by Pulse   





Oximetry   














  10/23/17 10/23/17





  12:03 12:09


 


Temperature  


 


Pulse Rate  


 


Respiratory 19 18





Rate  


 


Blood Pressure  





(mmHg)  


 


O2 Sat by Pulse  





Oximetry  











Oxygen Devices in Use Now: None


Appearance: Patient is a 71yo male who appears stated age sitting up in the 

chair in NAD.


Eyes: No Scleral Icterus, PERRLA


Ears/Nose/Mouth/Throat: NL Teeth, Lips, Gums, Clear Oropharnyx, Mucous 

Membranes Moist


Neck: NL Appearance and Movements; NL JVP, Trachea Midline


Respiratory: Symmetrical Chest Expansion and Respiratory Effort, Clear to 

Auscultation


Cardiovascular: NL Sounds; No Murmurs; No JVD, RRR, No Edema, - - Pulses 2+ in 

the Bilateral Radial, Dorsalis Pedis, and Posterior Tibialis areas.


Abdominal: NL Sounds; No Tenderness; No Distention, No Hepatosplenomegaly


Lymphatic: No Cervical Adenopathy


Extremities: No Clubbing, Cyanosis


Skin: - - Dry red rash in intertriginous groin fold improving since last exam. 

Scaly rash with central clearing on left hip and buttock stable since last 

exam. Decubitus ulcer in sacral area covered by wound vac.


Neurological: Alert and Oriented x 3, - - Cranial Nerve II-XII intact. Strength 

5/5 in the bilateral upper extremities. Sensation to light touch preserved in 

upper extremities. Paralyzed and insensate from the waist down.


Result Diagrams: 


 10/17/17 14:11





 10/17/17 14:11


Additional Lab and Data: 





 Laboratory Tests











  10/17/17 10/17/17





  14:11 14:11


 


WBC  10.0 


 


RBC  4.47 


 


Hgb  12.2 L 


 


Hct  37 L 


 


MCV  84 


 


MCH  27 


 


MCHC  33 


 


RDW  19 H 


 


Plt Count  333 


 


MPV  8 


 


Neut % (Auto)  51.5 


 


Lymph % (Auto)  30.6 


 


Mono % (Auto)  14.4 H 


 


Eos % (Auto)  2.5 


 


Baso % (Auto)  1.0 


 


Absolute Neuts (auto)  5.1 


 


Absolute Lymphs (auto)  3.1 


 


Absolute Monos (auto)  1.4 H 


 


Absolute Eos (auto)  0.2 


 


Absolute Basos (auto)  0.1 


 


Absolute Nucleated RBC  0 


 


Nucleated RBC %  0 


 


Sodium   137


 


Potassium   4.0


 


Chloride   101


 


Carbon Dioxide   31


 


Anion Gap   5


 


BUN   33 H


 


Creatinine   1.16


 


Est GFR ( Amer)   79.8


 


Est GFR (Non-Af Amer)   62.1


 


BUN/Creatinine Ratio   28.4 H


 


Glucose   131 H


 


Calcium   9.7


 


C-Reactive Protein   22.35 H











Microbiology and Other Data: 


 Microbiology











 10/16/17 12:15 Stool Occult Blood (MJ) - Final





 Stool 














Assessment/Plan - Billing


Assessment: 





Mr. Pryor is a 73 yo male with PMH significant for who presented to the 

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 

Stenosis, CKD stage 3, neurogenic bowel and bladder, who presented initially to 

the emergency room with sepsis secondary to an infected sacral decubitus ulcer 

on 9/2/17 and has been hospitalized since, he is now a swing patient awaiting 

surgery.





Patient is medically optimized for the planned surgery. Chest X-Ray and EKG 

done for preoperative testing are unremarkable. Laboratory testing stable with 

a hemoglobin of 12.2. The remainder of the laboratory values were reviewed and 

have been sent previously with other preoperative testing. It has previously 

been stated that the patient had a history of coronary artery disease. This was 

an error in dictation and the patient has no history of coronary artery disease 

per the patient. This was a dictational mistake in a previous history and 

physical and meant to read Carotid Artery Disease with stenosis in the 

bilateral proximal carotid arteries from 50-69% . As such there is no need for 

additional preoperative testing at this time. According to the Surgical Risk 

Calculator from the [employer] website the patient's risk of 

a serious complication is 18.2 percent as compared to an average risk of 16.7%. 

Additional information from the risk calculator is attached.








Attending: Marj Mcclain

## 2017-10-24 VITALS — SYSTOLIC BLOOD PRESSURE: 146 MMHG | DIASTOLIC BLOOD PRESSURE: 60 MMHG

## 2017-10-24 LAB
ANION GAP SERPL CALC-SCNC: 5 MMOL/L (ref 2–11)
BUN SERPL-MCNC: 42 MG/DL (ref 6–24)
BUN/CREAT SERPL: 32.8 (ref 8–20)
CALCIUM SERPL-MCNC: 10.2 MG/DL (ref 8.6–10.3)
CHLORIDE SERPL-SCNC: 104 MMOL/L (ref 101–111)
GLUCOSE SERPL-MCNC: 99 MG/DL (ref 70–100)
HCO3 SERPL-SCNC: 31 MMOL/L (ref 22–32)
HCT VFR BLD AUTO: 34 % (ref 42–52)
HGB BLD-MCNC: 11.3 G/DL (ref 14–18)
MCH RBC QN AUTO: 28 PG (ref 27–31)
MCHC RBC AUTO-ENTMCNC: 33 G/DL (ref 31–36)
MCV RBC AUTO: 83 FL (ref 80–94)
POTASSIUM SERPL-SCNC: 4 MMOL/L (ref 3.5–5)
RBC # BLD AUTO: 4.09 10^6/UL (ref 4–5.4)
SODIUM SERPL-SCNC: 140 MMOL/L (ref 133–145)
WBC # BLD AUTO: 8.6 10^3/UL (ref 3.5–10.8)

## 2017-10-24 RX ADMIN — POTASSIUM CHLORIDE SCH MEQ: 750 TABLET, FILM COATED, EXTENDED RELEASE ORAL at 09:38

## 2017-10-24 RX ADMIN — Medication SCH CAP: at 09:37

## 2017-10-24 RX ADMIN — PREGABALIN SCH MG: 50 CAPSULE ORAL at 14:08

## 2017-10-24 RX ADMIN — OXYCODONE HYDROCHLORIDE AND ACETAMINOPHEN SCH MG: 500 TABLET ORAL at 09:38

## 2017-10-24 RX ADMIN — FUROSEMIDE SCH MG: 40 TABLET ORAL at 09:38

## 2017-10-24 RX ADMIN — WATER SCH NOTE: 100 INJECTION, SOLUTION INTRAVENOUS at 18:47

## 2017-10-24 RX ADMIN — OXYCODONE HYDROCHLORIDE SCH MG: 5 CAPSULE ORAL at 07:34

## 2017-10-24 RX ADMIN — OXYCODONE HYDROCHLORIDE SCH MG: 5 CAPSULE ORAL at 11:45

## 2017-10-24 RX ADMIN — WATER SCH NOTE: 100 INJECTION, SOLUTION INTRAVENOUS at 06:49

## 2017-10-24 RX ADMIN — Medication SCH UNITS: at 09:38

## 2017-10-24 RX ADMIN — Medication SCH MG: at 21:25

## 2017-10-24 RX ADMIN — CYANOCOBALAMIN TAB 500 MCG SCH MCG: 500 TAB at 09:39

## 2017-10-24 RX ADMIN — Medication SCH CAP: at 21:25

## 2017-10-24 RX ADMIN — DIPHENOXYLATE HYDROCHLORIDE AND ATROPINE SULFATE SCH TAB: 2.5; .025 TABLET ORAL at 21:24

## 2017-10-24 RX ADMIN — PREGABALIN SCH MG: 50 CAPSULE ORAL at 21:25

## 2017-10-24 RX ADMIN — HYDROCORTISONE SCH APPLIC: 1 CREAM TOPICAL at 11:14

## 2017-10-24 RX ADMIN — METRONIDAZOLE SCH MG: 250 TABLET ORAL at 09:37

## 2017-10-24 RX ADMIN — FOLIC ACID SCH MG: 1 TABLET ORAL at 09:37

## 2017-10-24 RX ADMIN — HYDROCORTISONE SCH APPLIC: 1 CREAM TOPICAL at 21:26

## 2017-10-24 RX ADMIN — DIPHENOXYLATE HYDROCHLORIDE AND ATROPINE SULFATE SCH TAB: 2.5; .025 TABLET ORAL at 09:38

## 2017-10-24 RX ADMIN — METRONIDAZOLE SCH MG: 250 TABLET ORAL at 21:25

## 2017-10-24 RX ADMIN — DIPHENOXYLATE HYDROCHLORIDE AND ATROPINE SULFATE SCH TAB: 2.5; .025 TABLET ORAL at 17:13

## 2017-10-24 RX ADMIN — OMEPRAZOLE SCH MG: 20 CAPSULE, DELAYED RELEASE ORAL at 06:09

## 2017-10-24 RX ADMIN — PREGABALIN SCH MG: 50 CAPSULE ORAL at 09:38

## 2017-10-24 RX ADMIN — Medication SCH MG: at 09:38

## 2017-10-24 RX ADMIN — DIPHENOXYLATE HYDROCHLORIDE AND ATROPINE SULFATE SCH TAB: 2.5; .025 TABLET ORAL at 13:06

## 2017-10-24 RX ADMIN — ECONAZOLE NITRATE SCH APPLIC: 10 CREAM TOPICAL at 11:14

## 2017-10-24 RX ADMIN — OXYCODONE HYDROCHLORIDE SCH MG: 5 CAPSULE ORAL at 16:46

## 2017-10-24 RX ADMIN — Medication SCH MG: at 09:39

## 2017-10-24 RX ADMIN — ASPIRIN SCH MG: 81 TABLET, COATED ORAL at 09:38

## 2017-10-24 RX ADMIN — Medication SCH ML: at 06:09

## 2017-10-24 RX ADMIN — Medication SCH ML: at 11:45

## 2017-10-24 RX ADMIN — HYDROCORTISONE SCH APPLIC: 1 CREAM TOPICAL at 14:10

## 2017-10-25 RX ADMIN — Medication SCH ML: at 05:16

## 2017-10-25 RX ADMIN — OMEPRAZOLE SCH: 20 CAPSULE, DELAYED RELEASE ORAL at 05:16

## 2017-10-25 RX ADMIN — WATER SCH NOTE: 100 INJECTION, SOLUTION INTRAVENOUS at 05:22

## 2017-10-25 NOTE — DS
DISCHARGE SUMMARY:

 

DATE OF ADMISSION:  10/12/17

 

DATE OF DISCHARGE:  10/25/17

 

PRIMARY CARE PHYSICIAN:  Dr. Vicente.

 

MY ATTENDING WHILE IN THE HOSPITAL:  Dr. Abdi Duval* (dictated by MATEUS Pearson).

 

PRIMARY DISCHARGE DIAGNOSES:  Acute osteomyelitis of the sacrum, stage IV 
sacral decubitus ulcers, sepsis, diarrhea, ependymoma, neurogenic bowel and 
bladder.

 

SECONDARY DIAGNOSES:  Obstructive sleep apnea, chronic renal failure stage 3, 
intrinsic asthma, as well as carotid artery stenosis.

 

CONSULTATIONS WHILE IN THE HOSPITAL:  Dr. Aaron Darby with Infectious 
Disease, Dr. Alberto Holley with General Surgery.

 

PROCEDURES:  None.

 

STUDIES DONE WHILE IN THE HOSPITAL:  Chest x-ray from 10/17/17 read as no 
active cardiopulmonary disease noted and electrocardiogram from 10/19/17 shows 
normal sinus rhythm, normal axis, no ST segment changes, no blocks, no other 
abnormalities.

 

MEDICATIONS AT DISCHARGE:

1.  Potassium chloride.

2.  Klor-Con sprinkle 10 mEq p.o. daily.

3.  Vitamin D3 5000 units p.o. daily.

4.  Tylenol 600 mg p.o. q. 6 hours as needed for pain.

5.  Vitamin C 500 mg p.o. daily.

6.  Aspirin 81 mg p.o. daily.

7.  Vitamin B12 1000 mg p.o. daily.

8.  Docusate 100 mg p.o. b.i.d. as needed for constipation.

9.  Ferrous gluconate 324 mg p.o. b.i.d.

10.  Folic acid 0.5 mg p.o. daily.

11.  Furosemide 40 mg p.o. daily.

12.  Culturelle 1 cap p.o. b.i.d.

13.  Nystatin cream 1 application topical b.i.d.

14.  Omeprazole 40 mg p.o. daily.

15.  Zofran 4 mg p.o. q. 8 hours as needed for nausea.

16.  Lyrica 150 mg p.o. t.i.d.

17.  Zinc sulfate 220 mg p.o. daily.

18.  Flagyl 500 mg p.o. b.i.d.

19.  Amaryl 1 tab p.o. q.i.d. as needed for diarrhea.

20.  Econazole 1 application topical daily.

21.  Heparin flush 1 flush 0600 and 1800.

22.  Hydrocortisone 1 application topical daily.

23.  Ceftriaxone 2 g IV q. 24 hours.

24.  Fentanyl patch 75 mg transdermally q. 72 hours.

25.  Oxycodone 10 mg p.o. a.c. scheduled.

 

HOSPITAL COURSE:  This is a brief summary of the patient's hospital course 
since going swing status.  For more detail, please see the admission notes from 
Dr. Bj Faulkner on 09/21/17 and the discharge summary from Kellie Jasmine on 10/12/17.  In brief, the patient is a 72-year-old male with past 
medical history significant as above, who has been admitted to the hospital in 
various capacities since 09/02/17.  Patient was initially admitted for 
confusion related to sepsis from a urinary tract infection and developed 
diarrhea from the antibiotics. Patient was then sent to [facility] within the 
hospital for rehab, but was then readmitted to the short-stay surgical floor on 
09/21/17 for intensive nursing care and management for his sacral decubitus 
ulcer, moving towards the plan of a flap at [place] at some point.  Since 
then patient has been stabilized with pain control and has continued to receive 
IV antibiotics consisting of ceftriaxone and oral antibiotics consisting of 
metronidazole daily for his osteomyelitis of the sacrum. Patient has improved 
dramatically and is able to transfer using a slide board at this point, which 
he was not able to do at admission.  The patient also has adequate pain control 
at all times, being able to sit up for all meals with oxycodone.  Patient's 
sacral decubitus ulcer has also improved dramatically with the use of Medihoney 
and wet-to-dry dressings, which were then transitioned to a wound VAC, which he 
is tolerating well.  Patient has denied any systemic symptoms since his 
transition to swing status, 09/21/17.  The patient has a rash in his groin and 
a rash on his hip which are consistent with candida and tinea respectively, and 
both of which are responding well to econazole cream.  Patient will be 
discharged at this time with ambulance transfer to [facility] 
for a muscle flap procedure for his sacral decubitus ulcer on 10/25/17 with Dr. Luis Felipe Chirinos, with the plan for a VAC placement afterwards.

 

PHYSICAL EXAM ON DAY OF DISCHARGE:  General:  Patient is a 72-year-old male who 
appears stated age and is lying comfortably in the bed in no acute distress.  
Vital signs from 10/24/17 at 1111 - temperature 98.3, pulse 85, respiratory 
rate 17, oxygen saturation 98% on room air, blood pressure 146/60.  HEENT:  
Head normocephalic, atraumatic.  Sclerae anicteric.  No conjunctival injection.
  Mucous membranes moist.  Clear oropharynx.  Neck:  Supple, nontender.  No 
lymphadenopathy. No carotid bruit auscultated.  Cardiac:  Regular rate and 
rhythm.  No clicks, murmurs, gallops, or rubs.  Pulses 2+ in the bilateral 
dorsalis pedis, posterior tibialis, and radial areas.  No edema.  Respiratory:  
Clear to auscultation bilaterally.  No wheezes, rales, or rhonchi.  Abdomen:  
Distended, similar with previous exam.  Bowel sounds are present and 
normoactive in all quadrants, nontender.  No hepatosplenomegaly.  No mass. 
Genitourinary:  No suprapubic tenderness or CVA tenderness.  Chronic indwelling 
Rodriguez present.  Draining clear yellow urine without blood.  Skin:  There is an 
almost resolved scaly rash with central clearing on the patient's left buttock, 
which has been improving significantly with treatment with econazole cream.  
There is also a wet rash on the patient's groin with satellite lesions 
consistent with candida, which has also been improving greatly with econazole 
cream.  The patient has a popped blister, that had at admission a black dry 
skin flap over it, which has mostly come off at this point, revealing pink skin 
without signs of inflammation or erosion.  Neuro: Cranial nerves II through XII 
grossly intact.  Her strength preserved 5/5 in the patient's upper extremities.
  The patient is paralyzed and insensate from the waist down.  Psychiatric:  
Calm, pleasant, and cooperative.

 

DIAGNOSTIC STUDIES/LABORATORY DATA:  Laboratory data from 10/24/17 white blood 
cell count 8.6, hemoglobin 11.3, RDW 18, platelet count 256.  Sodium 140, 
potassium 4.0, chloride 104, carbon dioxide 31, anion gap 5, BUN 42, creatinine 
1.28, glucose 99, CRP 17.5.

 

DISCHARGE PLAN:  Patient will be discharged directly to [facility] for the planned 
flap. Patient is medically optimized for the planned procedure and will be 
followed by the surgical team after the surgery is complete.  The patient will 
be continued on medications as above at this time including ceftriaxone 2 g IV 
daily and metronidazole 500 mg p.o. b.i.d.  Patient after the surgery can be 
transitioned to p.o. antibiotics entirely for the duration of his treatment, 
which is dependent on his response to antibiotic therapy.  Patient's chronic 
diarrhea has significantly improved since his admission, but he should receive 
Lomotil after every bowel movement.  The patient should receive oxycodone as 
scheduled before meals so he can sit up to eat his meal without pain. Patient 
should attempt to eat several small, high protein meals daily.  The patient 
should be attempted to get out of bed to chair once daily and taken outside 
preferably to help with his mood. Patient should be turned and positioned at 
least every 2 hours for help with the sacral ulcer and for monitoring of 
perineal hygiene.  Activity as tolerated. Patient should continue to work with 
PT and OT at the new facility.  Patient is on a regular unrestricted diet.

 

TIME SPENT:  Approximately 60 minutes was spent on this discharge, 20 minutes 
of which was spent face-to-face with the patient obtaining history and physical 
and discussing treatment options.

 

____________________________________ 

MATEUS PEARSON

 

[id number]/CPS #: [id number]

ELISABET

## 2017-11-06 ENCOUNTER — HOSPITAL ENCOUNTER (INPATIENT)
Dept: HOSPITAL 25 - SSU | Age: 72
LOS: 52 days | Discharge: HOME HEALTH | DRG: 540 | End: 2017-12-28
Attending: INTERNAL MEDICINE | Admitting: HOSPITALIST
Payer: MEDICARE

## 2017-11-06 DIAGNOSIS — B35.4: ICD-10-CM

## 2017-11-06 DIAGNOSIS — G89.29: ICD-10-CM

## 2017-11-06 DIAGNOSIS — D64.9: ICD-10-CM

## 2017-11-06 DIAGNOSIS — K21.9: ICD-10-CM

## 2017-11-06 DIAGNOSIS — M46.28: Primary | ICD-10-CM

## 2017-11-06 DIAGNOSIS — K52.9: ICD-10-CM

## 2017-11-06 DIAGNOSIS — K59.2: ICD-10-CM

## 2017-11-06 DIAGNOSIS — E78.5: ICD-10-CM

## 2017-11-06 DIAGNOSIS — G47.33: ICD-10-CM

## 2017-11-06 DIAGNOSIS — J45.909: ICD-10-CM

## 2017-11-06 DIAGNOSIS — Z86.010: ICD-10-CM

## 2017-11-06 DIAGNOSIS — G82.20: ICD-10-CM

## 2017-11-06 DIAGNOSIS — R15.9: ICD-10-CM

## 2017-11-06 DIAGNOSIS — N18.3: ICD-10-CM

## 2017-11-06 DIAGNOSIS — G57.93: ICD-10-CM

## 2017-11-06 DIAGNOSIS — I25.10: ICD-10-CM

## 2017-11-06 DIAGNOSIS — Z91.018: ICD-10-CM

## 2017-11-06 DIAGNOSIS — I12.9: ICD-10-CM

## 2017-11-06 DIAGNOSIS — Z79.82: ICD-10-CM

## 2017-11-06 DIAGNOSIS — Z99.3: ICD-10-CM

## 2017-11-06 DIAGNOSIS — Z88.8: ICD-10-CM

## 2017-11-06 DIAGNOSIS — N40.0: ICD-10-CM

## 2017-11-06 DIAGNOSIS — N31.9: ICD-10-CM

## 2017-11-06 DIAGNOSIS — E66.3: ICD-10-CM

## 2017-11-06 DIAGNOSIS — F11.20: ICD-10-CM

## 2017-11-06 PROCEDURE — 82565 ASSAY OF CREATININE: CPT

## 2017-11-06 PROCEDURE — 86141 C-REACTIVE PROTEIN HS: CPT

## 2017-11-06 PROCEDURE — 94660 CPAP INITIATION&MGMT: CPT

## 2017-11-06 PROCEDURE — 85014 HEMATOCRIT: CPT

## 2017-11-06 PROCEDURE — 85018 HEMOGLOBIN: CPT

## 2017-11-06 PROCEDURE — 84134 ASSAY OF PREALBUMIN: CPT

## 2017-11-06 PROCEDURE — 85025 COMPLETE CBC W/AUTO DIFF WBC: CPT

## 2017-11-06 PROCEDURE — 85049 AUTOMATED PLATELET COUNT: CPT

## 2017-11-06 PROCEDURE — 84238 ASSAY NONENDOCRINE RECEPTOR: CPT

## 2017-11-06 PROCEDURE — 85048 AUTOMATED LEUKOCYTE COUNT: CPT

## 2017-11-06 PROCEDURE — 5A09357 ASSISTANCE WITH RESPIRATORY VENTILATION, LESS THAN 24 CONSECUTIVE HOURS, CONTINUOUS POSITIVE AIRWAY PRESSURE: ICD-10-PCS | Performed by: HOSPITALIST

## 2017-11-06 PROCEDURE — 80048 BASIC METABOLIC PNL TOTAL CA: CPT

## 2017-11-06 PROCEDURE — 80202 ASSAY OF VANCOMYCIN: CPT

## 2017-11-06 PROCEDURE — 36415 COLL VENOUS BLD VENIPUNCTURE: CPT

## 2017-11-06 PROCEDURE — 80053 COMPREHEN METABOLIC PANEL: CPT

## 2017-11-06 PROCEDURE — 97530 THERAPEUTIC ACTIVITIES: CPT

## 2017-11-06 PROCEDURE — 86140 C-REACTIVE PROTEIN: CPT

## 2017-11-06 PROCEDURE — 80076 HEPATIC FUNCTION PANEL: CPT

## 2017-11-06 PROCEDURE — 84520 ASSAY OF UREA NITROGEN: CPT

## 2017-11-06 RX ADMIN — METRONIDAZOLE SCH MG: 250 TABLET ORAL at 20:04

## 2017-11-06 RX ADMIN — HYDROCORTISONE SCH APPLIC: 1 CREAM TOPICAL at 20:07

## 2017-11-06 RX ADMIN — NYSTATIN SCH UNITS: 100000 CREAM TOPICAL at 20:09

## 2017-11-06 RX ADMIN — CIPROFLOXACIN SCH MG: 750 TABLET, FILM COATED ORAL at 20:04

## 2017-11-06 RX ADMIN — WATER SCH NOTE: 100 INJECTION, SOLUTION INTRAVENOUS at 19:17

## 2017-11-06 RX ADMIN — PREGABALIN SCH MG: 50 CAPSULE ORAL at 20:04

## 2017-11-06 RX ADMIN — DIPHENOXYLATE HYDROCHLORIDE AND ATROPINE SULFATE PRN TAB: 2.5; .025 TABLET ORAL at 20:04

## 2017-11-06 RX ADMIN — Medication SCH MG: at 20:03

## 2017-11-06 RX ADMIN — Medication SCH CAP: at 20:03

## 2017-11-06 RX ADMIN — ENOXAPARIN SODIUM SCH MG: 40 INJECTION SUBCUTANEOUS at 17:23

## 2017-11-06 RX ADMIN — OXYCODONE HYDROCHLORIDE PRN MG: 5 CAPSULE ORAL at 18:40

## 2017-11-06 RX ADMIN — FUROSEMIDE SCH MG: 40 TABLET ORAL at 20:03

## 2017-11-06 RX ADMIN — Medication SCH ML: at 17:23

## 2017-11-06 RX ADMIN — FENTANYL TRANSDERMAL SCH MCG: 75 PATCH, EXTENDED RELEASE TRANSDERMAL at 17:16

## 2017-11-06 NOTE — HP
HISTORY AND PHYSICAL:

 

DATE OF ADMISSION TO SWING BED STATUS:  17

 

Admitted from [facility].

 

PRIMARY CARE PROVIDER:  Dr. Vicetne.

 

ATTENDING PHYSICIAN:  Dr. Coral Ramirez * (report dictated by Lili Wright NP).

 

REASON FOR ADMISSION:  Swing bed status for IV antibiotics and care while on 
bedrest.

 

HISTORY OF PRESENT ILLNESS:  Mr. Pryor is a 72-year-old paraplegic male, who 
had an extended hospitalization here at [facility] from 17 to 
10/12/17.  During that time, he was admitted and treated for acute 
osteomyelitis to the sacrum and a stage IV sacral decubitus ulcer as well as 
sepsis and antibiotic- associated diarrhea.  The patient is a paraplegic and 
has history of neurogenic bowel and bladder, multiple back surgeries, 
obstructive sleep apnea, and chronic renal insufficiency.  For full detail of 
that hospitalization, please refer to discharge summary dictated by Luisana Brian NP, on 10/12/17.  The patient was placed on swing bed 
status on 10/12/17 while awaiting transfer to [facility] for plastic 
surgery and flap to the decubitus ulcer.  On 10/25/17, the patient was 
discharged to [facility] and taken to the operating room by Dr. Chirinos, 
plastic surgeon, for a debridement of the sacral wound and right and left 
gluteus vladimir flaps.  The patient had a fairly uncomplicated hospitalization 
at [facility].  Infectious Disease was consulted and recommended 6 weeks 
of treatment, recommendations were to treat with vancomycin, ciprofloxacin as 
well as Flagyl, and these were started on 10/25/17.  In regards to activity, 
the patient was only able to use wedge pillows for meals and is on complete bed 
rest.  During the hospitalization, the patient's vancomycin had to be adjusted 
based on his creatinine.  Today, the patient was transferred back to our 
institution in stable condition.

 

Currently, the patient reports pain as during transit, his fentanyl patch was 
dislodged and he no longer has it on.  He denies any chest pain or shortness of 
breath.  Hospitalists were asked to admit this patient on swing status for 
further IV antibiotics and care prior to eventual discharge home.

 

PAST MEDICAL HISTORY:  Sacral osteomyelitis, currently receiving treatment; 
acute osteomyelitis of the sacrum; stage IV sacral decubitus ulcer; neurogenic 
bowel and bladder; obstructive sleep apnea; chronic renal failure; intrinsic 
asthma; carotid artery stenosis; ependymoma

 

PAST SURGICAL HISTORY:  Four spinal surgeries at [facility] for the 
ependymoma over the years, appendectomy after a ruptured appendix, right 
carotid endarterectomy.

 

MEDICATIONS:  At the time of transfer back to our institution:

1.  Vancomycin 1 g q.24 hours.

2.  Metronidazole 500 mg oral twice daily.

3.  Cipro 750 mg oral twice daily.

4.  Zinc 220 mg oral daily.

5.  Prilosec 40 mg oral daily.

6.  Nystatin 1 application topical twice daily.

7.  Culturelle 1 capsule oral twice daily.

8.  Hydrocortisone 1 application topical 3 times daily.

9.  Lasix 40 mg oral twice daily.

10.  Folic acid 0.5 mg oral daily.

11.  Fergon 324 mg oral twice daily.

12.  Econazole 1 application topical daily.

13.  Lomotil 1 tab 4 times a day as needed for diarrhea.

14.  Vitamin B12 1000 mcg oral daily.

15.  Vitamin D3 5000 units oral daily.

16.  Aspirin 81 mg oral daily.

17.  Vitamin C 500 mg oral daily.

18.  Tylenol 650 mg oral every 6 hours as needed.

19.  Oxycodone 10 mg oral 3 times daily as needed, max dose 40 mg.

20.  Fentanyl patch 75 mcg transdermal q.72 hours.

21.  Lyrica 150 mg oral 3 times daily.

 

ALLERGIES:  LIPITOR, CHICKEN, VANILLA, and GRAPES.

 

FAMILY HISTORY:  Notable for father, who  of Parkinson's disease.  Mother 
is alive, age 99.

 

SOCIAL HISTORY:  He is disabled, , his wife is the surrogate decision 
maker in the event the patient cannot make decisions for himself.  He does not 
smoke, use alcohol, or any other drugs.

 

REVIEW OF SYSTEMS:  I performed a 14-point review of systems.  All the 
pertinent positives and negatives are mentioned in the history of present 
illness.  The remaining review of systems is negative.

 

                               PHYSICAL EXAMINATION

 

GENERAL APPEARANCE:  The patient is alert, pleasant, and appeared to be in no 
apparent distress.

 

VITAL SIGNS:  Temperature 99.6, heart rate 80, respiratory rate 15, blood 
pressure 147/65, oxygen saturation 98%.

 

HEENT:  Normocephalic/atraumatic.  Pupils are equal and reactive to light. 
Extraocular movements were intact.

 

NECK:  Neck was supple.  There is no lymphadenopathy noted.

 

RESPIRATORY:  There was no accessory muscle use and lungs were clear to 
auscultation.

 

CARDIAC:  S1, S2 were crisp.  There were no murmurs, rubs, or gallops heard.

 

ABDOMEN:  Soft, nontender, nondistended.  There are bowel sounds x4.

 

EXTREMITIES:  There was no lower extremity edema.  DP and PT pulses were 2+ and 
symmetric.

 

MUSCULOSKELETAL:  There is no clubbing or cyanosis noted.  The patient 
exhibited equal strength in upper extremities, he has limited-to-no strength in 
lower extremities.

 

NEUROLOGICAL:  Cranial nerves II through XII are intact.  The patient moves all 
extremities.  No sensation on lower extremities.

 

PSYCH:  The patient is alert and oriented x3.

 

SKIN:  The patient has stapled incisions on his buttocks with bilateral MARK 
drains with sanguineous drainage.  Staples are present.  There is minimal 
erythema.  The patient does have stool present at rectum.  The patient also has 
an area at the base of his left ankle that appears to be a healing pressure 
ulcer.  The patient's wife states that this is present at the time of transfer 
to [facility].

 

 LABORATORY DATA:  No current laboratory data to report.

 

IMPRESSION:  This is a 72-year-old male, who has a long history of ependymoma 
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 
who is now admitted to swing bed status after a bilateral skin flap placement 
for sacral decubitus ulcer with osteomyelitis.

 

ASSESSMENT AND PLAN:

1.  Status post bilateral gluteal flap reconstruction of stage IV sacral 
pressure ulcer.  The patient needs to be on strict bed rest.  The patient can 
only have 2 wedges for meal.  This will last at least for 3 weeks.  The plan is 
to have telemedicine with his surgeon, Dr. Chirinos, from [facility] on a 
weekly basis. The incision should be kept clean, dry, and intact without any 
dressing.  Rectum should be maintained clean as possible. Wound care was 
consult so that we can document by photo his incision on a weekly basis.

2.  Sacral osteomyelitis.  Recommendations from Infectious Disease at [facility] was 6 weeks of treatment from 10/25/17.  Their recommendations were for 
vancomycin, Flagyl 500 q.12, and ciprofloxacin 750 q.12.  Cultures grew 
Enterococcus faecalis and Staphylococcus epidermidis. Continue this regimen for 
6 weeks totla (start 10/25/2017).

3.  Chronic pain.  Fentanyl patch and p.r.n. oxycodone will continue.  Lyrica 
will continue.

4.  Chronic kidney disease, stage 3.  Creatinine will be checked in the morning 
as well as vanco trough and vanco and other antibiotics will be dosed 
appropriately.

5.  Gastroesophageal reflux disease.  Prilosec will continue.

6.  Hypertension.  Blood pressure is controlled.  Twice a day Lasix will 
continue.

7.  Neurogenic bladder.  Rodriguez was inserted at the end of September and it will 
be changed today and every 30 days after today.

8.  Fluids, electrolytes, and nutrition.  The patient will have a high-protein 
diet.

9.  DVT prophylaxis.  The patient is high risk.  He will have SCDs and Lovenox.

10.  Code status is full.

11.  Disposition.  The plan is for the patient to remain on swing bed status 
until he has been given clearance by his plastic surgeon to have the mobility 
to do transverse independently.  At that point, he will be stable to be 
discharged to home.  In addition, the patient is here for IV antibiotics.

 

TIME SPENT:  Time for this admission was 60 minutes, and 35 minutes was spent 
with the patient discussing medications, history, and events that occurred at 
[facility] and current plan of care.

 

Reviewed by LILI WRIGHT NP 2017 1730

 

[id number] #: [id number]

ELISABET

## 2017-11-07 LAB
BASOPHILS # BLD AUTO: 0.1 10^3/UL (ref 0–0.2)
EOSINOPHIL # BLD AUTO: 0.3 10^3/UL (ref 0–0.6)
HCT VFR BLD AUTO: 38 % (ref 42–52)
HGB BLD-MCNC: 12.9 G/DL (ref 14–18)
LYMPHOCYTES # BLD AUTO: 3.3 10^3/UL (ref 1–4.8)
MCH RBC QN AUTO: 28 PG (ref 27–31)
MCHC RBC AUTO-ENTMCNC: 34 G/DL (ref 31–36)
MCV RBC AUTO: 83 FL (ref 80–94)
MONOCYTES # BLD AUTO: 1.4 10^3/UL (ref 0–0.8)
NEUTROPHILS # BLD AUTO: 5.8 10^3/UL (ref 1.5–7.7)
NRBC # BLD AUTO: 0 10^3/UL
NRBC BLD QL AUTO: 0
PLATELET # BLD AUTO: 244 10^3/UL (ref 150–450)
RBC # BLD AUTO: 4.61 10^6/UL (ref 4–5.4)
WBC # BLD AUTO: 10.9 10^3/UL (ref 3.5–10.8)

## 2017-11-07 RX ADMIN — CIPROFLOXACIN SCH MG: 750 TABLET, FILM COATED ORAL at 20:26

## 2017-11-07 RX ADMIN — NYSTATIN SCH: 100000 CREAM TOPICAL at 08:56

## 2017-11-07 RX ADMIN — Medication SCH MG: at 08:39

## 2017-11-07 RX ADMIN — ALTEPLASE PRN MG: 2.2 INJECTION, POWDER, LYOPHILIZED, FOR SOLUTION INTRAVENOUS at 14:13

## 2017-11-07 RX ADMIN — VANCOMYCIN HYDROCHLORIDE SCH MLS/HR: 1 INJECTION, POWDER, LYOPHILIZED, FOR SOLUTION INTRAVENOUS at 22:17

## 2017-11-07 RX ADMIN — METRONIDAZOLE SCH MG: 250 TABLET ORAL at 20:20

## 2017-11-07 RX ADMIN — FUROSEMIDE SCH MG: 40 TABLET ORAL at 20:19

## 2017-11-07 RX ADMIN — CIPROFLOXACIN SCH MG: 750 TABLET, FILM COATED ORAL at 08:40

## 2017-11-07 RX ADMIN — PREGABALIN SCH MG: 50 CAPSULE ORAL at 20:19

## 2017-11-07 RX ADMIN — VANCOMYCIN HYDROCHLORIDE SCH MLS/HR: 1 INJECTION, POWDER, LYOPHILIZED, FOR SOLUTION INTRAVENOUS at 11:40

## 2017-11-07 RX ADMIN — OXYCODONE HYDROCHLORIDE AND ACETAMINOPHEN SCH MG: 500 TABLET ORAL at 08:39

## 2017-11-07 RX ADMIN — METRONIDAZOLE SCH MG: 250 TABLET ORAL at 08:40

## 2017-11-07 RX ADMIN — OMEPRAZOLE SCH MG: 20 CAPSULE, DELAYED RELEASE ORAL at 08:40

## 2017-11-07 RX ADMIN — Medication SCH ML: at 06:14

## 2017-11-07 RX ADMIN — WATER SCH NOTE: 100 INJECTION, SOLUTION INTRAVENOUS at 19:12

## 2017-11-07 RX ADMIN — Medication SCH CAP: at 20:21

## 2017-11-07 RX ADMIN — PREGABALIN SCH MG: 50 CAPSULE ORAL at 08:41

## 2017-11-07 RX ADMIN — PREGABALIN SCH MG: 50 CAPSULE ORAL at 14:11

## 2017-11-07 RX ADMIN — HYDROCORTISONE SCH APPLIC: 1 CREAM TOPICAL at 10:00

## 2017-11-07 RX ADMIN — HYDROCORTISONE SCH APPLIC: 1 CREAM TOPICAL at 20:12

## 2017-11-07 RX ADMIN — HYDROCORTISONE SCH APPLIC: 1 CREAM TOPICAL at 15:30

## 2017-11-07 RX ADMIN — ECONAZOLE NITRATE SCH: 10 CREAM TOPICAL at 08:56

## 2017-11-07 RX ADMIN — Medication SCH CAP: at 08:39

## 2017-11-07 RX ADMIN — Medication SCH MG: at 20:21

## 2017-11-07 RX ADMIN — ASPIRIN SCH MG: 81 TABLET, COATED ORAL at 08:40

## 2017-11-07 RX ADMIN — OXYCODONE HYDROCHLORIDE PRN MG: 5 CAPSULE ORAL at 11:46

## 2017-11-07 RX ADMIN — CYANOCOBALAMIN TAB 500 MCG SCH MCG: 500 TAB at 08:40

## 2017-11-07 RX ADMIN — FUROSEMIDE SCH MG: 40 TABLET ORAL at 08:40

## 2017-11-07 RX ADMIN — WATER SCH NOTE: 100 INJECTION, SOLUTION INTRAVENOUS at 07:21

## 2017-11-07 RX ADMIN — OXYCODONE HYDROCHLORIDE PRN MG: 5 CAPSULE ORAL at 18:24

## 2017-11-07 RX ADMIN — FOLIC ACID SCH MG: 1 TABLET ORAL at 08:47

## 2017-11-07 RX ADMIN — NYSTATIN SCH: 100000 CREAM TOPICAL at 22:20

## 2017-11-07 RX ADMIN — OXYCODONE HYDROCHLORIDE PRN MG: 5 CAPSULE ORAL at 08:39

## 2017-11-07 RX ADMIN — ENOXAPARIN SODIUM SCH MG: 40 INJECTION SUBCUTANEOUS at 17:38

## 2017-11-07 RX ADMIN — Medication SCH: at 17:36

## 2017-11-07 RX ADMIN — Medication SCH UNITS: at 08:39

## 2017-11-07 RX ADMIN — Medication SCH ML: at 15:04

## 2017-11-08 RX ADMIN — WATER SCH NOTE: 100 INJECTION, SOLUTION INTRAVENOUS at 19:05

## 2017-11-08 RX ADMIN — PREGABALIN SCH MG: 50 CAPSULE ORAL at 20:16

## 2017-11-08 RX ADMIN — CYANOCOBALAMIN TAB 500 MCG SCH MCG: 500 TAB at 09:18

## 2017-11-08 RX ADMIN — Medication SCH UNITS: at 09:17

## 2017-11-08 RX ADMIN — HYDROCORTISONE SCH APPLIC: 1 CREAM TOPICAL at 09:19

## 2017-11-08 RX ADMIN — FUROSEMIDE SCH MG: 40 TABLET ORAL at 20:18

## 2017-11-08 RX ADMIN — ASPIRIN SCH MG: 81 TABLET, COATED ORAL at 09:20

## 2017-11-08 RX ADMIN — Medication SCH CAP: at 09:17

## 2017-11-08 RX ADMIN — NYSTATIN SCH APPLIC: 100000 CREAM TOPICAL at 20:17

## 2017-11-08 RX ADMIN — VANCOMYCIN HYDROCHLORIDE SCH MLS/HR: 1 INJECTION, POWDER, LYOPHILIZED, FOR SOLUTION INTRAVENOUS at 10:37

## 2017-11-08 RX ADMIN — Medication SCH CAP: at 20:17

## 2017-11-08 RX ADMIN — ENOXAPARIN SODIUM SCH MG: 40 INJECTION SUBCUTANEOUS at 17:24

## 2017-11-08 RX ADMIN — Medication SCH ML: at 17:28

## 2017-11-08 RX ADMIN — CIPROFLOXACIN SCH MG: 750 TABLET, FILM COATED ORAL at 09:17

## 2017-11-08 RX ADMIN — CIPROFLOXACIN SCH MG: 750 TABLET, FILM COATED ORAL at 20:16

## 2017-11-08 RX ADMIN — ECONAZOLE NITRATE SCH: 10 CREAM TOPICAL at 09:19

## 2017-11-08 RX ADMIN — HYDROCORTISONE SCH APPLIC: 1 CREAM TOPICAL at 14:26

## 2017-11-08 RX ADMIN — HYDROCORTISONE SCH APPLIC: 1 CREAM TOPICAL at 20:17

## 2017-11-08 RX ADMIN — Medication SCH MG: at 09:17

## 2017-11-08 RX ADMIN — Medication SCH MG: at 20:18

## 2017-11-08 RX ADMIN — OXYCODONE HYDROCHLORIDE AND ACETAMINOPHEN SCH MG: 500 TABLET ORAL at 09:17

## 2017-11-08 RX ADMIN — METRONIDAZOLE SCH MG: 250 TABLET ORAL at 09:18

## 2017-11-08 RX ADMIN — METRONIDAZOLE SCH MG: 250 TABLET ORAL at 20:17

## 2017-11-08 RX ADMIN — FOLIC ACID SCH MG: 1 TABLET ORAL at 09:18

## 2017-11-08 RX ADMIN — PREGABALIN SCH MG: 50 CAPSULE ORAL at 14:24

## 2017-11-08 RX ADMIN — FUROSEMIDE SCH MG: 40 TABLET ORAL at 09:17

## 2017-11-08 RX ADMIN — Medication SCH ML: at 06:25

## 2017-11-08 RX ADMIN — OMEPRAZOLE SCH MG: 20 CAPSULE, DELAYED RELEASE ORAL at 07:55

## 2017-11-08 RX ADMIN — WATER SCH NOTE: 100 INJECTION, SOLUTION INTRAVENOUS at 07:11

## 2017-11-08 RX ADMIN — PREGABALIN SCH MG: 50 CAPSULE ORAL at 09:18

## 2017-11-08 RX ADMIN — NYSTATIN SCH APPLIC: 100000 CREAM TOPICAL at 09:19

## 2017-11-09 RX ADMIN — NYSTATIN SCH APPLIC: 100000 CREAM TOPICAL at 20:53

## 2017-11-09 RX ADMIN — ECONAZOLE NITRATE SCH: 10 CREAM TOPICAL at 09:43

## 2017-11-09 RX ADMIN — FUROSEMIDE SCH MG: 40 TABLET ORAL at 08:10

## 2017-11-09 RX ADMIN — Medication SCH MG: at 20:49

## 2017-11-09 RX ADMIN — METRONIDAZOLE SCH MG: 250 TABLET ORAL at 08:11

## 2017-11-09 RX ADMIN — ASPIRIN SCH MG: 81 TABLET, COATED ORAL at 08:11

## 2017-11-09 RX ADMIN — Medication SCH ML: at 17:42

## 2017-11-09 RX ADMIN — Medication SCH MG: at 08:11

## 2017-11-09 RX ADMIN — METRONIDAZOLE SCH MG: 250 TABLET ORAL at 20:49

## 2017-11-09 RX ADMIN — NYSTATIN SCH APPLIC: 100000 CREAM TOPICAL at 08:16

## 2017-11-09 RX ADMIN — FENTANYL TRANSDERMAL SCH MCG: 75 PATCH, EXTENDED RELEASE TRANSDERMAL at 17:34

## 2017-11-09 RX ADMIN — PREGABALIN SCH MG: 50 CAPSULE ORAL at 20:49

## 2017-11-09 RX ADMIN — OXYCODONE HYDROCHLORIDE PRN MG: 5 CAPSULE ORAL at 17:55

## 2017-11-09 RX ADMIN — PREGABALIN SCH MG: 50 CAPSULE ORAL at 14:19

## 2017-11-09 RX ADMIN — OXYCODONE HYDROCHLORIDE AND ACETAMINOPHEN SCH MG: 500 TABLET ORAL at 08:12

## 2017-11-09 RX ADMIN — PREGABALIN SCH MG: 50 CAPSULE ORAL at 08:11

## 2017-11-09 RX ADMIN — WATER SCH NOTE: 100 INJECTION, SOLUTION INTRAVENOUS at 18:47

## 2017-11-09 RX ADMIN — HYDROCORTISONE SCH APPLIC: 1 CREAM TOPICAL at 17:43

## 2017-11-09 RX ADMIN — OMEPRAZOLE SCH MG: 20 CAPSULE, DELAYED RELEASE ORAL at 08:11

## 2017-11-09 RX ADMIN — Medication SCH UNITS: at 08:11

## 2017-11-09 RX ADMIN — FENTANYL TRANSDERMAL SCH MCG: 75 PATCH, EXTENDED RELEASE TRANSDERMAL at 17:36

## 2017-11-09 RX ADMIN — HYDROCORTISONE SCH APPLIC: 1 CREAM TOPICAL at 08:16

## 2017-11-09 RX ADMIN — WATER SCH NOTE: 100 INJECTION, SOLUTION INTRAVENOUS at 08:09

## 2017-11-09 RX ADMIN — CIPROFLOXACIN SCH MG: 750 TABLET, FILM COATED ORAL at 20:49

## 2017-11-09 RX ADMIN — CIPROFLOXACIN SCH MG: 750 TABLET, FILM COATED ORAL at 08:10

## 2017-11-09 RX ADMIN — ENOXAPARIN SODIUM SCH MG: 40 INJECTION SUBCUTANEOUS at 17:38

## 2017-11-09 RX ADMIN — Medication SCH ML: at 06:00

## 2017-11-09 RX ADMIN — VANCOMYCIN HYDROCHLORIDE SCH MLS/HR: 1 INJECTION, POWDER, LYOPHILIZED, FOR SOLUTION INTRAVENOUS at 09:39

## 2017-11-09 RX ADMIN — FOLIC ACID SCH MG: 1 TABLET ORAL at 08:12

## 2017-11-09 RX ADMIN — Medication SCH CAP: at 20:49

## 2017-11-09 RX ADMIN — Medication SCH CAP: at 08:10

## 2017-11-09 RX ADMIN — FUROSEMIDE SCH MG: 40 TABLET ORAL at 20:49

## 2017-11-09 RX ADMIN — Medication SCH ML: at 11:54

## 2017-11-09 RX ADMIN — CYANOCOBALAMIN TAB 500 MCG SCH MCG: 500 TAB at 08:11

## 2017-11-09 RX ADMIN — HYDROCORTISONE SCH APPLIC: 1 CREAM TOPICAL at 20:54

## 2017-11-10 RX ADMIN — HYDROCORTISONE SCH APPLIC: 1 CREAM TOPICAL at 08:10

## 2017-11-10 RX ADMIN — Medication SCH: at 16:33

## 2017-11-10 RX ADMIN — Medication SCH CAP: at 20:17

## 2017-11-10 RX ADMIN — OXYCODONE HYDROCHLORIDE PRN MG: 5 CAPSULE ORAL at 08:09

## 2017-11-10 RX ADMIN — PREGABALIN SCH MG: 50 CAPSULE ORAL at 08:09

## 2017-11-10 RX ADMIN — HYDROCORTISONE SCH APPLIC: 1 CREAM TOPICAL at 13:28

## 2017-11-10 RX ADMIN — Medication SCH CAP: at 08:09

## 2017-11-10 RX ADMIN — OMEPRAZOLE SCH MG: 20 CAPSULE, DELAYED RELEASE ORAL at 08:08

## 2017-11-10 RX ADMIN — OXYCODONE HYDROCHLORIDE PRN MG: 5 CAPSULE ORAL at 16:47

## 2017-11-10 RX ADMIN — NYSTATIN SCH APPLIC: 100000 CREAM TOPICAL at 08:10

## 2017-11-10 RX ADMIN — PREGABALIN SCH MG: 50 CAPSULE ORAL at 13:28

## 2017-11-10 RX ADMIN — OXYCODONE HYDROCHLORIDE AND ACETAMINOPHEN SCH MG: 500 TABLET ORAL at 08:09

## 2017-11-10 RX ADMIN — METRONIDAZOLE SCH MG: 250 TABLET ORAL at 08:08

## 2017-11-10 RX ADMIN — Medication SCH ML: at 09:29

## 2017-11-10 RX ADMIN — HYDROCORTISONE SCH APPLIC: 1 CREAM TOPICAL at 20:41

## 2017-11-10 RX ADMIN — CIPROFLOXACIN SCH MG: 750 TABLET, FILM COATED ORAL at 20:17

## 2017-11-10 RX ADMIN — CIPROFLOXACIN SCH MG: 750 TABLET, FILM COATED ORAL at 08:09

## 2017-11-10 RX ADMIN — METRONIDAZOLE SCH MG: 250 TABLET ORAL at 20:18

## 2017-11-10 RX ADMIN — WATER SCH NOTE: 100 INJECTION, SOLUTION INTRAVENOUS at 19:19

## 2017-11-10 RX ADMIN — Medication SCH MG: at 08:07

## 2017-11-10 RX ADMIN — FUROSEMIDE SCH MG: 40 TABLET ORAL at 20:17

## 2017-11-10 RX ADMIN — FUROSEMIDE SCH MG: 40 TABLET ORAL at 08:09

## 2017-11-10 RX ADMIN — CYANOCOBALAMIN TAB 500 MCG SCH MCG: 500 TAB at 08:08

## 2017-11-10 RX ADMIN — ASPIRIN SCH MG: 81 TABLET, COATED ORAL at 08:09

## 2017-11-10 RX ADMIN — Medication SCH ML: at 13:28

## 2017-11-10 RX ADMIN — NYSTATIN SCH APPLIC: 100000 CREAM TOPICAL at 20:41

## 2017-11-10 RX ADMIN — WATER SCH NOTE: 100 INJECTION, SOLUTION INTRAVENOUS at 07:04

## 2017-11-10 RX ADMIN — PREGABALIN SCH MG: 50 CAPSULE ORAL at 20:18

## 2017-11-10 RX ADMIN — ENOXAPARIN SODIUM SCH MG: 40 INJECTION SUBCUTANEOUS at 16:47

## 2017-11-10 RX ADMIN — Medication SCH MG: at 08:09

## 2017-11-10 RX ADMIN — FOLIC ACID SCH MG: 1 TABLET ORAL at 08:08

## 2017-11-10 RX ADMIN — VANCOMYCIN HYDROCHLORIDE SCH MLS/HR: 1 INJECTION, POWDER, LYOPHILIZED, FOR SOLUTION INTRAVENOUS at 10:49

## 2017-11-10 RX ADMIN — Medication SCH ML: at 05:08

## 2017-11-10 RX ADMIN — Medication SCH UNITS: at 08:08

## 2017-11-10 RX ADMIN — ECONAZOLE NITRATE SCH: 10 CREAM TOPICAL at 07:30

## 2017-11-10 RX ADMIN — Medication SCH MG: at 20:17

## 2017-11-11 RX ADMIN — OXYCODONE HYDROCHLORIDE AND ACETAMINOPHEN SCH MG: 500 TABLET ORAL at 10:04

## 2017-11-11 RX ADMIN — PREGABALIN SCH MG: 50 CAPSULE ORAL at 10:05

## 2017-11-11 RX ADMIN — FOLIC ACID SCH MG: 1 TABLET ORAL at 10:04

## 2017-11-11 RX ADMIN — FUROSEMIDE SCH MG: 40 TABLET ORAL at 10:03

## 2017-11-11 RX ADMIN — ASPIRIN SCH MG: 81 TABLET, COATED ORAL at 10:05

## 2017-11-11 RX ADMIN — PREGABALIN SCH MG: 50 CAPSULE ORAL at 20:27

## 2017-11-11 RX ADMIN — ENOXAPARIN SODIUM SCH MG: 40 INJECTION SUBCUTANEOUS at 17:08

## 2017-11-11 RX ADMIN — WATER SCH NOTE: 100 INJECTION, SOLUTION INTRAVENOUS at 07:02

## 2017-11-11 RX ADMIN — METRONIDAZOLE SCH MG: 250 TABLET ORAL at 20:28

## 2017-11-11 RX ADMIN — Medication SCH MG: at 10:03

## 2017-11-11 RX ADMIN — Medication SCH CAP: at 10:04

## 2017-11-11 RX ADMIN — NYSTATIN SCH: 100000 CREAM TOPICAL at 21:08

## 2017-11-11 RX ADMIN — FUROSEMIDE SCH MG: 40 TABLET ORAL at 20:27

## 2017-11-11 RX ADMIN — HYDROCORTISONE SCH: 1 CREAM TOPICAL at 10:15

## 2017-11-11 RX ADMIN — CIPROFLOXACIN SCH MG: 750 TABLET, FILM COATED ORAL at 20:28

## 2017-11-11 RX ADMIN — ACETAMINOPHEN PRN MG: 325 TABLET ORAL at 06:34

## 2017-11-11 RX ADMIN — PREGABALIN SCH MG: 50 CAPSULE ORAL at 15:05

## 2017-11-11 RX ADMIN — METRONIDAZOLE SCH MG: 250 TABLET ORAL at 10:04

## 2017-11-11 RX ADMIN — Medication SCH ML: at 06:26

## 2017-11-11 RX ADMIN — Medication SCH MG: at 20:28

## 2017-11-11 RX ADMIN — Medication SCH CAP: at 20:27

## 2017-11-11 RX ADMIN — OMEPRAZOLE SCH MG: 20 CAPSULE, DELAYED RELEASE ORAL at 10:03

## 2017-11-11 RX ADMIN — CIPROFLOXACIN SCH MG: 750 TABLET, FILM COATED ORAL at 10:04

## 2017-11-11 RX ADMIN — HYDROCORTISONE SCH: 1 CREAM TOPICAL at 14:19

## 2017-11-11 RX ADMIN — Medication SCH UNITS: at 10:05

## 2017-11-11 RX ADMIN — NYSTATIN SCH: 100000 CREAM TOPICAL at 10:15

## 2017-11-11 RX ADMIN — HYDROCORTISONE SCH: 1 CREAM TOPICAL at 21:08

## 2017-11-11 RX ADMIN — ECONAZOLE NITRATE SCH: 10 CREAM TOPICAL at 10:06

## 2017-11-11 RX ADMIN — VANCOMYCIN HYDROCHLORIDE SCH MLS/HR: 1 INJECTION, POWDER, LYOPHILIZED, FOR SOLUTION INTRAVENOUS at 11:12

## 2017-11-11 RX ADMIN — WATER SCH NOTE: 100 INJECTION, SOLUTION INTRAVENOUS at 18:35

## 2017-11-11 RX ADMIN — Medication SCH ML: at 17:08

## 2017-11-11 RX ADMIN — CYANOCOBALAMIN TAB 500 MCG SCH MCG: 500 TAB at 10:03

## 2017-11-11 NOTE — PN
Subjective


Date of Service: 11/11/17


Interval History: 





rounded on patient 


spoke with him and his wife, Kimmy.


good spirits


checking labs tomorrow


will pursue telemedicine consultation with Dr. Chirinos (plastic surgeon at [facility]) 

this week.








Objective


Active Medications: 








Acetaminophen (Tylenol Tab*)  650 mg PO Q6H PRN


   PRN Reason: FEVER/PAIN


   Last Admin: 11/11/17 06:34 Dose:  650 mg


Alteplase, Recombinant (Cathflo Activase*)  2 mg IV ONCE PRN


   PRN Reason: PICC line clog


   Last Admin: 11/07/17 14:13 Dose:  2 mg


Ascorbic Acid (Vitamin C  Tab*)  500 mg PO DAILY [facility]


   Last Admin: 11/11/17 10:04 Dose:  500 mg


Aspirin (Aspirin Ec Low Dose*)  81 mg PO DAILY [facility]


   Last Admin: 11/11/17 10:05 Dose:  81 mg


Cholecalciferol (Vitamin D Tab*)  5,000 units PO DAILY [facility]


   Last Admin: 11/11/17 10:05 Dose:  5,000 units


Ciprofloxacin (Cipro Tab*)  750 mg PO Q12HR [facility]


   Last Admin: 11/11/17 10:04 Dose:  750 mg


Cyanocobalamin (Vitamin B12 Tab*)  1,000 mcg PO DAILY [facility]


   Last Admin: 11/11/17 10:03 Dose:  1,000 mcg


Diphenoxylate HCl/Atropine (Lomotil Tab*)  1 tab PO QID PRN


   PRN Reason: DIARRHEA


   Last Admin: 11/06/17 20:04 Dose:  1 tab


Econazole Nitrate (Econazole 1 % Cream (Nf))  1 applic TOPICAL DAILY [facility]


   Last Admin: 11/11/17 10:06 Dose:  Not Given


Enoxaparin Sodium (Lovenox(*))  40 mg SUBCUT Q24H [facility]


   Last Admin: 11/11/17 17:08 Dose:  40 mg


Fentanyl (Duragesic  Patch 75 Mcg/Hr*)  75 mcg TRANSDERM Q72H [facility]


   Last Admin: 11/09/17 17:36 Dose:  75 mcg


Ferrous Gluconate (Fergon Tab*)  324 mg PO BID [facility]


   Last Admin: 11/11/17 10:03 Dose:  324 mg


Folic Acid (Folvite Tab*)  0.5 mg PO DAILY [facility]


   Last Admin: 11/11/17 10:04 Dose:  0.5 mg


Furosemide (Lasix Tab*)  40 mg PO BID [facility]


   Last Admin: 11/11/17 10:03 Dose:  40 mg


Heparin Sodium (Porcine) (Heparin Flush Picc/Ml/Cvc(*))  0 ml IV FLUSH 0600,

1800 [facility]


   PRN Reason: Protocol


   Last Admin: 11/11/17 17:08 Dose:  1 ml


Hydrocortisone (Hytone Cream 1%*)  1 applic TOPICAL TID [facility]


   Last Admin: 11/11/17 14:19 Dose:  Not Given


Vancomycin HCl 1,000 mg/ (Sodium Chloride)  250 mls @ 166.667 mls/hr IVPB Q24H 

[facility]


   Last Admin: 11/11/17 11:12 Dose:  166.667 mls/hr


Lactobacillus Rhamnosus (Culturelle*)  1 cap PO BID [facility]


   Last Admin: 11/11/17 10:04 Dose:  1 cap


Metronidazole (Flagyl Tab*)  500 mg PO BID [facility]


   Last Admin: 11/11/17 10:04 Dose:  500 mg


Nystatin (Nystatin Cream*)  1 applic TOPICAL BID [facility]


   Last Admin: 11/11/17 10:15 Dose:  Not Given


Omeprazole (Prilosec Cap*)  40 mg PO DAILY@0730 [facility]


   Last Admin: 11/11/17 10:03 Dose:  40 mg


Oxycodone HCl (Roxycodone Tab*)  10 mg PO AC PRN


   PRN Reason: PAIN


   Last Admin: 11/10/17 16:47 Dose:  10 mg


Pharmacy Consult (Vancomycin Per Pharmacy*)  1 note FOLLOW UP . PRN


   PRN Reason: PER PROTOCOL


Pharmacy Profile Note (Fentanyl Patch Check Q Shift)  1 note N/A 0700,1900 [facility]


   Last Admin: 11/11/17 07:02 Dose:  1 note


Pharmacy Profile Note (Vancomycin Trough Check)  1 note FOLLOW UP 0930 [facility]


   Stop: 11/12/17 09:31


Pregabalin (Lyrica Cap(*))  150 mg PO TID [facility]


   Last Admin: 11/11/17 15:05 Dose:  150 mg


Zinc Sulfate (Zinc-220 Cap*)  220 mg PO DAILY [facility]


   Last Admin: 11/11/17 10:03 Dose:  220 mg








 Vital Signs











  11/10/17 11/10/17 11/10/17





  19:11 19:41 20:15


 


Temperature  98.0 F 


 


Pulse Rate  75 


 


Respiratory 18 16 16





Rate   


 


Blood Pressure  139/58 





(mmHg)   


 


O2 Sat by Pulse  95 





Oximetry   














  11/10/17 11/11/17 11/11/17





  20:18 08:43 09:02


 


Temperature   


 


Pulse Rate   


 


Respiratory 14 16 16





Rate   


 


Blood Pressure   





(mmHg)   


 


O2 Sat by Pulse   





Oximetry   














  11/11/17 11/11/17 11/11/17





  10:05 14:18 15:05


 


Temperature   


 


Pulse Rate   


 


Respiratory 18 16 16





Rate   


 


Blood Pressure   





(mmHg)   


 


O2 Sat by Pulse   





Oximetry   














  11/11/17





  17:09


 


Temperature 


 


Pulse Rate 


 


Respiratory 16





Rate 


 


Blood Pressure 





(mmHg) 


 


O2 Sat by Pulse 





Oximetry 











Oxygen Devices in Use Now: None


Result Diagrams: 


 11/12/17 05:50





 11/12/17 05:50





Assess/Plan/Problems-Billing


Assessment: 











- Patient Problems


(1) S/P plastic surgery


Current Visit: Yes   Status: Acute   Priority: High   Code(s): Z98.890 - OTHER 

SPECIFIED POSTPROCEDURAL STATES   Comment: 


- in clinitron bed until otherwise directed


- careful T&P by nursing


- flap looks good.


   





(2) S/P flap graft


Current Visit: Yes   Status: Acute   Priority: High   Code(s): Z98.890 - OTHER 

SPECIFIED POSTPROCEDURAL STATES   Comment: 


- 10/25/2017   





(3) Acute osteomyelitis of sacrum


Current Visit: No   Status: Acute   Priority: High   Code(s): M46.28 - 

OSTEOMYELITIS OF VERTEBRA, SACRAL AND SACROCOCCYGEAL REGION   Comment: 


- s/p debridement at time of flap creation at [facility]...continue vancomycin and 

ciprofloxacin


- vanco by level & pharmacy protocol   





(4) CKD (chronic kidney disease), stage III


Current Visit: No   Status: Chronic   Priority: High   Code(s): N18.3 - CHRONIC 

KIDNEY DISEASE, STAGE 3 (MODERATE)   Comment: 


- Creatinine baseline, no acute exacerbation


- Avoid nephrotoxic medications   





(5) Chronic indwelling Rodriguez catheter


Current Visit: No   Status: Chronic   Priority: High   Code(s): Z92.89 - 

PERSONAL HISTORY OF OTHER MEDICAL TREATMENT   Comment: 


- High risk of UTI 


- currently on ABX, no current evidence of UTI.   





(6) Neurogenic bladder


Current Visit: No   Status: Chronic   Priority: High   Code(s): N31.9 - 

NEUROMUSCULAR DYSFUNCTION OF BLADDER, UNSPECIFIED   Comment: 


- With chronic indwelling urinary catheter   





(7) Paraplegia


Current Visit: No   Status: Chronic   Priority: High   Code(s): G82.20 - 

PARAPLEGIA, UNSPECIFIED   Comment: 


- Secondary to ependymoma


- PT / OT as allowed by plastics


- will discuss that this week during call with Dr. Chirinos.

## 2017-11-12 LAB
BASOPHILS # BLD AUTO: 0.1 10^3/UL (ref 0–0.2)
EOSINOPHIL # BLD AUTO: 0.4 10^3/UL (ref 0–0.6)
HCT VFR BLD AUTO: 33 % (ref 42–52)
HGB BLD-MCNC: 11.1 G/DL (ref 14–18)
LYMPHOCYTES # BLD AUTO: 2.5 10^3/UL (ref 1–4.8)
MCH RBC QN AUTO: 28 PG (ref 27–31)
MCHC RBC AUTO-ENTMCNC: 33 G/DL (ref 31–36)
MCV RBC AUTO: 84 FL (ref 80–94)
MONOCYTES # BLD AUTO: 1.1 10^3/UL (ref 0–0.8)
NEUTROPHILS # BLD AUTO: 4.4 10^3/UL (ref 1.5–7.7)
NRBC # BLD AUTO: 0 10^3/UL
NRBC BLD QL AUTO: 0
PLATELET # BLD AUTO: 201 10^3/UL (ref 150–450)
RBC # BLD AUTO: 3.99 10^6/UL (ref 4–5.4)
WBC # BLD AUTO: 8.5 10^3/UL (ref 3.5–10.8)

## 2017-11-12 RX ADMIN — ECONAZOLE NITRATE SCH: 10 CREAM TOPICAL at 10:58

## 2017-11-12 RX ADMIN — CIPROFLOXACIN SCH MG: 750 TABLET, FILM COATED ORAL at 09:31

## 2017-11-12 RX ADMIN — Medication SCH CAP: at 09:32

## 2017-11-12 RX ADMIN — PREGABALIN SCH MG: 50 CAPSULE ORAL at 14:34

## 2017-11-12 RX ADMIN — HYDROCORTISONE SCH: 1 CREAM TOPICAL at 20:18

## 2017-11-12 RX ADMIN — Medication SCH CAP: at 20:17

## 2017-11-12 RX ADMIN — HYDROCORTISONE SCH: 1 CREAM TOPICAL at 10:59

## 2017-11-12 RX ADMIN — METRONIDAZOLE SCH MG: 250 TABLET ORAL at 09:32

## 2017-11-12 RX ADMIN — WATER SCH NOTE: 100 INJECTION, SOLUTION INTRAVENOUS at 06:50

## 2017-11-12 RX ADMIN — ENOXAPARIN SODIUM SCH MG: 40 INJECTION SUBCUTANEOUS at 17:20

## 2017-11-12 RX ADMIN — OMEPRAZOLE SCH MG: 20 CAPSULE, DELAYED RELEASE ORAL at 07:50

## 2017-11-12 RX ADMIN — OXYCODONE HYDROCHLORIDE AND ACETAMINOPHEN SCH MG: 500 TABLET ORAL at 09:31

## 2017-11-12 RX ADMIN — CYANOCOBALAMIN TAB 500 MCG SCH MCG: 500 TAB at 09:32

## 2017-11-12 RX ADMIN — PREGABALIN SCH MG: 50 CAPSULE ORAL at 20:17

## 2017-11-12 RX ADMIN — Medication SCH UNITS: at 09:32

## 2017-11-12 RX ADMIN — PREGABALIN SCH MG: 50 CAPSULE ORAL at 09:32

## 2017-11-12 RX ADMIN — Medication SCH ML: at 05:50

## 2017-11-12 RX ADMIN — FUROSEMIDE SCH MG: 40 TABLET ORAL at 20:17

## 2017-11-12 RX ADMIN — FENTANYL TRANSDERMAL SCH MCG: 75 PATCH, EXTENDED RELEASE TRANSDERMAL at 17:14

## 2017-11-12 RX ADMIN — VANCOMYCIN HYDROCHLORIDE SCH MLS/HR: 1 INJECTION, POWDER, LYOPHILIZED, FOR SOLUTION INTRAVENOUS at 09:31

## 2017-11-12 RX ADMIN — Medication SCH ML: at 17:20

## 2017-11-12 RX ADMIN — Medication SCH MG: at 09:31

## 2017-11-12 RX ADMIN — ASPIRIN SCH MG: 81 TABLET, COATED ORAL at 09:32

## 2017-11-12 RX ADMIN — HYDROCORTISONE SCH: 1 CREAM TOPICAL at 14:36

## 2017-11-12 RX ADMIN — NYSTATIN SCH: 100000 CREAM TOPICAL at 20:18

## 2017-11-12 RX ADMIN — CIPROFLOXACIN SCH MG: 750 TABLET, FILM COATED ORAL at 20:17

## 2017-11-12 RX ADMIN — FUROSEMIDE SCH MG: 40 TABLET ORAL at 09:32

## 2017-11-12 RX ADMIN — Medication SCH MG: at 20:17

## 2017-11-12 RX ADMIN — FOLIC ACID SCH MG: 1 TABLET ORAL at 09:32

## 2017-11-12 RX ADMIN — METRONIDAZOLE SCH MG: 250 TABLET ORAL at 20:17

## 2017-11-12 RX ADMIN — WATER SCH NOTE: 100 INJECTION, SOLUTION INTRAVENOUS at 18:54

## 2017-11-12 RX ADMIN — NYSTATIN SCH: 100000 CREAM TOPICAL at 10:59

## 2017-11-13 RX ADMIN — NYSTATIN SCH: 100000 CREAM TOPICAL at 08:21

## 2017-11-13 RX ADMIN — Medication SCH ML: at 12:12

## 2017-11-13 RX ADMIN — FUROSEMIDE SCH MG: 40 TABLET ORAL at 08:17

## 2017-11-13 RX ADMIN — PREGABALIN SCH MG: 50 CAPSULE ORAL at 20:01

## 2017-11-13 RX ADMIN — VANCOMYCIN HYDROCHLORIDE SCH MLS/HR: 1 INJECTION, POWDER, LYOPHILIZED, FOR SOLUTION INTRAVENOUS at 10:03

## 2017-11-13 RX ADMIN — Medication SCH: at 16:38

## 2017-11-13 RX ADMIN — ECONAZOLE NITRATE SCH: 10 CREAM TOPICAL at 08:21

## 2017-11-13 RX ADMIN — PREGABALIN SCH MG: 50 CAPSULE ORAL at 08:14

## 2017-11-13 RX ADMIN — Medication SCH MG: at 20:01

## 2017-11-13 RX ADMIN — Medication SCH CAP: at 08:17

## 2017-11-13 RX ADMIN — OXYCODONE HYDROCHLORIDE AND ACETAMINOPHEN SCH MG: 500 TABLET ORAL at 08:15

## 2017-11-13 RX ADMIN — WATER SCH NOTE: 100 INJECTION, SOLUTION INTRAVENOUS at 18:45

## 2017-11-13 RX ADMIN — PREGABALIN SCH MG: 50 CAPSULE ORAL at 14:10

## 2017-11-13 RX ADMIN — CIPROFLOXACIN SCH MG: 750 TABLET, FILM COATED ORAL at 20:02

## 2017-11-13 RX ADMIN — CIPROFLOXACIN SCH MG: 750 TABLET, FILM COATED ORAL at 08:19

## 2017-11-13 RX ADMIN — Medication SCH MG: at 08:18

## 2017-11-13 RX ADMIN — Medication SCH ML: at 06:06

## 2017-11-13 RX ADMIN — CYANOCOBALAMIN TAB 500 MCG SCH MCG: 500 TAB at 08:18

## 2017-11-13 RX ADMIN — ALTEPLASE PRN MG: 2.2 INJECTION, POWDER, LYOPHILIZED, FOR SOLUTION INTRAVENOUS at 14:56

## 2017-11-13 RX ADMIN — METRONIDAZOLE SCH MG: 250 TABLET ORAL at 08:18

## 2017-11-13 RX ADMIN — HYDROCORTISONE SCH: 1 CREAM TOPICAL at 20:02

## 2017-11-13 RX ADMIN — HYDROCORTISONE SCH: 1 CREAM TOPICAL at 08:21

## 2017-11-13 RX ADMIN — METRONIDAZOLE SCH MG: 250 TABLET ORAL at 20:01

## 2017-11-13 RX ADMIN — POTASSIUM CHLORIDE SCH MEQ: 1500 TABLET, EXTENDED RELEASE ORAL at 08:19

## 2017-11-13 RX ADMIN — Medication SCH MG: at 08:16

## 2017-11-13 RX ADMIN — Medication SCH ML: at 15:32

## 2017-11-13 RX ADMIN — HYDROCORTISONE SCH: 1 CREAM TOPICAL at 13:48

## 2017-11-13 RX ADMIN — Medication SCH UNITS: at 08:15

## 2017-11-13 RX ADMIN — ENOXAPARIN SODIUM SCH MG: 40 INJECTION SUBCUTANEOUS at 16:44

## 2017-11-13 RX ADMIN — WATER SCH NOTE: 100 INJECTION, SOLUTION INTRAVENOUS at 06:53

## 2017-11-13 RX ADMIN — ACETAMINOPHEN PRN MG: 325 TABLET ORAL at 00:14

## 2017-11-13 RX ADMIN — FUROSEMIDE SCH MG: 40 TABLET ORAL at 20:01

## 2017-11-13 RX ADMIN — ASPIRIN SCH MG: 81 TABLET, COATED ORAL at 08:18

## 2017-11-13 RX ADMIN — OMEPRAZOLE SCH MG: 20 CAPSULE, DELAYED RELEASE ORAL at 08:17

## 2017-11-13 RX ADMIN — FOLIC ACID SCH MG: 1 TABLET ORAL at 08:18

## 2017-11-13 RX ADMIN — NYSTATIN SCH: 100000 CREAM TOPICAL at 20:02

## 2017-11-13 RX ADMIN — Medication SCH CAP: at 20:02

## 2017-11-14 RX ADMIN — FOLIC ACID SCH MG: 1 TABLET ORAL at 09:01

## 2017-11-14 RX ADMIN — FUROSEMIDE SCH MG: 40 TABLET ORAL at 21:34

## 2017-11-14 RX ADMIN — METRONIDAZOLE SCH MG: 250 TABLET ORAL at 09:03

## 2017-11-14 RX ADMIN — OXYCODONE HYDROCHLORIDE AND ACETAMINOPHEN SCH MG: 500 TABLET ORAL at 09:03

## 2017-11-14 RX ADMIN — OXYCODONE HYDROCHLORIDE PRN MG: 5 CAPSULE ORAL at 09:02

## 2017-11-14 RX ADMIN — Medication SCH CAP: at 21:34

## 2017-11-14 RX ADMIN — WATER SCH NOTE: 100 INJECTION, SOLUTION INTRAVENOUS at 06:37

## 2017-11-14 RX ADMIN — CIPROFLOXACIN SCH MG: 750 TABLET, FILM COATED ORAL at 09:01

## 2017-11-14 RX ADMIN — Medication SCH CAP: at 09:03

## 2017-11-14 RX ADMIN — Medication SCH MG: at 21:34

## 2017-11-14 RX ADMIN — Medication SCH ML: at 12:33

## 2017-11-14 RX ADMIN — Medication SCH ML: at 06:28

## 2017-11-14 RX ADMIN — NYSTATIN SCH: 100000 CREAM TOPICAL at 09:04

## 2017-11-14 RX ADMIN — OXYCODONE HYDROCHLORIDE PRN MG: 5 CAPSULE ORAL at 17:32

## 2017-11-14 RX ADMIN — ENOXAPARIN SODIUM SCH MG: 40 INJECTION SUBCUTANEOUS at 17:33

## 2017-11-14 RX ADMIN — OMEPRAZOLE SCH MG: 20 CAPSULE, DELAYED RELEASE ORAL at 07:53

## 2017-11-14 RX ADMIN — FUROSEMIDE SCH MG: 40 TABLET ORAL at 09:03

## 2017-11-14 RX ADMIN — POTASSIUM CHLORIDE SCH MEQ: 1500 TABLET, EXTENDED RELEASE ORAL at 09:03

## 2017-11-14 RX ADMIN — HYDROCORTISONE SCH APPLIC: 1 CREAM TOPICAL at 14:04

## 2017-11-14 RX ADMIN — HYDROCORTISONE SCH: 1 CREAM TOPICAL at 21:37

## 2017-11-14 RX ADMIN — METRONIDAZOLE SCH MG: 250 TABLET ORAL at 21:34

## 2017-11-14 RX ADMIN — Medication SCH UNITS: at 09:02

## 2017-11-14 RX ADMIN — CYANOCOBALAMIN TAB 500 MCG SCH MCG: 500 TAB at 09:03

## 2017-11-14 RX ADMIN — ASPIRIN SCH MG: 81 TABLET, COATED ORAL at 09:03

## 2017-11-14 RX ADMIN — ECONAZOLE NITRATE SCH: 10 CREAM TOPICAL at 09:04

## 2017-11-14 RX ADMIN — PREGABALIN SCH MG: 50 CAPSULE ORAL at 21:33

## 2017-11-14 RX ADMIN — PREGABALIN SCH MG: 50 CAPSULE ORAL at 09:02

## 2017-11-14 RX ADMIN — OXYCODONE HYDROCHLORIDE PRN MG: 5 CAPSULE ORAL at 12:32

## 2017-11-14 RX ADMIN — NYSTATIN SCH: 100000 CREAM TOPICAL at 21:37

## 2017-11-14 RX ADMIN — HYDROCORTISONE SCH: 1 CREAM TOPICAL at 09:04

## 2017-11-14 RX ADMIN — Medication SCH MG: at 09:00

## 2017-11-14 RX ADMIN — Medication SCH MG: at 09:03

## 2017-11-14 RX ADMIN — CIPROFLOXACIN SCH MG: 750 TABLET, FILM COATED ORAL at 21:33

## 2017-11-14 RX ADMIN — Medication SCH ML: at 17:33

## 2017-11-14 RX ADMIN — VANCOMYCIN HYDROCHLORIDE SCH MLS/HR: 1 INJECTION, POWDER, LYOPHILIZED, FOR SOLUTION INTRAVENOUS at 10:30

## 2017-11-14 RX ADMIN — PREGABALIN SCH MG: 50 CAPSULE ORAL at 14:02

## 2017-11-14 RX ADMIN — WATER SCH NOTE: 100 INJECTION, SOLUTION INTRAVENOUS at 18:43

## 2017-11-14 NOTE — PN
Subjective


Date of Service: 11/14/17


Doctor-Patient Communication Method: Telemedicine


Interval History: 


.


visit brokered by me between patient and plastic surgeon - Dr. Luis Felipe Chirinos


used iPAD with HIPAA compliant Zoom platform to perform face to face encounter 

with Mr. urias and his wife Kimmy. Nurses present.


General instructions received from Dr. Chirinos about flap , he was shown drains 

and instructed me to remove "every other suture". Bed restriction instructions 

given. IV antibiotics were reviewed.


general questions were answered from family.


total time = 35 minutes with 15 minutes of active face to face on camera.


I was in room 45 minutes.


.





Family History: Unchanged from Admission


Social History: Unchanged from Admission


Past Medical History: Unchanged from Admission





Review of Systems





- Measurements


Intake and Output: 


Intake and Output Last 24 Hours











 11/12/17 11/13/17 11/14/17 11/15/17





 06:59 06:59 06:59 06:59


 


Intake Total 1716 2090 960 999


 


Output Total 2431 2505 2773 1208


 


Balance -715 -415 -1813 -209


 


Intake:    


 


  IV Fluids 566   299


 


    ABX - VANCOMYCIN 566   269


 


    NS    30


 


  Oral 1150 2090 960 700


 


Output:    


 


  MARK #1 3 0 18 5


 


  MARK #2 3 5 5 3


 


  Rodriguez [id number] 1200


 


Other:    


 


  Date of Last Bowel  11/13/17  





  Movement    


 


  # Bowel Movements  4  1


 


  Estimated Stool Amount  Small  Small














Objective


Active Medications: 


.


Acetaminophen (Tylenol Tab*)  650 mg PO Q6H PRN


   PRN Reason: FEVER/PAIN


   Last Admin: 11/13/17 00:14 Dose:  650 mg


Ascorbic Acid (Vitamin C  Tab*)  500 mg PO DAILY [facility]


   Last Admin: 11/14/17 09:03 Dose:  500 mg


Aspirin (Aspirin Ec Low Dose*)  81 mg PO DAILY [facility]


   Last Admin: 11/14/17 09:03 Dose:  81 mg


Cholecalciferol (Vitamin D Tab*)  5,000 units PO DAILY [facility]


   Last Admin: 11/14/17 09:02 Dose:  5,000 units


Ciprofloxacin (Cipro Tab*)  750 mg PO Q12HR [facility]


   Last Admin: 11/14/17 09:01 Dose:  750 mg


Cyanocobalamin (Vitamin B12 Tab*)  1,000 mcg PO DAILY [facility]


   Last Admin: 11/14/17 09:03 Dose:  1,000 mcg


Diphenoxylate HCl/Atropine (Lomotil Tab*)  1 tab PO QID PRN


   PRN Reason: DIARRHEA


   Last Admin: 11/06/17 20:04 Dose:  1 tab


Econazole Nitrate (Econazole 1 % Cream (Nf))  1 applic TOPICAL DAILY [facility]


   Last Admin: 11/14/17 09:04 Dose:  Not Given


Enoxaparin Sodium (Lovenox(*))  40 mg SUBCUT Q24H [facility]


   Last Admin: 11/14/17 17:33 Dose:  40 mg


Fentanyl (Duragesic  Patch 75 Mcg/Hr*)  75 mcg TRANSDERM Q72H [facility]


   Last Admin: 11/12/17 17:14 Dose:  75 mcg


Ferrous Gluconate (Fergon Tab*)  324 mg PO BID [facility]


   Last Admin: 11/14/17 09:03 Dose:  324 mg


Folic Acid (Folvite Tab*)  0.5 mg PO DAILY [facility]


   Last Admin: 11/14/17 09:01 Dose:  0.5 mg


Furosemide (Lasix Tab*)  40 mg PO BID [facility]


   Last Admin: 11/14/17 09:03 Dose:  40 mg


Heparin Sodium (Porcine) (Heparin Flush Picc/Ml/Cvc(*))  0 ml IV FLUSH 0600,

1800 [facility]


   PRN Reason: Protocol


   Last Admin: 11/14/17 17:33 Dose:  1 ml


Hydrocortisone (Hytone Cream 1%*)  1 applic TOPICAL TID [facility]


   Last Admin: 11/14/17 14:04 Dose:  1 applic


Vancomycin HCl 1,000 mg/ (Sodium Chloride)  250 mls @ 166.667 mls/hr IVPB Q24H 

[facility]


   Last Admin: 11/14/17 10:30 Dose:  166.667 mls/hr


Lactobacillus Rhamnosus (Culturelle*)  1 cap PO BID [facility]


   Last Admin: 11/14/17 09:03 Dose:  1 cap


Metronidazole (Flagyl Tab*)  500 mg PO BID [facility]


   Last Admin: 11/14/17 09:03 Dose:  500 mg


Nystatin (Nystatin Cream*)  1 applic TOPICAL BID [facility]


   Last Admin: 11/14/17 09:04 Dose:  Not Given


Omeprazole (Prilosec Cap*)  40 mg PO DAILY@0730 [facility]


   Last Admin: 11/14/17 07:53 Dose:  40 mg


Oxycodone HCl (Roxycodone Tab*)  10 mg PO AC PRN


   PRN Reason: PAIN


   Last Admin: 11/14/17 17:32 Dose:  10 mg


Pharmacy Consult (Vancomycin Per Pharmacy*)  1 note FOLLOW UP . PRN


   PRN Reason: PER PROTOCOL


Pharmacy Profile Note (Fentanyl Patch Check Q Shift)  1 note N/A 0700,1900 [facility]


   Last Admin: 11/14/17 06:37 Dose:  1 note


Potassium Chloride (Klor Con Er Tab*)  20 meq PO DAILY [facility]


   Stop: 11/18/17 08:59


   Last Admin: 11/14/17 09:03 Dose:  20 meq


Pregabalin (Lyrica Cap(*))  150 mg PO TID [facility]


   Last Admin: 11/14/17 14:02 Dose:  150 mg


Zinc Sulfate (Zinc-220 Cap*)  220 mg PO DAILY [facility]


   Last Admin: 11/14/17 09:00 Dose:  220 mg


.


 Vital Signs











  11/13/17 11/13/17 11/13/17





  20:01 20:06 22:01


 


Temperature  97.9 F 


 


Pulse Rate  81 


 


Respiratory 14 16 17





Rate   


 


Blood Pressure  154/60 





(mmHg)   


 


O2 Sat by Pulse  94 





Oximetry   














  11/14/17 11/14/17 11/14/17





  07:30 08:12 09:02


 


Temperature  98.1 F 


 


Pulse Rate  71 


 


Respiratory 16 16 18





Rate   


 


Blood Pressure  151/64 





(mmHg)   


 


O2 Sat by Pulse  97 





Oximetry   








Oxygen Devices in Use Now: None


Appearance: NAD


Eyes: PERRLA


Ears/Nose/Mouth/Throat: NL Teeth, Lips, Gums, Clear Oropharnyx


Neck: NL Appearance and Movements; NL JVP


Respiratory: Symmetrical Chest Expansion and Respiratory Effort


Cardiovascular: NL Sounds; No Murmurs; No JVD


Abdominal: NL Sounds; No Tenderness; No Distention


Skin: - - flap looks good - no fluctuance. MARK x 2. sutures all ok. 1/2 were 

removed by me today during exam.


Neurological: Alert and Oriented x 3


Lines/Tubes/Other Access: Clean, Dry and Intact PICC Line, Clean, Dry and 

Intact Other Access - MARK drain x 2


Nutrition: Taking PO's


Result Diagrams: 


 11/12/17 05:50





 11/12/17 05:50





Assess/Plan/Problems-Billing


.


Assessment: 


71 yo man with paraplegia s/p plastic surgery for Stage IV sacral wound and 

acute osteomyelitis as well as chronic pain.








- Patient Problems


(1) S/P plastic surgery


Current Visit: Yes   Status: Acute   Priority: High   Code(s): Z98.890 - OTHER 

SPECIFIED POSTPROCEDURAL STATES   Comment: 


- In clinitron bed until otherwise directed - can sit up at 4 weeks after 

surgery, or ~ 11/22/2017 


- Careful T&P by nursing


- Flap looks good (Dr. Chirinos agrees)


- 1/2 of staples removed...every other one.


   





(2) S/P flap graft


Current Visit: Yes   Status: Acute   Priority: High   Code(s): Z98.890 - OTHER 

SPECIFIED POSTPROCEDURAL STATES   Comment: 


- 10/25/2017   





(3) Acute osteomyelitis of sacrum


Current Visit: No   Status: Acute   Priority: High   Code(s): M46.28 - 

OSTEOMYELITIS OF VERTEBRA, SACRAL AND SACROCOCCYGEAL REGION   Comment: 


- s/p debridement at time of flap creation at [facility]...continue vancomycin and 

ciprofloxacin and metronidazole


- vanco by level & pharmacy protocol   





(4) CKD (chronic kidney disease), stage III


Current Visit: No   Status: Chronic   Priority: High   Code(s): N18.3 - CHRONIC 

KIDNEY DISEASE, STAGE 3 (MODERATE)   Comment: 


- Creatinine baseline, no acute exacerbation


- Avoid nephrotoxic medications   





(5) Chronic indwelling Rodriguez catheter


Current Visit: No   Status: Chronic   Priority: High   Code(s): Z92.89 - 

PERSONAL HISTORY OF OTHER MEDICAL TREATMENT   Comment: 


- High risk of UTI 


- currently on ABX, no current evidence of UTI.   





(6) Neurogenic bladder


Current Visit: No   Status: Chronic   Priority: High   Code(s): N31.9 - 

NEUROMUSCULAR DYSFUNCTION OF BLADDER, UNSPECIFIED   Comment: 


- With chronic indwelling urinary catheter   





(7) Paraplegia


Current Visit: No   Status: Chronic   Priority: High   Code(s): G82.20 - 

PARAPLEGIA, UNSPECIFIED   Comment: 


- Secondary to ependymoma


- PT / OT as allowed by plastics

## 2017-11-15 RX ADMIN — Medication SCH MG: at 09:20

## 2017-11-15 RX ADMIN — Medication SCH ML: at 14:06

## 2017-11-15 RX ADMIN — Medication SCH UNITS: at 09:21

## 2017-11-15 RX ADMIN — HYDROCORTISONE SCH: 1 CREAM TOPICAL at 09:23

## 2017-11-15 RX ADMIN — PREGABALIN SCH MG: 50 CAPSULE ORAL at 09:21

## 2017-11-15 RX ADMIN — CIPROFLOXACIN SCH MG: 750 TABLET, FILM COATED ORAL at 20:17

## 2017-11-15 RX ADMIN — Medication SCH: at 16:59

## 2017-11-15 RX ADMIN — METRONIDAZOLE SCH MG: 250 TABLET ORAL at 09:21

## 2017-11-15 RX ADMIN — CIPROFLOXACIN SCH MG: 750 TABLET, FILM COATED ORAL at 09:22

## 2017-11-15 RX ADMIN — Medication SCH CAP: at 20:17

## 2017-11-15 RX ADMIN — CYANOCOBALAMIN TAB 500 MCG SCH MCG: 500 TAB at 09:20

## 2017-11-15 RX ADMIN — HYDROCORTISONE SCH APPLIC: 1 CREAM TOPICAL at 14:11

## 2017-11-15 RX ADMIN — POTASSIUM CHLORIDE SCH MEQ: 1500 TABLET, EXTENDED RELEASE ORAL at 09:21

## 2017-11-15 RX ADMIN — METRONIDAZOLE SCH MG: 250 TABLET ORAL at 20:18

## 2017-11-15 RX ADMIN — OXYCODONE HYDROCHLORIDE AND ACETAMINOPHEN SCH MG: 500 TABLET ORAL at 09:20

## 2017-11-15 RX ADMIN — NYSTATIN SCH APPLIC: 100000 CREAM TOPICAL at 09:22

## 2017-11-15 RX ADMIN — HYDROCORTISONE SCH: 1 CREAM TOPICAL at 20:18

## 2017-11-15 RX ADMIN — FUROSEMIDE SCH MG: 40 TABLET ORAL at 09:20

## 2017-11-15 RX ADMIN — ASPIRIN SCH MG: 81 TABLET, COATED ORAL at 09:21

## 2017-11-15 RX ADMIN — PREGABALIN SCH MG: 50 CAPSULE ORAL at 20:18

## 2017-11-15 RX ADMIN — Medication SCH ML: at 05:48

## 2017-11-15 RX ADMIN — WATER SCH NOTE: 100 INJECTION, SOLUTION INTRAVENOUS at 18:46

## 2017-11-15 RX ADMIN — WATER SCH NOTE: 100 INJECTION, SOLUTION INTRAVENOUS at 06:37

## 2017-11-15 RX ADMIN — Medication SCH MG: at 20:18

## 2017-11-15 RX ADMIN — NYSTATIN SCH: 100000 CREAM TOPICAL at 20:19

## 2017-11-15 RX ADMIN — ECONAZOLE NITRATE SCH: 10 CREAM TOPICAL at 09:28

## 2017-11-15 RX ADMIN — Medication SCH MG: at 09:21

## 2017-11-15 RX ADMIN — HYDROCORTISONE SCH: 1 CREAM TOPICAL at 14:12

## 2017-11-15 RX ADMIN — FOLIC ACID SCH MG: 1 TABLET ORAL at 09:22

## 2017-11-15 RX ADMIN — FUROSEMIDE SCH MG: 40 TABLET ORAL at 20:18

## 2017-11-15 RX ADMIN — FENTANYL TRANSDERMAL SCH MCG: 75 PATCH, EXTENDED RELEASE TRANSDERMAL at 16:54

## 2017-11-15 RX ADMIN — OMEPRAZOLE SCH MG: 20 CAPSULE, DELAYED RELEASE ORAL at 07:42

## 2017-11-15 RX ADMIN — Medication SCH CAP: at 09:20

## 2017-11-15 RX ADMIN — ENOXAPARIN SODIUM SCH MG: 40 INJECTION SUBCUTANEOUS at 16:56

## 2017-11-15 RX ADMIN — VANCOMYCIN HYDROCHLORIDE SCH MLS/HR: 1 INJECTION, POWDER, LYOPHILIZED, FOR SOLUTION INTRAVENOUS at 11:40

## 2017-11-15 RX ADMIN — PREGABALIN SCH MG: 50 CAPSULE ORAL at 14:06

## 2017-11-16 RX ADMIN — Medication SCH MG: at 22:42

## 2017-11-16 RX ADMIN — NYSTATIN SCH: 100000 CREAM TOPICAL at 09:16

## 2017-11-16 RX ADMIN — PREGABALIN SCH MG: 50 CAPSULE ORAL at 09:07

## 2017-11-16 RX ADMIN — Medication SCH CAP: at 22:41

## 2017-11-16 RX ADMIN — ENOXAPARIN SODIUM SCH MG: 40 INJECTION SUBCUTANEOUS at 17:24

## 2017-11-16 RX ADMIN — OXYCODONE HYDROCHLORIDE AND ACETAMINOPHEN SCH MG: 500 TABLET ORAL at 09:04

## 2017-11-16 RX ADMIN — PREGABALIN SCH MG: 50 CAPSULE ORAL at 14:24

## 2017-11-16 RX ADMIN — HYDROCORTISONE SCH: 1 CREAM TOPICAL at 14:21

## 2017-11-16 RX ADMIN — FOLIC ACID SCH MG: 1 TABLET ORAL at 09:03

## 2017-11-16 RX ADMIN — VANCOMYCIN HYDROCHLORIDE SCH MLS/HR: 1 INJECTION, POWDER, LYOPHILIZED, FOR SOLUTION INTRAVENOUS at 11:43

## 2017-11-16 RX ADMIN — ASPIRIN SCH MG: 81 TABLET, COATED ORAL at 09:03

## 2017-11-16 RX ADMIN — Medication SCH UNITS: at 09:06

## 2017-11-16 RX ADMIN — CIPROFLOXACIN SCH MG: 750 TABLET, FILM COATED ORAL at 09:03

## 2017-11-16 RX ADMIN — Medication SCH CAP: at 09:09

## 2017-11-16 RX ADMIN — OMEPRAZOLE SCH MG: 20 CAPSULE, DELAYED RELEASE ORAL at 07:37

## 2017-11-16 RX ADMIN — Medication SCH MG: at 09:10

## 2017-11-16 RX ADMIN — HYDROCORTISONE SCH: 1 CREAM TOPICAL at 09:11

## 2017-11-16 RX ADMIN — WATER SCH NOTE: 100 INJECTION, SOLUTION INTRAVENOUS at 07:01

## 2017-11-16 RX ADMIN — Medication SCH MG: at 09:03

## 2017-11-16 RX ADMIN — METRONIDAZOLE SCH MG: 250 TABLET ORAL at 22:41

## 2017-11-16 RX ADMIN — NYSTATIN SCH: 100000 CREAM TOPICAL at 22:42

## 2017-11-16 RX ADMIN — FUROSEMIDE SCH MG: 40 TABLET ORAL at 09:10

## 2017-11-16 RX ADMIN — HYDROCORTISONE SCH: 1 CREAM TOPICAL at 22:42

## 2017-11-16 RX ADMIN — CYANOCOBALAMIN TAB 500 MCG SCH MCG: 500 TAB at 09:10

## 2017-11-16 RX ADMIN — WATER SCH NOTE: 100 INJECTION, SOLUTION INTRAVENOUS at 19:20

## 2017-11-16 RX ADMIN — PREGABALIN SCH MG: 50 CAPSULE ORAL at 22:41

## 2017-11-16 RX ADMIN — Medication SCH ML: at 17:23

## 2017-11-16 RX ADMIN — METRONIDAZOLE SCH MG: 250 TABLET ORAL at 09:09

## 2017-11-16 RX ADMIN — Medication SCH ML: at 06:14

## 2017-11-16 RX ADMIN — CIPROFLOXACIN SCH MG: 750 TABLET, FILM COATED ORAL at 22:41

## 2017-11-16 RX ADMIN — FUROSEMIDE SCH MG: 40 TABLET ORAL at 22:41

## 2017-11-16 RX ADMIN — POTASSIUM CHLORIDE SCH MEQ: 1500 TABLET, EXTENDED RELEASE ORAL at 09:04

## 2017-11-17 RX ADMIN — Medication SCH CAP: at 20:55

## 2017-11-17 RX ADMIN — OXYCODONE HYDROCHLORIDE AND ACETAMINOPHEN SCH MG: 500 TABLET ORAL at 09:35

## 2017-11-17 RX ADMIN — Medication SCH ML: at 17:59

## 2017-11-17 RX ADMIN — NYSTATIN SCH: 100000 CREAM TOPICAL at 09:43

## 2017-11-17 RX ADMIN — PREGABALIN SCH MG: 50 CAPSULE ORAL at 15:06

## 2017-11-17 RX ADMIN — FUROSEMIDE SCH MG: 40 TABLET ORAL at 20:55

## 2017-11-17 RX ADMIN — HYDROCORTISONE SCH: 1 CREAM TOPICAL at 15:02

## 2017-11-17 RX ADMIN — Medication SCH ML: at 05:59

## 2017-11-17 RX ADMIN — WATER SCH NOTE: 100 INJECTION, SOLUTION INTRAVENOUS at 19:03

## 2017-11-17 RX ADMIN — HYDROCORTISONE SCH: 1 CREAM TOPICAL at 09:43

## 2017-11-17 RX ADMIN — METRONIDAZOLE SCH MG: 250 TABLET ORAL at 09:36

## 2017-11-17 RX ADMIN — NYSTATIN SCH: 100000 CREAM TOPICAL at 20:55

## 2017-11-17 RX ADMIN — PREGABALIN SCH MG: 50 CAPSULE ORAL at 20:54

## 2017-11-17 RX ADMIN — OMEPRAZOLE SCH MG: 20 CAPSULE, DELAYED RELEASE ORAL at 08:11

## 2017-11-17 RX ADMIN — HYDROCORTISONE SCH: 1 CREAM TOPICAL at 20:55

## 2017-11-17 RX ADMIN — PREGABALIN SCH MG: 50 CAPSULE ORAL at 09:36

## 2017-11-17 RX ADMIN — Medication SCH CAP: at 09:36

## 2017-11-17 RX ADMIN — FOLIC ACID SCH MG: 1 TABLET ORAL at 09:36

## 2017-11-17 RX ADMIN — WATER SCH NOTE: 100 INJECTION, SOLUTION INTRAVENOUS at 07:26

## 2017-11-17 RX ADMIN — ENOXAPARIN SODIUM SCH MG: 40 INJECTION SUBCUTANEOUS at 17:59

## 2017-11-17 RX ADMIN — Medication SCH ML: at 13:15

## 2017-11-17 RX ADMIN — ASPIRIN SCH MG: 81 TABLET, COATED ORAL at 09:35

## 2017-11-17 RX ADMIN — POTASSIUM CHLORIDE SCH MEQ: 1500 TABLET, EXTENDED RELEASE ORAL at 09:36

## 2017-11-17 RX ADMIN — Medication SCH MG: at 20:54

## 2017-11-17 RX ADMIN — VANCOMYCIN HYDROCHLORIDE SCH MLS/HR: 1 INJECTION, POWDER, LYOPHILIZED, FOR SOLUTION INTRAVENOUS at 10:30

## 2017-11-17 RX ADMIN — CYANOCOBALAMIN TAB 500 MCG SCH MCG: 500 TAB at 09:36

## 2017-11-17 RX ADMIN — Medication SCH MG: at 09:35

## 2017-11-17 RX ADMIN — Medication SCH MG: at 09:36

## 2017-11-17 RX ADMIN — FUROSEMIDE SCH MG: 40 TABLET ORAL at 09:36

## 2017-11-17 RX ADMIN — Medication SCH UNITS: at 09:35

## 2017-11-17 RX ADMIN — CIPROFLOXACIN SCH MG: 750 TABLET, FILM COATED ORAL at 20:54

## 2017-11-17 RX ADMIN — METRONIDAZOLE SCH MG: 250 TABLET ORAL at 20:55

## 2017-11-17 RX ADMIN — CIPROFLOXACIN SCH MG: 750 TABLET, FILM COATED ORAL at 09:36

## 2017-11-18 RX ADMIN — Medication SCH CAP: at 08:58

## 2017-11-18 RX ADMIN — CYANOCOBALAMIN TAB 500 MCG SCH MCG: 500 TAB at 08:58

## 2017-11-18 RX ADMIN — NYSTATIN SCH: 100000 CREAM TOPICAL at 20:09

## 2017-11-18 RX ADMIN — METRONIDAZOLE SCH MG: 250 TABLET ORAL at 08:57

## 2017-11-18 RX ADMIN — FUROSEMIDE SCH MG: 40 TABLET ORAL at 08:57

## 2017-11-18 RX ADMIN — PREGABALIN SCH MG: 50 CAPSULE ORAL at 13:14

## 2017-11-18 RX ADMIN — WATER SCH NOTE: 100 INJECTION, SOLUTION INTRAVENOUS at 19:30

## 2017-11-18 RX ADMIN — PREGABALIN SCH MG: 50 CAPSULE ORAL at 20:08

## 2017-11-18 RX ADMIN — CIPROFLOXACIN SCH MG: 750 TABLET, FILM COATED ORAL at 08:58

## 2017-11-18 RX ADMIN — OXYCODONE HYDROCHLORIDE PRN MG: 5 CAPSULE ORAL at 13:14

## 2017-11-18 RX ADMIN — OXYCODONE HYDROCHLORIDE AND ACETAMINOPHEN SCH MG: 500 TABLET ORAL at 08:58

## 2017-11-18 RX ADMIN — Medication SCH: at 16:10

## 2017-11-18 RX ADMIN — Medication SCH MG: at 20:08

## 2017-11-18 RX ADMIN — OXYCODONE HYDROCHLORIDE PRN MG: 5 CAPSULE ORAL at 07:54

## 2017-11-18 RX ADMIN — OXYCODONE HYDROCHLORIDE PRN MG: 5 CAPSULE ORAL at 17:01

## 2017-11-18 RX ADMIN — Medication SCH MG: at 08:57

## 2017-11-18 RX ADMIN — CIPROFLOXACIN SCH MG: 750 TABLET, FILM COATED ORAL at 20:08

## 2017-11-18 RX ADMIN — FUROSEMIDE SCH MG: 40 TABLET ORAL at 20:08

## 2017-11-18 RX ADMIN — ASPIRIN SCH MG: 81 TABLET, COATED ORAL at 08:58

## 2017-11-18 RX ADMIN — FENTANYL TRANSDERMAL SCH MCG: 75 PATCH, EXTENDED RELEASE TRANSDERMAL at 17:02

## 2017-11-18 RX ADMIN — ENOXAPARIN SODIUM SCH MG: 40 INJECTION SUBCUTANEOUS at 17:01

## 2017-11-18 RX ADMIN — WATER SCH NOTE: 100 INJECTION, SOLUTION INTRAVENOUS at 07:07

## 2017-11-18 RX ADMIN — Medication SCH ML: at 13:52

## 2017-11-18 RX ADMIN — HYDROCORTISONE SCH: 1 CREAM TOPICAL at 07:55

## 2017-11-18 RX ADMIN — HYDROCORTISONE SCH: 1 CREAM TOPICAL at 20:08

## 2017-11-18 RX ADMIN — HYDROCORTISONE SCH: 1 CREAM TOPICAL at 13:12

## 2017-11-18 RX ADMIN — OMEPRAZOLE SCH MG: 20 CAPSULE, DELAYED RELEASE ORAL at 07:54

## 2017-11-18 RX ADMIN — Medication SCH CAP: at 20:08

## 2017-11-18 RX ADMIN — FOLIC ACID SCH MG: 1 TABLET ORAL at 08:58

## 2017-11-18 RX ADMIN — NYSTATIN SCH: 100000 CREAM TOPICAL at 07:55

## 2017-11-18 RX ADMIN — Medication SCH UNITS: at 08:57

## 2017-11-18 RX ADMIN — Medication SCH ML: at 04:54

## 2017-11-18 RX ADMIN — VANCOMYCIN HYDROCHLORIDE SCH MLS/HR: 1 INJECTION, POWDER, LYOPHILIZED, FOR SOLUTION INTRAVENOUS at 10:31

## 2017-11-18 RX ADMIN — PREGABALIN SCH MG: 50 CAPSULE ORAL at 09:11

## 2017-11-18 RX ADMIN — METRONIDAZOLE SCH MG: 250 TABLET ORAL at 20:08

## 2017-11-19 LAB
BASOPHILS # BLD AUTO: 0.1 10^3/UL (ref 0–0.2)
EOSINOPHIL # BLD AUTO: 0.3 10^3/UL (ref 0–0.6)
HCT VFR BLD AUTO: 37 % (ref 42–52)
HGB BLD-MCNC: 12.3 G/DL (ref 14–18)
LYMPHOCYTES # BLD AUTO: 1.9 10^3/UL (ref 1–4.8)
MCH RBC QN AUTO: 28 PG (ref 27–31)
MCHC RBC AUTO-ENTMCNC: 34 G/DL (ref 31–36)
MCV RBC AUTO: 82 FL (ref 80–94)
MONOCYTES # BLD AUTO: 1.4 10^3/UL (ref 0–0.8)
NEUTROPHILS # BLD AUTO: 6.7 10^3/UL (ref 1.5–7.7)
NRBC # BLD AUTO: 0 10^3/UL
NRBC BLD QL AUTO: 0
PLATELET # BLD AUTO: 232 10^3/UL (ref 150–450)
RBC # BLD AUTO: 4.44 10^6/UL (ref 4–5.4)
WBC # BLD AUTO: 10.5 10^3/UL (ref 3.5–10.8)

## 2017-11-19 RX ADMIN — Medication SCH MG: at 20:00

## 2017-11-19 RX ADMIN — CIPROFLOXACIN SCH MG: 750 TABLET, FILM COATED ORAL at 20:00

## 2017-11-19 RX ADMIN — CIPROFLOXACIN SCH MG: 750 TABLET, FILM COATED ORAL at 09:31

## 2017-11-19 RX ADMIN — Medication SCH ML: at 13:35

## 2017-11-19 RX ADMIN — Medication SCH MG: at 09:30

## 2017-11-19 RX ADMIN — ENOXAPARIN SODIUM SCH MG: 40 INJECTION SUBCUTANEOUS at 17:02

## 2017-11-19 RX ADMIN — NYSTATIN SCH: 100000 CREAM TOPICAL at 20:09

## 2017-11-19 RX ADMIN — OMEPRAZOLE SCH MG: 20 CAPSULE, DELAYED RELEASE ORAL at 08:09

## 2017-11-19 RX ADMIN — HYDROCORTISONE SCH: 1 CREAM TOPICAL at 14:35

## 2017-11-19 RX ADMIN — Medication SCH UNITS: at 09:30

## 2017-11-19 RX ADMIN — WATER SCH NOTE: 100 INJECTION, SOLUTION INTRAVENOUS at 07:07

## 2017-11-19 RX ADMIN — PREGABALIN SCH MG: 50 CAPSULE ORAL at 20:00

## 2017-11-19 RX ADMIN — VANCOMYCIN HYDROCHLORIDE SCH MLS/HR: 1 INJECTION, POWDER, LYOPHILIZED, FOR SOLUTION INTRAVENOUS at 10:09

## 2017-11-19 RX ADMIN — HYDROCORTISONE SCH: 1 CREAM TOPICAL at 20:08

## 2017-11-19 RX ADMIN — ASPIRIN SCH MG: 81 TABLET, COATED ORAL at 09:31

## 2017-11-19 RX ADMIN — Medication SCH CAP: at 09:31

## 2017-11-19 RX ADMIN — Medication SCH CAP: at 19:59

## 2017-11-19 RX ADMIN — WATER SCH NOTE: 100 INJECTION, SOLUTION INTRAVENOUS at 18:27

## 2017-11-19 RX ADMIN — PREGABALIN SCH MG: 50 CAPSULE ORAL at 14:21

## 2017-11-19 RX ADMIN — CYANOCOBALAMIN TAB 500 MCG SCH MCG: 500 TAB at 09:31

## 2017-11-19 RX ADMIN — Medication SCH ML: at 06:14

## 2017-11-19 RX ADMIN — Medication SCH MG: at 09:31

## 2017-11-19 RX ADMIN — ACETAMINOPHEN PRN MG: 325 TABLET ORAL at 20:06

## 2017-11-19 RX ADMIN — FUROSEMIDE SCH MG: 40 TABLET ORAL at 09:32

## 2017-11-19 RX ADMIN — FUROSEMIDE SCH MG: 40 TABLET ORAL at 20:00

## 2017-11-19 RX ADMIN — METRONIDAZOLE SCH MG: 250 TABLET ORAL at 09:30

## 2017-11-19 RX ADMIN — NYSTATIN SCH: 100000 CREAM TOPICAL at 09:33

## 2017-11-19 RX ADMIN — PREGABALIN SCH MG: 50 CAPSULE ORAL at 09:31

## 2017-11-19 RX ADMIN — HYDROCORTISONE SCH: 1 CREAM TOPICAL at 09:32

## 2017-11-19 RX ADMIN — OXYCODONE HYDROCHLORIDE AND ACETAMINOPHEN SCH MG: 500 TABLET ORAL at 09:31

## 2017-11-19 RX ADMIN — METRONIDAZOLE SCH MG: 250 TABLET ORAL at 20:00

## 2017-11-19 RX ADMIN — FOLIC ACID SCH MG: 1 TABLET ORAL at 09:30

## 2017-11-20 RX ADMIN — Medication SCH CAP: at 08:56

## 2017-11-20 RX ADMIN — METRONIDAZOLE SCH MG: 250 TABLET ORAL at 08:56

## 2017-11-20 RX ADMIN — OMEPRAZOLE SCH MG: 20 CAPSULE, DELAYED RELEASE ORAL at 07:16

## 2017-11-20 RX ADMIN — METRONIDAZOLE SCH MG: 250 TABLET ORAL at 21:13

## 2017-11-20 RX ADMIN — ASPIRIN SCH MG: 81 TABLET, COATED ORAL at 08:55

## 2017-11-20 RX ADMIN — Medication SCH MG: at 21:13

## 2017-11-20 RX ADMIN — ACETAMINOPHEN PRN MG: 325 TABLET ORAL at 17:45

## 2017-11-20 RX ADMIN — CIPROFLOXACIN SCH MG: 750 TABLET, FILM COATED ORAL at 08:55

## 2017-11-20 RX ADMIN — Medication SCH ML: at 17:48

## 2017-11-20 RX ADMIN — Medication SCH MG: at 08:56

## 2017-11-20 RX ADMIN — VANCOMYCIN HYDROCHLORIDE SCH MLS/HR: 1 INJECTION, POWDER, LYOPHILIZED, FOR SOLUTION INTRAVENOUS at 09:52

## 2017-11-20 RX ADMIN — FUROSEMIDE SCH MG: 40 TABLET ORAL at 21:13

## 2017-11-20 RX ADMIN — CIPROFLOXACIN SCH MG: 750 TABLET, FILM COATED ORAL at 21:13

## 2017-11-20 RX ADMIN — Medication SCH ML: at 12:02

## 2017-11-20 RX ADMIN — WATER SCH NOTE: 100 INJECTION, SOLUTION INTRAVENOUS at 06:59

## 2017-11-20 RX ADMIN — FUROSEMIDE SCH MG: 40 TABLET ORAL at 08:56

## 2017-11-20 RX ADMIN — HYDROCORTISONE SCH: 1 CREAM TOPICAL at 13:40

## 2017-11-20 RX ADMIN — NYSTATIN SCH: 100000 CREAM TOPICAL at 21:22

## 2017-11-20 RX ADMIN — HYDROCORTISONE SCH: 1 CREAM TOPICAL at 08:55

## 2017-11-20 RX ADMIN — HYDROCORTISONE SCH APPLIC: 1 CREAM TOPICAL at 21:15

## 2017-11-20 RX ADMIN — PREGABALIN SCH MG: 50 CAPSULE ORAL at 13:43

## 2017-11-20 RX ADMIN — PREGABALIN SCH MG: 50 CAPSULE ORAL at 21:12

## 2017-11-20 RX ADMIN — Medication SCH CAP: at 21:13

## 2017-11-20 RX ADMIN — OXYCODONE HYDROCHLORIDE AND ACETAMINOPHEN SCH MG: 500 TABLET ORAL at 08:55

## 2017-11-20 RX ADMIN — NYSTATIN SCH: 100000 CREAM TOPICAL at 08:55

## 2017-11-20 RX ADMIN — WATER SCH NOTE: 100 INJECTION, SOLUTION INTRAVENOUS at 19:22

## 2017-11-20 RX ADMIN — Medication SCH UNITS: at 08:56

## 2017-11-20 RX ADMIN — CYANOCOBALAMIN TAB 500 MCG SCH MCG: 500 TAB at 08:56

## 2017-11-20 RX ADMIN — PREGABALIN SCH MG: 50 CAPSULE ORAL at 08:56

## 2017-11-20 RX ADMIN — FOLIC ACID SCH MG: 1 TABLET ORAL at 08:57

## 2017-11-20 RX ADMIN — Medication SCH ML: at 06:10

## 2017-11-20 RX ADMIN — ENOXAPARIN SODIUM SCH MG: 40 INJECTION SUBCUTANEOUS at 17:47

## 2017-11-21 RX ADMIN — HYDROCORTISONE SCH: 1 CREAM TOPICAL at 15:16

## 2017-11-21 RX ADMIN — FENTANYL TRANSDERMAL SCH MCG: 75 PATCH, EXTENDED RELEASE TRANSDERMAL at 18:20

## 2017-11-21 RX ADMIN — Medication SCH CAP: at 09:13

## 2017-11-21 RX ADMIN — HYDROCORTISONE SCH: 1 CREAM TOPICAL at 19:59

## 2017-11-21 RX ADMIN — Medication SCH CAP: at 19:59

## 2017-11-21 RX ADMIN — ENOXAPARIN SODIUM SCH MG: 40 INJECTION SUBCUTANEOUS at 18:24

## 2017-11-21 RX ADMIN — FUROSEMIDE SCH MG: 40 TABLET ORAL at 09:13

## 2017-11-21 RX ADMIN — Medication SCH MG: at 09:13

## 2017-11-21 RX ADMIN — Medication SCH UNITS: at 09:13

## 2017-11-21 RX ADMIN — METRONIDAZOLE SCH MG: 250 TABLET ORAL at 19:59

## 2017-11-21 RX ADMIN — OMEPRAZOLE SCH MG: 20 CAPSULE, DELAYED RELEASE ORAL at 09:13

## 2017-11-21 RX ADMIN — VANCOMYCIN HYDROCHLORIDE SCH MLS/HR: 1 INJECTION, POWDER, LYOPHILIZED, FOR SOLUTION INTRAVENOUS at 10:08

## 2017-11-21 RX ADMIN — WATER SCH NOTE: 100 INJECTION, SOLUTION INTRAVENOUS at 06:59

## 2017-11-21 RX ADMIN — Medication SCH ML: at 06:05

## 2017-11-21 RX ADMIN — METRONIDAZOLE SCH MG: 250 TABLET ORAL at 09:13

## 2017-11-21 RX ADMIN — CYANOCOBALAMIN TAB 500 MCG SCH MCG: 500 TAB at 09:12

## 2017-11-21 RX ADMIN — PREGABALIN SCH MG: 50 CAPSULE ORAL at 19:58

## 2017-11-21 RX ADMIN — Medication SCH ML: at 12:12

## 2017-11-21 RX ADMIN — NYSTATIN SCH: 100000 CREAM TOPICAL at 09:21

## 2017-11-21 RX ADMIN — HYDROCORTISONE SCH: 1 CREAM TOPICAL at 09:21

## 2017-11-21 RX ADMIN — Medication SCH ML: at 18:24

## 2017-11-21 RX ADMIN — ASPIRIN SCH MG: 81 TABLET, COATED ORAL at 09:13

## 2017-11-21 RX ADMIN — FUROSEMIDE SCH MG: 40 TABLET ORAL at 19:58

## 2017-11-21 RX ADMIN — WATER SCH NOTE: 100 INJECTION, SOLUTION INTRAVENOUS at 19:00

## 2017-11-21 RX ADMIN — PREGABALIN SCH MG: 50 CAPSULE ORAL at 09:13

## 2017-11-21 RX ADMIN — NYSTATIN SCH: 100000 CREAM TOPICAL at 19:59

## 2017-11-21 RX ADMIN — CIPROFLOXACIN SCH MG: 750 TABLET, FILM COATED ORAL at 09:12

## 2017-11-21 RX ADMIN — FOLIC ACID SCH MG: 1 TABLET ORAL at 09:12

## 2017-11-21 RX ADMIN — OXYCODONE HYDROCHLORIDE PRN MG: 5 CAPSULE ORAL at 04:04

## 2017-11-21 RX ADMIN — PREGABALIN SCH MG: 50 CAPSULE ORAL at 16:04

## 2017-11-21 RX ADMIN — Medication SCH MG: at 19:57

## 2017-11-21 RX ADMIN — CIPROFLOXACIN SCH MG: 750 TABLET, FILM COATED ORAL at 19:58

## 2017-11-21 RX ADMIN — OXYCODONE HYDROCHLORIDE AND ACETAMINOPHEN SCH MG: 500 TABLET ORAL at 09:12

## 2017-11-21 NOTE — PN
Subjective


Date of Service: 11/21/17


Interval History: 





Telemedicine conference today at noon with Dr. Chirinos (plastics) and Jose Pryor coordinated by Dr Faulkner who was present, also present was myself, 

Dari Davenport NP, Kimmy Pryor (wife) and Nat, nurse. 


Dr Chirinos felt that the surgical site continues to heal well. He recommended 

removing the remainder of the staples today as well as the drain which was done 

by Dari Davenport. He also felt the patient could sit up 90 degress TID (with 

meals) for 30 mins, trying to avoid shear forces on the site. Will plan for 

another teleconference in 2 weeks and potentially patient will be able to get 

out of bed at that time.


Family History: Unchanged from Admission


Social History: Unchanged from Admission


Past Medical History: Unchanged from Admission





Objective


Active Medications: 








Acetaminophen (Tylenol Tab*)  650 mg PO Q6H PRN


   PRN Reason: FEVER/PAIN


   Last Admin: 11/20/17 17:45 Dose:  650 mg


Ascorbic Acid (Vitamin C  Tab*)  500 mg PO DAILY [facility]


   Last Admin: 11/21/17 09:12 Dose:  500 mg


Aspirin (Aspirin Ec Low Dose*)  81 mg PO DAILY [facility]


   Last Admin: 11/21/17 09:13 Dose:  81 mg


Cholecalciferol (Vitamin D Tab*)  5,000 units PO DAILY [facility]


   Last Admin: 11/21/17 09:13 Dose:  5,000 units


Ciprofloxacin (Cipro Tab*)  750 mg PO Q12HR [facility]


   Last Admin: 11/21/17 09:12 Dose:  750 mg


Cyanocobalamin (Vitamin B12 Tab*)  1,000 mcg PO DAILY [facility]


   Last Admin: 11/21/17 09:12 Dose:  1,000 mcg


Diphenoxylate HCl/Atropine (Lomotil Tab*)  1 tab PO QID PRN


   PRN Reason: DIARRHEA


   Last Admin: 11/06/17 20:04 Dose:  1 tab


Enoxaparin Sodium (Lovenox(*))  40 mg SUBCUT Q24H [facility]


   Last Admin: 11/20/17 17:47 Dose:  40 mg


Fentanyl (Duragesic  Patch 75 Mcg/Hr*)  75 mcg TRANSDERM Q72H [facility]


   Stop: 11/26/17 15:59


   Last Admin: 11/18/17 17:02 Dose:  75 mcg


Ferrous Gluconate (Fergon Tab*)  324 mg PO BID [facility]


   Last Admin: 11/21/17 09:13 Dose:  324 mg


Folic Acid (Folvite Tab*)  0.5 mg PO DAILY [facility]


   Last Admin: 11/21/17 09:12 Dose:  0.5 mg


Furosemide (Lasix Tab*)  40 mg PO BID [facility]


   Last Admin: 11/21/17 09:13 Dose:  40 mg


Heparin Sodium (Porcine) (Heparin Flush Picc/Ml/Cvc(*))  0 ml IV FLUSH 0600,

1800 [facility]


   PRN Reason: Protocol


   Last Admin: 11/21/17 12:12 Dose:  1 ml


Hydrocortisone (Hytone Cream 1%*)  1 applic TOPICAL TID [facility]


   Last Admin: 11/21/17 15:16 Dose:  Not Given


Vancomycin HCl 1,000 mg/ (Sodium Chloride)  250 mls @ 166.667 mls/hr IVPB Q24H 

[facility]


   Last Admin: 11/21/17 10:08 Dose:  166.667 mls/hr


Lactobacillus Rhamnosus (Culturelle*)  1 cap PO BID [facility]


   Last Admin: 11/21/17 09:13 Dose:  1 cap


Metronidazole (Flagyl Tab*)  500 mg PO BID [facility]


   Last Admin: 11/21/17 09:13 Dose:  500 mg


Nystatin (Nystatin Cream*)  1 applic TOPICAL BID [facility]


   Last Admin: 11/21/17 09:21 Dose:  Not Given


Omeprazole (Prilosec Cap*)  40 mg PO DAILY@0730 [facility]


   Last Admin: 11/21/17 09:13 Dose:  40 mg


Oxycodone HCl (Roxycodone Tab*)  10 mg PO Q8H PRN


   PRN Reason: PAIN


   Last Admin: 11/21/17 04:04 Dose:  10 mg


Pharmacy Consult (Vancomycin Per Pharmacy*)  1 note FOLLOW UP . PRN


   PRN Reason: PER PROTOCOL


Pharmacy Profile Note (Fentanyl Patch Check Q Shift)  1 note N/A 0700,1900 [facility]


   Last Admin: 11/21/17 06:59 Dose:  1 note


Pregabalin (Lyrica Cap(*))  150 mg PO TID [facility]


   Last Admin: 11/21/17 09:13 Dose:  150 mg


Zinc Sulfate (Zinc-220 Cap*)  220 mg PO DAILY [facility]


   Last Admin: 11/21/17 09:13 Dose:  220 mg








 Vital Signs











  11/20/17 11/20/17 11/20/17





  17:38 21:12 21:15


 


Temperature   


 


Pulse Rate   


 


Respiratory 16 16 16





Rate   


 


Blood Pressure   





(mmHg)   


 


O2 Sat by Pulse   





Oximetry   














  11/20/17 11/21/17 11/21/17





  23:23 04:04 06:08


 


Temperature   


 


Pulse Rate   


 


Respiratory 16 16 16





Rate   


 


Blood Pressure   





(mmHg)   


 


O2 Sat by Pulse   





Oximetry   














  11/21/17 11/21/17 11/21/17





  07:45 08:30 09:13


 


Temperature 98.6 F  


 


Pulse Rate 84  


 


Respiratory 18 16 16





Rate   


 


Blood Pressure 133/57  





(mmHg)   


 


O2 Sat by Pulse 97  





Oximetry   











Oxygen Devices in Use Now: None


Appearance: Elderly,  M, laying in bed in NAD


Skin: - - Well healing surgical wound over the sacral area, staples and drains 

removed


Neurological: Alert and Oriented x 3


Result Diagrams: 


 11/19/17 06:23





 11/19/17 06:23





Assess/Plan/Problems-Billing


.


Assessment: 


71 yo man with paraplegia s/p plastic surgery for Stage IV sacral wound and 

acute osteomyelitis as well as chronic pain.








- Patient Problems


(1) S/P plastic surgery


Current Visit: Yes   Comment: 


- In clinitron bed until otherwise directed - can sit up 3x/day with meals for 

30 mins, plan for potentially OOB in 2 weeks (12/5) after next teleconference


- Careful T&P by nursing


- Flap looks good (Dr. Chirinos agrees)


- Remainder of staples removed as well as drains on 11/21





   





(2) S/P flap graft


Current Visit: Yes   Comment: 


- 10/25/2017   





(3) Acute osteomyelitis of sacrum


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 


- s/p debridement at time of flap creation at [facility]...continue vancomycin and 

ciprofloxacin and metronidazole


- Plan was for 6 weeks total of above regimen (should complete 12/6)


- vanco by level & pharmacy protocol   





(4) CKD (chronic kidney disease), stage III


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 


- Creatinine baseline, no acute exacerbation


- Avoid nephrotoxic medications   





(5) Chronic indwelling Rodriguez catheter


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 


- High risk of UTI 


- currently on ABX, no current evidence of UTI.   





(6) Neurogenic bladder


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 


- With chronic indwelling urinary catheter   





(7) Paraplegia


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 


- Secondary to ependymoma


- PT / OT as allowed by plastics   





(8) DVT prophylaxis


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 


Lovenox, SCDs

## 2017-11-22 RX ADMIN — NYSTATIN SCH: 100000 CREAM TOPICAL at 20:30

## 2017-11-22 RX ADMIN — Medication SCH MG: at 20:04

## 2017-11-22 RX ADMIN — CYANOCOBALAMIN TAB 500 MCG SCH MCG: 500 TAB at 08:30

## 2017-11-22 RX ADMIN — Medication SCH ML: at 06:14

## 2017-11-22 RX ADMIN — FOLIC ACID SCH MG: 1 TABLET ORAL at 08:30

## 2017-11-22 RX ADMIN — Medication SCH CAP: at 08:29

## 2017-11-22 RX ADMIN — OXYCODONE HYDROCHLORIDE AND ACETAMINOPHEN SCH MG: 500 TABLET ORAL at 08:30

## 2017-11-22 RX ADMIN — WATER SCH NOTE: 100 INJECTION, SOLUTION INTRAVENOUS at 06:50

## 2017-11-22 RX ADMIN — Medication SCH UNITS: at 08:29

## 2017-11-22 RX ADMIN — WATER SCH NOTE: 100 INJECTION, SOLUTION INTRAVENOUS at 15:44

## 2017-11-22 RX ADMIN — ASPIRIN SCH MG: 81 TABLET, COATED ORAL at 08:30

## 2017-11-22 RX ADMIN — CIPROFLOXACIN SCH MG: 750 TABLET, FILM COATED ORAL at 08:32

## 2017-11-22 RX ADMIN — OXYCODONE HYDROCHLORIDE PRN MG: 5 CAPSULE ORAL at 14:15

## 2017-11-22 RX ADMIN — HYDROCORTISONE SCH APPLIC: 1 CREAM TOPICAL at 20:30

## 2017-11-22 RX ADMIN — WATER SCH NOTE: 100 INJECTION, SOLUTION INTRAVENOUS at 18:30

## 2017-11-22 RX ADMIN — HYDROCORTISONE SCH: 1 CREAM TOPICAL at 07:17

## 2017-11-22 RX ADMIN — PREGABALIN SCH MG: 50 CAPSULE ORAL at 20:04

## 2017-11-22 RX ADMIN — VANCOMYCIN HYDROCHLORIDE SCH MLS/HR: 1 INJECTION, POWDER, LYOPHILIZED, FOR SOLUTION INTRAVENOUS at 10:41

## 2017-11-22 RX ADMIN — OMEPRAZOLE SCH MG: 20 CAPSULE, DELAYED RELEASE ORAL at 08:30

## 2017-11-22 RX ADMIN — Medication SCH MG: at 08:32

## 2017-11-22 RX ADMIN — FUROSEMIDE SCH MG: 40 TABLET ORAL at 20:04

## 2017-11-22 RX ADMIN — HYDROCORTISONE SCH: 1 CREAM TOPICAL at 12:18

## 2017-11-22 RX ADMIN — Medication SCH MG: at 08:30

## 2017-11-22 RX ADMIN — PREGABALIN SCH MG: 50 CAPSULE ORAL at 08:30

## 2017-11-22 RX ADMIN — METRONIDAZOLE SCH MG: 250 TABLET ORAL at 08:30

## 2017-11-22 RX ADMIN — Medication SCH CAP: at 20:04

## 2017-11-22 RX ADMIN — CIPROFLOXACIN SCH MG: 750 TABLET, FILM COATED ORAL at 20:04

## 2017-11-22 RX ADMIN — NYSTATIN SCH: 100000 CREAM TOPICAL at 07:17

## 2017-11-22 RX ADMIN — HYDROCORTISONE SCH APPLIC: 1 CREAM TOPICAL at 16:31

## 2017-11-22 RX ADMIN — Medication SCH: at 16:27

## 2017-11-22 RX ADMIN — METRONIDAZOLE SCH MG: 250 TABLET ORAL at 20:04

## 2017-11-22 RX ADMIN — PREGABALIN SCH MG: 50 CAPSULE ORAL at 14:15

## 2017-11-22 RX ADMIN — FUROSEMIDE SCH MG: 40 TABLET ORAL at 08:30

## 2017-11-22 RX ADMIN — ENOXAPARIN SODIUM SCH MG: 40 INJECTION SUBCUTANEOUS at 16:29

## 2017-11-22 RX ADMIN — Medication SCH ML: at 14:15

## 2017-11-23 RX ADMIN — CIPROFLOXACIN SCH MG: 750 TABLET, FILM COATED ORAL at 09:34

## 2017-11-23 RX ADMIN — HYDROCORTISONE SCH: 1 CREAM TOPICAL at 14:12

## 2017-11-23 RX ADMIN — PREGABALIN SCH MG: 50 CAPSULE ORAL at 09:23

## 2017-11-23 RX ADMIN — FOLIC ACID SCH MG: 1 TABLET ORAL at 09:21

## 2017-11-23 RX ADMIN — METRONIDAZOLE SCH MG: 250 TABLET ORAL at 09:21

## 2017-11-23 RX ADMIN — Medication SCH ML: at 05:57

## 2017-11-23 RX ADMIN — Medication SCH UNITS: at 09:22

## 2017-11-23 RX ADMIN — METRONIDAZOLE SCH MG: 250 TABLET ORAL at 20:10

## 2017-11-23 RX ADMIN — PREGABALIN SCH MG: 50 CAPSULE ORAL at 20:10

## 2017-11-23 RX ADMIN — Medication SCH CAP: at 09:22

## 2017-11-23 RX ADMIN — Medication SCH ML: at 11:22

## 2017-11-23 RX ADMIN — Medication SCH MG: at 20:10

## 2017-11-23 RX ADMIN — PREGABALIN SCH MG: 50 CAPSULE ORAL at 14:18

## 2017-11-23 RX ADMIN — Medication SCH: at 17:06

## 2017-11-23 RX ADMIN — Medication SCH MG: at 09:23

## 2017-11-23 RX ADMIN — CYANOCOBALAMIN TAB 500 MCG SCH MCG: 500 TAB at 09:21

## 2017-11-23 RX ADMIN — OXYCODONE HYDROCHLORIDE AND ACETAMINOPHEN SCH MG: 500 TABLET ORAL at 09:35

## 2017-11-23 RX ADMIN — Medication SCH MG: at 09:34

## 2017-11-23 RX ADMIN — WATER SCH NOTE: 100 INJECTION, SOLUTION INTRAVENOUS at 07:01

## 2017-11-23 RX ADMIN — NYSTATIN SCH APPLIC: 100000 CREAM TOPICAL at 10:20

## 2017-11-23 RX ADMIN — FUROSEMIDE SCH MG: 40 TABLET ORAL at 20:09

## 2017-11-23 RX ADMIN — WATER SCH NOTE: 100 INJECTION, SOLUTION INTRAVENOUS at 18:46

## 2017-11-23 RX ADMIN — NYSTATIN SCH: 100000 CREAM TOPICAL at 19:56

## 2017-11-23 RX ADMIN — OMEPRAZOLE SCH MG: 20 CAPSULE, DELAYED RELEASE ORAL at 09:22

## 2017-11-23 RX ADMIN — CIPROFLOXACIN SCH MG: 750 TABLET, FILM COATED ORAL at 20:09

## 2017-11-23 RX ADMIN — ENOXAPARIN SODIUM SCH MG: 40 INJECTION SUBCUTANEOUS at 17:04

## 2017-11-23 RX ADMIN — HYDROCORTISONE SCH: 1 CREAM TOPICAL at 19:56

## 2017-11-23 RX ADMIN — VANCOMYCIN HYDROCHLORIDE SCH MLS/HR: 1 INJECTION, POWDER, LYOPHILIZED, FOR SOLUTION INTRAVENOUS at 10:14

## 2017-11-23 RX ADMIN — FUROSEMIDE SCH MG: 40 TABLET ORAL at 09:21

## 2017-11-23 RX ADMIN — ASPIRIN SCH MG: 81 TABLET, COATED ORAL at 09:21

## 2017-11-23 RX ADMIN — Medication SCH CAP: at 20:09

## 2017-11-23 RX ADMIN — HYDROCORTISONE SCH: 1 CREAM TOPICAL at 10:32

## 2017-11-24 RX ADMIN — OMEPRAZOLE SCH MG: 20 CAPSULE, DELAYED RELEASE ORAL at 08:36

## 2017-11-24 RX ADMIN — Medication SCH UNITS: at 08:36

## 2017-11-24 RX ADMIN — HYDROCORTISONE SCH: 1 CREAM TOPICAL at 17:27

## 2017-11-24 RX ADMIN — HYDROCORTISONE SCH: 1 CREAM TOPICAL at 21:47

## 2017-11-24 RX ADMIN — METRONIDAZOLE SCH MG: 250 TABLET ORAL at 21:45

## 2017-11-24 RX ADMIN — PREGABALIN SCH MG: 50 CAPSULE ORAL at 21:45

## 2017-11-24 RX ADMIN — WATER SCH NOTE: 100 INJECTION, SOLUTION INTRAVENOUS at 18:58

## 2017-11-24 RX ADMIN — Medication SCH MG: at 08:36

## 2017-11-24 RX ADMIN — Medication SCH MG: at 21:45

## 2017-11-24 RX ADMIN — Medication SCH CAP: at 21:45

## 2017-11-24 RX ADMIN — OXYCODONE HYDROCHLORIDE PRN MG: 5 CAPSULE ORAL at 08:36

## 2017-11-24 RX ADMIN — FOLIC ACID SCH MG: 1 TABLET ORAL at 08:35

## 2017-11-24 RX ADMIN — NYSTATIN SCH: 100000 CREAM TOPICAL at 22:15

## 2017-11-24 RX ADMIN — HYDROCORTISONE SCH: 1 CREAM TOPICAL at 10:25

## 2017-11-24 RX ADMIN — ENOXAPARIN SODIUM SCH MG: 40 INJECTION SUBCUTANEOUS at 17:19

## 2017-11-24 RX ADMIN — Medication SCH ML: at 06:03

## 2017-11-24 RX ADMIN — PREGABALIN SCH MG: 50 CAPSULE ORAL at 14:37

## 2017-11-24 RX ADMIN — CYANOCOBALAMIN TAB 500 MCG SCH MCG: 500 TAB at 08:36

## 2017-11-24 RX ADMIN — VANCOMYCIN HYDROCHLORIDE SCH MLS/HR: 1 INJECTION, POWDER, LYOPHILIZED, FOR SOLUTION INTRAVENOUS at 10:30

## 2017-11-24 RX ADMIN — FUROSEMIDE SCH MG: 40 TABLET ORAL at 08:36

## 2017-11-24 RX ADMIN — FENTANYL TRANSDERMAL SCH MCG: 75 PATCH, EXTENDED RELEASE TRANSDERMAL at 17:18

## 2017-11-24 RX ADMIN — NYSTATIN SCH APPLIC: 100000 CREAM TOPICAL at 10:25

## 2017-11-24 RX ADMIN — OXYCODONE HYDROCHLORIDE AND ACETAMINOPHEN SCH MG: 500 TABLET ORAL at 08:36

## 2017-11-24 RX ADMIN — METRONIDAZOLE SCH MG: 250 TABLET ORAL at 08:36

## 2017-11-24 RX ADMIN — FUROSEMIDE SCH MG: 40 TABLET ORAL at 21:45

## 2017-11-24 RX ADMIN — Medication SCH ML: at 12:25

## 2017-11-24 RX ADMIN — Medication SCH CAP: at 08:36

## 2017-11-24 RX ADMIN — WATER SCH NOTE: 100 INJECTION, SOLUTION INTRAVENOUS at 07:00

## 2017-11-24 RX ADMIN — CIPROFLOXACIN SCH MG: 750 TABLET, FILM COATED ORAL at 21:45

## 2017-11-24 RX ADMIN — PREGABALIN SCH MG: 50 CAPSULE ORAL at 08:37

## 2017-11-24 RX ADMIN — Medication SCH: at 17:09

## 2017-11-24 RX ADMIN — CIPROFLOXACIN SCH MG: 750 TABLET, FILM COATED ORAL at 08:36

## 2017-11-24 RX ADMIN — ASPIRIN SCH MG: 81 TABLET, COATED ORAL at 08:33

## 2017-11-24 NOTE — PN
Progress Note





- Progress Note


Date of Service: 11/24/17


Note: 





Asked to see pt today as a staple was noted still in place. Additionally there 

were questions about the patient's turning and positioning schedule. The last 

staple removed by myself today without issue. The wound appears to be healing 

well. There is one area  medially that has not healed as well as the other 

areas but no signs of infection. The patient's wife states while the patient 

was in [place] he was inspected for a BM q3-4hr and turned at that time. 

Additionally overnight the patient is hoping to be left to sleep if he is 

already asleep. Will change to T&P order to the above request from the patient 

and his wife.

## 2017-11-25 RX ADMIN — FUROSEMIDE SCH MG: 40 TABLET ORAL at 20:45

## 2017-11-25 RX ADMIN — FOLIC ACID SCH MG: 1 TABLET ORAL at 09:05

## 2017-11-25 RX ADMIN — HYDROCORTISONE SCH APPLIC: 1 CREAM TOPICAL at 20:48

## 2017-11-25 RX ADMIN — PREGABALIN SCH MG: 50 CAPSULE ORAL at 15:14

## 2017-11-25 RX ADMIN — CYANOCOBALAMIN TAB 500 MCG SCH MCG: 500 TAB at 09:05

## 2017-11-25 RX ADMIN — ASPIRIN SCH MG: 81 TABLET, COATED ORAL at 09:07

## 2017-11-25 RX ADMIN — Medication SCH MG: at 09:05

## 2017-11-25 RX ADMIN — WATER SCH NOTE: 100 INJECTION, SOLUTION INTRAVENOUS at 18:53

## 2017-11-25 RX ADMIN — OXYCODONE HYDROCHLORIDE AND ACETAMINOPHEN SCH MG: 500 TABLET ORAL at 09:05

## 2017-11-25 RX ADMIN — PREGABALIN SCH MG: 50 CAPSULE ORAL at 20:45

## 2017-11-25 RX ADMIN — Medication SCH CAP: at 20:44

## 2017-11-25 RX ADMIN — NYSTATIN SCH APPLIC: 100000 CREAM TOPICAL at 16:51

## 2017-11-25 RX ADMIN — VANCOMYCIN HYDROCHLORIDE SCH MLS/HR: 1 INJECTION, POWDER, LYOPHILIZED, FOR SOLUTION INTRAVENOUS at 09:08

## 2017-11-25 RX ADMIN — WATER SCH NOTE: 100 INJECTION, SOLUTION INTRAVENOUS at 15:45

## 2017-11-25 RX ADMIN — Medication SCH ML: at 10:57

## 2017-11-25 RX ADMIN — Medication SCH MG: at 20:44

## 2017-11-25 RX ADMIN — PREGABALIN SCH MG: 50 CAPSULE ORAL at 09:07

## 2017-11-25 RX ADMIN — NYSTATIN SCH APPLIC: 100000 CREAM TOPICAL at 20:49

## 2017-11-25 RX ADMIN — HYDROCORTISONE SCH: 1 CREAM TOPICAL at 13:42

## 2017-11-25 RX ADMIN — Medication SCH UNITS: at 09:06

## 2017-11-25 RX ADMIN — Medication SCH ML: at 05:00

## 2017-11-25 RX ADMIN — CIPROFLOXACIN SCH MG: 750 TABLET, FILM COATED ORAL at 20:44

## 2017-11-25 RX ADMIN — Medication SCH CAP: at 09:05

## 2017-11-25 RX ADMIN — METRONIDAZOLE SCH MG: 250 TABLET ORAL at 20:44

## 2017-11-25 RX ADMIN — Medication SCH ML: at 16:52

## 2017-11-25 RX ADMIN — METRONIDAZOLE SCH MG: 250 TABLET ORAL at 09:07

## 2017-11-25 RX ADMIN — CIPROFLOXACIN SCH MG: 750 TABLET, FILM COATED ORAL at 09:04

## 2017-11-25 RX ADMIN — Medication SCH MG: at 09:06

## 2017-11-25 RX ADMIN — HYDROCORTISONE SCH: 1 CREAM TOPICAL at 09:10

## 2017-11-25 RX ADMIN — FUROSEMIDE SCH MG: 40 TABLET ORAL at 09:07

## 2017-11-25 RX ADMIN — ENOXAPARIN SODIUM SCH MG: 40 INJECTION SUBCUTANEOUS at 16:48

## 2017-11-25 RX ADMIN — WATER SCH NOTE: 100 INJECTION, SOLUTION INTRAVENOUS at 06:45

## 2017-11-25 RX ADMIN — NYSTATIN SCH: 100000 CREAM TOPICAL at 09:11

## 2017-11-25 RX ADMIN — OMEPRAZOLE SCH MG: 20 CAPSULE, DELAYED RELEASE ORAL at 09:06

## 2017-11-25 RX ADMIN — OXYCODONE HYDROCHLORIDE PRN MG: 5 CAPSULE ORAL at 16:46

## 2017-11-26 RX ADMIN — PREGABALIN SCH MG: 50 CAPSULE ORAL at 09:39

## 2017-11-26 RX ADMIN — ASPIRIN SCH MG: 81 TABLET, COATED ORAL at 09:39

## 2017-11-26 RX ADMIN — FUROSEMIDE SCH MG: 40 TABLET ORAL at 09:38

## 2017-11-26 RX ADMIN — WATER SCH NOTE: 100 INJECTION, SOLUTION INTRAVENOUS at 06:39

## 2017-11-26 RX ADMIN — VANCOMYCIN HYDROCHLORIDE SCH MLS/HR: 1 INJECTION, POWDER, LYOPHILIZED, FOR SOLUTION INTRAVENOUS at 09:54

## 2017-11-26 RX ADMIN — CIPROFLOXACIN SCH MG: 750 TABLET, FILM COATED ORAL at 09:39

## 2017-11-26 RX ADMIN — WATER SCH NOTE: 100 INJECTION, SOLUTION INTRAVENOUS at 18:39

## 2017-11-26 RX ADMIN — OMEPRAZOLE SCH MG: 20 CAPSULE, DELAYED RELEASE ORAL at 08:14

## 2017-11-26 RX ADMIN — PREGABALIN SCH MG: 50 CAPSULE ORAL at 21:06

## 2017-11-26 RX ADMIN — Medication SCH MG: at 09:38

## 2017-11-26 RX ADMIN — PREGABALIN SCH MG: 50 CAPSULE ORAL at 14:19

## 2017-11-26 RX ADMIN — METRONIDAZOLE SCH MG: 250 TABLET ORAL at 21:06

## 2017-11-26 RX ADMIN — HYDROCORTISONE SCH: 1 CREAM TOPICAL at 09:38

## 2017-11-26 RX ADMIN — HYDROCORTISONE SCH: 1 CREAM TOPICAL at 14:19

## 2017-11-26 RX ADMIN — FOLIC ACID SCH MG: 1 TABLET ORAL at 09:38

## 2017-11-26 RX ADMIN — Medication SCH ML: at 17:53

## 2017-11-26 RX ADMIN — HYDROCORTISONE SCH: 1 CREAM TOPICAL at 22:05

## 2017-11-26 RX ADMIN — CYANOCOBALAMIN TAB 500 MCG SCH MCG: 500 TAB at 09:40

## 2017-11-26 RX ADMIN — Medication SCH UNITS: at 09:38

## 2017-11-26 RX ADMIN — Medication SCH MG: at 09:39

## 2017-11-26 RX ADMIN — METRONIDAZOLE SCH MG: 250 TABLET ORAL at 09:39

## 2017-11-26 RX ADMIN — ENOXAPARIN SODIUM SCH MG: 40 INJECTION SUBCUTANEOUS at 17:43

## 2017-11-26 RX ADMIN — CIPROFLOXACIN SCH MG: 750 TABLET, FILM COATED ORAL at 21:06

## 2017-11-26 RX ADMIN — OXYCODONE HYDROCHLORIDE AND ACETAMINOPHEN SCH MG: 500 TABLET ORAL at 09:38

## 2017-11-26 RX ADMIN — Medication SCH ML: at 06:13

## 2017-11-26 RX ADMIN — FUROSEMIDE SCH MG: 40 TABLET ORAL at 21:06

## 2017-11-26 RX ADMIN — NYSTATIN SCH APPLIC: 100000 CREAM TOPICAL at 22:05

## 2017-11-26 RX ADMIN — Medication SCH CAP: at 09:39

## 2017-11-26 RX ADMIN — Medication SCH ML: at 11:46

## 2017-11-26 RX ADMIN — Medication SCH CAP: at 21:06

## 2017-11-26 RX ADMIN — NYSTATIN SCH APPLIC: 100000 CREAM TOPICAL at 09:38

## 2017-11-26 RX ADMIN — Medication SCH MG: at 21:06

## 2017-11-27 LAB
BASOPHILS # BLD AUTO: 0.1 10^3/UL (ref 0–0.2)
EOSINOPHIL # BLD AUTO: 0.3 10^3/UL (ref 0–0.6)
HCT VFR BLD AUTO: 36 % (ref 42–52)
HGB BLD-MCNC: 12.3 G/DL (ref 14–18)
LYMPHOCYTES # BLD AUTO: 2.2 10^3/UL (ref 1–4.8)
MCH RBC QN AUTO: 28 PG (ref 27–31)
MCHC RBC AUTO-ENTMCNC: 34 G/DL (ref 31–36)
MCV RBC AUTO: 82 FL (ref 80–94)
MONOCYTES # BLD AUTO: 1.1 10^3/UL (ref 0–0.8)
NEUTROPHILS # BLD AUTO: 3.8 10^3/UL (ref 1.5–7.7)
NRBC # BLD AUTO: 0 10^3/UL
NRBC BLD QL AUTO: 0
PLATELET # BLD AUTO: 246 10^3/UL (ref 150–450)
RBC # BLD AUTO: 4.41 10^6/UL (ref 4–5.4)
WBC # BLD AUTO: 7.4 10^3/UL (ref 3.5–10.8)

## 2017-11-27 RX ADMIN — FOLIC ACID SCH MG: 1 TABLET ORAL at 09:09

## 2017-11-27 RX ADMIN — FENTANYL TRANSDERMAL SCH MCG: 75 PATCH, EXTENDED RELEASE TRANSDERMAL at 17:07

## 2017-11-27 RX ADMIN — VANCOMYCIN HYDROCHLORIDE SCH MLS/HR: 1 INJECTION, POWDER, LYOPHILIZED, FOR SOLUTION INTRAVENOUS at 10:32

## 2017-11-27 RX ADMIN — OMEPRAZOLE SCH MG: 20 CAPSULE, DELAYED RELEASE ORAL at 09:09

## 2017-11-27 RX ADMIN — Medication SCH UNITS: at 09:08

## 2017-11-27 RX ADMIN — PREGABALIN SCH MG: 50 CAPSULE ORAL at 14:32

## 2017-11-27 RX ADMIN — Medication SCH ML: at 05:43

## 2017-11-27 RX ADMIN — PREGABALIN SCH MG: 50 CAPSULE ORAL at 09:08

## 2017-11-27 RX ADMIN — HYDROCORTISONE SCH: 1 CREAM TOPICAL at 07:27

## 2017-11-27 RX ADMIN — METRONIDAZOLE SCH MG: 250 TABLET ORAL at 09:09

## 2017-11-27 RX ADMIN — OXYCODONE HYDROCHLORIDE AND ACETAMINOPHEN SCH MG: 500 TABLET ORAL at 09:08

## 2017-11-27 RX ADMIN — WATER SCH NOTE: 100 INJECTION, SOLUTION INTRAVENOUS at 18:40

## 2017-11-27 RX ADMIN — Medication SCH MG: at 21:06

## 2017-11-27 RX ADMIN — Medication SCH CAP: at 09:09

## 2017-11-27 RX ADMIN — Medication SCH MG: at 09:09

## 2017-11-27 RX ADMIN — Medication SCH: at 16:45

## 2017-11-27 RX ADMIN — CIPROFLOXACIN SCH MG: 750 TABLET, FILM COATED ORAL at 21:06

## 2017-11-27 RX ADMIN — HYDROCORTISONE SCH: 1 CREAM TOPICAL at 14:32

## 2017-11-27 RX ADMIN — WATER SCH NOTE: 100 INJECTION, SOLUTION INTRAVENOUS at 06:56

## 2017-11-27 RX ADMIN — NYSTATIN SCH APPLIC: 100000 CREAM TOPICAL at 09:09

## 2017-11-27 RX ADMIN — HYDROCORTISONE SCH: 1 CREAM TOPICAL at 21:12

## 2017-11-27 RX ADMIN — FUROSEMIDE SCH MG: 40 TABLET ORAL at 09:09

## 2017-11-27 RX ADMIN — METRONIDAZOLE SCH MG: 250 TABLET ORAL at 21:06

## 2017-11-27 RX ADMIN — FUROSEMIDE SCH MG: 40 TABLET ORAL at 21:07

## 2017-11-27 RX ADMIN — ASPIRIN SCH MG: 81 TABLET, COATED ORAL at 09:09

## 2017-11-27 RX ADMIN — Medication SCH ML: at 13:27

## 2017-11-27 RX ADMIN — Medication SCH MG: at 09:08

## 2017-11-27 RX ADMIN — NYSTATIN SCH APPLIC: 100000 CREAM TOPICAL at 21:09

## 2017-11-27 RX ADMIN — CIPROFLOXACIN SCH MG: 750 TABLET, FILM COATED ORAL at 09:08

## 2017-11-27 RX ADMIN — ENOXAPARIN SODIUM SCH MG: 40 INJECTION SUBCUTANEOUS at 17:07

## 2017-11-27 RX ADMIN — Medication SCH CAP: at 21:06

## 2017-11-27 RX ADMIN — PREGABALIN SCH MG: 50 CAPSULE ORAL at 21:06

## 2017-11-27 RX ADMIN — CYANOCOBALAMIN TAB 500 MCG SCH MCG: 500 TAB at 09:09

## 2017-11-28 RX ADMIN — FOLIC ACID SCH MG: 1 TABLET ORAL at 08:55

## 2017-11-28 RX ADMIN — Medication SCH CAP: at 08:57

## 2017-11-28 RX ADMIN — Medication SCH MG: at 08:57

## 2017-11-28 RX ADMIN — NYSTATIN SCH APPLIC: 100000 CREAM TOPICAL at 21:04

## 2017-11-28 RX ADMIN — HYDROCORTISONE SCH: 1 CREAM TOPICAL at 09:03

## 2017-11-28 RX ADMIN — METRONIDAZOLE SCH MG: 250 TABLET ORAL at 08:57

## 2017-11-28 RX ADMIN — OXYCODONE HYDROCHLORIDE AND ACETAMINOPHEN SCH MG: 500 TABLET ORAL at 08:57

## 2017-11-28 RX ADMIN — FUROSEMIDE SCH MG: 40 TABLET ORAL at 20:47

## 2017-11-28 RX ADMIN — CYANOCOBALAMIN TAB 500 MCG SCH MCG: 500 TAB at 08:58

## 2017-11-28 RX ADMIN — PREGABALIN SCH MG: 50 CAPSULE ORAL at 14:32

## 2017-11-28 RX ADMIN — ACETAMINOPHEN PRN MG: 325 TABLET ORAL at 20:46

## 2017-11-28 RX ADMIN — HYDROCORTISONE SCH: 1 CREAM TOPICAL at 14:39

## 2017-11-28 RX ADMIN — METRONIDAZOLE SCH MG: 250 TABLET ORAL at 20:47

## 2017-11-28 RX ADMIN — OXYCODONE HYDROCHLORIDE PRN MG: 5 CAPSULE ORAL at 14:33

## 2017-11-28 RX ADMIN — OMEPRAZOLE SCH MG: 20 CAPSULE, DELAYED RELEASE ORAL at 07:36

## 2017-11-28 RX ADMIN — Medication SCH CAP: at 20:47

## 2017-11-28 RX ADMIN — Medication SCH ML: at 05:40

## 2017-11-28 RX ADMIN — CIPROFLOXACIN SCH MG: 750 TABLET, FILM COATED ORAL at 20:47

## 2017-11-28 RX ADMIN — Medication SCH: at 17:30

## 2017-11-28 RX ADMIN — ENOXAPARIN SODIUM SCH MG: 40 INJECTION SUBCUTANEOUS at 17:31

## 2017-11-28 RX ADMIN — Medication SCH MG: at 20:46

## 2017-11-28 RX ADMIN — Medication SCH UNITS: at 08:56

## 2017-11-28 RX ADMIN — ASPIRIN SCH MG: 81 TABLET, COATED ORAL at 08:58

## 2017-11-28 RX ADMIN — VANCOMYCIN HYDROCHLORIDE SCH MLS/HR: 1 INJECTION, POWDER, LYOPHILIZED, FOR SOLUTION INTRAVENOUS at 13:04

## 2017-11-28 RX ADMIN — PREGABALIN SCH MG: 50 CAPSULE ORAL at 20:46

## 2017-11-28 RX ADMIN — Medication SCH ML: at 14:45

## 2017-11-28 RX ADMIN — ACETAMINOPHEN PRN MG: 325 TABLET ORAL at 03:41

## 2017-11-28 RX ADMIN — WATER SCH NOTE: 100 INJECTION, SOLUTION INTRAVENOUS at 07:36

## 2017-11-28 RX ADMIN — NYSTATIN SCH APPLIC: 100000 CREAM TOPICAL at 09:04

## 2017-11-28 RX ADMIN — FUROSEMIDE SCH MG: 40 TABLET ORAL at 08:57

## 2017-11-28 RX ADMIN — PREGABALIN SCH MG: 50 CAPSULE ORAL at 08:58

## 2017-11-28 RX ADMIN — WATER SCH NOTE: 100 INJECTION, SOLUTION INTRAVENOUS at 18:53

## 2017-11-28 RX ADMIN — CIPROFLOXACIN SCH MG: 750 TABLET, FILM COATED ORAL at 08:58

## 2017-11-28 RX ADMIN — HYDROCORTISONE SCH: 1 CREAM TOPICAL at 21:06

## 2017-11-29 RX ADMIN — Medication SCH ML: at 06:02

## 2017-11-29 RX ADMIN — Medication SCH UNITS: at 10:00

## 2017-11-29 RX ADMIN — WATER SCH NOTE: 100 INJECTION, SOLUTION INTRAVENOUS at 18:49

## 2017-11-29 RX ADMIN — ENOXAPARIN SODIUM SCH MG: 40 INJECTION SUBCUTANEOUS at 18:48

## 2017-11-29 RX ADMIN — PREGABALIN SCH MG: 50 CAPSULE ORAL at 10:00

## 2017-11-29 RX ADMIN — VANCOMYCIN HYDROCHLORIDE SCH MLS/HR: 1 INJECTION, POWDER, LYOPHILIZED, FOR SOLUTION INTRAVENOUS at 11:36

## 2017-11-29 RX ADMIN — METRONIDAZOLE SCH MG: 250 TABLET ORAL at 09:59

## 2017-11-29 RX ADMIN — OXYCODONE HYDROCHLORIDE AND ACETAMINOPHEN SCH MG: 500 TABLET ORAL at 10:00

## 2017-11-29 RX ADMIN — METRONIDAZOLE SCH MG: 250 TABLET ORAL at 21:14

## 2017-11-29 RX ADMIN — Medication SCH MG: at 10:01

## 2017-11-29 RX ADMIN — WATER SCH NOTE: 100 INJECTION, SOLUTION INTRAVENOUS at 06:48

## 2017-11-29 RX ADMIN — Medication SCH: at 18:21

## 2017-11-29 RX ADMIN — FUROSEMIDE SCH MG: 40 TABLET ORAL at 10:00

## 2017-11-29 RX ADMIN — FOLIC ACID SCH MG: 1 TABLET ORAL at 09:59

## 2017-11-29 RX ADMIN — CYANOCOBALAMIN TAB 500 MCG SCH MCG: 500 TAB at 10:00

## 2017-11-29 RX ADMIN — Medication SCH CAP: at 09:59

## 2017-11-29 RX ADMIN — ASPIRIN SCH MG: 81 TABLET, COATED ORAL at 10:00

## 2017-11-29 RX ADMIN — PREGABALIN SCH MG: 50 CAPSULE ORAL at 13:26

## 2017-11-29 RX ADMIN — CIPROFLOXACIN SCH MG: 750 TABLET, FILM COATED ORAL at 09:59

## 2017-11-29 RX ADMIN — CIPROFLOXACIN SCH MG: 750 TABLET, FILM COATED ORAL at 21:15

## 2017-11-29 RX ADMIN — NYSTATIN SCH APPLIC: 100000 CREAM TOPICAL at 21:19

## 2017-11-29 RX ADMIN — NYSTATIN SCH APPLIC: 100000 CREAM TOPICAL at 10:01

## 2017-11-29 RX ADMIN — HYDROCORTISONE SCH: 1 CREAM TOPICAL at 13:28

## 2017-11-29 RX ADMIN — Medication SCH ML: at 13:30

## 2017-11-29 RX ADMIN — HYDROCORTISONE SCH: 1 CREAM TOPICAL at 10:02

## 2017-11-29 RX ADMIN — FUROSEMIDE SCH MG: 40 TABLET ORAL at 21:14

## 2017-11-29 RX ADMIN — OMEPRAZOLE SCH MG: 20 CAPSULE, DELAYED RELEASE ORAL at 09:59

## 2017-11-29 RX ADMIN — Medication SCH CAP: at 21:13

## 2017-11-29 RX ADMIN — Medication SCH MG: at 10:00

## 2017-11-29 RX ADMIN — HYDROCORTISONE SCH: 1 CREAM TOPICAL at 21:20

## 2017-11-29 RX ADMIN — PREGABALIN SCH MG: 50 CAPSULE ORAL at 21:15

## 2017-11-29 NOTE — PN
Subjective


Date of Service: 11/29/17


Interval History: 





Little pain.  Appetite OK.  No new c/o.


Family History: Unchanged from Admission


Social History: Unchanged from Admission


Past Medical History: Unchanged from Admission





Objective


Active Medications: 








Acetaminophen (Tylenol Tab*)  650 mg PO Q6H PRN


   PRN Reason: FEVER/PAIN


   Last Admin: 11/28/17 20:46 Dose:  650 mg


Ascorbic Acid (Vitamin C  Tab*)  500 mg PO DAILY [facility]


   Last Admin: 11/29/17 10:00 Dose:  500 mg


Aspirin (Aspirin Ec Low Dose*)  81 mg PO DAILY [facility]


   Last Admin: 11/29/17 10:00 Dose:  81 mg


Cholecalciferol (Vitamin D Tab*)  5,000 units PO DAILY [facility]


   Last Admin: 11/29/17 10:00 Dose:  5,000 units


Ciprofloxacin (Cipro Tab*)  750 mg PO Q12HR [facility]


   Last Admin: 11/29/17 09:59 Dose:  750 mg


Cyanocobalamin (Vitamin B12 Tab*)  1,000 mcg PO DAILY [facility]


   Last Admin: 11/29/17 10:00 Dose:  1,000 mcg


Diphenoxylate HCl/Atropine (Lomotil Tab*)  1 tab PO QID PRN


   PRN Reason: DIARRHEA


   Last Admin: 11/06/17 20:04 Dose:  1 tab


Enoxaparin Sodium (Lovenox(*))  40 mg SUBCUT Q24H [facility]


   Last Admin: 11/28/17 17:31 Dose:  40 mg


Fentanyl (Duragesic  Patch 75 Mcg/Hr*)  75 mcg TRANSDERM Q72H [facility]


   Last Admin: 11/27/17 17:07 Dose:  75 mcg


Ferrous Gluconate (Fergon Tab*)  324 mg PO BID [facility]


   Last Admin: 11/29/17 10:00 Dose:  324 mg


Folic Acid (Folvite Tab*)  0.5 mg PO DAILY [facility]


   Last Admin: 11/29/17 09:59 Dose:  0.5 mg


Furosemide (Lasix Tab*)  40 mg PO BID [facility]


   Last Admin: 11/29/17 10:00 Dose:  40 mg


Heparin Sodium (Porcine) (Heparin Flush Picc/Ml/Cvc(*))  0 ml IV FLUSH 0600,

1800 [facility]


   PRN Reason: Protocol


   Last Admin: 11/29/17 06:02 Dose:  1 ml


Hydrocortisone (Hytone Cream 1%*)  1 applic TOPICAL TID [facility]


   Last Admin: 11/29/17 10:02 Dose:  Not Given


Vancomycin HCl 1,000 mg/ (Sodium Chloride)  250 mls @ 166.667 mls/hr IVPB Q24H 

[facility]


   Last Admin: 11/28/17 13:04 Dose:  166.667 mls/hr


Lactobacillus Rhamnosus (Culturelle*)  1 cap PO BID [facility]


   Last Admin: 11/29/17 09:59 Dose:  1 cap


Metronidazole (Flagyl Tab*)  500 mg PO BID [facility]


   Last Admin: 11/29/17 09:59 Dose:  500 mg


Nystatin (Nystatin Cream*)  1 applic TOPICAL BID [facility]


   Last Admin: 11/29/17 10:01 Dose:  1 applic


Omeprazole (Prilosec Cap*)  40 mg PO DAILY@0730 [facility]


   Last Admin: 11/29/17 09:59 Dose:  40 mg


Oxycodone HCl (Roxycodone Tab*)  10 mg PO Q8H PRN


   PRN Reason: PAIN


   Last Admin: 11/28/17 14:33 Dose:  10 mg


Pharmacy Consult (Vancomycin Per Pharmacy*)  1 note FOLLOW UP . PRN


   PRN Reason: PER PROTOCOL


Pharmacy Profile Note (Fentanyl Patch Check Q Shift)  1 note N/A 0700,1900 [facility]


   Last Admin: 11/29/17 06:48 Dose:  1 note


Pregabalin (Lyrica Cap(*))  150 mg PO TID [facility]


   Last Admin: 11/29/17 10:00 Dose:  150 mg


Zinc Sulfate (Zinc-220 Cap*)  220 mg PO DAILY [facility]


   Last Admin: 11/29/17 10:01 Dose:  220 mg








 Vital Signs











  11/28/17 11/28/17 11/28/17





  12:57 14:32 14:33


 


Temperature   


 


Pulse Rate   


 


Respiratory 18 17 17





Rate   














  11/28/17 11/28/17 11/28/17





  17:27 17:28 19:59


 


Temperature   


 


Pulse Rate   


 


Respiratory 18 18 17





Rate   














  11/28/17 11/28/17 11/28/17





  20:00 20:46 21:07


 


Temperature   98.8 F


 


Pulse Rate   84


 


Respiratory 17 16 16





Rate   














  11/28/17 11/29/17





  22:52 10:00


 


Temperature  


 


Pulse Rate  


 


Respiratory 16 16





Rate  











Oxygen Devices in Use Now: None


Appearance: Supine on Clintron bed.  In good spirits.  Looks comfortable.


Eyes: No Scleral Icterus


Neck: NL Appearance and Movements; NL JVP, No Thyroid Enlargement, Masses


Respiratory: Symmetrical Chest Expansion and Respiratory Effort, Clear to 

Auscultation, Clear to Percussion


Cardiovascular: NL Sounds; No Murmurs; No JVD, RRR, No Edema


Extremities: No Edema, No Clubbing, Cyanosis, -


Skin: No Rash or Ulcers, No Nodules or Sclerosis, -


Neurological: Alert and Oriented x 3, NL Sensation


Result Diagrams: 


 11/27/17 05:45





 11/27/17 05:45





Assess/Plan/Problems-Billing


.


Assessment: 


71 yo man with paraplegia s/p plastic surgery for Stage IV sacral wound and 

acute osteomyelitis as well as chronic pain.








- Patient Problems


(1) S/P flap graft


Current Visit: Yes   Status: Acute   Priority: High   Code(s): Z98.890 - OTHER 

SPECIFIED POSTPROCEDURAL STATES   SNOMED Code(s): 878827090


   Comment: 


- 10/25/2017


Continue Clinitron bed, wound care, T&P.  Teleconference with his plastic 

surgeon scheduled for 12/5/17.


Continue fentanyl patch, PRN oxycodone, APAP.   





(2) Anemia


Current Visit: No   Status: Chronic   Code(s): D64.9 - ANEMIA, UNSPECIFIED   

SNOMED Code(s): 791636881


   Comment: - Suspect anemia of chronic disease and AMY.


- Stool for occult blood negative.


- No s/s of bleeding.


- Continue iron supplementation, B12, folate. 


Reduce iron to once daily.  Soluble transferrin factor ordered for 11/30/17.   





(3) CAD (coronary artery disease)


Current Visit: No   Status: Chronic   Code(s): I25.10 - ATHSCL HEART DISEASE OF 

NATIVE CORONARY ARTERY W/O ANG PCTRS   SNOMED Code(s): 98131029


   Comment: 


- Asymptomatic


- Continue ASA.   





(4) Neurogenic bladder


Current Visit: No   Status: Chronic   Priority: High   Code(s): N31.9 - 

NEUROMUSCULAR DYSFUNCTION OF BLADDER, UNSPECIFIED   SNOMED Code(s): 759681544


   Comment: 


- With chronic indwelling urinary catheter

## 2017-11-30 RX ADMIN — NYSTATIN SCH APPLIC: 100000 CREAM TOPICAL at 09:12

## 2017-11-30 RX ADMIN — WATER SCH NOTE: 100 INJECTION, SOLUTION INTRAVENOUS at 23:36

## 2017-11-30 RX ADMIN — CIPROFLOXACIN SCH MG: 750 TABLET, FILM COATED ORAL at 09:11

## 2017-11-30 RX ADMIN — METRONIDAZOLE SCH MG: 250 TABLET ORAL at 20:45

## 2017-11-30 RX ADMIN — Medication SCH ML: at 17:18

## 2017-11-30 RX ADMIN — Medication SCH CAP: at 09:11

## 2017-11-30 RX ADMIN — HYDROCORTISONE SCH APPLIC: 1 CREAM TOPICAL at 09:12

## 2017-11-30 RX ADMIN — PREGABALIN SCH MG: 50 CAPSULE ORAL at 13:38

## 2017-11-30 RX ADMIN — HYDROCORTISONE SCH APPLIC: 1 CREAM TOPICAL at 20:46

## 2017-11-30 RX ADMIN — FENTANYL TRANSDERMAL SCH MCG: 75 PATCH, EXTENDED RELEASE TRANSDERMAL at 17:12

## 2017-11-30 RX ADMIN — CYANOCOBALAMIN TAB 500 MCG SCH MCG: 500 TAB at 09:11

## 2017-11-30 RX ADMIN — Medication SCH ML: at 05:47

## 2017-11-30 RX ADMIN — VANCOMYCIN HYDROCHLORIDE SCH MLS/HR: 1 INJECTION, POWDER, LYOPHILIZED, FOR SOLUTION INTRAVENOUS at 11:07

## 2017-11-30 RX ADMIN — Medication SCH CAP: at 20:45

## 2017-11-30 RX ADMIN — Medication SCH MG: at 09:11

## 2017-11-30 RX ADMIN — FUROSEMIDE SCH MG: 40 TABLET ORAL at 09:11

## 2017-11-30 RX ADMIN — CIPROFLOXACIN SCH MG: 750 TABLET, FILM COATED ORAL at 20:45

## 2017-11-30 RX ADMIN — FOLIC ACID SCH MG: 1 TABLET ORAL at 09:11

## 2017-11-30 RX ADMIN — METRONIDAZOLE SCH MG: 250 TABLET ORAL at 09:11

## 2017-11-30 RX ADMIN — OMEPRAZOLE SCH MG: 20 CAPSULE, DELAYED RELEASE ORAL at 08:17

## 2017-11-30 RX ADMIN — PREGABALIN SCH MG: 50 CAPSULE ORAL at 20:45

## 2017-11-30 RX ADMIN — ASPIRIN SCH MG: 81 TABLET, COATED ORAL at 09:11

## 2017-11-30 RX ADMIN — FUROSEMIDE SCH MG: 40 TABLET ORAL at 20:45

## 2017-11-30 RX ADMIN — PREGABALIN SCH MG: 50 CAPSULE ORAL at 09:11

## 2017-11-30 RX ADMIN — Medication SCH UNITS: at 09:11

## 2017-11-30 RX ADMIN — HYDROCORTISONE SCH: 1 CREAM TOPICAL at 13:34

## 2017-11-30 RX ADMIN — WATER SCH NOTE: 100 INJECTION, SOLUTION INTRAVENOUS at 07:17

## 2017-11-30 RX ADMIN — NYSTATIN SCH: 100000 CREAM TOPICAL at 23:52

## 2017-11-30 RX ADMIN — OXYCODONE HYDROCHLORIDE AND ACETAMINOPHEN SCH MG: 500 TABLET ORAL at 09:11

## 2017-11-30 RX ADMIN — Medication SCH ML: at 13:39

## 2017-11-30 RX ADMIN — ENOXAPARIN SODIUM SCH MG: 40 INJECTION SUBCUTANEOUS at 17:14

## 2017-12-01 RX ADMIN — OXYCODONE HYDROCHLORIDE AND ACETAMINOPHEN SCH MG: 500 TABLET ORAL at 08:59

## 2017-12-01 RX ADMIN — Medication SCH MG: at 08:54

## 2017-12-01 RX ADMIN — ENOXAPARIN SODIUM SCH MG: 40 INJECTION SUBCUTANEOUS at 16:56

## 2017-12-01 RX ADMIN — VANCOMYCIN HYDROCHLORIDE SCH MLS/HR: 1 INJECTION, POWDER, LYOPHILIZED, FOR SOLUTION INTRAVENOUS at 10:16

## 2017-12-01 RX ADMIN — HYDROCORTISONE SCH: 1 CREAM TOPICAL at 09:53

## 2017-12-01 RX ADMIN — METRONIDAZOLE SCH MG: 250 TABLET ORAL at 08:54

## 2017-12-01 RX ADMIN — OMEPRAZOLE SCH MG: 20 CAPSULE, DELAYED RELEASE ORAL at 08:54

## 2017-12-01 RX ADMIN — WATER SCH NOTE: 100 INJECTION, SOLUTION INTRAVENOUS at 06:52

## 2017-12-01 RX ADMIN — CYANOCOBALAMIN TAB 500 MCG SCH MCG: 500 TAB at 08:59

## 2017-12-01 RX ADMIN — Medication SCH ML: at 12:00

## 2017-12-01 RX ADMIN — FOLIC ACID SCH MG: 1 TABLET ORAL at 08:59

## 2017-12-01 RX ADMIN — ASPIRIN SCH MG: 81 TABLET, COATED ORAL at 08:59

## 2017-12-01 RX ADMIN — FUROSEMIDE SCH MG: 40 TABLET ORAL at 21:32

## 2017-12-01 RX ADMIN — METRONIDAZOLE SCH MG: 250 TABLET ORAL at 21:32

## 2017-12-01 RX ADMIN — HYDROCORTISONE SCH APPLIC: 1 CREAM TOPICAL at 21:33

## 2017-12-01 RX ADMIN — Medication SCH UNITS: at 08:54

## 2017-12-01 RX ADMIN — NYSTATIN SCH APPLIC: 100000 CREAM TOPICAL at 09:50

## 2017-12-01 RX ADMIN — PREGABALIN SCH MG: 50 CAPSULE ORAL at 13:39

## 2017-12-01 RX ADMIN — CIPROFLOXACIN SCH MG: 750 TABLET, FILM COATED ORAL at 08:59

## 2017-12-01 RX ADMIN — Medication SCH ML: at 06:04

## 2017-12-01 RX ADMIN — FUROSEMIDE SCH MG: 40 TABLET ORAL at 08:59

## 2017-12-01 RX ADMIN — Medication SCH ML: at 16:56

## 2017-12-01 RX ADMIN — Medication SCH CAP: at 21:32

## 2017-12-01 RX ADMIN — PREGABALIN SCH MG: 50 CAPSULE ORAL at 09:35

## 2017-12-01 RX ADMIN — CIPROFLOXACIN SCH MG: 750 TABLET, FILM COATED ORAL at 21:32

## 2017-12-01 RX ADMIN — HYDROCORTISONE SCH APPLIC: 1 CREAM TOPICAL at 13:51

## 2017-12-01 RX ADMIN — PREGABALIN SCH MG: 50 CAPSULE ORAL at 21:32

## 2017-12-01 RX ADMIN — WATER SCH NOTE: 100 INJECTION, SOLUTION INTRAVENOUS at 19:10

## 2017-12-01 RX ADMIN — NYSTATIN SCH APPLIC: 100000 CREAM TOPICAL at 21:33

## 2017-12-01 RX ADMIN — Medication SCH CAP: at 08:56

## 2017-12-02 RX ADMIN — HYDROCORTISONE SCH APPLIC: 1 CREAM TOPICAL at 09:03

## 2017-12-02 RX ADMIN — Medication SCH: at 17:30

## 2017-12-02 RX ADMIN — NYSTATIN SCH APPLIC: 100000 CREAM TOPICAL at 09:03

## 2017-12-02 RX ADMIN — METRONIDAZOLE SCH MG: 250 TABLET ORAL at 19:46

## 2017-12-02 RX ADMIN — Medication SCH UNITS: at 09:00

## 2017-12-02 RX ADMIN — METRONIDAZOLE SCH MG: 250 TABLET ORAL at 09:00

## 2017-12-02 RX ADMIN — Medication SCH ML: at 12:19

## 2017-12-02 RX ADMIN — CIPROFLOXACIN SCH MG: 750 TABLET, FILM COATED ORAL at 09:02

## 2017-12-02 RX ADMIN — PREGABALIN SCH MG: 50 CAPSULE ORAL at 08:59

## 2017-12-02 RX ADMIN — Medication SCH CAP: at 09:00

## 2017-12-02 RX ADMIN — FUROSEMIDE SCH MG: 40 TABLET ORAL at 09:01

## 2017-12-02 RX ADMIN — Medication SCH ML: at 04:56

## 2017-12-02 RX ADMIN — OMEPRAZOLE SCH MG: 20 CAPSULE, DELAYED RELEASE ORAL at 09:02

## 2017-12-02 RX ADMIN — Medication SCH CAP: at 19:46

## 2017-12-02 RX ADMIN — ENOXAPARIN SODIUM SCH MG: 40 INJECTION SUBCUTANEOUS at 17:35

## 2017-12-02 RX ADMIN — Medication SCH MG: at 09:02

## 2017-12-02 RX ADMIN — WATER SCH NOTE: 100 INJECTION, SOLUTION INTRAVENOUS at 18:44

## 2017-12-02 RX ADMIN — OXYCODONE HYDROCHLORIDE PRN MG: 5 CAPSULE ORAL at 12:57

## 2017-12-02 RX ADMIN — NYSTATIN SCH APPLIC: 100000 CREAM TOPICAL at 19:45

## 2017-12-02 RX ADMIN — WATER SCH NOTE: 100 INJECTION, SOLUTION INTRAVENOUS at 06:40

## 2017-12-02 RX ADMIN — HYDROCORTISONE SCH APPLIC: 1 CREAM TOPICAL at 19:45

## 2017-12-02 RX ADMIN — OXYCODONE HYDROCHLORIDE AND ACETAMINOPHEN SCH MG: 500 TABLET ORAL at 09:01

## 2017-12-02 RX ADMIN — PREGABALIN SCH MG: 50 CAPSULE ORAL at 19:46

## 2017-12-02 RX ADMIN — CIPROFLOXACIN SCH MG: 750 TABLET, FILM COATED ORAL at 19:46

## 2017-12-02 RX ADMIN — HYDROCORTISONE SCH APPLIC: 1 CREAM TOPICAL at 14:21

## 2017-12-02 RX ADMIN — PREGABALIN SCH MG: 50 CAPSULE ORAL at 14:20

## 2017-12-02 RX ADMIN — FOLIC ACID SCH MG: 1 TABLET ORAL at 09:01

## 2017-12-02 RX ADMIN — CYANOCOBALAMIN TAB 500 MCG SCH MCG: 500 TAB at 09:01

## 2017-12-02 RX ADMIN — ASPIRIN SCH MG: 81 TABLET, COATED ORAL at 09:01

## 2017-12-02 RX ADMIN — VANCOMYCIN HYDROCHLORIDE SCH MLS/HR: 1 INJECTION, POWDER, LYOPHILIZED, FOR SOLUTION INTRAVENOUS at 10:34

## 2017-12-02 RX ADMIN — Medication SCH MG: at 09:00

## 2017-12-02 RX ADMIN — FUROSEMIDE SCH MG: 40 TABLET ORAL at 19:46

## 2017-12-03 RX ADMIN — OMEPRAZOLE SCH MG: 20 CAPSULE, DELAYED RELEASE ORAL at 09:26

## 2017-12-03 RX ADMIN — FUROSEMIDE SCH MG: 40 TABLET ORAL at 09:26

## 2017-12-03 RX ADMIN — ENOXAPARIN SODIUM SCH MG: 40 INJECTION SUBCUTANEOUS at 16:21

## 2017-12-03 RX ADMIN — HYDROCORTISONE SCH APPLIC: 1 CREAM TOPICAL at 09:26

## 2017-12-03 RX ADMIN — METRONIDAZOLE SCH MG: 250 TABLET ORAL at 09:25

## 2017-12-03 RX ADMIN — OXYCODONE HYDROCHLORIDE PRN MG: 5 CAPSULE ORAL at 16:21

## 2017-12-03 RX ADMIN — OXYCODONE HYDROCHLORIDE AND ACETAMINOPHEN SCH MG: 500 TABLET ORAL at 09:25

## 2017-12-03 RX ADMIN — CYANOCOBALAMIN TAB 500 MCG SCH MCG: 500 TAB at 09:26

## 2017-12-03 RX ADMIN — Medication SCH MG: at 09:26

## 2017-12-03 RX ADMIN — NYSTATIN SCH APPLIC: 100000 CREAM TOPICAL at 09:26

## 2017-12-03 RX ADMIN — Medication SCH ML: at 12:47

## 2017-12-03 RX ADMIN — FOLIC ACID SCH MG: 1 TABLET ORAL at 09:26

## 2017-12-03 RX ADMIN — ASPIRIN SCH MG: 81 TABLET, COATED ORAL at 09:26

## 2017-12-03 RX ADMIN — PREGABALIN SCH MG: 50 CAPSULE ORAL at 14:30

## 2017-12-03 RX ADMIN — Medication SCH CAP: at 09:26

## 2017-12-03 RX ADMIN — Medication SCH: at 16:36

## 2017-12-03 RX ADMIN — HYDROCORTISONE SCH APPLIC: 1 CREAM TOPICAL at 14:30

## 2017-12-03 RX ADMIN — FENTANYL TRANSDERMAL SCH MCG: 75 PATCH, EXTENDED RELEASE TRANSDERMAL at 16:22

## 2017-12-03 RX ADMIN — PREGABALIN SCH MG: 50 CAPSULE ORAL at 09:25

## 2017-12-03 RX ADMIN — WATER SCH NOTE: 100 INJECTION, SOLUTION INTRAVENOUS at 18:49

## 2017-12-03 RX ADMIN — Medication SCH ML: at 05:57

## 2017-12-03 RX ADMIN — WATER SCH NOTE: 100 INJECTION, SOLUTION INTRAVENOUS at 07:20

## 2017-12-03 RX ADMIN — Medication SCH MG: at 09:25

## 2017-12-03 RX ADMIN — VANCOMYCIN HYDROCHLORIDE SCH MLS/HR: 1 INJECTION, POWDER, LYOPHILIZED, FOR SOLUTION INTRAVENOUS at 10:27

## 2017-12-03 RX ADMIN — METRONIDAZOLE SCH MG: 250 TABLET ORAL at 22:09

## 2017-12-03 RX ADMIN — HYDROCORTISONE SCH APPLIC: 1 CREAM TOPICAL at 22:10

## 2017-12-03 RX ADMIN — Medication SCH CAP: at 22:10

## 2017-12-03 RX ADMIN — FUROSEMIDE SCH MG: 40 TABLET ORAL at 22:10

## 2017-12-03 RX ADMIN — NYSTATIN SCH: 100000 CREAM TOPICAL at 22:10

## 2017-12-03 RX ADMIN — CIPROFLOXACIN SCH MG: 750 TABLET, FILM COATED ORAL at 09:26

## 2017-12-03 RX ADMIN — PREGABALIN SCH MG: 50 CAPSULE ORAL at 22:09

## 2017-12-03 RX ADMIN — CIPROFLOXACIN SCH MG: 750 TABLET, FILM COATED ORAL at 22:10

## 2017-12-03 RX ADMIN — Medication SCH UNITS: at 09:26

## 2017-12-04 LAB
HCT VFR BLD AUTO: 38 % (ref 42–52)
HGB BLD-MCNC: 12.7 G/DL (ref 14–18)
PLATELET # BLD AUTO: 237 10^3/UL (ref 150–450)

## 2017-12-04 RX ADMIN — Medication SCH ML: at 17:38

## 2017-12-04 RX ADMIN — Medication SCH ML: at 05:09

## 2017-12-04 RX ADMIN — FUROSEMIDE SCH MG: 40 TABLET ORAL at 09:16

## 2017-12-04 RX ADMIN — VANCOMYCIN HYDROCHLORIDE SCH MLS/HR: 1 INJECTION, POWDER, LYOPHILIZED, FOR SOLUTION INTRAVENOUS at 10:15

## 2017-12-04 RX ADMIN — CYANOCOBALAMIN TAB 500 MCG SCH MCG: 500 TAB at 09:16

## 2017-12-04 RX ADMIN — ASPIRIN SCH MG: 81 TABLET, COATED ORAL at 09:16

## 2017-12-04 RX ADMIN — Medication SCH CAP: at 21:54

## 2017-12-04 RX ADMIN — CIPROFLOXACIN SCH MG: 750 TABLET, FILM COATED ORAL at 09:16

## 2017-12-04 RX ADMIN — Medication SCH ML: at 11:57

## 2017-12-04 RX ADMIN — OXYCODONE HYDROCHLORIDE PRN MG: 5 CAPSULE ORAL at 05:09

## 2017-12-04 RX ADMIN — PREGABALIN SCH MG: 50 CAPSULE ORAL at 14:11

## 2017-12-04 RX ADMIN — NYSTATIN SCH APPLIC: 100000 CREAM TOPICAL at 10:14

## 2017-12-04 RX ADMIN — HYDROCORTISONE SCH APPLIC: 1 CREAM TOPICAL at 10:15

## 2017-12-04 RX ADMIN — CLOBETASOL PROPIONATE SCH APPLIC: 0.5 OINTMENT TOPICAL at 21:53

## 2017-12-04 RX ADMIN — Medication SCH MG: at 09:16

## 2017-12-04 RX ADMIN — Medication SCH UNITS: at 09:16

## 2017-12-04 RX ADMIN — OXYCODONE HYDROCHLORIDE AND ACETAMINOPHEN SCH MG: 500 TABLET ORAL at 09:16

## 2017-12-04 RX ADMIN — Medication SCH CAP: at 09:16

## 2017-12-04 RX ADMIN — METRONIDAZOLE SCH MG: 250 TABLET ORAL at 09:16

## 2017-12-04 RX ADMIN — PREGABALIN SCH MG: 50 CAPSULE ORAL at 21:54

## 2017-12-04 RX ADMIN — CIPROFLOXACIN SCH MG: 750 TABLET, FILM COATED ORAL at 21:54

## 2017-12-04 RX ADMIN — NYSTATIN SCH: 100000 CREAM TOPICAL at 21:56

## 2017-12-04 RX ADMIN — METRONIDAZOLE SCH MG: 250 TABLET ORAL at 21:55

## 2017-12-04 RX ADMIN — FUROSEMIDE SCH MG: 40 TABLET ORAL at 21:54

## 2017-12-04 RX ADMIN — WATER SCH NOTE: 100 INJECTION, SOLUTION INTRAVENOUS at 06:57

## 2017-12-04 RX ADMIN — ENOXAPARIN SODIUM SCH MG: 40 INJECTION SUBCUTANEOUS at 17:39

## 2017-12-04 RX ADMIN — OMEPRAZOLE SCH MG: 20 CAPSULE, DELAYED RELEASE ORAL at 09:16

## 2017-12-04 RX ADMIN — PREGABALIN SCH MG: 50 CAPSULE ORAL at 09:24

## 2017-12-04 RX ADMIN — WATER SCH NOTE: 100 INJECTION, SOLUTION INTRAVENOUS at 18:46

## 2017-12-04 RX ADMIN — FOLIC ACID SCH MG: 1 TABLET ORAL at 09:17

## 2017-12-05 RX ADMIN — PREGABALIN SCH MG: 50 CAPSULE ORAL at 20:31

## 2017-12-05 RX ADMIN — PREGABALIN SCH MG: 50 CAPSULE ORAL at 15:00

## 2017-12-05 RX ADMIN — Medication SCH ML: at 11:50

## 2017-12-05 RX ADMIN — FUROSEMIDE SCH MG: 40 TABLET ORAL at 08:29

## 2017-12-05 RX ADMIN — ENOXAPARIN SODIUM SCH MG: 40 INJECTION SUBCUTANEOUS at 16:51

## 2017-12-05 RX ADMIN — WATER SCH NOTE: 100 INJECTION, SOLUTION INTRAVENOUS at 07:09

## 2017-12-05 RX ADMIN — ASPIRIN SCH MG: 81 TABLET, COATED ORAL at 08:29

## 2017-12-05 RX ADMIN — WATER SCH NOTE: 100 INJECTION, SOLUTION INTRAVENOUS at 19:11

## 2017-12-05 RX ADMIN — OMEPRAZOLE SCH MG: 20 CAPSULE, DELAYED RELEASE ORAL at 08:35

## 2017-12-05 RX ADMIN — Medication SCH UNITS: at 08:34

## 2017-12-05 RX ADMIN — NYSTATIN SCH APPLIC: 100000 CREAM TOPICAL at 20:34

## 2017-12-05 RX ADMIN — Medication SCH CAP: at 20:31

## 2017-12-05 RX ADMIN — NYSTATIN SCH: 100000 CREAM TOPICAL at 08:56

## 2017-12-05 RX ADMIN — Medication SCH CAP: at 08:36

## 2017-12-05 RX ADMIN — CYANOCOBALAMIN TAB 500 MCG SCH MCG: 500 TAB at 08:33

## 2017-12-05 RX ADMIN — CLOBETASOL PROPIONATE SCH APPLIC: 0.5 OINTMENT TOPICAL at 08:56

## 2017-12-05 RX ADMIN — METRONIDAZOLE SCH MG: 250 TABLET ORAL at 08:31

## 2017-12-05 RX ADMIN — CIPROFLOXACIN SCH MG: 750 TABLET, FILM COATED ORAL at 08:45

## 2017-12-05 RX ADMIN — PREGABALIN SCH MG: 50 CAPSULE ORAL at 08:30

## 2017-12-05 RX ADMIN — OXYCODONE HYDROCHLORIDE AND ACETAMINOPHEN SCH MG: 500 TABLET ORAL at 08:30

## 2017-12-05 RX ADMIN — FUROSEMIDE SCH MG: 40 TABLET ORAL at 20:32

## 2017-12-05 RX ADMIN — FOLIC ACID SCH MG: 1 TABLET ORAL at 08:31

## 2017-12-05 RX ADMIN — CIPROFLOXACIN SCH MG: 750 TABLET, FILM COATED ORAL at 20:32

## 2017-12-05 RX ADMIN — Medication SCH MG: at 08:29

## 2017-12-05 RX ADMIN — Medication SCH MG: at 08:36

## 2017-12-05 RX ADMIN — CLOBETASOL PROPIONATE SCH APPLIC: 0.5 OINTMENT TOPICAL at 20:34

## 2017-12-05 RX ADMIN — Medication SCH ML: at 05:40

## 2017-12-05 RX ADMIN — OXYCODONE HYDROCHLORIDE PRN MG: 5 CAPSULE ORAL at 20:32

## 2017-12-05 RX ADMIN — METRONIDAZOLE SCH MG: 250 TABLET ORAL at 20:31

## 2017-12-05 RX ADMIN — VANCOMYCIN HYDROCHLORIDE SCH MLS/HR: 1 INJECTION, POWDER, LYOPHILIZED, FOR SOLUTION INTRAVENOUS at 10:11

## 2017-12-05 NOTE — PN
Hospitalist Progress Note





Telemedicine Consultation with Dr. Luis Felipe Novoa of [facility] today. He is happy with 

the progression of the flap. WOuld like small open area at apex of wound packed 

with damp 2x2 gauze BID. Sutures on long stretches of incision of to remove if 

irritating. OK to stop antibiotics tomorrow as planned. OK to start PT/OT and 

sitting up for meals starting with 30mins TID and increasing as tolerated. 

Avoiding Shearing forces on flap is paramount. Would recommend rehab after 

hospitalization and a repeat telecommunications consult in 3weeks.

## 2017-12-06 RX ADMIN — Medication SCH CAP: at 21:54

## 2017-12-06 RX ADMIN — OXYCODONE HYDROCHLORIDE AND ACETAMINOPHEN SCH MG: 500 TABLET ORAL at 08:48

## 2017-12-06 RX ADMIN — Medication SCH MG: at 08:49

## 2017-12-06 RX ADMIN — CIPROFLOXACIN SCH MG: 750 TABLET, FILM COATED ORAL at 21:54

## 2017-12-06 RX ADMIN — FENTANYL TRANSDERMAL SCH MCG: 75 PATCH, EXTENDED RELEASE TRANSDERMAL at 17:54

## 2017-12-06 RX ADMIN — WATER SCH NOTE: 100 INJECTION, SOLUTION INTRAVENOUS at 19:02

## 2017-12-06 RX ADMIN — PREGABALIN SCH MG: 50 CAPSULE ORAL at 14:03

## 2017-12-06 RX ADMIN — FUROSEMIDE SCH MG: 40 TABLET ORAL at 08:49

## 2017-12-06 RX ADMIN — ENOXAPARIN SODIUM SCH MG: 40 INJECTION SUBCUTANEOUS at 18:00

## 2017-12-06 RX ADMIN — CIPROFLOXACIN SCH MG: 750 TABLET, FILM COATED ORAL at 08:48

## 2017-12-06 RX ADMIN — CLOBETASOL PROPIONATE SCH APPLIC: 0.5 OINTMENT TOPICAL at 08:54

## 2017-12-06 RX ADMIN — Medication SCH MG: at 08:48

## 2017-12-06 RX ADMIN — NYSTATIN SCH APPLIC: 100000 CREAM TOPICAL at 22:07

## 2017-12-06 RX ADMIN — NYSTATIN SCH: 100000 CREAM TOPICAL at 08:55

## 2017-12-06 RX ADMIN — Medication SCH UNITS: at 08:47

## 2017-12-06 RX ADMIN — METRONIDAZOLE SCH MG: 250 TABLET ORAL at 21:55

## 2017-12-06 RX ADMIN — Medication SCH ML: at 05:40

## 2017-12-06 RX ADMIN — FUROSEMIDE SCH MG: 40 TABLET ORAL at 21:54

## 2017-12-06 RX ADMIN — Medication SCH ML: at 18:03

## 2017-12-06 RX ADMIN — PREGABALIN SCH MG: 50 CAPSULE ORAL at 21:54

## 2017-12-06 RX ADMIN — ACETAMINOPHEN PRN MG: 325 TABLET ORAL at 21:56

## 2017-12-06 RX ADMIN — WATER SCH NOTE: 100 INJECTION, SOLUTION INTRAVENOUS at 06:31

## 2017-12-06 RX ADMIN — ASPIRIN SCH MG: 81 TABLET, COATED ORAL at 08:48

## 2017-12-06 RX ADMIN — PREGABALIN SCH MG: 50 CAPSULE ORAL at 08:48

## 2017-12-06 RX ADMIN — FOLIC ACID SCH MG: 1 TABLET ORAL at 08:49

## 2017-12-06 RX ADMIN — OMEPRAZOLE SCH MG: 20 CAPSULE, DELAYED RELEASE ORAL at 07:54

## 2017-12-06 RX ADMIN — CYANOCOBALAMIN TAB 500 MCG SCH MCG: 500 TAB at 08:48

## 2017-12-06 RX ADMIN — OXYCODONE HYDROCHLORIDE PRN MG: 5 CAPSULE ORAL at 17:51

## 2017-12-06 RX ADMIN — VANCOMYCIN HYDROCHLORIDE SCH MLS/HR: 1 INJECTION, POWDER, LYOPHILIZED, FOR SOLUTION INTRAVENOUS at 10:05

## 2017-12-06 RX ADMIN — CLOBETASOL PROPIONATE SCH APPLIC: 0.5 OINTMENT TOPICAL at 22:07

## 2017-12-06 RX ADMIN — METRONIDAZOLE SCH MG: 250 TABLET ORAL at 08:47

## 2017-12-06 RX ADMIN — Medication SCH CAP: at 08:48

## 2017-12-07 RX ADMIN — OXYCODONE HYDROCHLORIDE PRN MG: 5 CAPSULE ORAL at 12:39

## 2017-12-07 RX ADMIN — FUROSEMIDE SCH MG: 40 TABLET ORAL at 09:24

## 2017-12-07 RX ADMIN — OXYCODONE HYDROCHLORIDE AND ACETAMINOPHEN SCH MG: 500 TABLET ORAL at 09:18

## 2017-12-07 RX ADMIN — WATER SCH NOTE: 100 INJECTION, SOLUTION INTRAVENOUS at 19:25

## 2017-12-07 RX ADMIN — ENOXAPARIN SODIUM SCH MG: 40 INJECTION SUBCUTANEOUS at 16:48

## 2017-12-07 RX ADMIN — FUROSEMIDE SCH MG: 40 TABLET ORAL at 22:08

## 2017-12-07 RX ADMIN — CLOBETASOL PROPIONATE SCH APPLIC: 0.5 OINTMENT TOPICAL at 09:29

## 2017-12-07 RX ADMIN — ASPIRIN SCH MG: 81 TABLET, COATED ORAL at 09:18

## 2017-12-07 RX ADMIN — Medication SCH UNITS: at 09:18

## 2017-12-07 RX ADMIN — Medication SCH MG: at 09:22

## 2017-12-07 RX ADMIN — Medication SCH MG: at 09:25

## 2017-12-07 RX ADMIN — PREGABALIN SCH MG: 50 CAPSULE ORAL at 09:22

## 2017-12-07 RX ADMIN — Medication SCH CAP: at 22:07

## 2017-12-07 RX ADMIN — WATER SCH NOTE: 100 INJECTION, SOLUTION INTRAVENOUS at 07:19

## 2017-12-07 RX ADMIN — OXYCODONE HYDROCHLORIDE PRN MG: 5 CAPSULE ORAL at 22:08

## 2017-12-07 RX ADMIN — Medication SCH ML: at 04:49

## 2017-12-07 RX ADMIN — NYSTATIN SCH: 100000 CREAM TOPICAL at 09:26

## 2017-12-07 RX ADMIN — FOLIC ACID SCH MG: 1 TABLET ORAL at 09:25

## 2017-12-07 RX ADMIN — OMEPRAZOLE SCH MG: 20 CAPSULE, DELAYED RELEASE ORAL at 07:33

## 2017-12-07 RX ADMIN — PREGABALIN SCH MG: 50 CAPSULE ORAL at 22:07

## 2017-12-07 RX ADMIN — Medication SCH ML: at 16:40

## 2017-12-07 RX ADMIN — PREGABALIN SCH MG: 50 CAPSULE ORAL at 13:50

## 2017-12-07 RX ADMIN — Medication SCH CAP: at 09:24

## 2017-12-07 RX ADMIN — CLOBETASOL PROPIONATE SCH APPLIC: 0.5 OINTMENT TOPICAL at 22:09

## 2017-12-07 RX ADMIN — NYSTATIN SCH APPLIC: 100000 CREAM TOPICAL at 22:09

## 2017-12-07 RX ADMIN — CYANOCOBALAMIN TAB 500 MCG SCH MCG: 500 TAB at 09:20

## 2017-12-07 RX ADMIN — ACETAMINOPHEN PRN MG: 325 TABLET ORAL at 16:58

## 2017-12-07 NOTE — PN
Hospitalist Progress Note


Date of Service: 12/07/17





Patient seen and wound examined. Stray sutures removed from straight areas of 

flap. Middle of sutures have dissolved and they are unlikely to be providing 

any support at any area of the incision. All antibiotics discontinued at this 

time.

## 2017-12-08 RX ADMIN — PREGABALIN SCH MG: 50 CAPSULE ORAL at 14:06

## 2017-12-08 RX ADMIN — CYANOCOBALAMIN TAB 500 MCG SCH MCG: 500 TAB at 10:17

## 2017-12-08 RX ADMIN — PREGABALIN SCH MG: 50 CAPSULE ORAL at 10:16

## 2017-12-08 RX ADMIN — OMEPRAZOLE SCH MG: 20 CAPSULE, DELAYED RELEASE ORAL at 10:16

## 2017-12-08 RX ADMIN — NYSTATIN SCH: 100000 CREAM TOPICAL at 10:18

## 2017-12-08 RX ADMIN — Medication SCH UNITS: at 10:17

## 2017-12-08 RX ADMIN — FUROSEMIDE SCH MG: 40 TABLET ORAL at 20:34

## 2017-12-08 RX ADMIN — Medication SCH CAP: at 20:34

## 2017-12-08 RX ADMIN — CLOBETASOL PROPIONATE SCH APPLIC: 0.5 OINTMENT TOPICAL at 10:18

## 2017-12-08 RX ADMIN — Medication SCH MG: at 10:17

## 2017-12-08 RX ADMIN — OXYCODONE HYDROCHLORIDE AND ACETAMINOPHEN SCH MG: 500 TABLET ORAL at 10:17

## 2017-12-08 RX ADMIN — CLOBETASOL PROPIONATE SCH APPLIC: 0.5 OINTMENT TOPICAL at 20:35

## 2017-12-08 RX ADMIN — Medication SCH ML: at 05:54

## 2017-12-08 RX ADMIN — ENOXAPARIN SODIUM SCH MG: 40 INJECTION SUBCUTANEOUS at 16:27

## 2017-12-08 RX ADMIN — OXYCODONE HYDROCHLORIDE PRN MG: 5 CAPSULE ORAL at 18:43

## 2017-12-08 RX ADMIN — Medication SCH ML: at 16:27

## 2017-12-08 RX ADMIN — WATER SCH NOTE: 100 INJECTION, SOLUTION INTRAVENOUS at 07:14

## 2017-12-08 RX ADMIN — ASPIRIN SCH MG: 81 TABLET, COATED ORAL at 10:17

## 2017-12-08 RX ADMIN — FUROSEMIDE SCH MG: 40 TABLET ORAL at 10:17

## 2017-12-08 RX ADMIN — PREGABALIN SCH MG: 50 CAPSULE ORAL at 20:34

## 2017-12-08 RX ADMIN — WATER SCH NOTE: 100 INJECTION, SOLUTION INTRAVENOUS at 19:01

## 2017-12-08 RX ADMIN — NYSTATIN SCH: 100000 CREAM TOPICAL at 21:19

## 2017-12-08 RX ADMIN — Medication SCH CAP: at 10:16

## 2017-12-08 RX ADMIN — FOLIC ACID SCH MG: 1 TABLET ORAL at 10:16

## 2017-12-09 RX ADMIN — Medication SCH UNITS: at 08:58

## 2017-12-09 RX ADMIN — WATER SCH NOTE: 100 INJECTION, SOLUTION INTRAVENOUS at 19:03

## 2017-12-09 RX ADMIN — OXYCODONE HYDROCHLORIDE PRN MG: 5 CAPSULE ORAL at 03:14

## 2017-12-09 RX ADMIN — Medication SCH CAP: at 20:13

## 2017-12-09 RX ADMIN — Medication SCH MG: at 08:58

## 2017-12-09 RX ADMIN — CYANOCOBALAMIN TAB 500 MCG SCH MCG: 500 TAB at 08:58

## 2017-12-09 RX ADMIN — NYSTATIN SCH: 100000 CREAM TOPICAL at 08:59

## 2017-12-09 RX ADMIN — WATER SCH NOTE: 100 INJECTION, SOLUTION INTRAVENOUS at 06:56

## 2017-12-09 RX ADMIN — PREGABALIN SCH MG: 50 CAPSULE ORAL at 14:16

## 2017-12-09 RX ADMIN — Medication SCH ML: at 18:14

## 2017-12-09 RX ADMIN — FUROSEMIDE SCH MG: 40 TABLET ORAL at 20:13

## 2017-12-09 RX ADMIN — CLOBETASOL PROPIONATE SCH APPLIC: 0.5 OINTMENT TOPICAL at 08:26

## 2017-12-09 RX ADMIN — PREGABALIN SCH MG: 50 CAPSULE ORAL at 20:13

## 2017-12-09 RX ADMIN — CLOBETASOL PROPIONATE SCH APPLIC: 0.5 OINTMENT TOPICAL at 20:14

## 2017-12-09 RX ADMIN — OMEPRAZOLE SCH MG: 20 CAPSULE, DELAYED RELEASE ORAL at 08:25

## 2017-12-09 RX ADMIN — ASPIRIN SCH MG: 81 TABLET, COATED ORAL at 08:58

## 2017-12-09 RX ADMIN — PREGABALIN SCH MG: 50 CAPSULE ORAL at 08:58

## 2017-12-09 RX ADMIN — OXYCODONE HYDROCHLORIDE AND ACETAMINOPHEN SCH MG: 500 TABLET ORAL at 08:58

## 2017-12-09 RX ADMIN — Medication SCH CAP: at 08:58

## 2017-12-09 RX ADMIN — NYSTATIN SCH: 100000 CREAM TOPICAL at 20:16

## 2017-12-09 RX ADMIN — OXYCODONE HYDROCHLORIDE PRN MG: 5 CAPSULE ORAL at 16:32

## 2017-12-09 RX ADMIN — FOLIC ACID SCH MG: 1 TABLET ORAL at 08:58

## 2017-12-09 RX ADMIN — ENOXAPARIN SODIUM SCH MG: 40 INJECTION SUBCUTANEOUS at 16:35

## 2017-12-09 RX ADMIN — FUROSEMIDE SCH MG: 40 TABLET ORAL at 08:58

## 2017-12-09 RX ADMIN — FENTANYL TRANSDERMAL SCH MCG: 75 PATCH, EXTENDED RELEASE TRANSDERMAL at 16:32

## 2017-12-09 RX ADMIN — Medication SCH ML: at 05:57

## 2017-12-10 RX ADMIN — NYSTATIN SCH: 100000 CREAM TOPICAL at 09:04

## 2017-12-10 RX ADMIN — OXYCODONE HYDROCHLORIDE AND ACETAMINOPHEN SCH MG: 500 TABLET ORAL at 09:03

## 2017-12-10 RX ADMIN — OXYCODONE HYDROCHLORIDE PRN MG: 5 CAPSULE ORAL at 16:48

## 2017-12-10 RX ADMIN — FOLIC ACID SCH MG: 1 TABLET ORAL at 09:03

## 2017-12-10 RX ADMIN — CLOBETASOL PROPIONATE SCH APPLIC: 0.5 OINTMENT TOPICAL at 07:53

## 2017-12-10 RX ADMIN — ASPIRIN SCH MG: 81 TABLET, COATED ORAL at 09:03

## 2017-12-10 RX ADMIN — PREGABALIN SCH MG: 50 CAPSULE ORAL at 09:02

## 2017-12-10 RX ADMIN — FUROSEMIDE SCH MG: 40 TABLET ORAL at 20:50

## 2017-12-10 RX ADMIN — WATER SCH NOTE: 100 INJECTION, SOLUTION INTRAVENOUS at 14:59

## 2017-12-10 RX ADMIN — ENOXAPARIN SODIUM SCH MG: 40 INJECTION SUBCUTANEOUS at 16:49

## 2017-12-10 RX ADMIN — Medication SCH MG: at 09:03

## 2017-12-10 RX ADMIN — NYSTATIN SCH: 100000 CREAM TOPICAL at 20:51

## 2017-12-10 RX ADMIN — WATER SCH NOTE: 100 INJECTION, SOLUTION INTRAVENOUS at 18:56

## 2017-12-10 RX ADMIN — OMEPRAZOLE SCH MG: 20 CAPSULE, DELAYED RELEASE ORAL at 07:49

## 2017-12-10 RX ADMIN — Medication SCH MG: at 09:02

## 2017-12-10 RX ADMIN — PREGABALIN SCH MG: 50 CAPSULE ORAL at 20:50

## 2017-12-10 RX ADMIN — CLOBETASOL PROPIONATE SCH APPLIC: 0.5 OINTMENT TOPICAL at 20:51

## 2017-12-10 RX ADMIN — CYANOCOBALAMIN TAB 500 MCG SCH MCG: 500 TAB at 09:03

## 2017-12-10 RX ADMIN — WATER SCH NOTE: 100 INJECTION, SOLUTION INTRAVENOUS at 06:48

## 2017-12-10 RX ADMIN — Medication SCH ML: at 16:49

## 2017-12-10 RX ADMIN — FUROSEMIDE SCH MG: 40 TABLET ORAL at 09:03

## 2017-12-10 RX ADMIN — PREGABALIN SCH MG: 50 CAPSULE ORAL at 13:55

## 2017-12-10 RX ADMIN — Medication SCH CAP: at 09:03

## 2017-12-10 RX ADMIN — Medication SCH UNITS: at 09:02

## 2017-12-10 RX ADMIN — Medication SCH ML: at 06:11

## 2017-12-10 RX ADMIN — Medication SCH CAP: at 20:50

## 2017-12-10 NOTE — PN
Subjective


Date of Service: 12/10/17


Interval History: 





Patient offers no new complaints.  No cough, SOB, abd pain, n/v. 


Family History: Unchanged from Admission


Social History: Unchanged from Admission


Past Medical History: Unchanged from Admission





Objective


Active Medications: 








Acetaminophen (Tylenol Tab*)  650 mg PO Q6H PRN


   PRN Reason: FEVER/PAIN


   Last Admin: 12/07/17 16:58 Dose:  650 mg


Ascorbic Acid (Vitamin C  Tab*)  500 mg PO DAILY [facility]


   Last Admin: 12/10/17 09:03 Dose:  500 mg


Aspirin (Aspirin Ec Low Dose*)  81 mg PO DAILY [facility]


   Last Admin: 12/10/17 09:03 Dose:  81 mg


Cholecalciferol (Vitamin D Tab*)  5,000 units PO DAILY [facility]


   Last Admin: 12/10/17 09:02 Dose:  5,000 units


Clobetasol Propionate (Clobetasol 0.05% Oint*)  1 applic TOPICAL BID [facility]


   Stop: 12/18/17 09:00


   Last Admin: 12/10/17 07:53 Dose:  1 applic


Cyanocobalamin (Vitamin B12 Tab*)  1,000 mcg PO DAILY [facility]


   Last Admin: 12/10/17 09:03 Dose:  1,000 mcg


Diphenoxylate HCl/Atropine (Lomotil Tab*)  1 tab PO QID PRN


   PRN Reason: DIARRHEA


   Last Admin: 11/06/17 20:04 Dose:  1 tab


Enoxaparin Sodium (Lovenox(*))  40 mg SUBCUT Q24H [facility]


   Last Admin: 12/09/17 16:35 Dose:  40 mg


Fentanyl (Duragesic  Patch 75 Mcg/Hr*)  75 mcg TRANSDERM Q72H [facility]


   Last Admin: 12/09/17 16:32 Dose:  75 mcg


Ferrous Gluconate (Fergon Tab*)  324 mg PO DAILY [facility]


   Last Admin: 12/10/17 09:03 Dose:  324 mg


Folic Acid (Folvite Tab*)  0.5 mg PO DAILY [facility]


   Last Admin: 12/10/17 09:03 Dose:  0.5 mg


Furosemide (Lasix Tab*)  40 mg PO BID [facility]


   Last Admin: 12/10/17 09:03 Dose:  40 mg


Heparin Sodium (Porcine) (Heparin Flush Picc/Ml/Cvc(*))  0 ml IV FLUSH 0600,

1800 [facility]


   PRN Reason: Protocol


   Last Admin: 12/10/17 06:11 Dose:  1 ml


Lactobacillus Rhamnosus (Culturelle*)  1 cap PO BID [facility]


   Last Admin: 12/10/17 09:03 Dose:  1 cap


Nystatin (Nystatin Cream*)  1 applic TOPICAL BID [facility]


   Last Admin: 12/10/17 09:04 Dose:  Not Given


Omeprazole (Prilosec Cap*)  40 mg PO DAILY@0730 [facility]


   Last Admin: 12/10/17 07:49 Dose:  40 mg


Oxycodone HCl (Roxycodone Tab*)  10 mg PO Q8H PRN


   PRN Reason: PAIN


   Last Admin: 12/09/17 16:32 Dose:  10 mg


Pharmacy Profile Note (Fentanyl Patch Check Q Shift)  1 note N/A 0700,1900 [facility]


   Last Admin: 12/10/17 06:48 Dose:  1 note


Pregabalin (Lyrica Cap(*))  150 mg PO TID [facility]


   Last Admin: 12/10/17 13:55 Dose:  150 mg


Zinc Sulfate (Zinc-220 Cap*)  220 mg PO DAILY [facility]


   Last Admin: 12/10/17 09:02 Dose:  220 mg








Vital Signs:











Temp Pulse Resp BP Pulse Ox


 


 98.1 F   75   18   140/74   96 


 


 12/10/17 07:49  12/10/17 07:49  12/10/17 13:55  12/10/17 07:49  12/10/17 07:49











Oxygen Devices in Use Now: None


Appearance: Well appearing 71 yo gentleman in NAD.  Lying in bed comfortably, 

accompanied by his wife


Respiratory: Symmetrical Chest Expansion and Respiratory Effort, Clear to 

Auscultation


Cardiovascular: NL Sounds; No Murmurs; No JVD, RRR


Abdominal: NL Sounds; No Tenderness; No Distention


Extremities: No Edema


Skin: No Rash or Ulcers


Neurological: Alert and Oriented x 3


Result Diagrams: 


 12/04/17 05:16





 12/04/17 05:16





Assess/Plan/Problems-Billing


.


Assessment: 


71 yo man with paraplegia s/p plastic surgery for Stage IV sacral wound and 

acute osteomyelitis as well as chronic pain.








- Patient Problems


(1) S/P flap graft


Comment: 


- 10/25/2017 at [facility] over large sacral defect


Continue Clinitron bed, wound care, T&P.  


Last teleconference with his plastic surgeon 12/5/17, follow up in 3 weeks


Continue fentanyl patch, PRN oxycodone, APAP


Small open area being treated with Medihoney and gauze   





(2) Acute osteomyelitis of sacrum


Comment: 


s/p debridement at time of flap creation at [facility]


Completed antibiotics 12/6   





(3) Paraplegia


Comment: 


Secondary to ependymoma


PT / OT as allowed by plastics   





(4) Chronic indwelling Rodriguez catheter


Comment: 


Secondary to neurogenic bladder   





(5) DVT prophylaxis


Comment: 


Lovenox, SCDs   





(6) Full code status





Status and Disposition: 





Swing.  PT/OT. PT to resume UE strengthening activities

## 2017-12-11 LAB
HCT VFR BLD AUTO: 38 % (ref 42–52)
HGB BLD-MCNC: 12.6 G/DL (ref 14–18)
PLATELET # BLD AUTO: 214 10^3/UL (ref 150–450)
WBC # BLD AUTO: 7.3 10^3/UL (ref 3.5–10.8)

## 2017-12-11 RX ADMIN — FOLIC ACID SCH MG: 1 TABLET ORAL at 09:15

## 2017-12-11 RX ADMIN — FUROSEMIDE SCH MG: 40 TABLET ORAL at 20:21

## 2017-12-11 RX ADMIN — OMEPRAZOLE SCH MG: 20 CAPSULE, DELAYED RELEASE ORAL at 07:55

## 2017-12-11 RX ADMIN — PREGABALIN SCH MG: 50 CAPSULE ORAL at 09:14

## 2017-12-11 RX ADMIN — Medication SCH CAP: at 20:21

## 2017-12-11 RX ADMIN — ENOXAPARIN SODIUM SCH MG: 40 INJECTION SUBCUTANEOUS at 17:20

## 2017-12-11 RX ADMIN — ACETAMINOPHEN PRN MG: 325 TABLET ORAL at 20:20

## 2017-12-11 RX ADMIN — FUROSEMIDE SCH MG: 40 TABLET ORAL at 09:13

## 2017-12-11 RX ADMIN — PREGABALIN SCH MG: 50 CAPSULE ORAL at 14:10

## 2017-12-11 RX ADMIN — Medication SCH UNITS: at 09:14

## 2017-12-11 RX ADMIN — Medication SCH ML: at 17:20

## 2017-12-11 RX ADMIN — OXYCODONE HYDROCHLORIDE PRN MG: 5 CAPSULE ORAL at 15:01

## 2017-12-11 RX ADMIN — OXYCODONE HYDROCHLORIDE AND ACETAMINOPHEN SCH MG: 500 TABLET ORAL at 09:14

## 2017-12-11 RX ADMIN — NYSTATIN SCH: 100000 CREAM TOPICAL at 20:38

## 2017-12-11 RX ADMIN — CLOBETASOL PROPIONATE SCH APPLIC: 0.5 OINTMENT TOPICAL at 20:22

## 2017-12-11 RX ADMIN — WATER SCH NOTE: 100 INJECTION, SOLUTION INTRAVENOUS at 18:30

## 2017-12-11 RX ADMIN — Medication SCH ML: at 05:22

## 2017-12-11 RX ADMIN — ASPIRIN SCH MG: 81 TABLET, COATED ORAL at 09:15

## 2017-12-11 RX ADMIN — CLOBETASOL PROPIONATE SCH APPLIC: 0.5 OINTMENT TOPICAL at 09:19

## 2017-12-11 RX ADMIN — NYSTATIN SCH: 100000 CREAM TOPICAL at 09:19

## 2017-12-11 RX ADMIN — Medication SCH MG: at 09:14

## 2017-12-11 RX ADMIN — PREGABALIN SCH MG: 50 CAPSULE ORAL at 20:20

## 2017-12-11 RX ADMIN — CYANOCOBALAMIN TAB 500 MCG SCH MCG: 500 TAB at 09:13

## 2017-12-11 RX ADMIN — Medication SCH CAP: at 09:15

## 2017-12-11 RX ADMIN — WATER SCH NOTE: 100 INJECTION, SOLUTION INTRAVENOUS at 06:47

## 2017-12-12 RX ADMIN — PREGABALIN SCH MG: 50 CAPSULE ORAL at 13:53

## 2017-12-12 RX ADMIN — OMEPRAZOLE SCH MG: 20 CAPSULE, DELAYED RELEASE ORAL at 07:41

## 2017-12-12 RX ADMIN — NYSTATIN SCH APPLIC: 100000 CREAM TOPICAL at 20:55

## 2017-12-12 RX ADMIN — WATER SCH NOTE: 100 INJECTION, SOLUTION INTRAVENOUS at 06:57

## 2017-12-12 RX ADMIN — Medication SCH ML: at 17:40

## 2017-12-12 RX ADMIN — ASPIRIN SCH MG: 81 TABLET, COATED ORAL at 09:47

## 2017-12-12 RX ADMIN — Medication SCH ML: at 05:11

## 2017-12-12 RX ADMIN — Medication SCH CAP: at 20:53

## 2017-12-12 RX ADMIN — FENTANYL TRANSDERMAL SCH MCG: 75 PATCH, EXTENDED RELEASE TRANSDERMAL at 17:38

## 2017-12-12 RX ADMIN — NYSTATIN SCH APPLIC: 100000 CREAM TOPICAL at 09:50

## 2017-12-12 RX ADMIN — CLOBETASOL PROPIONATE SCH APPLIC: 0.5 OINTMENT TOPICAL at 20:55

## 2017-12-12 RX ADMIN — ENOXAPARIN SODIUM SCH MG: 40 INJECTION SUBCUTANEOUS at 17:40

## 2017-12-12 RX ADMIN — Medication SCH CAP: at 09:47

## 2017-12-12 RX ADMIN — FUROSEMIDE SCH MG: 40 TABLET ORAL at 20:52

## 2017-12-12 RX ADMIN — PREGABALIN SCH MG: 50 CAPSULE ORAL at 20:53

## 2017-12-12 RX ADMIN — DIPHENOXYLATE HYDROCHLORIDE AND ATROPINE SULFATE PRN TAB: 2.5; .025 TABLET ORAL at 13:52

## 2017-12-12 RX ADMIN — FOLIC ACID SCH MG: 1 TABLET ORAL at 09:49

## 2017-12-12 RX ADMIN — Medication SCH UNITS: at 09:45

## 2017-12-12 RX ADMIN — Medication SCH MG: at 09:47

## 2017-12-12 RX ADMIN — OXYCODONE HYDROCHLORIDE AND ACETAMINOPHEN SCH MG: 500 TABLET ORAL at 09:46

## 2017-12-12 RX ADMIN — FUROSEMIDE SCH MG: 40 TABLET ORAL at 09:47

## 2017-12-12 RX ADMIN — WATER SCH NOTE: 100 INJECTION, SOLUTION INTRAVENOUS at 18:50

## 2017-12-12 RX ADMIN — PREGABALIN SCH MG: 50 CAPSULE ORAL at 09:48

## 2017-12-12 RX ADMIN — CLOBETASOL PROPIONATE SCH APPLIC: 0.5 OINTMENT TOPICAL at 09:50

## 2017-12-12 RX ADMIN — OXYCODONE HYDROCHLORIDE PRN MG: 5 CAPSULE ORAL at 13:53

## 2017-12-12 RX ADMIN — CYANOCOBALAMIN TAB 500 MCG SCH MCG: 500 TAB at 09:48

## 2017-12-13 RX ADMIN — Medication SCH CAP: at 09:21

## 2017-12-13 RX ADMIN — OMEPRAZOLE SCH MG: 20 CAPSULE, DELAYED RELEASE ORAL at 09:20

## 2017-12-13 RX ADMIN — Medication SCH MG: at 09:20

## 2017-12-13 RX ADMIN — PREGABALIN SCH MG: 50 CAPSULE ORAL at 14:39

## 2017-12-13 RX ADMIN — CLOBETASOL PROPIONATE SCH APPLIC: 0.5 OINTMENT TOPICAL at 11:00

## 2017-12-13 RX ADMIN — OXYCODONE HYDROCHLORIDE PRN MG: 5 CAPSULE ORAL at 17:30

## 2017-12-13 RX ADMIN — PREGABALIN SCH MG: 50 CAPSULE ORAL at 22:07

## 2017-12-13 RX ADMIN — Medication SCH ML: at 17:30

## 2017-12-13 RX ADMIN — PREGABALIN SCH MG: 50 CAPSULE ORAL at 09:20

## 2017-12-13 RX ADMIN — Medication SCH UNITS: at 09:20

## 2017-12-13 RX ADMIN — ENOXAPARIN SODIUM SCH MG: 40 INJECTION SUBCUTANEOUS at 17:30

## 2017-12-13 RX ADMIN — FOLIC ACID SCH MG: 1 TABLET ORAL at 09:19

## 2017-12-13 RX ADMIN — CLOBETASOL PROPIONATE SCH APPLIC: 0.5 OINTMENT TOPICAL at 22:09

## 2017-12-13 RX ADMIN — Medication SCH ML: at 06:25

## 2017-12-13 RX ADMIN — WATER SCH NOTE: 100 INJECTION, SOLUTION INTRAVENOUS at 18:44

## 2017-12-13 RX ADMIN — Medication SCH CAP: at 22:07

## 2017-12-13 RX ADMIN — CYANOCOBALAMIN TAB 500 MCG SCH MCG: 500 TAB at 09:20

## 2017-12-13 RX ADMIN — NYSTATIN SCH: 100000 CREAM TOPICAL at 22:08

## 2017-12-13 RX ADMIN — NYSTATIN SCH: 100000 CREAM TOPICAL at 14:39

## 2017-12-13 RX ADMIN — FUROSEMIDE SCH MG: 40 TABLET ORAL at 22:07

## 2017-12-13 RX ADMIN — WATER SCH NOTE: 100 INJECTION, SOLUTION INTRAVENOUS at 06:57

## 2017-12-13 RX ADMIN — ASPIRIN SCH MG: 81 TABLET, COATED ORAL at 09:21

## 2017-12-13 RX ADMIN — OXYCODONE HYDROCHLORIDE AND ACETAMINOPHEN SCH MG: 500 TABLET ORAL at 09:21

## 2017-12-13 RX ADMIN — FUROSEMIDE SCH MG: 40 TABLET ORAL at 09:21

## 2017-12-13 NOTE — PN
Subjective


Date of Service: 12/13/17


Interval History: 





Patient offers no new complaints.  He began working with PT yesterday and is 

pleased.  Denies CP, SOB, abd pain, n/v. 


Family History: Unchanged from Admission


Social History: Unchanged from Admission


Past Medical History: Unchanged from Admission





Objective


Active Medications: 








Acetaminophen (Tylenol Tab*)  650 mg PO Q6H PRN


   PRN Reason: FEVER/PAIN


   Last Admin: 12/11/17 20:20 Dose:  650 mg


Ascorbic Acid (Vitamin C  Tab*)  500 mg PO DAILY [facility]


   Last Admin: 12/13/17 09:21 Dose:  500 mg


Aspirin (Aspirin Ec Low Dose*)  81 mg PO DAILY [facility]


   Last Admin: 12/13/17 09:21 Dose:  81 mg


Cholecalciferol (Vitamin D Tab*)  5,000 units PO DAILY [facility]


   Last Admin: 12/13/17 09:20 Dose:  5,000 units


Clobetasol Propionate (Clobetasol 0.05% Oint*)  1 applic TOPICAL BID [facility]


   Stop: 12/18/17 09:00


   Last Admin: 12/13/17 11:00 Dose:  1 applic


Cyanocobalamin (Vitamin B12 Tab*)  1,000 mcg PO DAILY [facility]


   Last Admin: 12/13/17 09:20 Dose:  1,000 mcg


Diphenoxylate HCl/Atropine (Lomotil Tab*)  1 tab PO QID PRN


   PRN Reason: DIARRHEA


   Last Admin: 12/12/17 13:52 Dose:  1 tab


Enoxaparin Sodium (Lovenox(*))  40 mg SUBCUT Q24H [facility]


   Last Admin: 12/12/17 17:40 Dose:  40 mg


Fentanyl (Duragesic  Patch 75 Mcg/Hr*)  75 mcg TRANSDERM Q72H [facility]


   Last Admin: 12/12/17 17:38 Dose:  75 mcg


Ferrous Gluconate (Fergon Tab*)  324 mg PO DAILY [facility]


   Last Admin: 12/13/17 09:20 Dose:  324 mg


Folic Acid (Folvite Tab*)  0.5 mg PO DAILY [facility]


   Last Admin: 12/13/17 09:19 Dose:  0.5 mg


Furosemide (Lasix Tab*)  40 mg PO BID [facility]


   Last Admin: 12/13/17 09:21 Dose:  40 mg


Heparin Sodium (Porcine) (Heparin Flush Picc/Ml/Cvc(*))  0 ml IV FLUSH 0600,

1800 [facility]


   PRN Reason: Protocol


   Last Admin: 12/13/17 06:25 Dose:  1 ml


Lactobacillus Rhamnosus (Culturelle*)  1 cap PO BID [facility]


   Last Admin: 12/13/17 09:21 Dose:  1 cap


Nystatin (Nystatin Cream*)  1 applic TOPICAL BID [facility]


   Last Admin: 12/13/17 14:39 Dose:  Not Given


Omeprazole (Prilosec Cap*)  40 mg PO DAILY@0730 [facility]


   Last Admin: 12/13/17 09:20 Dose:  40 mg


Oxycodone HCl (Roxycodone Tab*)  10 mg PO Q8H PRN


   PRN Reason: PAIN


   Last Admin: 12/12/17 13:53 Dose:  10 mg


Pharmacy Profile Note (Fentanyl Patch Check Q Shift)  1 note N/A 0700,1900 [facility]


   Last Admin: 12/13/17 06:57 Dose:  1 note


Pregabalin (Lyrica Cap(*))  150 mg PO TID [facility]


   Last Admin: 12/13/17 14:39 Dose:  150 mg


Zinc Sulfate (Zinc-220 Cap*)  220 mg PO DAILY [facility]


   Last Admin: 12/13/17 09:20 Dose:  220 mg








Vital Signs:











Temp Pulse Resp BP Pulse Ox


 


 98.8 F   76   16   161/66   97 


 


 12/13/17 10:05  12/13/17 10:05  12/13/17 14:40  12/13/17 10:05  12/13/17 10:05











Oxygen Devices in Use Now: None


Appearance: Well appearing in NAD


Respiratory: Symmetrical Chest Expansion and Respiratory Effort, Clear to 

Auscultation


Cardiovascular: NL Sounds; No Murmurs; No JVD, RRR


Abdominal: NL Sounds; No Tenderness; No Distention


Extremities: No Edema, -


Skin: - - flap not examined


Neurological: - - flaccid lower extremities


Result Diagrams: 


 12/11/17 05:17





 12/11/17 05:17





Assess/Plan/Problems-Billing


.


Assessment: 


71 yo man with paraplegia s/p plastic surgery for Stage IV sacral wound and 

acute osteomyelitis as well as chronic pain.








- Patient Problems


(1) S/P flap graft


Comment: 


- 10/25/2017 at [facility] over large sacral defect


Continue Clinitron bed, wound care, T&P.  


Last teleconference with his plastic surgeon 12/5/17, follow up in 3 weeks


Continue fentanyl patch, PRN oxycodone, APAP


Small open area being treated with Medihoney and gauze   





(2) Acute osteomyelitis of sacrum


Comment: 


s/p debridement at time of flap creation at [facility]


Completed antibiotics 12/6   





(3) Paraplegia


Comment: 


Secondary to ependymoma


PT / OT as allowed by plastics   





(4) Chronic indwelling Rodriguez catheter


Comment: 


Secondary to neurogenic bladder   





(5) DVT prophylaxis


Comment: 


Lovenox, SCDs   





(6) Full code status





Status and Disposition: 





Swing.  PT/OT. Next family meeting scheduled for Fri 12/15

## 2017-12-14 RX ADMIN — Medication SCH MG: at 09:14

## 2017-12-14 RX ADMIN — Medication SCH CAP: at 20:45

## 2017-12-14 RX ADMIN — OXYCODONE HYDROCHLORIDE PRN MG: 5 CAPSULE ORAL at 22:09

## 2017-12-14 RX ADMIN — NYSTATIN SCH APPLIC: 100000 CREAM TOPICAL at 12:22

## 2017-12-14 RX ADMIN — NYSTATIN SCH APPLIC: 100000 CREAM TOPICAL at 21:01

## 2017-12-14 RX ADMIN — ENOXAPARIN SODIUM SCH MG: 40 INJECTION SUBCUTANEOUS at 16:53

## 2017-12-14 RX ADMIN — WATER SCH NOTE: 100 INJECTION, SOLUTION INTRAVENOUS at 07:11

## 2017-12-14 RX ADMIN — Medication SCH ML: at 06:18

## 2017-12-14 RX ADMIN — FOLIC ACID SCH MG: 1 TABLET ORAL at 09:01

## 2017-12-14 RX ADMIN — Medication SCH CAP: at 09:00

## 2017-12-14 RX ADMIN — Medication SCH UNITS: at 09:00

## 2017-12-14 RX ADMIN — CLOBETASOL PROPIONATE SCH: 0.5 OINTMENT TOPICAL at 12:21

## 2017-12-14 RX ADMIN — PREGABALIN SCH MG: 50 CAPSULE ORAL at 13:56

## 2017-12-14 RX ADMIN — CYANOCOBALAMIN TAB 500 MCG SCH MCG: 500 TAB at 09:01

## 2017-12-14 RX ADMIN — Medication SCH ML: at 16:54

## 2017-12-14 RX ADMIN — CLOBETASOL PROPIONATE SCH: 0.5 OINTMENT TOPICAL at 21:01

## 2017-12-14 RX ADMIN — PREGABALIN SCH MG: 50 CAPSULE ORAL at 09:01

## 2017-12-14 RX ADMIN — WATER SCH NOTE: 100 INJECTION, SOLUTION INTRAVENOUS at 19:02

## 2017-12-14 RX ADMIN — FUROSEMIDE SCH MG: 40 TABLET ORAL at 09:01

## 2017-12-14 RX ADMIN — Medication SCH MG: at 09:13

## 2017-12-14 RX ADMIN — FUROSEMIDE SCH MG: 40 TABLET ORAL at 20:45

## 2017-12-14 RX ADMIN — OXYCODONE HYDROCHLORIDE AND ACETAMINOPHEN SCH MG: 500 TABLET ORAL at 09:01

## 2017-12-14 RX ADMIN — ASPIRIN SCH MG: 81 TABLET, COATED ORAL at 09:01

## 2017-12-14 RX ADMIN — OXYCODONE HYDROCHLORIDE PRN MG: 5 CAPSULE ORAL at 13:59

## 2017-12-14 RX ADMIN — OMEPRAZOLE SCH MG: 20 CAPSULE, DELAYED RELEASE ORAL at 09:00

## 2017-12-14 RX ADMIN — PREGABALIN SCH MG: 50 CAPSULE ORAL at 20:45

## 2017-12-15 RX ADMIN — Medication SCH CAP: at 23:34

## 2017-12-15 RX ADMIN — Medication SCH ML: at 17:25

## 2017-12-15 RX ADMIN — CLOBETASOL PROPIONATE SCH: 0.5 OINTMENT TOPICAL at 23:42

## 2017-12-15 RX ADMIN — WATER SCH NOTE: 100 INJECTION, SOLUTION INTRAVENOUS at 07:05

## 2017-12-15 RX ADMIN — PREGABALIN SCH MG: 50 CAPSULE ORAL at 13:54

## 2017-12-15 RX ADMIN — NYSTATIN SCH APPLIC: 100000 CREAM TOPICAL at 23:39

## 2017-12-15 RX ADMIN — ASPIRIN SCH MG: 81 TABLET, COATED ORAL at 08:47

## 2017-12-15 RX ADMIN — Medication SCH ML: at 05:56

## 2017-12-15 RX ADMIN — FENTANYL TRANSDERMAL SCH MCG: 75 PATCH, EXTENDED RELEASE TRANSDERMAL at 17:26

## 2017-12-15 RX ADMIN — FOLIC ACID SCH MG: 1 TABLET ORAL at 08:48

## 2017-12-15 RX ADMIN — FUROSEMIDE SCH MG: 40 TABLET ORAL at 23:33

## 2017-12-15 RX ADMIN — Medication SCH MG: at 08:48

## 2017-12-15 RX ADMIN — PREGABALIN SCH MG: 50 CAPSULE ORAL at 23:33

## 2017-12-15 RX ADMIN — OXYCODONE HYDROCHLORIDE PRN MG: 5 CAPSULE ORAL at 08:52

## 2017-12-15 RX ADMIN — CYANOCOBALAMIN TAB 500 MCG SCH MCG: 500 TAB at 08:47

## 2017-12-15 RX ADMIN — OXYCODONE HYDROCHLORIDE AND ACETAMINOPHEN SCH MG: 500 TABLET ORAL at 08:48

## 2017-12-15 RX ADMIN — CLOBETASOL PROPIONATE SCH: 0.5 OINTMENT TOPICAL at 07:27

## 2017-12-15 RX ADMIN — FUROSEMIDE SCH MG: 40 TABLET ORAL at 08:48

## 2017-12-15 RX ADMIN — OMEPRAZOLE SCH MG: 20 CAPSULE, DELAYED RELEASE ORAL at 08:48

## 2017-12-15 RX ADMIN — ENOXAPARIN SODIUM SCH MG: 40 INJECTION SUBCUTANEOUS at 17:25

## 2017-12-15 RX ADMIN — Medication SCH MG: at 08:52

## 2017-12-15 RX ADMIN — Medication SCH UNITS: at 08:48

## 2017-12-15 RX ADMIN — Medication SCH CAP: at 08:52

## 2017-12-15 RX ADMIN — WATER SCH NOTE: 100 INJECTION, SOLUTION INTRAVENOUS at 19:11

## 2017-12-15 RX ADMIN — PREGABALIN SCH MG: 50 CAPSULE ORAL at 08:47

## 2017-12-15 RX ADMIN — NYSTATIN SCH APPLIC: 100000 CREAM TOPICAL at 08:50

## 2017-12-16 RX ADMIN — CLOBETASOL PROPIONATE SCH APPLIC: 0.5 OINTMENT TOPICAL at 21:21

## 2017-12-16 RX ADMIN — Medication SCH ML: at 06:00

## 2017-12-16 RX ADMIN — Medication SCH UNITS: at 08:34

## 2017-12-16 RX ADMIN — ENOXAPARIN SODIUM SCH MG: 40 INJECTION SUBCUTANEOUS at 17:09

## 2017-12-16 RX ADMIN — CYANOCOBALAMIN TAB 500 MCG SCH MCG: 500 TAB at 08:34

## 2017-12-16 RX ADMIN — NYSTATIN SCH APPLIC: 100000 CREAM TOPICAL at 21:21

## 2017-12-16 RX ADMIN — OXYCODONE HYDROCHLORIDE PRN MG: 5 CAPSULE ORAL at 15:32

## 2017-12-16 RX ADMIN — WATER SCH NOTE: 100 INJECTION, SOLUTION INTRAVENOUS at 06:44

## 2017-12-16 RX ADMIN — CLOBETASOL PROPIONATE SCH: 0.5 OINTMENT TOPICAL at 08:41

## 2017-12-16 RX ADMIN — Medication SCH MG: at 08:34

## 2017-12-16 RX ADMIN — ASPIRIN SCH MG: 81 TABLET, COATED ORAL at 08:34

## 2017-12-16 RX ADMIN — WATER SCH NOTE: 100 INJECTION, SOLUTION INTRAVENOUS at 18:57

## 2017-12-16 RX ADMIN — NYSTATIN SCH APPLIC: 100000 CREAM TOPICAL at 08:41

## 2017-12-16 RX ADMIN — OMEPRAZOLE SCH MG: 20 CAPSULE, DELAYED RELEASE ORAL at 08:34

## 2017-12-16 RX ADMIN — OXYCODONE HYDROCHLORIDE AND ACETAMINOPHEN SCH MG: 500 TABLET ORAL at 08:35

## 2017-12-16 RX ADMIN — Medication SCH CAP: at 21:22

## 2017-12-16 RX ADMIN — FUROSEMIDE SCH MG: 40 TABLET ORAL at 08:35

## 2017-12-16 RX ADMIN — PREGABALIN SCH MG: 50 CAPSULE ORAL at 21:22

## 2017-12-16 RX ADMIN — PREGABALIN SCH MG: 50 CAPSULE ORAL at 08:34

## 2017-12-16 RX ADMIN — PREGABALIN SCH MG: 50 CAPSULE ORAL at 14:09

## 2017-12-16 RX ADMIN — Medication SCH CAP: at 08:34

## 2017-12-16 RX ADMIN — FUROSEMIDE SCH MG: 40 TABLET ORAL at 21:22

## 2017-12-16 RX ADMIN — FOLIC ACID SCH MG: 1 TABLET ORAL at 08:35

## 2017-12-16 RX ADMIN — Medication SCH ML: at 17:09

## 2017-12-17 RX ADMIN — PREGABALIN SCH MG: 50 CAPSULE ORAL at 15:00

## 2017-12-17 RX ADMIN — Medication SCH ML: at 06:18

## 2017-12-17 RX ADMIN — CYANOCOBALAMIN TAB 500 MCG SCH MCG: 500 TAB at 09:57

## 2017-12-17 RX ADMIN — WATER SCH NOTE: 100 INJECTION, SOLUTION INTRAVENOUS at 19:04

## 2017-12-17 RX ADMIN — FUROSEMIDE SCH MG: 40 TABLET ORAL at 20:58

## 2017-12-17 RX ADMIN — PREGABALIN SCH MG: 50 CAPSULE ORAL at 09:57

## 2017-12-17 RX ADMIN — CLOBETASOL PROPIONATE SCH APPLIC: 0.5 OINTMENT TOPICAL at 20:48

## 2017-12-17 RX ADMIN — Medication SCH ML: at 17:54

## 2017-12-17 RX ADMIN — Medication SCH UNITS: at 09:58

## 2017-12-17 RX ADMIN — OMEPRAZOLE SCH MG: 20 CAPSULE, DELAYED RELEASE ORAL at 09:58

## 2017-12-17 RX ADMIN — CLOBETASOL PROPIONATE SCH APPLIC: 0.5 OINTMENT TOPICAL at 09:58

## 2017-12-17 RX ADMIN — FOLIC ACID SCH MG: 1 TABLET ORAL at 09:58

## 2017-12-17 RX ADMIN — PREGABALIN SCH MG: 50 CAPSULE ORAL at 20:58

## 2017-12-17 RX ADMIN — WATER SCH NOTE: 100 INJECTION, SOLUTION INTRAVENOUS at 07:07

## 2017-12-17 RX ADMIN — Medication SCH MG: at 09:57

## 2017-12-17 RX ADMIN — FUROSEMIDE SCH MG: 40 TABLET ORAL at 09:57

## 2017-12-17 RX ADMIN — OXYCODONE HYDROCHLORIDE PRN MG: 5 CAPSULE ORAL at 23:49

## 2017-12-17 RX ADMIN — ENOXAPARIN SODIUM SCH MG: 40 INJECTION SUBCUTANEOUS at 17:54

## 2017-12-17 RX ADMIN — ASPIRIN SCH MG: 81 TABLET, COATED ORAL at 09:58

## 2017-12-17 RX ADMIN — Medication SCH MG: at 09:58

## 2017-12-17 RX ADMIN — Medication SCH CAP: at 20:57

## 2017-12-17 RX ADMIN — NYSTATIN SCH APPLIC: 100000 CREAM TOPICAL at 09:47

## 2017-12-17 RX ADMIN — NYSTATIN SCH APPLIC: 100000 CREAM TOPICAL at 20:48

## 2017-12-17 RX ADMIN — OXYCODONE HYDROCHLORIDE AND ACETAMINOPHEN SCH MG: 500 TABLET ORAL at 09:58

## 2017-12-17 RX ADMIN — OXYCODONE HYDROCHLORIDE PRN MG: 5 CAPSULE ORAL at 17:55

## 2017-12-17 RX ADMIN — Medication SCH CAP: at 09:57

## 2017-12-18 LAB
HCT VFR BLD AUTO: 39 % (ref 42–52)
HGB BLD-MCNC: 13.1 G/DL (ref 14–18)
PLATELET # BLD AUTO: 200 10^3/UL (ref 150–450)

## 2017-12-18 RX ADMIN — OMEPRAZOLE SCH MG: 20 CAPSULE, DELAYED RELEASE ORAL at 07:50

## 2017-12-18 RX ADMIN — PREGABALIN SCH MG: 50 CAPSULE ORAL at 09:29

## 2017-12-18 RX ADMIN — WATER SCH NOTE: 100 INJECTION, SOLUTION INTRAVENOUS at 18:41

## 2017-12-18 RX ADMIN — OXYCODONE HYDROCHLORIDE PRN MG: 5 CAPSULE ORAL at 07:51

## 2017-12-18 RX ADMIN — Medication SCH MG: at 09:28

## 2017-12-18 RX ADMIN — ASPIRIN SCH MG: 81 TABLET, COATED ORAL at 09:28

## 2017-12-18 RX ADMIN — FOLIC ACID SCH MG: 1 TABLET ORAL at 09:29

## 2017-12-18 RX ADMIN — Medication SCH ML: at 05:25

## 2017-12-18 RX ADMIN — WATER SCH NOTE: 100 INJECTION, SOLUTION INTRAVENOUS at 07:15

## 2017-12-18 RX ADMIN — CLOBETASOL PROPIONATE SCH: 0.5 OINTMENT TOPICAL at 09:18

## 2017-12-18 RX ADMIN — Medication SCH ML: at 17:54

## 2017-12-18 RX ADMIN — FUROSEMIDE SCH MG: 40 TABLET ORAL at 20:29

## 2017-12-18 RX ADMIN — NYSTATIN SCH APPLIC: 100000 CREAM TOPICAL at 09:33

## 2017-12-18 RX ADMIN — Medication SCH CAP: at 09:28

## 2017-12-18 RX ADMIN — Medication SCH UNITS: at 09:28

## 2017-12-18 RX ADMIN — PREGABALIN SCH MG: 50 CAPSULE ORAL at 20:29

## 2017-12-18 RX ADMIN — NYSTATIN SCH APPLIC: 100000 CREAM TOPICAL at 20:39

## 2017-12-18 RX ADMIN — OXYCODONE HYDROCHLORIDE PRN MG: 5 CAPSULE ORAL at 16:50

## 2017-12-18 RX ADMIN — FUROSEMIDE SCH MG: 40 TABLET ORAL at 09:28

## 2017-12-18 RX ADMIN — Medication SCH CAP: at 20:28

## 2017-12-18 RX ADMIN — OXYCODONE HYDROCHLORIDE AND ACETAMINOPHEN SCH MG: 500 TABLET ORAL at 09:28

## 2017-12-18 RX ADMIN — ENOXAPARIN SODIUM SCH MG: 40 INJECTION SUBCUTANEOUS at 16:51

## 2017-12-18 RX ADMIN — CALCIUM POLYCARBOPHIL SCH MG: 625 TABLET, FILM COATED ORAL at 20:28

## 2017-12-18 RX ADMIN — Medication SCH MG: at 09:29

## 2017-12-18 RX ADMIN — CYANOCOBALAMIN TAB 500 MCG SCH MCG: 500 TAB at 09:28

## 2017-12-18 RX ADMIN — FENTANYL TRANSDERMAL SCH MCG: 75 PATCH, EXTENDED RELEASE TRANSDERMAL at 16:51

## 2017-12-18 RX ADMIN — PREGABALIN SCH MG: 50 CAPSULE ORAL at 14:08

## 2017-12-18 NOTE — PN
Subjective


Date of Service: 12/18/17


Interval History: 





Mr. Pryor denies complaint today.  He does report continued leakage of stool 

throughout the day.  His wife asks that his roxycodone dose be adjusted so that 

it can be better used with transfers in and out of bed.  He denies chest pain, 

SOB, nausea, or abdominal pain.








Family History: Unchanged from Admission


Social History: Unchanged from Admission


Past Medical History: Unchanged from Admission





Objective


Active Medications: 





Acetaminophen (Tylenol Tab*)  650 mg PO Q6H PRN


Ascorbic Acid (Vitamin C  Tab*)  500 mg PO DAILY [facility]


Aspirin (Aspirin Ec Low Dose*)  81 mg PO DAILY [facility]


Calcium Polycarbophil (Fibercon Tab*)  625 mg PO BID [facility]


Cholecalciferol (Vitamin D Tab*)  5,000 units PO DAILY [facility]


Cyanocobalamin (Vitamin B12 Tab*)  1,000 mcg PO DAILY [facility]


Diphenoxylate HCl/Atropine (Lomotil Tab*)  1 tab PO QID PRN


Enoxaparin Sodium (Lovenox(*))  40 mg SUBCUT Q24H [facility]


Fentanyl (Duragesic  Patch 75 Mcg/Hr*)  75 mcg TRANSDERM Q72H [facility]


Ferrous Gluconate (Fergon Tab*)  324 mg PO DAILY [facility]


Folic Acid (Folvite Tab*)  0.5 mg PO DAILY [facility]


Furosemide (Lasix Tab*)  40 mg PO BID [facility]


Heparin Sodium (Porcine) (Heparin Flush Picc/Ml/Cvc(*))  0 ml IV FLUSH 0600,

1800 [facility]


Lactobacillus Rhamnosus (Culturelle*)  1 cap PO BID [facility]


Nystatin (Nystatin Cream*)  1 applic TOPICAL BID [facility]


Omeprazole (Prilosec Cap*)  40 mg PO DAILY@0730 [facility]


Oxycodone HCl (Roxycodone Tab*)  10 mg PO Q8H PRN


Pharmacy Profile Note (Fentanyl Patch Check Q Shift)  1 note N/A 0700,1900 [facility]


Pregabalin (Lyrica Cap(*))  150 mg PO TID [facility]


Zinc Sulfate (Zinc-220 Cap*)  220 mg PO DAILY [facility]





Vital Signs:











Temp Pulse Resp BP Pulse Ox


 


 97.5 F   68   18   163/67   99 


 


 12/18/17 08:09  12/18/17 08:09  12/18/17 10:25  12/18/17 08:09  12/18/17 08:09











Oxygen Devices in Use Now: None


Appearance: Patient lying in bed in NAD


Eyes: No Scleral Icterus


Ears/Nose/Mouth/Throat: Mucous Membranes Moist


Neck: Trachea Midline


Respiratory: Symmetrical Chest Expansion and Respiratory Effort, Clear to 

Auscultation


Cardiovascular: NL Sounds; No Murmurs; No JVD, No Edema


Abdominal: NL Sounds; No Tenderness; No Distention


Lymphatic: No Cervical Adenopathy


Extremities: No Edema


Skin: No Rash or Ulcers, - - Small tunneling wound to gluteal cleft, chronic 

and present since graft was placed


Neurological: Alert and Oriented x 3


Nutrition: Taking PO's


Result Diagrams: 


 12/18/17 05:37





 12/18/17 05:37





Assess/Plan/Problems-Billing


.


Assessment: 


Ms Pryor is 73 yo man with paraplegia s/p plastic surgery for Stage IV sacral 

wound and acute osteomyelitis as well as chronic pain.








- Patient Problems


(1) S/P flap graft


Comment: 


- 10/25/2017 at [facility] over large sacral defect


- Continue Clinitron bed, wound care, T&P.  


- Last teleconference with his plastic surgeon 12/5/17, follow up in 3 weeks


- Continue fentanyl patch, PRN oxycodone, APAP


- Small open area being treated with Medihoney and gauze   





(2) Acute osteomyelitis of sacrum


Comment: 


- s/p debridement at time of flap creation at [facility]


- Completed antibiotics 12/6   





(3) Paraplegia


Comment: 


- PT / OT as allowed by plastics


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  With 

concurrent chronic back pain and inability to ambulate


- Back anatomy puts patient at much higher risk for pressure ulcer formation, 

as does high opiate requirements and fragility with easily acquired infections (

pneumonia / urinary tract infections). UTI's in particular are a high risk 

given his neurogenic bladder and indwelling london catheter.   





(4) Chronic back pain


Comment: 


- Secondary to ependymoma.


- Continue pregabalin, acetaminophen, oxycodone liquid 5mg q4h, and Fentanyl 

patch at 75mcg.   





(5) Neurogenic bladder


Comment: 


- With chronic indwelling urinary catheter   





(6) Neurogenic bowel


Comment: 


- Loose bowel movements with repositioning. Patient reports continued leakage 

of soft stool thoughout the day.  Unable to feel or control BMs.


- Added fibercon BID.  Lomotil available prn.   





(7) Anemia


Comment: 


- Resolved.


- Suspect anemia of chronic disease and AMY.


- Continue iron supplementation, B12, folate.    





(8) CAD (coronary artery disease)


Comment: 


- Asymptomatic


- Continue ASA.   





(9) CKD (chronic kidney disease), stage III


Comment: 


- Creatinine baseline, no acute exacerbation


- Avoid nephrotoxic medications   





(10) HTN (hypertension)


Comment: 


- -160's.


- Continue lasix.   





(11) BONIFACIO (obstructive sleep apnea)


Comment: 


- Does not wear bipap, states he cannot get a good seal on mask.     





(12) DVT prophylaxis


Comment: 


- Lovenox, SCDs   





(13) Full code status


Comment: 


   


Status and Disposition: 





Swing.  PT/OT.

## 2017-12-19 RX ADMIN — CALCIUM POLYCARBOPHIL SCH MG: 625 TABLET, FILM COATED ORAL at 21:14

## 2017-12-19 RX ADMIN — NYSTATIN SCH: 100000 CREAM TOPICAL at 21:43

## 2017-12-19 RX ADMIN — WATER SCH NOTE: 100 INJECTION, SOLUTION INTRAVENOUS at 18:34

## 2017-12-19 RX ADMIN — PREGABALIN SCH MG: 50 CAPSULE ORAL at 21:14

## 2017-12-19 RX ADMIN — Medication SCH MG: at 10:09

## 2017-12-19 RX ADMIN — OXYCODONE HYDROCHLORIDE PRN MG: 5 CAPSULE ORAL at 21:25

## 2017-12-19 RX ADMIN — OXYCODONE HYDROCHLORIDE PRN MG: 5 CAPSULE ORAL at 17:22

## 2017-12-19 RX ADMIN — WATER SCH NOTE: 100 INJECTION, SOLUTION INTRAVENOUS at 07:13

## 2017-12-19 RX ADMIN — PREGABALIN SCH MG: 50 CAPSULE ORAL at 10:11

## 2017-12-19 RX ADMIN — OXYCODONE HYDROCHLORIDE PRN MG: 5 CAPSULE ORAL at 07:39

## 2017-12-19 RX ADMIN — OXYCODONE HYDROCHLORIDE AND ACETAMINOPHEN SCH MG: 500 TABLET ORAL at 10:11

## 2017-12-19 RX ADMIN — Medication SCH UNITS: at 10:10

## 2017-12-19 RX ADMIN — OXYCODONE HYDROCHLORIDE PRN MG: 5 CAPSULE ORAL at 11:59

## 2017-12-19 RX ADMIN — Medication SCH CAP: at 10:09

## 2017-12-19 RX ADMIN — CALCIUM POLYCARBOPHIL SCH MG: 625 TABLET, FILM COATED ORAL at 10:09

## 2017-12-19 RX ADMIN — FUROSEMIDE SCH MG: 40 TABLET ORAL at 21:14

## 2017-12-19 RX ADMIN — ENOXAPARIN SODIUM SCH MG: 40 INJECTION SUBCUTANEOUS at 17:23

## 2017-12-19 RX ADMIN — FOLIC ACID SCH MG: 1 TABLET ORAL at 10:10

## 2017-12-19 RX ADMIN — Medication SCH ML: at 17:24

## 2017-12-19 RX ADMIN — NYSTATIN SCH: 100000 CREAM TOPICAL at 10:13

## 2017-12-19 RX ADMIN — ASPIRIN SCH MG: 81 TABLET, COATED ORAL at 10:09

## 2017-12-19 RX ADMIN — FUROSEMIDE SCH MG: 40 TABLET ORAL at 10:11

## 2017-12-19 RX ADMIN — Medication SCH ML: at 05:54

## 2017-12-19 RX ADMIN — Medication SCH CAP: at 21:14

## 2017-12-19 RX ADMIN — PREGABALIN SCH MG: 50 CAPSULE ORAL at 14:46

## 2017-12-19 RX ADMIN — CYANOCOBALAMIN TAB 500 MCG SCH MCG: 500 TAB at 10:10

## 2017-12-19 RX ADMIN — OMEPRAZOLE SCH MG: 20 CAPSULE, DELAYED RELEASE ORAL at 07:38

## 2017-12-20 RX ADMIN — OXYCODONE HYDROCHLORIDE AND ACETAMINOPHEN SCH MG: 500 TABLET ORAL at 08:17

## 2017-12-20 RX ADMIN — OXYCODONE HYDROCHLORIDE PRN MG: 5 CAPSULE ORAL at 16:38

## 2017-12-20 RX ADMIN — Medication SCH MG: at 08:17

## 2017-12-20 RX ADMIN — ENOXAPARIN SODIUM SCH MG: 40 INJECTION SUBCUTANEOUS at 16:38

## 2017-12-20 RX ADMIN — Medication SCH ML: at 16:39

## 2017-12-20 RX ADMIN — FUROSEMIDE SCH MG: 40 TABLET ORAL at 20:42

## 2017-12-20 RX ADMIN — PREGABALIN SCH MG: 50 CAPSULE ORAL at 14:30

## 2017-12-20 RX ADMIN — ASPIRIN SCH MG: 81 TABLET, COATED ORAL at 08:17

## 2017-12-20 RX ADMIN — CALCIUM POLYCARBOPHIL SCH MG: 625 TABLET, FILM COATED ORAL at 20:42

## 2017-12-20 RX ADMIN — Medication SCH UNITS: at 08:17

## 2017-12-20 RX ADMIN — OMEPRAZOLE SCH MG: 20 CAPSULE, DELAYED RELEASE ORAL at 08:17

## 2017-12-20 RX ADMIN — NYSTATIN SCH: 100000 CREAM TOPICAL at 20:43

## 2017-12-20 RX ADMIN — PREGABALIN SCH MG: 50 CAPSULE ORAL at 20:42

## 2017-12-20 RX ADMIN — Medication SCH CAP: at 20:42

## 2017-12-20 RX ADMIN — WATER SCH NOTE: 100 INJECTION, SOLUTION INTRAVENOUS at 06:59

## 2017-12-20 RX ADMIN — OXYCODONE HYDROCHLORIDE PRN MG: 5 CAPSULE ORAL at 08:18

## 2017-12-20 RX ADMIN — PREGABALIN SCH MG: 50 CAPSULE ORAL at 08:17

## 2017-12-20 RX ADMIN — NYSTATIN SCH: 100000 CREAM TOPICAL at 07:53

## 2017-12-20 RX ADMIN — CYANOCOBALAMIN TAB 500 MCG SCH MCG: 500 TAB at 08:18

## 2017-12-20 RX ADMIN — FOLIC ACID SCH MG: 1 TABLET ORAL at 08:37

## 2017-12-20 RX ADMIN — CALCIUM POLYCARBOPHIL SCH MG: 625 TABLET, FILM COATED ORAL at 08:17

## 2017-12-20 RX ADMIN — Medication SCH CAP: at 08:17

## 2017-12-20 RX ADMIN — Medication SCH ML: at 05:56

## 2017-12-20 RX ADMIN — FUROSEMIDE SCH MG: 40 TABLET ORAL at 08:45

## 2017-12-20 RX ADMIN — WATER SCH NOTE: 100 INJECTION, SOLUTION INTRAVENOUS at 19:00

## 2017-12-21 RX ADMIN — NYSTATIN SCH: 100000 CREAM TOPICAL at 21:07

## 2017-12-21 RX ADMIN — ASPIRIN SCH MG: 81 TABLET, COATED ORAL at 08:04

## 2017-12-21 RX ADMIN — Medication SCH UNITS: at 08:03

## 2017-12-21 RX ADMIN — FOLIC ACID SCH MG: 1 TABLET ORAL at 08:05

## 2017-12-21 RX ADMIN — PREGABALIN SCH MG: 50 CAPSULE ORAL at 08:04

## 2017-12-21 RX ADMIN — Medication SCH CAP: at 08:03

## 2017-12-21 RX ADMIN — OXYCODONE HYDROCHLORIDE PRN MG: 5 CAPSULE ORAL at 17:19

## 2017-12-21 RX ADMIN — ONDANSETRON PRN MG: 2 INJECTION INTRAMUSCULAR; INTRAVENOUS at 20:41

## 2017-12-21 RX ADMIN — Medication SCH MG: at 08:04

## 2017-12-21 RX ADMIN — CALCIUM POLYCARBOPHIL SCH MG: 625 TABLET, FILM COATED ORAL at 21:06

## 2017-12-21 RX ADMIN — DIPHENOXYLATE HYDROCHLORIDE AND ATROPINE SULFATE PRN TAB: 2.5; .025 TABLET ORAL at 21:06

## 2017-12-21 RX ADMIN — FUROSEMIDE SCH MG: 40 TABLET ORAL at 08:03

## 2017-12-21 RX ADMIN — NYSTATIN SCH: 100000 CREAM TOPICAL at 10:52

## 2017-12-21 RX ADMIN — ONDANSETRON PRN MG: 2 INJECTION INTRAMUSCULAR; INTRAVENOUS at 13:45

## 2017-12-21 RX ADMIN — OXYCODONE HYDROCHLORIDE PRN MG: 5 CAPSULE ORAL at 06:45

## 2017-12-21 RX ADMIN — OXYCODONE HYDROCHLORIDE PRN MG: 5 CAPSULE ORAL at 12:08

## 2017-12-21 RX ADMIN — OXYCODONE HYDROCHLORIDE AND ACETAMINOPHEN SCH MG: 500 TABLET ORAL at 08:03

## 2017-12-21 RX ADMIN — PREGABALIN SCH MG: 50 CAPSULE ORAL at 21:06

## 2017-12-21 RX ADMIN — WATER SCH NOTE: 100 INJECTION, SOLUTION INTRAVENOUS at 18:37

## 2017-12-21 RX ADMIN — OMEPRAZOLE SCH MG: 20 CAPSULE, DELAYED RELEASE ORAL at 08:03

## 2017-12-21 RX ADMIN — Medication SCH ML: at 17:20

## 2017-12-21 RX ADMIN — PREGABALIN SCH MG: 50 CAPSULE ORAL at 13:45

## 2017-12-21 RX ADMIN — WATER SCH NOTE: 100 INJECTION, SOLUTION INTRAVENOUS at 06:46

## 2017-12-21 RX ADMIN — ENOXAPARIN SODIUM SCH MG: 40 INJECTION SUBCUTANEOUS at 17:20

## 2017-12-21 RX ADMIN — Medication SCH CAP: at 21:05

## 2017-12-21 RX ADMIN — CYANOCOBALAMIN TAB 500 MCG SCH MCG: 500 TAB at 08:05

## 2017-12-21 RX ADMIN — CALCIUM POLYCARBOPHIL SCH MG: 625 TABLET, FILM COATED ORAL at 08:03

## 2017-12-21 RX ADMIN — Medication SCH ML: at 06:04

## 2017-12-21 RX ADMIN — Medication SCH MG: at 08:03

## 2017-12-21 RX ADMIN — FUROSEMIDE SCH MG: 40 TABLET ORAL at 21:05

## 2017-12-21 NOTE — PN
Subjective


Date of Service: 12/21/17


Interval History: 





Patient reports feeling nauseated today.  Symptoms started before breakfast, he 

ate very little.  No vomiting or abd pain.  He has chronic diarrhea.  No cough 

or SOB.


Family History: Unchanged from Admission


Social History: Unchanged from Admission


Past Medical History: Unchanged from Admission





Objective


Active Medications: 








Acetaminophen (Tylenol Tab*)  650 mg PO Q6H PRN


   PRN Reason: FEVER/PAIN


   Last Admin: 12/11/17 20:20 Dose:  650 mg


Ascorbic Acid (Vitamin C  Tab*)  500 mg PO DAILY [facility]


   Last Admin: 12/21/17 08:03 Dose:  500 mg


Aspirin (Aspirin Ec Low Dose*)  81 mg PO DAILY [facility]


   Last Admin: 12/21/17 08:04 Dose:  81 mg


Calcium Polycarbophil (Fibercon Tab*)  625 mg PO BID [facility]


   Last Admin: 12/21/17 08:03 Dose:  625 mg


Cholecalciferol (Vitamin D Tab*)  5,000 units PO DAILY [facility]


   Last Admin: 12/21/17 08:03 Dose:  5,000 units


Cyanocobalamin (Vitamin B12 Tab*)  1,000 mcg PO DAILY [facility]


   Last Admin: 12/21/17 08:05 Dose:  1,000 mcg


Diphenoxylate HCl/Atropine (Lomotil Tab*)  1 tab PO QID PRN


   PRN Reason: DIARRHEA


   Last Admin: 12/12/17 13:52 Dose:  1 tab


Enoxaparin Sodium (Lovenox(*))  40 mg SUBCUT Q24H [facility]


   Last Admin: 12/20/17 16:38 Dose:  40 mg


Ferrous Gluconate (Fergon Tab*)  324 mg PO DAILY [facility]


   Last Admin: 12/21/17 08:04 Dose:  324 mg


Folic Acid (Folvite Tab*)  0.5 mg PO DAILY [facility]


   Last Admin: 12/21/17 08:05 Dose:  0.5 mg


Furosemide (Lasix Tab*)  40 mg PO BID [facility]


   Last Admin: 12/21/17 08:03 Dose:  40 mg


Heparin Sodium (Porcine) (Heparin Flush Picc/Ml/Cvc(*))  0 ml IV FLUSH 0600,

1800 [facility]


   PRN Reason: Protocol


   Last Admin: 12/21/17 06:04 Dose:  1 ml


Lactobacillus Rhamnosus (Culturelle*)  1 cap PO BID [facility]


   Last Admin: 12/21/17 08:03 Dose:  1 cap


Nystatin (Nystatin Cream*)  1 applic TOPICAL BID [facility]


   Last Admin: 12/21/17 10:52 Dose:  Not Given


Omeprazole (Prilosec Cap*)  40 mg PO DAILY@0730 [facility]


   Last Admin: 12/21/17 08:03 Dose:  40 mg


Ondansetron HCl (Zofran Inj*)  4 mg IV Q4H PRN


   PRN Reason: NAUSEA


   Last Admin: 12/21/17 13:45 Dose:  4 mg


Oxycodone HCl (Roxycodone Tab*)  5 mg PO Q4H PRN


   PRN Reason: PAIN


   Last Admin: 12/21/17 12:08 Dose:  5 mg


Pharmacy Profile Note (Fentanyl Patch Check Q Shift)  1 note N/A 0700,1900 [facility]


   Last Admin: 12/21/17 06:46 Dose:  1 note


Pregabalin (Lyrica Cap(*))  150 mg PO TID [facility]


   Last Admin: 12/21/17 13:45 Dose:  150 mg


Zinc Sulfate (Zinc-220 Cap*)  220 mg PO DAILY [facility]


   Last Admin: 12/21/17 08:03 Dose:  220 mg








Vital Signs:











Temp Pulse Resp BP Pulse Ox


 


 97.8 F   77   16   175/80   96 


 


 12/21/17 08:07  12/21/17 08:07  12/21/17 14:30  12/21/17 08:07  12/21/17 08:07











Oxygen Devices in Use Now: None


Appearance: Chronically ill and slightly fatigued appearing 71 yo male in NAD


Respiratory: Symmetrical Chest Expansion and Respiratory Effort, Clear to 

Auscultation


Cardiovascular: NL Sounds; No Murmurs; No JVD, RRR


Abdominal: NL Sounds; No Tenderness; No Distention


Extremities: No Edema


Neurological: Alert and Oriented x 3


Result Diagrams: 


 12/18/17 05:37





 12/18/17 05:37





Assess/Plan/Problems-Billing


.


Assessment: 


Ms Pryor is 71 yo man with paraplegia s/p plastic surgery for Stage IV sacral 

wound and acute osteomyelitis as well as chronic pain.








- Patient Problems


(1) Nausea


Comment: 


Unsure of etiology, normal exam


Plan to repeat labs tomorrow


Treat with supportive measures at this time   





(2) S/P flap graft


Comment: 


- 10/25/2017 at [facility] over large sacral defect


- Continue Clinitron bed, wound care, T&P.  


- Last teleconference with his plastic surgeon 12/5/17, follow up tomorrow (12/ 22)


- Continue fentanyl patch, PRN oxycodone, APAP


- Small open area being treated with Medihoney and gauze which shows signs of 

healing   





(3) Acute osteomyelitis of sacrum


Comment: 


- s/p debridement at time of flap creation at [facility]


- Completed antibiotics 12/6   





(4) Paraplegia


Comment: 


- PT / OT as allowed by plastics


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  With 

concurrent chronic back pain and inability to ambulate


- Back anatomy puts patient at much higher risk for pressure ulcer formation, 

as does high opiate requirements and fragility with easily acquired infections (

pneumonia / urinary tract infections). UTI's in particular are a high risk 

given his neurogenic bladder and indwelling london catheter.   





(5) Chronic indwelling London catheter


Comment: 


Secondary to neurogenic bladder   





(6) DVT prophylaxis


Comment: 


- Lovenox, SCDs   





(7) Full code status


Comment: 


   


Status and Disposition: 





Swing.  PT/OT.

## 2017-12-22 LAB
BASOPHILS # BLD AUTO: 0.1 10^3/UL (ref 0–0.2)
EOSINOPHIL # BLD AUTO: 0 10^3/UL (ref 0–0.6)
HCT VFR BLD AUTO: 41 % (ref 42–52)
HGB BLD-MCNC: 13.9 G/DL (ref 14–18)
LYMPHOCYTES # BLD AUTO: 1.6 10^3/UL (ref 1–4.8)
MCH RBC QN AUTO: 28 PG (ref 27–31)
MCHC RBC AUTO-ENTMCNC: 34 G/DL (ref 31–36)
MCV RBC AUTO: 82 FL (ref 80–94)
MONOCYTES # BLD AUTO: 1 10^3/UL (ref 0–0.8)
NEUTROPHILS # BLD AUTO: 3.8 10^3/UL (ref 1.5–7.7)
NRBC # BLD AUTO: 0.01 10^3/UL
NRBC BLD QL AUTO: 0.2
PLATELET # BLD AUTO: 197 10^3/UL (ref 150–450)
RBC # BLD AUTO: 4.96 10^6/UL (ref 4–5.4)
WBC # BLD AUTO: 6.5 10^3/UL (ref 3.5–10.8)

## 2017-12-22 RX ADMIN — ENOXAPARIN SODIUM SCH MG: 40 INJECTION SUBCUTANEOUS at 16:41

## 2017-12-22 RX ADMIN — FUROSEMIDE SCH MG: 40 TABLET ORAL at 09:18

## 2017-12-22 RX ADMIN — DIPHENOXYLATE HYDROCHLORIDE AND ATROPINE SULFATE PRN TAB: 2.5; .025 TABLET ORAL at 09:25

## 2017-12-22 RX ADMIN — Medication SCH CAP: at 09:17

## 2017-12-22 RX ADMIN — DIPHENOXYLATE HYDROCHLORIDE AND ATROPINE SULFATE SCH TAB: 2.5; .025 TABLET ORAL at 21:08

## 2017-12-22 RX ADMIN — ASPIRIN SCH MG: 81 TABLET, COATED ORAL at 09:18

## 2017-12-22 RX ADMIN — WATER SCH NOTE: 100 INJECTION, SOLUTION INTRAVENOUS at 06:34

## 2017-12-22 RX ADMIN — Medication SCH UNITS: at 09:19

## 2017-12-22 RX ADMIN — Medication SCH MG: at 09:15

## 2017-12-22 RX ADMIN — CALCIUM POLYCARBOPHIL SCH MG: 625 TABLET, FILM COATED ORAL at 21:10

## 2017-12-22 RX ADMIN — WATER SCH NOTE: 100 INJECTION, SOLUTION INTRAVENOUS at 19:20

## 2017-12-22 RX ADMIN — Medication SCH ML: at 19:19

## 2017-12-22 RX ADMIN — POTASSIUM CHLORIDE SCH MEQ: 1500 TABLET, EXTENDED RELEASE ORAL at 14:26

## 2017-12-22 RX ADMIN — OXYCODONE HYDROCHLORIDE PRN MG: 5 CAPSULE ORAL at 07:14

## 2017-12-22 RX ADMIN — Medication SCH MG: at 09:16

## 2017-12-22 RX ADMIN — POTASSIUM CHLORIDE SCH MEQ: 1500 TABLET, EXTENDED RELEASE ORAL at 09:18

## 2017-12-22 RX ADMIN — CYANOCOBALAMIN TAB 500 MCG SCH MCG: 500 TAB at 09:18

## 2017-12-22 RX ADMIN — CALCIUM POLYCARBOPHIL SCH MG: 625 TABLET, FILM COATED ORAL at 09:16

## 2017-12-22 RX ADMIN — PREGABALIN SCH MG: 50 CAPSULE ORAL at 09:20

## 2017-12-22 RX ADMIN — OXYCODONE HYDROCHLORIDE PRN MG: 5 CAPSULE ORAL at 11:49

## 2017-12-22 RX ADMIN — PREGABALIN SCH MG: 50 CAPSULE ORAL at 21:10

## 2017-12-22 RX ADMIN — OMEPRAZOLE SCH MG: 20 CAPSULE, DELAYED RELEASE ORAL at 07:14

## 2017-12-22 RX ADMIN — FUROSEMIDE SCH MG: 40 TABLET ORAL at 21:10

## 2017-12-22 RX ADMIN — NYSTATIN SCH APPLIC: 100000 CREAM TOPICAL at 21:13

## 2017-12-22 RX ADMIN — FOLIC ACID SCH MG: 1 TABLET ORAL at 09:16

## 2017-12-22 RX ADMIN — POTASSIUM CHLORIDE SCH MEQ: 1500 TABLET, EXTENDED RELEASE ORAL at 21:09

## 2017-12-22 RX ADMIN — Medication SCH ML: at 05:55

## 2017-12-22 RX ADMIN — PREGABALIN SCH MG: 50 CAPSULE ORAL at 14:26

## 2017-12-22 RX ADMIN — OXYCODONE HYDROCHLORIDE AND ACETAMINOPHEN SCH MG: 500 TABLET ORAL at 09:17

## 2017-12-22 RX ADMIN — NYSTATIN SCH APPLIC: 100000 CREAM TOPICAL at 11:53

## 2017-12-22 RX ADMIN — OXYCODONE HYDROCHLORIDE PRN MG: 5 CAPSULE ORAL at 16:43

## 2017-12-22 RX ADMIN — Medication SCH CAP: at 21:09

## 2017-12-22 NOTE — PN
Subjective


Date of Service: 12/22/17


Interval History: 





Patient reports improvement in nausea today.  Had a follow up telemedicine 

conference with Dr Chirinos today and discussed the following points:


1. He can be up sitting for max 2h three times daily


2. It is appropriate to use a slide board to get in and out of the shower (

maybe use a towel to reduce friction)


3. Consider colostomy in the future to prevent breakdown


4. Plan for dc by the end of the year


5. Ok to use regular hospital bed, no further need for Clinitron





Family History: Unchanged from Admission


Social History: Unchanged from Admission


Past Medical History: Unchanged from Admission





Objective


Active Medications: 








Acetaminophen (Tylenol Tab*)  650 mg PO Q6H PRN


   PRN Reason: FEVER/PAIN


   Last Admin: 12/11/17 20:20 Dose:  650 mg


Ascorbic Acid (Vitamin C  Tab*)  500 mg PO DAILY [facility]


   Last Admin: 12/22/17 09:17 Dose:  500 mg


Aspirin (Aspirin Ec Low Dose*)  81 mg PO DAILY [facility]


   Last Admin: 12/22/17 09:18 Dose:  81 mg


Calcium Polycarbophil (Fibercon Tab*)  625 mg PO BID [facility]


   Last Admin: 12/22/17 09:16 Dose:  625 mg


Cholecalciferol (Vitamin D Tab*)  5,000 units PO DAILY [facility]


   Last Admin: 12/22/17 09:19 Dose:  5,000 units


Cyanocobalamin (Vitamin B12 Tab*)  1,000 mcg PO DAILY [facility]


   Last Admin: 12/22/17 09:18 Dose:  1,000 mcg


Diphenoxylate HCl/Atropine (Lomotil Tab*)  1 tab PO QID PRN


   PRN Reason: DIARRHEA


   Last Admin: 12/22/17 09:25 Dose:  1 tab


Enoxaparin Sodium (Lovenox(*))  40 mg SUBCUT Q24H [facility]


   Last Admin: 12/21/17 17:20 Dose:  40 mg


Fentanyl (Duragesic  Patch 75 Mcg/Hr*)  75 mcg TRANSDERM Q72H [facility]


   Last Admin: 12/22/17 02:24 Dose:  75 mcg


Ferrous Gluconate (Fergon Tab*)  324 mg PO DAILY [facility]


   Last Admin: 12/22/17 09:16 Dose:  324 mg


Folic Acid (Folvite Tab*)  0.5 mg PO DAILY [facility]


   Last Admin: 12/22/17 09:16 Dose:  0.5 mg


Furosemide (Lasix Tab*)  40 mg PO BID [facility]


   Last Admin: 12/22/17 09:18 Dose:  40 mg


Heparin Sodium (Porcine) (Heparin Flush Picc/Ml/Cvc(*))  0 ml IV FLUSH 0600,

1800 [facility]


   PRN Reason: Protocol


   Last Admin: 12/22/17 05:55 Dose:  1 ml


Lactobacillus Rhamnosus (Culturelle*)  1 cap PO BID [facility]


   Last Admin: 12/22/17 09:17 Dose:  1 cap


Nystatin (Nystatin Cream*)  1 applic TOPICAL BID [facility]


   Last Admin: 12/22/17 11:53 Dose:  1 applic


Omeprazole (Prilosec Cap*)  40 mg PO DAILY@0730 [facility]


   Last Admin: 12/22/17 07:14 Dose:  40 mg


Ondansetron HCl (Zofran Inj*)  4 mg IV Q4H PRN


   PRN Reason: NAUSEA


   Last Admin: 12/21/17 20:41 Dose:  4 mg


Oxycodone HCl (Roxycodone Tab*)  5 mg PO Q4H PRN


   PRN Reason: PAIN


   Last Admin: 12/22/17 11:49 Dose:  5 mg


Pharmacy Profile Note (Fentanyl Patch Check Q Shift)  1 note N/A 0700,1900 [facility]


   Last Admin: 12/22/17 06:34 Dose:  1 note


Potassium Chloride (Klor Con Er Tab*)  20 meq PO TID [facility]


   Stop: 12/22/17 21:01


   Last Admin: 12/22/17 14:26 Dose:  20 meq


Pregabalin (Lyrica Cap(*))  150 mg PO TID [facility]


   Last Admin: 12/22/17 14:26 Dose:  150 mg


Zinc Sulfate (Zinc-220 Cap*)  220 mg PO DAILY [facility]


   Last Admin: 12/22/17 09:15 Dose:  220 mg








Vital Signs:











Temp Pulse Resp BP Pulse Ox


 


 97.4 F   103   18   154/78   92 


 


 12/21/17 20:36  12/21/17 20:36  12/22/17 14:26  12/21/17 20:36  12/21/17 20:36











Oxygen Devices in Use Now: None


Appearance: Chronically ill appearing male in NAD.  Accompanied by his wife.


Respiratory: Symmetrical Chest Expansion and Respiratory Effort, Clear to 

Auscultation


Cardiovascular: NL Sounds; No Murmurs; No JVD, RRR


Abdominal: NL Sounds; No Tenderness; No Distention


Extremities: No Edema


Skin: - - healing gluteal flap, very small open area at the sacrum without 

drainage or surrounding erythema


Neurological: Alert and Oriented x 3


Result Diagrams: 


 12/22/17 06:08





 12/22/17 06:08





Assess/Plan/Problems-Billing


.


Assessment: 


Ms Pryor is 73 yo man with paraplegia s/p plastic surgery for Stage IV sacral 

wound and acute osteomyelitis as well as chronic pain.








- Patient Problems


(1) S/P flap graft


Comment: 


- 10/25/2017 at [facility] over large sacral defect


- Follow up completed today with Dr Chirinos (plastic surgeon)


- Continue fentanyl patch, PRN oxycodone, APAP


- Small open area being treated with Medihoney and gauze which is nearly healed


- up to chair max 2hrs, three times daily


- plan for diverting colostomy perhaps first week of Jan, will discuss with 

general surgery


- May d/c Clinitron bed and go to usual hospital bed 


   





(2) Acute osteomyelitis of sacrum


Comment: 


- s/p debridement at time of flap creation at [facility]


- Completed antibiotics 12/6   





(3) Paraplegia


Comment: 


- PT / OT, cont to work on increasing strength with transfers


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  With 

concurrent chronic back pain and inability to ambulate   





(4) Chronic diarrhea


Comment: 


Likely functional


Little to no improvement with fiber and prn Lomotil


Start scheduled twice daily Lomotil


Pending consult with Gen surg to discuss diverting colostomy   





(5) Chronic indwelling Rodriguez catheter


Comment: 


Secondary to neurogenic bladder   





(6) DVT prophylaxis


Comment: 


- Lovenox, SCDs   





(7) Full code status


Comment: 


   


Status and Disposition: 





Swing.  PT/OT. Plan for dc Dec 29 with pending consult with general surgery to 

discuss diverting colostomy

## 2017-12-23 RX ADMIN — OXYCODONE HYDROCHLORIDE AND ACETAMINOPHEN SCH MG: 500 TABLET ORAL at 10:26

## 2017-12-23 RX ADMIN — Medication SCH UNITS: at 10:26

## 2017-12-23 RX ADMIN — ENOXAPARIN SODIUM SCH MG: 40 INJECTION SUBCUTANEOUS at 16:45

## 2017-12-23 RX ADMIN — OMEPRAZOLE SCH MG: 20 CAPSULE, DELAYED RELEASE ORAL at 08:04

## 2017-12-23 RX ADMIN — ASPIRIN SCH MG: 81 TABLET, COATED ORAL at 10:25

## 2017-12-23 RX ADMIN — PREGABALIN SCH MG: 50 CAPSULE ORAL at 21:28

## 2017-12-23 RX ADMIN — DIPHENOXYLATE HYDROCHLORIDE AND ATROPINE SULFATE SCH TAB: 2.5; .025 TABLET ORAL at 21:27

## 2017-12-23 RX ADMIN — FUROSEMIDE SCH MG: 40 TABLET ORAL at 21:28

## 2017-12-23 RX ADMIN — Medication SCH CAP: at 21:28

## 2017-12-23 RX ADMIN — Medication SCH ML: at 16:46

## 2017-12-23 RX ADMIN — CALCIUM POLYCARBOPHIL SCH MG: 625 TABLET, FILM COATED ORAL at 21:27

## 2017-12-23 RX ADMIN — Medication SCH CAP: at 10:25

## 2017-12-23 RX ADMIN — WATER SCH NOTE: 100 INJECTION, SOLUTION INTRAVENOUS at 06:50

## 2017-12-23 RX ADMIN — PREGABALIN SCH MG: 50 CAPSULE ORAL at 10:24

## 2017-12-23 RX ADMIN — FOLIC ACID SCH MG: 1 TABLET ORAL at 10:25

## 2017-12-23 RX ADMIN — OXYCODONE HYDROCHLORIDE PRN MG: 5 CAPSULE ORAL at 11:46

## 2017-12-23 RX ADMIN — DIPHENOXYLATE HYDROCHLORIDE AND ATROPINE SULFATE SCH TAB: 2.5; .025 TABLET ORAL at 10:24

## 2017-12-23 RX ADMIN — OXYCODONE HYDROCHLORIDE PRN MG: 5 CAPSULE ORAL at 21:29

## 2017-12-23 RX ADMIN — Medication SCH MG: at 10:25

## 2017-12-23 RX ADMIN — PREGABALIN SCH MG: 50 CAPSULE ORAL at 14:38

## 2017-12-23 RX ADMIN — OXYCODONE HYDROCHLORIDE PRN MG: 5 CAPSULE ORAL at 08:04

## 2017-12-23 RX ADMIN — Medication SCH MG: at 10:24

## 2017-12-23 RX ADMIN — WATER SCH NOTE: 100 INJECTION, SOLUTION INTRAVENOUS at 19:06

## 2017-12-23 RX ADMIN — CYANOCOBALAMIN TAB 500 MCG SCH MCG: 500 TAB at 10:25

## 2017-12-23 RX ADMIN — OXYCODONE HYDROCHLORIDE PRN MG: 5 CAPSULE ORAL at 16:44

## 2017-12-23 RX ADMIN — FUROSEMIDE SCH MG: 40 TABLET ORAL at 10:25

## 2017-12-23 RX ADMIN — NYSTATIN SCH APPLIC: 100000 CREAM TOPICAL at 21:29

## 2017-12-23 RX ADMIN — NYSTATIN SCH APPLIC: 100000 CREAM TOPICAL at 10:27

## 2017-12-23 RX ADMIN — Medication SCH ML: at 05:45

## 2017-12-23 RX ADMIN — CALCIUM POLYCARBOPHIL SCH MG: 625 TABLET, FILM COATED ORAL at 10:24

## 2017-12-24 RX ADMIN — FOLIC ACID SCH MG: 1 TABLET ORAL at 09:44

## 2017-12-24 RX ADMIN — OMEPRAZOLE SCH MG: 20 CAPSULE, DELAYED RELEASE ORAL at 07:52

## 2017-12-24 RX ADMIN — CALCIUM POLYCARBOPHIL SCH MG: 625 TABLET, FILM COATED ORAL at 22:18

## 2017-12-24 RX ADMIN — WATER SCH NOTE: 100 INJECTION, SOLUTION INTRAVENOUS at 07:45

## 2017-12-24 RX ADMIN — Medication SCH MG: at 09:43

## 2017-12-24 RX ADMIN — DIPHENOXYLATE HYDROCHLORIDE AND ATROPINE SULFATE SCH TAB: 2.5; .025 TABLET ORAL at 09:44

## 2017-12-24 RX ADMIN — CYANOCOBALAMIN TAB 500 MCG SCH MCG: 500 TAB at 09:44

## 2017-12-24 RX ADMIN — NYSTATIN SCH APPLIC: 100000 CREAM TOPICAL at 09:49

## 2017-12-24 RX ADMIN — NYSTATIN SCH APPLIC: 100000 CREAM TOPICAL at 22:19

## 2017-12-24 RX ADMIN — DIPHENOXYLATE HYDROCHLORIDE AND ATROPINE SULFATE SCH TAB: 2.5; .025 TABLET ORAL at 22:19

## 2017-12-24 RX ADMIN — Medication SCH ML: at 05:39

## 2017-12-24 RX ADMIN — Medication SCH MG: at 09:44

## 2017-12-24 RX ADMIN — ENOXAPARIN SODIUM SCH MG: 40 INJECTION SUBCUTANEOUS at 17:22

## 2017-12-24 RX ADMIN — ASPIRIN SCH MG: 81 TABLET, COATED ORAL at 09:44

## 2017-12-24 RX ADMIN — FUROSEMIDE SCH MG: 40 TABLET ORAL at 09:45

## 2017-12-24 RX ADMIN — FUROSEMIDE SCH MG: 40 TABLET ORAL at 22:19

## 2017-12-24 RX ADMIN — OXYCODONE HYDROCHLORIDE AND ACETAMINOPHEN SCH MG: 500 TABLET ORAL at 09:45

## 2017-12-24 RX ADMIN — CALCIUM POLYCARBOPHIL SCH MG: 625 TABLET, FILM COATED ORAL at 09:45

## 2017-12-24 RX ADMIN — PREGABALIN SCH MG: 50 CAPSULE ORAL at 14:42

## 2017-12-24 RX ADMIN — Medication SCH ML: at 17:22

## 2017-12-24 RX ADMIN — PREGABALIN SCH MG: 50 CAPSULE ORAL at 22:18

## 2017-12-24 RX ADMIN — Medication SCH UNITS: at 09:45

## 2017-12-24 RX ADMIN — OXYCODONE HYDROCHLORIDE PRN MG: 5 CAPSULE ORAL at 17:21

## 2017-12-24 RX ADMIN — Medication SCH CAP: at 22:19

## 2017-12-24 RX ADMIN — Medication SCH CAP: at 09:44

## 2017-12-24 RX ADMIN — OXYCODONE HYDROCHLORIDE PRN MG: 5 CAPSULE ORAL at 07:53

## 2017-12-24 RX ADMIN — PREGABALIN SCH MG: 50 CAPSULE ORAL at 07:53

## 2017-12-24 RX ADMIN — WATER SCH NOTE: 100 INJECTION, SOLUTION INTRAVENOUS at 18:54

## 2017-12-24 NOTE — CONS
CC:  Rafy Vicente MD; Bj Faulkner MD; Luis Felipe Chirinos MD in [facility]; [facility]

 

CONSULTATION REPORT:

 

DATE OF CONSULT:  12/24/17

 

REASON FOR CONSULT:  Colostomy surgery.

 

HISTORY OF PRESENT ILLNESS:  Raad Pryor is a 72-year-old gentleman with a 
history of ependymoma of the lower spine status post multiple back surgeries in 
[place] for debulking.  He has chronic lower back pain, paresthesias, and 
neuropathy in the lower extremities.  He is wheelchair bound.  He developed a 
sacral decubitus and then sacral osteomyelitis and sepsis over the past year.  
He has an ongoing problem with persistent diarrhea.  He had been admitted 
multiple times in the past year and in October he was transferred to [facility] for muscular flap coverage of the decubitus ulcer by Dr. Luis Felipe Chirinos, plastic surgeon.  The patient apparently did well after that 
procedure and was transferred back to [facility] for long term IV 
antibiotics and ongoing recovery.  Due to problems with fecal soilage, a 
surgical creation of colostomy has been requested.

 

PAST MEDICAL HISTORY:  Significant for sacral osteomyelitis, neurogenic bowel 
and bladder, obstructive sleep apnea, chronic renal failure, asthma, carotid 
artery stenosis, ependymoma, adenomatous polyps of colon, hypertension, lower 
back pain, hyperlipidemia, BPH.

 

PAST SURGICAL HISTORY:  Debridement of sacral wound and right and left gluteus 
vladimir flap reconstruction on 10/25/17 at [facility]; 4 
debulking surgeries for ependymoma between 1971 and 1991; open appendectomy for 
perforated appendicitis and subsequent reoperation for bladder injury in 1980s; 
right carotid endarterectomy; tonsillectomy; colonoscopy in 1998, 2005, 2009, 
and 2014; upper endoscopy in 2015.

 

MEDICATIONS:  Currently:

 

1.  Tylenol.

2.  Vitamin C.

3.  Aspirin.

4.  FiberCon.

5.  Vitamin D.

6.  Vitamin B12.

7.  Lomotil.

8.  Lovenox.

9.  Duragesic patch.

10.  Fergon.

11.  Folvite.

12.  Lasix.

13.  Culturelle.

14.  Nystatin cream.

15.  Prilosec.

16.  Zofran.

17.  Oxycodone.

18.  Lyrica.

19.  Zinc.

 

ALLERGIES:  ATORVASTATIN.

 

SOCIAL HISTORY:  He is .  He is a retired .  He denies 
tobacco or alcohol use.

 

FAMILY HISTORY:  Father had Parkinson's.  Mother is alive, age 99.

 

REVIEW OF SYSTEMS:  As reported above.  GI:  He had the colonoscopy in 2014 and 
was to have a followup colonoscopy 6 months ago, which has been deferred due to 
his other medical issues.

 

PHYSICAL EXAMINATION:  A 72-year-old gentleman lying in hospital bed, no acute 
distress.  Vital Signs:  His temperature is 97.4, pulse 70, respirations 16, 
blood pressure 149/67, O2 saturation 98% on room air.  Abdomen:  Obese with 
right lower quadrant scar.  Bowel sounds present, soft, nontender.  No palpable 
masses or hernias.

 

LABORATORY DATA:  On 12/22/17, WBCs are 6.5, hemoglobin 13.9, platelets are 
197. Chemistries:  Sodium 139, potassium 3.1, chloride 102, bicarb 28, BUN 41, 
creatinine 1.08, glucose 119, albumin 3.6.

 

IMPRESSION:  Raad Pryor is a 72-year-old gentleman with a complex past 
medical and past surgical history who is recovering from muscular flap coverage 
of stage IV sacral decubitus ulcer complicated by osteomyelitis.  Fecal soilage 
has been an ongoing problem for the patient and it does seem reasonable to 
consider creation of a colostomy to aide in his healing.

 

PLAN/RECOMMENDATION:  I discussed the plan for permanent colostomy with the 
patient and his wife who accompanied him.  I explained the nature of the 
procedure, indications, risks, benefits, and alternatives.  Prior to surgery, 
it would seem reasonable to complete the colonoscopy if possible and this would 
need to be discussed with Gastroenterology.  Preoperative teaching with ostomy 
therapist will need to be arranged as well.  Likely best surgical option would 
be an end colostomy based on the sigmoid/descending colon and possibly this 
could be done with laparoscopic techniques.  At present, there is no urgent 
nature to the procedure and this will be arranged for the week following the 
new year pending completion of his colonoscopy.

 

 [id number]/CPS #: [id number]

ELISABET

## 2017-12-25 LAB
HCT VFR BLD AUTO: 40 % (ref 42–52)
HGB BLD-MCNC: 13.6 G/DL (ref 14–18)
PLATELET # BLD AUTO: 198 10^3/UL (ref 150–450)

## 2017-12-25 RX ADMIN — FUROSEMIDE SCH MG: 40 TABLET ORAL at 09:36

## 2017-12-25 RX ADMIN — DIPHENOXYLATE HYDROCHLORIDE AND ATROPINE SULFATE SCH TAB: 2.5; .025 TABLET ORAL at 09:36

## 2017-12-25 RX ADMIN — Medication SCH MG: at 09:34

## 2017-12-25 RX ADMIN — NYSTATIN SCH APPLIC: 100000 CREAM TOPICAL at 09:37

## 2017-12-25 RX ADMIN — Medication SCH ML: at 17:05

## 2017-12-25 RX ADMIN — WATER SCH NOTE: 100 INJECTION, SOLUTION INTRAVENOUS at 18:40

## 2017-12-25 RX ADMIN — ASPIRIN SCH MG: 81 TABLET, COATED ORAL at 09:34

## 2017-12-25 RX ADMIN — PREGABALIN SCH MG: 50 CAPSULE ORAL at 21:06

## 2017-12-25 RX ADMIN — DIPHENOXYLATE HYDROCHLORIDE AND ATROPINE SULFATE SCH TAB: 2.5; .025 TABLET ORAL at 21:06

## 2017-12-25 RX ADMIN — OXYCODONE HYDROCHLORIDE PRN MG: 5 CAPSULE ORAL at 21:07

## 2017-12-25 RX ADMIN — OXYCODONE HYDROCHLORIDE AND ACETAMINOPHEN SCH MG: 500 TABLET ORAL at 09:35

## 2017-12-25 RX ADMIN — FOLIC ACID SCH MG: 1 TABLET ORAL at 09:35

## 2017-12-25 RX ADMIN — CYANOCOBALAMIN TAB 500 MCG SCH MCG: 500 TAB at 09:36

## 2017-12-25 RX ADMIN — FUROSEMIDE SCH MG: 40 TABLET ORAL at 21:07

## 2017-12-25 RX ADMIN — WATER SCH NOTE: 100 INJECTION, SOLUTION INTRAVENOUS at 13:00

## 2017-12-25 RX ADMIN — FENTANYL TRANSDERMAL SCH MCG: 75 PATCH, EXTENDED RELEASE TRANSDERMAL at 06:24

## 2017-12-25 RX ADMIN — OMEPRAZOLE SCH MG: 20 CAPSULE, DELAYED RELEASE ORAL at 08:05

## 2017-12-25 RX ADMIN — Medication SCH MG: at 09:35

## 2017-12-25 RX ADMIN — Medication SCH CAP: at 09:36

## 2017-12-25 RX ADMIN — CALCIUM POLYCARBOPHIL SCH MG: 625 TABLET, FILM COATED ORAL at 09:35

## 2017-12-25 RX ADMIN — PREGABALIN SCH MG: 50 CAPSULE ORAL at 08:05

## 2017-12-25 RX ADMIN — OXYCODONE HYDROCHLORIDE PRN MG: 5 CAPSULE ORAL at 08:05

## 2017-12-25 RX ADMIN — CALCIUM POLYCARBOPHIL SCH MG: 625 TABLET, FILM COATED ORAL at 21:07

## 2017-12-25 RX ADMIN — Medication SCH UNITS: at 09:35

## 2017-12-25 RX ADMIN — ENOXAPARIN SODIUM SCH MG: 40 INJECTION SUBCUTANEOUS at 17:03

## 2017-12-25 RX ADMIN — Medication SCH ML: at 06:23

## 2017-12-25 RX ADMIN — PREGABALIN SCH MG: 50 CAPSULE ORAL at 13:54

## 2017-12-25 RX ADMIN — NYSTATIN SCH APPLIC: 100000 CREAM TOPICAL at 21:08

## 2017-12-25 RX ADMIN — OXYCODONE HYDROCHLORIDE PRN MG: 5 CAPSULE ORAL at 12:21

## 2017-12-25 RX ADMIN — OXYCODONE HYDROCHLORIDE PRN MG: 5 CAPSULE ORAL at 17:04

## 2017-12-25 RX ADMIN — WATER SCH NOTE: 100 INJECTION, SOLUTION INTRAVENOUS at 07:19

## 2017-12-25 RX ADMIN — Medication SCH CAP: at 21:07

## 2017-12-26 RX ADMIN — Medication SCH CAP: at 09:06

## 2017-12-26 RX ADMIN — Medication SCH MG: at 09:06

## 2017-12-26 RX ADMIN — CLOTRIMAZOLE SCH APPLIC: 10 CREAM TOPICAL at 14:23

## 2017-12-26 RX ADMIN — ASPIRIN SCH MG: 81 TABLET, COATED ORAL at 09:07

## 2017-12-26 RX ADMIN — PREGABALIN SCH MG: 50 CAPSULE ORAL at 21:30

## 2017-12-26 RX ADMIN — Medication SCH UNITS: at 09:07

## 2017-12-26 RX ADMIN — PREGABALIN SCH MG: 50 CAPSULE ORAL at 09:05

## 2017-12-26 RX ADMIN — WATER SCH NOTE: 100 INJECTION, SOLUTION INTRAVENOUS at 06:38

## 2017-12-26 RX ADMIN — DIPHENOXYLATE HYDROCHLORIDE AND ATROPINE SULFATE SCH TAB: 2.5; .025 TABLET ORAL at 09:06

## 2017-12-26 RX ADMIN — CALCIUM POLYCARBOPHIL SCH MG: 625 TABLET, FILM COATED ORAL at 09:06

## 2017-12-26 RX ADMIN — CYANOCOBALAMIN TAB 500 MCG SCH MCG: 500 TAB at 09:40

## 2017-12-26 RX ADMIN — FUROSEMIDE SCH MG: 40 TABLET ORAL at 21:30

## 2017-12-26 RX ADMIN — OMEPRAZOLE SCH MG: 20 CAPSULE, DELAYED RELEASE ORAL at 07:30

## 2017-12-26 RX ADMIN — OXYCODONE HYDROCHLORIDE PRN MG: 5 CAPSULE ORAL at 18:19

## 2017-12-26 RX ADMIN — WATER SCH NOTE: 100 INJECTION, SOLUTION INTRAVENOUS at 19:08

## 2017-12-26 RX ADMIN — OXYCODONE HYDROCHLORIDE AND ACETAMINOPHEN SCH MG: 500 TABLET ORAL at 09:06

## 2017-12-26 RX ADMIN — OXYCODONE HYDROCHLORIDE PRN MG: 5 CAPSULE ORAL at 12:05

## 2017-12-26 RX ADMIN — FOLIC ACID SCH MG: 1 TABLET ORAL at 09:05

## 2017-12-26 RX ADMIN — Medication SCH CAP: at 21:30

## 2017-12-26 RX ADMIN — Medication SCH ML: at 18:19

## 2017-12-26 RX ADMIN — OXYCODONE HYDROCHLORIDE PRN MG: 5 CAPSULE ORAL at 07:30

## 2017-12-26 RX ADMIN — FUROSEMIDE SCH MG: 40 TABLET ORAL at 09:07

## 2017-12-26 RX ADMIN — PREGABALIN SCH MG: 50 CAPSULE ORAL at 14:23

## 2017-12-26 RX ADMIN — Medication SCH ML: at 05:32

## 2017-12-26 NOTE — CONS
GASTROENTEROLOGY CONSULTATION                DATE:  12/26/17

 

CONSULTING PHYSICIAN:  Lux Mathias.

 

REASON FOR CONSULTATION:  Preoperative surveillance colonoscopy prior to end 
colostomy to manage decubiti in a paraplegic.

 

HISTORY:  This 72-year-old man is paraplegic on account of spinal cord injury 
in the lower thoracic or upper lumbar area from an ependymoma.  At this time, 
he is being considered for end colostomy.

 

His bowels typically run liquid.  Over the last 3 to 4 months, he has been 
dealing with a decubitus ulcer, this started in the summer of 2017.  In October
, he was transferred to [facility] where he had plastic surgery and skin 
flaps applied.  They have healed up.

 

Part of his rehab regimen is to lie flat.  He has had severe erosive 
esophagitis diagnosed in April 2015 along with Helicobacter pylori gastritis.  
He was treated for that and kept on continuous omeprazole 20 mg.  14 months 
later, repeat upper endoscopy continued to show erosive GERD and the omeprazole 
was increased to 40. There is no repeat CLOtest in the hospital system.  He did 
have a negative stool for H. pylori antigen in August 2015 approximately 4 
months after the first upper endoscopy.

 

He has had 4 or 5 colonoscopies.  The most recent in April 2014 with a sigmoid 
tubulovillous adenoma as the most significant polyp.

 

He has been maintained on ferrous gluconate right along.  Part of treatment for 
his decubitus and healing his plastic surgical wound has been strict bed rest, 
being popped up just transiently with couple of pillows.  He does not sense any 
acid indigestion and never has when upper endoscopies have shown significant 
erosive GERD.

 

PAST MEDICAL HISTORY:

1.  Paraplegia.

2.  Carotid artery stenosis, status post carotid endarterectomy.

3.  Sleep apnea.

4.  Hypertension.

5.  Hyperlipidemia.

6.  Appendectomy, early 1980s - perforated.

7.  Spinal surgeries for ependymoma in 1971 and 1991 - 4.

8.  History of colon polyps with colonoscopies in 1998, 2005, 2009, and 2014.

 

MEDICATIONS:  The inpatient list serves as his chronic listing currently and 
includes, zinc sulfate 220; ferrous gluconate 324; enoxaparin 40; aspirin 81; 
ascorbic acid 500; fentanyl patch 75 mcg; furosemide 40 b.i.d.; omeprazole 40; 
oxycodone 5 q.4 p.r.n.; Lyrica 150 t.i.d. per schedule; B12 1000 mcg daily.

 

SOCIAL HISTORY:  He is a disabled . Dr Reynoso reports he could 
walk up to about 6 yrs aago.   He has been  several decades and his wife
, Kimmy is his proxy.  Dr. Vicente is his primary.

 

REVIEW OF SYSTEMS:  There is no history of syncope, arrhythmias, palpitations, 
coronary disease, MI, blood clots, pulmonary embolism, hemoptysis, TB, hepatitis
, jaundice.  Nutritional status appears to be on the upswing as his most recent 
albumin 3.6 is up from the high 2's in late August and in September and 
hemoglobin which had been around 9.6 to 10 in September with his original 
sepsis admission has now risen to 13.6 with an MCV of 82 up from 79 to 80, 
although his baseline MCV appears to be 86 to 88 based on 2012.

 

IMPRESSION:  This 72-year-old man with paraplegia and trouble with decubiti, 
needs an ostomy for hygiene and enhancement of his care and avoidance of 
sepsis.  He has a history of some significant polyps and that will be 
resurveyed prior to performing the colostomy with tattooing of any significant 
lesions, so that might possibly be included in the surgical specimen. His prep 
is likely to be difficult and extended. 

 

 [id number]/CPS #: [id number]

James J. Peters VA Medical CenterJENNIFER

## 2017-12-26 NOTE — PN
Subjective


Date of Service: 12/26/17


Interval History: 





Patient has no complaints today. Plan for Colonoscopy tomorrow morning with 

Fecal Management system to be placed overnight to preserve integrity of skin 

flap. Patient denies any previous hemodynamic complications with colonoscopies 

related to spinal injury. 


Family History: Unchanged from Admission


Social History: Unchanged from Admission


Past Medical History: Unchanged from Admission





Objective


Active Medications: 








Acetaminophen (Tylenol Tab*)  650 mg PO Q6H PRN


   PRN Reason: FEVER/PAIN


   Last Admin: 12/11/17 20:20 Dose:  650 mg


Ascorbic Acid (Vitamin C  Tab*)  500 mg PO DAILY [facility]


   Last Admin: 12/26/17 09:06 Dose:  500 mg


Aspirin (Aspirin Ec Low Dose*)  81 mg PO DAILY [facility]


   Last Admin: 12/26/17 09:07 Dose:  81 mg


Cholecalciferol (Vitamin D Tab*)  5,000 units PO DAILY [facility]


   Last Admin: 12/26/17 09:07 Dose:  5,000 units


Clotrimazole (Clotrimazole 1%*)  1 applic TOPICAL DAILY [facility]


Cyanocobalamin (Vitamin B12 Tab*)  1,000 mcg PO DAILY [facility]


   Last Admin: 12/26/17 09:40 Dose:  1,000 mcg


Enoxaparin Sodium (Lovenox(*))  40 mg SUBCUT Q24H [facility]


   Last Admin: 12/25/17 17:03 Dose:  40 mg


Fentanyl (Duragesic  Patch 75 Mcg/Hr*)  75 mcg TRANSDERM Q72H [facility]


   Last Admin: 12/25/17 06:24 Dose:  75 mcg


Ferrous Gluconate (Fergon Tab*)  324 mg PO DAILY [facility]


   Last Admin: 12/26/17 09:06 Dose:  324 mg


Folic Acid (Folvite Tab*)  0.5 mg PO DAILY [facility]


   Last Admin: 12/26/17 09:05 Dose:  0.5 mg


Furosemide (Lasix Tab*)  40 mg PO BID [facility]


   Last Admin: 12/26/17 09:07 Dose:  40 mg


Heparin Sodium (Porcine) (Heparin Flush Picc/Ml/Cvc(*))  0 ml IV FLUSH 0600,

1800 [facility]


   PRN Reason: Protocol


   Last Admin: 12/26/17 05:32 Dose:  1 ml


Lactobacillus Rhamnosus (Culturelle*)  1 cap PO BID [facility]


   Last Admin: 12/26/17 09:06 Dose:  1 cap


Omeprazole (Prilosec Cap*)  40 mg PO DAILY@0730 [facility]


   Last Admin: 12/26/17 07:30 Dose:  40 mg


Ondansetron HCl (Zofran Inj*)  4 mg IV Q4H PRN


   PRN Reason: NAUSEA


   Last Admin: 12/21/17 20:41 Dose:  4 mg


Oxycodone HCl (Roxycodone Tab*)  5 mg PO Q4H PRN


   PRN Reason: PAIN


   Last Admin: 12/26/17 12:05 Dose:  5 mg


Pharmacy Profile Note (Fentanyl Patch Check Q Shift)  1 note N/A 0700,1900 [facility]


   Last Admin: 12/26/17 06:38 Dose:  1 note


Polyethylene Glycol/Electrolytes (Golytely*)  4,000 ml PO ONCE ONE


   Stop: 12/26/17 14:01


Pregabalin (Lyrica Cap(*))  150 mg PO TID [facility]


   Last Admin: 12/26/17 09:05 Dose:  150 mg


Zinc Sulfate (Zinc-220 Cap*)  220 mg PO DAILY [facility]


   Last Admin: 12/26/17 09:06 Dose:  220 mg








 Vital Signs - 8 hr











  12/26/17 12/26/17 12/26/17





  07:30 09:05 09:06


 


Respiratory 16 20 20





Rate   














  12/26/17 12/26/17





  12:02 12:05


 


Respiratory 18 18





Rate  











Oxygen Devices in Use Now: None


Appearance: Patient is a 71yo male who appears stated age and is sitting in the 

chair in NAD.


Eyes: No Scleral Icterus, PERRLA


Ears/Nose/Mouth/Throat: NL Teeth, Lips, Gums, Clear Oropharnyx, Mucous 

Membranes Moist


Neck: NL Appearance and Movements; NL JVP, Trachea Midline


Respiratory: Symmetrical Chest Expansion and Respiratory Effort, Clear to 

Auscultation


Cardiovascular: NL Sounds; No Murmurs; No JVD, RRR, No Edema


Abdominal: No Hepatosplenomegaly, - - Baseline distention. No tenderness, 

normal sounds. 


Lymphatic: No Cervical Adenopathy


Extremities: No Edema, No Clubbing, Cyanosis


Skin: No Nodules or Sclerosis, - - Serpiginous rash on abdomen and in groin. 

Worse than previous exams.


Neurological: Alert and Oriented x 3, - - Insensate and paralyzed from waist 

down.


Result Diagrams: 


 12/25/17 06:34





 12/25/17 06:34





Assess/Plan/Problems-Billing


.


Assessment: 


Ms Pryor is 73 yo man with paraplegia s/p plastic surgery for Stage IV sacral 

wound and acute osteomyelitis as well as chronic pain.








- Patient Problems


(1) Chronic diarrhea


Current Visit: Yes   Status: Acute   Code(s): K52.9 - NONINFECTIVE 

GASTROENTERITIS AND COLITIS, UNSPECIFIED   SNOMED Code(s): 027095648


   Comment: 


Likely functional


Little to no improvement with fiber and prn Lomotil. Discontinued fiber per 

family request and possible worsening of diarrhea.


Lomotil stopped for bowel prep.


Appreciate general surgery consult. Plan for diverting colostomy with general 

surgery early next year. 


Plan for colonoscopy tomorrow for monitoring of colonic polyps. Appreciate 

Gastroenterology input. Patient will begin bowel prep this afternoon and have a 

clear liquid diet until tomorrow. Will place fecal management system to control 

expected diarrhea.   





(2) S/P flap graft


Current Visit: Yes   Status: Acute   Priority: High   Code(s): Z98.890 - OTHER 

SPECIFIED POSTPROCEDURAL STATES   SNOMED Code(s): 746537315


   Comment: 


10/25/2017 at [facility] over large sacral defect


Continue fentanyl patch, PRN oxycodone, APAP


Small open area being treated with Medihoney and gauze which is nearly healed


up to chair max 2hrs, three times daily





   





(3) Tinea corporis


Current Visit: No   Status: Acute   Code(s): B35.4 - TINEA CORPORIS   SNOMED 

Code(s): 53214154


   Comment: 


nnular scaly lesion on hip and new area on abdomen and in groin.


Start clotrimazole cream.   





(4) Chronic back pain


Current Visit: No   Status: Chronic   Code(s): M54.9 - DORSALGIA, UNSPECIFIED; 

G89.29 - OTHER CHRONIC PAIN   SNOMED Code(s): 257846344


   Comment: 


- Secondary to ependymoma.


- Continue pregabalin, acetaminophen, oxycodone liquid 5mg q4h, and Fentanyl 

patch at 75mcg.   





(5) Neurogenic bladder


Current Visit: No   Status: Chronic   Priority: High   Code(s): N31.9 - 

NEUROMUSCULAR DYSFUNCTION OF BLADDER, UNSPECIFIED   SNOMED Code(s): 003736071


   Comment: 


- With chronic indwelling urinary catheter   





(6) BONIFACIO (obstructive sleep apnea)


Current Visit: No   Status: Chronic   Code(s): G47.33 - OBSTRUCTIVE SLEEP APNEA 

(ADULT) (PEDIATRIC)   SNOMED Code(s): 06603294


   Comment: 


Does not wear bipap, states he cannot get a good seal on mask.     





(7) DVT prophylaxis


Current Visit: No   Status: Acute   Code(s): TJT4493 -    SNOMED Code(s): 

083830575


   Comment: 


- Lovenox, SCDs   


Status and Disposition: 





Swing.  PT/OT. Plan for dc Dec 29 and diverting colostomy the following week.

## 2017-12-27 RX ADMIN — Medication SCH ML: at 06:11

## 2017-12-27 RX ADMIN — PREGABALIN SCH MG: 50 CAPSULE ORAL at 21:16

## 2017-12-27 RX ADMIN — OXYCODONE HYDROCHLORIDE PRN MG: 5 CAPSULE ORAL at 08:23

## 2017-12-27 RX ADMIN — OXYCODONE HYDROCHLORIDE AND ACETAMINOPHEN SCH MG: 500 TABLET ORAL at 09:04

## 2017-12-27 RX ADMIN — ASPIRIN SCH MG: 81 TABLET, COATED ORAL at 09:04

## 2017-12-27 RX ADMIN — Medication SCH UNITS: at 09:04

## 2017-12-27 RX ADMIN — FUROSEMIDE SCH MG: 40 TABLET ORAL at 21:17

## 2017-12-27 RX ADMIN — WATER SCH NOTE: 100 INJECTION, SOLUTION INTRAVENOUS at 07:08

## 2017-12-27 RX ADMIN — CYANOCOBALAMIN TAB 500 MCG SCH MCG: 500 TAB at 09:04

## 2017-12-27 RX ADMIN — WATER SCH NOTE: 100 INJECTION, SOLUTION INTRAVENOUS at 19:03

## 2017-12-27 RX ADMIN — CLOTRIMAZOLE SCH APPLIC: 10 CREAM TOPICAL at 09:05

## 2017-12-27 RX ADMIN — FOLIC ACID SCH MG: 1 TABLET ORAL at 09:03

## 2017-12-27 RX ADMIN — FUROSEMIDE SCH MG: 40 TABLET ORAL at 09:04

## 2017-12-27 RX ADMIN — Medication SCH CAP: at 09:04

## 2017-12-27 RX ADMIN — PREGABALIN SCH MG: 50 CAPSULE ORAL at 15:49

## 2017-12-27 RX ADMIN — Medication SCH ML: at 17:49

## 2017-12-27 RX ADMIN — NYSTATIN SCH: 100000 CREAM TOPICAL at 00:43

## 2017-12-27 RX ADMIN — PREGABALIN SCH MG: 50 CAPSULE ORAL at 08:22

## 2017-12-27 RX ADMIN — WATER SCH NOTE: 100 INJECTION, SOLUTION INTRAVENOUS at 14:11

## 2017-12-27 RX ADMIN — OMEPRAZOLE SCH MG: 20 CAPSULE, DELAYED RELEASE ORAL at 08:22

## 2017-12-27 NOTE — PN
Hospitalist Progress Note


Date of Service: 12/27/17





Patient planned to have colonoscopy today and was given 4L GoLytely started at 

1400 on 12/26 for preparation which was completed at approximately 1400 on 12/ 27 but had not produced liquid stool. Will give another 2L GoLytely and plan 

for Colonoscopy tomorrow at 0730 before discharge at 1400 so he can be present 

for the delivery of his wheelchair.

## 2017-12-28 VITALS — DIASTOLIC BLOOD PRESSURE: 68 MMHG | SYSTOLIC BLOOD PRESSURE: 161 MMHG

## 2017-12-28 PROCEDURE — 0DJD8ZZ INSPECTION OF LOWER INTESTINAL TRACT, VIA NATURAL OR ARTIFICIAL OPENING ENDOSCOPIC: ICD-10-PCS | Performed by: INTERNAL MEDICINE

## 2017-12-28 RX ADMIN — ACETAMINOPHEN PRN MG: 325 TABLET ORAL at 13:21

## 2017-12-28 RX ADMIN — CYANOCOBALAMIN TAB 500 MCG SCH MCG: 500 TAB at 09:32

## 2017-12-28 RX ADMIN — FOLIC ACID SCH MG: 1 TABLET ORAL at 09:32

## 2017-12-28 RX ADMIN — FENTANYL TRANSDERMAL SCH MCG: 75 PATCH, EXTENDED RELEASE TRANSDERMAL at 06:11

## 2017-12-28 RX ADMIN — CLOTRIMAZOLE SCH APPLIC: 10 CREAM TOPICAL at 09:33

## 2017-12-28 RX ADMIN — Medication SCH ML: at 06:30

## 2017-12-28 RX ADMIN — OXYCODONE HYDROCHLORIDE AND ACETAMINOPHEN SCH MG: 500 TABLET ORAL at 09:32

## 2017-12-28 RX ADMIN — OMEPRAZOLE SCH MG: 20 CAPSULE, DELAYED RELEASE ORAL at 09:32

## 2017-12-28 RX ADMIN — PREGABALIN SCH MG: 50 CAPSULE ORAL at 13:21

## 2017-12-28 RX ADMIN — FUROSEMIDE SCH MG: 40 TABLET ORAL at 09:32

## 2017-12-28 RX ADMIN — WATER SCH NOTE: 100 INJECTION, SOLUTION INTRAVENOUS at 06:54

## 2017-12-28 RX ADMIN — ASPIRIN SCH MG: 81 TABLET, COATED ORAL at 09:32

## 2017-12-28 RX ADMIN — PREGABALIN SCH MG: 50 CAPSULE ORAL at 09:33

## 2017-12-28 RX ADMIN — OXYCODONE HYDROCHLORIDE PRN MG: 5 CAPSULE ORAL at 11:20

## 2017-12-28 RX ADMIN — Medication SCH UNITS: at 09:32

## 2017-12-28 NOTE — PRO
DATE:  12/28/17 - ROOM #346               REFERRING PHYSICIANS:  Rafy Vicente, Lux Mathias *

 

PROCEDURE:  Colonoscopy to cecum with adult colonoscope after 2-day prep.

 

INDICATION:  This 72-year-old man with paraplegia and fecal incontinence who 
has had flap repair of sacral decubitus within the last few months, is now 
considering a diverting colostomy for hygiene.  He has had colon polyps removed 
in the past, most recently April 2014.  No blood has been seen in his stool.  
This included a two-day prep.  Review of his record and review of the risk of 
autonomic dysreflexia was held with [facility] staff.

 

ENDOSCOPIST:  Dr. Noland.          MEDICATIONS:  Midazolam 3, meperidine 37.5.



FINDINGS:  He is a paraplegic man, mildly overweight in no distress.  His 
abdomen is soft.  Perianal inspection shows rectal tube which was removed.  It 
had a large, firm bulbous tip.  



Initial views show a fair prep with lots of granular debris.  There were 
excoriated areas in the proximal rectum consistent with the tubes that had been 
in place.  The adult scope was then inserted with multiple maneuvers and 
rotations supported by nursing hand checks from 2 staff members and eventually 
the cecum was reached. The right colon had lots of iron and other pill debris.  
No polyps were seen. There were no areas of ischemia or scarring.  Coming back 
from the ileocecal valve, no abnormalities were noted.  There were some 
limitations to be used given large pools of iron debris.  Close inspection of 
the rectal irritated areas did not show any mass lesion.

 

IMPRESSION:

1.  Rectal irritation.

2.  Normal colon - patient prepared for eventual colostomy.

 

 [id number]/[facility] #: [id number]

[facility]

## 2017-12-29 NOTE — DS
*** AMENDED REPORT NOW INCLUDES COSIGNER DESIGNATION - ESIGNED BEFORE 
ADJUSTMENTS ***



CC:  Dr. Rafy Vicente; Dr. Disha Duncan; Dr. Bj Faulkner; Dr. Lux Mathias *

 

DISCHARGE SUMMARY:

 

DATE OF ADMISSION:  11/06/17

 

DATE OF DISCHARGE:  12/28/17

 

PRIMARY CARE PROVIDER:  Dr. Rafy Vicente.

 

MY ATTENDING WHILE IN THE HOSPITAL:  Dr. Disha Duncan * (DICTATED BY MATEUS PEARSON)

 

PRIMARY DISCHARGE DIAGNOSES:

1.  Sacral osteomyelitis, resolved.

2.  Sacral decubitus ulcer, status post muscle flap.

3.  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

 

SECONDARY DISCHARGE DIAGNOSES:

1.  Chronic pain with opioid dependence.

2.  Neurogenic bladder and bowel.

3.  Obstructive sleep apnea.

4.  Chronic kidney disease.

5.  Asthma.

6.  Carotid artery stenosis.

 

STUDIES DONE WHILE IN THE HOSPITAL:  None.

 

MEDICATIONS AT DISCHARGE:

1.  Vitamin D3 5000 units p.o. daily.

2.  Tylenol 650 mg q.6 hours as needed.

3.  Vitamin C 500 mg p.o. daily.

4.  Aspirin 81 mg p.o. daily.

5.  Folic acid 0.5 mg p.o. daily.

6.  Clotrimazole cream 1% one application daily.

7.  Lomotil one tablet p.o. b.i.d. scheduled.

8.  Fentanyl patch 75 mcg transdermal q.72 hours.

9.  Ferrous gluconate 324 mg p.o. daily.

10.  Lactobacillus acidophilus one capsule p.o. daily.

11.  Oxycodone 5 mg p.o. q.4 hours as needed for pain.

12.  Zinc sulfate 220 mg p.o. daily.

13.  Medihoney 1 gel topical b.i.d.

14.  Vitamin B12 1000 mcg p.o. daily.

15.  Furosemide 40 mg p.o. b.i.d.

16.  Hydrocortisone one application topical t.i.d. as needed.

17.  Prilosec 40 mg p.o. daily.

18.  Lyrica 150 mg p.o. t.i.d.

 

Medications discontinued at discharge:

1.  Oxycodone 10 mg p.o. a.c.

2.  Metronidazole 500 mg p.o. b.i.d.

3.  Nystatin cream.

4.  Econazole.

 

New medications at discharge:

1.  Medihoney.

2.  Oxycodone.

3.  Lomotil.

4.  Clotrimazole.

 

HOSPITAL COURSE:  This is a brief summary of the patient's presentation.  For 
more details, please see the history and physical from Lili Wright from 11/06/
17 as well as previous admission note from Dr. Bj Faulkner on 09/21/17 and 
the history and physical from Dr. Chito Harvey on 09/02/17 as the patient has 
been admitted to an institution.  Since then, the patient has a very complex 
medical history, which started from an ependymoma in his 20s, which had several 
spinal surgeries, but progressed to cause paraplegia and numbness from the 
waist down as well as neurogenic bowel and bladder.  The patient was admitted 
earlier this year to the hospital for a pneumonia likely related to his 
oversedation from his narcotic use for his chronic pain.  The patient was 
treated with levofloxacin, which seem to cause diarrhea, which has been 
resistant to medication and has not stopped since then.  Secondary to this, the 
patient was admitted again to the hospital on 09/02/17 with a large decubitus 
ulcer with underlying sacral osteomyelitis.  The patient had a prolonged 
hospital stay and was discharged to [facility] for further rehabilitation; however, 
the patient was readmitted to the medical floor on 09/21/17 to further address 
his sacral wound and osteomyelitis.  The patient was treated with antibiotics, 
intensive nursing care and Dr. Luis Felipe Chirinos from [place] was consulted and 
the patient was transferred to that institution on 10/24/17, where he underwent 
a muscle flap to cover his sacral wound.  The patient was readmitted to this 
institution on 11/06/17.  The patient then completed 6 weeks of vancomycin, 
Flagyl and ciprofloxacin therapy for his osteomyelitis.  He was also on bedrest 
with a Clinitron bed for 6 weeks, which greatly improved as well as with 
turning and positioning, intensive perineal care.  The patient through this 
time continued to have diarrhea; however, there was no deterioration of the 
patient's muscle flap.  The patient was able to engage in PT/OT again starting 
on 12/05/17. The patient was able to work up to transferring himself with 
assistance using a slide board to his wheelchair.  Surgery was consulted for 
colostomy to help maintain his perineal integrity.  The patient had no other 
abnormalities while in the hospital.  The patient underwent a colonoscopy on 12/
28/17 as a screening prior to his colostomy.  The patient was planned to be 
discharged from the hospital today with a visiting nurse services and the 
assistance of his wife as a trial period to see how well he fares at home with 
regards to functional capacity, turning positioning and preserving the 
integrity of his muscle flap.  The patient will be readmitted on 01/01/18 in 
preparation for his colostomy on 01/02/18 with Dr. Mathias.  The patient and 
wife are in agreement with this plan.

 

PHYSICAL EXAM ON THE DAY OF DISCHARGE:  General:  The patient is a 72-year-old 
male who appears stated age and sitting comfortably in the bed, in no acute 
distress. Vital Signs:  Temperature 98.0, pulse rate 70, respiratory rate 12, 
oxygen saturation 94% on room air, blood pressure 161/68.  HEENT:  Head:  
Normocephalic, atraumatic.  Sclerae anicteric.  No conjunctival injection.  
Nasal mucosa moist. Oral mucosa moist.  No pharyngeal erythema, discharge or 
exudates noted.  Neck: Supple, nontender.  No lymphadenopathy.  No carotid 
bruits auscultated.  Cardiac: Regular rate and rhythm.  No clicks, murmurs, 
gallops, or rubs.  Pulses are 2+ in bilateral dorsalis pedis, posterior tibialis
, and radial areas.  No edema noted in the bilateral lower extremities.  The 
patient has NEW stockings on.  Respiratory: Clear to auscultation bilaterally.  
No wheezes, rales, or rhonchi.  Abdomen:  Soft, distended at the level 
consistent with numerous previous exams, nontender.  No hepatosplenomegaly.  
Bowel sounds present and normoactive in all four quadrants. Genitourinary:  The 
patient has an indwelling Rodriguez catheter with some irritation around the 
urethral meatus also consistent with many previous exams and not getting worse.
  The patient has no CVA tenderness or suprapubic tenderness.  Skin:  The 
patient has a serpiginous rash on his abdomen and in his groin, which has been 
fluctuating over the course of his hospitalization and was recently started on 
clotrimazole cream and has improved slightly.  The patient has a large muscle 
flap over his sacrum, photos of which are available in the chart from 12/28/17.
  The patient also has a small open area which was also documented on 12/28/17 
and has been improving since his return from his surgery.  The patient 
previously had a pressure ulcer with a healed blood blister over it on his left 
heel, which is now entirely resolved with only a slight area of blanchable 
erythema.  Neurologic: Cranial nerves II through XII intact.  No focal deficits
, 5/5 strength in the bilateral upper extremities.  The patient is insensate 
and has no ability to move his lower extremities.  Sensation to light touch 
intact in the bilateral upper extremities.  Psychiatric:  Pleasant and 
cooperative.

 

LABORATORY DATA:  Most recently from 12/25/17, hemoglobin 13.6, hematocrit 40, 
platelet count 198, BUN 37, creatinine 1.02.  Other abnormalities from lab work 
on 12/22/17, glucose 119, alkaline phosphatase 115, potassium 3.1.

 

DISCHARGE PLAN:  The patient will be discharged to home with VNS support as 
well as support of his wife who has been working with physical therapy and 
occupational therapy for months in preparation of being able to assist the 
patient with his transfers and self care.  The patient will also have family in 
the area.  The patient should use his new wheelchair, which is being delivered 
today with pneumatic cushion as well as turning positioning to help maintain 
the integrity of his flap.  The patient should return to the hospital for any 
difficulty with this plan.  The patient should also attempt to have several 
high protein meals a day and avoid all shearing forces on his sacral area.  The 
patient should take medications as prescribed, attempting to avoid excessive 
use of narcotic pain medication to the point where he is oversedated.  The 
patient should continue with clotrimazole cream on his rashes.  The patient 
should return to the hospital on 01/01/18 as scheduled in preparation for his 
colostomy.  The patient is medically optimized for surgery. For more details on 
the patient's surgical risk, please see the medical optimization from 10/23/17 
by MATEUS Pearson.  The only changes, there is no need for additional 
preoperative testing at this time as there was not at that time.  Since then, 
the patient's hemoglobin has improved from 12.2 and his creatinine has 
decreased to 1.02 as above.  The patient's osteomyelitis has also resolved, all 
of which improve the patient's risk profile for surgery.  The patient has an 
RCRI score of 1 due to high risk intraperitoneal surgery at this time 
indicating at 0.09% risk of major cardiac event.  According to the ACS NSQIP 
surgical risk calculator from the [employer], the patient's 
risk is of a serious complication is 11.6%, below and average risk of 19.2%.  
The patient's risk of any complication is 15%, below and average risk of 23%.  
The patient's risk of death is 0.2%.  The patient will have a predicted 
hospital stay of 4.5 days.  The patient should engage in activity as tolerated, 
working with physical therapy and occupational therapy, visiting nurse services 
to maximize independence at home using an assistive devices such as a slide 
board.  The patient should avoid shearing forces to his sacrum.

 

DIET:  The patient should be on a consistent carbohydrate heart healthy diet. 



TIME SPENT:  Approximately 60 minutes were spent on this discharge, 30 of which 
was spent face-to-face with the patient obtaining history and physical and 
discussing treatment plan with the patient and his wife.

 

____________________________________ MATEUS PEARSON

 

[id number]/KYLE #: [id number]

ELISABET

## 2018-01-01 ENCOUNTER — HOSPITAL ENCOUNTER (INPATIENT)
Dept: HOSPITAL 25 - SSU | Age: 73
LOS: 16 days | Discharge: HOME HEALTH | DRG: 329 | End: 2018-01-17
Attending: INTERNAL MEDICINE | Admitting: HOSPITALIST
Payer: MEDICARE

## 2018-01-01 DIAGNOSIS — T83.518A: ICD-10-CM

## 2018-01-01 DIAGNOSIS — N39.0: ICD-10-CM

## 2018-01-01 DIAGNOSIS — B35.4: ICD-10-CM

## 2018-01-01 DIAGNOSIS — G89.29: ICD-10-CM

## 2018-01-01 DIAGNOSIS — M46.28: ICD-10-CM

## 2018-01-01 DIAGNOSIS — Z99.3: ICD-10-CM

## 2018-01-01 DIAGNOSIS — K52.9: ICD-10-CM

## 2018-01-01 DIAGNOSIS — G82.20: ICD-10-CM

## 2018-01-01 DIAGNOSIS — K55.039: ICD-10-CM

## 2018-01-01 DIAGNOSIS — R15.9: Primary | ICD-10-CM

## 2018-01-01 DIAGNOSIS — G47.33: ICD-10-CM

## 2018-01-01 DIAGNOSIS — I95.9: ICD-10-CM

## 2018-01-01 DIAGNOSIS — K91.871: ICD-10-CM

## 2018-01-01 DIAGNOSIS — C41.2: ICD-10-CM
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
## 2018-01-01 DIAGNOSIS — J45.909: ICD-10-CM

## 2018-01-01 DIAGNOSIS — K94.09: ICD-10-CM

## 2018-01-01 DIAGNOSIS — F11.20: ICD-10-CM
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
## 2018-01-01 DIAGNOSIS — K21.9: ICD-10-CM

## 2018-01-01 DIAGNOSIS — I12.9: ICD-10-CM

## 2018-01-01 DIAGNOSIS — N18.3: ICD-10-CM

## 2018-01-01 DIAGNOSIS — E87.6: ICD-10-CM

## 2018-01-01 DIAGNOSIS — K59.2: ICD-10-CM

## 2018-01-01 DIAGNOSIS — N31.9: ICD-10-CM
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
## 2018-01-01 DIAGNOSIS — E86.0: ICD-10-CM

## 2018-01-01 LAB
BASOPHILS # BLD AUTO: 0.1 10^3/UL (ref 0–0.2)
EOSINOPHIL # BLD AUTO: 0.3 10^3/UL (ref 0–0.6)
HCT VFR BLD AUTO: 39 % (ref 42–52)
HGB BLD-MCNC: 13.1 G/DL (ref 14–18)
LYMPHOCYTES # BLD AUTO: 2 10^3/UL (ref 1–4.8)
MCH RBC QN AUTO: 27 PG (ref 27–31)
MCHC RBC AUTO-ENTMCNC: 33 G/DL (ref 31–36)
MCV RBC AUTO: 83 FL (ref 80–94)
MONOCYTES # BLD AUTO: 1 10^3/UL (ref 0–0.8)
NEUTROPHILS # BLD AUTO: 4.6 10^3/UL (ref 1.5–7.7)
NRBC # BLD AUTO: 0 10^3/UL
NRBC BLD QL AUTO: 0
PLATELET # BLD AUTO: 246 10^3/UL (ref 150–450)
RBC # BLD AUTO: 4.77 10^6/UL (ref 4–5.4)
WBC # BLD AUTO: 8 10^3/UL (ref 3.5–10.8)

## 2018-01-01 PROCEDURE — 87086 URINE CULTURE/COLONY COUNT: CPT

## 2018-01-01 PROCEDURE — 81003 URINALYSIS AUTO W/O SCOPE: CPT

## 2018-01-01 PROCEDURE — 97530 THERAPEUTIC ACTIVITIES: CPT

## 2018-01-01 PROCEDURE — 80053 COMPREHEN METABOLIC PANEL: CPT

## 2018-01-01 PROCEDURE — C1776 JOINT DEVICE (IMPLANTABLE): HCPCS

## 2018-01-01 PROCEDURE — 86141 C-REACTIVE PROTEIN HS: CPT

## 2018-01-01 PROCEDURE — 85025 COMPLETE CBC W/AUTO DIFF WBC: CPT

## 2018-01-01 PROCEDURE — 84100 ASSAY OF PHOSPHORUS: CPT

## 2018-01-01 PROCEDURE — 87106 FUNGI IDENTIFICATION YEAST: CPT

## 2018-01-01 PROCEDURE — 83735 ASSAY OF MAGNESIUM: CPT

## 2018-01-01 PROCEDURE — 81015 MICROSCOPIC EXAM OF URINE: CPT

## 2018-01-01 PROCEDURE — 88304 TISSUE EXAM BY PATHOLOGIST: CPT

## 2018-01-01 PROCEDURE — 36415 COLL VENOUS BLD VENIPUNCTURE: CPT

## 2018-01-01 PROCEDURE — 86140 C-REACTIVE PROTEIN: CPT

## 2018-01-01 PROCEDURE — 80048 BASIC METABOLIC PNL TOTAL CA: CPT

## 2018-01-01 RX ADMIN — Medication SCH: at 10:50

## 2018-01-01 RX ADMIN — HYDROCORTISONE SCH: 1 CREAM TOPICAL at 10:50

## 2018-01-01 RX ADMIN — PREGABALIN SCH MG: 50 CAPSULE ORAL at 14:12

## 2018-01-01 RX ADMIN — CLOTRIMAZOLE SCH: 10 CREAM TOPICAL at 10:49

## 2018-01-01 RX ADMIN — CYANOCOBALAMIN TAB 500 MCG SCH: 500 TAB at 10:49

## 2018-01-01 RX ADMIN — FENTANYL TRANSDERMAL SCH MCG: 75 PATCH, EXTENDED RELEASE TRANSDERMAL at 11:01

## 2018-01-01 RX ADMIN — PREGABALIN SCH MG: 50 CAPSULE ORAL at 20:15

## 2018-01-01 RX ADMIN — HYDROCORTISONE SCH APPLIC: 1 CREAM TOPICAL at 20:16

## 2018-01-01 RX ADMIN — OXYCODONE HYDROCHLORIDE PRN MG: 5 CAPSULE ORAL at 14:12

## 2018-01-01 RX ADMIN — FUROSEMIDE SCH: 40 TABLET ORAL at 10:50

## 2018-01-01 RX ADMIN — Medication SCH: at 10:49

## 2018-01-01 RX ADMIN — PREGABALIN SCH: 50 CAPSULE ORAL at 10:50

## 2018-01-01 RX ADMIN — OXYCODONE HYDROCHLORIDE AND ACETAMINOPHEN SCH: 500 TABLET ORAL at 10:49

## 2018-01-01 RX ADMIN — FOLIC ACID SCH: 1 TABLET ORAL at 10:49

## 2018-01-01 RX ADMIN — FUROSEMIDE SCH MG: 40 TABLET ORAL at 20:15

## 2018-01-01 RX ADMIN — HYDROCORTISONE SCH APPLIC: 1 CREAM TOPICAL at 14:12

## 2018-01-01 NOTE — PN
Progress Note





- Progress Note


Date of Service: 01/01/18


SOAP: 


Subjective:


Pt seen , chart reviewed.  Feels well


Fecal incontinence and for planned diverting colostomy tomorrow.  


No new compliants.


  New wheelchair has been obtained.








Objective:


af vss





labs reviewed








Assessment:


Fecal incontinence








Plan:


OR tomorrow with Dr Mathias.


NPO at [facility]


Clears today

## 2018-01-01 NOTE — HP
CC:  Dr. Vicente.*

 

HOSPITAL MEDICINE HISTORY AND PHYSICAL:

 

DATE OF ADMISSION:  18

 

PRIMARY CARE PHYSICIAN:  Dr. Vicente.

 

ATTENDING PHYSICIAN:  Dr. Disha Duncan * (dictation provided by Yomaira Light NP
)

 

REASON FOR ADMISSION:  Planned diverting colostomy.

 

HISTORY OF PRESENT ILLNESS:  Mr. Pryor is a 72-year-old male with a past 
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 
sacral osteomyelitis due to a sacral decubitus ulcer, status post muscle flap.  
The patient was admitted to our hospital most recently from 17 to  after the muscle flap placement for his decubitus ulcer.  He was doing well 
and working on the transfers.  Mr. Pryor continued to have frequent small 
amounts of diarrhea with incontinence , despite immodium and fiber 
supplementaton, and plans were made for him to undergo a diverting colostomy to 
protect his muscle flap and prevent further decubitus ulcerations.  In 
preparation for this he underwent a colonoscopy on 17 with Dr. Noland.  
Please see his report for complete details, but his impression was that there 
was "rectal irritation.  Normal colon - the patient prepared for ventral 
colostomy."  



The patient and his wife state he has been doing well since returning home.  
The only issue is that he has not had a bowel movement for the past 3 days, 
which is not entirely unexpected after his recent bowel prep for colonoscopy.  
The patient states he has been eating and drinking normally.  He has had no 
nausea, vomiting, or abdominal pain.  There have been no changes with this 
medications.  The wife has continued to do his daily dressing changes and notes 
that the small opening at the gluteal cleft, which had been preexisting area 
left open by the surgeon due to inability to close at that location, has been 
doing well and seems to be continuing to heal well.  The patient has had no 
other issue with muscle flap.

 

PAST MEDICAL HISTORY:

1.  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

2.  History of sacral osteomyelitis, resolved.

3.  History of sacral decubitus ulcer, now status post muscle flap.

4.  Chronic pain with opioid dependence.

5.  Neurogenic bladder and bowel.

6.  Obstructive sleep apnea.

7.  Chronic kidney disease.

8.  Asthma.

9.  Carotid artery stenosis.

10.  Appendectomy.

11.  Right carotid endarterectomy.

 

The patient presents to the hospital for a planned diverting colostomy.  He has 
had ongoing issues with diarrhea.  There is concern that this will ultimately 
undermine the integrity of his new muscle flap and therefore diverting 
colostomy is planned.

 

MEDICATIONS:  Medications today are:

1.  Vitamin D3 5000 units p.o. daily.

2.  Tylenol 650 mg p.o. q.6 hours as needed.

3.  Vitamin C 500 mg p.o. daily.

4.  Aspirin 81 mg p.o. daily.

5.  Folic acid 0.5 mg p.o. daily.

6.  Clotrimazole cream 1% one application daily.

7.  Lomotil 1 tablet p.o. b.i.d. as scheduled.

8.  Fentanyl patch 75 mcg transdermally q.72 hours.

9.  Ferrous gluconate 324 mg p.o. daily.

10.  Lactobacillus acidophilus 1 capsule p.o. daily.

11.  Oxycodone 5 mg p.o. q.4 hours as needed for pain.

12.  Zinc sulfate 220 mg p.o. daily.

13.  Medihoney 1 gel topically b.i.d. to the gluteal cleft.

14.  Vitamin B12 1000 mcg p.o. daily.

15.  Furosemide 40 mg p.o. b.i.d.

16.  Hydrocortisone 1 application topically t.i.d. as needed.

17.  Prilosec 40 mg p.o. daily.

18.  Lyrica 150 mg p.o. t.i.d.

 

ALLERGIES:  LIPITOR, CHICKEN, BANANA, and GRAPES.

 

FAMILY HISTORY:  Notable for father who  of Parkinson's disease and mother 
is alive at age 99 as of our last discussion with him.

 

SOCIAL HISTORY:  The patient is disabled.  He is .  His wife is the 
surrogate decision maker.  There is no reported alcohol, tobacco, or drug use.

 

REVIEW OF SYSTEMS:  A 14-point review of systems was completed with Mr. Pryor 
today and all those not mentioned above were negative.

 

                               PHYSICAL EXAMINATION

 

GENERAL:  Mr. Pryor is lying in the bed with his wife at the bedside, in no 
distress and in good spirits.

 

VITAL SIGNS:  Vital signs are pending, being placed in the computer and not 
available at the time of this dictation.

 

LUNGS:  Lungs are clear to auscultation bilaterally today with no accessory 
muscle use and good aeration.

 

HEART:  S1 and S2.  No murmur, rub or gallop, and regular.

 

ABDOMEN:  Soft, nontender with bowel sounds positive x4.

 

EXTREMITIES:  No cyanosis, no edema.

 

NEURO:  He is alert.  He is oriented x3.  He does not move his lower 
extremities. There is good  movement in the bilateral upper extremities.  There 
is no facial asymmetry or focal weakness.  His speech is clear.  Extraocular 
movements are intact.

 

SKIN:  The patient has a well-healing flap with a small ulceration at the 
gluteal cleft measuring 1.5 cm x 1.5 cm, it is 1 cm deep.  There is no 
tunneling.  The entire skin is pink and blanchable.  The patient has a few 
scattered spots, rashy red spots, which the patient's wife states he is 
treating with clotrimazole.

 

 DIAGNOSTIC STUDIES/LAB DATA:  Labs for today are pending.

 

ASSESSMENT AND PLAN:  Mr. Pryor is a 72-year-old male with a past medical 
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 
osteomyelitis with decubitus ulcer, now status post muscle flap with good 
healing.  He presents today to the hospital with a planned diverting colostomy.

1.  Planned diverting colostomy.  Dr. Mathias has been contacted and the 
patient will be going for diverting colostomy tomorrow.  I note that the 
patient has had not stool for 3 days.  Dr. Mathias has ordered a clear liquid 
diet.  He will be seeing the patient later today and will add on any other 
bowel medications or prep that he feels as necessary.  Patient is paraplegic 
and therefore not able to obtain 4 METS of activity.  However, he has no known 
history of coronary artery disease,  MI, stroke or CHF.  He does have a history 
of carotid endarterectomy to suggest generalized atherosclerosis.  However, 
based on the RCRI he has no cardiac risk factors and an 0.04% of cardiac 
complications in the lidia-operative period.  I do not recommend any further 
testing prior to surgery.

2.  Chronic pain.  Continue fentanyl patch and oxycodone.

3.  Recent muscle flap for sacral decubitus ulcer.  Continues to heal well.  
Plan to continue Medihoney dressings daily.

4.  Hypertension.  Plan to continue Lasix twice daily today, but we will hold 
in a.m. and we will add back in based on clinical course postoperatively.

5.  DVT prophylaxis is with SCDs only at this point per the recommendation of 
Dr. Mathias.  Additional chemical prophylaxis will be added on postoperatively.

6.  Gastroesophageal reflux disease.  Continue Prilosec.

7.  Chronic kidney disease stage 3.  BMP is pending and we will monitor that 
intermittently during the hospitalization.

8.  Neurogenic bladder.  Continue Rodriguez with plan to change every 30 days as 
needed per routine.

9.  Code status:  Full code.

 

TIME SPENT:  Approximately 60 minutes were spent in the admission of this 
patient, more than half the time spent with the patient and his wife at the 
bedside reviewing the events leading up to this hospitalization, performing 
physical examination and reviewing my plan of care.

 

 ____________________________________ 

YOMAIRA LIGHT, NP

 

[id number]/CPS #: [id number]

ELISABET

## 2018-01-02 PROCEDURE — 0D1N4Z4 BYPASS SIGMOID COLON TO CUTANEOUS, PERCUTANEOUS ENDOSCOPIC APPROACH: ICD-10-PCS | Performed by: SURGERY

## 2018-01-02 RX ADMIN — PREGABALIN SCH MG: 50 CAPSULE ORAL at 20:36

## 2018-01-02 RX ADMIN — HYDROMORPHONE HYDROCHLORIDE PRN MG: 1 INJECTION, SOLUTION INTRAMUSCULAR; INTRAVENOUS; SUBCUTANEOUS at 18:17

## 2018-01-02 RX ADMIN — WATER SCH NOTE: 100 INJECTION, SOLUTION INTRAVENOUS at 18:53

## 2018-01-02 RX ADMIN — POTASSIUM CHLORIDE SCH MEQ: 750 TABLET, FILM COATED, EXTENDED RELEASE ORAL at 17:17

## 2018-01-02 RX ADMIN — FOLIC ACID SCH: 1 TABLET ORAL at 15:58

## 2018-01-02 RX ADMIN — HYDROCORTISONE SCH: 1 CREAM TOPICAL at 15:58

## 2018-01-02 RX ADMIN — Medication SCH: at 15:57

## 2018-01-02 RX ADMIN — Medication SCH: at 15:58

## 2018-01-02 RX ADMIN — WATER SCH: 100 INJECTION, SOLUTION INTRAVENOUS at 15:57

## 2018-01-02 RX ADMIN — OMEPRAZOLE SCH: 20 CAPSULE, DELAYED RELEASE ORAL at 15:57

## 2018-01-02 RX ADMIN — HYDROCORTISONE SCH APPLIC: 1 CREAM TOPICAL at 20:39

## 2018-01-02 RX ADMIN — PREGABALIN SCH MG: 50 CAPSULE ORAL at 16:17

## 2018-01-02 RX ADMIN — PREGABALIN SCH: 50 CAPSULE ORAL at 15:58

## 2018-01-02 RX ADMIN — CLOTRIMAZOLE SCH: 10 CREAM TOPICAL at 15:57

## 2018-01-02 RX ADMIN — LISINOPRIL SCH MG: 5 TABLET ORAL at 17:17

## 2018-01-02 RX ADMIN — OXYCODONE HYDROCHLORIDE AND ACETAMINOPHEN SCH: 500 TABLET ORAL at 15:57

## 2018-01-02 RX ADMIN — CYANOCOBALAMIN TAB 500 MCG SCH: 500 TAB at 15:58

## 2018-01-02 RX ADMIN — OXYCODONE HYDROCHLORIDE PRN MG: 5 CAPSULE ORAL at 17:17

## 2018-01-02 RX ADMIN — HYDROMORPHONE HYDROCHLORIDE PRN MG: 1 INJECTION, SOLUTION INTRAMUSCULAR; INTRAVENOUS; SUBCUTANEOUS at 20:37

## 2018-01-02 NOTE — PN
Subjective


Date of Service: 01/02/18


Interval History: 





Patient went for colostomy today. Examined when back in room. Rates incision 

pain at 6/10. States his feet hurt, but this often happens when he has delayed 

his lyrica. Denies other complaints. No events when discharged from the hospital

, did well at home with support of wife. 


Family History: Unchanged from Admission


Social History: Unchanged from Admission


Past Medical History: Unchanged from Admission





Objective


Active Medications: 








Acetaminophen (Tylenol Tab*)  650 mg PO Q6H PRN


   PRN Reason: FEVER/PAIN


Ascorbic Acid (Vitamin C  Tab*)  500 mg PO DAILY [facility]


   Last Admin: 01/02/18 15:57 Dose:  Not Given


Cholecalciferol (Vitamin D Tab*)  5,000 units PO DAILY [facility]


   Last Admin: 01/02/18 15:57 Dose:  Not Given


Clotrimazole (Clotrimazole 1%*)  1 applic TOPICAL DAILY [facility]


   Last Admin: 01/02/18 15:57 Dose:  Not Given


Cyanocobalamin (Vitamin B12 Tab*)  1,000 mcg PO DAILY [facility]


   Last Admin: 01/02/18 15:58 Dose:  Not Given


Fentanyl (Duragesic  Patch 75 Mcg/Hr*)  75 mcg TRANSDERM Q72H [facility]


   Last Admin: 01/01/18 11:01 Dose:  75 mcg


Ferrous Gluconate (Fergon Tab*)  324 mg PO DAILY [facility]


   Last Admin: 01/02/18 15:58 Dose:  Not Given


Folic Acid (Folvite Tab*)  0.5 mg PO DAILY [facility]


   Last Admin: 01/02/18 15:58 Dose:  Not Given


Hydrocortisone (Hytone Cream 1%*)  1 applic TOPICAL TID [facility]


   Last Admin: 01/02/18 15:58 Dose:  Not Given


Hydromorphone HCl (Dilaudid Injic*)  0.5 mg IV SLOW PU Q1H PRN


   PRN Reason: PAIN


Lactobacillus Rhamnosus (Culturelle*)  1 cap PO DAILY [facility]


   Last Admin: 01/02/18 15:58 Dose:  Not Given


Lisinopril (Prinivil Tab*)  5 mg PO DAILY [facility]


Omeprazole (Prilosec Cap*)  40 mg PO DAILY@0700 [facility]


   Last Admin: 01/02/18 15:57 Dose:  Not Given


Oxycodone HCl (Roxycodone Tab*)  5 mg PO Q4H PRN


   PRN Reason: PAIN


   Last Admin: 01/01/18 14:12 Dose:  5 mg


Pharmacy Profile Note (Fentanyl Patch Check Q Shift)  1 note N/A 0700,1900 [facility]


   Last Admin: 01/02/18 15:57 Dose:  Not Given


Potassium Chloride (Klor Con Er Tab*)  10 meq PO DAILY [facility]


Pregabalin (Lyrica Cap(*))  150 mg PO TID [facility]


   Last Admin: 01/02/18 16:17 Dose:  150 mg


Zinc Sulfate (Zinc-220 Cap*)  220 mg PO DAILY [facility]


   Last Admin: 01/02/18 15:58 Dose:  Not Given








 Vital Signs - 8 hr











  01/02/18 01/02/18





  16:17 16:26


 


Temperature  97.4 F


 


Pulse Rate  82


 


Respiratory 16 16





Rate  


 


Blood Pressure  178/76





(mmHg)  


 


O2 Sat by Pulse  99





Oximetry  











Oxygen Devices in Use Now: Nasal Cannula - 2L


Appearance: Patient is a 71yo male who appears stated age and is sitting in the 

bed in NAD.


Eyes: No Scleral Icterus, PERRLA


Ears/Nose/Mouth/Throat: NL Teeth, Lips, Gums, Clear Oropharnyx, Mucous 

Membranes Moist


Neck: NL Appearance and Movements; NL JVP, Trachea Midline


Respiratory: Symmetrical Chest Expansion and Respiratory Effort, Clear to 

Auscultation


Cardiovascular: NL Sounds; No Murmurs; No JVD, RRR, - - 1+ edema in B/L LE. 

Pulses normal.


Abdominal: No Hepatosplenomegaly, - - Tenderness. Dark Red stoma covered by bag 

draining dark red blood. 


Lymphatic: No Cervical Adenopathy


Extremities: No Clubbing, Cyanosis


Skin: No Nodules or Sclerosis, - - Improved serpinginous rash on abdomen 


Neurological: - - Sedated. A/Ox2. CN II-XII intact. Insensate and immobile 

below waist. 


Result Diagrams: 


 01/01/18 09:07





 01/01/18 09:07





Assess/Plan/Problems-Billing


Assessment: 





Patient is a 71yo male with a PMH significant for Ependymoma with several 

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 

post flap who presents for diverting colostomy to aid in the healing of his 

ulcer, which he tolerated well. 





- Patient Problems


(1) Chronic diarrhea


Current Visit: No   Status: Acute   Code(s): K52.9 - NONINFECTIVE 

GASTROENTERITIS AND COLITIS, UNSPECIFIED   SNOMED Code(s): 842757981


   Comment: 


S/P diverting colostomy. POD#0. Appreciate General Surgery. Will continue pain 

control on home meds. Patient has not had BM in 4 days since Bowel Prep. Will 

monitor and give laxatives as needed.    





(2) Acute osteomyelitis of sacrum


Current Visit: No   Status: Acute   Priority: High   Code(s): M46.28 - 

OSTEOMYELITIS OF VERTEBRA, SACRAL AND SACROCOCCYGEAL REGION   SNOMED Code(s): 

172710512


   Comment: 


s/p debridement at time of flap creation at [facility]


Completed antibiotics 12/6   





(3) S/P flap graft


Current Visit: No   Status: Acute   Priority: High   Code(s): Z98.890 - OTHER 

SPECIFIED POSTPROCEDURAL STATES   SNOMED Code(s): 393899848


   Comment: 


10/25/2017 at [facility] over large sacral defect


Continue fentanyl patch, PRN oxycodone, APAP


Small open area being treated with Medihoney and gauze which is nearly healed


up to chair max 2hrs, three times daily





   





(4) Tinea corporis


Current Visit: No   Status: Acute   Code(s): B35.4 - TINEA CORPORIS   SNOMED 

Code(s): 51170814


   Comment: 


Improving Annular scaly lesion on hip and new area on abdomen and in groin.


Continue Clotrimazole.    





(5) Chronic indwelling Rodriguez catheter


Current Visit: No   Status: Chronic   Priority: High   Code(s): Z92.89 - 

PERSONAL HISTORY OF OTHER MEDICAL TREATMENT   SNOMED Code(s): 833368485


   Comment: 


Secondary to neurogenic bladder


No signs of UTI   





(6) BONIFACIO (obstructive sleep apnea)


Current Visit: No   Status: Chronic   Code(s): G47.33 - OBSTRUCTIVE SLEEP APNEA 

(ADULT) (PEDIATRIC)   SNOMED Code(s): 84167298


   Comment: 


Does not wear bipap, states he cannot get a good seal on mask.     





(7) HTN (hypertension)


Current Visit: No   Status: Chronic   Code(s): I10 - ESSENTIAL (PRIMARY) 

HYPERTENSION   SNOMED Code(s): 23779913


   Comment: 


Hypertensive after surgery


Continue lasix and start Lisinopril for HTN and renal protection.   





(8) Hypokalemia


Current Visit: Yes   Status: Acute   Code(s): E87.6 - HYPOKALEMIA   SNOMED Code(

s): 06889898


   Comment: 


3.1 on admission. Resume supplementation.    





(9) Full code status


Current Visit: No   Status: Acute   Code(s): Z78.9 - OTHER SPECIFIED HEALTH 

STATUS   SNOMED Code(s): 451809400


   Comment: 


   





(10) DVT prophylaxis


Current Visit: No   Status: Acute   Code(s): FEQ6742 -    SNOMED Code(s): 

613222217


   Comment: 


- Lovenox, SCDs   


Status and Disposition: 





Patient is admitted inpatient. Will discharge when able to resume home routine.

## 2018-01-02 NOTE — PN
Progress Note





- Progress Note


Date of Service: 01/02/18


Note: 


Brief Operative Note:





Pre-op:   Sacral decubitus with osteomylitis; Fecal incontinence





Post-op:   Same





Procedure:   Laparoscopic sigmoid colostomy





Surgeon:   Dr. Mathias





Assistant;   RAYRAY Hays PA-S





Anaesthesia:   GETA





EBL:   Minimal





Fluids:   1500 cc LR





Drains:   None





Catheter:   Rodriguez to gravity





Specimen:   None





Findings:   See dictated op note

## 2018-01-03 LAB
BASOPHILS # BLD AUTO: 0 10^3/UL (ref 0–0.2)
EOSINOPHIL # BLD AUTO: 0 10^3/UL (ref 0–0.6)
HCT VFR BLD AUTO: 35 % (ref 42–52)
HGB BLD-MCNC: 12.1 G/DL (ref 14–18)
LYMPHOCYTES # BLD AUTO: 1.5 10^3/UL (ref 1–4.8)
MCH RBC QN AUTO: 28 PG (ref 27–31)
MCHC RBC AUTO-ENTMCNC: 34 G/DL (ref 31–36)
MCV RBC AUTO: 81 FL (ref 80–94)
MONOCYTES # BLD AUTO: 0.9 10^3/UL (ref 0–0.8)
NEUTROPHILS # BLD AUTO: 5.9 10^3/UL (ref 1.5–7.7)
NRBC # BLD AUTO: 0 10^3/UL
NRBC BLD QL AUTO: 0
PLATELET # BLD AUTO: 282 10^3/UL (ref 150–450)
RBC # BLD AUTO: 4.36 10^6/UL (ref 4–5.4)
WBC # BLD AUTO: 8.4 10^3/UL (ref 3.5–10.8)

## 2018-01-03 PROCEDURE — 0DBN4ZZ EXCISION OF SIGMOID COLON, PERCUTANEOUS ENDOSCOPIC APPROACH: ICD-10-PCS | Performed by: SURGERY

## 2018-01-03 PROCEDURE — 0W9F4ZZ DRAINAGE OF ABDOMINAL WALL, PERCUTANEOUS ENDOSCOPIC APPROACH: ICD-10-PCS | Performed by: SURGERY

## 2018-01-03 RX ADMIN — CLOTRIMAZOLE SCH APPLIC: 10 CREAM TOPICAL at 08:57

## 2018-01-03 RX ADMIN — POTASSIUM CHLORIDE SCH MEQ: 750 TABLET, FILM COATED, EXTENDED RELEASE ORAL at 08:34

## 2018-01-03 RX ADMIN — HYDROMORPHONE HYDROCHLORIDE PRN MG: 1 INJECTION, SOLUTION INTRAMUSCULAR; INTRAVENOUS; SUBCUTANEOUS at 21:30

## 2018-01-03 RX ADMIN — PREGABALIN SCH MG: 50 CAPSULE ORAL at 08:34

## 2018-01-03 RX ADMIN — PREGABALIN SCH: 50 CAPSULE ORAL at 17:00

## 2018-01-03 RX ADMIN — Medication SCH UNITS: at 08:43

## 2018-01-03 RX ADMIN — HYDROMORPHONE HYDROCHLORIDE PRN MG: 1 INJECTION, SOLUTION INTRAMUSCULAR; INTRAVENOUS; SUBCUTANEOUS at 09:23

## 2018-01-03 RX ADMIN — OXYCODONE HYDROCHLORIDE PRN MG: 5 CAPSULE ORAL at 08:43

## 2018-01-03 RX ADMIN — LISINOPRIL SCH MG: 5 TABLET ORAL at 08:35

## 2018-01-03 RX ADMIN — Medication SCH MG: at 08:34

## 2018-01-03 RX ADMIN — OMEPRAZOLE SCH MG: 20 CAPSULE, DELAYED RELEASE ORAL at 06:57

## 2018-01-03 RX ADMIN — CYANOCOBALAMIN TAB 500 MCG SCH MCG: 500 TAB at 08:34

## 2018-01-03 RX ADMIN — Medication SCH CAP: at 08:34

## 2018-01-03 RX ADMIN — WATER SCH NOTE: 100 INJECTION, SOLUTION INTRAVENOUS at 18:37

## 2018-01-03 RX ADMIN — FOLIC ACID SCH MG: 1 TABLET ORAL at 08:35

## 2018-01-03 RX ADMIN — HYDROCORTISONE SCH APPLIC: 1 CREAM TOPICAL at 21:50

## 2018-01-03 RX ADMIN — OXYCODONE HYDROCHLORIDE AND ACETAMINOPHEN SCH MG: 500 TABLET ORAL at 08:34

## 2018-01-03 RX ADMIN — HYDROCORTISONE SCH: 1 CREAM TOPICAL at 11:13

## 2018-01-03 RX ADMIN — HYDROCORTISONE SCH: 1 CREAM TOPICAL at 17:01

## 2018-01-03 RX ADMIN — PREGABALIN SCH MG: 50 CAPSULE ORAL at 21:30

## 2018-01-03 RX ADMIN — HYDROMORPHONE HYDROCHLORIDE PRN MG: 1 INJECTION, SOLUTION INTRAMUSCULAR; INTRAVENOUS; SUBCUTANEOUS at 22:31

## 2018-01-03 RX ADMIN — WATER SCH NOTE: 100 INJECTION, SOLUTION INTRAVENOUS at 06:56

## 2018-01-03 RX ADMIN — Medication SCH MG: at 08:36

## 2018-01-03 RX ADMIN — HYDROMORPHONE HYDROCHLORIDE PRN MG: 1 INJECTION, SOLUTION INTRAMUSCULAR; INTRAVENOUS; SUBCUTANEOUS at 18:43

## 2018-01-03 NOTE — OP
CC:  Rafy Vicente MD; Luis Felipe Chirinos MD, [facility]; 
Sánchez Noland MD *

 

DATE OF OPERATION:  01/02/18 - ROOM #346

 

DATE OF BIRTH:  [birth year]

 

SURGEON:  Lux Mathias MD

 

ASSISTANT:  MATEUS Ricks

 

ANESTHESIOLOGIST:  Lawrence Mckeon MD

 

ANESTHESIA:  General endotracheal.

 

PRE-OP DIAGNOSIS:  Sacral osteomyelitis and fecal incontinence.

 

POST-OP DIAGNOSIS:  Sacral osteomyelitis and fecal incontinence.

 

OPERATIVE PROCEDURE:  Laparoscopic colostomy.

 

ESTIMATED BLOOD LOSS:  Minimal.

 

IV FLUIDS:  1.5 Liters crystalloid.

 

SPECIMENS:  None.

 

DRAINS:  None.

 

COMPLICATIONS:  None.

 

COUNTS:  Instrument, needle, and sponge counts correct.

 

DESCRIPTION OF PROCEDURE:  The patient was brought to the operating room and 
placed on the table supine.  Sequential compression devices were placed on both 
lower extremities.  General anesthesia was administered.  His abdomen was 
prepped and draped in the usual sterile fashion.  Time-out was performed.

 

Local anesthetic was infiltrated to the skin and soft tissue prior to making 
each incision.  Entry into the abdomen was through a transumbilical incision 
vertically using an open technique.  After accessing the peritoneal cavity a 5-
mm trocar was placed and inspection revealed no adhesions in the left lower 
quadrant with redundant sigmoid colon.  5-mm trocars were placed in the 
suprapubic region and in the site of the preoperatively marked proposed ostomy 
site.  Four 5-mm trocars were placed in the upper midline in the abdomen.  The 
descending colon and sigmoid colon were mobilized using combination of LigaSure 
and cautery and sharp dissection. There appeared to be adequate length of 
descending and sigmoid colon to be brought up as a loop to the anterior 
abdominal wall and then a disc of skin was removed from the proposed ostomy 
site in the left lower quadrant.  Subcutaneous fat was also excised and the 
anterior rectus fascia was incised in a cruciate fashion.  The underlying 
rectus muscle was retracted medially and laterally and the posterior sheath was 
elevated and incised, dilated with the finger and then the loop of colon was 
brought up through this opening.  The bowel was encircled with a red rubber 
catheter to elevate it.  Subsequently, it was decided to divide the bowel with 
a ELANA stapler and the distal portion was left within the subcutaneous tissues.  
The proximal portion was used for the colostomy.  The ports were removed under 
direct visualization and carbon dioxide was released.  The umbilical wound was 
closed with 0 Polysorb for the fascia and the skin was closed with 4-0 
Monocryl.  DermaFlex was applied at the umbilical incision and Steri-Strips at 
the remaining incisions.  The colostomy was then matured with 3-0 Vicryl suture 
and digital exam revealed adequate patency.  The appliance was placed and the 
patient was subsequently extubated and awakened and transferred to the recovery 
in stable condition.

 

 [id number]/CPS #: [id number]

[facility] Health CenterJENNIFER

## 2018-01-03 NOTE — PN
Subjective


Date of Service: 01/03/18


Interval History: 





Patient examined post-op. Complains of pain in back and abdomen but unable to 

quantify. Patient shivering and confused. A/Ox2. Patient denies other 

complaints but ROS limited. Patient went back to the OR today for ischemic 

colostomy. No signs of sepsis on morning lab work. 


Family History: Unchanged from Admission


Social History: Unchanged from Admission


Past Medical History: Unchanged from Admission





Objective


Active Medications: 








Acetaminophen (Tylenol Tab*)  650 mg PO Q6H PRN


   PRN Reason: FEVER/PAIN


Ascorbic Acid (Vitamin C  Tab*)  500 mg PO DAILY [facility]


   Last Admin: 01/03/18 08:34 Dose:  500 mg


Cholecalciferol (Vitamin D Tab*)  5,000 units PO DAILY [facility]


   Last Admin: 01/03/18 08:43 Dose:  5,000 units


Clotrimazole (Clotrimazole 1%*)  1 applic TOPICAL DAILY [facility]


   Last Admin: 01/03/18 08:57 Dose:  1 applic


Cyanocobalamin (Vitamin B12 Tab*)  1,000 mcg PO DAILY [facility]


   Last Admin: 01/03/18 08:34 Dose:  1,000 mcg


Fentanyl (Duragesic  Patch 75 Mcg/Hr*)  75 mcg TRANSDERM Q72H [facility]


   Last Admin: 01/01/18 11:01 Dose:  75 mcg


Fentanyl Citrate (Fentanyl*)  50 mcg IV Q5M PRN


   PRN Reason: PAIN - MODERATE


   Stop: 01/03/18 20:00


Ferrous Gluconate (Fergon Tab*)  324 mg PO DAILY [facility]


   Last Admin: 01/03/18 08:34 Dose:  324 mg


Folic Acid (Folvite Tab*)  0.5 mg PO DAILY [facility]


   Last Admin: 01/03/18 08:35 Dose:  0.5 mg


Heparin Sodium (Porcine) (Heparin Vial(*))  5,000 units SUBCUT Q8HR [facility]


Hydrocortisone (Hytone Cream 1%*)  1 applic TOPICAL TID [facility]


   Last Admin: 01/03/18 17:01 Dose:  Not Given


Hydromorphone HCl (Dilaudid Injic*)  0.5 mg IV SLOW PU Q1H PRN


   PRN Reason: PAIN


   Last Admin: 01/03/18 09:23 Dose:  0.5 mg


Lactobacillus Rhamnosus (Culturelle*)  1 cap PO DAILY [facility]


   Last Admin: 01/03/18 08:34 Dose:  1 cap


Lisinopril (Prinivil Tab*)  5 mg PO DAILY [facility]


   Last Admin: 01/03/18 08:35 Dose:  5 mg


Naloxone HCl (Narcan*)  0.08 mg IV Q2M PRN


   PRN Reason: severe induced resp depression


   Stop: 01/04/18 08:00


Omeprazole (Prilosec Cap*)  40 mg PO DAILY@0700 [facility]


   Last Admin: 01/03/18 06:57 Dose:  40 mg


Oxycodone HCl (Roxycodone Tab*)  5 mg PO Q4H PRN


   PRN Reason: PAIN


   Last Admin: 01/03/18 08:43 Dose:  5 mg


Pharmacy Profile Note (Fentanyl Patch Check Q Shift)  1 note N/A 0700,1900 [facility]


   Last Admin: 01/03/18 06:56 Dose:  1 note


Potassium Chloride (Klor Con Er Tab*)  10 meq PO DAILY [facility]


   Last Admin: 01/03/18 08:34 Dose:  10 meq


Pregabalin (Lyrica Cap(*))  150 mg PO TID [facility]


   Last Admin: 01/03/18 17:00 Dose:  Not Given


Zinc Sulfate (Zinc-220 Cap*)  220 mg PO DAILY [facility]


   Last Admin: 01/03/18 08:36 Dose:  220 mg








 Vital Signs - 8 hr











  01/03/18 01/03/18 01/03/18





  10:16 10:38 10:39


 


Temperature   


 


Pulse Rate   


 


Respiratory 16 16 16





Rate   


 


Blood Pressure   





(mmHg)   


 


O2 Sat by Pulse   





Oximetry   














  01/03/18 01/03/18 01/03/18





  11:24 15:31 15:35


 


Temperature 98.2 F 98.1 F 


 


Pulse Rate 74 70 65


 


Respiratory 16 14 14





Rate   


 


Blood Pressure 159/61 178/80 182/84





(mmHg)   


 


O2 Sat by Pulse 92 100 100





Oximetry   














  01/03/18 01/03/18 01/03/18





  15:40 15:45 16:00


 


Temperature   


 


Pulse Rate 70 74 76


 


Respiratory 14 14 12





Rate   


 


Blood Pressure 171/86 192/85 153/68





(mmHg)   


 


O2 Sat by Pulse 100 100 99





Oximetry   














  01/03/18 01/03/18 01/03/18





  16:15 16:30 16:45


 


Temperature  98.1 F 


 


Pulse Rate 83 86 79


 


Respiratory 14 14 14





Rate   


 


Blood Pressure 164/68 168/71 147/64





(mmHg)   


 


O2 Sat by Pulse 98 98 96





Oximetry   














  01/03/18 01/03/18 01/03/18





  17:00 17:15 17:27


 


Temperature  97.7 F 97.6 F


 


Pulse Rate 80 71 70


 


Respiratory 12 12 16





Rate   


 


Blood Pressure 146/70 143/70 148/62





(mmHg)   


 


O2 Sat by Pulse 92 96 98





Oximetry   











Oxygen Devices in Use Now: Nasal Cannula


Appearance: Patient is a 71yo male who appears stated age and is sitting in the 

bed, shivering profusely.


Eyes: No Scleral Icterus, PERRLA


Ears/Nose/Mouth/Throat: NL Teeth, Lips, Gums, Clear Oropharnyx, Mucous 

Membranes Moist


Neck: NL Appearance and Movements; NL JVP, Trachea Midline


Respiratory: Symmetrical Chest Expansion and Respiratory Effort, Clear to 

Auscultation


Cardiovascular: NL Sounds; No Murmurs; No JVD, RRR, No Edema


Abdominal: No Hepatosplenomegaly, - - Tenderness to palpation, pink stoma 

surrounded by bag with minor serous drainage. 


Lymphatic: No Cervical Adenopathy


Extremities: No Edema, No Clubbing, Cyanosis


Result Diagrams: 


 01/03/18 06:06





 01/03/18 06:06





Assess/Plan/Problems-Billing


Assessment: 





Patient is a 71yo male with a PMH significant for Ependymoma with several 

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 

post flap who presents for diverting colostomy to aid in the healing of his 

ulcer, which he tolerated well. 





- Patient Problems


(1) Chronic diarrhea


Current Visit: No   Status: Acute   Code(s): K52.9 - NONINFECTIVE 

GASTROENTERITIS AND COLITIS, UNSPECIFIED   SNOMED Code(s): 405121454


   Comment: 


S/P diverting colostomy with revision. POD#1 and #0 respectively. Appreciate 

General Surgery. Will continue pain control on home meds with Dilaudid PRN. 

Patient has not had BM in 4 days since Bowel Prep. Will monitor and give 

laxatives as needed. No signs of ischemia in current stoma.    





(2) Acute osteomyelitis of sacrum


Current Visit: No   Status: Acute   Priority: High   Code(s): M46.28 - 

OSTEOMYELITIS OF VERTEBRA, SACRAL AND SACROCOCCYGEAL REGION   SNOMED Code(s): 

306893094


   Comment: 


s/p debridement at time of flap creation at [facility]


Completed antibiotics 12/6   





(3) S/P flap graft


Current Visit: No   Status: Acute   Priority: High   Code(s): Z98.890 - OTHER 

SPECIFIED POSTPROCEDURAL STATES   SNOMED Code(s): 640682453


   Comment: 


10/25/2017 at [facility] over large sacral defect


Continue fentanyl patch, PRN oxycodone, APAP


Small open area being treated with Medihoney and gauze which is nearly healed


up to chair max 2hrs, three times daily


Graft shows no signs of deterioration. 





   





(4) Tinea corporis


Current Visit: No   Status: Acute   Code(s): B35.4 - TINEA CORPORIS   SNOMED 

Code(s): 16748808


   Comment: 


Improving Annular scaly lesion on hip and new area on abdomen and in groin.


Continue Clotrimazole.    





(5) Chronic indwelling Rodriguez catheter


Current Visit: No   Status: Chronic   Priority: High   Code(s): Z92.89 - 

PERSONAL HISTORY OF OTHER MEDICAL TREATMENT   SNOMED Code(s): 418872596


   Comment: 


Secondary to neurogenic bladder


No signs of UTI   





(6) BONIFACIO (obstructive sleep apnea)


Current Visit: No   Status: Chronic   Code(s): G47.33 - OBSTRUCTIVE SLEEP APNEA 

(ADULT) (PEDIATRIC)   SNOMED Code(s): 48230459


   Comment: 


Does not wear bipap, states he cannot get a good seal on mask.     





(7) HTN (hypertension)


Current Visit: No   Status: Chronic   Code(s): I10 - ESSENTIAL (PRIMARY) 

HYPERTENSION   SNOMED Code(s): 25465347


   Comment: 


More Hypertensive after second surgery. Given Hydralazine with good effect.


Continue lasix and start Lisinopril for HTN and renal protection.   





(8) Hypokalemia


Current Visit: Yes   Status: Acute   Code(s): E87.6 - HYPOKALEMIA   SNOMED Code(

s): 51090932


   Comment: 


3.1 on admission. 4.0 now. Resume supplementation.    





(9) Full code status


Current Visit: No   Status: Acute   Code(s): Z78.9 - OTHER SPECIFIED HEALTH 

STATUS   SNOMED Code(s): 180023833


   Comment: 


   





(10) DVT prophylaxis


Current Visit: No   Status: Acute   Code(s): IKT2326 -    SNOMED Code(s): 

064389451


   Comment: 


- Lovenox, SCDs   


Status and Disposition: 





Patient is admitted inpatient. Will discharge when able to resume home routine.

## 2018-01-03 NOTE — OP
Operative Report - Blank





- Operative Report


Date of Operation: 01/03/18


Note: 





Preop Dx: Ischemic colostomy


Postop Dx: same


Procedure: laparoscopic revision of colostomy


Anesthesia: GET


Surgeon: Mey


Asst: MATEUS Goldsmith; NATASHA Latif


Fluids: 1300 ml


EBL: < 50 ml


Drains: none


Specimen: colostomy


Findings: dictated

## 2018-01-04 LAB
BASOPHILS # BLD AUTO: 0.1 10^3/UL (ref 0–0.2)
EOSINOPHIL # BLD AUTO: 0 10^3/UL (ref 0–0.6)
HCT VFR BLD AUTO: 37 % (ref 42–52)
HGB BLD-MCNC: 12.3 G/DL (ref 14–18)
LYMPHOCYTES # BLD AUTO: 2.6 10^3/UL (ref 1–4.8)
MCH RBC QN AUTO: 27 PG (ref 27–31)
MCHC RBC AUTO-ENTMCNC: 33 G/DL (ref 31–36)
MCV RBC AUTO: 82 FL (ref 80–94)
MONOCYTES # BLD AUTO: 1.4 10^3/UL (ref 0–0.8)
NEUTROPHILS # BLD AUTO: 6.4 10^3/UL (ref 1.5–7.7)
NRBC # BLD AUTO: 0 10^3/UL
NRBC BLD QL AUTO: 0
PLATELET # BLD AUTO: 289 10^3/UL (ref 150–450)
RBC # BLD AUTO: 4.51 10^6/UL (ref 4–5.4)
WBC # BLD AUTO: 10.6 10^3/UL (ref 3.5–10.8)

## 2018-01-04 RX ADMIN — CYANOCOBALAMIN TAB 500 MCG SCH MCG: 500 TAB at 09:06

## 2018-01-04 RX ADMIN — OXYCODONE HYDROCHLORIDE PRN MG: 5 CAPSULE ORAL at 18:50

## 2018-01-04 RX ADMIN — Medication SCH UNITS: at 09:07

## 2018-01-04 RX ADMIN — Medication SCH MG: at 09:06

## 2018-01-04 RX ADMIN — WATER SCH NOTE: 100 INJECTION, SOLUTION INTRAVENOUS at 06:58

## 2018-01-04 RX ADMIN — Medication SCH CAP: at 09:06

## 2018-01-04 RX ADMIN — FOLIC ACID SCH MG: 1 TABLET ORAL at 09:05

## 2018-01-04 RX ADMIN — HYDROMORPHONE HYDROCHLORIDE PRN MG: 1 INJECTION, SOLUTION INTRAMUSCULAR; INTRAVENOUS; SUBCUTANEOUS at 04:59

## 2018-01-04 RX ADMIN — HEPARIN SODIUM SCH UNITS: 5000 INJECTION INTRAVENOUS; SUBCUTANEOUS at 04:59

## 2018-01-04 RX ADMIN — FENTANYL TRANSDERMAL SCH MCG: 75 PATCH, EXTENDED RELEASE TRANSDERMAL at 09:14

## 2018-01-04 RX ADMIN — CLOTRIMAZOLE SCH APPLIC: 10 CREAM TOPICAL at 14:30

## 2018-01-04 RX ADMIN — Medication SCH MG: at 09:05

## 2018-01-04 RX ADMIN — HYDROMORPHONE HYDROCHLORIDE PRN MG: 1 INJECTION, SOLUTION INTRAMUSCULAR; INTRAVENOUS; SUBCUTANEOUS at 02:08

## 2018-01-04 RX ADMIN — HYDROCORTISONE SCH: 1 CREAM TOPICAL at 14:25

## 2018-01-04 RX ADMIN — PREGABALIN SCH MG: 50 CAPSULE ORAL at 21:34

## 2018-01-04 RX ADMIN — OXYCODONE HYDROCHLORIDE PRN MG: 5 SOLUTION ORAL at 10:55

## 2018-01-04 RX ADMIN — HEPARIN SODIUM SCH UNITS: 5000 INJECTION INTRAVENOUS; SUBCUTANEOUS at 14:21

## 2018-01-04 RX ADMIN — HYDROCORTISONE SCH: 1 CREAM TOPICAL at 20:51

## 2018-01-04 RX ADMIN — HYDROMORPHONE HYDROCHLORIDE PRN MG: 1 INJECTION, SOLUTION INTRAMUSCULAR; INTRAVENOUS; SUBCUTANEOUS at 10:55

## 2018-01-04 RX ADMIN — OXYCODONE HYDROCHLORIDE AND ACETAMINOPHEN SCH MG: 500 TABLET ORAL at 09:05

## 2018-01-04 RX ADMIN — OXYCODONE HYDROCHLORIDE SCH: 5 CAPSULE ORAL at 20:51

## 2018-01-04 RX ADMIN — OXYCODONE HYDROCHLORIDE PRN MG: 5 CAPSULE ORAL at 09:05

## 2018-01-04 RX ADMIN — OXYCODONE HYDROCHLORIDE PRN MG: 5 CAPSULE ORAL at 14:52

## 2018-01-04 RX ADMIN — PREGABALIN SCH MG: 50 CAPSULE ORAL at 14:30

## 2018-01-04 RX ADMIN — FUROSEMIDE SCH MG: 40 TABLET ORAL at 18:02

## 2018-01-04 RX ADMIN — PREGABALIN SCH MG: 50 CAPSULE ORAL at 09:05

## 2018-01-04 RX ADMIN — OMEPRAZOLE SCH MG: 20 CAPSULE, DELAYED RELEASE ORAL at 06:58

## 2018-01-04 RX ADMIN — WATER SCH NOTE: 100 INJECTION, SOLUTION INTRAVENOUS at 18:52

## 2018-01-04 RX ADMIN — LISINOPRIL SCH MG: 5 TABLET ORAL at 09:05

## 2018-01-04 RX ADMIN — POTASSIUM CHLORIDE SCH MEQ: 750 TABLET, FILM COATED, EXTENDED RELEASE ORAL at 09:05

## 2018-01-04 RX ADMIN — HEPARIN SODIUM SCH UNITS: 5000 INJECTION INTRAVENOUS; SUBCUTANEOUS at 21:33

## 2018-01-04 RX ADMIN — OXYCODONE HYDROCHLORIDE PRN MG: 5 CAPSULE ORAL at 03:46

## 2018-01-04 NOTE — PN
Subjective


Date of Service: 01/04/18


Interval History: 





Patient examined and found to still be moderately confused, but states that he 

is doing ok. Patient states his pain is 5/10 at time of exam. Patient states 

his pain is in his lower back, legs and abdomen at the colostomy site. Patient 

denies N/V, F/C, chest pain, SOB, or other pain. Patient's wife concerned about 

pain control at discharge and requested that the oxycodone liquid be 

reinstituted. Wife states that pain control at home was better when she was 

able to give Oxycodone liquid prior to transfers.


Family History: Unchanged from Admission


Social History: Unchanged from Admission


Past Medical History: Unchanged from Admission





Objective


Active Medications: 








Acetaminophen (Tylenol Tab*)  650 mg PO Q6H PRN


   PRN Reason: FEVER/PAIN


Ascorbic Acid (Vitamin C  Tab*)  500 mg PO DAILY [facility]


   Last Admin: 01/04/18 09:05 Dose:  500 mg


Cholecalciferol (Vitamin D Tab*)  5,000 units PO DAILY [facility]


   Last Admin: 01/04/18 09:07 Dose:  5,000 units


Clotrimazole (Clotrimazole 1%*)  1 applic TOPICAL DAILY [facility]


   Last Admin: 01/03/18 08:57 Dose:  1 applic


Cyanocobalamin (Vitamin B12 Tab*)  1,000 mcg PO DAILY [facility]


   Last Admin: 01/04/18 09:06 Dose:  1,000 mcg


Fentanyl (Duragesic  Patch 75 Mcg/Hr*)  75 mcg TRANSDERM Q72H [facility]


   Last Admin: 01/04/18 09:14 Dose:  75 mcg


Ferrous Gluconate (Fergon Tab*)  324 mg PO DAILY [facility]


   Last Admin: 01/04/18 09:06 Dose:  324 mg


Folic Acid (Folvite Tab*)  0.5 mg PO DAILY [facility]


   Last Admin: 01/04/18 09:05 Dose:  0.5 mg


Heparin Sodium (Porcine) (Heparin Vial(*))  5,000 units SUBCUT Q8HR [facility]


   Last Admin: 01/04/18 04:59 Dose:  5,000 units


Hydralazine HCl (Apresoline Iv*)  5 mg IV SLOW PU Q6H PRN


   PRN Reason: SYSTOLIC BP GREATER THAN:


Hydrocortisone (Hytone Cream 1%*)  1 applic TOPICAL TID [facility]


   Last Admin: 01/03/18 21:50 Dose:  1 applic


Hydromorphone HCl (Dilaudid Injic*)  0.5 mg IV SLOW PU Q1H PRN


   PRN Reason: PAIN


   Last Admin: 01/04/18 10:55 Dose:  0.5 mg


Lactobacillus Rhamnosus (Culturelle*)  1 cap PO DAILY [facility]


   Last Admin: 01/04/18 09:06 Dose:  1 cap


Lisinopril (Prinivil Tab*)  5 mg PO DAILY [facility]


   Last Admin: 01/04/18 09:05 Dose:  5 mg


Omeprazole (Prilosec Cap*)  40 mg PO DAILY@0700 [facility]


   Last Admin: 01/04/18 06:58 Dose:  40 mg


Oxycodone HCl (Oxycodone Oral.Soln*)  10 mg PO Q4H PRN


   PRN Reason: PAIN


   Last Admin: 01/04/18 10:55 Dose:  10 mg


Oxycodone HCl (Roxycodone Tab*)  10 mg PO Q4H PRN


   PRN Reason: PAIN


Pharmacy Profile Note (Fentanyl Patch Check Q Shift)  1 note N/A 0700,1900 [facility]


   Last Admin: 01/04/18 06:58 Dose:  1 note


Potassium Chloride (Klor Con Er Tab*)  10 meq PO DAILY [facility]


   Last Admin: 01/04/18 09:05 Dose:  10 meq


Pregabalin (Lyrica Cap(*))  150 mg PO TID [facility]


   Last Admin: 01/04/18 09:05 Dose:  150 mg


Zinc Sulfate (Zinc-220 Cap*)  220 mg PO DAILY [facility]


   Last Admin: 01/04/18 09:05 Dose:  220 mg








 Vital Signs - 8 hr











  01/04/18 01/04/18 01/04/18





  07:34 09:00 09:05


 


Temperature 98.2 F  


 


Pulse Rate 85  


 


Respiratory 16 16 16





Rate   


 


Blood Pressure 164/75  





(mmHg)   


 


O2 Sat by Pulse 97  





Oximetry   














  01/04/18 01/04/18 01/04/18





  10:55 11:41 12:07


 


Temperature  98.3 F 


 


Pulse Rate  86 


 


Respiratory 16 16 16





Rate   


 


Blood Pressure  167/70 





(mmHg)   


 


O2 Sat by Pulse  94 





Oximetry   














  01/04/18





  13:01


 


Temperature 


 


Pulse Rate 


 


Respiratory 16





Rate 


 


Blood Pressure 





(mmHg) 


 


O2 Sat by Pulse 





Oximetry 











Oxygen Devices in Use Now: None


Appearance: Patient is a 71yo male who appears stated age and is sitting in the 

bed in NAD.


Eyes: No Scleral Icterus, PERRLA


Ears/Nose/Mouth/Throat: NL Teeth, Lips, Gums, Clear Oropharnyx, Mucous 

Membranes Moist


Neck: NL Appearance and Movements; NL JVP, Trachea Midline


Respiratory: Symmetrical Chest Expansion and Respiratory Effort, Clear to 

Auscultation


Cardiovascular: NL Sounds; No Murmurs; No JVD, RRR, No Edema, - - Pulses 2+ in B

/L radial, DP, PT. Trace edema B/L.


Abdominal: No Hepatosplenomegaly, - - Tender to palpation diffusely, improved 

from previous exam. Hypoactive bowel sounds. 


Lymphatic: No Cervical Adenopathy


Extremities: No Edema, No Clubbing, Cyanosis


Skin: No Rash or Ulcers, No Nodules or Sclerosis


Neurological: Alert and Oriented x 3, - - Paralyzed and insensate from the 

waist down.


Result Diagrams: 


 01/04/18 05:03





 01/04/18 05:03





Assess/Plan/Problems-Billing


Assessment: 





Patient is a 71yo male with a PMH significant for Ependymoma with several 

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 

post flap who presents for diverting colostomy to aid in the healing of his 

ulcer, which he tolerated well. 





- Patient Problems


(1) Chronic diarrhea


Current Visit: No   Status: Acute   Code(s): K52.9 - NONINFECTIVE 

GASTROENTERITIS AND COLITIS, UNSPECIFIED   SNOMED Code(s): 069967960


   Comment: 


S/P diverting colostomy with revision. POD#2 and #1 respectively. Appreciate 

General Surgery. Will continue pain control on home meds with Dilaudid PRN. 

Liquid green fluid draining into colostomy.   





(2) Acute osteomyelitis of sacrum


Current Visit: No   Status: Acute   Priority: High   Code(s): M46.28 - 

OSTEOMYELITIS OF VERTEBRA, SACRAL AND SACROCOCCYGEAL REGION   SNOMED Code(s): 

326451088


   Comment: 


s/p debridement at time of flap creation at [facility]


Completed antibiotics 12/6


No change in appearance since before last D/C.   





(3) S/P flap graft


Current Visit: No   Status: Acute   Priority: High   Code(s): Z98.890 - OTHER 

SPECIFIED POSTPROCEDURAL STATES   SNOMED Code(s): 887919563


   Comment: 


10/25/2017 at [facility] over large sacral defect


Small open area being treated with Medihoney and gauze which is nearly healed


up to chair max 2hrs, three times daily


Graft shows no signs of deterioration. 





   





(4) Tinea corporis


Current Visit: No   Status: Acute   Code(s): B35.4 - TINEA CORPORIS   SNOMED 

Code(s): 18966304


   Comment: 


Improving Annular scaly lesion on hip and new area on abdomen and in groin.


Continue Clotrimazole.    





(5) Chronic indwelling Rodriguez catheter


Current Visit: No   Status: Chronic   Priority: High   Code(s): Z92.89 - 

PERSONAL HISTORY OF OTHER MEDICAL TREATMENT   SNOMED Code(s): 401793483


   Comment: 


Secondary to neurogenic bladder


No signs of UTI   





(6) BONIFACIO (obstructive sleep apnea)


Current Visit: No   Status: Chronic   Code(s): G47.33 - OBSTRUCTIVE SLEEP APNEA 

(ADULT) (PEDIATRIC)   SNOMED Code(s): 72152326


   Comment: 


Does not wear bipap, states he cannot get a good seal on mask.     





(7) HTN (hypertension)


Current Visit: No   Status: Chronic   Code(s): I10 - ESSENTIAL (PRIMARY) 

HYPERTENSION   SNOMED Code(s): 87376034


   Comment: 


More Hypertensive after second surgery. Given Hydralazine with good effect.


Continue lasix and increase Lisinopril for HTN and renal protection.   





(8) Hypokalemia


Current Visit: Yes   Status: Acute   Code(s): E87.6 - HYPOKALEMIA   SNOMED Code(

s): 87496331


   Comment: 


Resolved, continue supplementation.    





(9) Full code status


Current Visit: No   Status: Acute   Code(s): Z78.9 - OTHER SPECIFIED HEALTH 

STATUS   SNOMED Code(s): 021451746


   Comment: 


   





(10) DVT prophylaxis


Current Visit: No   Status: Acute   Code(s): EEQ0220 -    SNOMED Code(s): 

577175777


   Comment: 


- Lovenox, SCDs   


Status and Disposition: 





Patient is admitted inpatient. Will discharge when able to resume home routine.

## 2018-01-04 NOTE — CONSULT
Consult


Consult: 





INPATIENT PAIN CONSULTATION


Raad Pryor is a 72 year old male. 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 

lumbar spine in 1971. He had debulking surgeries many years ago, and had 

radiation as well. Radiation and chemo were both tried and did not help. Raad 

has had gradual increasing weakness in his legs. When I first met him in 2012, 

he was able to ambulate with bilateral AFOs for short distances and used a 

wheelchair for longer distances. With the progression of tiime, he lost the 

ability to ambulate. He has always had pain as a result of the tumor invading 

the lumbar vertebrae, but the pain significantly worsened over the past 2 1/2 

years. In May, 2017, the pain increased dramatically. His pain medications were 

increased, and this led to a hospitalization over July 4 weekend when he forgot 

to catheterize himself. His meds were cut back. He lost the ability to transfer 

with a stand pivot. He has poor sensation below his waist and now has a 

neurogenic bowel and bladder. He developed a large pressure ulcer and had a 

flap. To keep the area clean, it was decided to do a colostomy. He was admitted 

to [facility] and went to the OR on January 2. He went back to the OR January 3 for a 

revision colostomy after he developed ischemia and an abdominal wall hematoma. 

I am asked to assist in pain management.





PAST MEDICAL HISTORY: CAD, CKD, BONIFACIO, HTN





ALLERGIES: Atorvastatin





 Current Medications





Acetaminophen (Tylenol Tab*)  650 mg PO Q6H PRN


   PRN Reason: FEVER/PAIN


Ascorbic Acid (Vitamin C  Tab*)  500 mg PO DAILY [facility]


   Last Admin: 01/04/18 09:05 Dose:  500 mg


Cholecalciferol (Vitamin D Tab*)  5,000 units PO DAILY [facility]


   Last Admin: 01/04/18 09:07 Dose:  5,000 units


Clotrimazole (Clotrimazole 1%*)  1 applic TOPICAL DAILY [facility]


   Last Admin: 01/04/18 14:30 Dose:  1 applic


Cyanocobalamin (Vitamin B12 Tab*)  1,000 mcg PO DAILY [facility]


   Last Admin: 01/04/18 09:06 Dose:  1,000 mcg


Fentanyl (Duragesic  Patch 75 Mcg/Hr*)  75 mcg TRANSDERM Q72H [facility]


   Last Admin: 01/04/18 09:14 Dose:  75 mcg


Ferrous Gluconate (Fergon Tab*)  324 mg PO DAILY [facility]


   Last Admin: 01/04/18 09:06 Dose:  324 mg


Folic Acid (Folvite Tab*)  0.5 mg PO DAILY [facility]


   Last Admin: 01/04/18 09:05 Dose:  0.5 mg


Furosemide (Lasix Tab*)  40 mg PO 0800,1700 [facility]


   Last Admin: 01/04/18 18:02 Dose:  40 mg


Heparin Sodium (Porcine) (Heparin Vial(*))  5,000 units SUBCUT Q8HR [facility]


   Last Admin: 01/04/18 14:21 Dose:  5,000 units


Hydralazine HCl (Apresoline Iv*)  5 mg IV SLOW PU Q6H PRN


   PRN Reason: SYSTOLIC BP GREATER THAN:


Hydrocortisone (Hytone Cream 1%*)  1 applic TOPICAL TID [facility]


   Last Admin: 01/04/18 14:25 Dose:  Not Given


Hydromorphone HCl (Dilaudid Injic*)  0.5 mg IV SLOW PU Q1H PRN


   PRN Reason: PAIN


   Last Admin: 01/04/18 10:55 Dose:  0.5 mg


Lactobacillus Rhamnosus (Culturelle*)  1 cap PO DAILY [facility]


   Last Admin: 01/04/18 09:06 Dose:  1 cap


Lisinopril (Prinivil Tab*)  10 mg PO DAILY [facility]


Omeprazole (Prilosec Cap*)  40 mg PO DAILY@0700 [facility]


   Last Admin: 01/04/18 06:58 Dose:  40 mg


Oxycodone HCl (Oxycodone Oral.Soln*)  10 mg PO Q4H PRN


   PRN Reason: PAIN


   Last Admin: 01/04/18 10:55 Dose:  10 mg


Oxycodone HCl (Roxycodone Tab*)  10 mg PO Q4H PRN


   PRN Reason: PAIN


   Last Admin: 01/04/18 18:50 Dose:  10 mg


Pharmacy Profile Note (Fentanyl Patch Check Q Shift)  1 note N/A 0700,1900 [facility]


   Last Admin: 01/04/18 18:52 Dose:  1 note


Potassium Chloride (Klor Con Er Tab*)  10 meq PO DAILY [facility]


   Last Admin: 01/04/18 09:05 Dose:  10 meq


Pregabalin (Lyrica Cap(*))  150 mg PO TID [facility]


   Last Admin: 01/04/18 14:30 Dose:  150 mg


Zinc Sulfate (Zinc-220 Cap*)  220 mg PO DAILY [facility]


   Last Admin: 01/04/18 09:05 Dose:  220 mg





 Vital Signs











Temp Pulse Resp BP Pulse Ox


 


 99.2 F   84   16   157/73   94 


 


 01/04/18 15:51  01/04/18 15:51  01/04/18 18:50  01/04/18 15:51  01/04/18 15:51








EXAM:


LUNGS: Clear


HEART: reg rhythm


ABDOMEN: Soft


NEUROLOGIC: Decreased sensation in legs. 2/5 hip flexion, trace quads, absent 

DF and PF





ASSESSMENT:


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


2. 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





PLAN: I will continue the Duragesic 75 mcg/hr. I will try to make the oxycodone 

10 mg Q4 while awake. I will see him tomorrow to see how he is doing.

## 2018-01-04 NOTE — OP
CC:  Rafy Vicente MD*

 

DATE OF OPERATION:  01/03/18 - ROOM #346

 

DATE OF BIRTH:  [birth year]

 

SURGEON:  Lux Mathias MD.

 

ASSISTANT:  MATEUS Cunningham.

 

ANESTHESIOLOGIST:  Paulo Stark DO.

 

ANESTHESIA:  General endotracheal.

 

PRE-OP DIAGNOSIS:  Ischemia of colostomy.

 

POST-OP DIAGNOSES:  Ischemia of colostomy and abdominal wall hematoma.

 

OPERATIVE PROCEDURE:  Laparoscopic-assisted revision of colostomy and 
evacuation of abdominal wall hematoma.

 

ESTIMATED BLOOD LOSS:  Less than 20 mL.

 

IV FLUIDS:  Crystalloids.

 

SPECIMENS:  Colostomy.

 

DRAINS:  None.

 

COMPLICATIONS:  None.

 

COUNTS:  Instrument, needle, and sponge counts correct.

 

DESCRIPTION OF PROCEDURE:  The patient was brought to the operating room and 
placed on the table supine.  Sequential compression devices were placed on both 
lower extremities and general anesthesia was administered.  The abdomen was 
prepped and draped in the usual sterile fashion.  The ostomy site was covered 
with Tegaderm.  Time-out was performed.

 

The prior epigastric and suprapubic wounds were opened by removing the sutures 
and then a 5-mm trocar was placed into the peritoneal cavity through the 
epigastric port.  Carbon dioxide was insufflated to a pressure of 15 mmHg.  A 5-
mm 30-degree laparoscope was introduced and the abdominal viscera appeared to 
be normal with inspection of the proximal colon from the colostomy site, 
revealing no evidence of ischemia.  The proximal end of the distal bowel was 
still adherent up into the fascial defect.  The decision at this point was to 
revise the colostomy by dividing the bowel with endoscopic staplers and this 
was achieved by first creating a window in the mesentery of the colon just 
below the level of the colostomy and then using the EndoGIA stapler with 16-mm 
purple cartridges the bowel was divided.  The intervening mesentry was then 
divided with LigaSure.  At the proximal portion of the distal colon, which was 
defunctionalized, the bowel was also divided with a stapler.  At this point, 
the abdomen was desufflated and the ostomy site was draped off separately.  The 
Tegaderm was removed and then the colostomy was removed by dividing the 
previously placed sutures and removing the colostomy to the back table to be 
sent off as specimen.  There was an old hematoma within the abdominal wall, a 
site which was removed.  The site was irrigated.  The abdomen was reinsufflated 
and the proximal colon was easily brought through the ostomy aperture and there 
was no tension.  The 12-mm port site, which had been used for the stapler, 
which was located in the right abdomen, was then closed using the Endo Close 
device with 0-Polysorb and the skin incisions were closed with 4-0 Monocryl in 
a subcuticular fashion.  Steri-Strips applied.  The colostomy was then matured 
with 3-0 Polysorb and it appeared pink and viable.  A digital examination of 
the colostomy revealed it to be well patent and there were some brown stool 
noted.  Appliance was applied to the site.  The patient tolerated the procedure 
well, was extubated and transferred to the recovery room in stable condition.

 

 [id number] #: [id number]

ELISABET

## 2018-01-05 LAB
BASOPHILS # BLD AUTO: 0.1 10^3/UL (ref 0–0.2)
EOSINOPHIL # BLD AUTO: 0.1 10^3/UL (ref 0–0.6)
HCT VFR BLD AUTO: 37 % (ref 42–52)
HGB BLD-MCNC: 12.8 G/DL (ref 14–18)
LYMPHOCYTES # BLD AUTO: 1.3 10^3/UL (ref 1–4.8)
MCH RBC QN AUTO: 28 PG (ref 27–31)
MCHC RBC AUTO-ENTMCNC: 35 G/DL (ref 31–36)
MCV RBC AUTO: 82 FL (ref 80–94)
MONOCYTES # BLD AUTO: 1 10^3/UL (ref 0–0.8)
NEUTROPHILS # BLD AUTO: 4.4 10^3/UL (ref 1.5–7.7)
NRBC # BLD AUTO: 0 10^3/UL
NRBC BLD QL AUTO: 0
PLATELET # BLD AUTO: 227 10^3/UL (ref 150–450)
RBC # BLD AUTO: 4.51 10^6/UL (ref 4–5.4)
WBC # BLD AUTO: 6.9 10^3/UL (ref 3.5–10.8)

## 2018-01-05 RX ADMIN — ACETAMINOPHEN PRN MG: 325 TABLET ORAL at 15:57

## 2018-01-05 RX ADMIN — OXYCODONE HYDROCHLORIDE SCH MG: 5 CAPSULE ORAL at 15:57

## 2018-01-05 RX ADMIN — Medication SCH MG: at 08:36

## 2018-01-05 RX ADMIN — HYDROCORTISONE SCH: 1 CREAM TOPICAL at 13:59

## 2018-01-05 RX ADMIN — WATER SCH NOTE: 100 INJECTION, SOLUTION INTRAVENOUS at 06:59

## 2018-01-05 RX ADMIN — CLOTRIMAZOLE SCH APPLIC: 10 CREAM TOPICAL at 08:38

## 2018-01-05 RX ADMIN — HYDROCORTISONE SCH: 1 CREAM TOPICAL at 08:12

## 2018-01-05 RX ADMIN — PREGABALIN SCH: 50 CAPSULE ORAL at 22:06

## 2018-01-05 RX ADMIN — OXYCODONE HYDROCHLORIDE SCH: 5 CAPSULE ORAL at 00:30

## 2018-01-05 RX ADMIN — HEPARIN SODIUM SCH UNITS: 5000 INJECTION INTRAVENOUS; SUBCUTANEOUS at 13:59

## 2018-01-05 RX ADMIN — FOLIC ACID SCH MG: 1 TABLET ORAL at 08:36

## 2018-01-05 RX ADMIN — HYDRALAZINE HYDROCHLORIDE PRN MG: 20 INJECTION INTRAMUSCULAR; INTRAVENOUS at 04:24

## 2018-01-05 RX ADMIN — OXYCODONE HYDROCHLORIDE SCH: 5 CAPSULE ORAL at 19:57

## 2018-01-05 RX ADMIN — CYANOCOBALAMIN TAB 500 MCG SCH MCG: 500 TAB at 08:37

## 2018-01-05 RX ADMIN — OXYCODONE HYDROCHLORIDE SCH: 5 CAPSULE ORAL at 04:14

## 2018-01-05 RX ADMIN — PREGABALIN SCH MG: 50 CAPSULE ORAL at 08:36

## 2018-01-05 RX ADMIN — HYDRALAZINE HYDROCHLORIDE PRN MG: 20 INJECTION INTRAMUSCULAR; INTRAVENOUS at 09:47

## 2018-01-05 RX ADMIN — OXYCODONE HYDROCHLORIDE SCH MG: 5 CAPSULE ORAL at 07:49

## 2018-01-05 RX ADMIN — Medication SCH UNITS: at 08:36

## 2018-01-05 RX ADMIN — HEPARIN SODIUM SCH UNITS: 5000 INJECTION INTRAVENOUS; SUBCUTANEOUS at 22:00

## 2018-01-05 RX ADMIN — OXYCODONE HYDROCHLORIDE SCH MG: 5 CAPSULE ORAL at 20:39

## 2018-01-05 RX ADMIN — OXYCODONE HYDROCHLORIDE SCH: 5 CAPSULE ORAL at 12:02

## 2018-01-05 RX ADMIN — WATER SCH NOTE: 100 INJECTION, SOLUTION INTRAVENOUS at 18:38

## 2018-01-05 RX ADMIN — HEPARIN SODIUM SCH UNITS: 5000 INJECTION INTRAVENOUS; SUBCUTANEOUS at 06:11

## 2018-01-05 RX ADMIN — FUROSEMIDE SCH MG: 40 TABLET ORAL at 15:58

## 2018-01-05 RX ADMIN — OXYCODONE HYDROCHLORIDE AND ACETAMINOPHEN SCH MG: 500 TABLET ORAL at 08:37

## 2018-01-05 RX ADMIN — OMEPRAZOLE SCH MG: 20 CAPSULE, DELAYED RELEASE ORAL at 07:49

## 2018-01-05 RX ADMIN — Medication SCH CAP: at 08:37

## 2018-01-05 RX ADMIN — OXYCODONE HYDROCHLORIDE PRN MG: 5 SOLUTION ORAL at 11:28

## 2018-01-05 RX ADMIN — FUROSEMIDE SCH MG: 40 TABLET ORAL at 07:49

## 2018-01-05 RX ADMIN — OXYCODONE HYDROCHLORIDE PRN MG: 5 SOLUTION ORAL at 17:20

## 2018-01-05 RX ADMIN — POTASSIUM CHLORIDE SCH MEQ: 750 TABLET, FILM COATED, EXTENDED RELEASE ORAL at 08:37

## 2018-01-05 RX ADMIN — LISINOPRIL SCH MG: 10 TABLET ORAL at 08:36

## 2018-01-05 RX ADMIN — HYDROCORTISONE SCH: 1 CREAM TOPICAL at 20:00

## 2018-01-05 RX ADMIN — PREGABALIN SCH MG: 50 CAPSULE ORAL at 13:59

## 2018-01-05 NOTE — PN
Subjective


Date of Service: 01/05/18


Interval History: 





Patient's pain improved with scheduled oxycodone and PRN liquid oxycodone as 

before. Pain persists and occasionally severe in abdomen and back. Patient doesn

't like to express this per wife, but get agitated when in significant pain. 

Family had questions about colostomy which were answered. Denies F/C, N/V, CP, 

SOB, or other pain.


Family History: Unchanged from Admission


Social History: Unchanged from Admission


Past Medical History: Unchanged from Admission





Objective


Active Medications: 








Acetaminophen (Tylenol Tab*)  650 mg PO Q6H PRN


   PRN Reason: FEVER/PAIN


   Last Admin: 01/05/18 15:57 Dose:  650 mg


Ascorbic Acid (Vitamin C  Tab*)  500 mg PO DAILY [facility]


   Last Admin: 01/05/18 08:37 Dose:  500 mg


Cholecalciferol (Vitamin D Tab*)  5,000 units PO DAILY [facility]


   Last Admin: 01/05/18 08:36 Dose:  5,000 units


Clotrimazole (Clotrimazole 1%*)  1 applic TOPICAL DAILY [facility]


   Last Admin: 01/05/18 08:38 Dose:  1 applic


Cyanocobalamin (Vitamin B12 Tab*)  1,000 mcg PO DAILY [facility]


   Last Admin: 01/05/18 08:37 Dose:  1,000 mcg


Fentanyl (Duragesic  Patch 75 Mcg/Hr*)  75 mcg TRANSDERM Q72H [facility]


   Last Admin: 01/04/18 09:14 Dose:  75 mcg


Ferrous Gluconate (Fergon Tab*)  324 mg PO DAILY [facility]


   Last Admin: 01/05/18 08:36 Dose:  324 mg


Folic Acid (Folvite Tab*)  0.5 mg PO DAILY [facility]


   Last Admin: 01/05/18 08:36 Dose:  0.5 mg


Furosemide (Lasix Tab*)  40 mg PO 0800,1700 [facility]


   Last Admin: 01/05/18 15:58 Dose:  40 mg


Heparin Sodium (Porcine) (Heparin Vial(*))  5,000 units SUBCUT Q8HR [facility]


   Last Admin: 01/05/18 13:59 Dose:  5,000 units


Hydralazine HCl (Apresoline Iv*)  5 mg IV SLOW PU Q6H PRN


   PRN Reason: SYSTOLIC BP GREATER THAN:


   Last Admin: 01/05/18 09:47 Dose:  5 mg


Hydrocortisone (Hytone Cream 1%*)  1 applic TOPICAL TID [facility]


   Last Admin: 01/05/18 20:00 Dose:  Not Given


Hydromorphone HCl (Dilaudid Injic*)  0.5 mg IV SLOW PU Q1H PRN


   PRN Reason: PAIN


   Last Admin: 01/04/18 10:55 Dose:  0.5 mg


Lactobacillus Rhamnosus (Culturelle*)  1 cap PO DAILY [facility]


   Last Admin: 01/05/18 08:37 Dose:  1 cap


Lisinopril (Prinivil Tab*)  10 mg PO DAILY [facility]


   Last Admin: 01/05/18 08:36 Dose:  10 mg


Omeprazole (Prilosec Cap*)  40 mg PO DAILY@0700 [facility]


   Last Admin: 01/05/18 07:49 Dose:  40 mg


Oxycodone HCl (Oxycodone Oral.Soln*)  10 mg PO Q4H PRN


   PRN Reason: PAIN


   Last Admin: 01/05/18 17:20 Dose:  10 mg


Oxycodone HCl (Roxycodone Tab*)  10 mg PO Q4H [facility]


   Last Admin: 01/05/18 20:39 Dose:  10 mg


Pharmacy Profile Note (Fentanyl Patch Check Q Shift)  1 note N/A 0700,1900 [facility]


   Last Admin: 01/05/18 18:38 Dose:  1 note


Potassium Chloride (Klor Con Er Tab*)  10 meq PO DAILY [facility]


   Last Admin: 01/05/18 08:37 Dose:  10 meq


Pregabalin (Lyrica Cap(*))  150 mg PO TID [facility]


   Last Admin: 01/05/18 13:59 Dose:  150 mg


Zinc Sulfate (Zinc-220 Cap*)  220 mg PO DAILY [facility]


   Last Admin: 01/05/18 08:36 Dose:  220 mg








 Vital Signs - 8 hr











  01/05/18 01/05/18 01/05/18





  13:28 13:59 15:46


 


Temperature   100.4 F


 


Pulse Rate   109


 


Respiratory 18 18 16





Rate   


 


Blood Pressure   146/68





(mmHg)   


 


O2 Sat by Pulse   92





Oximetry   














  01/05/18 01/05/18 01/05/18





  15:57 15:58 16:00


 


Temperature   


 


Pulse Rate   


 


Respiratory 18 18 





Rate   


 


Blood Pressure   





(mmHg)   


 


O2 Sat by Pulse   92





Oximetry   














  01/05/18 01/05/18 01/05/18





  17:20 17:51 19:56


 


Temperature   98.3 F


 


Pulse Rate   89


 


Respiratory 18 18 16





Rate   


 


Blood Pressure   119/52





(mmHg)   


 


O2 Sat by Pulse   93





Oximetry   














  01/05/18 01/05/18 01/05/18





  19:57 20:00 20:01


 


Temperature   


 


Pulse Rate   


 


Respiratory 16 18 18





Rate   


 


Blood Pressure   





(mmHg)   


 


O2 Sat by Pulse   





Oximetry   














  01/05/18





  20:39


 


Temperature 


 


Pulse Rate 


 


Respiratory 18





Rate 


 


Blood Pressure 





(mmHg) 


 


O2 Sat by Pulse 





Oximetry 











Oxygen Devices in Use Now: None


Appearance: Patient is a 73yo male who appears stated age and is sitting in the 

bed in NAD.


Eyes: No Scleral Icterus, PERRLA


Ears/Nose/Mouth/Throat: NL Teeth, Lips, Gums, Clear Oropharnyx, Mucous 

Membranes Moist


Neck: NL Appearance and Movements; NL JVP, Trachea Midline


Respiratory: Symmetrical Chest Expansion and Respiratory Effort, Clear to 

Auscultation


Cardiovascular: NL Sounds; No Murmurs; No JVD, RRR, - - Trace edema in B/L LE. 


Abdominal: No Hepatosplenomegaly - Abdominal pain to palpation, normoactive BS. 

Laparoscopic surgical incisons with steri-strips. Ostomy pink and draining 

liquid stool.


Lymphatic: No Cervical Adenopathy


Extremities: No Clubbing, Cyanosis


Skin: - - No change in Flap appearance. 


Neurological: Alert and Oriented x 3, - - CN II-XII intact. Insensate and 

paralyzed from the waist down.


Result Diagrams: 


 01/05/18 06:02





 01/05/18 06:02





Assess/Plan/Problems-Billing


Assessment: 





Patient is a 73yo male with a PMH significant for Ependymoma with several 

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 

post flap who presents for diverting colostomy to aid in the healing of his 

ulcer, which he tolerated well. 





- Patient Problems


(1) Chronic back pain


Current Visit: No   Status: Chronic   Code(s): M54.9 - DORSALGIA, UNSPECIFIED; 

G89.29 - OTHER CHRONIC PAIN   SNOMED Code(s): 793990582


   Comment: 


Secondary to ependymoma.


Appreciate pain management input.


Continue pregabalin, acetaminophen, oxycodone liquid 10mg q4h PRN, Oxycodone 

10mg PO Q4H scheduled while awake and Fentanyl patch at 75mcg.


Improved.   





(2) Chronic diarrhea


Current Visit: No   Status: Acute   Code(s): K52.9 - NONINFECTIVE 

GASTROENTERITIS AND COLITIS, UNSPECIFIED   SNOMED Code(s): 483177561


   Comment: 


S/P diverting colostomy with revision. POD#3 and #2 respectively. Appreciate 

General Surgery. Liquid green fluid draining into colostomy.   





(3) Acute osteomyelitis of sacrum


Current Visit: No   Status: Acute   Priority: High   Code(s): M46.28 - 

OSTEOMYELITIS OF VERTEBRA, SACRAL AND SACROCOCCYGEAL REGION   SNOMED Code(s): 

036543328


   Comment: 


s/p debridement at time of flap creation at [facility]


Completed antibiotics 12/6


No change in appearance since before last D/C.   





(4) S/P flap graft


Current Visit: No   Status: Acute   Priority: High   Code(s): Z98.890 - OTHER 

SPECIFIED POSTPROCEDURAL STATES   SNOMED Code(s): 327293654


   Comment: 


10/25/2017 at [facility] over large sacral defect


Small open area being treated with Medihoney and gauze which is nearly healed


up to chair max 2hrs, three times daily


Graft shows no signs of deterioration. 





   





(5) Tinea corporis


Current Visit: No   Status: Acute   Code(s): B35.4 - TINEA CORPORIS   SNOMED 

Code(s): 18160098


   Comment: 


Resolved   





(6) Chronic indwelling Rodriguez catheter


Current Visit: No   Status: Chronic   Priority: High   Code(s): Z92.89 - 

PERSONAL HISTORY OF OTHER MEDICAL TREATMENT   SNOMED Code(s): 636294560


   Comment: 


Secondary to neurogenic bladder


No signs of UTI   





(7) BONIFACIO (obstructive sleep apnea)


Current Visit: No   Status: Chronic   Code(s): G47.33 - OBSTRUCTIVE SLEEP APNEA 

(ADULT) (PEDIATRIC)   SNOMED Code(s): 15127559


   Comment: 


Does not wear bipap, states he cannot get a good seal on mask.     





(8) HTN (hypertension)


Current Visit: No   Status: Chronic   Code(s): I10 - ESSENTIAL (PRIMARY) 

HYPERTENSION   SNOMED Code(s): 84178870


   Comment: 


Intermittenly hypertensive. Now normotensive.


Continue lasix and increase Lisinopril for HTN and renal protection.


Hydralazine PRN.   





(9) Hypokalemia


Current Visit: Yes   Status: Acute   Code(s): E87.6 - HYPOKALEMIA   SNOMED Code(

s): 14759676


   Comment: 


Resolved, continue supplementation.    





(10) Full code status


Current Visit: No   Status: Acute   Code(s): Z78.9 - OTHER SPECIFIED HEALTH 

STATUS   SNOMED Code(s): 167634063


   Comment: 


   





(11) DVT prophylaxis


Current Visit: No   Status: Acute   Code(s): MVP3267 -    SNOMED Code(s): 

551774465


   Comment: 


- Lovenox, SCDs   


Status and Disposition: 





Patient is admitted inpatient. Will discharge when able to resume home routine.

## 2018-01-05 NOTE — PN
Progress Note





- Progress Note


Date of Service: 01/05/18


Note: 





Surgery Progress:





S: POD #2 &3. No abd pain unless being moved. No N/V. Would like to adv diet. 





O: 


 Vital Signs - 8 hr











  01/05/18 01/05/18 01/05/18





  04:18 07:49 08:00


 


Temperature 98.6 F  


 


Pulse Rate 93  


 


Respiratory 16 18 18





Rate   


 


Blood Pressure 184/86  





(mmHg)   


 


O2 Sat by Pulse 96  94





Oximetry   














  01/05/18 01/05/18 01/05/18





  08:08 08:36 09:44


 


Temperature 99.8 F  


 


Pulse Rate 97  


 


Respiratory 16 18 18





Rate   


 


Blood Pressure 175/71  





(mmHg)   


 


O2 Sat by Pulse 94  





Oximetry   














  01/05/18





  10:34


 


Temperature 


 


Pulse Rate 


 


Respiratory 18





Rate 


 


Blood Pressure 





(mmHg) 


 


O2 Sat by Pulse 





Oximetry 








Intake and Output Last 24 Hours











 01/03/18 01/04/18 01/05/18 01/06/18





 06:59 06:59 06:59 06:59


 


Intake Total 900 2580 1080 


 


Output Total 575 1550 3300 0


 


Balance 325 1030 -2220 0


 


Intake:    


 


  IV Fluids  1400  


 


    LR  1300  


 


    NS 50ML, Cefoxitin 2G  50  


 


    NS 50ML, Ertopenin 1G  50  


 


  Oral 900 1180 1080 


 


Output:    


 


  Rodriguez 575 1550 3200 0


 


  Colostomy 0 0 100 


 


Other:    


 


  Date of Last Bowel colostomy   





  Movement    








Heart: reg


Lungs: clear


Abd: +BS; ostomy edematous, pink; some gas in bag as well as clear fluid; soft; 

mild lidia-ostomy tenderness; remainder nontender





A: s/p diverting end-sigmoid colostomy w/ revision on POD #1 for ischemia 2/2 

abd wall hematoma; doing well at this point





P: will advance diet; med mgmt per hosp; pain mgmt per Dr. Reynoso

## 2018-01-06 LAB
BASOPHILS # BLD AUTO: 0.1 10^3/UL (ref 0–0.2)
EOSINOPHIL # BLD AUTO: 0.4 10^3/UL (ref 0–0.6)
HCT VFR BLD AUTO: 38 % (ref 42–52)
HGB BLD-MCNC: 12.8 G/DL (ref 14–18)
LYMPHOCYTES # BLD AUTO: 3 10^3/UL (ref 1–4.8)
MCH RBC QN AUTO: 28 PG (ref 27–31)
MCHC RBC AUTO-ENTMCNC: 34 G/DL (ref 31–36)
MCV RBC AUTO: 82 FL (ref 80–94)
MONOCYTES # BLD AUTO: 1.2 10^3/UL (ref 0–0.8)
NEUTROPHILS # BLD AUTO: 2.5 10^3/UL (ref 1.5–7.7)
NRBC # BLD AUTO: 0 10^3/UL
NRBC BLD QL AUTO: 0.1
PLATELET # BLD AUTO: 245 10^3/UL (ref 150–450)
RBC # BLD AUTO: 4.64 10^6/UL (ref 4–5.4)
WBC # BLD AUTO: 7.2 10^3/UL (ref 3.5–10.8)

## 2018-01-06 RX ADMIN — Medication SCH UNITS: at 09:33

## 2018-01-06 RX ADMIN — WATER SCH NOTE: 100 INJECTION, SOLUTION INTRAVENOUS at 16:23

## 2018-01-06 RX ADMIN — WATER SCH NOTE: 100 INJECTION, SOLUTION INTRAVENOUS at 07:10

## 2018-01-06 RX ADMIN — HEPARIN SODIUM SCH UNITS: 5000 INJECTION INTRAVENOUS; SUBCUTANEOUS at 21:38

## 2018-01-06 RX ADMIN — OXYCODONE HYDROCHLORIDE SCH: 5 CAPSULE ORAL at 12:38

## 2018-01-06 RX ADMIN — Medication SCH CAP: at 09:30

## 2018-01-06 RX ADMIN — OXYCODONE HYDROCHLORIDE SCH: 5 CAPSULE ORAL at 00:08

## 2018-01-06 RX ADMIN — Medication SCH MG: at 09:31

## 2018-01-06 RX ADMIN — HYDROCORTISONE SCH APPLIC: 1 CREAM TOPICAL at 09:34

## 2018-01-06 RX ADMIN — FUROSEMIDE SCH MG: 40 TABLET ORAL at 19:57

## 2018-01-06 RX ADMIN — HYDROCORTISONE SCH APPLIC: 1 CREAM TOPICAL at 21:38

## 2018-01-06 RX ADMIN — HYDROCORTISONE SCH: 1 CREAM TOPICAL at 14:05

## 2018-01-06 RX ADMIN — FUROSEMIDE SCH MG: 40 TABLET ORAL at 07:57

## 2018-01-06 RX ADMIN — POTASSIUM CHLORIDE SCH MEQ: 750 TABLET, FILM COATED, EXTENDED RELEASE ORAL at 09:30

## 2018-01-06 RX ADMIN — OXYCODONE HYDROCHLORIDE PRN MG: 5 SOLUTION ORAL at 12:55

## 2018-01-06 RX ADMIN — Medication SCH MG: at 09:33

## 2018-01-06 RX ADMIN — LISINOPRIL SCH MG: 10 TABLET ORAL at 09:31

## 2018-01-06 RX ADMIN — FOLIC ACID SCH: 1 TABLET ORAL at 09:27

## 2018-01-06 RX ADMIN — HEPARIN SODIUM SCH UNITS: 5000 INJECTION INTRAVENOUS; SUBCUTANEOUS at 05:59

## 2018-01-06 RX ADMIN — OXYCODONE HYDROCHLORIDE SCH: 5 CAPSULE ORAL at 04:09

## 2018-01-06 RX ADMIN — OXYCODONE HYDROCHLORIDE SCH MG: 5 CAPSULE ORAL at 08:02

## 2018-01-06 RX ADMIN — PREGABALIN SCH MG: 50 CAPSULE ORAL at 21:37

## 2018-01-06 RX ADMIN — OXYCODONE HYDROCHLORIDE SCH MG: 5 CAPSULE ORAL at 16:25

## 2018-01-06 RX ADMIN — OXYCODONE HYDROCHLORIDE SCH MG: 5 CAPSULE ORAL at 20:10

## 2018-01-06 RX ADMIN — OXYCODONE HYDROCHLORIDE PRN MG: 5 SOLUTION ORAL at 09:39

## 2018-01-06 RX ADMIN — PREGABALIN SCH MG: 50 CAPSULE ORAL at 09:28

## 2018-01-06 RX ADMIN — CLOTRIMAZOLE SCH APPLIC: 10 CREAM TOPICAL at 09:34

## 2018-01-06 RX ADMIN — HEPARIN SODIUM SCH UNITS: 5000 INJECTION INTRAVENOUS; SUBCUTANEOUS at 14:04

## 2018-01-06 RX ADMIN — PREGABALIN SCH MG: 50 CAPSULE ORAL at 14:05

## 2018-01-06 RX ADMIN — OMEPRAZOLE SCH MG: 20 CAPSULE, DELAYED RELEASE ORAL at 07:10

## 2018-01-06 RX ADMIN — WATER SCH NOTE: 100 INJECTION, SOLUTION INTRAVENOUS at 19:21

## 2018-01-06 RX ADMIN — OXYCODONE HYDROCHLORIDE SCH: 5 CAPSULE ORAL at 16:14

## 2018-01-06 RX ADMIN — CYANOCOBALAMIN TAB 500 MCG SCH MCG: 500 TAB at 09:30

## 2018-01-06 RX ADMIN — OXYCODONE HYDROCHLORIDE AND ACETAMINOPHEN SCH MG: 500 TABLET ORAL at 09:33

## 2018-01-06 NOTE — PN
Progress Note





- Progress Note


Date of Service: 01/06/18


SOAP: 


Subjective:


Pt seen and examined.  feeling well.  NO abdo pain.  appetite improving








Objective:


af


abdo: soft/ ND/ NT


   colostomy: pink, edematous, positive flatus, stool-liquid


no calf tenderness





Assessment:


POD 4/3 lap sigmoid colostomy, and takeback for revision; doing well


   pt needs PT according to family and hosp service








Plan:


no additional surgical intervention


plan as per hospitalist/PT

## 2018-01-06 NOTE — PN
Subjective


Date of Service: 01/06/18


Interval History: 





Patient seen and examined, son at bedside. Had been OOB earlier, tolerated 

well. Denies any acute complaints. No chest pain, no SOB, no abdominal pain, no 

nausea or vomiting. Tolerating PO.


Family History: Unchanged from Admission


Social History: Unchanged from Admission


Past Medical History: Unchanged from Admission





Objective


Active Medications: 








Acetaminophen (Tylenol Tab*)  650 mg PO Q6H PRN


   PRN Reason: FEVER/PAIN


   Last Admin: 01/05/18 15:57 Dose:  650 mg


Ascorbic Acid (Vitamin C  Tab*)  500 mg PO DAILY [facility]


   Last Admin: 01/06/18 09:33 Dose:  500 mg


Cholecalciferol (Vitamin D Tab*)  5,000 units PO DAILY [facility]


   Last Admin: 01/06/18 09:33 Dose:  5,000 units


Clotrimazole (Clotrimazole 1%*)  1 applic TOPICAL DAILY [facility]


   Last Admin: 01/06/18 09:34 Dose:  1 applic


Cyanocobalamin (Vitamin B12 Tab*)  1,000 mcg PO DAILY [facility]


   Last Admin: 01/06/18 09:30 Dose:  1,000 mcg


Fentanyl (Duragesic  Patch 75 Mcg/Hr*)  75 mcg TRANSDERM Q72H [facility]


   Last Admin: 01/04/18 09:14 Dose:  75 mcg


Ferrous Gluconate (Fergon Tab*)  324 mg PO DAILY [facility]


   Last Admin: 01/06/18 09:33 Dose:  324 mg


Folic Acid (Folvite Tab*)  0.5 mg PO DAILY [facility]


   Last Admin: 01/06/18 09:27 Dose:  Not Given


Furosemide (Lasix Tab*)  40 mg PO 0800,1700 [facility]


   Last Admin: 01/06/18 07:57 Dose:  40 mg


Heparin Sodium (Porcine) (Heparin Vial(*))  5,000 units SUBCUT Q8HR [facility]


   Last Admin: 01/06/18 14:04 Dose:  5,000 units


Hydralazine HCl (Apresoline Iv*)  5 mg IV SLOW PU Q6H PRN


   PRN Reason: SYSTOLIC BP GREATER THAN:


   Last Admin: 01/05/18 09:47 Dose:  5 mg


Hydrocortisone (Hytone Cream 1%*)  1 applic TOPICAL TID [facility]


   Last Admin: 01/06/18 14:05 Dose:  Not Given


Lactobacillus Rhamnosus (Culturelle*)  1 cap PO DAILY [facility]


   Last Admin: 01/06/18 09:30 Dose:  1 cap


Lisinopril (Prinivil Tab*)  10 mg PO DAILY [facility]


   Last Admin: 01/06/18 09:31 Dose:  10 mg


Omeprazole (Prilosec Cap*)  40 mg PO DAILY@0700 [facility]


   Last Admin: 01/06/18 07:10 Dose:  40 mg


Oxycodone HCl (Oxycodone Oral.Soln*)  10 mg PO Q4H PRN


   PRN Reason: PAIN


   Last Admin: 01/06/18 12:55 Dose:  10 mg


Oxycodone HCl (Roxycodone Tab*)  10 mg PO Q4H [facility]


   Last Admin: 01/06/18 16:25 Dose:  10 mg


Pharmacy Profile Note (Fentanyl Patch Check Q Shift)  1 note N/A 0700,1900 [facility]


   Last Admin: 01/06/18 16:23 Dose:  1 note


Potassium Chloride (Klor Con Er Tab*)  10 meq PO DAILY [facility]


   Last Admin: 01/06/18 09:30 Dose:  10 meq


Pregabalin (Lyrica Cap(*))  150 mg PO TID [facility]


   Last Admin: 01/06/18 14:05 Dose:  150 mg


Zinc Sulfate (Zinc-220 Cap*)  220 mg PO DAILY [facility]


   Last Admin: 01/06/18 09:31 Dose:  220 mg








 Vital Signs - 8 hr











  01/06/18 01/06/18 01/06/18





  09:28 09:37 09:39


 


Temperature   


 


Pulse Rate   


 


Respiratory 17 15 17





Rate   


 


Blood Pressure   





(mmHg)   


 


O2 Sat by Pulse   





Oximetry   














  01/06/18 01/06/18 01/06/18





  11:29 12:15 12:16


 


Temperature 98.3 F  


 


Pulse Rate 88  


 


Respiratory 16 17 16





Rate   


 


Blood Pressure 131/49  





(mmHg)   


 


O2 Sat by Pulse 97  





Oximetry   














  01/06/18 01/06/18 01/06/18





  12:38 12:55 14:05


 


Temperature   


 


Pulse Rate   


 


Respiratory 17 17 17





Rate   


 


Blood Pressure   





(mmHg)   


 


O2 Sat by Pulse   





Oximetry   














  01/06/18 01/06/18 01/06/18





  15:15 15:18 16:13


 


Temperature  99.4 F 


 


Pulse Rate  92 


 


Respiratory 18 16 16





Rate   


 


Blood Pressure  108/68 





(mmHg)   


 


O2 Sat by Pulse  95 





Oximetry   














  01/06/18





  16:25


 


Temperature 


 


Pulse Rate 


 


Respiratory 18





Rate 


 


Blood Pressure 





(mmHg) 


 


O2 Sat by Pulse 





Oximetry 











Oxygen Devices in Use Now: None


Appearance: Alert, NAD


Eyes: PERRLA


Ears/Nose/Mouth/Throat: Mucous Membranes Moist


Neck: NL Appearance and Movements; NL JVP, Trachea Midline


Respiratory: Symmetrical Chest Expansion and Respiratory Effort, Clear to 

Auscultation


Cardiovascular: NL Sounds; No Murmurs; No JVD, RRR


Abdominal: NL Sounds; No Tenderness; No Distention - diverting colostomy, 

trochar wounds with steri strips intact


Skin: - - did not visualize flap, as patient had just been turned. Per notes, 

graft is intact with two small open areas


Neurological: Alert and Oriented x 3


Result Diagrams: 


 01/06/18 06:01





 01/06/18 06:09





Assess/Plan/Problems-Billing


Assessment: 





Patient is a 73yo male with a PMH significant for Ependymoma with multiple 

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 

post flap who presents for diverting colostomy to aid in the healing of his 

ulcer.





- Patient Problems


(1) Acute osteomyelitis of sacrum


Code(s): M46.28 - OSTEOMYELITIS OF VERTEBRA, SACRAL AND SACROCOCCYGEAL REGION   

SNOMED Code(s): 158957063


   Comment: 


 - s/p debridement at time of flap creation at [facility]


 - Completed antibiotics 12/6


 - No change in appearance since before last D/C


   





(2) Chronic back pain


Code(s): M54.9 - DORSALGIA, UNSPECIFIED; G89.29 - OTHER CHRONIC PAIN   SNOMED 

Code(s): 827760284


   Comment: 


 - Secondary to ependymoma


 - Pain management following


 - Continue pregabalin, acetaminophen, oxycodone liquid 10mg q4h PRN, Oxycodone 

10mg PO Q4H scheduled while awake and Fentanyl patch at 75mcg.


 - Stable    





(3) Chronic indwelling Rodriguez catheter


Code(s): Z92.89 - PERSONAL HISTORY OF OTHER MEDICAL TREATMENT   SNOMED Code(s): 

387249158


   Comment: 


 - Secondary to neurogenic bladder


 - No signs of UTI   





(4) Ependymoma


Code(s): C71.9 - MALIGNANT NEOPLASM OF BRAIN, UNSPECIFIED   SNOMED Code(s): 

354996105


   Comment: 


 - Wheelchair bound


 - High risk for continued back pain, decompensation, pressure wounds and UTIs


 - Supportive care


   





(5) Neurogenic bladder


Code(s): N31.9 - NEUROMUSCULAR DYSFUNCTION OF BLADDER, UNSPECIFIED   SNOMED Code

(s): 613051052


   Comment: 


- Rodriguez care   





(6) Neurogenic bowel


Code(s): K59.2 - NEUROGENIC BOWEL, NOT ELSEWHERE CLASSIFIED   SNOMED Code(s): 

602389394


   Comment:  


 - S/P diverting colostomy to prevent further skin breakdown   





(7) Paraplegia


Code(s): G82.20 - PARAPLEGIA, UNSPECIFIED   SNOMED Code(s): 72442281


   Comment: 


 - OOB to chair TID up to 2 hours each session


 - Trapeze and noemy lift


   





(8) S/P flap graft


Code(s): Z98.890 - OTHER SPECIFIED POSTPROCEDURAL STATES   SNOMED Code(s): 

653400033


   Comment: 


 - Graft completed 10/25/17 at [facility]


 - Continue medihoney for 2 small open areas


 - Graft intact


 - Reposition Q2h


 - OOB TID for up to 2 hours as bartolome








   


Status and Disposition: 





Remain inpatient. DC to home when clear by surgery.


Counseling and/or Coordination of Care Minutes: coordinated with patient and 

staff

## 2018-01-07 RX ADMIN — HYDROCORTISONE SCH APPLIC: 1 CREAM TOPICAL at 22:49

## 2018-01-07 RX ADMIN — Medication SCH MG: at 08:45

## 2018-01-07 RX ADMIN — OMEPRAZOLE SCH MG: 20 CAPSULE, DELAYED RELEASE ORAL at 06:44

## 2018-01-07 RX ADMIN — WATER SCH NOTE: 100 INJECTION, SOLUTION INTRAVENOUS at 18:48

## 2018-01-07 RX ADMIN — PREGABALIN SCH MG: 50 CAPSULE ORAL at 08:45

## 2018-01-07 RX ADMIN — LISINOPRIL SCH MG: 10 TABLET ORAL at 08:44

## 2018-01-07 RX ADMIN — OXYCODONE HYDROCHLORIDE PRN MG: 5 SOLUTION ORAL at 18:46

## 2018-01-07 RX ADMIN — POTASSIUM CHLORIDE SCH MEQ: 750 TABLET, FILM COATED, EXTENDED RELEASE ORAL at 08:44

## 2018-01-07 RX ADMIN — FUROSEMIDE SCH MG: 40 TABLET ORAL at 17:58

## 2018-01-07 RX ADMIN — OXYCODONE HYDROCHLORIDE SCH: 5 CAPSULE ORAL at 04:48

## 2018-01-07 RX ADMIN — FENTANYL TRANSDERMAL SCH MCG: 75 PATCH, EXTENDED RELEASE TRANSDERMAL at 08:40

## 2018-01-07 RX ADMIN — OXYCODONE HYDROCHLORIDE SCH MG: 5 CAPSULE ORAL at 13:53

## 2018-01-07 RX ADMIN — HEPARIN SODIUM SCH UNITS: 5000 INJECTION INTRAVENOUS; SUBCUTANEOUS at 05:39

## 2018-01-07 RX ADMIN — CLOTRIMAZOLE SCH APPLIC: 10 CREAM TOPICAL at 10:58

## 2018-01-07 RX ADMIN — OXYCODONE HYDROCHLORIDE PRN MG: 5 SOLUTION ORAL at 08:45

## 2018-01-07 RX ADMIN — FUROSEMIDE SCH MG: 40 TABLET ORAL at 08:44

## 2018-01-07 RX ADMIN — WATER SCH NOTE: 100 INJECTION, SOLUTION INTRAVENOUS at 06:44

## 2018-01-07 RX ADMIN — HEPARIN SODIUM SCH UNITS: 5000 INJECTION INTRAVENOUS; SUBCUTANEOUS at 14:06

## 2018-01-07 RX ADMIN — FOLIC ACID SCH MG: 1 TABLET ORAL at 08:44

## 2018-01-07 RX ADMIN — OXYCODONE HYDROCHLORIDE SCH: 5 CAPSULE ORAL at 20:24

## 2018-01-07 RX ADMIN — Medication SCH UNITS: at 08:44

## 2018-01-07 RX ADMIN — HEPARIN SODIUM SCH UNITS: 5000 INJECTION INTRAVENOUS; SUBCUTANEOUS at 22:48

## 2018-01-07 RX ADMIN — OXYCODONE HYDROCHLORIDE SCH: 5 CAPSULE ORAL at 00:40

## 2018-01-07 RX ADMIN — OXYCODONE HYDROCHLORIDE PRN MG: 5 SOLUTION ORAL at 12:26

## 2018-01-07 RX ADMIN — HYDROCORTISONE SCH APPLIC: 1 CREAM TOPICAL at 10:58

## 2018-01-07 RX ADMIN — OXYCODONE HYDROCHLORIDE SCH: 5 CAPSULE ORAL at 13:01

## 2018-01-07 RX ADMIN — OXYCODONE HYDROCHLORIDE SCH: 5 CAPSULE ORAL at 09:17

## 2018-01-07 RX ADMIN — OXYCODONE HYDROCHLORIDE AND ACETAMINOPHEN SCH MG: 500 TABLET ORAL at 08:44

## 2018-01-07 RX ADMIN — HYDROCORTISONE SCH: 1 CREAM TOPICAL at 14:00

## 2018-01-07 RX ADMIN — Medication SCH CAP: at 08:44

## 2018-01-07 RX ADMIN — CYANOCOBALAMIN TAB 500 MCG SCH MCG: 500 TAB at 08:45

## 2018-01-07 RX ADMIN — OXYCODONE HYDROCHLORIDE SCH: 5 CAPSULE ORAL at 17:02

## 2018-01-07 RX ADMIN — PREGABALIN SCH MG: 50 CAPSULE ORAL at 14:06

## 2018-01-07 NOTE — PN
Subjective


Date of Service: 01/07/18


Interval History: 








Patient seen and examined. Events of last night noted, patient had episodes of 

hypotension and slow respirations. Patient does not have his CPAP from home, 

also had several doses of long acting oxy yesterday. BP stable this AM as are 

respirations. Patient states pain is 3/10 at present, denies cough, SOB or 

chest pain, no N/V, ostomy is producing/functioning without issue, london is 

functioning, he is tolerating PO. No further complaints.


Family History: Unchanged from Admission


Social History: Unchanged from Admission


Past Medical History: Unchanged from Admission





Objective


Active Medications: 








Acetaminophen (Tylenol Tab*)  650 mg PO Q6H PRN


   PRN Reason: FEVER/PAIN


   Last Admin: 01/05/18 15:57 Dose:  650 mg


Ascorbic Acid (Vitamin C  Tab*)  500 mg PO DAILY [facility]


   Last Admin: 01/07/18 08:44 Dose:  500 mg


Cholecalciferol (Vitamin D Tab*)  5,000 units PO DAILY [facility]


   Last Admin: 01/07/18 08:44 Dose:  5,000 units


Clotrimazole (Clotrimazole 1%*)  1 applic TOPICAL [facility]ILY [facility]


   Last Admin: 01/06/18 09:34 Dose:  1 applic


Cyanocobalamin (Vitamin B12 Tab*)  1,000 mcg PO DAILY [facility]


   Last Admin: 01/07/18 08:45 Dose:  1,000 mcg


Fentanyl (Duragesic  Patch 75 Mcg/Hr*)  75 mcg TRANSDERM Q72H [facility]


   Last Admin: 01/07/18 08:40 Dose:  75 mcg


Ferrous Gluconate (Fergon Tab*)  324 mg PO DAILY [facility]


   Last Admin: 01/07/18 08:45 Dose:  324 mg


Folic Acid (Folvite Tab*)  0.5 mg PO DAILY [facility]


   Last Admin: 01/07/18 08:44 Dose:  0.5 mg


Furosemide (Lasix Tab*)  40 mg PO 0800,1700 [facility]


   Last Admin: 01/07/18 08:44 Dose:  40 mg


Heparin Sodium (Porcine) (Heparin Vial(*))  5,000 units SUBCUT Q8HR [facility]


   Last Admin: 01/07/18 05:39 Dose:  5,000 units


Hydralazine HCl (Apresoline Iv*)  5 mg IV SLOW PU Q6H PRN


   PRN Reason: SYSTOLIC BP GREATER THAN:


   Last Admin: 01/05/18 09:47 Dose:  5 mg


Hydrocortisone (Hytone Cream 1%*)  1 applic TOPICAL TID [facility]


   Last Admin: 01/06/18 21:38 Dose:  1 applic


Lactobacillus Rhamnosus (Culturelle*)  1 cap PO DAILY [facility]


   Last Admin: 01/07/18 08:44 Dose:  1 cap


Lisinopril (Prinivil Tab*)  10 mg PO DAILY [facility]


   Last Admin: 01/07/18 08:44 Dose:  10 mg


Omeprazole (Prilosec Cap*)  40 mg PO DAILY@0700 [facility]


   Last Admin: 01/07/18 06:44 Dose:  40 mg


Oxycodone HCl (Oxycodone Oral.Soln*)  10 mg PO Q4H PRN


   PRN Reason: PAIN


   Last Admin: 01/07/18 08:45 Dose:  10 mg


Oxycodone HCl (Roxycodone Tab*)  10 mg PO Q4H [facility]


   Last Admin: 01/07/18 04:48 Dose:  Not Given


Pharmacy Profile Note (Fentanyl Patch Check Q Shift)  1 note N/A 0700,1900 [facility]


   Last Admin: 01/07/18 06:44 Dose:  1 note


Potassium Chloride (Klor Con Er Tab*)  10 meq PO DAILY [facility]


   Last Admin: 01/07/18 08:44 Dose:  10 meq


Pregabalin (Lyrica Cap(*))  150 mg PO TID [facility]


   Last Admin: 01/07/18 08:45 Dose:  150 mg


Zinc Sulfate (Zinc-220 Cap*)  220 mg PO DAILY [facility]


   Last Admin: 01/07/18 08:45 Dose:  220 mg








 Vital Signs - 8 hr











  01/07/18 01/07/18 01/07/18





  01:15 01:47 03:43


 


Temperature   98.2 F


 


Pulse Rate   77


 


Respiratory 10  14





Rate   


 


Blood Pressure  118/62 93/49





(mmHg)   


 


O2 Sat by Pulse   98





Oximetry   














  01/07/18 01/07/18 01/07/18





  04:48 07:21 07:41


 


Temperature  98.1 F 98.5 F


 


Pulse Rate  73 72


 


Respiratory 14 17 10





Rate   


 


Blood Pressure  113/53 143/53





(mmHg)   


 


O2 Sat by Pulse  96 93





Oximetry   














  01/07/18 01/07/18





  07:51 08:45


 


Temperature  


 


Pulse Rate  


 


Respiratory 11 12





Rate  


 


Blood Pressure  





(mmHg)  


 


O2 Sat by Pulse  





Oximetry  











Oxygen Devices in Use Now: None


Appearance: Alert, NAD


Eyes: PERRLA


Ears/Nose/Mouth/Throat: NL Teeth, Lips, Gums, Mucous Membranes Moist


Neck: Trachea Midline


Respiratory: Symmetrical Chest Expansion and Respiratory Effort, Clear to 

Auscultation


Cardiovascular: NL Sounds; No Murmurs; No JVD, RRR


Abdominal: NL Sounds; No Tenderness; No Distention - colostomy functioning, gas 

and liquid feces in bag


Extremities: No Edema, No Clubbing, Cyanosis


Skin: - - flap wound intact, granulating tissue in two small open areas at 

sacrum, no exudate


Neurological: Alert and Oriented x 3


Nutrition: Taking PO's


Result Diagrams: 


 01/06/18 06:01





 01/06/18 06:09





Assess/Plan/Problems-Billing


Assessment: 





Patient is a 71yo male with a PMH significant for Ependymoma with multiple 

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 

post flap who presents for diverting colostomy to aid in the healing of his 

ulcer.





- Patient Problems


(1) Acute osteomyelitis of sacrum


Code(s): M46.28 - OSTEOMYELITIS OF VERTEBRA, SACRAL AND SACROCOCCYGEAL REGION   

SNOMED Code(s): 445605432


   Comment: 


 - s/p debridement at time of flap creation at [facility] [place], [place]


 - Completed antibiotics 12/6


 - stable


   





(2) Chronic back pain


Code(s): M54.9 - DORSALGIA, UNSPECIFIED; G89.29 - OTHER CHRONIC PAIN   SNOMED 

Code(s): 202590507


   Comment: 


 - Secondary to ependymoma


 - Pain management following


 - Continue pregabalin, acetaminophen, oxycodone liquid 10mg q4h PRN, and 

Fentanyl patch at 75mcg.


 - Hold Oxycodone 10mg PO Q4H scheduled while awake 2/2 hypotension


 - Stable    





(3) Chronic indwelling [place] catheter


Code(s): Z92.89 - PERSONAL HISTORY OF OTHER MEDICAL TREATMENT   SNOMED Code(s): 

914335215


   Comment: 


 - Secondary to neurogenic bladder


 - No signs of UTI   





(4) Ependymoma


Code(s): C71.9 - MALIGNANT NEOPLASM OF BRAIN, UNSPECIFIED   SNOMED Code(s): 

599172756


   Comment: 


 - Wheelchair bound


 - High risk for continued back pain, decompensation, pressure wounds and UTIs


 - Supportive care


   





(5) Neurogenic bladder


Code(s): N31.9 - NEUROMUSCULAR DYSFUNCTION OF BLADDER, UNSPECIFIED   SNOMED Code

(s): 523415543


   Comment: 


- London care   





(6) Neurogenic bowel


Code(s): K59.2 - NEUROGENIC BOWEL, NOT ELSEWHERE CLASSIFIED   SNOMED Code(s): 

045294347


   Comment:  


 - S/P diverting colostomy to prevent further skin breakdown


 - Cleared per surgery   





(7) Paraplegia


Code(s): G82.20 - PARAPLEGIA, UNSPECIFIED   SNOMED Code(s): 59759554


   Comment: 


 - OOB to chair TID up to 2 hours each session


 - Trapeze and noemy lift


   





(8) S/P flap graft


Code(s): Z98.890 - OTHER SPECIFIED POSTPROCEDURAL STATES   SNOMED Code(s): 

176384550


   Comment: 


 - Graft completed 10/25/17 at [facility] [place], [place]


 - Continue medihoney for 2 small open areas


 - Graft intact


 - Reposition Q2h


 - OOB TID for up to 2 hours as bartolome








   


Status and Disposition: 





Remain inpatient, reinforce PT and DC home.


Counseling and/or Coordination of Care Minutes: coordinated with patient, staff 

and Dr. Porter

## 2018-01-08 LAB
BASOPHILS # BLD AUTO: 0.1 10^3/UL (ref 0–0.2)
EOSINOPHIL # BLD AUTO: 0.2 10^3/UL (ref 0–0.6)
HCT VFR BLD AUTO: 39 % (ref 42–52)
HGB BLD-MCNC: 13.1 G/DL (ref 14–18)
LYMPHOCYTES # BLD AUTO: 1.8 10^3/UL (ref 1–4.8)
MCH RBC QN AUTO: 28 PG (ref 27–31)
MCHC RBC AUTO-ENTMCNC: 33 G/DL (ref 31–36)
MCV RBC AUTO: 83 FL (ref 80–94)
MONOCYTES # BLD AUTO: 1.3 10^3/UL (ref 0–0.8)
NEUTROPHILS # BLD AUTO: 5.7 10^3/UL (ref 1.5–7.7)
NRBC # BLD AUTO: 0 10^3/UL
NRBC BLD QL AUTO: 0
PLATELET # BLD AUTO: 274 10^3/UL (ref 150–450)
RBC # BLD AUTO: 4.76 10^6/UL (ref 4–5.4)
WBC # BLD AUTO: 9.1 10^3/UL (ref 3.5–10.8)

## 2018-01-08 RX ADMIN — CYANOCOBALAMIN TAB 500 MCG SCH MCG: 500 TAB at 13:39

## 2018-01-08 RX ADMIN — CLOTRIMAZOLE SCH APPLIC: 10 CREAM TOPICAL at 09:30

## 2018-01-08 RX ADMIN — HYDROCORTISONE SCH APPLIC: 1 CREAM TOPICAL at 09:30

## 2018-01-08 RX ADMIN — WATER SCH NOTE: 100 INJECTION, SOLUTION INTRAVENOUS at 06:42

## 2018-01-08 RX ADMIN — OXYCODONE HYDROCHLORIDE SCH: 5 CAPSULE ORAL at 06:18

## 2018-01-08 RX ADMIN — HYDROCORTISONE SCH: 1 CREAM TOPICAL at 14:45

## 2018-01-08 RX ADMIN — OXYCODONE HYDROCHLORIDE SCH: 5 CAPSULE ORAL at 08:15

## 2018-01-08 RX ADMIN — FOLIC ACID SCH MG: 1 TABLET ORAL at 13:39

## 2018-01-08 RX ADMIN — Medication SCH MG: at 13:41

## 2018-01-08 RX ADMIN — OMEPRAZOLE SCH MG: 20 CAPSULE, DELAYED RELEASE ORAL at 06:14

## 2018-01-08 RX ADMIN — PREGABALIN SCH MG: 50 CAPSULE ORAL at 13:42

## 2018-01-08 RX ADMIN — OXYCODONE HYDROCHLORIDE SCH: 5 CAPSULE ORAL at 12:13

## 2018-01-08 RX ADMIN — OXYCODONE HYDROCHLORIDE AND ACETAMINOPHEN SCH MG: 500 TABLET ORAL at 13:40

## 2018-01-08 RX ADMIN — FUROSEMIDE SCH MG: 40 TABLET ORAL at 08:14

## 2018-01-08 RX ADMIN — OXYCODONE HYDROCHLORIDE SCH: 5 CAPSULE ORAL at 16:47

## 2018-01-08 RX ADMIN — PREGABALIN SCH: 50 CAPSULE ORAL at 01:10

## 2018-01-08 RX ADMIN — PREGABALIN SCH: 50 CAPSULE ORAL at 21:42

## 2018-01-08 RX ADMIN — PREGABALIN SCH: 50 CAPSULE ORAL at 10:00

## 2018-01-08 RX ADMIN — OXYCODONE HYDROCHLORIDE PRN MG: 5 SOLUTION ORAL at 08:10

## 2018-01-08 RX ADMIN — Medication SCH CAP: at 13:40

## 2018-01-08 RX ADMIN — HEPARIN SODIUM SCH UNITS: 5000 INJECTION INTRAVENOUS; SUBCUTANEOUS at 13:42

## 2018-01-08 RX ADMIN — HEPARIN SODIUM SCH UNITS: 5000 INJECTION INTRAVENOUS; SUBCUTANEOUS at 06:14

## 2018-01-08 RX ADMIN — Medication SCH UNITS: at 13:40

## 2018-01-08 RX ADMIN — WATER SCH NOTE: 100 INJECTION, SOLUTION INTRAVENOUS at 18:41

## 2018-01-08 RX ADMIN — OXYCODONE HYDROCHLORIDE SCH: 5 CAPSULE ORAL at 21:00

## 2018-01-08 RX ADMIN — OXYCODONE HYDROCHLORIDE SCH: 5 CAPSULE ORAL at 01:09

## 2018-01-08 RX ADMIN — HYDROCORTISONE SCH: 1 CREAM TOPICAL at 21:43

## 2018-01-08 RX ADMIN — FUROSEMIDE SCH: 40 TABLET ORAL at 16:47

## 2018-01-08 RX ADMIN — HEPARIN SODIUM SCH UNITS: 5000 INJECTION INTRAVENOUS; SUBCUTANEOUS at 21:41

## 2018-01-08 RX ADMIN — LISINOPRIL SCH MG: 10 TABLET ORAL at 13:39

## 2018-01-08 RX ADMIN — POTASSIUM CHLORIDE SCH MEQ: 750 TABLET, FILM COATED, EXTENDED RELEASE ORAL at 13:41

## 2018-01-08 NOTE — PN
Progress Note





- Progress Note


Date of Service: 01/08/18


SOAP: 


Subjective:





Patient seen and examined with wife and son at bedside. Reports low back pain, 

intermittent, worse with prolonged sitting. Denies abdominal pain, nausea or 

vomiting. Appetite is better, stoma is working.








Objective:





Awake and alert, sitting on his motorized chair, in NAD


VSS, Tmax 99.1


Abdomen soft, NT, ND


Stoma viable with output of liquid brown stool


No hernias noted


Ext. without edema





Assessment:





POD#6, s/p diagnostic laparoscopy with diverting colostomy, stable








Plan:





Maintain soft diet, may consider adding boost for nutrition supplements with 

meals


Stable from a surgical standpoint


D/C plans per medicine

## 2018-01-09 LAB
BASOPHILS # BLD AUTO: 0.1 10^3/UL (ref 0–0.2)
EOSINOPHIL # BLD AUTO: 0.2 10^3/UL (ref 0–0.6)
HCT VFR BLD AUTO: 34 % (ref 42–52)
HGB BLD-MCNC: 11.3 G/DL (ref 14–18)
LYMPHOCYTES # BLD AUTO: 1.9 10^3/UL (ref 1–4.8)
MCH RBC QN AUTO: 27 PG (ref 27–31)
MCHC RBC AUTO-ENTMCNC: 33 G/DL (ref 31–36)
MCV RBC AUTO: 82 FL (ref 80–94)
MONOCYTES # BLD AUTO: 1.2 10^3/UL (ref 0–0.8)
NEUTROPHILS # BLD AUTO: 3.4 10^3/UL (ref 1.5–7.7)
NRBC # BLD AUTO: 0 10^3/UL
NRBC BLD QL AUTO: 0
PLATELET # BLD AUTO: 234 10^3/UL (ref 150–450)
RBC # BLD AUTO: 4.12 10^6/UL (ref 4–5.4)
WBC # BLD AUTO: 6.8 10^3/UL (ref 3.5–10.8)

## 2018-01-09 RX ADMIN — HYDROCORTISONE SCH: 1 CREAM TOPICAL at 23:57

## 2018-01-09 RX ADMIN — CYANOCOBALAMIN TAB 500 MCG SCH MCG: 500 TAB at 09:24

## 2018-01-09 RX ADMIN — Medication SCH CAP: at 09:24

## 2018-01-09 RX ADMIN — CEFTRIAXONE SODIUM SCH MLS/HR: 1 INJECTION, POWDER, FOR SOLUTION INTRAVENOUS at 16:47

## 2018-01-09 RX ADMIN — FUROSEMIDE SCH MG: 40 TABLET ORAL at 16:46

## 2018-01-09 RX ADMIN — OXYCODONE HYDROCHLORIDE SCH: 5 CAPSULE ORAL at 00:30

## 2018-01-09 RX ADMIN — PREGABALIN SCH MG: 50 CAPSULE ORAL at 15:32

## 2018-01-09 RX ADMIN — OXYCODONE HYDROCHLORIDE SCH: 5 CAPSULE ORAL at 04:02

## 2018-01-09 RX ADMIN — LISINOPRIL SCH MG: 10 TABLET ORAL at 09:24

## 2018-01-09 RX ADMIN — HEPARIN SODIUM SCH UNITS: 5000 INJECTION INTRAVENOUS; SUBCUTANEOUS at 15:33

## 2018-01-09 RX ADMIN — Medication SCH UNITS: at 09:23

## 2018-01-09 RX ADMIN — Medication SCH MG: at 09:24

## 2018-01-09 RX ADMIN — OXYCODONE HYDROCHLORIDE SCH: 5 CAPSULE ORAL at 22:10

## 2018-01-09 RX ADMIN — Medication SCH MG: at 09:25

## 2018-01-09 RX ADMIN — OXYCODONE HYDROCHLORIDE SCH MG: 5 CAPSULE ORAL at 16:46

## 2018-01-09 RX ADMIN — HYDROCORTISONE SCH: 1 CREAM TOPICAL at 16:12

## 2018-01-09 RX ADMIN — HYDROCORTISONE SCH APPLIC: 1 CREAM TOPICAL at 10:00

## 2018-01-09 RX ADMIN — FUROSEMIDE SCH MG: 40 TABLET ORAL at 09:24

## 2018-01-09 RX ADMIN — OXYCODONE HYDROCHLORIDE SCH MG: 5 CAPSULE ORAL at 09:48

## 2018-01-09 RX ADMIN — OMEPRAZOLE SCH MG: 20 CAPSULE, DELAYED RELEASE ORAL at 05:48

## 2018-01-09 RX ADMIN — WATER SCH NOTE: 100 INJECTION, SOLUTION INTRAVENOUS at 19:09

## 2018-01-09 RX ADMIN — SODIUM CHLORIDE AND POTASSIUM CHLORIDE SCH MLS/HR: 9; 1.49 INJECTION, SOLUTION INTRAVENOUS at 18:39

## 2018-01-09 RX ADMIN — PREGABALIN SCH MG: 50 CAPSULE ORAL at 22:18

## 2018-01-09 RX ADMIN — WATER SCH NOTE: 100 INJECTION, SOLUTION INTRAVENOUS at 07:17

## 2018-01-09 RX ADMIN — HEPARIN SODIUM SCH UNITS: 5000 INJECTION INTRAVENOUS; SUBCUTANEOUS at 22:21

## 2018-01-09 RX ADMIN — FOLIC ACID SCH MG: 1 TABLET ORAL at 09:24

## 2018-01-09 RX ADMIN — HEPARIN SODIUM SCH UNITS: 5000 INJECTION INTRAVENOUS; SUBCUTANEOUS at 05:47

## 2018-01-09 RX ADMIN — OXYCODONE HYDROCHLORIDE SCH: 5 CAPSULE ORAL at 12:56

## 2018-01-09 RX ADMIN — CLOTRIMAZOLE SCH APPLIC: 10 CREAM TOPICAL at 09:57

## 2018-01-09 RX ADMIN — OMEPRAZOLE SCH: 20 CAPSULE, DELAYED RELEASE ORAL at 06:16

## 2018-01-09 RX ADMIN — OXYCODONE HYDROCHLORIDE AND ACETAMINOPHEN SCH MG: 500 TABLET ORAL at 09:24

## 2018-01-09 RX ADMIN — POTASSIUM CHLORIDE SCH MEQ: 750 TABLET, FILM COATED, EXTENDED RELEASE ORAL at 09:24

## 2018-01-09 RX ADMIN — PREGABALIN SCH MG: 50 CAPSULE ORAL at 09:40

## 2018-01-09 NOTE — PN
Progress Note





- Progress Note


Date of Service: 01/09/18


SOAP: 


Subjective:





Doing better today, denies any back or abdominal pain. Stoma continues to work 

well, appetite keeps getting better. Has no complaints today.








Objective:





Awake and alert, comfortable in bed


VSS, afbrile


Abdomen soft, Nt, ND. Stoma viable with liquid brown stool output.








Assessment:





73 y/o male, s/p diverting colostomy, stable and improving








Plan:





D/C plans per medicine


No surgical indications at this time.


Sign off patient, please call if any issues during this admission.

## 2018-01-09 NOTE — PN
Subjective


Date of Service: 01/08/18


Interval History: 


.


patient with ongoing confusion and intermittent chills


long discussion with patient's family -- his wife and his son who is visiting 

from [place].





After multiple conversations and lines of inquiry, I determined the patient was 

suffering from a UTI.





We discussed rehydration.


patient was certainly delerius from the UTI (as well as opiates he was getting 

concurrent with his acute illness).





colostomy working


intake was diminished


T&P'ing occuring religiously.





orders for IVF and repeat labs to ensure creatinine was decreasing.


.








Family History: Unchanged from Admission


Social History: Unchanged from Admission


Past Medical History: Unchanged from Admission





Objective


Active Medications: 


.


Acetaminophen (Tylenol Tab*)  650 mg PO Q6H PRN


   PRN Reason: FEVER/PAIN


   Last Admin: 01/05/18 15:57 Dose:  650 mg


Ascorbic Acid (Vitamin C  Tab*)  500 mg PO DAILY [facility]


   Last Admin: 01/09/18 09:24 Dose:  500 mg


Cholecalciferol (Vitamin D Tab*)  5,000 units PO DAILY [facility]


   Last Admin: 01/09/18 09:23 Dose:  5,000 units


Clotrimazole (Clotrimazole 1%*)  1 applic TOPICAL DAILY [facility]


   Last Admin: 01/09/18 09:57 Dose:  1 applic


Cyanocobalamin (Vitamin B12 Tab*)  1,000 mcg PO DAILY [facility]


   Last Admin: 01/09/18 09:24 Dose:  1,000 mcg


Fentanyl (Duragesic  Patch 75 Mcg/Hr*)  75 mcg TRANSDERM Q72H [facility]


Ferrous Gluconate (Fergon Tab*)  324 mg PO DAILY [facility]


   Last Admin: 01/09/18 09:24 Dose:  324 mg


Folic Acid (Folvite Tab*)  0.5 mg PO DAILY [facility]


   Last Admin: 01/09/18 09:24 Dose:  0.5 mg


Furosemide (Lasix Tab*)  40 mg PO 0800,1700 [facility]


   Last Admin: 01/09/18 16:46 Dose:  40 mg


Heparin Sodium (Porcine) (Heparin Vial(*))  5,000 units SUBCUT Q8HR [facility]


   Last Admin: 01/09/18 15:33 Dose:  5,000 units


Hydralazine HCl (Apresoline Iv*)  5 mg IV SLOW PU Q6H PRN


   PRN Reason: SYSTOLIC BP GREATER THAN:


   Last Admin: 01/05/18 09:47 Dose:  5 mg


Hydrocortisone (Hytone Cream 1%*)  1 applic TOPICAL TID [facility]


   Last Admin: 01/09/18 16:12 Dose:  Not Given


Ceftriaxone Sodium 1 gm/ (Dextrose)  50 mls @ 200 mls/hr IVPB 1730 [facility]


   Last Admin: 01/09/18 16:47 Dose:  200 mls/hr


Potassium Chloride/Sodium Chloride (Ns 0.9% W/ 20 Meq Kcl 1000 Ml*)  1,000 mls 

@ 150 mls/hr IV .ENTER RATE [facility]


Lactobacillus Rhamnosus (Culturelle*)  1 cap PO DAILY [facility]


   Last Admin: 01/09/18 09:24 Dose:  1 cap


Lisinopril (Prinivil Tab*)  10 mg PO DAILY [facility]


   Last Admin: 01/09/18 09:24 Dose:  10 mg


Omeprazole (Prilosec Cap*)  40 mg PO DAILY@0730 [facility]


   Last Admin: 01/09/18 06:16 Dose:  Not Given


Oxycodone HCl (Oxycodone Oral.Soln*)  10 mg PO Q4H PRN


   PRN Reason: PAIN


   Last Admin: 01/08/18 08:10 Dose:  10 mg


Oxycodone HCl (Roxycodone Tab*)  10 mg PO Q4H [facility]


   Last Admin: 01/09/18 16:46 Dose:  10 mg


Pharmacy Profile Note (Fentanyl Patch Check Q Shift)  1 note N/A 0700,1900 [facility]


   Last Admin: 01/09/18 07:17 Dose:  1 note


Potassium Chloride (Klor Con Er Tab*)  10 meq PO DAILY [facility]


   Last Admin: 01/09/18 09:24 Dose:  10 meq


Pregabalin (Lyrica Cap(*))  150 mg PO TID [facility]


   Last Admin: 01/09/18 15:32 Dose:  150 mg


Zinc Sulfate (Zinc-220 Cap*)  220 mg PO DAILY [facility]


   Last Admin: 01/09/18 09:25 Dose:  220 mg


.


 Vital Signs - 8 hr











  01/09/18 01/09/18 01/09/18





  11:40 11:50 15:32


 


Temperature   


 


Pulse Rate   


 


Respiratory 16 16 16





Rate   


 


Blood Pressure   





(mmHg)   


 


O2 Sat by Pulse   





Oximetry   














  01/09/18 01/09/18 01/09/18





  15:37 16:00 16:46


 


Temperature 98.1 F  


 


Pulse Rate 92  


 


Respiratory 16  16





Rate   


 


Blood Pressure 135/58  





(mmHg)   


 


O2 Sat by Pulse 95 95 





Oximetry   











Oxygen Devices in Use Now: None


Appearance: elderly, appears confused relative to baseline; less interacive/

alert


Eyes: No Scleral Icterus


Ears/Nose/Mouth/Throat: Clear Oropharnyx, - - bit dry MM


Neck: Trachea Midline


Respiratory: Symmetrical Chest Expansion and Respiratory Effort, - - RR ~ 10-

12...


Cardiovascular: NL Sounds; No Murmurs; No JVD


Abdominal: NL Sounds; No Tenderness; No Distention, - - colostomy functioning; 

looks good.


Extremities: No Edema


Skin: No Rash or Ulcers, - - flap looks good on rolling.


Neurological: - - confused, acute delerium (though mild).


Lines/Tubes/Other Access: Clean, Dry and Intact Peripheral IV - R arm


Nutrition: Taking PO's - but diminished


Result Diagrams: 


 01/09/18 05:58





 01/09/18 05:58


Additional Lab and Data: 





UA grossly abnormal 1/8/2017


UCX - pending








Assess/Plan/Problems-Billing


.


Assessment: 





Patient is a 71 yo male with a PMH significant for Ependymoma with multiple 

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 

post flap who presents for diverting colostomy to aid in the healing of his 

ulcer.





Recent altered mental status due to acute Urinary tract infection related to 

indwelling london catheter and related dehydration.





- Patient Problems


(1) UTI (urinary tract infection)


Current Visit: No   Status: Acute   Priority: High   Comment: 


UA with positive leukocyte esterase


Start ceftriaxone given long recent h/o fluroquinolone use...


Patient with chronic indwelling london catheter - will change after Abx start


Consider suprapubic catheter given number of UTI's in past year


Await urine cx --> tailor Abx as per Abx sensitivities   





(2) Acute osteomyelitis of sacrum


Current Visit: No   Status: Resolved   Priority: High   Code(s): M46.28 - 

OSTEOMYELITIS OF VERTEBRA, SACRAL AND SACROCOCCYGEAL REGION   Comment: 


 - s/p debridement at time of flap creation at [facility]


 - Completed antibiotics 12/6


 - stable   





(3) S/P flap graft


Current Visit: No   Status: Acute   Priority: High   Code(s): Z98.890 - OTHER 

SPECIFIED POSTPROCEDURAL STATES   Comment: 


 - Graft completed 10/25/17 at [facility]


 - Continue medihoney for 2 small open areas


 - Graft intact


 - Reposition Q2h


 - OOB TID for up to 2 hours as bartolome   





(4) Chronic back pain


Current Visit: No   Status: Chronic   Priority: Medium   Code(s): M54.9 - 

DORSALGIA, UNSPECIFIED; G89.29 - OTHER CHRONIC PAIN   Comment: 


 - Secondary to ependymoma


 - Pain management following


 - Continue pregabalin, acetaminophen, oxycodone liquid 10mg q4h PRN, and 

Fentanyl patch at 75mcg...hold opiates, as needed, for AMS related to UTI/acute 

infection.


 - Hold Oxycodone 10mg PO Q4H scheduled while awake 2/2 hypotension


 - Stable    





(5) Chronic indwelling [place] catheter


Current Visit: No   Status: Chronic   Priority: High   Code(s): Z92.89 - 

PERSONAL HISTORY OF OTHER MEDICAL TREATMENT   Comment: 


 - Secondary to neurogenic bladder


 - + UTI (recurrent)


- consider suprapubic catheter; discuss with urology after acute infection 

cleares.   





(6) Ependymoma


Current Visit: No   Status: Chronic   Priority: High   Code(s): C71.9 - 

MALIGNANT NEOPLASM OF BRAIN, UNSPECIFIED   Comment: 


 - Wheelchair bound


 - High risk for continued back pain, decompensation, pressure wounds and UTIs


 - Supportive care   





(7) Neurogenic bladder


Current Visit: No   Status: Chronic   Priority: High   Code(s): N31.9 - 

NEUROMUSCULAR DYSFUNCTION OF BLADDER, UNSPECIFIED   Comment: 


- London care; change london given current infection   





(8) Neurogenic bowel


Current Visit: No   Status: Chronic   Priority: High   Code(s): K59.2 - 

NEUROGENIC BOWEL, NOT ELSEWHERE CLASSIFIED   SNOMED Code(s): 160086043


   Comment:  


 - S/P diverting colostomy to prevent further skin breakdown


 - Cleared per surgery   


Status and Disposition: 





Remain inpatient, reinforce PT and DC home.

## 2018-01-09 NOTE — PN
Subjective


Date of Service: 01/09/18


Interval History: 


.


doing better today; more lucid


family happy


discussed SP catheter option -- will further d/w urology next week.


denies new pain -- curently controlled


still suboptimal intake - will intermittently give IVF.


abx going well


ostomy ok.


.


Family History: Unchanged from Admission


Social History: Unchanged from Admission


Past Medical History: Unchanged from Admission





Objective


Active Medications: 








Acetaminophen (Tylenol Tab*)  650 mg PO Q6H PRN


   PRN Reason: FEVER/PAIN


   Last Admin: 01/05/18 15:57 Dose:  650 mg


Ascorbic Acid (Vitamin C  Tab*)  500 mg PO DAILY [facility]


   Last Admin: 01/09/18 09:24 Dose:  500 mg


Cholecalciferol (Vitamin D Tab*)  5,000 units PO DAILY [facility]


   Last Admin: 01/09/18 09:23 Dose:  5,000 units


Clotrimazole (Clotrimazole 1%*)  1 applic TOPICAL DAILY [facility]


   Last Admin: 01/09/18 09:57 Dose:  1 applic


Cyanocobalamin (Vitamin B12 Tab*)  1,000 mcg PO DAILY [facility]


   Last Admin: 01/09/18 09:24 Dose:  1,000 mcg


Fentanyl (Duragesic  Patch 75 Mcg/Hr*)  75 mcg TRANSDERM Q72H [facility]


Ferrous Gluconate (Fergon Tab*)  324 mg PO DAILY [facility]


   Last Admin: 01/09/18 09:24 Dose:  324 mg


Folic Acid (Folvite Tab*)  0.5 mg PO DAILY [facility]


   Last Admin: 01/09/18 09:24 Dose:  0.5 mg


Furosemide (Lasix Tab*)  40 mg PO 0800,1700 [facility]


   Last Admin: 01/09/18 16:46 Dose:  40 mg


Heparin Sodium (Porcine) (Heparin Vial(*))  5,000 units SUBCUT Q8HR [facility]


   Last Admin: 01/09/18 15:33 Dose:  5,000 units


Hydralazine HCl (Apresoline Iv*)  5 mg IV SLOW PU Q6H PRN


   PRN Reason: SYSTOLIC BP GREATER THAN:


   Last Admin: 01/05/18 09:47 Dose:  5 mg


Hydrocortisone (Hytone Cream 1%*)  1 applic TOPICAL TID [facility]


   Last Admin: 01/09/18 16:12 Dose:  Not Given


Ceftriaxone Sodium 1 gm/ (Dextrose)  50 mls @ 200 mls/hr IVPB 1730 [facility]


   Last Admin: 01/09/18 16:47 Dose:  200 mls/hr


Potassium Chloride/Sodium Chloride (Ns 0.9% W/ 20 Meq Kcl 1000 Ml*)  1,000 mls 

@ 150 mls/hr IV PER RATE [facility]


Lactobacillus Rhamnosus (Culturelle*)  1 cap PO DAILY [facility]


   Last Admin: 01/09/18 09:24 Dose:  1 cap


Lisinopril (Prinivil Tab*)  10 mg PO DAILY [facility]


   Last Admin: 01/09/18 09:24 Dose:  10 mg


Omeprazole (Prilosec Cap*)  40 mg PO DAILY@0730 [facility]


   Last Admin: 01/09/18 06:16 Dose:  Not Given


Oxycodone HCl (Oxycodone Oral.Soln*)  10 mg PO Q4H PRN


   PRN Reason: PAIN


   Last Admin: 01/08/18 08:10 Dose:  10 mg


Oxycodone HCl (Roxycodone Tab*)  10 mg PO Q4H [facility]


   Last Admin: 01/09/18 16:46 Dose:  10 mg


Pharmacy Profile Note (Fentanyl Patch Check Q Shift)  1 note N/A 0700,1900 [facility]


   Last Admin: 01/09/18 07:17 Dose:  1 note


Potassium Chloride (Klor Con Er Tab*)  10 meq PO DAILY [facility]


   Last Admin: 01/09/18 09:24 Dose:  10 meq


Pregabalin (Lyrica Cap(*))  150 mg PO TID [facility]


   Last Admin: 01/09/18 15:32 Dose:  150 mg


Zinc Sulfate (Zinc-220 Cap*)  220 mg PO DAILY [facility]


   Last Admin: 01/09/18 09:25 Dose:  220 mg








 Vital Signs - 8 hr











  01/09/18 01/09/18 01/09/18





  11:40 11:50 15:32


 


Temperature   


 


Pulse Rate   


 


Respiratory 16 16 16





Rate   


 


Blood Pressure   





(mmHg)   


 


O2 Sat by Pulse   





Oximetry   














  01/09/18 01/09/18 01/09/18





  15:37 16:00 16:46


 


Temperature 98.1 F  


 


Pulse Rate 92  


 


Respiratory 16  16





Rate   


 


Blood Pressure 135/58  





(mmHg)   


 


O2 Sat by Pulse 95 95 





Oximetry   














  01/09/18





  17:35


 


Temperature 


 


Pulse Rate 


 


Respiratory 14





Rate 


 


Blood Pressure 





(mmHg) 


 


O2 Sat by Pulse 





Oximetry 











Oxygen Devices in Use Now: None


Appearance: elderly; more alert


Eyes: No Scleral Icterus


Ears/Nose/Mouth/Throat: Clear Oropharnyx


Neck: NL Appearance and Movements; NL JVP


Respiratory: Symmetrical Chest Expansion and Respiratory Effort


Cardiovascular: NL Sounds; No Murmurs; No JVD


Abdominal: NL Sounds; No Tenderness; No Distention, - - ostomy, R, CDI...


Lymphatic: No Cervical Adenopathy


Extremities: No Edema, - - teds on


Skin: No Rash or Ulcers, - - graft looks good.


Neurological: Alert and Oriented x 3


Lines/Tubes/Other Access: Clean, Dry and Intact [place] - changed, Clean, Dry and 

Intact Peripheral IV - R arm


Nutrition: Taking PO's


Result Diagrams: 


 01/09/18 05:58





 01/09/18 05:58


Additional Lab and Data: 





UA grossly abnormal 1/8/2017


UCX - pending








Assess/Plan/Problems-Billing


.


Assessment: 





Patient is a 73 yo male with a PMH significant for Ependymoma with multiple 

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 

post flap who presents for diverting colostomy to aid in the healing of his 

ulcer.





Recent altered mental status due to acute Urinary tract infection related to 

indwelling london catheter and related dehydration.





- Patient Problems


(1) UTI (urinary tract infection)


Current Visit: No   Status: Acute   Priority: High   Comment: 


UA with positive leukocyte esterase


Started ceftriaxone given long recent h/o fluroquinolone use...tolerating well. 

complete 5-7 days course.


Patient with chronic indwelling london catheter - will change after Abx start


Consider suprapubic catheter given number of UTI's in past year


Await urine cx --> tailor Abx as per Abx sensitivities   





(2) Acute osteomyelitis of sacrum


Current Visit: No   Status: Resolved   Priority: High   Code(s): M46.28 - 

OSTEOMYELITIS OF VERTEBRA, SACRAL AND SACROCOCCYGEAL REGION   Comment: 


 - s/p debridement at time of flap creation at [facility]


 - Completed antibiotics 12/6


 - stable   





(3) S/P flap graft


Current Visit: No   Status: Acute   Priority: High   Code(s): Z98.890 - OTHER 

SPECIFIED POSTPROCEDURAL STATES   Comment: 


 - Graft completed 10/25/17 at [facility]


 - Continue medihoney for 2 small open areas


 - Graft intact


 - Reposition Q2h


 - OOB TID for up to 2 hours as bartolome   





(4) Chronic back pain


Current Visit: No   Status: Chronic   Priority: Medium   Code(s): M54.9 - 

DORSALGIA, UNSPECIFIED; G89.29 - OTHER CHRONIC PAIN   Comment: 


 - Secondary to ependymoma


 - Pain management following


 - Continue pregabalin, acetaminophen, oxycodone liquid 10mg q4h PRN, and 

Fentanyl patch at 75mcg...hold opiates, as needed, for AMS related to UTI/acute 

infection.


 - Hold Oxycodone 10mg PO Q4H scheduled while awake 2/2 hypotension


 - Stable    





(5) Chronic indwelling [place] catheter


Current Visit: No   Status: Chronic   Priority: High   Code(s): Z92.89 - 

PERSONAL HISTORY OF OTHER MEDICAL TREATMENT   Comment: 


 - Secondary to neurogenic bladder


 - + UTI (recurrent)


- consider suprapubic catheter; discuss with urology after acute infection 

cleares.   





(6) Ependymoma


Current Visit: No   Status: Chronic   Priority: High   Code(s): C71.9 - 

MALIGNANT NEOPLASM OF BRAIN, UNSPECIFIED   Comment: 


 - Wheelchair bound


 - High risk for continued back pain, decompensation, pressure wounds and UTIs


 - Supportive care   





(7) Neurogenic bladder


Current Visit: No   Status: Chronic   Priority: High   Code(s): N31.9 - 

NEUROMUSCULAR DYSFUNCTION OF BLADDER, UNSPECIFIED   Comment: 


- London care; change london given current infection   





(8) Neurogenic bowel


Current Visit: No   Status: Chronic   Priority: High   Code(s): K59.2 - 

NEUROGENIC BOWEL, NOT ELSEWHERE CLASSIFIED   SNOMED Code(s): 688441271


   Comment:  


 - S/P diverting colostomy to prevent further skin breakdown


 - Cleared per surgery   


Status and Disposition: 





Remain inpatient, reinforce PT and DC home.

## 2018-01-10 RX ADMIN — OXYCODONE HYDROCHLORIDE SCH: 5 CAPSULE ORAL at 04:10

## 2018-01-10 RX ADMIN — OXYCODONE HYDROCHLORIDE SCH: 5 CAPSULE ORAL at 10:23

## 2018-01-10 RX ADMIN — SODIUM CHLORIDE AND POTASSIUM CHLORIDE SCH MLS/HR: 9; 1.49 INJECTION, SOLUTION INTRAVENOUS at 04:28

## 2018-01-10 RX ADMIN — OXYCODONE HYDROCHLORIDE SCH: 5 CAPSULE ORAL at 16:00

## 2018-01-10 RX ADMIN — HYDROCORTISONE SCH: 1 CREAM TOPICAL at 21:51

## 2018-01-10 RX ADMIN — SODIUM CHLORIDE AND POTASSIUM CHLORIDE SCH MLS/HR: 9; 1.49 INJECTION, SOLUTION INTRAVENOUS at 12:02

## 2018-01-10 RX ADMIN — HEPARIN SODIUM SCH UNITS: 5000 INJECTION INTRAVENOUS; SUBCUTANEOUS at 14:23

## 2018-01-10 RX ADMIN — Medication SCH CAP: at 10:17

## 2018-01-10 RX ADMIN — OXYCODONE HYDROCHLORIDE AND ACETAMINOPHEN SCH MG: 500 TABLET ORAL at 10:17

## 2018-01-10 RX ADMIN — Medication SCH MG: at 10:16

## 2018-01-10 RX ADMIN — LISINOPRIL SCH MG: 10 TABLET ORAL at 10:17

## 2018-01-10 RX ADMIN — Medication SCH MG: at 10:17

## 2018-01-10 RX ADMIN — PREGABALIN SCH MG: 50 CAPSULE ORAL at 14:22

## 2018-01-10 RX ADMIN — PREGABALIN SCH MG: 50 CAPSULE ORAL at 10:17

## 2018-01-10 RX ADMIN — HYDROCORTISONE SCH APPLIC: 1 CREAM TOPICAL at 12:26

## 2018-01-10 RX ADMIN — OXYCODONE HYDROCHLORIDE SCH: 5 CAPSULE ORAL at 00:09

## 2018-01-10 RX ADMIN — OXYCODONE HYDROCHLORIDE SCH MG: 5 CAPSULE ORAL at 14:23

## 2018-01-10 RX ADMIN — HEPARIN SODIUM SCH UNITS: 5000 INJECTION INTRAVENOUS; SUBCUTANEOUS at 21:55

## 2018-01-10 RX ADMIN — OMEPRAZOLE SCH MG: 20 CAPSULE, DELAYED RELEASE ORAL at 10:17

## 2018-01-10 RX ADMIN — POTASSIUM CHLORIDE SCH MEQ: 750 TABLET, FILM COATED, EXTENDED RELEASE ORAL at 10:17

## 2018-01-10 RX ADMIN — CLOTRIMAZOLE SCH APPLIC: 10 CREAM TOPICAL at 12:26

## 2018-01-10 RX ADMIN — CYANOCOBALAMIN TAB 500 MCG SCH MCG: 500 TAB at 10:17

## 2018-01-10 RX ADMIN — HEPARIN SODIUM SCH UNITS: 5000 INJECTION INTRAVENOUS; SUBCUTANEOUS at 06:59

## 2018-01-10 RX ADMIN — Medication SCH UNITS: at 10:16

## 2018-01-10 RX ADMIN — OXYCODONE HYDROCHLORIDE SCH: 5 CAPSULE ORAL at 22:22

## 2018-01-10 RX ADMIN — FOLIC ACID SCH MG: 1 TABLET ORAL at 10:18

## 2018-01-10 RX ADMIN — FUROSEMIDE SCH MG: 40 TABLET ORAL at 10:17

## 2018-01-10 RX ADMIN — WATER SCH NOTE: 100 INJECTION, SOLUTION INTRAVENOUS at 06:52

## 2018-01-10 RX ADMIN — FUROSEMIDE SCH MG: 40 TABLET ORAL at 18:18

## 2018-01-10 RX ADMIN — WATER SCH NOTE: 100 INJECTION, SOLUTION INTRAVENOUS at 19:18

## 2018-01-10 RX ADMIN — PREGABALIN SCH MG: 50 CAPSULE ORAL at 21:52

## 2018-01-10 RX ADMIN — CEFTRIAXONE SODIUM SCH MLS/HR: 1 INJECTION, POWDER, FOR SOLUTION INTRAVENOUS at 19:09

## 2018-01-10 RX ADMIN — FENTANYL TRANSDERMAL SCH MCG: 75 PATCH, EXTENDED RELEASE TRANSDERMAL at 18:16

## 2018-01-10 RX ADMIN — HYDROCORTISONE SCH: 1 CREAM TOPICAL at 14:29

## 2018-01-11 LAB
BASOPHILS # BLD AUTO: 0 10^3/UL (ref 0–0.2)
EOSINOPHIL # BLD AUTO: 0.2 10^3/UL (ref 0–0.6)
HCT VFR BLD AUTO: 35 % (ref 42–52)
HGB BLD-MCNC: 11.3 G/DL (ref 14–18)
LYMPHOCYTES # BLD AUTO: 2.1 10^3/UL (ref 1–4.8)
MCH RBC QN AUTO: 27 PG (ref 27–31)
MCHC RBC AUTO-ENTMCNC: 33 G/DL (ref 31–36)
MCV RBC AUTO: 83 FL (ref 80–94)
MONOCYTES # BLD AUTO: 0.9 10^3/UL (ref 0–0.8)
NEUTROPHILS # BLD AUTO: 2.4 10^3/UL (ref 1.5–7.7)
NRBC # BLD AUTO: 0 10^3/UL
NRBC BLD QL AUTO: 0
PLATELET # BLD AUTO: 254 10^3/UL (ref 150–450)
RBC # BLD AUTO: 4.19 10^6/UL (ref 4–5.4)
WBC # BLD AUTO: 5.7 10^3/UL (ref 3.5–10.8)

## 2018-01-11 RX ADMIN — HYDROCORTISONE SCH: 1 CREAM TOPICAL at 12:25

## 2018-01-11 RX ADMIN — OXYCODONE HYDROCHLORIDE SCH: 5 CAPSULE ORAL at 20:56

## 2018-01-11 RX ADMIN — FOLIC ACID SCH MG: 1 TABLET ORAL at 08:47

## 2018-01-11 RX ADMIN — FUROSEMIDE SCH MG: 40 TABLET ORAL at 08:49

## 2018-01-11 RX ADMIN — LISINOPRIL SCH MG: 10 TABLET ORAL at 08:48

## 2018-01-11 RX ADMIN — OXYCODONE HYDROCHLORIDE SCH: 5 CAPSULE ORAL at 04:03

## 2018-01-11 RX ADMIN — OMEPRAZOLE SCH MG: 20 CAPSULE, DELAYED RELEASE ORAL at 08:49

## 2018-01-11 RX ADMIN — OXYCODONE HYDROCHLORIDE SCH: 5 CAPSULE ORAL at 08:30

## 2018-01-11 RX ADMIN — OXYCODONE HYDROCHLORIDE SCH: 5 CAPSULE ORAL at 16:48

## 2018-01-11 RX ADMIN — Medication SCH CAP: at 08:49

## 2018-01-11 RX ADMIN — OXYCODONE HYDROCHLORIDE SCH: 5 CAPSULE ORAL at 12:33

## 2018-01-11 RX ADMIN — Medication SCH UNITS: at 08:48

## 2018-01-11 RX ADMIN — POTASSIUM CHLORIDE SCH MEQ: 750 TABLET, FILM COATED, EXTENDED RELEASE ORAL at 08:51

## 2018-01-11 RX ADMIN — OXYCODONE HYDROCHLORIDE SCH MG: 5 CAPSULE ORAL at 12:51

## 2018-01-11 RX ADMIN — SODIUM CHLORIDE AND POTASSIUM CHLORIDE SCH MLS/HR: 9; 1.49 INJECTION, SOLUTION INTRAVENOUS at 01:36

## 2018-01-11 RX ADMIN — OXYCODONE HYDROCHLORIDE PRN MG: 5 SOLUTION ORAL at 17:41

## 2018-01-11 RX ADMIN — HYDROCORTISONE SCH: 1 CREAM TOPICAL at 20:55

## 2018-01-11 RX ADMIN — OXYCODONE HYDROCHLORIDE AND ACETAMINOPHEN SCH MG: 500 TABLET ORAL at 08:51

## 2018-01-11 RX ADMIN — CYANOCOBALAMIN TAB 500 MCG SCH MCG: 500 TAB at 08:48

## 2018-01-11 RX ADMIN — CLOTRIMAZOLE SCH: 10 CREAM TOPICAL at 12:25

## 2018-01-11 RX ADMIN — HEPARIN SODIUM SCH UNITS: 5000 INJECTION INTRAVENOUS; SUBCUTANEOUS at 20:57

## 2018-01-11 RX ADMIN — FUROSEMIDE SCH MG: 40 TABLET ORAL at 17:16

## 2018-01-11 RX ADMIN — WATER SCH NOTE: 100 INJECTION, SOLUTION INTRAVENOUS at 18:48

## 2018-01-11 RX ADMIN — HEPARIN SODIUM SCH UNITS: 5000 INJECTION INTRAVENOUS; SUBCUTANEOUS at 05:51

## 2018-01-11 RX ADMIN — Medication SCH MG: at 08:49

## 2018-01-11 RX ADMIN — CEFTRIAXONE SODIUM SCH MLS/HR: 1 INJECTION, POWDER, FOR SOLUTION INTRAVENOUS at 17:16

## 2018-01-11 RX ADMIN — WATER SCH NOTE: 100 INJECTION, SOLUTION INTRAVENOUS at 07:22

## 2018-01-11 RX ADMIN — OXYCODONE HYDROCHLORIDE SCH: 5 CAPSULE ORAL at 17:19

## 2018-01-11 RX ADMIN — PREGABALIN SCH MG: 50 CAPSULE ORAL at 14:38

## 2018-01-11 RX ADMIN — HYDROCORTISONE SCH APPLIC: 1 CREAM TOPICAL at 14:41

## 2018-01-11 RX ADMIN — Medication SCH MG: at 08:48

## 2018-01-11 RX ADMIN — PREGABALIN SCH MG: 50 CAPSULE ORAL at 20:55

## 2018-01-11 RX ADMIN — OXYCODONE HYDROCHLORIDE PRN MG: 5 SOLUTION ORAL at 08:28

## 2018-01-11 RX ADMIN — OXYCODONE HYDROCHLORIDE SCH: 5 CAPSULE ORAL at 00:10

## 2018-01-11 RX ADMIN — HEPARIN SODIUM SCH UNITS: 5000 INJECTION INTRAVENOUS; SUBCUTANEOUS at 14:38

## 2018-01-11 RX ADMIN — PREGABALIN SCH MG: 50 CAPSULE ORAL at 08:49

## 2018-01-12 RX ADMIN — HEPARIN SODIUM SCH UNITS: 5000 INJECTION INTRAVENOUS; SUBCUTANEOUS at 20:59

## 2018-01-12 RX ADMIN — HYDROCORTISONE SCH APPLIC: 1 CREAM TOPICAL at 10:31

## 2018-01-12 RX ADMIN — Medication SCH MG: at 08:55

## 2018-01-12 RX ADMIN — PREGABALIN SCH MG: 50 CAPSULE ORAL at 08:56

## 2018-01-12 RX ADMIN — Medication SCH CAP: at 08:55

## 2018-01-12 RX ADMIN — HYDROCORTISONE SCH APPLIC: 1 CREAM TOPICAL at 14:24

## 2018-01-12 RX ADMIN — POTASSIUM CHLORIDE SCH MEQ: 750 TABLET, FILM COATED, EXTENDED RELEASE ORAL at 08:55

## 2018-01-12 RX ADMIN — HEPARIN SODIUM SCH UNITS: 5000 INJECTION INTRAVENOUS; SUBCUTANEOUS at 14:24

## 2018-01-12 RX ADMIN — OMEPRAZOLE SCH MG: 20 CAPSULE, DELAYED RELEASE ORAL at 08:56

## 2018-01-12 RX ADMIN — FUROSEMIDE SCH MG: 40 TABLET ORAL at 08:56

## 2018-01-12 RX ADMIN — OXYCODONE HYDROCHLORIDE SCH: 5 CAPSULE ORAL at 09:02

## 2018-01-12 RX ADMIN — OXYCODONE HYDROCHLORIDE PRN MG: 5 SOLUTION ORAL at 13:06

## 2018-01-12 RX ADMIN — Medication SCH UNITS: at 08:55

## 2018-01-12 RX ADMIN — WATER SCH NOTE: 100 INJECTION, SOLUTION INTRAVENOUS at 19:07

## 2018-01-12 RX ADMIN — OXYCODONE HYDROCHLORIDE SCH: 5 CAPSULE ORAL at 16:54

## 2018-01-12 RX ADMIN — OXYCODONE HYDROCHLORIDE SCH: 5 CAPSULE ORAL at 04:45

## 2018-01-12 RX ADMIN — HEPARIN SODIUM SCH UNITS: 5000 INJECTION INTRAVENOUS; SUBCUTANEOUS at 05:52

## 2018-01-12 RX ADMIN — OXYCODONE HYDROCHLORIDE SCH: 5 CAPSULE ORAL at 01:08

## 2018-01-12 RX ADMIN — OXYCODONE HYDROCHLORIDE PRN MG: 5 SOLUTION ORAL at 17:45

## 2018-01-12 RX ADMIN — CEFTRIAXONE SODIUM SCH MLS/HR: 1 INJECTION, POWDER, FOR SOLUTION INTRAVENOUS at 16:47

## 2018-01-12 RX ADMIN — OXYCODONE HYDROCHLORIDE PRN MG: 5 SOLUTION ORAL at 08:10

## 2018-01-12 RX ADMIN — HYDROCORTISONE SCH APPLIC: 1 CREAM TOPICAL at 20:57

## 2018-01-12 RX ADMIN — FUROSEMIDE SCH MG: 40 TABLET ORAL at 16:47

## 2018-01-12 RX ADMIN — Medication SCH MG: at 08:54

## 2018-01-12 RX ADMIN — FOLIC ACID SCH MG: 1 TABLET ORAL at 08:55

## 2018-01-12 RX ADMIN — WATER SCH NOTE: 100 INJECTION, SOLUTION INTRAVENOUS at 07:02

## 2018-01-12 RX ADMIN — PREGABALIN SCH MG: 50 CAPSULE ORAL at 14:23

## 2018-01-12 RX ADMIN — OXYCODONE HYDROCHLORIDE SCH: 5 CAPSULE ORAL at 20:57

## 2018-01-12 RX ADMIN — LISINOPRIL SCH MG: 10 TABLET ORAL at 08:54

## 2018-01-12 RX ADMIN — OXYCODONE HYDROCHLORIDE SCH: 5 CAPSULE ORAL at 14:35

## 2018-01-12 RX ADMIN — OXYCODONE HYDROCHLORIDE AND ACETAMINOPHEN SCH MG: 500 TABLET ORAL at 08:56

## 2018-01-12 RX ADMIN — CLOTRIMAZOLE SCH APPLIC: 10 CREAM TOPICAL at 10:31

## 2018-01-12 RX ADMIN — PREGABALIN SCH MG: 50 CAPSULE ORAL at 20:55

## 2018-01-12 RX ADMIN — CYANOCOBALAMIN TAB 500 MCG SCH MCG: 500 TAB at 08:55

## 2018-01-13 LAB
BASOPHILS # BLD AUTO: 0.1 10^3/UL (ref 0–0.2)
EOSINOPHIL # BLD AUTO: 0.2 10^3/UL (ref 0–0.6)
HCT VFR BLD AUTO: 35 % (ref 42–52)
HGB BLD-MCNC: 11.7 G/DL (ref 14–18)
LYMPHOCYTES # BLD AUTO: 2.4 10^3/UL (ref 1–4.8)
MCH RBC QN AUTO: 27 PG (ref 27–31)
MCHC RBC AUTO-ENTMCNC: 33 G/DL (ref 31–36)
MCV RBC AUTO: 82 FL (ref 80–94)
MONOCYTES # BLD AUTO: 0.9 10^3/UL (ref 0–0.8)
NEUTROPHILS # BLD AUTO: 3 10^3/UL (ref 1.5–7.7)
NRBC # BLD AUTO: 0 10^3/UL
NRBC BLD QL AUTO: 0.1
PLATELET # BLD AUTO: 265 10^3/UL (ref 150–450)
RBC # BLD AUTO: 4.3 10^6/UL (ref 4–5.4)
WBC # BLD AUTO: 6.7 10^3/UL (ref 3.5–10.8)

## 2018-01-13 RX ADMIN — CLOTRIMAZOLE SCH APPLIC: 10 CREAM TOPICAL at 08:53

## 2018-01-13 RX ADMIN — PREGABALIN SCH MG: 50 CAPSULE ORAL at 22:44

## 2018-01-13 RX ADMIN — HEPARIN SODIUM SCH UNITS: 5000 INJECTION INTRAVENOUS; SUBCUTANEOUS at 05:57

## 2018-01-13 RX ADMIN — OXYCODONE HYDROCHLORIDE SCH: 5 CAPSULE ORAL at 12:03

## 2018-01-13 RX ADMIN — FOLIC ACID SCH MG: 1 TABLET ORAL at 08:52

## 2018-01-13 RX ADMIN — POTASSIUM CHLORIDE SCH MEQ: 750 TABLET, FILM COATED, EXTENDED RELEASE ORAL at 08:52

## 2018-01-13 RX ADMIN — OXYCODONE HYDROCHLORIDE PRN MG: 5 SOLUTION ORAL at 17:13

## 2018-01-13 RX ADMIN — Medication SCH UNITS: at 08:51

## 2018-01-13 RX ADMIN — WATER SCH NOTE: 100 INJECTION, SOLUTION INTRAVENOUS at 18:45

## 2018-01-13 RX ADMIN — OXYCODONE HYDROCHLORIDE SCH: 5 CAPSULE ORAL at 00:35

## 2018-01-13 RX ADMIN — Medication SCH MG: at 08:51

## 2018-01-13 RX ADMIN — HYDROCORTISONE SCH: 1 CREAM TOPICAL at 13:47

## 2018-01-13 RX ADMIN — OXYCODONE HYDROCHLORIDE AND ACETAMINOPHEN SCH MG: 500 TABLET ORAL at 08:52

## 2018-01-13 RX ADMIN — OXYCODONE HYDROCHLORIDE PRN MG: 5 SOLUTION ORAL at 08:49

## 2018-01-13 RX ADMIN — HYDROCORTISONE SCH: 1 CREAM TOPICAL at 22:49

## 2018-01-13 RX ADMIN — CEFTRIAXONE SODIUM SCH MLS/HR: 1 INJECTION, POWDER, FOR SOLUTION INTRAVENOUS at 17:15

## 2018-01-13 RX ADMIN — OMEPRAZOLE SCH MG: 20 CAPSULE, DELAYED RELEASE ORAL at 08:52

## 2018-01-13 RX ADMIN — WATER SCH NOTE: 100 INJECTION, SOLUTION INTRAVENOUS at 07:27

## 2018-01-13 RX ADMIN — OXYCODONE HYDROCHLORIDE PRN MG: 5 SOLUTION ORAL at 12:09

## 2018-01-13 RX ADMIN — PREGABALIN SCH MG: 50 CAPSULE ORAL at 13:44

## 2018-01-13 RX ADMIN — Medication SCH CAP: at 08:51

## 2018-01-13 RX ADMIN — FENTANYL TRANSDERMAL SCH MCG: 75 PATCH, EXTENDED RELEASE TRANSDERMAL at 17:10

## 2018-01-13 RX ADMIN — FUROSEMIDE SCH MG: 40 TABLET ORAL at 08:52

## 2018-01-13 RX ADMIN — HEPARIN SODIUM SCH UNITS: 5000 INJECTION INTRAVENOUS; SUBCUTANEOUS at 22:45

## 2018-01-13 RX ADMIN — Medication SCH MG: at 08:52

## 2018-01-13 RX ADMIN — OXYCODONE HYDROCHLORIDE SCH: 5 CAPSULE ORAL at 08:54

## 2018-01-13 RX ADMIN — OXYCODONE HYDROCHLORIDE SCH: 5 CAPSULE ORAL at 04:32

## 2018-01-13 RX ADMIN — CYANOCOBALAMIN TAB 500 MCG SCH MCG: 500 TAB at 08:51

## 2018-01-13 RX ADMIN — PREGABALIN SCH MG: 50 CAPSULE ORAL at 08:52

## 2018-01-13 RX ADMIN — LISINOPRIL SCH MG: 10 TABLET ORAL at 08:52

## 2018-01-13 RX ADMIN — HYDROCORTISONE SCH APPLIC: 1 CREAM TOPICAL at 12:35

## 2018-01-13 RX ADMIN — FUROSEMIDE SCH MG: 40 TABLET ORAL at 17:14

## 2018-01-13 RX ADMIN — HEPARIN SODIUM SCH UNITS: 5000 INJECTION INTRAVENOUS; SUBCUTANEOUS at 13:44

## 2018-01-13 NOTE — PN
Subjective


Date of Service: 01/13/18


Interval History: 





Pt has no new complains. Good appetite, still feels generalized weakness. Doesn'

t remember how many days he had been at [facility] this time


Family History: Unchanged from Admission


Social History: Unchanged from Admission


Past Medical History: Unchanged from Admission





Objective


Active Medications: 








Acetaminophen (Tylenol Tab*)  650 mg PO Q6H PRN


   PRN Reason: FEVER/PAIN


   Last Admin: 01/05/18 15:57 Dose:  650 mg


Ascorbic Acid (Vitamin C  Tab*)  500 mg PO DAILY [facility]


   Last Admin: 01/13/18 08:52 Dose:  500 mg


Cholecalciferol (Vitamin D Tab*)  5,000 units PO DAILY [facility]


   Last Admin: 01/13/18 08:51 Dose:  5,000 units


Clotrimazole (Clotrimazole 1%*)  1 applic TOPICAL DAILY [facility]


   Last Admin: 01/13/18 08:53 Dose:  1 applic


Cyanocobalamin (Vitamin B12 Tab*)  1,000 mcg PO DAILY [facility]


   Last Admin: 01/13/18 08:51 Dose:  1,000 mcg


Fentanyl (Duragesic  Patch 75 Mcg/Hr*)  75 mcg TRANSDERM Q72H [facility]


   Last Admin: 01/10/18 18:16 Dose:  75 mcg


Ferrous Gluconate (Fergon Tab*)  324 mg PO DAILY [facility]


   Last Admin: 01/13/18 08:51 Dose:  324 mg


Folic Acid (Folvite Tab*)  0.5 mg PO DAILY [facility]


   Last Admin: 01/13/18 08:52 Dose:  0.5 mg


Furosemide (Lasix Tab*)  40 mg PO 0800,1700 [facility]


   Last Admin: 01/13/18 08:52 Dose:  40 mg


Heparin Sodium (Porcine) (Heparin Vial(*))  5,000 units SUBCUT Q8HR [facility]


   Last Admin: 01/13/18 05:57 Dose:  5,000 units


Hydralazine HCl (Apresoline Iv*)  5 mg IV SLOW PU Q6H PRN


   PRN Reason: SYSTOLIC BP GREATER THAN:


   Last Admin: 01/05/18 09:47 Dose:  5 mg


Hydrocortisone (Hytone Cream 1%*)  1 applic TOPICAL TID [facility]


   Last Admin: 01/13/18 08:54 Dose:  1 applic


Ceftriaxone Sodium 1 gm/ (Dextrose)  50 mls @ 200 mls/hr IVPB 1730 [facility]


   Last Admin: 01/12/18 16:47 Dose:  200 mls/hr


Lactobacillus Rhamnosus (Culturelle*)  1 cap PO DAILY [facility]


   Last Admin: 01/13/18 08:51 Dose:  1 cap


Lisinopril (Prinivil Tab*)  10 mg PO DAILY [facility]


   Last Admin: 01/13/18 08:52 Dose:  10 mg


Omeprazole (Prilosec Cap*)  40 mg PO DAILY@0730 [facility]


   Last Admin: 01/13/18 08:52 Dose:  40 mg


Oxycodone HCl (Roxycodone Tab*)  10 mg PO Q4H [facility]


   Last Admin: 01/13/18 08:54 Dose:  Not Given


Oxycodone HCl (Oxycodone Oral.Soln*)  10 mg PO Q4H PRN


   PRN Reason: PAIN


   Last Admin: 01/13/18 08:49 Dose:  10 mg


Pharmacy Profile Note (Fentanyl Patch Check Q Shift)  1 note N/A 0700,1900 [facility]


   Last Admin: 01/13/18 07:27 Dose:  1 note


Potassium Chloride (Klor Con Er Tab*)  10 meq PO DAILY [facility]


   Last Admin: 01/13/18 08:52 Dose:  10 meq


Pregabalin (Lyrica Cap(*))  150 mg PO TID [facility]


   Last Admin: 01/13/18 08:52 Dose:  150 mg


Zinc Sulfate (Zinc-220 Cap*)  220 mg PO DAILY [facility]


   Last Admin: 01/13/18 08:52 Dose:  220 mg








 Vital Signs - 8 hr











  01/13/18 01/13/18 01/13/18





  04:07 08:49 08:52


 


Temperature 98.2 F  


 


Pulse Rate 65  


 


Respiratory 16 16 16





Rate   


 


Blood Pressure 115/61  





(mmHg)   


 


O2 Sat by Pulse 97  





Oximetry   











Oxygen Devices in Use Now: None


Appearance: 71 yo M in NAD, AAOx3


Eyes: No Scleral Icterus, PERRLA


Ears/Nose/Mouth/Throat: NL Teeth, Lips, Gums, Mucous Membranes Moist


Neck: NL Appearance and Movements; NL JVP, Trachea Midline


Respiratory: Symmetrical Chest Expansion and Respiratory Effort


Cardiovascular: NL Sounds; No Murmurs; No JVD


Abdominal: NL Sounds; No Tenderness; No Distention, No Hepatosplenomegaly, - - 

colostomy pink with bag with brown semi solid stool


Lymphatic: No Cervical Adenopathy


Extremities: No Edema, No Clubbing, Cyanosis


Skin: No Nodules or Sclerosis, - - sacral flap well healed with small wound 

area on top of graft and abrasion like 4 cm in lenght area on R side of graft


Neurological: Alert and Oriented x 3, - - paraplegic


Result Diagrams: 


 01/13/18 06:09





 01/11/18 06:55


Additional Lab and Data: 





UA grossly abnormal 1/8/2017


UCX - pending





Microbiology and Other Data: 


 Microbiology











 01/08/18 10:48 Urine Culture - Final





 Urine    Candida Parapsilosis














Assess/Plan/Problems-Billing


.


Assessment: 





Patient is a 71 yo male with a PMH significant for Ependymoma with multiple 

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 

post flap who presents for diverting colostomy to aid in the healing of his 

ulcer.





Recent altered mental status due to acute Urinary tract infection related to 

indwelling london catheter and related dehydration.





- Patient Problems


(1) UTI (urinary tract infection)


Comment: 


UA with positive leukocyte esterase


Started ceftriaxone on 1/9/18 given long recent h/o fluroquinolone 

use...tolerating well. complete 7 days course 1/15/18. Cx growing Candida 

parapsilosis. Possible colononization of london? will recheck UA and cx.


Consider suprapubic catheter given number of UTI's in past year


   





(2) S/P flap graft


Comment: 


 - Graft completed 10/25/17 at [facility] [place]


 - Continue medihoney for 2 small open areas


 - Graft intact


 - Reposition Q2h


 - OOB TID for up to 2 hours as bartolome   





(3) Chronic indwelling [place] catheter


Comment: 


 - Secondary to neurogenic bladder


 - + UTI (recurrent)


- consider suprapubic catheter as outpatient   





(4) Ependymoma


Comment: 


 - Wheelchair bound


 - High risk for continued back pain, decompensation, pressure wounds and UTIs


 - Supportive care   





(5) Neurogenic bladder


Comment: 


- London care; London changed due to UTI this hospital stay   





(6) Acute osteomyelitis of sacrum


Comment: 


 - s/p debridement at time of flap creation at [facility] [place]


 - Completed antibiotics 12/6


 - stable   





(7) Neurogenic bowel


Comment:  


 - S/P diverting colostomy to prevent further skin breakdown


 - Cleared per surgery   


Status and Disposition: 





Remain inpatient

## 2018-01-14 RX ADMIN — OXYCODONE HYDROCHLORIDE PRN MG: 5 SOLUTION ORAL at 07:55

## 2018-01-14 RX ADMIN — PREGABALIN SCH MG: 50 CAPSULE ORAL at 14:42

## 2018-01-14 RX ADMIN — LISINOPRIL SCH MG: 10 TABLET ORAL at 08:32

## 2018-01-14 RX ADMIN — HEPARIN SODIUM SCH UNITS: 5000 INJECTION INTRAVENOUS; SUBCUTANEOUS at 06:28

## 2018-01-14 RX ADMIN — CEFTRIAXONE SODIUM SCH MLS/HR: 1 INJECTION, POWDER, FOR SOLUTION INTRAVENOUS at 16:34

## 2018-01-14 RX ADMIN — CYANOCOBALAMIN TAB 500 MCG SCH MCG: 500 TAB at 08:32

## 2018-01-14 RX ADMIN — OXYCODONE HYDROCHLORIDE AND ACETAMINOPHEN SCH MG: 500 TABLET ORAL at 08:32

## 2018-01-14 RX ADMIN — PREGABALIN SCH: 50 CAPSULE ORAL at 21:56

## 2018-01-14 RX ADMIN — Medication SCH UNITS: at 08:31

## 2018-01-14 RX ADMIN — Medication SCH CAP: at 08:31

## 2018-01-14 RX ADMIN — HEPARIN SODIUM SCH UNITS: 5000 INJECTION INTRAVENOUS; SUBCUTANEOUS at 14:42

## 2018-01-14 RX ADMIN — OMEPRAZOLE SCH MG: 20 CAPSULE, DELAYED RELEASE ORAL at 07:54

## 2018-01-14 RX ADMIN — WATER SCH NOTE: 100 INJECTION, SOLUTION INTRAVENOUS at 07:00

## 2018-01-14 RX ADMIN — PREGABALIN SCH MG: 50 CAPSULE ORAL at 08:32

## 2018-01-14 RX ADMIN — CLOTRIMAZOLE SCH: 10 CREAM TOPICAL at 08:02

## 2018-01-14 RX ADMIN — Medication SCH MG: at 08:31

## 2018-01-14 RX ADMIN — FUROSEMIDE SCH MG: 40 TABLET ORAL at 07:54

## 2018-01-14 RX ADMIN — POTASSIUM CHLORIDE SCH MEQ: 750 TABLET, FILM COATED, EXTENDED RELEASE ORAL at 08:32

## 2018-01-14 RX ADMIN — CEFTRIAXONE SODIUM SCH: 1 INJECTION, POWDER, FOR SOLUTION INTRAVENOUS at 18:09

## 2018-01-14 RX ADMIN — OXYCODONE HYDROCHLORIDE PRN MG: 5 SOLUTION ORAL at 16:34

## 2018-01-14 RX ADMIN — HYDROCORTISONE SCH: 1 CREAM TOPICAL at 20:47

## 2018-01-14 RX ADMIN — HYDROCORTISONE SCH: 1 CREAM TOPICAL at 07:58

## 2018-01-14 RX ADMIN — HEPARIN SODIUM SCH UNITS: 5000 INJECTION INTRAVENOUS; SUBCUTANEOUS at 21:54

## 2018-01-14 RX ADMIN — FUROSEMIDE SCH MG: 40 TABLET ORAL at 16:34

## 2018-01-14 RX ADMIN — HYDROCORTISONE SCH: 1 CREAM TOPICAL at 13:54

## 2018-01-14 RX ADMIN — FOLIC ACID SCH MG: 1 TABLET ORAL at 08:32

## 2018-01-14 RX ADMIN — WATER SCH NOTE: 100 INJECTION, SOLUTION INTRAVENOUS at 18:54

## 2018-01-14 NOTE — PN
Subjective


Date of Service: 01/12/18


Interval History: 


.


denies new c/o


.


Family History: Unchanged from Admission


Social History: Unchanged from Admission


Past Medical History: Unchanged from Admission





Objective


Active Medications: 








Acetaminophen (Tylenol Tab*)  650 mg PO Q6H PRN


   PRN Reason: FEVER/PAIN


   Last Admin: 01/05/18 15:57 Dose:  650 mg


Ascorbic Acid (Vitamin C  Tab*)  500 mg PO DAILY [facility]


   Last Admin: 01/14/18 08:32 Dose:  500 mg


Cholecalciferol (Vitamin D Tab*)  5,000 units PO DAILY [facility]


   Last Admin: 01/14/18 08:31 Dose:  5,000 units


Clotrimazole (Clotrimazole 1%*)  1 applic TOPICAL DAILY [facility]


   Last Admin: 01/14/18 08:02 Dose:  Not Given


Cyanocobalamin (Vitamin B12 Tab*)  1,000 mcg PO DAILY [facility]


   Last Admin: 01/14/18 08:32 Dose:  1,000 mcg


Fentanyl (Duragesic  Patch 75 Mcg/Hr*)  75 mcg TRANSDERM Q72H [facility]


   Last Admin: 01/13/18 17:10 Dose:  75 mcg


Ferrous Gluconate (Fergon Tab*)  324 mg PO DAILY [facility]


   Last Admin: 01/14/18 08:31 Dose:  324 mg


Folic Acid (Folvite Tab*)  0.5 mg PO DAILY [facility]


   Last Admin: 01/14/18 08:32 Dose:  0.5 mg


Furosemide (Lasix Tab*)  40 mg PO 0800,1700 [facility]


   Last Admin: 01/14/18 07:54 Dose:  40 mg


Heparin Sodium (Porcine) (Heparin Vial(*))  5,000 units SUBCUT Q8HR [facility]


   Last Admin: 01/14/18 06:28 Dose:  5,000 units


Hydralazine HCl (Apresoline Iv*)  5 mg IV SLOW PU Q6H PRN


   PRN Reason: SYSTOLIC BP GREATER THAN:


   Last Admin: 01/05/18 09:47 Dose:  5 mg


Hydrocortisone (Hytone Cream 1%*)  1 applic TOPICAL TID [facility]


   Last Admin: 01/14/18 07:58 Dose:  Not Given


Ceftriaxone Sodium 1 gm/ (Dextrose)  50 mls @ 200 mls/hr IVPB 1730 [facility]


   Last Admin: 01/13/18 17:15 Dose:  200 mls/hr


Lactobacillus Rhamnosus (Culturelle*)  1 cap PO DAILY [facility]


   Last Admin: 01/14/18 08:31 Dose:  1 cap


Lisinopril (Prinivil Tab*)  10 mg PO DAILY [facility]


   Last Admin: 01/14/18 08:32 Dose:  10 mg


Omeprazole (Prilosec Cap*)  40 mg PO DAILY@0730 [facility]


   Last Admin: 01/14/18 07:54 Dose:  40 mg


Oxycodone HCl (Oxycodone Oral.Soln*)  10 mg PO Q4H PRN


   PRN Reason: PAIN


   Last Admin: 01/14/18 07:55 Dose:  10 mg


Pharmacy Profile Note (Fentanyl Patch Check Q Shift)  1 note N/A 0700,1900 [facility]


   Last Admin: 01/14/18 07:00 Dose:  1 note


Potassium Chloride (Klor Con Er Tab*)  10 meq PO DAILY [facility]


   Last Admin: 01/14/18 08:32 Dose:  10 meq


Pregabalin (Lyrica Cap(*))  150 mg PO TID [facility]


   Last Admin: 01/14/18 08:32 Dose:  150 mg


Zinc Sulfate (Zinc-220 Cap*)  220 mg PO DAILY [facility]


   Last Admin: 01/14/18 08:31 Dose:  220 mg








 Vital Signs - 8 hr








reviewed.


no signs of sepsis / SIRS.


Oxygen Devices in Use Now: None


Appearance: NAD


Eyes: No Scleral Icterus, PERRLA


Ears/Nose/Mouth/Throat: Clear Oropharnyx, Mucous Membranes Moist


Neck: Trachea Midline


Respiratory: Symmetrical Chest Expansion and Respiratory Effort, Clear to 

Auscultation


Abdominal: NL Sounds; No Tenderness; No Distention, No Hepatosplenomegaly, - - 

+ ostomy C/D/I


Lymphatic: No Cervical Adenopathy, No Inguinal Adenopathy


Extremities: No Edema


Skin: No Rash or Ulcers


Neurological: Alert and Oriented x 3


Lines/Tubes/Other Access: Clean, Dry and Intact Peripheral IV


Nutrition: Taking PO's


Result Diagrams: 


 01/13/18 06:09





 01/11/18 06:55


Additional Lab and Data: 





UA grossly abnormal 1/8/2017





Microbiology and Other Data: 


 Microbiology











 01/08/18 10:48 Urine Culture - Final





 Urine    Candida Parapsilosis














Assess/Plan/Problems-Billing


.


Assessment: 





Patient is a 71 yo male with a PMH significant for Ependymoma with multiple 

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 

post flap who presents for diverting colostomy to aid in the healing of his 

ulcer.





Recent altered mental status due to acute Urinary tract infection related to 

indwelling london catheter and related dehydration.





- Patient Problems


(1) UTI (urinary tract infection)


Current Visit: No   Status: Acute   Priority: High   Comment: 


- UA with positive leukocyte esterase


- Started ceftriaxone on 1/9/18 given long recent h/o fluroquinolone 

use...tolerating well. - - Complete 7 days course 1/15/18. Cx growing Candida 

parapsilosis. Possible colononization of london? 


- recheck UA and cx.


- Consider suprapubic catheter given number of UTI's in past year


   





(2) Acute osteomyelitis of sacrum


Current Visit: No   Status: Resolved   Priority: High   Code(s): M46.28 - 

OSTEOMYELITIS OF VERTEBRA, SACRAL AND SACROCOCCYGEAL REGION   Comment: 


 - s/p debridement at time of flap creation at [facility]


 - Completed antibiotics 12/6


 - stable   





(3) S/P flap graft


Current Visit: No   Status: Acute   Priority: High   Code(s): Z98.890 - OTHER 

SPECIFIED POSTPROCEDURAL STATES   Comment: 


 - Graft completed 10/25/17 at [facility]


 - Continue medihoney for 2 small open areas


 - Graft intact


 - Reposition Q2h


 - OOB TID for up to 2 hours as bartolome   





(4) Chronic back pain


Current Visit: No   Status: Chronic   Priority: Medium   Code(s): M54.9 - 

DORSALGIA, UNSPECIFIED; G89.29 - OTHER CHRONIC PAIN   Comment: 


 - Secondary to ependymoma


 - Pain management following


 - Continue pregabalin, acetaminophen, oxycodone liquid 10mg q4h PRN, and 

Fentanyl patch at 75mcg...hold opiates, as needed, for AMS related to UTI/acute 

infection.


 - Hold Oxycodone 10mg PO Q4H scheduled while awake 2/2 hypotension


 - Stable    





(5) Chronic indwelling [place] catheter


Current Visit: No   Status: Chronic   Priority: High   Code(s): Z92.89 - 

PERSONAL HISTORY OF OTHER MEDICAL TREATMENT   Comment: 


 - Secondary to neurogenic bladder


 - + UTI (recurrent)


- consider suprapubic catheter as outpatient   





(6) Ependymoma


Current Visit: No   Status: Chronic   Priority: High   Code(s): C71.9 - 

MALIGNANT NEOPLASM OF BRAIN, UNSPECIFIED   Comment: 


 - Wheelchair bound


 - High risk for continued back pain, decompensation, pressure wounds and UTIs


 - Supportive care   





(7) Neurogenic bladder


Current Visit: No   Status: Chronic   Priority: High   Code(s): N31.9 - 

NEUROMUSCULAR DYSFUNCTION OF BLADDER, UNSPECIFIED   Comment: 


- London care; London changed due to UTI this hospital stay   





(8) Neurogenic bowel


Current Visit: No   Status: Chronic   Priority: High   Code(s): K59.2 - 

NEUROGENIC BOWEL, NOT ELSEWHERE CLASSIFIED   SNOMED Code(s): 635741511


   Comment:  


 - S/P diverting colostomy to prevent further skin breakdown


 - Cleared per surgery   


Status and Disposition: 





Remain inpatient, reinforce PT and DC home.

## 2018-01-14 NOTE — PN
Subjective


Date of Service: 01/10/18


Interval History: 


.


no new c/o


breathing better


better M-Status


Family History: Unchanged from Admission


Social History: Unchanged from Admission


Past Medical History: Unchanged from Admission





Objective


Active Medications: 


.


Acetaminophen (Tylenol Tab*)  650 mg PO Q6H PRN


   PRN Reason: FEVER/PAIN


   Last Admin: 01/05/18 15:57 Dose:  650 mg


Ascorbic Acid (Vitamin C  Tab*)  500 mg PO DAILY [facility]


   Last Admin: 01/14/18 08:32 Dose:  500 mg


Cholecalciferol (Vitamin D Tab*)  5,000 units PO DAILY [facility]


   Last Admin: 01/14/18 08:31 Dose:  5,000 units


Clotrimazole (Clotrimazole 1%*)  1 applic TOPICAL DAILY [facility]


   Last Admin: 01/14/18 08:02 Dose:  Not Given


Cyanocobalamin (Vitamin B12 Tab*)  1,000 mcg PO DAILY [facility]


   Last Admin: 01/14/18 08:32 Dose:  1,000 mcg


Fentanyl (Duragesic  Patch 75 Mcg/Hr*)  75 mcg TRANSDERM Q72H [facility]


   Last Admin: 01/13/18 17:10 Dose:  75 mcg


Ferrous Gluconate (Fergon Tab*)  324 mg PO DAILY [facility]


   Last Admin: 01/14/18 08:31 Dose:  324 mg


Folic Acid (Folvite Tab*)  0.5 mg PO DAILY [facility]


   Last Admin: 01/14/18 08:32 Dose:  0.5 mg


Furosemide (Lasix Tab*)  40 mg PO 0800,1700 [facility]


   Last Admin: 01/14/18 07:54 Dose:  40 mg


Heparin Sodium (Porcine) (Heparin Vial(*))  5,000 units SUBCUT Q8HR [facility]


   Last Admin: 01/14/18 06:28 Dose:  5,000 units


Hydralazine HCl (Apresoline Iv*)  5 mg IV SLOW PU Q6H PRN


   PRN Reason: SYSTOLIC BP GREATER THAN:


   Last Admin: 01/05/18 09:47 Dose:  5 mg


Hydrocortisone (Hytone Cream 1%*)  1 applic TOPICAL TID [facility]


   Last Admin: 01/14/18 07:58 Dose:  Not Given


Ceftriaxone Sodium 1 gm/ (Dextrose)  50 mls @ 200 mls/hr IVPB 1730 [facility]


   Last Admin: 01/13/18 17:15 Dose:  200 mls/hr


Lactobacillus Rhamnosus (Culturelle*)  1 cap PO DAILY [facility]


   Last Admin: 01/14/18 08:31 Dose:  1 cap


Lisinopril (Prinivil Tab*)  10 mg PO DAILY [facility]


   Last Admin: 01/14/18 08:32 Dose:  10 mg


Omeprazole (Prilosec Cap*)  40 mg PO DAILY@0730 [facility]


   Last Admin: 01/14/18 07:54 Dose:  40 mg


Oxycodone HCl (Oxycodone Oral.Soln*)  10 mg PO Q4H PRN


   PRN Reason: PAIN


   Last Admin: 01/14/18 07:55 Dose:  10 mg


Pharmacy Profile Note (Fentanyl Patch Check Q Shift)  1 note N/A 0700,1900 [facility]


   Last Admin: 01/14/18 07:00 Dose:  1 note


Potassium Chloride (Klor Con Er Tab*)  10 meq PO DAILY [facility]


   Last Admin: 01/14/18 08:32 Dose:  10 meq


Pregabalin (Lyrica Cap(*))  150 mg PO TID [facility]


   Last Admin: 01/14/18 08:32 Dose:  150 mg


Zinc Sulfate (Zinc-220 Cap*)  220 mg PO DAILY [facility]


   Last Admin: 01/14/18 08:31 Dose:  220 mg


.


 Vital Signs - 8 hr











  01/10/18 01/10/18 01/10/18





  07:55 08:00 08:32


 


Temperature   


 


Pulse Rate   


 


Respiratory 18 18 18





Rate   


 


Blood Pressure   





(mmHg)   


 


O2 Sat by Pulse  96 





Oximetry   














  01/10/18 01/10/18 01/10/18





  09:54 10:46 11:22


 


Temperature   98.6 F


 


Pulse Rate   73


 


Respiratory 18 18 16





Rate   


 


Blood Pressure   122/53





(mmHg)   


 


O2 Sat by Pulse   99





Oximetry   











Oxygen Devices in Use Now: None


Appearance: NAD


Eyes: No Scleral Icterus


Ears/Nose/Mouth/Throat: Clear Oropharnyx


Neck: Trachea Midline


Respiratory: Symmetrical Chest Expansion and Respiratory Effort


Cardiovascular: RRR


Abdominal: No Hepatosplenomegaly, - - + ostomy C/D/I


Lymphatic: No Cervical Adenopathy


Extremities: No Edema


Skin: No Rash or Ulcers


Lines/Tubes/Other Access: Clean, Dry and Intact Peripheral IV


Nutrition: Taking PO's


Result Diagrams: 


 01/13/18 06:09





 01/11/18 06:55


Additional Lab and Data: 





UA grossly abnormal 1/8/2017





Microbiology and Other Data: 


 Microbiology











 01/08/18 10:48 Urine Culture - Final





 Urine    Candida Parapsilosis














Assess/Plan/Problems-Billing


.


Assessment: 





Patient is a 73 yo male with a PMH significant for Ependymoma with multiple 

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 

post flap who presents for diverting colostomy to aid in the healing of his 

ulcer.





Recent altered mental status due to acute Urinary tract infection related to 

indwelling london catheter and related dehydration.





- Patient Problems


(1) UTI (urinary tract infection)


Current Visit: No   Status: Acute   Priority: High   Comment: 


- UA with positive leukocyte esterase


- Started ceftriaxone on 1/9/18 given long recent h/o fluroquinolone 

use...tolerating well. - - Complete 7 days course 1/15/18. Cx growing Candida 

parapsilosis. Possible colononization of london? 


- recheck UA and cx.


- Consider suprapubic catheter given number of UTI's in past year


   





(2) Acute osteomyelitis of sacrum


Current Visit: No   Status: Resolved   Priority: High   Code(s): M46.28 - 

OSTEOMYELITIS OF VERTEBRA, SACRAL AND SACROCOCCYGEAL REGION   Comment: 


 - s/p debridement at time of flap creation at [facility]


 - Completed antibiotics 12/6


 - stable   





(3) S/P flap graft


Current Visit: No   Status: Acute   Priority: High   Code(s): Z98.890 - OTHER 

SPECIFIED POSTPROCEDURAL STATES   Comment: 


 - Graft completed 10/25/17 at [facility]


 - Continue medihoney for 2 small open areas


 - Graft intact


 - Reposition Q2h


 - OOB TID for up to 2 hours as bartolome   





(4) Chronic back pain


Current Visit: No   Status: Chronic   Priority: Medium   Code(s): M54.9 - 

DORSALGIA, UNSPECIFIED; G89.29 - OTHER CHRONIC PAIN   Comment: 


 - Secondary to ependymoma


 - Pain management following


 - Continue pregabalin, acetaminophen, oxycodone liquid 10mg q4h PRN, and 

Fentanyl patch at 75mcg...hold opiates, as needed, for AMS related to UTI/acute 

infection.


 - Hold Oxycodone 10mg PO Q4H scheduled while awake 2/2 hypotension


 - Stable    





(5) Chronic indwelling [place] catheter


Current Visit: No   Status: Chronic   Priority: High   Code(s): Z92.89 - 

PERSONAL HISTORY OF OTHER MEDICAL TREATMENT   Comment: 


 - Secondary to neurogenic bladder


 - + UTI (recurrent)


- consider suprapubic catheter as outpatient   





(6) Ependymoma


Current Visit: No   Status: Chronic   Priority: High   Code(s): C71.9 - 

MALIGNANT NEOPLASM OF BRAIN, UNSPECIFIED   Comment: 


 - Wheelchair bound


 - High risk for continued back pain, decompensation, pressure wounds and UTIs


 - Supportive care   





(7) Neurogenic bladder


Current Visit: No   Status: Chronic   Priority: High   Code(s): N31.9 - 

NEUROMUSCULAR DYSFUNCTION OF BLADDER, UNSPECIFIED   Comment: 


- London care; London changed due to UTI this hospital stay   





(8) Neurogenic bowel


Current Visit: No   Status: Chronic   Priority: High   Code(s): K59.2 - 

NEUROGENIC BOWEL, NOT ELSEWHERE CLASSIFIED   SNOMED Code(s): 236411470


   Comment:  


 - S/P diverting colostomy to prevent further skin breakdown


 - Cleared per surgery   


Status and Disposition: 





Remain inpatient, reinforce PT and DC home.

## 2018-01-14 NOTE — PN
Subjective


Date of Service: 01/11/18


Interval History: 


.


no new c/o





Family History: Unchanged from Admission


Social History: Unchanged from Admission


Past Medical History: Unchanged from Admission





Objective


Active Medications: 








Acetaminophen (Tylenol Tab*)  650 mg PO Q6H PRN


   PRN Reason: FEVER/PAIN


   Last Admin: 01/05/18 15:57 Dose:  650 mg


Ascorbic Acid (Vitamin C  Tab*)  500 mg PO DAILY [facility]


   Last Admin: 01/14/18 08:32 Dose:  500 mg


Cholecalciferol (Vitamin D Tab*)  5,000 units PO DAILY [facility]


   Last Admin: 01/14/18 08:31 Dose:  5,000 units


Clotrimazole (Clotrimazole 1%*)  1 applic TOPICAL DAILY [facility]


   Last Admin: 01/14/18 08:02 Dose:  Not Given


Cyanocobalamin (Vitamin B12 Tab*)  1,000 mcg PO DAILY [facility]


   Last Admin: 01/14/18 08:32 Dose:  1,000 mcg


Fentanyl (Duragesic  Patch 75 Mcg/Hr*)  75 mcg TRANSDERM Q72H [facility]


   Last Admin: 01/13/18 17:10 Dose:  75 mcg


Ferrous Gluconate (Fergon Tab*)  324 mg PO DAILY [facility]


   Last Admin: 01/14/18 08:31 Dose:  324 mg


Folic Acid (Folvite Tab*)  0.5 mg PO DAILY [facility]


   Last Admin: 01/14/18 08:32 Dose:  0.5 mg


Furosemide (Lasix Tab*)  40 mg PO 0800,1700 [facility]


   Last Admin: 01/14/18 07:54 Dose:  40 mg


Heparin Sodium (Porcine) (Heparin Vial(*))  5,000 units SUBCUT Q8HR [facility]


   Last Admin: 01/14/18 06:28 Dose:  5,000 units


Hydralazine HCl (Apresoline Iv*)  5 mg IV SLOW PU Q6H PRN


   PRN Reason: SYSTOLIC BP GREATER THAN:


   Last Admin: 01/05/18 09:47 Dose:  5 mg


Hydrocortisone (Hytone Cream 1%*)  1 applic TOPICAL TID [facility]


   Last Admin: 01/14/18 07:58 Dose:  Not Given


Ceftriaxone Sodium 1 gm/ (Dextrose)  50 mls @ 200 mls/hr IVPB 1730 [facility]


   Last Admin: 01/13/18 17:15 Dose:  200 mls/hr


Lactobacillus Rhamnosus (Culturelle*)  1 cap PO DAILY [facility]


   Last Admin: 01/14/18 08:31 Dose:  1 cap


Lisinopril (Prinivil Tab*)  10 mg PO DAILY [facility]


   Last Admin: 01/14/18 08:32 Dose:  10 mg


Omeprazole (Prilosec Cap*)  40 mg PO DAILY@0730 [facility]


   Last Admin: 01/14/18 07:54 Dose:  40 mg


Oxycodone HCl (Oxycodone Oral.Soln*)  10 mg PO Q4H PRN


   PRN Reason: PAIN


   Last Admin: 01/14/18 07:55 Dose:  10 mg


Pharmacy Profile Note (Fentanyl Patch Check Q Shift)  1 note N/A 0700,1900 [facility]


   Last Admin: 01/14/18 07:00 Dose:  1 note


Potassium Chloride (Klor Con Er Tab*)  10 meq PO DAILY [facility]


   Last Admin: 01/14/18 08:32 Dose:  10 meq


Pregabalin (Lyrica Cap(*))  150 mg PO TID [facility]


   Last Admin: 01/14/18 08:32 Dose:  150 mg


Zinc Sulfate (Zinc-220 Cap*)  220 mg PO DAILY [facility]


   Last Admin: 01/14/18 08:31 Dose:  220 mg








 Vital Signs - 8 hr


 


reviewed in nursing section.





Oxygen Devices in Use Now: None


Appearance: NAD


Eyes: No Scleral Icterus


Ears/Nose/Mouth/Throat: Clear Oropharnyx


Neck: NL Appearance and Movements; NL JVP, No Thyroid Enlargement, Masses


Respiratory: Symmetrical Chest Expansion and Respiratory Effort, Clear to 

Auscultation


Cardiovascular: RRR


Abdominal: NL Sounds; No Tenderness; No Distention


Lymphatic: No Cervical Adenopathy


Extremities: No Edema


Skin: - - graft looks good


Neurological: Alert and Oriented x 3


Lines/Tubes/Other Access: Clean, Dry and Intact Peripheral IV


Nutrition: Taking PO's


Result Diagrams: 


 01/13/18 06:09





 01/11/18 06:55


Additional Lab and Data: 





UA grossly abnormal 1/8/2017





Microbiology and Other Data: 


 Microbiology











 01/08/18 10:48 Urine Culture - Final





 Urine    Candida Parapsilosis














Assess/Plan/Problems-Billing


.


Assessment: 





Patient is a 71 yo male with a PMH significant for Ependymoma with multiple 

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 

post flap who presents for diverting colostomy to aid in the healing of his 

ulcer.





Recent altered mental status due to acute Urinary tract infection related to 

indwelling london catheter and related dehydration.





- Patient Problems


(1) UTI (urinary tract infection)


Current Visit: No   Status: Acute   Priority: High   Comment: 


- UA with positive leukocyte esterase


- Started ceftriaxone on 1/9/18 given long recent h/o fluroquinolone 

use...tolerating well. - - Complete 7 days course 1/15/18. Cx growing Candida 

parapsilosis. Possible colononization of london? 


- recheck UA and cx.


- Consider suprapubic catheter given number of UTI's in past year


   





(2) Acute osteomyelitis of sacrum


Current Visit: No   Status: Resolved   Priority: High   Code(s): M46.28 - 

OSTEOMYELITIS OF VERTEBRA, SACRAL AND SACROCOCCYGEAL REGION   Comment: 


 - s/p debridement at time of flap creation at [facility] ([place], [place])


 - Completed antibiotics 12/6


 - stable   





(3) S/P flap graft


Current Visit: No   Status: Acute   Priority: High   Code(s): Z98.890 - OTHER 

SPECIFIED POSTPROCEDURAL STATES   Comment: 


 - Graft completed 10/25/17 at [facility] ([place], [place])


 - Continue medihoney for 2 small open areas


 - Graft intact


 - Reposition Q2h


 - OOB TID for up to 2 hours as bartolome   





(4) Chronic back pain


Current Visit: No   Status: Chronic   Priority: Medium   Code(s): M54.9 - 

DORSALGIA, UNSPECIFIED; G89.29 - OTHER CHRONIC PAIN   Comment: 


 - Secondary to ependymoma


 - Pain management following


 - Continue pregabalin, acetaminophen, oxycodone liquid 10mg q4h PRN, and 

Fentanyl patch at 75mcg...hold opiates, as needed, for AMS related to UTI/acute 

infection.


 - Hold Oxycodone 10mg PO Q4H scheduled while awake 2/2 hypotension


 - Stable    





(5) Chronic indwelling [place] catheter


Current Visit: No   Status: Chronic   Priority: High   Code(s): Z92.89 - 

PERSONAL HISTORY OF OTHER MEDICAL TREATMENT   Comment: 


 - Secondary to neurogenic bladder


 - + UTI (recurrent)


- consider suprapubic catheter as outpatient   





(6) Ependymoma


Current Visit: No   Status: Chronic   Priority: High   Code(s): C71.9 - 

MALIGNANT NEOPLASM OF BRAIN, UNSPECIFIED   Comment: 


 - Wheelchair bound


 - High risk for continued back pain, decompensation, pressure wounds and UTIs


 - Supportive care   





(7) Neurogenic bladder


Current Visit: No   Status: Chronic   Priority: High   Code(s): N31.9 - 

NEUROMUSCULAR DYSFUNCTION OF BLADDER, UNSPECIFIED   Comment: 


- London care; London changed due to UTI this hospital stay   





(8) Neurogenic bowel


Current Visit: No   Status: Chronic   Priority: High   Code(s): K59.2 - 

NEUROGENIC BOWEL, NOT ELSEWHERE CLASSIFIED   SNOMED Code(s): 829291840


   Comment:  


 - S/P diverting colostomy to prevent further skin breakdown


 - Cleared per surgery   


Status and Disposition: 





Remain inpatient, reinforce PT and DC home.

## 2018-01-15 RX ADMIN — HYDROCORTISONE SCH: 1 CREAM TOPICAL at 08:34

## 2018-01-15 RX ADMIN — PREGABALIN SCH MG: 50 CAPSULE ORAL at 14:43

## 2018-01-15 RX ADMIN — PREGABALIN SCH MG: 50 CAPSULE ORAL at 21:45

## 2018-01-15 RX ADMIN — CYANOCOBALAMIN TAB 500 MCG SCH MCG: 500 TAB at 08:26

## 2018-01-15 RX ADMIN — OXYCODONE HYDROCHLORIDE PRN MG: 5 SOLUTION ORAL at 17:39

## 2018-01-15 RX ADMIN — OXYCODONE HYDROCHLORIDE AND ACETAMINOPHEN SCH MG: 500 TABLET ORAL at 08:28

## 2018-01-15 RX ADMIN — OMEPRAZOLE SCH MG: 20 CAPSULE, DELAYED RELEASE ORAL at 07:34

## 2018-01-15 RX ADMIN — SODIUM CHLORIDE ONE MLS/HR: 900 IRRIGANT IRRIGATION at 14:43

## 2018-01-15 RX ADMIN — HYDROCORTISONE SCH: 1 CREAM TOPICAL at 14:53

## 2018-01-15 RX ADMIN — HEPARIN SODIUM SCH UNITS: 5000 INJECTION INTRAVENOUS; SUBCUTANEOUS at 14:46

## 2018-01-15 RX ADMIN — LISINOPRIL SCH MG: 10 TABLET ORAL at 08:28

## 2018-01-15 RX ADMIN — Medication SCH CAP: at 08:28

## 2018-01-15 RX ADMIN — HEPARIN SODIUM SCH UNITS: 5000 INJECTION INTRAVENOUS; SUBCUTANEOUS at 06:29

## 2018-01-15 RX ADMIN — WATER SCH NOTE: 100 INJECTION, SOLUTION INTRAVENOUS at 06:30

## 2018-01-15 RX ADMIN — Medication SCH UNITS: at 08:27

## 2018-01-15 RX ADMIN — Medication SCH MG: at 08:28

## 2018-01-15 RX ADMIN — POTASSIUM CHLORIDE SCH MEQ: 750 TABLET, FILM COATED, EXTENDED RELEASE ORAL at 08:28

## 2018-01-15 RX ADMIN — PREGABALIN SCH MG: 50 CAPSULE ORAL at 08:26

## 2018-01-15 RX ADMIN — SODIUM CHLORIDE ONE MLS/HR: 900 IRRIGANT IRRIGATION at 17:37

## 2018-01-15 RX ADMIN — HYDROCORTISONE SCH: 1 CREAM TOPICAL at 21:50

## 2018-01-15 RX ADMIN — OXYCODONE HYDROCHLORIDE PRN MG: 5 SOLUTION ORAL at 12:44

## 2018-01-15 RX ADMIN — OXYCODONE HYDROCHLORIDE PRN MG: 5 SOLUTION ORAL at 08:25

## 2018-01-15 RX ADMIN — HEPARIN SODIUM SCH UNITS: 5000 INJECTION INTRAVENOUS; SUBCUTANEOUS at 21:47

## 2018-01-15 RX ADMIN — FUROSEMIDE SCH MG: 40 TABLET ORAL at 08:26

## 2018-01-15 RX ADMIN — CLOTRIMAZOLE SCH: 10 CREAM TOPICAL at 08:33

## 2018-01-15 RX ADMIN — FOLIC ACID SCH MG: 1 TABLET ORAL at 08:27

## 2018-01-15 RX ADMIN — WATER SCH NOTE: 100 INJECTION, SOLUTION INTRAVENOUS at 18:47

## 2018-01-16 RX ADMIN — Medication SCH UNITS: at 09:15

## 2018-01-16 RX ADMIN — CLOTRIMAZOLE SCH: 10 CREAM TOPICAL at 10:08

## 2018-01-16 RX ADMIN — POTASSIUM CHLORIDE SCH MEQ: 750 TABLET, FILM COATED, EXTENDED RELEASE ORAL at 09:15

## 2018-01-16 RX ADMIN — HYDROCORTISONE SCH: 1 CREAM TOPICAL at 10:08

## 2018-01-16 RX ADMIN — Medication SCH MG: at 09:14

## 2018-01-16 RX ADMIN — HEPARIN SODIUM SCH UNITS: 5000 INJECTION INTRAVENOUS; SUBCUTANEOUS at 22:39

## 2018-01-16 RX ADMIN — WATER SCH NOTE: 100 INJECTION, SOLUTION INTRAVENOUS at 19:06

## 2018-01-16 RX ADMIN — OMEPRAZOLE SCH MG: 20 CAPSULE, DELAYED RELEASE ORAL at 07:44

## 2018-01-16 RX ADMIN — PREGABALIN SCH MG: 50 CAPSULE ORAL at 20:21

## 2018-01-16 RX ADMIN — OXYCODONE HYDROCHLORIDE PRN MG: 5 SOLUTION ORAL at 17:30

## 2018-01-16 RX ADMIN — FOLIC ACID SCH MG: 1 TABLET ORAL at 09:12

## 2018-01-16 RX ADMIN — FENTANYL TRANSDERMAL SCH MCG: 75 PATCH, EXTENDED RELEASE TRANSDERMAL at 17:31

## 2018-01-16 RX ADMIN — HYDROCORTISONE SCH: 1 CREAM TOPICAL at 20:26

## 2018-01-16 RX ADMIN — ACETAMINOPHEN PRN MG: 325 TABLET ORAL at 02:39

## 2018-01-16 RX ADMIN — OXYCODONE HYDROCHLORIDE PRN MG: 5 SOLUTION ORAL at 07:44

## 2018-01-16 RX ADMIN — OXYCODONE HYDROCHLORIDE AND ACETAMINOPHEN SCH MG: 500 TABLET ORAL at 09:13

## 2018-01-16 RX ADMIN — LISINOPRIL SCH MG: 10 TABLET ORAL at 09:15

## 2018-01-16 RX ADMIN — PREGABALIN SCH MG: 50 CAPSULE ORAL at 14:24

## 2018-01-16 RX ADMIN — PREGABALIN SCH MG: 50 CAPSULE ORAL at 09:13

## 2018-01-16 RX ADMIN — Medication SCH CAP: at 09:15

## 2018-01-16 RX ADMIN — WATER SCH NOTE: 100 INJECTION, SOLUTION INTRAVENOUS at 06:31

## 2018-01-16 RX ADMIN — HYDROCORTISONE SCH: 1 CREAM TOPICAL at 14:28

## 2018-01-16 RX ADMIN — OXYCODONE HYDROCHLORIDE PRN MG: 5 SOLUTION ORAL at 12:36

## 2018-01-16 RX ADMIN — CYANOCOBALAMIN TAB 500 MCG SCH MCG: 500 TAB at 09:14

## 2018-01-16 RX ADMIN — HEPARIN SODIUM SCH UNITS: 5000 INJECTION INTRAVENOUS; SUBCUTANEOUS at 14:24

## 2018-01-16 RX ADMIN — OXYCODONE HYDROCHLORIDE PRN MG: 5 CAPSULE ORAL at 07:49

## 2018-01-16 RX ADMIN — HEPARIN SODIUM SCH UNITS: 5000 INJECTION INTRAVENOUS; SUBCUTANEOUS at 06:03

## 2018-01-17 VITALS — DIASTOLIC BLOOD PRESSURE: 56 MMHG | SYSTOLIC BLOOD PRESSURE: 142 MMHG

## 2018-01-17 RX ADMIN — FOLIC ACID SCH MG: 1 TABLET ORAL at 08:41

## 2018-01-17 RX ADMIN — OXYCODONE HYDROCHLORIDE PRN MG: 5 CAPSULE ORAL at 07:50

## 2018-01-17 RX ADMIN — CYANOCOBALAMIN TAB 500 MCG SCH MCG: 500 TAB at 08:41

## 2018-01-17 RX ADMIN — HYDROCORTISONE SCH: 1 CREAM TOPICAL at 13:55

## 2018-01-17 RX ADMIN — HEPARIN SODIUM SCH UNITS: 5000 INJECTION INTRAVENOUS; SUBCUTANEOUS at 14:05

## 2018-01-17 RX ADMIN — HEPARIN SODIUM SCH UNITS: 5000 INJECTION INTRAVENOUS; SUBCUTANEOUS at 05:50

## 2018-01-17 RX ADMIN — OXYCODONE HYDROCHLORIDE PRN MG: 5 SOLUTION ORAL at 12:45

## 2018-01-17 RX ADMIN — CLOTRIMAZOLE SCH: 10 CREAM TOPICAL at 07:54

## 2018-01-17 RX ADMIN — Medication SCH CAP: at 08:42

## 2018-01-17 RX ADMIN — PREGABALIN SCH MG: 50 CAPSULE ORAL at 14:05

## 2018-01-17 RX ADMIN — Medication SCH MG: at 08:41

## 2018-01-17 RX ADMIN — Medication SCH UNITS: at 08:42

## 2018-01-17 RX ADMIN — OMEPRAZOLE SCH MG: 20 CAPSULE, DELAYED RELEASE ORAL at 07:49

## 2018-01-17 RX ADMIN — Medication SCH MG: at 08:42

## 2018-01-17 RX ADMIN — OXYCODONE HYDROCHLORIDE AND ACETAMINOPHEN SCH MG: 500 TABLET ORAL at 08:41

## 2018-01-17 RX ADMIN — HYDROCORTISONE SCH: 1 CREAM TOPICAL at 07:54

## 2018-01-17 RX ADMIN — LISINOPRIL SCH MG: 10 TABLET ORAL at 08:42

## 2018-01-17 RX ADMIN — POTASSIUM CHLORIDE SCH MEQ: 750 TABLET, FILM COATED, EXTENDED RELEASE ORAL at 08:41

## 2018-01-17 RX ADMIN — WATER SCH NOTE: 100 INJECTION, SOLUTION INTRAVENOUS at 06:52

## 2018-01-17 RX ADMIN — PREGABALIN SCH MG: 50 CAPSULE ORAL at 08:41

## 2018-01-19 NOTE — DS
CC:  Dr. Vicente; Dr. Mathias; Dr. uLis Felipe Reynoso

 

DISCHARGE SUMMARY:

 

DATE OF ADMISSION:  01/01/18

 

DATE OF DISCHARGE:  01/17/18

 

PRIMARY CARE PROVIDER:  Dr. Rafy Vicente.

 

GENERAL SURGEON:  Dr. Lux Mathias.

 

PAIN MEDICINE SPECIALIST:  Dr. Luis Felipe Reynoso.

 

PRINCIPAL DISCHARGE DIAGNOSES:  Status post diverting colostomy by Dr. Lux Mathias on 01/02/18 with 
subsequent ischemic colostomy and laparoscopic revision of the colostomy on 01/03/18 with ultimate go
od effect and working colostomy.

 

Hospitalization complicated by urinary tract infection, with course of antibiotics.

 

Hospitalization further complicated by dehydration secondary to robust colostomy output and urinary t
ract infection with acute on chronic renal insufficiency with creatinine increased to 1.8 up from [facility]
anish of 1.1 to 1.2.

 

SECONDARY DIAGNOSES:

1.  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

2.  Sacral osteomyelitis - resolved.

3.  Sacral decubitus ulcer, status post muscle flap at [facility] by Dr. Luis Felipe Chirinos (Plastic
 Surgery).

4.  Chronic pain with opiate dependence.

5.  Neurogenic bowel and bladder.

6.  Obstructive sleep apnea - on CPAP.

7.  Chronic renal insufficiency - baseline creatinine 1.2.

8.  Asthma.

9.  Carotid artery stenosis.

10.  Appendectomy.

11.  History of right carotid endarterectomy.

 

DISCHARGE MEDICATION REGIMEN:

1.  Fentanyl 75 mcg patch q.72 hours.

2.  Oxycodone 10 mg by mouth every 4 hours p.r.n. pain - recommend using approximately 3 times daily,
 but more frequent dosing possible with prescription.

3.  Oxycodone 10 mg liquid formulation q.4 hours - recommend using before movements 3 times daily vidya
und meal time and wheelchair transfers.

4.  Vitamin D3 500 units by mouth daily.

5.  Tylenol 650 mg by mouth every 6 hours as needed for pain/fever.

6.  Vitamin C 500 mg by mouth daily.

7.  Aspirin 81 mg by mouth daily.

8.  Folic acid 0.5 mg by mouth daily.

9.  Clotrimazole cream 1% application daily to affected skin.

10.  Discontinued Lomotil given new colostomy.

11.  Ferrous gluconate 324 mg by mouth daily.

12.  Lactobacillus 1 capsule by mouth daily.

13.  Zinc sulfate 220 mg by mouth daily.

14.  Medihoney to gluteal cleft twice daily - as per wound care instructions.

15.  Vitamin B12 1000 mcg by mouth daily.

16.  Furosemide changed to 20 mg by mouth daily for now with reevaluation in 1 week in terms of fluid
 status and lab work.

17.  Hydrocortisone 3 times daily to affected skin areas as needed.

18.  Prilosec 40 mg by mouth daily.

19.  Lyrica 150 mg by mouth 3 times daily.

 

HISTORY OF PRESENT ILLNESS AND HOSPITAL COURSE:  Please see H and P on 01/01/18 by nurse practitioner
, Yomaira Light, under the supervision of Dr. Disha Duncan.  In brief, Mr. Pryor is a 72-year-old mal
e with a complicated past medical history primarily with ependymoma and associated pain, course compl
icated in the summer of 2017 with sacral decubitus ulcer suffered around an acute illness (pneumonia,
 urinary tract infection).  The patient had a worsening of his sacral decubitus ulcer and sacral oste
omyelitis noted and he was treated with IV antibiotics, but ultimately opted for a skin/muscle graft 
done by Dr. Luis Felipe Chirinos at [facility].  This was accomplished without complication a
nd the patient was transferred back to [facility] for ongoing rehabilitation and postsurgi
georgina establishment of the graft with a [place] bed.  The patient received intensive nursing care and
 did well over the time period.  The patient was noted to have ongoing chronic diarrhea pursuant to h
is immobility and paralysis and intestinal colonization and perhaps overgrowth.  The patient was benito
mmended for colostomy for skin graft protection and this was scheduled for 01/02/18.  The patient pre
sented on 01/01/18 for preoperative stabilization and assessment.  The patient proceeded with surgery
 without difficulty.  There was an ischemic component to the colostomy noted by the surgical team and
 there was a revision done on 01/03/18 this time with good results.  There was colostomy teaching dur
ing the hospitalization.  On hospital day 7, the patient suffered ongoing confusion and intermittent 
chills that were out of the ordinary.  The patient was noted to have an abnormal urinalysis and start
ed empirically on aggressive fluids and antibiotics (ceftriaxone).  The patient completed a complete 
course of ceftriaxone though there was nonspecific organism grown.  In terms of a bacterial component
, the patient did grow Candida parapsilosis, but this was determined to be a colonization and not the
 cause of his infection.  The patient responded very well with rehydration and had a subsequent episo
de of confusion and lethargy that was further complicated by an acute worsening in his creatinine.  I
t seems that the patient's colostomy output was substantial throughout the hospitalization and he has
 a tendency to require rehydration.  I had a kash discussion with the patient and his wife that he n
eeded to work exquisitely on maintaining adequate hydration and in addition his Lasix dose was decrea
sed.  The patient's pain control was complicated during the hospitalization, resolved on the ability 
to administer 10 mg oxycodone doses in either tablet or liquid form, with the liquid primarily being 
used around transfers because of its more rapid onset and the tablets for intermittent pain control, 
the q.4-hour frequency was to allow for double doses and ample education when into holding this medic
ation for any alteration in mental status.  Of note, Mr. Pryor seems to suffer prerenal azotemia and
 this impairs his morphine clearance and he is at high risk for over sedation, but on the other hand,
 if he is under treated with pain medication, he also suffers and this is unwanted.  In all, this is 
a delicate situation with frequent reevaluations necessary.  This certainly places a high burden on t
he patient's family, although there are multiple family members who are involved and interested in hi
s well being.

 

The patient did well with conditioning and met his PT and OT goals.  We will continue to have lillian
 nurse services.  I requested lab work be done in his home by ANANDA on 01/22/18 with results back to JENNIFER Vicente for evaluation of the adequacy of his hydration.  The patient is eager to be in the out
patient setting in his home.  They attained an electronic wheelchair that is going to assist in his a
bility to mobilize.

 

There was a question regarding whether a suprapubic catheter would be appropriate as opposed to a chr
onic indwelling Rodriguez.  Again, it is turned out not to be a specified bacterial infection and perhaps
 his alteration in mental status was more attributable to a situation of dehydration/prerenal azotemi
a.  Nonetheless, there was a request to speak with Dr. Wallis, the patient's outpatient urologist, 
then the patient is going to follow up with Dr. Wallis who cannot see the patient during this hospi
talization (although this is a nonacute question regarding the suprapubic catheter).

 

I recommended the patient and his family maintain an opiate diary to understand the total daily dose 
of his opiates in morphine equivalence and this should be re- presented to Dr. Luis Felipe Reynoso in out
patient followup.

 

I encouraged Ms. Pryor to reconnect with the neurosurgery department at [facility] whe
re the patient was initially scheduled for dorsal column stimulator.  I do think that if he could dec
rease his opiate doses overtime, this would decrease his risk of alteration in mental status and comp
lications such as sacral decubitus ulcers or aspiration pneumonia or other complication.

 

Mr. Pryor is going to follow up with Dr. Vicente in the outpatient setting. Dr. Vicente has ki
ndly seen the patient intermittently in his home and is very attentive to this case and that is appre
ciated.

 

For more questions regarding this hospitalization, please see the full medical record as this is a hi
ghly summarized account of a complex hospitalization.

 

TIME SPENT:  Total time taken for discharge Mr. Pryor was 75 minutes, greater than half that time wa
s spent at the bedside conducting discharge education and formulating his plan of care as above.

 

 [id number]/[facility] #: [id number]

## 2018-06-22 ENCOUNTER — HOSPITAL ENCOUNTER (EMERGENCY)
Dept: HOSPITAL 25 - ED | Age: 73
LOS: 1 days | Discharge: HOME | End: 2018-06-23
Payer: MEDICARE

## 2018-06-22 DIAGNOSIS — R31.9: ICD-10-CM

## 2018-06-22 DIAGNOSIS — I10: ICD-10-CM

## 2018-06-22 DIAGNOSIS — T83.511A: Primary | ICD-10-CM

## 2018-06-22 PROCEDURE — 87077 CULTURE AEROBIC IDENTIFY: CPT

## 2018-06-22 PROCEDURE — 87086 URINE CULTURE/COLONY COUNT: CPT

## 2018-06-22 PROCEDURE — 81003 URINALYSIS AUTO W/O SCOPE: CPT

## 2018-06-22 PROCEDURE — 99283 EMERGENCY DEPT VISIT LOW MDM: CPT

## 2018-06-22 PROCEDURE — 87186 SC STD MICRODIL/AGAR DIL: CPT

## 2018-06-22 PROCEDURE — 81015 MICROSCOPIC EXAM OF URINE: CPT

## 2018-06-23 VITALS — SYSTOLIC BLOOD PRESSURE: 200 MMHG | DIASTOLIC BLOOD PRESSURE: 96 MMHG

## 2018-06-23 NOTE — ED
GI/ HPI





- HPI Summary


HPI Summary: 





31 female presents ER with complaints of London catheter complications.  Has 

been having blood pass through catheter and has also had urinary retention and 

6 PM today.  Patient has had London catheter due to neurogenic bladder for the 

past 2 years.  Patient had this most recent London catheter placed 3 weeks ago.  

Denies any fever, chills, abdominal pain, low back pain, nausea or vomiting.  

Admits to some suprapubic pressure and states it is very uncomfortable 9/10.  

Patient has history of high blood pressure.  No other symptoms at this time.





- History of Current Complaint


Chief Complaint: EDUrogenitalProblems


Time Seen by Provider: 06/23/18 01:51


Stated Complaint: CATH PROBLEM


Hx Obtained From: Patient, Family/Caretaker - Wife


Onset/Duration: Started Hours Ago, Still Present


Timing: Constant


Severity: Moderate


Current Severity: Moderate


Pain Intensity: 9


Location of Pain: Suprapubic


Pain Characteristics: Pressure


Associated Signs and Symptoms: Positive: Hematuria, Other: - Catheter problem





- Additional Pertinent History


Primary Care Physician: SINA





- Allergy/Home Medications


Allergies/Adverse Reactions: 


 Allergies











Allergy/AdvReac Type Severity Reaction Status Date / Time


 


atorvastatin [From Lipitor] Allergy  HIVES, Verified 06/22/18 23:07





   MUSCLE  





   SPASMS  














PMH/Surg Hx/FS Hx/Imm Hx


Endocrine/Hematology History: 


   Denies: Hx Diabetes


Cardiovascular History: Reports: Hx Coronary Artery Disease, Hx 

Hypercholesterolemia, Hx Hypertension, Other Cardiovascular Problems/Disorders 

- carotid endarterectomy


   Denies: Hx Pacemaker/ICD, Hx Peripheral Vascular Disease


Respiratory History: Reports: Hx Asthma, Hx Sleep Apnea, Other Respiratory 

Problems/Disorders - LATENT TB


GI History: Reports: Hx Gastroesophageal Reflux Disease, Hx Gastrointestinal 

Bleed, Other GI Disorders - diverting colostomy


 History: Reports: Hx Chronic Renal Failure - stage 2, Hx Renal Disease, 

Other  Problems/Disorders - neurogenic bladder; indwelling london


Musculoskeletal History: Reports: Hx Back Problems - ependymoma and paraplegia, 

Other Musculoskeletal History - parapelgic d/t ependymoma


   Denies: Hx Rheumatoid Arthritis, Hx Osteoporosis


Sensory History: Reports: Hx Contacts or Glasses, Other Sensory Impairments - 

paraplegic


   Denies: Hx Eye Injury, Hx Eye Prosthesis, Hx Glaucoma, Hx Legally Blind, Hx 

Macular Degeneration, Hx Vision Problem, Hx Deafness, Hx Hearing Aid, Hx 

Hearing Problem


Opthamlomology History: Reports: Hx Contacts or Glasses, Other Sensory 

Impairments - paraplegic


   Denies: Hx Eye Injury, Hx Eye Prosthesis, Hx Glaucoma, Hx Legally Blind, Hx 

Macular Degeneration, Hx Vision Problem


Neurological History: Reports: Hx Spinal Cord Injury - ependymoma, Other Neuro 

Impairments/Disorders - paraplegia


   Denies: Hx Dementia, Hx Headaches, Hx Seizures, Hx Transient Ischemic 

Attacks (TIA)


Psychiatric History: 


   Denies: Hx Anxiety, Hx Depression, Hx Panic Disorder





- Cancer History


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


Hx Chemotherapy: Yes


Hx Radiation Therapy: Yes





- Surgical History


Surgery Procedure, Year, and Place: SPINAL SURGERY - 4 SURGERIES FROM [id number] 

(EPENDYMOMA), APPENDECTOMY 06/15 - Rt ENDARTERECTOMY 2015, TONSILLECTOMY ( as a 

child), Drainage of sacral wound, Oct/16 flap surgery to heal sacral wound.  

Fall 2017 - flap surgery for decubitus ulcer, then colostomy and revision


Hx Anesthesia Reactions: No





- Immunization History


Date of Tetanus Vaccine: PT STATES UNSURE


Infectious Disease History: No


Infectious Disease History: Reports: Hx Clostridium Difficile - current visit, 

Hx Hepatitis - childhood


   Denies: Hx of Known/Suspected MRSA, Hx Shingles, Hx Tuberculosis - will test 

+ d/t vaccine, History Other Infectious Disease, Traveled Outside the [place] in 

Last 30 Days





- Family History


Known Family History: Positive: Other - NO GI ISSUES





- Social History


Alcohol Use: None


Alcohol Amount: one a week


Substance Use Type: Reports: None


Substance Use Comment - Amount & Last Used: tramadol


Hx Tobacco Use: No


Smoking Status (MU): Never Smoked Tobacco


Have You Smoked in the Last Year: No





Review of Systems


Constitutional: Negative


Cardiovascular: Negative


Respiratory: Negative


Gastrointestinal: Negative


Positive: hematuria, other - catheter complications


Skin: Negative


All Other Systems Reviewed And Are Negative: Yes





Physical Exam


Triage Information Reviewed: Yes


Vital Signs On Initial Exam: 


 Initial Vitals











Temp Pulse Resp BP Pulse Ox


 


 98.8 F   97   16   193/104   94 


 


 06/22/18 23:00  06/22/18 23:00  06/22/18 23:00  06/22/18 23:00  06/22/18 23:00








Elevated BP noted


Vital Signs Reviewed: Yes


Appearance: Positive: Well-Appearing, No Pain Distress, Well-Nourished


Skin: Positive: Warm, Skin Color Reflects Adequate Perfusion, Dry.  Negative: 

Cold, Cyanosis @, Pale, Erythema @


Head/Face: Positive: Normal Head/Face Inspection


Eyes: Positive: Conjunctiva Clear


ENT: Positive: TMs normal


Neck: Positive: Supple, Nontender


Respiratory/Lung Sounds: Positive: Clear to Auscultation, Breath Sounds 

Present.  Negative: Rales, Rhonchi, Wheezes


Cardiovascular: Positive: Normal, RRR, Pulses are Symmetrical in both Upper and 

Lower Extremities - I.  Negative: Murmur, Rub


Abdomen Description: Positive: Nontender, Soft


Bowel Sounds: Positive: Present


Musculoskeletal: Positive: Normal - for patient's chronic conditions, no change


Neurological: Positive: Normal, Alert, Oriented to Person Place, Time





Diagnostics





- Vital Signs


 Vital Signs











  Temp Pulse Resp BP Pulse Ox


 


 06/23/18 02:13   98   193/87  95


 


 06/22/18 23:53  98.4 F  103  19  210/97 


 


 06/22/18 23:00  98.8 F  97  16  193/104  94














- Laboratory


Lab Results: 


 Lab Results











  06/23/18 Range/Units





  01:55 


 


Urine Color  Yellow  


 


Urine Appearance  Turbid  


 


Urine pH  6.0  (5-9)  


 


Ur Specific Gravity  1.005 L  (1.010-1.030)  


 


Urine Protein  2+(100 mg/dl) A  (Negative)  


 


Urine Ketones  Negative  (Negative)  


 


Urine Blood  3+ A  (Negative)  


 


Urine Nitrate  Negative  (Negative)  


 


Urine Bilirubin  Negative  (Negative)  


 


Urine Urobilinogen  Negative  (Negative)  


 


Ur Leukocyte Esterase  3+ A  (Negative)  


 


Urine WBC (Auto)  3+(>20/hpf) A  (Absent)  


 


Urine RBC (Auto)  3+(>10/hpf) A  (Absent)  


 


Ur Squamous Epith Cells  Present A  (Absent)  


 


Urine Bacteria  1+ A  (Absent)  


 


Urine Glucose  Negative  (Negative)  











Lab Statement: Any lab studies that have been ordered have been reviewed, and 

results considered in the medical decision making process.





GIGU Course/Dx





- Course


Course Of Treatment: Blood catheter was replaced by Sushma R and without 

complication.  Was able to obtain urine and patient's symptoms were resolved.  

Urinalysis sent to lab.  Showed leukocytes blood and bacteria.  Due to the 

bacteria and increased amounts of blood and leukocytes with recent urinary 

retention Will treat with short course of Cipro.  Increase fluid intake.  Follow

-up with urologist and primary care provider.  Patient's blood pressure was 

elevated typically runs around 160/80.  Take amlodipine and has a clonidine 

patch.  Patient will follow up with primary care provider and cardiologist to 

reevaluate hypertension.  Spoke with Dr. Scott about case/hypertension who 

stated it was okay to discharge the patient blood pressure being 190/86, this 

did not meet hypertensive crisis criteria per Dr. Scott.  Patient was 

asymptomatic at this time.  Patient aware worsening signs and symptoms watch 

out for.  Follow-up with primary care provider.  No other concerns at this 

time.  Normal vitals and physical exam otherwise.





- Diagnoses


Differential Diagnoses - Male: Cystitis, Urinary Tract Infection, Other - 

Urinary retention, [place] catheter complication, hypertension


Provider Diagnoses: 


 [place] catheter problem, UTI (urinary tract infection), Hypertension








Discharge





- Sign-Out/Discharge


Documenting (check all that apply): Discharge/Admit/Transfer





- Discharge Plan


Condition: Good


Disposition: HOME


Prescriptions: 


Ciprofloxacin TAB* [Cipro 500 MG TAB*] 500 mg PO DAILY #4 tab


Patient Education Materials:  Urinary Tract Infection in Women (ED), [place] 

Catheter Placement and Care (ED)


Referrals: 


Rafy Vicente MD [Primary Care Provider] - 


Additional Instructions: 


Take prescribed medication as directed..


Increase fluid intake..


Any new or worsening symptoms or if catheter becomes problematic again please 

seek medical attention.


Follow-up with urologist


Follow-up with primary care/cardiology for re-eval for hypertension.





- Billing Disposition and Condition


Condition: GOOD


Disposition: Home

## 2018-06-23 NOTE — ED
Complaint/Male





- History of Current Complaint


Chief Complaint: EDUrogenitalProblems


Time Seen by Provider: 06/23/18 01:51





- Allergies/Home Medications


Allergies/Adverse Reactions: 


 Allergies











Allergy/AdvReac Type Severity Reaction Status Date / Time


 


atorvastatin [From Lipitor] Allergy  HIVES, Verified 06/22/18 23:07





   MUSCLE  





   SPASMS  














PMH/Surg Hx/FS Hx/Imm Hx


Endocrine/Hematology History: 


   Denies: Hx Diabetes


Cardiovascular History: Reports: Hx Coronary Artery Disease, Hx 

Hypercholesterolemia, Hx Hypertension, Other Cardiovascular Problems/Disorders 

- carotid endarterectomy


   Denies: Hx Pacemaker/ICD, Hx Peripheral Vascular Disease


Respiratory History: Reports: Hx Asthma, Hx Sleep Apnea, Other Respiratory 

Problems/Disorders - LATENT TB


GI History: Reports: Hx Gastroesophageal Reflux Disease, Hx Gastrointestinal 

Bleed, Other GI Disorders - diverting colostomy


 History: Reports: Hx Chronic Renal Failure - stage 2, Hx Renal Disease, 

Other  Problems/Disorders - neurogenic bladder; indwelling london


Musculoskeletal History: Reports: Hx Back Problems - ependymoma and paraplegia, 

Other Musculoskeletal History - parapelgic d/t ependymoma


   Denies: Hx Rheumatoid Arthritis, Hx Osteoporosis


Sensory History: Reports: Hx Contacts or Glasses, Other Sensory Impairments - 

paraplegic


   Denies: Hx Eye Injury, Hx Eye Prosthesis, Hx Glaucoma, Hx Legally Blind, Hx 

Macular Degeneration, Hx Vision Problem, Hx Deafness, Hx Hearing Aid, Hx 

Hearing Problem


Opthamlomology History: Reports: Hx Contacts or Glasses, Other Sensory 

Impairments - paraplegic


   Denies: Hx Eye Injury, Hx Eye Prosthesis, Hx Glaucoma, Hx Legally Blind, Hx 

Macular Degeneration, Hx Vision Problem


Neurological History: Reports: Hx Spinal Cord Injury - ependymoma, Other Neuro 

Impairments/Disorders - paraplegia


   Denies: Hx Dementia, Hx Headaches, Hx Seizures, Hx Transient Ischemic 

Attacks (TIA)


Psychiatric History: 


   Denies: Hx Anxiety, Hx Depression, Hx Panic Disorder





- Cancer History


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


Hx Chemotherapy: Yes


Hx Radiation Therapy: Yes





- Surgical History


Surgery Procedure, Year, and Place: SPINAL SURGERY - 4 SURGERIES FROM [id number] 

(EPENDYMOMA), APPENDECTOMY 06/15 - Rt ENDARTERECTOMY 2015, TONSILLECTOMY ( as a 

child), Drainage of sacral wound, Oct/16 flap surgery to heal sacral wound.  

Fall 2017 - flap surgery for decubitus ulcer, then colostomy and revision


Hx Anesthesia Reactions: No





- Immunization History


Date of Tetanus Vaccine: PT STATES UNSURE


Infectious Disease History: No


Infectious Disease History: Reports: Hx Clostridium Difficile - current visit, 

Hx Hepatitis - childhood


   Denies: Hx of Known/Suspected MRSA, Hx Shingles, Hx Tuberculosis - will test 

+ d/t vaccine, History Other Infectious Disease, Traveled Outside the [place] in 

Last 30 Days





- Family History


Known Family History: Positive: Other - NO GI ISSUES





- Social History


Alcohol Use: None


Alcohol Amount: one a week


Substance Use Type: Reports: None


Substance Use Comment - Amount & Last Used: tramadol


Hx Tobacco Use: No


Smoking Status (MU): Never Smoked Tobacco


Have You Smoked in the Last Year: No





Physical Exam


Vital Signs On Initial Exam: 


 Initial Vitals











Temp Pulse Resp BP Pulse Ox


 


 98.8 F   97   16   193/104   94 


 


 06/22/18 23:00  06/22/18 23:00  06/22/18 23:00  06/22/18 23:00  06/22/18 23:00














Diagnostics





- Vital Signs


 Vital Signs











  Temp Pulse Resp BP Pulse Ox


 


 06/22/18 23:53  98.4 F  103  19  210/97 


 


 06/22/18 23:00  98.8 F  97  16  193/104  94














- Laboratory


Lab Results: 


 Lab Results











  06/23/18 Range/Units





  01:55 


 


Urine Color  Yellow  


 


Urine Appearance  Turbid  


 


Urine pH  6.0  (5-9)  


 


Ur Specific Gravity  1.005 L  (1.010-1.030)  


 


Urine Protein  2+(100 mg/dl) A  (Negative)  


 


Urine Ketones  Negative  (Negative)  


 


Urine Blood  3+ A  (Negative)  


 


Urine Nitrate  Negative  (Negative)  


 


Urine Bilirubin  Negative  (Negative)  


 


Urine Urobilinogen  Negative  (Negative)  


 


Ur Leukocyte Esterase  3+ A  (Negative)  


 


Urine WBC (Auto)  3+(>20/hpf) A  (Absent)  


 


Urine RBC (Auto)  3+(>10/hpf) A  (Absent)  


 


Ur Squamous Epith Cells  Present A  (Absent)  


 


Urine Bacteria  1+ A  (Absent)  


 


Urine Glucose  Negative  (Negative)  











Lab Statement: Any lab studies that have been ordered have been reviewed, and 

results considered in the medical decision making process.

## 2018-06-25 NOTE — PN
Progress Note





- Progress Note


Date of Service: 06/23/18


Note: 


Urine culture preliminary grew Serratia Marcescens > 100,000


Patient was placed on Cipro prior to discharge


We'll await sensitivities at this time


LEANDRO Ho

## 2018-06-26 NOTE — PN
Progress Note





- Progress Note


Date of Service: 06/26/18


Note: 


Patient placed on cipro which final culture is sensitive to. no further action 

required.

## 2019-03-20 NOTE — PN
Subjective


Date of Service: 09/28/17


Interval History: 





Pt with phantom pains in legs/feet with prolonged sitting, more so than the low 

back pain. Video sent to Dr. Luis Felipe Chirinos of [facility] (

Plastic Surgery) by Dr. Faulkner of sacral decub. BMs still frequent with 

position changes.


Family History: Unchanged from Admission


Social History: Unchanged from Admission - no changes noted


Past Medical History: Unchanged from Admission





Objective


Active Medications: 








Acetaminophen (Tylenol Tab*)  650 mg PO Q6H PRN


   PRN Reason: FEVER/PAIN


Ascorbic Acid (Vitamin C  Tab*)  500 mg PO DAILY [facility]


   Last Admin: 09/28/17 09:39 Dose:  500 mg


Aspirin (Aspirin Ec Low Dose*)  81 mg PO DAILY [facility]


   Last Admin: 09/28/17 09:37 Dose:  81 mg


Bisacodyl (Dulcolax Supp*)  10 mg CO DAILY PRN


   PRN Reason: CONSTIPATION


Cholecalciferol (Vitamin D Tab*)  5,000 units PO DAILY [facility]


   Last Admin: 09/28/17 09:37 Dose:  5,000 units


Collagenase (Santyl 250 Mg/Gm Oint*)  1 applic TOPICAL .SEE INSTRUCTIONS [facility]


   Last Admin: 09/28/17 08:07 Dose:  1 applic


Cyanocobalamin (Vitamin B12 Tab*)  1,000 mcg PO DAILY [facility]


   Last Admin: 09/28/17 09:39 Dose:  1,000 mcg


Diphenoxylate HCl/Atropine (Lomotil Tab*)  1 tab PO QID [facility]


   Last Admin: 09/28/17 20:05 Dose:  1 tab


Docusate Sodium (Colace Cap*)  100 mg PO BID PRN


   PRN Reason: CONSTIPATION


Enoxaparin Sodium (Lovenox(*))  40 mg SUBCUT Q24H [facility]


   Last Admin: 09/28/17 17:40 Dose:  40 mg


Fentanyl (Duragesic  Patch 75 Mcg/Hr*)  75 mcg TRANSDERM Q72H [facility]


   Last Admin: 09/26/17 18:47 Dose:  75 mcg


Ferrous Gluconate (Fergon Tab*)  324 mg PO BID [facility]


   Last Admin: 09/28/17 20:05 Dose:  324 mg


Folic Acid (Folvite Tab*)  0.5 mg PO DAILY [facility]


   Last Admin: 09/28/17 09:38 Dose:  0.5 mg


Furosemide (Lasix Tab*)  40 mg PO DAILY [facility]


   Last Admin: 09/28/17 09:39 Dose:  40 mg


Heparin Sodium (Porcine) (Heparin Flush Picc/Ml/Cvc(*))  1 ml FLUSH 0600,1800 

[facility]


   PRN Reason: Protocol


   Last Admin: 09/28/17 17:41 Dose:  1 ml


Ceftriaxone Sodium 2 gm/ (Sodium Chloride)  100 mls @ 200 mls/hr IVPB DAILY [facility]


   Last Admin: 09/28/17 09:32 Dose:  200 mls/hr


Lactobacillus Rhamnosus (Culturelle*)  1 cap PO BID [facility]


   Last Admin: 09/28/17 20:05 Dose:  1 cap


Metronidazole (Flagyl Tab*)  500 mg PO BID [facility]


   Last Admin: 09/28/17 20:05 Dose:  500 mg


Nystatin (Nystatin Cream*)  1 applic TOPICAL BID [facility]


   Last Admin: 09/28/17 08:06 Dose:  1 applic


Omeprazole (Prilosec Cap*)  40 mg PO DAILY@0600 [facility]


   Last Admin: 09/28/17 05:55 Dose:  40 mg


Ondansetron HCl (Zofran Odt Tab*)  4 mg PO Q8H PRN


   PRN Reason: NAUSEA/VOMITING


Oxycodone HCl (Oxycodone Oral.Soln*)  10 mg PO DAILY PRN


   PRN Reason: PAIN


Oxycodone HCl (Oxycodone Oral.Soln*)  10 mg PO AC [facility]


   Last Admin: 09/28/17 16:55 Dose:  10 mg


Oxycodone HCl (Roxycodone Tab*)  5 mg PO Q4H PRN


   PRN Reason: PAIN


Pharmacy Profile Note (Fentanyl Patch Check Q Shift)  1 note N/A 0700,1900 [facility]


   Last Admin: 09/28/17 19:11 Dose:  1 note


Potassium Chloride (Klor Con Er Tab*)  10 meq PO DAILY [facility]


   Last Admin: 09/28/17 09:38 Dose:  10 meq


Pregabalin (Lyrica Cap(*))  150 mg PO TID [facility]


   Last Admin: 09/28/17 20:05 Dose:  150 mg


Triamcinolone Acetonide (Triamcinolone 0.025% Oint *)  1 applic TOPICAL BID [facility]


   Last Admin: 09/28/17 08:05 Dose:  1 applic


Zinc Sulfate (Zinc-220 Cap*)  220 mg PO DAILY [facility]


   Last Admin: 09/28/17 09:37 Dose:  220 mg








 Vital Signs











  09/27/17 09/28/17 09/28/17





  22:09 00:08 04:12


 


Temperature  97.6 F 98.3 F


 


Pulse Rate  71 75


 


Respiratory 14 16 16





Rate   


 


Blood Pressure  151/63 148/62





(mmHg)   


 


O2 Sat by Pulse  97 97





Oximetry   














  09/28/17 09/28/17 09/28/17





  06:51 07:48 08:00


 


Temperature 98.2 F  


 


Pulse Rate 75  


 


Respiratory 14 18 18





Rate   


 


Blood Pressure 157/62  





(mmHg)   


 


O2 Sat by Pulse 98  





Oximetry   














  09/28/17 09/28/17 09/28/17





  09:38 09:39 11:19


 


Temperature   


 


Pulse Rate   


 


Respiratory 18 18 18





Rate   


 


Blood Pressure   





(mmHg)   


 


O2 Sat by Pulse   





Oximetry   














  09/28/17 09/28/17 09/28/17





  11:20 11:58 12:34


 


Temperature   98.4 F


 


Pulse Rate   78


 


Respiratory 18 18 





Rate   


 


Blood Pressure   





(mmHg)   


 


O2 Sat by Pulse   98





Oximetry   














  09/28/17 09/28/17 09/28/17





  13:00 13:52 13:53


 


Temperature 98.4 F  


 


Pulse Rate 77  


 


Respiratory 16 16 18





Rate   


 


Blood Pressure 145/68  





(mmHg)   


 


O2 Sat by Pulse 97  





Oximetry   














  09/28/17 09/28/17 09/28/17





  15:52 16:25 16:54


 


Temperature  98.3 F 


 


Pulse Rate  84 


 


Respiratory 18 18 18





Rate   


 


Blood Pressure  163/69 





(mmHg)   


 


O2 Sat by Pulse  98 





Oximetry   














  09/28/17 09/28/17 09/28/17





  16:55 18:26 20:05


 


Temperature   


 


Pulse Rate   


 


Respiratory 18 16 16





Rate   


 


Blood Pressure   





(mmHg)   


 


O2 Sat by Pulse   





Oximetry   











Oxygen Devices in Use Now: None


Appearance: No acute distress.


Eyes: No Scleral Icterus, PERRLA


Ears/Nose/Mouth/Throat: NL Teeth, Lips, Gums, Mucous Membranes Moist


Neck: NL Appearance and Movements; NL JVP, Trachea Midline


Respiratory: Symmetrical Chest Expansion and Respiratory Effort, Clear to 

Auscultation


Cardiovascular: NL Sounds; No Murmurs; No JVD, RRR, No Edema


Abdominal: NL Sounds; No Tenderness; No Distention, No Hepatosplenomegaly


Extremities: No Edema


Skin: - - stage IV sacrad decub 10x9cm, few cms deep as seen on video 


Neurological: Alert and Oriented x 3, - - no sensation(other than phantom pains

) below waist; paraplegic


Result Diagrams: 


 09/28/17 06:00





 09/28/17 06:00


Additional Lab and Data: 





 


 Laboratory Results - last 24 hr











  09/28/17 09/28/17





  06:00 06:00


 


WBC  7.9 


 


RBC  3.60 L 


 


Hgb  10.1 L 


 


Hct  30 L 


 


MCV  83 


 


MCH  28 


 


MCHC  34 


 


RDW  20 H 


 


Plt Count  174 


 


MPV  8 


 


Neut % (Auto)  46.3 


 


Lymph % (Auto)  34.0 


 


Mono % (Auto)  14.0 H 


 


Eos % (Auto)  4.2 


 


Baso % (Auto)  1.5 


 


Absolute Neuts (auto)  3.6 


 


Absolute Lymphs (auto)  2.7 


 


Absolute Monos (auto)  1.1 H 


 


Absolute Eos (auto)  0.3 


 


Absolute Basos (auto)  0.1 


 


Absolute Nucleated RBC  0 


 


Nucleated RBC %  0.1 


 


Sodium   140


 


Potassium   4.0


 


Chloride   105


 


Carbon Dioxide   32


 


Anion Gap   3


 


BUN   37 H


 


Creatinine   1.38 H


 


Est GFR ( Amer)   65.3


 


Est GFR (Non-Af Amer)   50.8


 


BUN/Creatinine Ratio   26.8 H


 


Glucose   98


 


Calcium   9.6

















Assess/Plan/Problems-Billing


.


Assessment: 


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 

Now with large Stage IV sacral decubitus ulcer which has been debrided and is 

slowly healing. Seeking Plastic surgery consultation at [facility] for 

possible flap.  








- Patient Problems


(1) Acute osteomyelitis of sacrum


Current Visit: Yes   Status: Acute   Code(s): M46.28 - OSTEOMYELITIS OF VERTEBRA

, SACRAL AND SACROCOCCYGEAL REGION   SNOMED Code(s): 380020666


   Comment: Continue Ceftriaxone and flagyl at current doses per ID.


no signs of systemic or local infection    





(2) Chronic back pain


Current Visit: No   Status: Chronic   Code(s): M54.9 - DORSALGIA, UNSPECIFIED; 

G89.29 - OTHER CHRONIC PAIN   SNOMED Code(s): 669656157


   Comment: 


Secondary to ependymoma and sacral decub


Continue pregabalin, and acetaminophen.


oxycodone liquid to 10mg AC with one dose PRN for transfer.


Fentanyl patch to 75mcg. 


   





(3) Chronic indwelling Rodriguez catheter


Current Visit: Yes   Status: Chronic   Priority: High   Code(s): Z92.89 - 

PERSONAL HISTORY OF OTHER MEDICAL TREATMENT   SNOMED Code(s): 436358742


   Comment: 


High risk of UTI - currently on ABX, no current evidence of UTI.   





(4) Neurogenic bladder


Current Visit: Yes   Status: Chronic   Priority: High   Code(s): N31.9 - 

NEUROMUSCULAR DYSFUNCTION OF BLADDER, UNSPECIFIED   SNOMED Code(s): 580613339


   Comment: 


Indwelling urinary catheter, no sign of current UTI.   





(5) Neurogenic bowel


Current Visit: Yes   Status: Chronic   Priority: High   Code(s): K59.2 - 

NEUROGENIC BOWEL, NOT ELSEWHERE CLASSIFIED   SNOMED Code(s): 994153542


   Comment: 


Having frequent loose bowel movements. Frequent pericare to prevent skin 

breakdown. Flagyl to help with diarrhea. Immodium after loose bowel movements. 

Metamucil for bulking.    





(6) Sacral decubitus ulcer, stage IV


Current Visit: Yes   Status: Chronic   Priority: High   Code(s): L89.154 - 

PRESSURE ULCER OF SACRAL REGION, STAGE 4   SNOMED Code(s): 630683000


   Comment: Seeking plastic surgery consultation to [facility] (Luis Felipe Chirinos MD)


Continuing wound care, debrided 9/27 by Dr Holley, continue Santyl 

treatment. Continue wet to dry dressings. Appreciate input from surgery.


Appreciate nutrition recommendations to support healing, attempt for 6 small 

protein rich meals daily and continue vitamins. Vitamin C started per nutrition 

and wife.


   





(7) DVT prophylaxis


Current Visit: No   Status: Acute   Code(s): ENU7335 -    SNOMED Code(s): 

926454696


   Comment: 


Lovenox


SCDs in bed.   





(8) Diarrhea


Current Visit: No   Status: Acute   Code(s): R19.7 - DIARRHEA, UNSPECIFIED   

SNOMED Code(s): 75228720


   Comment: 


Persistent diarrhea, Likely ABX associated. ID thinks not Cdiff. Continue Flagyl

, Probiotic, Metamucil, and Immodium.


   


Status and Disposition: 





f/u plastic surgery consult. Plan is to return home when able to do so 

functionally. 


Attending: Nathaniel Coates detailed exam detailed exam detailed exam detailed exam detailed exam detailed exam detailed exam detailed exam detailed exam detailed exam detailed exam detailed exam detailed exam detailed exam detailed exam detailed exam detailed exam detailed exam detailed exam detailed exam detailed exam detailed exam

## 2019-07-05 ENCOUNTER — HOSPITAL ENCOUNTER (EMERGENCY)
Dept: HOSPITAL 25 - ED | Age: 74
LOS: 1 days | Discharge: ANOTHER HOSPITAL | End: 2019-07-06
Payer: MEDICARE

## 2019-07-05 DIAGNOSIS — I12.9: ICD-10-CM

## 2019-07-05 DIAGNOSIS — R22.2: ICD-10-CM

## 2019-07-05 DIAGNOSIS — E87.1: ICD-10-CM

## 2019-07-05 DIAGNOSIS — I25.10: ICD-10-CM

## 2019-07-05 DIAGNOSIS — N13.2: Primary | ICD-10-CM

## 2019-07-05 DIAGNOSIS — N39.0: ICD-10-CM

## 2019-07-05 DIAGNOSIS — Z88.8: ICD-10-CM

## 2019-07-05 DIAGNOSIS — N18.2: ICD-10-CM

## 2019-07-05 LAB
ALBUMIN SERPL BCG-MCNC: 4.4 G/DL (ref 3.2–5.2)
ALBUMIN/GLOB SERPL: 1.5 {RATIO} (ref 1–3)
ALP SERPL-CCNC: 118 U/L (ref 34–104)
ALT SERPL W P-5'-P-CCNC: 16 U/L (ref 7–52)
ANION GAP SERPL CALC-SCNC: 11 MMOL/L (ref 2–11)
AST SERPL-CCNC: 15 U/L (ref 13–39)
BASOPHILS # BLD AUTO: 0.1 10^3/UL (ref 0–0.2)
BUN SERPL-MCNC: 82 MG/DL (ref 6–24)
BUN/CREAT SERPL: 38.5 (ref 8–20)
CALCIUM SERPL-MCNC: 10.4 MG/DL (ref 8.6–10.3)
CHLORIDE SERPL-SCNC: 87 MMOL/L (ref 101–111)
EOSINOPHIL # BLD AUTO: 0.2 10^3/UL (ref 0–0.6)
GLOBULIN SER CALC-MCNC: 3 G/DL (ref 2–4)
GLUCOSE SERPL-MCNC: 149 MG/DL (ref 70–100)
HCO3 SERPL-SCNC: 26 MMOL/L (ref 22–32)
HCT VFR BLD AUTO: 39 % (ref 42–52)
HGB BLD-MCNC: 13.4 G/DL (ref 14–18)
LYMPHOCYTES # BLD AUTO: 1.4 10^3/UL (ref 1–4.8)
MCH RBC QN AUTO: 28 PG (ref 27–31)
MCHC RBC AUTO-ENTMCNC: 35 G/DL (ref 31–36)
MCV RBC AUTO: 81 FL (ref 80–94)
MONOCYTES # BLD AUTO: 1.3 10^3/UL (ref 0–0.8)
NEUTROPHILS # BLD AUTO: 6.8 10^3/UL (ref 1.5–7.7)
NRBC # BLD AUTO: 0 10^3/UL
NRBC BLD QL AUTO: 0.2
PLATELET # BLD AUTO: 208 10^3/UL (ref 150–450)
POTASSIUM SERPL-SCNC: 4.8 MMOL/L (ref 3.5–5)
PROT SERPL-MCNC: 7.4 G/DL (ref 6.4–8.9)
RBC # BLD AUTO: 4.74 10^6 /UL (ref 4.18–5.48)
RBC UR QL AUTO: (no result)
SODIUM SERPL-SCNC: 124 MMOL/L (ref 135–145)
WBC # BLD AUTO: 9.8 10^3/UL (ref 3.5–10.8)
WBC UR QL AUTO: (no result)

## 2019-07-05 PROCEDURE — 85025 COMPLETE CBC W/AUTO DIFF WBC: CPT

## 2019-07-05 PROCEDURE — 87077 CULTURE AEROBIC IDENTIFY: CPT

## 2019-07-05 PROCEDURE — 96375 TX/PRO/DX INJ NEW DRUG ADDON: CPT

## 2019-07-05 PROCEDURE — 96374 THER/PROPH/DIAG INJ IV PUSH: CPT

## 2019-07-05 PROCEDURE — 96372 THER/PROPH/DIAG INJ SC/IM: CPT

## 2019-07-05 PROCEDURE — 87186 SC STD MICRODIL/AGAR DIL: CPT

## 2019-07-05 PROCEDURE — 87086 URINE CULTURE/COLONY COUNT: CPT

## 2019-07-05 PROCEDURE — 99284 EMERGENCY DEPT VISIT MOD MDM: CPT

## 2019-07-05 PROCEDURE — 86140 C-REACTIVE PROTEIN: CPT

## 2019-07-05 PROCEDURE — 81015 MICROSCOPIC EXAM OF URINE: CPT

## 2019-07-05 PROCEDURE — 74176 CT ABD & PELVIS W/O CONTRAST: CPT

## 2019-07-05 PROCEDURE — 36415 COLL VENOUS BLD VENIPUNCTURE: CPT

## 2019-07-05 PROCEDURE — 81003 URINALYSIS AUTO W/O SCOPE: CPT

## 2019-07-05 PROCEDURE — 80053 COMPREHEN METABOLIC PANEL: CPT

## 2019-07-05 NOTE — ED
Progress





- Progress Note


Progress Note: 


The patient is a sign-out from Dr. Donnie Archuleta MD, to Dr. Kassandra Manzano MD, at change of shift at 1900 on 07/05/0219, pending Abd/Pel CT 

results and disposition. In the ED course, the patient was administered 

Ceftriaxone.





Abd/Pel CT Impression: 1. 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 

spinal canal, extending from L1/L2 through the sacrum, measuring approximately 

6.1 x 8.2 x 17 cm AP, laterally, and craniocaudad. The lesion was incompletely 

included on prior CT but visualized portions appear to have increased in size 

since that time. Recommend correlation with MRI for better characterization. 

Current evaluation limited by lack of IV contrast. 2. Partial collapse with 

mixed lytic and sclerotic change and fragmentation of the L3 and L4 vertebral 

bodies which are displaced anteriorly secondary to the spinal mass, now with 

approximately 1.6 cm L2 on L3 retrolisthesis. 3.  Left abdominal colostomy with 

herniation of lidia-stomal fat. No bowel obstruction. 4. Mild right 

hydronephrosis and proximal hydroureter with poor visualization of lower right 

ureter and surgical changes along its course. Correlate with surgical history. 

5. Cholelithiasis with distended gallbladder but no gallbladder wall thickening 

or pericholecystic stranding. If there is clinical concern for cholecystitis, 

right upper quadrant ultrasound is recommended. 6. Spine stimulator in place. 

Other non-emergent findings as above. 





UA reveals specific of 1.006, 1+ blood, 3+ leukocyte esterase, 3+ WBC, 1+ RBC, 

and 1+ bacteria.





At 2012, I discussed the imaging and lab results with the patient and his wife. 

His pain has improved with pain medication administration. 





With findings of Abd/Pel CT, blood work, and UA, the patient will need to be 

transferred as there is not a urologist available at [facility] [place] consult with the 

patient. The urologist that he usually sees for his previous GI conditions is 

Dr. Wallis. 





At 2040, the patient and his wife agree with transfer to [facility] since the 

patient has been there before.





At 2058, I spoke with a nurse from the transfer center concerning the patient's 

case. The nurse will speak with a hospitalist at [facility] for further 

consultation of the transfer.





At 2226, I spoke with [facility] concerning the findings for the patient. They 

would like further analysis of the CT. I spoke with radiology at 2242 to refine 

CT results. At 2305, I spoke with [facility] again concerning CT results. They are 

unable to accept the patient at this time due to unavailability of beds.





At 0000, I spoke with the transfer center concerning the needs of the patient 

either to be transferred somewhere else or be admitted to [facility]. I spoke with 

Dr. Haile, hospitalist, at 0004, who believes that the patient is not 

appropriate for admission because urology is not available until 07/11. 





The patient and his wife agree to transfer to [facility] since 

the patient is unable to be transferred to [facility] at 0030.





At 0035, I spoke with the transfer center concerning transfer to [facility]. They will call back after calling the hospital.





The patient is requesting pain medication at 0044. I will order Morphine since 

it helped relieve his pain earlier in his stay.





At 0104, I spoke with Dr. Plata from [facility], and he accepts the 

patient for transfer.





He is diagnosed with nephrolithiasis with hydronephrosis with worsening renal 

failure, hyponatremia, spinal mass, and UTI.





Patient and his wife are agreeable with transfer and understand the need for 

further workup at this time.





- Results/Orders


Results/Orders: 


Abd/Pel CT Impression: 1. 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 

spinal canal, extending from L1/L2 through the sacrum, measuring approximately 

6.1 x 8.2 x 17 cm AP, laterally, and craniocaudad. The lesion was incompletely 

included on prior CT but visualized portions appear to have increased in size 

since that time. Recommend correlation with MRI for better characterization. 

Current evaluation limited by lack of IV contrast. 2. Partial collapse with 

mixed lytic and sclerotic change and fragmentation of the L3 and L4 vertebral 

bodies which are displaced anteriorly secondary to the spinal mass, now with 

approximately 1.6 cm L2 on L3 retrolisthesis. 3.  Left abdominal colostomy with 

herniation of lidia-stomal fat. No bowel obstruction. 4. Mild right 

hydronephrosis and proximal hydroureter with poor visualization of lower right 

ureter and surgical changes along its course. Correlate with surgical history. 

5. Cholelithiasis with distended gallbladder but no gallbladder wall thickening 

or pericholecystic stranding. If there is clinical concern for cholecystitis, 

right upper quadrant ultrasound is recommended. 6. Spine stimulator in place. 

Other non-emergent findings as above. ED physician has reviewed this report.





Re-Evaluation





- Re-Evaluation


  ** First Eval


Re-Evaluation Time: 20:12


Change: Improved


Comment: The patient's pain has improved, and he is sleeping but arousable. I 

discussed the results of the Abd/Pel CT, blood work, and UA with the patient 

and his wife. I recommended transfer for the patient as there is not a 

urologist available at this time.





  ** Second Eval


Re-Evaluation Time: 20:40


Change: Unchanged


Comment: The patient and his wife state that they prefer [facility] for 

transfer because the patient has had procedures there before.





  ** Third Eval


Re-Evaluation Time: 00:30


Change: Unchanged


Comment: I spoke with the patient concerning transfer to another hospital. They 

prefer [facility].





  ** Fourth Eval


Re-Evaluation Time: 00:44


Change: Worse


Comment: The patient is requesting pain medication.





Course/Dx





- Course


Course Of Treatment: The patient is a sign-out from Dr. Donnie Archuleta MD, to 

Dr. Kassandra Macias MD, at change of shift at 1900 on 07/05/0219, 

pending Abd/Pel CT results and disposition. In the ED course, the patient was 

administered Ceftriaxone. Abd/Pel CT Impression: 1. Large expansile mass in 

lumbar spine, centered in the spinal canal, extending from L1/L2 through the 

sacrum, measuring approximately 6.1 x 8.2 x 17 cm AP, laterally, and 

craniocaudad. The lesion was incompletely included on prior CT but visualized 

portions appear to have increased in size since that time. Recommend 

correlation with MRI for better characterization. Current evaluation limited by 

lack of IV contrast. 2. Partial collapse with mixed lytic and sclerotic change 

and fragmentation of the L3 and L4 vertebral bodies which are displaced 

anteriorly secondary to the spinal mass, now with approximately 1.6 cm L2 on L3 

retrolisthesis. 3.  Left abdominal colostomy with herniation of lidia-stomal 

fat. No bowel obstruction. 4. Mild right hydronephrosis and proximal 

hydroureter with poor visualization of lower right ureter and surgical changes 

along its course. Correlate with surgical history. 5. Cholelithiasis with 

distended gallbladder but no gallbladder wall thickening or pericholecystic 

stranding. If there is clinical concern for cholecystitis, right upper quadrant 

ultrasound is recommended. 6. Spine stimulator in place. Other non-emergent 

findings as above. UA reveals specific of 1.006, 1+ blood, 3+ leukocyte esterase

, 3+ WBC, 1+ RBC, and 1+ bacteria. At 2012, I discussed the imaging and lab 

results with the patient and his wife. His pain has improved with pain 

medication administration. With findings of Abd/Pel CT, blood work, and UA, the 

patient will need to be transferred as there is not a urologist available at 

[facility] to consult with the patient. The urologist that he usually sees for his 

previous GI conditions is Dr. Wallis. At 2040, the patient and his wife agree 

with transfer to [facility] since the patient has been there before. At 2058, 

I spoke with a nurse from the transfer center concerning the patient's case. 

The nurse will speak with a hospitalist at [facility] for further consultation of 

the transfer. At 2226, I spoke with [facility] concerning the findings for the 

patient. They would like further analysis of the CT. I spoke with radiology at 

2242 to refine CT results. At 2305, I spoke with [facility] again concerning CT 

results. They are unable to accept the patient at this time due to 

unavailability of beds. At 0000, I spoke with the transfer center concerning 

the needs of the patient either to be transferred somewhere else or be admitted 

to [facility]. I spoke with Dr. Haile, hospitalist, at 0004, who believes that the 

patient is not appropriate for admission because urology is not available until 

07/11. The patient and his wife agree to transfer to [facility] 

since the patient is unable to be transferred to [facility] at 0030. At 0035, I 

spoke with the transfer center concerning transfer to [facility]. They will call back after calling the hospital. The patient is 

requesting pain medication at 0044. I will order Morphine since it helped 

relieve his pain earlier in his stay. At 0104, I spoke with Dr. Plata from 

[facility], and he accepts the patient for transfer. He is diagnosed 

with nephrolithiasis with hydronephrosis with worsening renal failure, 

hyponatremia, spinal mass, and UTI. Patient and his wife are agreeable with 

transfer and understand the need for further workup at this time.





- Diagnoses


Provider Diagnoses: 


 Nephrolithiasis, Hydronephrosis, Hyponatremia, Mass of spine, Renal failure, 

UTI (urinary tract infection)








- Provider Notifications


Instructed by Provider To: Transfer - At 2058, I spoke with a nurse from the 

transfer center concerning the patient's case. The nurse will speak with a 

hospitalist at [facility] for further consultation of the transfer. At 2226, I 

spoke with [facility] concerning the findings for the patient. They would like 

further analysis of the CT. I spoke with radiology at 2242 to refine CT 

results. At 2305, I spoke with [facility] again concerning CT results. They are 

unable to accept the patient at this time due to unavailability of beds. At 0000

, I spoke with the transfer center concerning the needs of the patient either 

to be transferred somewhere else or be admitted to [facility]. I spoke with Dr. Haile

, hospitalist, at 0004, who believes that the patient is not appropriate for 

admission because urology is not available until 07/11. At 0035, I spoke with 

the transfer center concerning transfer to [facility]. They 

will call back after calling the hospital. At 0104, I spoke with Dr. Plata from 

[facility], and he accepts the patient for transfer.


Reason For Transfer: Specialty available at [facility] but not on call.





Discharge





- Sign-Out/Discharge


Documenting (check all that apply): Patient Departure - Patient is accepted for 

transfer to [facility] by Dr. Plata., Receiving Sign-Out


Receiving patient FROM: Donnie Archuleta - Patient is a sign-out from Dr. Archuleta 

at shift change pending Abd/Pel CT results and disposition.


Patient Received Moderate/Deep Sedation with Procedure: No





- Discharge Plan


Condition: Stable


Disposition: TRANS HIGHER LVL OF CARE FAC


Referrals: 


Rafy Vicente MD [Primary Care Provider] - 





- Billing Disposition and Condition


Condition: STABLE


Disposition: Trans Higher Lvl of Care Fac





- Attestation Statements


Document Initiated by Estrella: Yes


Documenting Scribe: Shelbi Hagen


Provider For Whom Estrella is Documenting (Include Credential): Dr. Kassandra Macias MD


Scribe Attestation: 


Shelbi DUQUE scribed for Dr. Kassandra Macias MD on 07/06/19 at 

0328. 


Scribe Documentation Reviewed: Yes


Provider Attestation: 


The documentation as recorded by the Shelbi julien accurately reflects 

the service I personally performed and the decisions made by me, Dr. Kassandra Macias MD


Status of Scribe Document: Viewed

## 2019-07-05 NOTE — ED
Back Pain





- HPI Summary


HPI Summary: 


Patient is a 75 y/o M presenting to ED with complaints of constipation and 

lower back pain. Back pain is described as "shooting" and is noted to radiate 

to his legs bilaterally. 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 

implanted spinal stimulator. He states that the present episode of pain that he 

is experiencing is different from the pain he typically experiences with his 

tumor, noting that the new pain is located above his tumor. Current 

presentation of pain onset three days ago. Patient had a large BM four days ago

, and since three days ago he has not had any ostomy output. Patient was given 

a laxative earlier today by wife with no effect. Patient has been taking lyrica

/ levorphanol, diclofenac patches without relief. Patient states he was advised 

by PCP to get MRI to recheck tumor and to come to ED for pain relief. On triage

, pain is rated 7/10, nothing is noted to aggravate/alleviate Sx. PMHx of CAD, 

HTN, HLD, asthma, GERD, GI bleed, chronic renal failure. Home medications and 

allergies are reviewed. 








- History of Current Complaint


Chief Complaint: EDBackInjuryPain


Stated Complaint: BACK/STOMACH PAIN PER PT


Time Seen by Provider: 07/05/19 14:31


Hx Obtained From: Patient


Onset/Duration: Lasting Days - 3, Still Present


Onset/Duration: Started Days Ago - 3, Still Present


Timing: Constant, Lasting Days - 3


Back Pain Location: Is Discrete @ - lower back


Severity Currently: Severe


Pain Intensity: 7


Pain Scale Used: 0-10 Numeric


Aggravating Symptom(s): Nothing


Alleviating Symptom(s): Nothing


Associated Signs And Symptoms: Positive: Other - constipation





- Allergies/Home Medications


Allergies/Adverse Reactions: 


 Allergies











Allergy/AdvReac Type Severity Reaction Status Date / Time


 


atorvastatin [From Lipitor] Allergy  HIVES, Verified 07/05/19 15:03





   MUSCLE  





   SPASMS  














PMH/Surg Hx/FS Hx/Imm Hx


Endocrine/Hematology History: 


   Denies: Hx Diabetes


Cardiovascular History: Reports: Hx Coronary Artery Disease, Hx 

Hypercholesterolemia, Hx Hypertension, Other Cardiovascular Problems/Disorders 

- carotid endarterectomy


   Denies: Hx Pacemaker/ICD, Hx Peripheral Vascular Disease


Respiratory History: Reports: Hx Asthma, Hx Sleep Apnea, Other Respiratory 

Problems/Disorders - LATENT TB


GI History: Reports: Hx Gastroesophageal Reflux Disease, Hx Gastrointestinal 

Bleed, Other GI Disorders - diverting colostomy


 History: Reports: Hx Chronic Renal Failure - stage 2, Hx Renal Disease, 

Other  Problems/Disorders - neurogenic bladder; indwelling london


Musculoskeletal History: Reports: Hx Back Problems - ependymoma and paraplegia, 

Other Musculoskeletal History - parapelgic d/t ependymoma


   Denies: Hx Rheumatoid Arthritis, Hx Osteoporosis


Sensory History: Reports: Hx Contacts or Glasses, Other Sensory Impairments - 

paraplegic


   Denies: Hx Eye Injury, Hx Eye Prosthesis, Hx Glaucoma, Hx Legally Blind, Hx 

Macular Degeneration, Hx Vision Problem, Hx Deafness, Hx Hearing Aid, Hx 

Hearing Problem


Opthamlomology History: Reports: Hx Contacts or Glasses, Other Sensory 

Impairments - paraplegic


   Denies: Hx Eye Injury, Hx Eye Prosthesis, Hx Glaucoma, Hx Legally Blind, Hx 

Macular Degeneration, Hx Vision Problem


Neurological History: Reports: Hx Spinal Cord Injury - ependymoma, Other Neuro 

Impairments/Disorders - paraplegia


   Denies: Hx Dementia, Hx Headaches, Hx Seizures, Hx Transient Ischemic 

Attacks (TIA)


Psychiatric History: 


   Denies: Hx Anxiety, Hx Depression, Hx Panic Disorder





- Cancer History


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


Hx Chemotherapy: Yes


Hx Radiation Therapy: Yes





- Surgical History


Surgery Procedure, Year, and Place: SPINAL SURGERY - 4 SURGERIES FROM [id number] 

(EPENDYMOMA), APPENDECTOMY 06/15 - Rt ENDARTERECTOMY 2015, TONSILLECTOMY ( as a 

child), Drainage of sacral wound, Oct/16 flap surgery to heal sacral wound.  

Fall 2017 - flap surgery for decubitus ulcer, then colostomy and revision


Hx Anesthesia Reactions: No





- Immunization History


Date of Tetanus Vaccine: PT STATES UNSURE


Infectious Disease History: No


Infectious Disease History: Reports: Hx Clostridium Difficile - current visit, 

Hx Hepatitis - childhood


   Denies: Hx of Known/Suspected MRSA, Hx Shingles, Hx Tuberculosis - will test 

+ d/t vaccine, History Other Infectious Disease, Traveled Outside the [place] in 

Last 30 Days





- Family History


Known Family History: Positive: Other - NO GI ISSUES





- Social History


Alcohol Use: None


Alcohol Amount: one a week


Substance Use Type: Reports: None


Substance Use Comment - Amount & Last Used: tramadol


Hx Tobacco Use: No


Smoking Status (MU): Never Smoked Tobacco


Have You Smoked in the Last Year: No





Review of Systems


Genitourinary: Other - positive - constipation


Musculoskeletal: Other - positive - back pain 


All Other Systems Reviewed And Are Negative: Yes





Physical Exam





- Summary


Physical Exam Summary: 


Appearance: The patient is well-nourished in no acute distress and in no acute 

pain.


 


Skin: The skin is warm and dry and skin color reflects adequate perfusion.





HEENT: The head is normocephalic and atraumatic. The pupils are equal and 

reactive. The conjunctivae are clear and without drainage. Nares are patent and 

without drainage. Mouth reveals moist mucous membranes and the throat is 

without erythema and exudate. The external ears are intact. The ear canals are 

patent and without drainage. The tympanic membranes are intact.


 


Neck: The neck is supple with full range of motion and non-tender. There are no 

carotid bruits. There is no neck vein distension.


 


Respiratory: Chest is non-tender. Lungs are clear to auscultation and breath 

sounds are symmetrical and equal.


 


Cardiovascular: Heart is regular rate and rhythm. There is no murmur or rub 

auscultated. There is no peripheral edema and pulses are symmetrical and equal.


 


Abdomen: The abdomen is soft and non-tender. There are normal bowel sounds 

heard in all four quadrants and there is no organomegaly palpated.


 


Musculoskeletal: There is tenderness at the paralumbar area. Extremities are non

-tender with full range of motion. There is good capillary refill. There is no 

peripheral edema or calf tenderness elicited.


 


Neurological: Patient is alert and oriented to person, place and time. The 

patient has symmetrical motor strength in all four extremities. Cranial nerves 

are grossly intact. Deep tendon reflexes are symmetrical and equal in all four 

extremities.


 


Psychiatric: The patient has an appropriate affect and does not exhibit any 

anxiety or depression.








Triage Information Reviewed: Yes


Vital Signs On Initial Exam: 


 Initial Vitals











Temp Pulse Resp BP Pulse Ox


 


 98 F   78   16   184/77   94 


 


 07/05/19 13:29  07/05/19 13:29  07/05/19 13:29  07/05/19 13:29  07/05/19 13:29











Vital Signs Reviewed: Yes





Diagnostics





- Vital Signs


 Vital Signs











  Temp Pulse Resp BP Pulse Ox


 


 07/05/19 15:18    18  


 


 07/05/19 13:29  98 F  78  16  184/77  94














- Laboratory


Result Diagrams: 


 07/05/19 16:38





 07/05/19 16:38


Lab Statement: Any lab studies that have been ordered have been reviewed, and 

results considered in the medical decision making process.





Re-Evaluation





- Re-Evaluation


  ** First Eval


Re-Evaluation Time: 17:48


Comment: Results of labs and tests were discussed so far. ABD/PEL CT to be 

obtained.





Back Pain Course/Dx





- Course


Course Of Treatment: Mr. Pryor presented with gradually worsening mid back 

pain.  He came into his wheelchair and was given IM morphine to help with the 

transfer over to the [facility].  Once I had him in the [facility] I evaluated his 

abdomen which was soft and nontender.  CT was obtained and by my reading shows 

an obstruction of the right kidney.  I'm still pending a urine on him but I 

suspect he'll have to be transferred as we have no neurological coverage today.

  He's been given pain medication a couple of times and is stable and is not 

toxic in appearance although he does appear to be in pain.





- Diagnoses


Provider Diagnoses: 


 Nephrolithiasis, Hydronephrosis, Hyponatremia, Mass of spine, Renal failure, 

UTI (urinary tract infection)








- Critical Care Time


Critical Care Time: 30-74 min





Discharge





- Sign-Out/Discharge


Documenting (check all that apply): Patient Departure


Patient Received Moderate/Deep Sedation with Procedure: No





- Discharge Plan


Condition: Stable


Disposition: TRANS HIGHER LVL OF CARE FAC


Referrals: 


Rafy Vicente MD [Primary Care Provider] - 





- Billing Disposition and Condition


Condition: STABLE


Disposition: Trans Higher Lvl of Care Fac





- Attestation Statements


Document Initiated by Estrella: Yes


Documenting Scribe: MARIA M CANALES


Provider For Whom Estrella is Documenting (Include Credential): JOSEPHINE CABRERA MD


Scribe Attestation: 


I, MARIA M CANALES, scribed for JOSEPHINE CABRERA MD on 07/06/19 at 1006. 


Scribe Documentation Reviewed: Yes


Provider Attestation: 


The documentation as recorded by the MARIA M julien accurately reflects 

the service I personally performed and the decisions made by me, JOSEPHINE CABRERA MD


Status of Scribe Document: Viewed

## 2019-07-06 VITALS — DIASTOLIC BLOOD PRESSURE: 60 MMHG | SYSTOLIC BLOOD PRESSURE: 149 MMHG

## 2019-07-07 NOTE — PN
Progress Note





- Progress Note


Date of Service: 07/05/19


Note: 


Urine culture preliminary grew Proteus Mirabilis


Patient was transferred to [facility]


Will await sensitivities and fax to [facility]

## 2019-07-08 NOTE — PN
Progress Note





- Progress Note


Date of Service: 07/06/19


Note: 





Sensitivities returned.


Faxed to [facility]

## 2019-10-02 ENCOUNTER — HOSPITAL ENCOUNTER (INPATIENT)
Dept: HOSPITAL 25 - PMRU | Age: 74
LOS: 4 days | Discharge: ANOTHER HOSPITAL | DRG: 52 | End: 2019-10-06
Attending: PHYSICAL MEDICINE & REHABILITATION | Admitting: PHYSICAL MEDICINE & REHABILITATION
Payer: MEDICARE

## 2019-10-02 DIAGNOSIS — B96.1: ICD-10-CM

## 2019-10-02 DIAGNOSIS — L89.519: ICD-10-CM

## 2019-10-02 DIAGNOSIS — M54.9: ICD-10-CM

## 2019-10-02 DIAGNOSIS — G47.30: ICD-10-CM

## 2019-10-02 DIAGNOSIS — Z99.89: ICD-10-CM

## 2019-10-02 DIAGNOSIS — G58.8: ICD-10-CM

## 2019-10-02 DIAGNOSIS — D64.89: ICD-10-CM

## 2019-10-02 DIAGNOSIS — N17.9: ICD-10-CM

## 2019-10-02 DIAGNOSIS — N18.3: ICD-10-CM

## 2019-10-02 DIAGNOSIS — L89.620: ICD-10-CM

## 2019-10-02 DIAGNOSIS — L89.210: ICD-10-CM

## 2019-10-02 DIAGNOSIS — I25.10: ICD-10-CM

## 2019-10-02 DIAGNOSIS — G40.909: ICD-10-CM

## 2019-10-02 DIAGNOSIS — S37.39XD: ICD-10-CM

## 2019-10-02 DIAGNOSIS — A41.9: ICD-10-CM

## 2019-10-02 DIAGNOSIS — Z99.3: ICD-10-CM

## 2019-10-02 DIAGNOSIS — T83.511A: ICD-10-CM

## 2019-10-02 DIAGNOSIS — I12.9: ICD-10-CM

## 2019-10-02 DIAGNOSIS — C41.2: ICD-10-CM

## 2019-10-02 DIAGNOSIS — Q54.8: ICD-10-CM

## 2019-10-02 DIAGNOSIS — N31.9: ICD-10-CM

## 2019-10-02 DIAGNOSIS — L89.613: ICD-10-CM

## 2019-10-02 DIAGNOSIS — I47.2: ICD-10-CM

## 2019-10-02 DIAGNOSIS — L89.223: ICD-10-CM

## 2019-10-02 DIAGNOSIS — Z79.82: ICD-10-CM

## 2019-10-02 DIAGNOSIS — L89.152: ICD-10-CM

## 2019-10-02 DIAGNOSIS — N39.0: ICD-10-CM

## 2019-10-02 DIAGNOSIS — I67.83: ICD-10-CM

## 2019-10-02 DIAGNOSIS — Z93.3: ICD-10-CM

## 2019-10-02 DIAGNOSIS — Z88.8: ICD-10-CM

## 2019-10-02 DIAGNOSIS — Z66: ICD-10-CM

## 2019-10-02 DIAGNOSIS — Z79.899: ICD-10-CM

## 2019-10-02 DIAGNOSIS — G82.20: Primary | ICD-10-CM

## 2019-10-02 PROCEDURE — 80053 COMPREHEN METABOLIC PANEL: CPT

## 2019-10-02 PROCEDURE — 87040 BLOOD CULTURE FOR BACTERIA: CPT

## 2019-10-02 PROCEDURE — 90686 IIV4 VACC NO PRSV 0.5 ML IM: CPT

## 2019-10-02 PROCEDURE — 83605 ASSAY OF LACTIC ACID: CPT

## 2019-10-02 PROCEDURE — 87077 CULTURE AEROBIC IDENTIFY: CPT

## 2019-10-02 PROCEDURE — 81003 URINALYSIS AUTO W/O SCOPE: CPT

## 2019-10-02 PROCEDURE — 87186 SC STD MICRODIL/AGAR DIL: CPT

## 2019-10-02 PROCEDURE — 87086 URINE CULTURE/COLONY COUNT: CPT

## 2019-10-02 PROCEDURE — 71045 X-RAY EXAM CHEST 1 VIEW: CPT

## 2019-10-02 PROCEDURE — 85025 COMPLETE CBC W/AUTO DIFF WBC: CPT

## 2019-10-02 PROCEDURE — 36415 COLL VENOUS BLD VENIPUNCTURE: CPT

## 2019-10-02 PROCEDURE — 81015 MICROSCOPIC EXAM OF URINE: CPT

## 2019-10-02 RX ADMIN — PREGABALIN SCH MG: 100 CAPSULE ORAL at 20:55

## 2019-10-02 RX ADMIN — CARVEDILOL SCH MG: 25 TABLET, FILM COATED ORAL at 20:55

## 2019-10-02 RX ADMIN — PANTOPRAZOLE SODIUM SCH MG: 40 TABLET, DELAYED RELEASE ORAL at 20:55

## 2019-10-02 RX ADMIN — Medication SCH MG: at 23:57

## 2019-10-02 RX ADMIN — HYDRALAZINE HYDROCHLORIDE SCH MG: 25 TABLET ORAL at 20:55

## 2019-10-02 RX ADMIN — LEVETIRACETAM SCH MG: 500 TABLET, FILM COATED ORAL at 20:55

## 2019-10-02 RX ADMIN — DOCUSATE SODIUM SCH MG: 100 CAPSULE, LIQUID FILLED ORAL at 20:55

## 2019-10-02 RX ADMIN — DOXAZOSIN MESYLATE SCH MG: 2 TABLET ORAL at 20:55

## 2019-10-02 RX ADMIN — ISOSORBIDE DINITRATE SCH MG: 20 TABLET ORAL at 20:54

## 2019-10-02 NOTE — CONSULT
Subjective


Date of Service: 10/02/19


Interval History: 





Mr. Pryor is a 75 yo male with PMH significant for paraplegia secondary to 

spinal ependymoma, asthma, CAD, CKD stage 3, GERD, HLD, HTN, PVD, neurogenic 

bladder with chronic indwelling urinary catheter, BONIFACIO, hx sacral stage 4 

pressure injury s/p flap reconstruction, and diverting colostomy. He presented 

to [facility] on 19 with complaints of fever for 3 days 

and concerns of an infected pressure injury and consult with Dr. Luis Felipe Chirinos (

previously performed flap reconstruction). He was admitted to the hospital for 

a sacral pressure injury and underwent debridement (by Dr. Marge Giron) and ABX. 

He developed Posterior Reversible Encephalopathy Syndrome (PRES) and was 

intubated, and also found to have seizures. He stayed at [place] for a total 

of 44 days. He was felt to require rehab and was admitted to [facility] today. 





Patient presented to [facility] from [facility] with multiple pressure 

injuries including; bilateral heels, left ischium, right hip, sacrum. Also 

noted to have ecchymosis to the right ankle. 





Patient seen and examined at bedside. 





Family History: Unchanged from Admission


Social History: Unchanged from Admission


Past Medical History: Unchanged from Admission





Review of Systems





- Measurements


Intake and Output: 


Intake and Output Last 24 Hours











 09/30/19 10/01/19 10/02/19 10/03/19





 06:59 06:59 06:59 06:59


 


Output Total    300


 


Balance    -300


 


Output:    


 


  Rodriguez    300














- Review of Systems


Constitutional Symptoms: 


   Negative: Fever, Other - Chills


Dermatology: Positive: Other - Wounds to bilateral heels, left ischium, sacrum, 

and left hip


Neurology: Positive: Other - Paraplegia





Objective


Active Medications: 





Acetaminophen (Tylenol Tab*)  650 mg PO Q6H PRN Reason: MILD PAIN or TEMP > 

100.4


Amlodipine Besylate (Norvasc Tab*)  10 mg PO DAILY [facility]


Aspirin (Aspirin Ec Tab*)  81 mg PO DAILY [facility]


Carvedilol (Coreg Tab*)  25 mg PO BID [facility]


Cholecalciferol (Vitamin D Tab*)  1,000 units PO DAILY [facility]


Collagenase (Santyl 250 Units/Gm Oint*)  1 applic TOPICAL Q12H [facility]


Cyanocobalamin (Vitamin B12 Tab*)  1,000 mcg PO DAILY [facility]


Docusate Sodium (Colace Cap*)  100 mg PO BID [facility]


Doxazosin Mesylate (Cardura Tab*)  2 mg PO BEDTIME [facility]


Ezetimibe (Zetia Tab*)  10 mg PO 1700 [facility]


Hydralazine HCl (Apresoline Tab*)  75 mg PO TID [facility]


Hydromorphone HCl (Dilaudid Tab*)  2 mg PO Q4H PRN Reason: PAIN - SEVERE


Isosorbide Dinitrate (Isordil Tab*)  30 mg PO TID [facility]


Levetiracetam (Keppra Tab*)  1,000 mg PO BID [facility]


   Stop: 10/04/19 23:59


Levetiracetam (Keppra Tab*)  500 mg PO BID [facility]


Magnesium Hydroxide (Milk Of Magnesia Liq*)  30 ml PO Q6H PRN Reason: 

CONSTIPATION


Pantoprazole Sodium (Protonix Tab*)  40 mg PO BID [facility]


Pregabalin (Lyrica Cap(*))  200 mg PO TID [facility]


Senna (Senokot 8.6 Mg Tab*)  2 tab PO BEDTIME PRN Reason: CONSTIPATION





 Vital Signs 











  10/02/19





  13:14


 


Temperature 98.0 F


 


Pulse Rate 72


 


Respiratory 15





Rate 


 


Blood Pressure 111/34





(mmHg) 


 


O2 Sat by Pulse 97





Oximetry 











Oxygen Devices in Use Now: None


Appearance: NAD, laying in bed


Ears/Nose/Mouth/Throat: Mucous Membranes Moist


Respiratory: Symmetrical Chest Expansion and Respiratory Effort


Cardiovascular: No Edema, - - Weak DP pulse bilateral


Skin: - - See skin note below


Neurological: Alert and Oriented x 3


Diagnostic Imagin. Exam Date: 19 - VL ANK/BRACHIAL INDICES





FINDINGS:  The ankle brachial index on the right was 0.39 and on the left was 

0.80. The ankle-brachial indices on the prior study were 0.93 and 0.91 

respectively. There is monophasic flow within the right posterior tibial artery 

and monophasic flow within the right dorsalis pedis artery.  There is biphasic 

flow within the left posterior tibial artery and biphasic flow within the left 

dorsalis pedis artery.





IMPRESSION:  SIGNIFICANTLY REDUCED ANKLE-BRACHIAL INDICES AND WAVEFORMS MORE 

SEVERE IN THE RIGHT LOWER EXTREMITY. CONSIDER A CT ANGIOGRAM OF THE AORTA AND 

LOWER EXTREMITIES FOR FURTHER EVALUATION.





2. Exam Date: 19 - MRI LOWER EXTREMITY RIGHT W/O





IMPRESSION:  SOFT TISSUE SWELLING AND SOFT TISSUE ULCER, NO SPECIFIC EVIDENCE 

FOR OSTEOMYELITIS.











Skin Deviation Note





- Skin Deviation Findings





Right medial heel - There is a superficial pressure injury that measures, 1.5 

cm x 2 cm x 0.1 cm.  The wound base is pink granulation tissue. The surrounding 

skin is intact, slight blanchable pink area from 12 o'clock to 3 o'clock in the 

periwound. There is no drainage.


Right lateral ankle - There is an area of ecchymosis, measures 7 cm x 2 cm. The 

skin is intact. 


Left medial heel - There is an unstageable pressure injury, total wound 

measures 3 cm x 3 cm x 0.1 cm. There is an area near the center that is yellow 

slough and a small amount of dark eschar that measures 1.5 cm x 1.2 cm. The 

surrounding skin is intact, slight erythema that is blanchable. 


Left ischium - There is a pressure ulcer present, measures 4.5 cm x 4 cm x 4 

cm. There is red granulation tissue from about 1 o'clock to 6 o'clock and 

yellow slough in the remainder of the wound bed. The periwound with slight 

blanchable erytehma. No drainage observed. No foul smell. 


Sacrum - There is a small open area that measures 0.8 cm x 0.3 cm x 0.1 cm. The 

tissue is white and appears to be macerated. There is also mild erythema 

surrounding the wound (this wound is located in a natural cleft in his back). 

There is no drainage. No sign of yeast infection. 


Right posterior lateral hip - There is a wound that measures 1.5 cm x 1 cm x 

0.1 cm. The wound base with yellow slough, the periwound with mild erythema. 

The surrounding skin is intact. No draiange noted. 








Wound Problem/Plan


Assessment: 


Mr. Pryor is a 75 yo male with PMH significant for paraplegia secondary to 

spinal ependymoma, asthma, CAD, CKD stage 3, GERD, HLD, HTN, PVD, neurogenic 

bladder with chronic indwelling urinary catheter, BONIFACIO, hx sacral stage 4 

pressure injury s/p flap reconstruction, and diverting colostomy. He presented 

to [facility] on 19 with complaints of fever for 3 days 

and concerns of an infected pressure injury. He developed PRES and was intubated

, and also found to have seizures. He stayed at [place] for a total of 44 

days. He was felt to require rehab and was admitted to [facility] today. Presented to 

[facility] from [facility] with multiple pressure injuries including; 

bilateral heels, left ischium, right hip, sacrum. Also noted to have ecchymosis 

to the right ankle. 





1. Right medial heel stage 3 pressure injury, healing. This has been present 

since May 2019, he started to follow with the [facility] 

for this wound in 2019. Underwent ABIs in August showing reduced ABIs 

right > left. Also had an MRI of the foot in 2019, showing no sign of 

osteomyelitis. He was suppose to have a CTA with runoff and referral to Dr. Smith, but it appears that was neither were ever completed. While in [facility] this wound was being treated with a border foam dressing. The wound is 

healing when compared to initial measurements from 19, at which time the 

wound measured 1.7 cm x 3 cm x 0.1 cm. Recommend applying a border foam 

dressing (Optifoam), and change every 3 days or as needed for drainage. Keep 

the heels elevated off the bed. Consider checking a prealbumin to evaluate 

nutritional status. Consider referral to Vascular outpatient. 


2. Right lateral ankle. There is an area of discoloration, this appears to be 

ecchymosis but may represent a deep tissue injury. Ms. Pryor reports that this 

occurred after the area was bumped while getting the patient out of bed. While 

in [facility] this wound area was being treated with a border foam 

dressing. Recommend using a border foam dressing (Optifoam) to protect the area

, but could consider leaving open to air as there is no open areas.


3. Left medial heel unstageable pressure injury. Unclear how long this has been 

present. Ms. Pryor reports this has been present since July, but was not 

previously documented by the [facility] in July and August when he was 

seen there last. See CHARMAINE results above. While in [facility] this wound 

was being treated with Medihoney and a border foam dressing and being changed 

every other day. Recommend Medihoney alginate followed by a border foam 

dressing (Optifoam), and change every other day or as needed for drainage. Keep 

the heels elevated off the bed. Consider checking a prealbumin to evaluate 

nutritional status.  Consider referral to Vascular outpatient. If wound 

continues to be present and not healing, should consider MRI to evaluate for 

Osteomyelitis. 


4. Left ischium stage 3 pressure injury. This has been present since May 2019, 

he started to follow with the [facility] for this wound in 

2019. While in [facility] this wound was being treated with a wound 

vac, and then changed to Santyl BID with wet to dry dressing at the time of 

discharge. This is reported to have wound cultures with MRSA and VRE while in 

[place].  He was treated with Linezolid and Zosyn, unclear for how long. 

Recommend applying Santyl daily to the area of slough in the wound base, 

followed by packing the wound with calcium alginate rope, and cover with a 

border foam gauze (Optifoam) and change daily or as needed for drainage. 

Frequent turning and repositioning. Do not sit up in a chair for more than 1-2 

hours at a time if possible. Use Roho cushion in wheel chair when up OOB. 

Consider checking a prealbumin to evaluate nutritional status. Should be 

referred back to the [facility] (or wound clinic of choice

) at discharge. 


5. Sacral stage 2 pressure injury. Unclear how long this has been present, but 

Ms. Pryor reports new since 2019. While at [facility] had a sacral 

MRI on 19 showing "Lumbosacral spinal canal compatible with the Pt's 

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 Fracture of the L3 vertebral body. There is 

perhaps slightly marrow signal abnormality within the posterior elements of the 

sacrum at the level of S3, which is nonspecific and may be reactive secondary 

to the adjacent mass, however early/mild changes related to osteomyelitis 

cannot be entirely excluded".   Recommend applying a border foam gauze (Optifoam

) to this area and change every 3 days or as needed for drainage or soiling. If 

the area becomes more macerated or appears to have a fungal infection recommend 

discontinuing border foam gauze. For macerated skin use barrier cream (orange 

top) as needed or for Candida use Nystatin powder twice daily. Frequent turning 

and repositioning. Consider checking a prealbumin to evaluate nutritional 

status.


6. Right hip unstageble pressure injury. Unclear how long this has been present

, but developed after last seen at the wound clinic in August as not documented 

at that time (19). While at [facility] this was being treated with 

Santyl and a border foam dressing. Recommend applying Santyl to the wound base 

once daily, followed by a border foam dressing (Optifoam) and change daily or 

as needed for drainage. Frequent turning and repositioning. Do not sit up in a 

chair for more than 1-2 hours at a time if possible. Use Roho cushion in wheel 

chair when up OOB. Consider checking a prealbumin to evaluate nutritional 

status.


7. Anterior urethral injury; acquired latrogenic hypospadias. Medical device 

related pressure injury to the penis secondary to the urinary catheter. This is 

not new and has been documented by Urology in the past. Use caution to keep 

pressure off the urinary catheter tube to prevent further breakdown.


8. 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 With neurogenic bladder and a 

chronic indwelling urinary catheter. 


9. Diet. Regular diet.


10. Code Status. DNR


11. Disposition. Inpatient, disposition per Primary team. 





Is Patient a Wound Clinic Patient: Yes - Dr. Serrato and Adelia Chu, FLORI


Comment: Last visit 19: Right heel - [place] superlite HD, dry gauze and 

rolled gauze, change MWF. Left ischium - Honey gel, covered with Tegaderm. 

Right great toe - [place] superlite HD. Recommended Vascular consult and CTA.


Attending: Le Wright

## 2019-10-02 NOTE — HP
HISTORY AND PHYSICAL:

 

DATE OF ADMISSION:  10/02/19

 

REASON FOR ADMISSION:  Paraplegia; pressure ulcer; complicated medical course 
including posterior reversible encephalopathy syndrome and acute respiratory 
failure with severe sepsis.

 

HISTORY OF PRESENT ILLNESS:  Jose Pryor is a 74-year-old male.  He has a 
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 
originally diagnosed in 1971.  He has had debulking surgeries and had radiation 
treatment to the area as well.  Radiation and chemotherapy were both tried and 
did not help.  Since 1971, Raad has had increasing weakness in his legs 
accompanied by pain down both legs.  When I first met him in 2012, he was able 
to ambulate with bilateral AFOs for short distances and required a wheelchair 
for long distances, but with the progression of time, he lost the ability to 
ambulate.  He always had pain as a result of the tumor invading the lumbar 
vertebrae and the cauda equina. The pain significantly worsened over the course 
of the past 3-1/2 years.  In May 2017, the pain increased dramatically.  He has 
been following with me in the pain clinic since 2012, and we have tried 
multiple pain medications.  This past year in June, he was brought to the 
emergency room with significant back pain.  He was originally diagnosed with a 
kidney stone, but then he was transferred to [facility].  There
, he was diagnosed with a kink in his Rodriguez catheter.  The catheter was changed
, and the back pain went away.  The patient developed shortly thereafter a 
small pressure ulcer over his ischium.  He was treated in the wound clinic and 
by visiting nurse services.  Unfortunately, the wound began to worsen. On 08/18/
19, the patient went back to [facility] Emergency Room because he had a 
high temperature.  He was diagnosed with an infected pressure ulcer.  The 
patient was treated with IV antibiotics including Zyvox and Zosyn.  He 
developed more confusion and acute respiratory failure and was intubated and 
had seizures. It was eventually felt that the seizures were secondary to the 
Zyvox.  He was also diagnosed with posterior reversible encephalopathy syndrome
, which was seen on the MRI of his brain.  His blood pressure was treated 
aggressively.  He was on the ventilator for 8 days and was eventually weaned 
off.  Following the weaning, the patient had difficulty swallowing and 
difficulty with speaking, but this gradually improved.  He remained quite 
fatigued.  He also had mental status changes.  His Keppra was eventually 
tapered down.  The patient seemed to do slightly better after his Dilantin was 
discontinued.  His diet was eventually upgraded to a regular diet with thin 
liquids.  He remained quite weak.  As far as his wounds were concerned, he 
completed a course of antibiotics for VRE, which grew from his wounds.  He had 
significant pain after he was extubated, and all his medications were weaned 
off. Normally, he takes levorphanol for his pain, but they did not have this 
drug available at [facility].  The medications they were giving 
him were not effective.  The patient complained of significant pain.  The 
patient was started on Dilaudid, but again this did not give him good relief.  
His Lyrica had been held because of renal issues as well as the seizures.  The 
patient otherwise seemed to be doing well with improving.  His renal failure 
improved.  They did stop his ACE inhibitor and his thiazide.  The patient was 
quite weak as a result of his prolonged complicated hospitalization.  He was 
felt to have physical therapy and occupational therapy needs.  He is now being 
admitted for inpatient rehab so that he might return to independent living.  He 
also has speech therapy needs.

 

PAST MEDICAL HISTORY:  Significant for the aforementioned ependymoma with 
paraplegia.  As mentioned, he also has posterior reversible encephalopathy 
syndrome.  He has a history of hypertension, sleep apnea, chronic kidney disease
, coronary artery disease.

 

MEDICATIONS:  Current medications include:

1.  Hydralazine.

2.  Isordil.

3.  Norvasc.

4.  Aspirin.

5.  Coreg.

6.  Cardura.

7.  Zetia.

8.  Dilaudid.

9.  Keppra.

10.  Protonix.

 

ALLERGIES:  The patient has allergies to STATIN drugs.

 

SOCIAL HISTORY:  He lives with his wife in a 1-story house.  There are no steps 
to enter.  Prior to admission, he was wheelchair bound.  They do have an 
electric wheelchair, and he has a wheelchair accessible van.  He has a son and 
daughter-in- law living nearby on the property.  He is a nonsmoker, nondrinker.

 

REVIEW OF SYSTEMS:  No current shortness of breath, chest pain.  He does report 
that he is quite fatigued from his journey from [place].

 

                               PHYSICAL EXAMINATION

 

VITAL SIGNS:  The patient's temperature is 98.0, blood pressure is 110/46, 
pulse 70, respirations 14.

 

HEENT:  His extraocular movements are intact.  Tongue is midline.

 

NECK:  Supple with no lymphadenopathy.

 

LUNGS:  Lungs sound clear to auscultation bilaterally.

 

HEART:  Heart sounds are regular.  S1, S2 audible.

 

ABDOMEN:  Soft and nontender.

 

EXTREMITIES:  He has an unstageable pressure ulcer on his left heel.  The left 
heel ulcer measures roughly 5 cm across.  There is a small area of eschar about 
1.5 cm in the middle.  In addition to the wound on his heel, he has a left 
ischial ulcer about 4 cm in diameter.

 

NEUROLOGIC:  The patient is alert.  He recognized me.  He has no movement below 
his waist.  Decreased sensation in his legs.

 

FUNCTIONAL EXAM:  He transfers independent.

 

 ASSESSMENT:

1.  Ependymoma with paraplegia.

2.  Infected pressure ulcer.

3.  Posterior reversible encephalopathy syndrome.

 

PLAN:  We are going to integrate him into a comprehensive and therapeutic rehab 
program with the following goals:

1.  Physical Therapy will work with the patient.  They are going to work on 
functional transfer training, ambulation training with a walker.

2.  Occupational Therapy will see the patient, work on his activities of daily 
living.

3.  Speech Therapy will see the patient, work on his cognitive training, also 
evaluate his swallow.

4.  For his PRES, he is on aggressive blood pressure monitoring now, but his 
blood pressure is low.  We are going to back off some of his blood pressure 
medicines.

5.  For his neurogenic bladder, we will continue his Rodriguez catheter care.

6.  For his seizures, we are going to be lowering his Keppra as they 
recommended in [place].

7.  For sleep apnea, we will continue his BiPAP.

8.  Wound consult for his ischial pressure ulcer as well as the ulcer on his 
left heel.

9.   will be closely involved to make sure that any services and 
equipment that the patient requires are in place prior to discharge.

10.  Adequate analgesia.  We are going to restart his levorphanol, which the 
patient's wife has brought in.

11.  He had a short run of nonsustained Vtach at [place].  We will continue 
his Coreg and his baby aspirin.

12.  We are going to restart his Lyrica.

13.  Family training as appropriate.

14.  Home with appropriate services.

15.  Advance directives:  The patient was DNR in [place].  The wife wishes to 
reverse his DNR now that the patient is improved.

 

ESTIMATED LENGTH OF STAY:  14 days.

 

 

 

[id number]/CPS #: [id number]

[facility]

## 2019-10-03 RX ADMIN — AMLODIPINE BESYLATE SCH MG: 5 TABLET ORAL at 09:43

## 2019-10-03 RX ADMIN — LEVETIRACETAM SCH MG: 500 TABLET, FILM COATED ORAL at 09:42

## 2019-10-03 RX ADMIN — CYANOCOBALAMIN TAB 500 MCG SCH MCG: 500 TAB at 09:43

## 2019-10-03 RX ADMIN — ISOSORBIDE DINITRATE SCH MG: 20 TABLET ORAL at 20:32

## 2019-10-03 RX ADMIN — Medication SCH MG: at 14:25

## 2019-10-03 RX ADMIN — PREGABALIN SCH MG: 100 CAPSULE ORAL at 20:32

## 2019-10-03 RX ADMIN — Medication SCH UNITS: at 09:42

## 2019-10-03 RX ADMIN — PANTOPRAZOLE SODIUM SCH MG: 40 TABLET, DELAYED RELEASE ORAL at 09:42

## 2019-10-03 RX ADMIN — LEVETIRACETAM SCH MG: 500 TABLET, FILM COATED ORAL at 20:31

## 2019-10-03 RX ADMIN — ISOSORBIDE DINITRATE SCH: 20 TABLET ORAL at 21:03

## 2019-10-03 RX ADMIN — DOCUSATE SODIUM SCH MG: 100 CAPSULE, LIQUID FILLED ORAL at 09:43

## 2019-10-03 RX ADMIN — CARVEDILOL SCH MG: 25 TABLET, FILM COATED ORAL at 20:30

## 2019-10-03 RX ADMIN — ISOSORBIDE DINITRATE SCH MG: 20 TABLET ORAL at 09:43

## 2019-10-03 RX ADMIN — DOCUSATE SODIUM SCH MG: 100 CAPSULE, LIQUID FILLED ORAL at 20:30

## 2019-10-03 RX ADMIN — Medication SCH MG: at 21:35

## 2019-10-03 RX ADMIN — Medication SCH MG: at 07:15

## 2019-10-03 RX ADMIN — ASPIRIN SCH MG: 81 TABLET, COATED ORAL at 09:42

## 2019-10-03 RX ADMIN — CARVEDILOL SCH MG: 25 TABLET, FILM COATED ORAL at 09:43

## 2019-10-03 RX ADMIN — PREGABALIN SCH MG: 100 CAPSULE ORAL at 09:42

## 2019-10-03 RX ADMIN — EZETIMIBE SCH MG: 10 TABLET ORAL at 17:18

## 2019-10-03 RX ADMIN — HYDRALAZINE HYDROCHLORIDE SCH MG: 25 TABLET ORAL at 14:24

## 2019-10-03 RX ADMIN — COLLAGENASE SANTYL SCH APPLIC: 250 OINTMENT TOPICAL at 12:32

## 2019-10-03 RX ADMIN — PANTOPRAZOLE SODIUM SCH MG: 40 TABLET, DELAYED RELEASE ORAL at 20:32

## 2019-10-03 RX ADMIN — HYDRALAZINE HYDROCHLORIDE SCH: 25 TABLET ORAL at 21:02

## 2019-10-03 RX ADMIN — PREGABALIN SCH MG: 100 CAPSULE ORAL at 14:40

## 2019-10-03 RX ADMIN — ISOSORBIDE DINITRATE SCH: 20 TABLET ORAL at 14:27

## 2019-10-03 RX ADMIN — HYDRALAZINE HYDROCHLORIDE SCH MG: 25 TABLET ORAL at 20:31

## 2019-10-03 RX ADMIN — HYDRALAZINE HYDROCHLORIDE SCH MG: 25 TABLET ORAL at 09:43

## 2019-10-03 RX ADMIN — DOXAZOSIN MESYLATE SCH MG: 2 TABLET ORAL at 20:32

## 2019-10-03 NOTE — PN
Progress Note


Date of Service: 10/03/19


Note: 


GETACHEW FLORES was visited. Therapy notes read and reviewed.He was able to 

sit at edge of bed for a few minutes. He was able to transfer into his chair 

with the noemy. BP still quite low. Will decrease hydralazine to 25, and 

decrease Isordil to 10








Current Medications: 


 Active Medications











Generic Name Dose Route Start Last Admin





  Trade Name Freq  PRN Reason Stop Dose Admin


 


Acetaminophen  650 mg  10/02/19 13:00  





  Tylenol Tab*  PO   





  Q6H PRN   





  MILD PAIN or TEMP > 100.4   





     





     





     


 


Amlodipine Besylate  10 mg  10/03/19 09:00  10/03/19 09:43





  Norvasc Tab*  PO   10 mg





  DAILY CHELSEA   Administration





     





     





     





     


 


Aspirin  81 mg  10/03/19 09:00  10/03/19 09:42





  Aspirin Ec Tab*  PO   81 mg





  DAILY CHELSEA   Administration





     





     





     





     


 


Carvedilol  25 mg  10/02/19 21:00  10/03/19 09:43





  Coreg Tab*  PO   25 mg





  BID CHELSEA   Administration





     





     





     





     


 


Cholecalciferol  1,000 units  10/03/19 09:00  10/03/19 09:42





  Vitamin D Tab*  PO   1,000 units





  DAILY CHELSEA   Administration





     





     





     





     


 


Collagenase  1 applic  10/03/19 09:00  10/03/19 12:32





  Santyl 250 Units/Gm Oint*  TOPICAL   1 applic





  DAILY CHELSEA   Administration





     





     





     





     


 


Cyanocobalamin  1,000 mcg  10/03/19 09:00  10/03/19 09:43





  Vitamin B12 Tab*  PO   1,000 mcg





  DAILY CHELSEA   Administration





     





     





     





     


 


Docusate Sodium  100 mg  10/02/19 21:00  10/03/19 09:43





  Colace Cap*  PO   100 mg





  BID CHELSEA   Administration





     





     





     





     


 


Doxazosin Mesylate  2 mg  10/02/19 21:00  10/02/19 20:55





  Cardura Tab*  PO   2 mg





  BEDTIME CHELSEA   Administration





     





     





     





     


 


Ezetimibe  10 mg  10/03/19 17:00  10/03/19 17:18





  Zetia Tab*  PO   10 mg





  1700 CHELSEA   Administration





     





     





     





     


 


Hydralazine HCl  50 mg  10/02/19 21:00  10/03/19 14:24





  Apresoline Tab*  PO   50 mg





  TID CHELSEA   Administration





     





     





     





     


 


Hydromorphone HCl  2 mg  10/02/19 13:18  





  Dilaudid Tab*  PO   





  Q4H PRN   





  PAIN - SEVERE   





     





     





     


 


Isosorbide Dinitrate  20 mg  10/02/19 21:00  10/03/19 14:27





  Isordil Tab*  PO   Not Given





  TID CHELSEA   





     





     





     





     


 


Levetiracetam  1,000 mg  10/02/19 21:00  10/03/19 09:42





  Keppra Tab*  PO  10/04/19 23:59  1,000 mg





  BID CHELSEA   Administration





     





     





     





     


 


Levetiracetam  500 mg  10/05/19 09:00  





  Keppra Tab*  PO   





  BID CHELSEA   





     





     





     





     


 


Magnesium Hydroxide  30 ml  10/02/19 13:00  





  Milk Of Magnesia Liq*  PO   





  Q6H PRN   





  CONSTIPATION   





     





     





     


 


Pto:Nft* (  1 mg  10/02/19 22:00  10/03/19 14:25





Levorphanol 2mg  PO   1 mg





Tablet)  Q8HR CHELSEA   Administration





     





     





     





     


 


Pantoprazole Sodium  40 mg  10/02/19 21:00  10/03/19 09:42





  Protonix Tab*  PO   40 mg





  BID CHELSEA   Administration





     





     





     





     


 


Pregabalin  100 mg  10/02/19 21:00  10/03/19 14:40





  Lyrica Cap(*)  PO   100 mg





  TID CHELSEA   Administration





     





     





     





     


 


Senna  2 tab  10/02/19 13:00  





  Senokot 8.6 Mg Tab*  PO   





  BEDTIME PRN   





  CONSTIPATION   





     





     





     











Vital Signs: 


 Vital Signs











Temp Pulse Resp BP Pulse Ox


 


 98.7 F   75   18   104/44   94 


 


 10/03/19 17:18  10/03/19 17:18  10/03/19 17:29  10/03/19 17:18  10/03/19 17:18











Exam: 





HEENT: EOMI


LUNGS: Clear 


HEART: reg Rhythm


ABDOMEN: Soft, +BS, Ostomy


SKIN: 4cm left ischial ulcer


NEUROLOGIC: A&O. No movement in legs. Limited sensation below waist





Assessment/Plan: 





1. Ependymoma with Paraplegia: PT/OT


2. Ischial Pressure Ulcer: Wound consult. Santyl/calcium alginate. Roho when in 

chair


3. PRES: On multiple BP meds, but now BPs are running low. Tapering Isordil and 

Hydralazine. On Norvasc/Coreg


4. 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 Levorphanol has improved situation


5. Seizures: On Keppra. Lowering dose. Seizures were felt to be possibly from 

Zyvox 


6. Neurogenic Bladder: Rodriguez


7. NSVT on Monitor at [facility]: Coreg


8. Acute on chronic renal failure: Check labs in am


9. Advanced Directives: Wife is HCP. Full code 





 








10/03/19 20:29





10/03/19 20:35

## 2019-10-04 LAB
ALBUMIN SERPL BCG-MCNC: 2.9 G/DL (ref 3.2–5.2)
ALBUMIN/GLOB SERPL: 1.2 {RATIO} (ref 1–3)
ALP SERPL-CCNC: 107 U/L (ref 34–104)
ALT SERPL W P-5'-P-CCNC: 10 U/L (ref 7–52)
ANION GAP SERPL CALC-SCNC: 6 MMOL/L (ref 2–11)
AST SERPL-CCNC: 8 U/L (ref 13–39)
BASOPHILS # BLD AUTO: 0 10^3/UL (ref 0–0.2)
BUN SERPL-MCNC: 70 MG/DL (ref 6–24)
BUN/CREAT SERPL: 28.1 (ref 8–20)
CALCIUM SERPL-MCNC: 10.3 MG/DL (ref 8.6–10.3)
CHLORIDE SERPL-SCNC: 106 MMOL/L (ref 101–111)
EOSINOPHIL # BLD AUTO: 0.2 10^3/UL (ref 0–0.6)
GLOBULIN SER CALC-MCNC: 2.5 G/DL (ref 2–4)
GLUCOSE SERPL-MCNC: 91 MG/DL (ref 70–100)
HCO3 SERPL-SCNC: 25 MMOL/L (ref 22–32)
HCT VFR BLD AUTO: 24 % (ref 42–52)
HGB BLD-MCNC: 7.9 G/DL (ref 14–18)
LYMPHOCYTES # BLD AUTO: 2.2 10^3/UL (ref 1–4.8)
MCH RBC QN AUTO: 28 PG (ref 27–31)
MCHC RBC AUTO-ENTMCNC: 33 G/DL (ref 31–36)
MCV RBC AUTO: 86 FL (ref 80–94)
MONOCYTES # BLD AUTO: 1.3 10^3/UL (ref 0–0.8)
NEUTROPHILS # BLD AUTO: 5.7 10^3/UL (ref 1.5–7.7)
NRBC # BLD AUTO: 0 10^3/UL
NRBC BLD QL AUTO: 0
PLATELET # BLD AUTO: 285 10^3/UL (ref 150–450)
POTASSIUM SERPL-SCNC: 5.1 MMOL/L (ref 3.5–5)
PROT SERPL-MCNC: 5.4 G/DL (ref 6.4–8.9)
RBC # BLD AUTO: 2.79 10^6 /UL (ref 4.18–5.48)
SODIUM SERPL-SCNC: 137 MMOL/L (ref 135–145)
WBC # BLD AUTO: 9.4 10^3/UL (ref 3.5–10.8)

## 2019-10-04 RX ADMIN — DOXAZOSIN MESYLATE SCH MG: 2 TABLET ORAL at 19:51

## 2019-10-04 RX ADMIN — ISOSORBIDE DINITRATE SCH MG: 20 TABLET ORAL at 19:50

## 2019-10-04 RX ADMIN — Medication SCH MG: at 15:05

## 2019-10-04 RX ADMIN — CARVEDILOL SCH MG: 25 TABLET, FILM COATED ORAL at 09:37

## 2019-10-04 RX ADMIN — Medication SCH MG: at 09:35

## 2019-10-04 RX ADMIN — ASPIRIN SCH MG: 81 TABLET, COATED ORAL at 08:42

## 2019-10-04 RX ADMIN — ACETAMINOPHEN PRN MG: 325 TABLET ORAL at 00:02

## 2019-10-04 RX ADMIN — Medication SCH UNITS: at 08:42

## 2019-10-04 RX ADMIN — CYANOCOBALAMIN TAB 500 MCG SCH MCG: 500 TAB at 08:42

## 2019-10-04 RX ADMIN — EZETIMIBE SCH MG: 10 TABLET ORAL at 17:12

## 2019-10-04 RX ADMIN — ISOSORBIDE DINITRATE SCH MG: 20 TABLET ORAL at 15:04

## 2019-10-04 RX ADMIN — Medication SCH MG: at 21:35

## 2019-10-04 RX ADMIN — LEVETIRACETAM SCH MG: 500 TABLET, FILM COATED ORAL at 19:51

## 2019-10-04 RX ADMIN — ISOSORBIDE DINITRATE SCH MG: 20 TABLET ORAL at 11:54

## 2019-10-04 RX ADMIN — DOCUSATE SODIUM SCH MG: 100 CAPSULE, LIQUID FILLED ORAL at 08:44

## 2019-10-04 RX ADMIN — HYDRALAZINE HYDROCHLORIDE SCH MG: 25 TABLET ORAL at 14:52

## 2019-10-04 RX ADMIN — COLLAGENASE SANTYL SCH APPLIC: 250 OINTMENT TOPICAL at 16:05

## 2019-10-04 RX ADMIN — HYDRALAZINE HYDROCHLORIDE SCH MG: 25 TABLET ORAL at 19:51

## 2019-10-04 RX ADMIN — PANTOPRAZOLE SODIUM SCH MG: 40 TABLET, DELAYED RELEASE ORAL at 11:53

## 2019-10-04 RX ADMIN — HYDRALAZINE HYDROCHLORIDE SCH MG: 25 TABLET ORAL at 09:37

## 2019-10-04 RX ADMIN — PREGABALIN SCH MG: 100 CAPSULE ORAL at 11:49

## 2019-10-04 RX ADMIN — CARVEDILOL SCH MG: 25 TABLET, FILM COATED ORAL at 19:51

## 2019-10-04 RX ADMIN — AMLODIPINE BESYLATE SCH MG: 5 TABLET ORAL at 08:41

## 2019-10-04 RX ADMIN — DOCUSATE SODIUM SCH MG: 100 CAPSULE, LIQUID FILLED ORAL at 19:51

## 2019-10-04 RX ADMIN — LEVETIRACETAM SCH MG: 500 TABLET, FILM COATED ORAL at 12:36

## 2019-10-04 RX ADMIN — PREGABALIN SCH MG: 100 CAPSULE ORAL at 15:08

## 2019-10-04 RX ADMIN — PANTOPRAZOLE SODIUM SCH MG: 40 TABLET, DELAYED RELEASE ORAL at 19:51

## 2019-10-04 RX ADMIN — PREGABALIN SCH MG: 100 CAPSULE ORAL at 19:51

## 2019-10-04 NOTE — PMRUTEAM
PMRU: Team Meeting


Current Status: 


 Physical Therapy: Current Status











Current Rolling Status         Substantial/Maximal


 


Current Supine <-> Sit Status  Dependent


 


Current Sit <-> Stand Status   Not Applicable


 


Current Bed <-> Chair Status   Dependent


 


Transfer/Bed Mobility          Bill Lift





Recommended Devices            


 


Current Picking Up Object      Not attempted





Status                         


 


Current Car Transfer Status    Not attempted


 


Current Ambulation Assistance  Not Applicable





Status                         


 


Current Wheelchair Propulsion  Dependent





Ability Status                 


 


Wheelchair Distance (ft)       5


 


Current Stair Climbing Status  Not Applicable


 


Current Curb Assistance Status Not Applicable


 


Objective Comments             Pt successfully completes transfer bed to/from 





 with total Ax2 and use of bill lift. Pt denies c/





 o pain or dizziness and request to decreased tilt





 to sit further upright. BP assessed with change in





 position (seated) - 76/36. Nsg notified. Pt





 returned to bed with bill lift/total Ax2.














 Occupational Therapy: Current Status











Current Upper Body Dressing    Substantial/Maximal





Status                         


 


Current Lower Body Dressing    Dependent





Status                         


 


Current Footwear Status        Dependent


 


Current Bathing Status         Dependent


 


Current Grooming Status        Supervision/Touching


 


Current Toileting Status       Dependent


 


Current Toilet Transfer Status Dependent


 


Current Eating Status          Supervision/Touching














 Nursing: Current Status











Skin Deviations [penis]        Unblanchable,Other


 


Skin Deviations [left ischium] Pressure Ulcer


 


Skin Deviations [Right Hip]    Pressure Ulcer


 


Skin Deviations [Right Ankle]  Bruise


 


Skin Deviations [Sacrum]       Pressure Ulcer


 


Skin Deviations [Left Hip]     Pressure Ulcer


 


Skin Deviations [Left Heel]    Pressure Ulcer


 


Skin Deviations [Right Heel]   Pressure Ulcer


 


Skin Deviation Description [   tubing injury





penis]                         


 


Skin Deviation Description [   drsg cdi; spenco boot





Right Ankle]                   


 


Skin Deviation Description [   Spanko boot in place





Left Heel]                     


 


Skin Deviation Description [   Spanko boot in place





Right Heel]                    


 


Wound Stage [left ischium]     III


 


Wound Stage [Left Heel]        II


 


Wound Stage [Right Heel]       II














 Rec Therapy: Current Status











Summary of Assessment and      RT assessment complete and pt. is aware of RT





Clinical Impression            services.  Pt. is engaged in leisure sessions.

















Goals: 


 Physical Therapy:  Goals











Goals to Be Accomplished in (  5





Days)                          


 


Goal: Rolling Assistance       Partial/Moderate


 


Goal Supine <-> Sit Status     Partial/Moderate


 


Goal Sit <-> Stand Status      Not Applicable


 


Goal Bed <-> Chair Status      Dependent


 


Transfer/Bed Mobility          Bill Lift





Recommended Devices            


 


Goal: Picking Up Object        Partial/Moderate


 


Goal: Car Transfer Status      Dependent


 


Goal: Ambulation Assistance    Not Applicable


 


Ambulation Assistive Devices   Forearm Crutches


 


Goal: Wheelchair Propulsion    Supervision/Touching





Ability                        


 


Wheelchair Distance (ft)       150


 


Goal: Stairs Assistance        Not Applicable


 


Goal: Curb Assistance          Not Applicable


 


Goal: Home Exercise Program    Partial/Moderate





Assistance                     














 Occupational Therapy: Goals











Goals to be Completed in (Days 5





)                              


 


Goal Upper Body Dressing       Partial/Moderate





Routine                        


 


Goal Lower Body Dressing       Substantial/Maximal





Routine                        


 


Goal Footwear Status           Substantial/Maximal


 


Goal Bathing Routine (OT)      Partial/Moderate


 


Goal Grooming Routine          Setup or Clean-up Assist


 


Goal Toilet Hygiene and        Substantial/Maximal





Clothing Management Routine    


 


Goal Toilet Transfer Routine   Dependent


 


Goal Functional Transfers for  Dependent





ADL                            


 


Goal Feeding Routine           Setup or Clean-up Assist














 Nutrition: Goals











Intervention Goals             1.  adequate intake to support hydration and lean





 body mass without add'l wt loss





 2.  areas of skin breakdown will show evidence of





 healing; no new areas of skin breakdown





 3.  achieve and maintain serum electrolytes WNL





 4.  regulation of bowel pattern/ostomy function;





 no c/o constipation (or diarrhea)





 

















Care Plan: 


 Care Plan





ADL's - Improve/Maintain                Start:  10/03/19 03:23              


Freq:   DAILY@0700,1900                 Status: Active      Target: 10/07/19


Protocol:                                                                   








Activity Type Activity Date Activity User E-Sign Co-Sign Detail





Recorded Client Recorded Date Recorded By   


 


Document 10/03/19 16:11 [id number]   





[id number]-C09 10/03/19 16:13 [id number]   














  10/03/19





  16:11


 


PMRU Outcome: ADL's/ADL Transfers 


 


Orders/Interventions Occupational





 Therapy





 Evaluation &





 Treatment





 Communication





 Tool in Patient





 Room


 


Device Yes


 


Address Deficits Secondary To: prolonged





 hospitalization





 , sepsis, resp





 failure


 


Patient to receive OT 5x/wk for  Therex





min/day Self Care





 Management





 Group Therapy





 Neuromuscular





 ReEducation


 


UE/LE ADL's with Assist Yes: min-maxA


 


ADL Transfers with Assist Yes: max-totalA


 


Toileting: Transfers,Clothing Management Yes: totalA





,Hygeine w/Assist 


 


Light Kitchen/Laundry w/Assist No


 


Other Outcome/Goals Pt participated





 well in





 treatment





 session,





 increased





 independence





 with feeding





 this date





 compared to





 previous





 hospital per





 wife's report





 with built up





 handle on





 silverware and





 incidental





 assist/cues for





 breakfast.


 


Progression Toward Outcome/Goals Goal Initiation








Cardiovascular- Improve/Maintain        Start:  10/02/19 12:57              


Freq:   DAILY@0700,1900                 Status: Active      Target: 10/09/19


Protocol:                                                                   








Activity Type Activity Date Activity User E-Sign Co-Sign Detail





Recorded Client Recorded Date Recorded By   


 


Document 10/04/19 01:48 [id number]   





PMRU-C03 10/04/19 01:49 [id number]   














  10/04/19





  01:48


 


PMRU Outcome: Cardiovascular 


 


Vital Signs q Shift for 48hrs Then BID Yes


 


Daily Weight Ordered No


 


Current Cardiovascular Outcome/Goal Maintain/





 Achieve





 Baseline HR, BP





 , Perfusion





 Free of





 Abnormal





 Cardiac





 Symptoms


 


Progression Toward Outcome/Goal Progressing








Communication-Improve/Maintain          Start:  10/02/19 12:57              


Freq:   DAILY@0700,1900                 Status: Active      Target: 10/09/19


Protocol:                                                                   








Activity Type Activity Date Activity User E-Sign Co-Sign Detail





Recorded Client Recorded Date Recorded By   


 


Document 10/04/19 01:48 [id number]   





PMRU-C03 10/04/19 01:49 [id number]   














  10/04/19





  01:48


 


PMRU Outcome: Communication/Cognitive 





Status 


 


Current Communication Outcome/Goals Makes Needs





 Known





 Effectively


 


Progression Toward Outcomes/Goals Progressing








DVT Prophylaxis- Improve/Maintain       Start:  10/02/19 12:57              


Freq:   DAILY@0700,1900                 Status: Active      Target: 10/09/19


Protocol:                                                                   








Activity Type Activity Date Activity User E-Sign Co-Sign Detail





Recorded Client Recorded Date Recorded By   


 


Document 10/04/19 01:48 [id number]   





PMRU-C03 10/04/19 01:49 [id number]   














  10/04/19





  01:48


 


PMRU Outcome: DVT Prophylaxis 


 


Current DVT Outcome/Goals Remains Free of





 DVT





 Complies with





 DVT Prophylaxis





 /Treatment





 Demonstrates





 Knowledge of





 DVT Prevention/





 Treatment


 


Progression Toward Outcome/Goals Progressing








Discharge Planning - Improve/Maintain   Start:  10/02/19 12:57              


Freq:   DAILY@0700,1900                 Status: Active      Target: 10/09/19


Protocol:                                                                   








Activity Type Activity Date Activity User E-Sign Co-Sign Detail





Recorded Client Recorded Date Recorded By   


 


Document 10/04/19 01:48 [id number]   





PMRU-C03 10/04/19 01:49 [id number]   














  10/04/19





  01:48


 


PMRU Outcome: Discharge Planning 


 


Update Patient Family Yes: reviewed





 care partner





 policies


 


Current Discharge Planning Outcome/Goals Demonstrates





 Understanding





 of Discharge





 Plan





 Homecare





 Referral - See





 Comment


 


Progression Toward Outcome/Goals Progressing








Education-Improve/Maintain              Start:  10/02/19 12:57              


Freq:   DAILY@0700,1900                 Status: Active      Target: 10/09/19


Protocol:                                                                   








Activity Type Activity Date Activity User E-Sign Co-Sign Detail





Recorded Client Recorded Date Recorded By   


 


Document 10/04/19 01:48 [id number]   





PMRU-C03 10/04/19 01:49 [id number]   














  10/04/19





  01:48


 


PMRU Outcome: Education 


 


Current Education Outcome/Goals Demonstrate/





 Verbalize





 Understanding





 of Written





 Discharge





 Instructions





 Demonstrates





 Skills





 Encourage





 Questions


 


Progression Toward Outcome/Goals Progressing








/GI-Improve/Maintain                  Start:  10/02/19 12:57              


Freq:   DAILY@0700,1900                 Status: Active      Target: 10/09/19


Protocol:                                                                   








Activity Type Activity Date Activity User E-Sign Co-Sign Detail





Recorded Client Recorded Date Recorded By   


 


Document 10/04/19 01:48 [id number]   





PMRU-C03 10/04/19 01:49 [id number]   














  10/04/19





  01:48


 


PMRU Outcome: Genitourinary/ 





Gastrointestinal 


 


Current Gastrointestinal Outcome/Goals Maintain/





 Achieve Bowel





 Regularity in





 Accordance with





 Pt's Baseline





 Remain Free of





 Emesis





 Prevent





 Constipation


 


Other GI Outcome/Goals colostomy since





 winter of 2018





 per wife


 


Progression Toward Outcome/Goals Progressing


 


Current Genitourinary Outcome/Goals Maintain/





 Achieve





 Adequate





 Urinary Output





 Remain Free of





 Hospital-





 Acquired UTI


 


Other Genitourinary Outcome/Goals [place] 4 years





 . changed on 9/ 9/19


 


Progression Toward Outcome/Goals Progressing








Medication Administration               Start:  10/02/19 12:57              


Freq:   DAILY@0700,1900                 Status: Active      Target: 10/09/19


Protocol:                                                                   








Activity Type Activity Date Activity User E-Sign Co-Sign Detail





Recorded Client Recorded Date Recorded By   


 


Document 10/04/19 01:48 [id number]   





PMRU-C03 10/04/19 01:49 [id number]   














  10/04/19





  01:48


 


PMRU Outcome: Medication Administration 


 


Assess Patient Knowledge/Teach Med Yes





Education for all Meds 


 


Current Med Admin Outcome/Goals Family/





 Caregiver





 Administer





 Medications at





 Home





 Demonstrates





 Understanding


 


Progression Towards Outcome/Goals Progressing


 


Is Patient Going Home on Lovenox? No








Mobility- Improve/Maintain              Start:  10/02/19 18:10              


Freq:   DAILY@0700,1900                 Status: Active      Target: 10/07/19


Protocol:                                                                   








Activity Type Activity Date Activity User E-Sign Co-Sign Detail





Recorded Client Recorded Date Recorded By   


 


Document 10/02/19 18:10 [id number]   





[id number] 10/02/19 18:11 [id number]   














  10/02/19





  18:10


 


PMRU Outcome: Mobility 


 


Physical Therapy Evaluation and Yes





Treatment 


 


Activity OOB with Assistance Yes


 


Device Yes


 


Assistance Yes


 


Patient to be seen 5x/wk for  min/ Therex





day for: Mobility





 Training





 W/C Mobility





 Balance


 


Current Mobility Outcome/Goals Improve





 Mobility Status


 


Progression Toward Outcome/Goals Goal Initiation


 


Bed Mobility Yes: min Ax1


 


Transfers Yes: total A





 with bill lift


 


Gait x ft No


 


W/C Mobility x ft Yes: 150'





 supervision


 


Up/Down Stairs No


 


With HEP Yes:





 supervision








Neurological- Improve/Maintain          Start:  10/02/19 12:57              


Freq:   DAILY@0700,1900                 Status: Active      Target: 10/09/19


Protocol:                                                                   








Activity Type Activity Date Activity User E-Sign Co-Sign Detail





Recorded Client Recorded Date Recorded By   


 


Document 10/04/19 01:48 [id number]   





PMRU-C03 10/04/19 01:49 [id number]   














  10/04/19





  01:48


 


PMRU Outcome: Neurological 


 


Weakness/Aphasia Weakness


 


Weakness/Aphasia Comment upper body





 weakness,





 paraplegia from





 spinal tumor





 since the 1970' s


 


Current Neurological Outcome/Goals Maintain/





 Achieve





 Baseline





 Neurological





 Status





 Prevent





 Avoidable





 Neurological





 Decline





 Maintain/





 Improve





 Strength/ROM


 


Progression Toward Outcome/Goals Progressing








Pain/Comfort- Improve/Maintain          Start:  10/02/19 12:57              


Freq:   DAILY@0700,1900                 Status: Active      Target: 10/09/19


Protocol:                                                                   








Activity Type Activity Date Activity User E-Sign Co-Sign Detail





Recorded Client Recorded Date Recorded By   


 


Document 10/04/19 01:48 [id number]   





PMRU-C03 10/04/19 01:49 [id number]   














  10/04/19





  01:48


 


PMRU Outcome: Pain/Comfort 


 


Current Pain/Comfort Outcome/Goals Demonstrates





 Knowledge and





 Use of





 Available





 Comfort





 Measures





 Achieves





 Acceptable





 Comfort/Pain





 Level as





 Determined by





 Patient/Condit





 Maintain





 Comfort Level





 Allowing





 Patient to





 Fully





 Participate in





 Rehab


 


Progression Toward Outcome/Goals Progressing


 


Outcome/Goals Met Comment pt denied pain








Safety- Improve/Maintain                Start:  10/02/19 12:56              


Freq:   DAILY@0700,1900                 Status: Active      Target: 10/09/19


Protocol:                                                                   








Activity Type Activity Date Activity User E-Sign Co-Sign Detail





Recorded Client Recorded Date Recorded By   


 


Document 10/04/19 01:48 [id number]   





PMRU-C03 10/04/19 01:49 [id number]   














  10/04/19





  01:48


 


PMRU Outcome: Safety 


 


Current Safety Outcome/Goals Remain Free of





 Injury or Harm





 Cooperates with





 Safety





 Measures for





 Least





 Restrictive





 Environment





 Prevent Falls/





 Injury


 


Progression Toward Outcome/Goals Progressing


 


Outcome/Goals Met Comment CHERYL armnichole








Skin- Improve/Maintain                  Start:  10/02/19 12:57              


Freq:   DAILY@0700,1900                 Status: Active      Target: 10/09/19


Protocol:                                                                   








Activity Type Activity Date Activity User E-Sign Co-Sign Detail





Recorded Client Recorded Date Recorded By   


 


Document 10/04/19 01:48 [id number]   





PMRU-C03 10/04/19 01:49 [id number]   














  10/04/19





  01:48


 


PMRU Outcome: Skin 


 


Skin Risk Level Very High Risk


 


Skin Orders Dressing Change





 Spenco Boots





 Turn/Position





 q2hr While in





 Bed





 Other


 


Skin Orders Comment charlie icu bed


 


Current Skin Outcome/Goals Maintain/





 Improve Skin





 Integrity





 Maintain/





 Improve Wound





 Status


 


Progression Toward Outcome/Goals Progressing

















- Interdisciplinary Staff Present


/Social Work Staff Present: Asya Delacruz SW


Nursing Staff Present: Gissell Novoa RN


OT Staff Present: Magui Marr


PT Staff Present: Jaimie Spaulding


Medicine Note: 








Length of Stay:  11 days





Anticipated Discharge Destination: Home





Tentative Discharge Date:  10/15/19





Discharged to:  Home

## 2019-10-04 NOTE — PN
Progress Note


Date of Service: 10/04/19


Note: 


GETACHEW FLORES was visited. Therapy notes read and reviewed. He was 

discussed in interdisciplinary plan of care rounds. He has better pain control 

but he is quite weak. Needs a lot of assistance. BP a little better








Current Medications: 


 Active Medications











Generic Name Dose Route Start Last Admin





  Trade Name Freq  PRN Reason Stop Dose Admin


 


Acetaminophen  650 mg  10/02/19 13:00  10/04/19 00:02





  Tylenol Tab*  PO   650 mg





  Q6H PRN   Administration





  MILD PAIN or TEMP > 100.4   





     





     





     


 


Amlodipine Besylate  10 mg  10/03/19 09:00  10/04/19 08:41





  Norvasc Tab*  PO   10 mg





  DAILY CHELSEA   Administration





     





     





     





     


 


Aspirin  81 mg  10/03/19 09:00  10/04/19 08:42





  Aspirin Ec Tab*  PO   81 mg





  DAILY CHELSEA   Administration





     





     





     





     


 


Carvedilol  25 mg  10/02/19 21:00  10/04/19 19:51





  Coreg Tab*  PO   25 mg





  BID CHELSEA   Administration





     





     





     





     


 


Cholecalciferol  1,000 units  10/03/19 09:00  10/04/19 08:42





  Vitamin D Tab*  PO   1,000 units





  DAILY CHELSEA   Administration





     





     





     





     


 


Collagenase  1 applic  10/03/19 09:00  10/04/19 16:05





  Santyl 250 Units/Gm Oint*  TOPICAL   1 applic





  DAILY CHELSEA   Administration





     





     





     





     


 


Cyanocobalamin  1,000 mcg  10/03/19 09:00  10/04/19 08:42





  Vitamin B12 Tab*  PO   1,000 mcg





  DAILY CHELSEA   Administration





     





     





     





     


 


Docusate Sodium  100 mg  10/02/19 21:00  10/04/19 19:51





  Colace Cap*  PO   100 mg





  BID CHELSEA   Administration





     





     





     





     


 


Doxazosin Mesylate  2 mg  10/02/19 21:00  10/04/19 19:51





  Cardura Tab*  PO   2 mg





  BEDTIME CHELSEA   Administration





     





     





     





     


 


Hydralazine HCl  25 mg  10/03/19 21:00  10/04/19 19:51





  Apresoline Tab*  PO   25 mg





  TID CHELSEA   Administration





     





     





     





     


 


Hydromorphone HCl  2 mg  10/02/19 13:18  





  Dilaudid Tab*  PO   





  Q4H PRN   





  PAIN - SEVERE   





     





     





     


 


Isosorbide Dinitrate  10 mg  10/03/19 21:00  10/04/19 19:50





  Isordil Tab*  PO   10 mg





  TID CHELSEA   Administration





     





     





     





     


 


Levetiracetam  1,000 mg  10/02/19 21:00  10/04/19 19:51





  Keppra Tab*  PO  10/04/19 23:59  1,000 mg





  BID CHELSEA   Administration





     





     





     





     


 


Levetiracetam  500 mg  10/05/19 09:00  





  Keppra Tab*  PO   





  BID CHELSEA   





     





     





     





     


 


Magnesium Hydroxide  30 ml  10/02/19 13:00  





  Milk Of Magnesia Liq*  PO   





  Q6H PRN   





  CONSTIPATION   





     





     





     


 


Pto:Nft* (  1 mg  10/02/19 22:00  10/04/19 15:05





Levorphanol 2mg  PO   1 mg





Tablet)  Q8HR CHELSEA   Administration





     





     





     





     


 


Pantoprazole Sodium  40 mg  10/02/19 21:00  10/04/19 19:51





  Protonix Tab*  PO   40 mg





  BID CHELSEA   Administration





     





     





     





     


 


Pregabalin  100 mg  10/02/19 21:00  10/04/19 19:51





  Lyrica Cap(*)  PO   100 mg





  TID CHELSEA   Administration





     





     





     





     


 


Senna  2 tab  10/02/19 13:00  





  Senokot 8.6 Mg Tab*  PO   





  BEDTIME PRN   





  CONSTIPATION   





     





     





     











Vital Signs: 


 Vital Signs











Temp Pulse Resp BP Pulse Ox


 


 98.1 F   81   18   120/48   94 


 


 10/04/19 17:24  10/04/19 17:24  10/04/19 19:51  10/04/19 17:24  10/04/19 19:18











Lab Results: 


 Laboratory Results - last 24 hr











  10/04/19 10/04/19





  04:13 04:13


 


WBC  9.4 


 


RBC  2.79 L 


 


Hgb  7.9 L 


 


Hct  24 L 


 


MCV  86 


 


MCH  28 


 


MCHC  33 


 


RDW  21 H 


 


Plt Count  285 


 


MPV  7.4 


 


Neut % (Auto)  60.2 


 


Lymph % (Auto)  23.7 


 


Mono % (Auto)  13.3 


 


Eos % (Auto)  2.5 


 


Baso % (Auto)  0.3 


 


Absolute Neuts (auto)  5.7 


 


Absolute Lymphs (auto)  2.2 


 


Absolute Monos (auto)  1.3 H 


 


Absolute Eos (auto)  0.2 


 


Absolute Basos (auto)  0.0 


 


Absolute Nucleated RBC  0.0 


 


Nucleated RBC %  0.0 


 


Sodium   137


 


Potassium   5.1 H


 


Chloride   106


 


Carbon Dioxide   25


 


Anion Gap   6


 


BUN   70 H


 


Creatinine   2.49 H


 


Est GFR ( Amer)   30.9


 


Est GFR (Non-Af Amer)   25.6


 


BUN/Creatinine Ratio   28.1 H


 


Glucose   91


 


Calcium   10.3


 


Total Bilirubin   0.20


 


AST   8 L


 


ALT   10


 


Alkaline Phosphatase   107 H


 


Total Protein   5.4 L


 


Albumin   2.9 L


 


Globulin   2.5


 


Albumin/Globulin Ratio   1.2











Exam: 





HEENT: EOMI


LUNGS: Clear 


HEART: reg Rhythm


ABDOMEN: Soft, +BS, Ostomy


SKIN: 4cm left ischial ulcer


NEUROLOGIC: A&O. No movement in legs. Limited sensation below waist


Assessment/Plan: 





1. Ependymoma with Paraplegia: PT/OT


2. Ischial Pressure Ulcer: Wound consult. Santyl/calcium alginate. Roho when in 

chair


3. PRES: On multiple BP meds, but now BPs are running low. Tapering Isordil and 

Hydralazine. On Norvasc/Coreg


4. 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 Levorphanol has improved situation


5. Seizures: On Keppra. Lowering dose. Seizures were felt to be possibly from 

Zyvox 


6. Neurogenic Bladder: Rodriguez


7. NSVT on Monitor at [facility]: Coreg


8. Acute on chronic renal failure: Cr 2.49 which is a little up from previous. 

Will check Sunday


9. Advanced Directives: Wife is HCP. Full code 





 














10/04/19 20:25

## 2019-10-05 LAB
ALBUMIN SERPL BCG-MCNC: 2.8 G/DL (ref 3.2–5.2)
ALBUMIN/GLOB SERPL: 1 {RATIO} (ref 1–3)
ALP SERPL-CCNC: 116 U/L (ref 34–104)
ALT SERPL W P-5'-P-CCNC: 14 U/L (ref 7–52)
ANION GAP SERPL CALC-SCNC: 6 MMOL/L (ref 2–11)
AST SERPL-CCNC: 11 U/L (ref 13–39)
BASOPHILS # BLD AUTO: 0.1 10^3/UL (ref 0–0.2)
BUN SERPL-MCNC: 82 MG/DL (ref 6–24)
BUN/CREAT SERPL: 34.7 (ref 8–20)
CALCIUM SERPL-MCNC: 9.7 MG/DL (ref 8.6–10.3)
CHLORIDE SERPL-SCNC: 107 MMOL/L (ref 101–111)
EOSINOPHIL # BLD AUTO: 0.1 10^3/UL (ref 0–0.6)
FLUAV RNA SPEC QL NAA+PROBE: NEGATIVE
FLUBV RNA SPEC QL NAA+PROBE: NEGATIVE
GLOBULIN SER CALC-MCNC: 2.7 G/DL (ref 2–4)
GLUCOSE SERPL-MCNC: 143 MG/DL (ref 70–100)
HCO3 SERPL-SCNC: 23 MMOL/L (ref 22–32)
HCT VFR BLD AUTO: 24 % (ref 42–52)
HGB BLD-MCNC: 7.7 G/DL (ref 14–18)
LYMPHOCYTES # BLD AUTO: 1.6 10^3/UL (ref 1–4.8)
MCH RBC QN AUTO: 28 PG (ref 27–31)
MCHC RBC AUTO-ENTMCNC: 33 G/DL (ref 31–36)
MCV RBC AUTO: 86 FL (ref 80–94)
MONOCYTES # BLD AUTO: 2.1 10^3/UL (ref 0–0.8)
NEUTROPHILS # BLD AUTO: 9.3 10^3/UL (ref 1.5–7.7)
NRBC # BLD AUTO: 0 10^3/UL
NRBC BLD QL AUTO: 0
PLATELET # BLD AUTO: 260 10^3/UL (ref 150–450)
POTASSIUM SERPL-SCNC: 5.5 MMOL/L (ref 3.5–5)
PROT SERPL-MCNC: 5.5 G/DL (ref 6.4–8.9)
RBC # BLD AUTO: 2.75 10^6 /UL (ref 4.18–5.48)
SODIUM SERPL-SCNC: 136 MMOL/L (ref 135–145)
VIT C UR QL: (no result)
WBC # BLD AUTO: 13.1 10^3/UL (ref 3.5–10.8)
WBC UR QL AUTO: (no result)

## 2019-10-05 PROCEDURE — F08Z3ZZ FEEDING/EATING TREATMENT: ICD-10-PCS | Performed by: PHYSICAL MEDICINE & REHABILITATION

## 2019-10-05 PROCEDURE — F08Z1ZZ DRESSING TECHNIQUES TREATMENT: ICD-10-PCS | Performed by: PHYSICAL MEDICINE & REHABILITATION

## 2019-10-05 PROCEDURE — F07Z8ZZ TRANSFER TRAINING TREATMENT: ICD-10-PCS | Performed by: PHYSICAL MEDICINE & REHABILITATION

## 2019-10-05 PROCEDURE — F08Z0ZZ BATHING/SHOWERING TECHNIQUES TREATMENT: ICD-10-PCS | Performed by: PHYSICAL MEDICINE & REHABILITATION

## 2019-10-05 PROCEDURE — F07Z4ZZ WHEELCHAIR MOBILITY TREATMENT: ICD-10-PCS | Performed by: PHYSICAL MEDICINE & REHABILITATION

## 2019-10-05 RX ADMIN — CARVEDILOL SCH: 25 TABLET, FILM COATED ORAL at 21:13

## 2019-10-05 RX ADMIN — HYDRALAZINE HYDROCHLORIDE SCH: 25 TABLET ORAL at 21:28

## 2019-10-05 RX ADMIN — PANTOPRAZOLE SODIUM SCH MG: 40 TABLET, DELAYED RELEASE ORAL at 08:38

## 2019-10-05 RX ADMIN — ISOSORBIDE DINITRATE SCH MG: 20 TABLET ORAL at 08:40

## 2019-10-05 RX ADMIN — ISOSORBIDE DINITRATE SCH MG: 20 TABLET ORAL at 15:18

## 2019-10-05 RX ADMIN — LEVORPHANOL TARTRATE SCH MG: 2 TABLET ORAL at 15:16

## 2019-10-05 RX ADMIN — CARVEDILOL SCH: 25 TABLET, FILM COATED ORAL at 21:27

## 2019-10-05 RX ADMIN — PREGABALIN SCH MG: 100 CAPSULE ORAL at 15:22

## 2019-10-05 RX ADMIN — CYANOCOBALAMIN TAB 500 MCG SCH MCG: 500 TAB at 08:37

## 2019-10-05 RX ADMIN — LEVETIRACETAM SCH: 500 TABLET, FILM COATED ORAL at 21:14

## 2019-10-05 RX ADMIN — LEVETIRACETAM SCH MG: 500 TABLET, FILM COATED ORAL at 08:38

## 2019-10-05 RX ADMIN — ACETAMINOPHEN PRN MG: 325 TABLET ORAL at 17:25

## 2019-10-05 RX ADMIN — COLLAGENASE SANTYL SCH APPLIC: 250 OINTMENT TOPICAL at 10:08

## 2019-10-05 RX ADMIN — ISOSORBIDE DINITRATE SCH: 20 TABLET ORAL at 21:28

## 2019-10-05 RX ADMIN — Medication SCH UNITS: at 08:38

## 2019-10-05 RX ADMIN — PREGABALIN SCH: 100 CAPSULE ORAL at 21:22

## 2019-10-05 RX ADMIN — AMLODIPINE BESYLATE SCH MG: 5 TABLET ORAL at 08:36

## 2019-10-05 RX ADMIN — HYDRALAZINE HYDROCHLORIDE SCH MG: 25 TABLET ORAL at 15:17

## 2019-10-05 RX ADMIN — DOCUSATE SODIUM SCH MG: 100 CAPSULE, LIQUID FILLED ORAL at 21:25

## 2019-10-05 RX ADMIN — CARVEDILOL SCH MG: 25 TABLET, FILM COATED ORAL at 08:39

## 2019-10-05 RX ADMIN — PREGABALIN SCH MG: 100 CAPSULE ORAL at 08:38

## 2019-10-05 RX ADMIN — LEVORPHANOL TARTRATE SCH MG: 2 TABLET ORAL at 08:35

## 2019-10-05 RX ADMIN — DOXAZOSIN MESYLATE SCH: 2 TABLET ORAL at 21:13

## 2019-10-05 RX ADMIN — ASPIRIN SCH MG: 81 TABLET, COATED ORAL at 08:37

## 2019-10-05 RX ADMIN — ISOSORBIDE DINITRATE SCH: 20 TABLET ORAL at 21:14

## 2019-10-05 RX ADMIN — HYDRALAZINE HYDROCHLORIDE SCH MG: 25 TABLET ORAL at 08:40

## 2019-10-05 RX ADMIN — DOCUSATE SODIUM SCH MG: 100 CAPSULE, LIQUID FILLED ORAL at 08:38

## 2019-10-05 RX ADMIN — PANTOPRAZOLE SODIUM SCH MG: 40 TABLET, DELAYED RELEASE ORAL at 21:25

## 2019-10-05 RX ADMIN — HYDRALAZINE HYDROCHLORIDE SCH: 25 TABLET ORAL at 21:23

## 2019-10-05 RX ADMIN — LEVORPHANOL TARTRATE SCH: 2 TABLET ORAL at 21:23

## 2019-10-05 NOTE — PN
Progress Note


Date of Service: 10/05/19


Note: 


Raad visited. Events of evening noted. CAT call at 1740 for altered mental 

status, fever to 102, low BPs. Cultures drawn. CXR done. UA not done, I will 

order. Spoke with hospitalist, Dr. Haile.  Plan to start antibiotics, fluids, 

and if BP doesn't come up may need to transfer to floor. I spoke with his wife, 

who is present. . DNR, MOLST ordered.  








Current Medications: 


 Active Medications











Generic Name Dose Route Start Last Admin





  Trade Name Freq  PRN Reason Stop Dose Admin


 


Acetaminophen  650 mg  10/02/19 13:00  10/05/19 17:25





  Tylenol Tab*  PO   650 mg





  Q6H PRN   Administration





  MILD PAIN or TEMP > 100.4   





     





     





     


 


Amlodipine Besylate  10 mg  10/06/19 09:00  





  Norvasc Tab*  PO   





  DAILY@0900 CHELSEA   





     





     





     





     


 


Aspirin  81 mg  10/03/19 09:00  10/05/19 08:37





  Aspirin Ec Tab*  PO   81 mg





  DAILY CHELSEA   Administration





     





     





     





     


 


Carvedilol  25 mg  10/05/19 21:00  10/05/19 21:27





  Coreg Tab*  PO   Not Given





  BID CHELSEA   





     





     





     





     


 


Cholecalciferol  1,000 units  10/03/19 09:00  10/05/19 08:38





  Vitamin D Tab*  PO   1,000 units





  DAILY CHELSEA   Administration





     





     





     





     


 


Collagenase  1 applic  10/03/19 09:00  10/05/19 10:08





  Santyl 250 Units/Gm Oint*  TOPICAL   1 applic





  DAILY CHELSEA   Administration





     





     





     





     


 


Cyanocobalamin  1,000 mcg  10/03/19 09:00  10/05/19 08:37





  Vitamin B12 Tab*  PO   1,000 mcg





  DAILY CHELSEA   Administration





     





     





     





     


 


Docusate Sodium  100 mg  10/02/19 21:00  10/05/19 21:25





  Colace Cap*  PO   100 mg





  BID CHELSEA   Administration





     





     





     





     


 


Doxazosin Mesylate  2 mg  10/02/19 21:00  10/05/19 21:13





  Cardura Tab*  PO   Not Given





  BEDTIME CHELSEA   





     





     





     





     


 


Hydralazine HCl  25 mg  10/05/19 22:00  10/05/19 21:28





  Apresoline Tab*  PO   Not Given





  Q8HR CHELSEA   





     





     





     





     


 


Hydromorphone HCl  2 mg  10/02/19 13:18  





  Dilaudid Tab*  PO   





  Q4H PRN   





  PAIN - SEVERE   





     





     





     


 


Piperacillin Sod/Tazobactam  100 mls @ 200 mls/hr  10/05/19 22:00  





  Sod 3.375 gm/ Sodium Chloride  IVPB  10/05/19 22:29  





  ONCE ONE   





     





     





     





     


 


Vancomycin HCl 1,500 mg/  250 mls @ 166.667 mls/hr  10/05/19 22:30  





  Sodium Chloride  IVPB  10/05/19 23:59  





  ONCE ONE   





     





     





     





     


 


Sodium Chloride  1,000 mls @ 0 mls/hr  10/05/19 21:49  





  Ns 0.9% 1000 Ml**  IV  10/05/19 21:50  





  .BOLUS ONE   





     





     





     





  Wide Open   


 


Isosorbide Dinitrate  10 mg  10/05/19 21:00  10/05/19 21:28





  Isordil Tab*  PO   Not Given





  TID [facility]   





     





     





     





     


 


Levetiracetam  500 mg  10/05/19 09:00  10/05/19 21:14





  Keppra Tab*  PO   Not Given





  BID [facility]   





     





     





     





     


 


Levorphanol Tartrate  1 mg  10/05/19 06:00  10/05/19 21:23





  Levorphanol 2 Mg (Nf)  PO   Not Given





  Q8HR [facility]   





     





     





     





     


 


Magnesium Hydroxide  30 ml  10/02/19 13:00  





  Milk Of Magnesia Liq*  PO   





  Q6H PRN   





  CONSTIPATION   





     





     





     


 


Pantoprazole Sodium  40 mg  10/02/19 21:00  10/05/19 21:25





  Protonix Tab*  PO   40 mg





  BID [facility]   Administration





     





     





     





     


 


Pharmacy Consult  1 note  10/05/19 21:49  





  Zosyn Per Pharmacy*  FOLLOW UP   





  . PRN   





  PER PROTOCOL   





     





     





     


 


Pharmacy Consult  1 note  10/05/19 21:49  





  Vancomycin Per Pharmacy*  FOLLOW UP   





  . PRN   





  PER PROTOCOL   





     





     





     


 


Pregabalin  100 mg  10/02/19 21:00  10/05/19 21:22





  Lyrica Cap(*)  PO   Not Given





  TID [facility]   





     





     





     





     


 


Senna  2 tab  10/02/19 13:00  





  Senokot 8.6 Mg Tab*  PO   





  BEDTIME PRN   





  CONSTIPATION   





     





     





     











Vital Signs: 


 Vital Signs











Temp Pulse Resp BP Pulse Ox


 


 99.8 F   88   18   92/50   98 


 


 10/05/19 20:45  10/05/19 20:45  10/05/19 19:15  10/05/19 20:45  10/05/19 20:45











Lab Results: 


 Laboratory Results - last 24 hr











  10/05/19 10/05/19 10/05/19





  17:25 18:00 18:58


 


WBC    13.1 H


 


RBC    2.75 L


 


Hgb    7.7 L


 


Hct    24 L


 


MCV    86


 


MCH    28


 


MCHC    33


 


RDW    22 H


 


Plt Count    260


 


MPV    7.2 L


 


Neut % (Auto)    70.6


 


Lymph % (Auto)    12.2


 


Mono % (Auto)    16.0


 


Eos % (Auto)    0.6


 


Baso % (Auto)    0.6


 


Absolute Neuts (auto)    9.3 H


 


Absolute Lymphs (auto)    1.6


 


Absolute Monos (auto)    2.1 H


 


Absolute Eos (auto)    0.1


 


Absolute Basos (auto)    0.1


 


Absolute Nucleated RBC    0.0


 


Nucleated RBC %    0.0


 


Sodium   


 


Potassium   


 


Chloride   


 


Carbon Dioxide   


 


Anion Gap   


 


BUN   


 


Creatinine   


 


Est GFR ( Amer)   


 


Est GFR (Non-Af Amer)   


 


BUN/Creatinine Ratio   


 


Glucose   


 


POC Glucose (mg/dL)  149 H  


 


Lactic Acid   


 


Calcium   


 


Total Bilirubin   


 


AST   


 


ALT   


 


Alkaline Phosphatase   


 


Total Protein   


 


Albumin   


 


Globulin   


 


Albumin/Globulin Ratio   


 


Influenza A (Rapid)   Negative 


 


Influenza B (Rapid)   Negative 














  10/05/19 10/05/19





  18:58 18:58


 


WBC  


 


RBC  


 


Hgb  


 


Hct  


 


MCV  


 


MCH  


 


MCHC  


 


RDW  


 


Plt Count  


 


MPV  


 


Neut % (Auto)  


 


Lymph % (Auto)  


 


Mono % (Auto)  


 


Eos % (Auto)  


 


Baso % (Auto)  


 


Absolute Neuts (auto)  


 


Absolute Lymphs (auto)  


 


Absolute Monos (auto)  


 


Absolute Eos (auto)  


 


Absolute Basos (auto)  


 


Absolute Nucleated RBC  


 


Nucleated RBC %  


 


Sodium  136 


 


Potassium  5.5 H 


 


Chloride  107 


 


Carbon Dioxide  23 


 


Anion Gap  6 


 


BUN  82 H 


 


Creatinine  2.36 H 


 


Est GFR ( Amer)  32.9 


 


Est GFR (Non-Af Amer)  27.2 


 


BUN/Creatinine Ratio  34.7 H 


 


Glucose  143 H 


 


POC Glucose (mg/dL)  


 


Lactic Acid   0.8


 


Calcium  9.7 


 


Total Bilirubin  0.20 


 


AST  11 L 


 


ALT  14 


 


Alkaline Phosphatase  116 H 


 


Total Protein  5.5 L 


 


Albumin  2.8 L 


 


Globulin  2.7 


 


Albumin/Globulin Ratio  1.0 


 


Influenza A (Rapid)  


 


Influenza B (Rapid)  











Exam: 





MSE: Slightly disoriented


LUNGS: clear


HEART: reg 


ABDOMEN: Ostomy in place





Assessment/Plan: 





1. Fever, low BP, altered MS: IV Vanco, Zosyn. Fluid Bolus. Check U/A. Low 

threshold to transfer to acute medical service





 


























10/05/19 22:02

## 2019-10-05 NOTE — PN
Hospitalist Progress Note


Date of Service: 10/05/19





CAT CALL NOTE:





Called to bedside for febrile PMRU/Rehab patient with fever and new O2 

requirement. Patient is a transfer from [place] this past Wednesday after 7 

week admission with sepsis, sacral wound and debridement, PRES, spinal tumor/

paraplegia. Also had adverse reaction to an antibiotic that caused a seizure. 

Patient currently has temp 101, BP 90/60 manual and sat is 96% on 2L NC. He is 

responding appropriately, wife at bedside. Lungs are clear. Urine output to 

[place] has decreased somewhat today per staff, per wife, he does appear more 

tired today. Per primary RN, sacral wound bed is clean with no drainage or pus.





Plan:


Pan culture, draw CBC, CMP, lactic, start IV access and give 1 liter NS, 

tylenol for fever. Swab for influenza. Hold parameters placed on BP meds. 

Scoring for SIRS. Reassess in one hour after labs and xray returned and bolus 

complete then determine if patient should be transferred to floor or can stay 

in PMRU. Coordinated with staff.

## 2019-10-05 NOTE — PN
Progress Note


Date of Service: 10/05/19


Note: 


GETACHEW FLORES was visited. Therapy notes read and reviewed. His buttock 

looks worse than yesterday. There are streaks of what appears to be bruising on 

both sides that don't appear to correlate with noemy straps. Could be delayed 

reaction from ambulance ride?. 








Current Medications: 


 Active Medications











Generic Name Dose Route Start Last Admin





  Trade Name Freq  PRN Reason Stop Dose Admin


 


Acetaminophen  650 mg  10/02/19 13:00  10/04/19 00:02





  Tylenol Tab*  PO   650 mg





  Q6H PRN   Administration





  MILD PAIN or TEMP > 100.4   





     





     





     


 


Amlodipine Besylate  10 mg  10/03/19 09:00  10/05/19 08:36





  Norvasc Tab*  PO   10 mg





  DAILY CHELSEA   Administration





     





     





     





     


 


Aspirin  81 mg  10/03/19 09:00  10/05/19 08:37





  Aspirin Ec Tab*  PO   81 mg





  DAILY CHELSEA   Administration





     





     





     





     


 


Carvedilol  25 mg  10/02/19 21:00  10/05/19 08:39





  Coreg Tab*  PO   25 mg





  BID CHELSEA   Administration





     





     





     





     


 


Cholecalciferol  1,000 units  10/03/19 09:00  10/05/19 08:38





  Vitamin D Tab*  PO   1,000 units





  DAILY CHELSEA   Administration





     





     





     





     


 


Collagenase  1 applic  10/03/19 09:00  10/05/19 10:08





  Santyl 250 Units/Gm Oint*  TOPICAL   1 applic





  DAILY CHELSEA   Administration





     





     





     





     


 


Cyanocobalamin  1,000 mcg  10/03/19 09:00  10/05/19 08:37





  Vitamin B12 Tab*  PO   1,000 mcg





  DAILY CHELSEA   Administration





     





     





     





     


 


Docusate Sodium  100 mg  10/02/19 21:00  10/05/19 08:38





  Colace Cap*  PO   100 mg





  BID CHELSEA   Administration





     





     





     





     


 


Doxazosin Mesylate  2 mg  10/02/19 21:00  10/04/19 19:51





  Cardura Tab*  PO   2 mg





  BEDTIME CHELSEA   Administration





     





     





     





     


 


Hydralazine HCl  25 mg  10/05/19 14:00  





  Apresoline Tab*  PO   





  Q8HR CHELSEA   





     





     





     





     


 


Hydromorphone HCl  2 mg  10/02/19 13:18  





  Dilaudid Tab*  PO   





  Q4H PRN   





  PAIN - SEVERE   





     





     





     


 


Isosorbide Dinitrate  10 mg  10/03/19 21:00  10/05/19 08:40





  Isordil Tab*  PO   10 mg





  TID CHELSEA   Administration





     





     





     





     


 


Levetiracetam  500 mg  10/05/19 09:00  10/05/19 08:38





  Keppra Tab*  PO   500 mg





  BID CHELSEA   Administration





     





     





     





     


 


Levorphanol Tartrate  1 mg  10/05/19 06:00  10/05/19 08:35





  Levorphanol 2 Mg (Nf)  PO   1 mg





  Q8HR CHELSEA   Administration





     





     





     





     


 


Magnesium Hydroxide  30 ml  10/02/19 13:00  





  Milk Of Magnesia Liq*  PO   





  Q6H PRN   





  CONSTIPATION   





     





     





     


 


Pantoprazole Sodium  40 mg  10/02/19 21:00  10/05/19 08:38





  Protonix Tab*  PO   40 mg





  BID CHELSEA   Administration





     





     





     





     


 


Pregabalin  100 mg  10/02/19 21:00  10/05/19 08:38





  Lyrica Cap(*)  PO   100 mg





  TID CHELSEA   Administration





     





     





     





     


 


Senna  2 tab  10/02/19 13:00  





  Senokot 8.6 Mg Tab*  PO   





  BEDTIME PRN   





  CONSTIPATION   





     





     





     











Vital Signs: 


 Vital Signs











Temp Pulse Resp BP Pulse Ox


 


 99.7 F   89   18   142/58   95 


 


 10/05/19 06:15  10/05/19 06:15  10/05/19 08:38  10/05/19 08:36  10/05/19 06:15











Exam: 





HEENT: EOMI


LUNGS: Clear 


HEART: reg Rhythm


ABDOMEN: Soft, +BS, Ostomy with stool


SKIN: 4cm left ischial ulcer. Streaks over buttocks like long thin bruises. 


NEUROLOGIC: A&O. No movement in legs. Limited sensation below waist


Assessment/Plan: 





1. Ependymoma with Paraplegia: PT/OT


2. Ischial Pressure Ulcer: Wound consult. Santyl/calcium alginate. Roho when in 

chair. Will have wound assess buttocks on Monday


3. PRES: On multiple BP meds, but now BPs are running low. Tapering Isordil and 

Hydralazine. On Norvasc/Coreg


4. 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 Levorphanol has improved situation


5. Seizures: On Keppra. Lowering dose. Seizures were felt to be possibly from 

Zyvox 


6. Neurogenic Bladder: Rodriguez


7. NSVT on Monitor at [facility]: Coreg


8. Acute on chronic renal failure: Cr 2.49 which is a little up from previous. 

Will check Sunday


9. Advanced Directives: Wife is HCP. Full code 





 




















10/05/19 10:52

## 2019-10-06 ENCOUNTER — HOSPITAL ENCOUNTER (INPATIENT)
Dept: HOSPITAL 25 - SSU | Age: 74
LOS: 9 days | Discharge: NURSING, REHAB OR LONG-TERM CARE | DRG: 698 | End: 2019-10-15
Attending: HOSPITALIST | Admitting: NURSE PRACTITIONER
Payer: MEDICARE

## 2019-10-06 VITALS — SYSTOLIC BLOOD PRESSURE: 105 MMHG | DIASTOLIC BLOOD PRESSURE: 42 MMHG

## 2019-10-06 DIAGNOSIS — N31.9: ICD-10-CM
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
## 2019-10-06 DIAGNOSIS — L89.620: ICD-10-CM

## 2019-10-06 DIAGNOSIS — A41.52: ICD-10-CM

## 2019-10-06 DIAGNOSIS — G40.909: ICD-10-CM

## 2019-10-06 DIAGNOSIS — L89.612: ICD-10-CM

## 2019-10-06 DIAGNOSIS — G82.20: ICD-10-CM

## 2019-10-06 DIAGNOSIS — Z66: ICD-10-CM

## 2019-10-06 DIAGNOSIS — I25.10: ICD-10-CM

## 2019-10-06 DIAGNOSIS — L89.210: ICD-10-CM

## 2019-10-06 DIAGNOSIS — G89.4: ICD-10-CM
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
## 2019-10-06 DIAGNOSIS — Z87.440: ICD-10-CM

## 2019-10-06 DIAGNOSIS — L89.223: ICD-10-CM

## 2019-10-06 DIAGNOSIS — D63.1: ICD-10-CM

## 2019-10-06 DIAGNOSIS — K21.9: ICD-10-CM

## 2019-10-06 DIAGNOSIS — B96.5: ICD-10-CM

## 2019-10-06 DIAGNOSIS — Z88.1: ICD-10-CM

## 2019-10-06 DIAGNOSIS — Z88.8: ICD-10-CM

## 2019-10-06 DIAGNOSIS — N18.3: ICD-10-CM

## 2019-10-06 DIAGNOSIS — I12.9: ICD-10-CM

## 2019-10-06 DIAGNOSIS — L89.152: ICD-10-CM

## 2019-10-06 DIAGNOSIS — E78.5: ICD-10-CM

## 2019-10-06 DIAGNOSIS — J45.909: ICD-10-CM

## 2019-10-06 DIAGNOSIS — Z79.82: ICD-10-CM

## 2019-10-06 DIAGNOSIS — B96.1: ICD-10-CM

## 2019-10-06 DIAGNOSIS — I72.9: ICD-10-CM

## 2019-10-06 DIAGNOSIS — G47.33: ICD-10-CM

## 2019-10-06 DIAGNOSIS — N39.0: ICD-10-CM

## 2019-10-06 DIAGNOSIS — T83.511A: Primary | ICD-10-CM

## 2019-10-06 DIAGNOSIS — A41.59: ICD-10-CM

## 2019-10-06 LAB
ALBUMIN SERPL BCG-MCNC: 2.8 G/DL (ref 3.2–5.2)
ALBUMIN/GLOB SERPL: 1 {RATIO} (ref 1–3)
ALP SERPL-CCNC: 114 U/L (ref 34–104)
ALT SERPL W P-5'-P-CCNC: 18 U/L (ref 7–52)
ANION GAP SERPL CALC-SCNC: 6 MMOL/L (ref 2–11)
AST SERPL-CCNC: 14 U/L (ref 13–39)
BASOPHILS # BLD AUTO: 0.1 10^3/UL (ref 0–0.2)
BUN SERPL-MCNC: 84 MG/DL (ref 6–24)
BUN/CREAT SERPL: 37 (ref 8–20)
CALCIUM SERPL-MCNC: 10 MG/DL (ref 8.6–10.3)
CHLORIDE SERPL-SCNC: 108 MMOL/L (ref 101–111)
EOSINOPHIL # BLD AUTO: 0.1 10^3/UL (ref 0–0.6)
GLOBULIN SER CALC-MCNC: 2.8 G/DL (ref 2–4)
GLUCOSE SERPL-MCNC: 111 MG/DL (ref 70–100)
HCO3 SERPL-SCNC: 24 MMOL/L (ref 22–32)
HCT VFR BLD AUTO: 24 % (ref 42–52)
HGB BLD-MCNC: 7.7 G/DL (ref 14–18)
LYMPHOCYTES # BLD AUTO: 1.6 10^3/UL (ref 1–4.8)
MCH RBC QN AUTO: 28 PG (ref 27–31)
MCHC RBC AUTO-ENTMCNC: 33 G/DL (ref 31–36)
MCV RBC AUTO: 86 FL (ref 80–94)
MONOCYTES # BLD AUTO: 1.7 10^3/UL (ref 0–0.8)
NEUTROPHILS # BLD AUTO: 9.3 10^3/UL (ref 1.5–7.7)
NRBC # BLD AUTO: 0 10^3/UL
NRBC BLD QL AUTO: 0
PLATELET # BLD AUTO: 241 10^3/UL (ref 150–450)
POTASSIUM SERPL-SCNC: 5.4 MMOL/L (ref 3.5–5)
PROT SERPL-MCNC: 5.6 G/DL (ref 6.4–8.9)
RBC # BLD AUTO: 2.74 10^6 /UL (ref 4.18–5.48)
SODIUM SERPL-SCNC: 138 MMOL/L (ref 135–145)
WBC # BLD AUTO: 12.9 10^3/UL (ref 3.5–10.8)

## 2019-10-06 PROCEDURE — 83550 IRON BINDING TEST: CPT

## 2019-10-06 PROCEDURE — 81015 MICROSCOPIC EXAM OF URINE: CPT

## 2019-10-06 PROCEDURE — 86140 C-REACTIVE PROTEIN: CPT

## 2019-10-06 PROCEDURE — 86900 BLOOD TYPING SEROLOGIC ABO: CPT

## 2019-10-06 PROCEDURE — 84134 ASSAY OF PREALBUMIN: CPT

## 2019-10-06 PROCEDURE — 82728 ASSAY OF FERRITIN: CPT

## 2019-10-06 PROCEDURE — P9040 RBC LEUKOREDUCED IRRADIATED: HCPCS

## 2019-10-06 PROCEDURE — 36415 COLL VENOUS BLD VENIPUNCTURE: CPT

## 2019-10-06 PROCEDURE — 83540 ASSAY OF IRON: CPT

## 2019-10-06 PROCEDURE — 85060 BLOOD SMEAR INTERPRETATION: CPT

## 2019-10-06 PROCEDURE — 87106 FUNGI IDENTIFICATION YEAST: CPT

## 2019-10-06 PROCEDURE — 83880 ASSAY OF NATRIURETIC PEPTIDE: CPT

## 2019-10-06 PROCEDURE — 85025 COMPLETE CBC W/AUTO DIFF WBC: CPT

## 2019-10-06 PROCEDURE — 86850 RBC ANTIBODY SCREEN: CPT

## 2019-10-06 PROCEDURE — 85027 COMPLETE CBC AUTOMATED: CPT

## 2019-10-06 PROCEDURE — 86922 COMPATIBILITY TEST ANTIGLOB: CPT

## 2019-10-06 PROCEDURE — 80048 BASIC METABOLIC PNL TOTAL CA: CPT

## 2019-10-06 PROCEDURE — 86901 BLOOD TYPING SEROLOGIC RH(D): CPT

## 2019-10-06 PROCEDURE — 81003 URINALYSIS AUTO W/O SCOPE: CPT

## 2019-10-06 PROCEDURE — 87086 URINE CULTURE/COLONY COUNT: CPT

## 2019-10-06 RX ADMIN — COLLAGENASE SANTYL SCH APPLIC: 250 OINTMENT TOPICAL at 10:14

## 2019-10-06 RX ADMIN — LEVETIRACETAM SCH MG: 500 TABLET, FILM COATED ORAL at 10:14

## 2019-10-06 RX ADMIN — ISOSORBIDE DINITRATE SCH MG: 20 TABLET ORAL at 15:02

## 2019-10-06 RX ADMIN — HYDRALAZINE HYDROCHLORIDE SCH: 25 TABLET ORAL at 05:27

## 2019-10-06 RX ADMIN — HYDRALAZINE HYDROCHLORIDE SCH MG: 25 TABLET ORAL at 15:03

## 2019-10-06 RX ADMIN — Medication SCH UNITS: at 10:11

## 2019-10-06 RX ADMIN — CYANOCOBALAMIN TAB 500 MCG SCH MCG: 500 TAB at 10:13

## 2019-10-06 RX ADMIN — PIPERACILLIN AND TAZOBACTAM SCH MLS/HR: 3; .375 INJECTION, POWDER, LYOPHILIZED, FOR SOLUTION INTRAVENOUS; PARENTERAL at 02:15

## 2019-10-06 RX ADMIN — PREGABALIN SCH MG: 100 CAPSULE ORAL at 15:02

## 2019-10-06 RX ADMIN — LEVORPHANOL TARTRATE SCH MG: 2 TABLET ORAL at 10:09

## 2019-10-06 RX ADMIN — CARVEDILOL SCH MG: 25 TABLET, FILM COATED ORAL at 10:10

## 2019-10-06 RX ADMIN — PIPERACILLIN AND TAZOBACTAM SCH MLS/HR: 3; .375 INJECTION, POWDER, LYOPHILIZED, FOR SOLUTION INTRAVENOUS; PARENTERAL at 10:08

## 2019-10-06 RX ADMIN — ACETAMINOPHEN PRN MG: 325 TABLET ORAL at 16:30

## 2019-10-06 RX ADMIN — PREGABALIN SCH MG: 100 CAPSULE ORAL at 10:10

## 2019-10-06 RX ADMIN — PANTOPRAZOLE SODIUM SCH MG: 40 TABLET, DELAYED RELEASE ORAL at 10:14

## 2019-10-06 RX ADMIN — LEVORPHANOL TARTRATE SCH MG: 2 TABLET ORAL at 15:02

## 2019-10-06 RX ADMIN — ISOSORBIDE DINITRATE SCH MG: 20 TABLET ORAL at 10:13

## 2019-10-06 RX ADMIN — DOCUSATE SODIUM SCH MG: 100 CAPSULE, LIQUID FILLED ORAL at 10:11

## 2019-10-06 RX ADMIN — LEVORPHANOL TARTRATE SCH: 2 TABLET ORAL at 05:27

## 2019-10-06 RX ADMIN — ASPIRIN SCH MG: 81 TABLET, COATED ORAL at 10:11

## 2019-10-06 NOTE — PN
Subjective


Date of Service: 10/06/19


Interval History: 





Patient is seen at 1700.





Pt is feeling quite sleepy but per nursing is more alert now than last evening. 

He denies any pain at this time. He denies any SOB. 





Objective


Active Medications: 








Acetaminophen (Tylenol Tab*)  650 mg PO Q6H PRN


   PRN Reason: MILD PAIN or TEMP > 100.4


   Last Admin: 10/06/19 16:30 Dose:  650 mg


Amlodipine Besylate (Norvasc Tab*)  10 mg PO DAILY@0900 [facility]


   Last Admin: 10/06/19 10:12 Dose:  10 mg


Aspirin (Aspirin Ec Tab*)  81 mg PO DAILY [facility]


   Last Admin: 10/06/19 10:11 Dose:  81 mg


Carvedilol (Coreg Tab*)  25 mg PO BID [facility]


   Last Admin: 10/06/19 10:10 Dose:  25 mg


Cholecalciferol (Vitamin D Tab*)  1,000 units PO DAILY [facility]


   Last Admin: 10/06/19 10:11 Dose:  1,000 units


Collagenase (Santyl 250 Units/Gm Oint*)  1 applic TOPICAL DAILY [facility]


   Last Admin: 10/06/19 10:14 Dose:  1 applic


Cyanocobalamin (Vitamin B12 Tab*)  1,000 mcg PO DAILY [facility]


   Last Admin: 10/06/19 10:13 Dose:  1,000 mcg


Docusate Sodium (Colace Cap*)  100 mg PO BID [facility]


   Last Admin: 10/06/19 10:11 Dose:  100 mg


Doxazosin Mesylate (Cardura Tab*)  2 mg PO BEDTIME [facility]


   Last Admin: 10/05/19 21:13 Dose:  Not Given


Hydralazine HCl (Apresoline Tab*)  25 mg PO Q8HR [facility]


   Last Admin: 10/06/19 15:03 Dose:  25 mg


Piperacillin Sod/Tazobactam (Sod 3.375 gm/ Sodium Chloride)  100 mls @ 200 mls/

hr IVPB Q6H [facility]


   Last Admin: 10/06/19 16:31 Dose:  200 mls/hr


Isosorbide Dinitrate (Isordil Tab*)  10 mg PO TID [facility]


   Last Admin: 10/06/19 15:02 Dose:  10 mg


Levetiracetam (Keppra Tab*)  500 mg PO BID [facility]


   Last Admin: 10/06/19 10:14 Dose:  500 mg


Levorphanol Tartrate (Levorphanol 2 Mg (Nf))  1 mg PO Q12HR [facility]


Magnesium Hydroxide (Milk Of Magnesia Liq*)  30 ml PO Q6H PRN


   PRN Reason: CONSTIPATION


Pantoprazole Sodium (Protonix Tab*)  40 mg PO BID [facility]


   Last Admin: 10/06/19 10:14 Dose:  40 mg


Pharmacy Consult (Zosyn Per Pharmacy*)  1 note FOLLOW UP . PRN


   PRN Reason: PER PROTOCOL


Pharmacy Consult (Vancomycin Per Pharmacy*)  1 note FOLLOW UP . PRN


   PRN Reason: PER PROTOCOL


Pharmacy Profile Note (Vancomycin Trough Check)  1 note FOLLOW UP 1100 ONE


   Stop: 10/07/19 11:01


Pregabalin (Lyrica Cap(*))  50 mg PO TID CHELSEA


Senna (Senokot 8.6 Mg Tab*)  2 tab PO BEDTIME PRN


   PRN Reason: CONSTIPATION








 Vital Signs - 8 hr











  10/06/19 10/06/19 10/06/19





  13:00 15:02 15:07


 


Temperature 99.4 F  99 F


 


Pulse Rate 90  92


 


Respiratory 20 18 18





Rate   


 


Blood Pressure 135/48  116/42





(mmHg)   


 


O2 Sat by Pulse 96  96





Oximetry   














  10/06/19 10/06/19 10/06/19





  17:36 19:17 19:33


 


Temperature  100.8 F 


 


Pulse Rate  88 


 


Respiratory 18 18 20





Rate   


 


Blood Pressure  140/52 





(mmHg)   


 


O2 Sat by Pulse 96 96 





Oximetry   














  10/06/19





  19:54


 


Temperature 100.4 F


 


Pulse Rate 88


 


Respiratory 20





Rate 


 


Blood Pressure 105/42





(mmHg) 


 


O2 Sat by Pulse 97





Oximetry 











Oxygen Devices in Use Now: Nasal Cannula


Appearance: Elderly male sitting up in bed, sleepy but arousable, NAD


Eyes: No Scleral Icterus


Ears/Nose/Mouth/Throat: Mucous Membranes Moist


Respiratory: Symmetrical Chest Expansion and Respiratory Effort, Clear to 

Auscultation - anteriorly


Cardiovascular: NL Sounds; No Murmurs; No JVD, RRR, - - trace LE edema


Abdominal: NL Sounds; No Tenderness; No Distention


Extremities: No Clubbing, Cyanosis


Skin: - - ulcerations not inspected by myself


Neurological: - - oriented x3, sleepy


Result Diagrams: 


 10/06/19 06:27





 10/06/19 06:27


Microbiology and Other Data: 


 Microbiology











 10/05/19 18:58 Aerobic Blood Culture - Preliminary





 Blood Venous    No Growth Day 1





 Anaerobic Blood Culture - Preliminary





    No Growth Day 1


 


 10/05/19 18:58 Aerobic Blood Culture - Preliminary





 Blood Venous    No Growth Day 1





 Anaerobic Blood Culture - Preliminary





    No Growth Day 1


 


 10/05/19 19:00 Urine Culture - Preliminary





 Urine    Pseudomonas Species





    Klebsiella  Pneumoniae











Diagnostic Imagin. Exam Date: 19 - VL ANK/BRACHIAL INDICES





FINDINGS:  The ankle brachial index on the right was 0.39 and on the left was 

0.80. The ankle-brachial indices on the prior study were 0.93 and 0.91 

respectively. There is monophasic flow within the right posterior tibial artery 

and monophasic flow within the right dorsalis pedis artery.  There is biphasic 

flow within the left posterior tibial artery and biphasic flow within the left 

dorsalis pedis artery.





IMPRESSION:  SIGNIFICANTLY REDUCED ANKLE-BRACHIAL INDICES AND WAVEFORMS MORE 

SEVERE IN THE RIGHT LOWER EXTREMITY. CONSIDER A CT ANGIOGRAM OF THE AORTA AND 

LOWER EXTREMITIES FOR FURTHER EVALUATION.





2. Exam Date: 19 - MRI LOWER EXTREMITY RIGHT W/O





IMPRESSION:  SOFT TISSUE SWELLING AND SOFT TISSUE ULCER, NO SPECIFIC EVIDENCE 

FOR OSTEOMYELITIS.











Assess/Plan/Problems-Billing


Mr. 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 

decubitus ulcers (on feet, buttock, hip),  who was hospitalized in [place] 

for about 7 weeks where he was treated for an infected decubitus ulcer 

complicated by PRES, seizures and respiratory failure requiring intubation who 

was admitted to [facility] for rehab. On the evening of 10/5/19 the patient developed 

fever, mild hypotension and concerns for sepsis. Work up was initiated and 

broad spectrum Abx started.





- Patient Problems


(1) Lethargy


Current Visit: Yes   Status: Acute   Code(s): R53.83 - OTHER FATIGUE   SNOMED 

Code(s): 387075705


   Comment: ? secondary to UTI vs re-instating lyrica, levorphanol. Doses to be 

reduced by Dr. Reynoso to reduce the likelihood is secondary to medications.  

  





(2) Sepsis


Current Visit: Yes   Status: Acute   Comment: Possibly septic last night 

secondary to CAUTI. At this time not clear that he needs to be admitted to the 

medical floor as he is now afebrile and WBC count slightly down on zosyn. If pt 

develops fever, tachycardia or hypotension, pt to be admitted to inpatient 

medical team. Additionally if mental status worsens, admit to medical floor.    





(3) Catheter-associated urinary tract infection


Current Visit: Yes   Status: Acute   Code(s): T83.511A - I/I REACT D/T 

INDWELLING URETHRAL CATHETER, INIT; N39.0 - URINARY TRACT INFECTION, SITE NOT 

SPECIFIED   SNOMED Code(s): 765110303


   Comment: Urine culture positive for pseudomonas and klebsiella- 75,000-100,

000 CFU. ? pathogens vs colonization. Continue zosyn for now.   





(4) HTN (hypertension)


Current Visit: Yes   Status: Chronic   Code(s): I10 - ESSENTIAL (PRIMARY) 

HYPERTENSION   SNOMED Code(s): 08804947


   Comment: Pt diagnosed with PRES during hospitalization in [place]. BP has 

been stable. Continue amlodipine, coreg, cardura, isordil and hydralazine.    





(5) Seizures


Current Visit: Yes   Status: Acute   Code(s): R56.9 - UNSPECIFIED CONVULSIONS   

SNOMED Code(s): 56744512


   Comment: Pt diganosed with seizures during admission in [place]-? 

secondary to linezolid. Tapering off antiepileptics. Pt with shaking of left 

arm during my evaluation, per pt's wife there has also been shaking of R arm 

intermittently. Continue to monitor. May need neuro evaluation if any concerns 

for seizure.    





(6) CKD (chronic kidney disease), stage III


Current Visit: Yes   Status: Chronic   Priority: High   Code(s): N18.3 - 

CHRONIC KIDNEY DISEASE, STAGE 3 (MODERATE)   SNOMED Code(s): 384014354


   Comment: Baseline creatinine difficult to determine but likely around 1.5. 

Now elevated (likely secondary to severe acute illness over last 2 months) but 

very slowly trending down. K elevated but stable. Continue to follow labs 

closely- avoid nephrotoxic agents and renally dose meds.    





(7) Anemia


Current Visit: Yes   Status: Chronic   Code(s): D64.9 - ANEMIA, UNSPECIFIED   

SNOMED Code(s): 061520324


   Comment: H/H much lower than in 2019 but stable over the last 3 days. 

Likely anemia related to his severe acute illness over the last 2 months.    





(8) Pressure ulcer of sacral region, stage 3


Current Visit: Yes   Status: Acute   Code(s): L89.153 - PRESSURE ULCER OF 

SACRAL REGION, STAGE 3   SNOMED Code(s): 505200018


   Comment: Pressure ulcer over ischium. Reportedly, last night pressure ulcer 

looked clean and not infected. He was initially hospitalized in [place] 

secondary to infected decubitus.    





(9) Chronic back pain


Current Visit: Yes   Status: Chronic   Code(s): M54.9 - DORSALGIA, UNSPECIFIED; 

G89.29 - OTHER CHRONIC PAIN   SNOMED Code(s): 966928635


   Comment: Secondary to ependymoma. No pain at this time. Continue reduced 

dose lyrica and levorphanol.    





(10) CAD (coronary artery disease)


Current Visit: Yes   Status: Chronic   Code(s): I25.10 - ATHSCL HEART DISEASE 

OF NATIVE CORONARY ARTERY W/O ANG PCTRS   SNOMED Code(s): 10235990


   Comment: No acute complaints. Continue ASA, coreg and isordil.    





(11) Paraplegia


Current Visit: Yes   Status: Chronic   Code(s): G82.20 - PARAPLEGIA, 

UNSPECIFIED   SNOMED Code(s): 37215606


   Comment: Needs PT to try to get back to previous functional status.    





(12) DVT prophylaxis


Current Visit: Yes   Status: Acute   Code(s): GIL1794 -    SNOMED Code(s): 

882172536


   Comment: none ordered   





(13) DNR (do not resuscitate)


Current Visit: Yes   Status: Acute

## 2019-10-06 NOTE — PN
Progress Note


Date of Service: 10/06/19


Note: 


GETACHEW FLORES was visited. Nursing notes read and reviewed. Case discussed 

with hospitalist. At present, his fever has abated. His CXR showed no I/E and U/

A was equivocal. Micro pending. WBC largely unchanged after receiving dose of 

Vanco and Zosyn. Will d/c Vanco as it is unclear what that would be treating, 

and potential further damage to kidneys. Will continue Zosyn for now. Remains 

quite fatigued. BP better; holding hydralazine. Pain an issue, back on 

Levorphanol 








Current Medications: 


 Active Medications











Generic Name Dose Route Start Last Admin





  Trade Name Freq  PRN Reason Stop Dose Admin


 


Acetaminophen  650 mg  10/02/19 13:00  10/05/19 17:25





  Tylenol Tab*  PO   650 mg





  Q6H PRN   Administration





  MILD PAIN or TEMP > 100.4   





     





     





     


 


Amlodipine Besylate  10 mg  10/06/19 09:00  10/06/19 10:12





  Norvasc Tab*  PO   10 mg





  DAILY@0900 CHELSEA   Administration





     





     





     





     


 


Aspirin  81 mg  10/03/19 09:00  10/06/19 10:11





  Aspirin Ec Tab*  PO   81 mg





  DAILY CHELSEA   Administration





     





     





     





     


 


Carvedilol  25 mg  10/05/19 21:00  10/06/19 10:10





  Coreg Tab*  PO   25 mg





  BID CHELSEA   Administration





     





     





     





     


 


Cholecalciferol  1,000 units  10/03/19 09:00  10/06/19 10:11





  Vitamin D Tab*  PO   1,000 units





  DAILY CHELSEA   Administration





     





     





     





     


 


Collagenase  1 applic  10/03/19 09:00  10/06/19 10:14





  Santyl 250 Units/Gm Oint*  TOPICAL   1 applic





  DAILY CHELSEA   Administration





     





     





     





     


 


Cyanocobalamin  1,000 mcg  10/03/19 09:00  10/06/19 10:13





  Vitamin B12 Tab*  PO   1,000 mcg





  DAILY CHELSEA   Administration





     





     





     





     


 


Docusate Sodium  100 mg  10/02/19 21:00  10/06/19 10:11





  Colace Cap*  PO   100 mg





  BID CHELSEA   Administration





     





     





     





     


 


Doxazosin Mesylate  2 mg  10/02/19 21:00  10/05/19 21:13





  Cardura Tab*  PO   Not Given





  BEDTIME CHELSEA   





     





     





     





     


 


Hydralazine HCl  25 mg  10/05/19 22:00  10/06/19 05:27





  Apresoline Tab*  PO   Not Given





  Q8HR CHELSEA   





     





     





     





     


 


Hydromorphone HCl  2 mg  10/02/19 13:18  





  Dilaudid Tab*  PO   





  Q4H PRN   





  PAIN - SEVERE   





     





     





     


 


Piperacillin Sod/Tazobactam  100 mls @ 200 mls/hr  10/06/19 17:00  





  Sod 3.375 gm/ Sodium Chloride  IVPB   





  Q6H CHELSEA   





     





     





     





     


 


Isosorbide Dinitrate  10 mg  10/05/19 21:00  10/06/19 10:13





  Isordil Tab*  PO   10 mg





  TID CHELSEA   Administration





     





     





     





     


 


Levetiracetam  500 mg  10/05/19 09:00  10/06/19 10:14





  Keppra Tab*  PO   500 mg





  BID CHELSEA   Administration





     





     





     





     


 


Levorphanol Tartrate  1 mg  10/05/19 06:00  10/06/19 10:09





  Levorphanol 2 Mg (Nf)  PO   1 mg





  Q8HR CHELSEA   Administration





     





     





     





     


 


Magnesium Hydroxide  30 ml  10/02/19 13:00  





  Milk Of Magnesia Liq*  PO   





  Q6H PRN   





  CONSTIPATION   





     





     





     


 


Pantoprazole Sodium  40 mg  10/02/19 21:00  10/06/19 10:14





  Protonix Tab*  PO   40 mg





  BID CHELSEA   Administration





     





     





     





     


 


Pharmacy Consult  1 note  10/05/19 21:49  





  Zosyn Per Pharmacy*  FOLLOW UP   





  . PRN   





  PER PROTOCOL   





     





     





     


 


Pharmacy Consult  1 note  10/05/19 21:49  





  Vancomycin Per Pharmacy*  FOLLOW UP   





  . PRN   





  PER PROTOCOL   





     





     





     


 


Pharmacy Profile Note  1 note  10/07/19 11:00  





  Vancomycin Trough Check  FOLLOW UP  10/07/19 11:01  





  1100 ONE   





     





     





     





     


 


Pregabalin  100 mg  10/02/19 21:00  10/06/19 10:10





  Lyrica Cap(*)  PO   100 mg





  TID CHELSEA   Administration





     





     





     





     


 


Senna  2 tab  10/02/19 13:00  





  Senokot 8.6 Mg Tab*  PO   





  BEDTIME PRN   





  CONSTIPATION   





     





     





     











Vital Signs: 


 Vital Signs











Temp Pulse Resp BP Pulse Ox


 


 99.4 F   90   20   135/48   96 


 


 10/06/19 13:00  10/06/19 13:00  10/06/19 13:00  10/06/19 13:00  10/06/19 13:00











Lab Results: 


 Laboratory Results - last 24 hr











  10/05/19 10/05/19 10/05/19





  17:25 18:00 18:58


 


WBC    13.1 H


 


RBC    2.75 L


 


Hgb    7.7 L


 


Hct    24 L


 


MCV    86


 


MCH    28


 


MCHC    33


 


RDW    22 H


 


Plt Count    260


 


MPV    7.2 L


 


Neut % (Auto)    70.6


 


Lymph % (Auto)    12.2


 


Mono % (Auto)    16.0


 


Eos % (Auto)    0.6


 


Baso % (Auto)    0.6


 


Absolute Neuts (auto)    9.3 H


 


Absolute Lymphs (auto)    1.6


 


Absolute Monos (auto)    2.1 H


 


Absolute Eos (auto)    0.1


 


Absolute Basos (auto)    0.1


 


Absolute Nucleated RBC    0.0


 


Nucleated RBC %    0.0


 


Sodium   


 


Potassium   


 


Chloride   


 


Carbon Dioxide   


 


Anion Gap   


 


BUN   


 


Creatinine   


 


Est GFR ( Amer)   


 


Est GFR (Non-Af Amer)   


 


BUN/Creatinine Ratio   


 


Glucose   


 


POC Glucose (mg/dL)  149 H  


 


Lactic Acid   


 


Calcium   


 


Total Bilirubin   


 


AST   


 


ALT   


 


Alkaline Phosphatase   


 


Total Protein   


 


Albumin   


 


Globulin   


 


Albumin/Globulin Ratio   


 


Urine Color   


 


Urine Appearance   


 


Urine pH   


 


Ur Specific Gravity   


 


Urine Protein   


 


Urine Ketones   


 


Urine Blood   


 


Urine Nitrate   


 


Urine Bilirubin   


 


Urine Urobilinogen   


 


Ur Leukocyte Esterase   


 


Urine WBC (Auto)   


 


Urine RBC (Auto)   


 


Urine Bacteria   


 


Urine Glucose   


 


Urine Ascorbic Acid   


 


Influenza A (Rapid)   Negative 


 


Influenza B (Rapid)   Negative 














  10/05/19 10/05/19 10/05/19





  18:58 18:58 19:00


 


WBC   


 


RBC   


 


Hgb   


 


Hct   


 


MCV   


 


MCH   


 


MCHC   


 


RDW   


 


Plt Count   


 


MPV   


 


Neut % (Auto)   


 


Lymph % (Auto)   


 


Mono % (Auto)   


 


Eos % (Auto)   


 


Baso % (Auto)   


 


Absolute Neuts (auto)   


 


Absolute Lymphs (auto)   


 


Absolute Monos (auto)   


 


Absolute Eos (auto)   


 


Absolute Basos (auto)   


 


Absolute Nucleated RBC   


 


Nucleated RBC %   


 


Sodium  136  


 


Potassium  5.5 H  


 


Chloride  107  


 


Carbon Dioxide  23  


 


Anion Gap  6  


 


BUN  82 H  


 


Creatinine  2.36 H  


 


Est GFR ( Amer)  32.9  


 


Est GFR (Non-Af Amer)  27.2  


 


BUN/Creatinine Ratio  34.7 H  


 


Glucose  143 H  


 


POC Glucose (mg/dL)   


 


Lactic Acid   0.8 


 


Calcium  9.7  


 


Total Bilirubin  0.20  


 


AST  11 L  


 


ALT  14  


 


Alkaline Phosphatase  116 H  


 


Total Protein  5.5 L  


 


Albumin  2.8 L  


 


Globulin  2.7  


 


Albumin/Globulin Ratio  1.0  


 


Urine Color    Yellow


 


Urine Appearance    Cloudy


 


Urine pH    5.0


 


Ur Specific Gravity    1.013


 


Urine Protein    Negative


 


Urine Ketones    Negative


 


Urine Blood    Negative


 


Urine Nitrate    Negative


 


Urine Bilirubin    Negative


 


Urine Urobilinogen    Negative


 


Ur Leukocyte Esterase    3+ A


 


Urine WBC (Auto)    3+(>20/hpf) A


 


Urine RBC (Auto)    Absent


 


Urine Bacteria    1+ A


 


Urine Glucose    Negative


 


Urine Ascorbic Acid    * A


 


Influenza A (Rapid)   


 


Influenza B (Rapid)   














  10/06/19 10/06/19





  06:27 06:27


 


WBC   12.9 H


 


RBC   2.74 L


 


Hgb   7.7 L


 


Hct   24 L


 


MCV   86


 


MCH   28


 


MCHC   33


 


RDW   22 H


 


Plt Count   241


 


MPV   7.3 L


 


Neut % (Auto)   72.3


 


Lymph % (Auto)   12.2


 


Mono % (Auto)   13.5


 


Eos % (Auto)   1.1


 


Baso % (Auto)   0.9


 


Absolute Neuts (auto)   9.3 H


 


Absolute Lymphs (auto)   1.6


 


Absolute Monos (auto)   1.7 H


 


Absolute Eos (auto)   0.1


 


Absolute Basos (auto)   0.1


 


Absolute Nucleated RBC   0.0


 


Nucleated RBC %   0.0


 


Sodium  138 


 


Potassium  5.4 H 


 


Chloride  108 


 


Carbon Dioxide  24 


 


Anion Gap  6 


 


BUN  84 H 


 


Creatinine  2.27 H 


 


Est GFR ( Amer)  34.4 


 


Est GFR (Non-Af Amer)  28.4 


 


BUN/Creatinine Ratio  37.0 H 


 


Glucose  111 H 


 


POC Glucose (mg/dL)  


 


Lactic Acid  


 


Calcium  10.0 


 


Total Bilirubin  0.20 


 


AST  14 


 


ALT  18 


 


Alkaline Phosphatase  114 H 


 


Total Protein  5.6 L 


 


Albumin  2.8 L 


 


Globulin  2.8 


 


Albumin/Globulin Ratio  1.0 


 


Urine Color  


 


Urine Appearance  


 


Urine pH  


 


Ur Specific Gravity  


 


Urine Protein  


 


Urine Ketones  


 


Urine Blood  


 


Urine Nitrate  


 


Urine Bilirubin  


 


Urine Urobilinogen  


 


Ur Leukocyte Esterase  


 


Urine WBC (Auto)  


 


Urine RBC (Auto)  


 


Urine Bacteria  


 


Urine Glucose  


 


Urine Ascorbic Acid  


 


Influenza A (Rapid)  


 


Influenza B (Rapid)  











Exam: 





HEENT: EOMI


LUNGS: Clear 


HEART: reg Rhythm


ABDOMEN: Soft, +BS, Ostomy with stool


SKIN: 4cm left ischial ulcer. Streaks over buttocks like long thin bruises, 

possibly from creases. 


NEUROLOGIC: A&O. No movement in legs. Limited sensation below waist


Assessment/Plan: 





1. Ependymoma with Paraplegia: PT/OT


2. Ischial Pressure Ulcer: Wound consult. Santyl/calcium alginate. Mounao when in 

chair. Will have wound assess buttocks on Monday


3. PRES: On multiple BP meds, but now BPs are running low. Tapering Isordil and 

holding Hydralazine. On Norvasc/Coreg


4. 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 Levorphanol has improved situation


5. Seizures: On Keppra. Lowering dose. Seizures were felt to be possibly from 

Zyvox 


6. Neurogenic Bladder: Rodriguez


7. NSVT on Monitor at [facility]: Coreg


8. Acute on chronic renal failure: Cr 2.27. Will check Sunday


9. Advanced Directives: Wife is HCP. Full code 


10. Fever, increased WBC: On IV Zosyn. Vanco stopped. Await urine cultures. CXR 

negative, wound clean


 


























10/06/19 13:39





10/06/19 13:40





10/06/19 13:41

## 2019-10-07 LAB
ANION GAP SERPL CALC-SCNC: 8 MMOL/L (ref 2–11)
BASOPHILS # BLD AUTO: 0.1 10^3/UL (ref 0–0.2)
BUN SERPL-MCNC: 80 MG/DL (ref 6–24)
BUN/CREAT SERPL: 38.6 (ref 8–20)
CALCIUM SERPL-MCNC: 10.5 MG/DL (ref 8.6–10.3)
CHLORIDE SERPL-SCNC: 111 MMOL/L (ref 101–111)
EOSINOPHIL # BLD AUTO: 0.4 10^3/UL (ref 0–0.6)
GLUCOSE SERPL-MCNC: 102 MG/DL (ref 70–100)
HCO3 SERPL-SCNC: 21 MMOL/L (ref 22–32)
HCT VFR BLD AUTO: 23 % (ref 42–52)
HGB BLD-MCNC: 7.4 G/DL (ref 14–18)
LYMPHOCYTES # BLD AUTO: 1.3 10^3/UL (ref 1–4.8)
MCH RBC QN AUTO: 29 PG (ref 27–31)
MCHC RBC AUTO-ENTMCNC: 33 G/DL (ref 31–36)
MCV RBC AUTO: 88 FL (ref 80–94)
MONOCYTES # BLD AUTO: 1.5 10^3/UL (ref 0–0.8)
NEUTROPHILS # BLD AUTO: 8.6 10^3/UL (ref 1.5–7.7)
NRBC # BLD AUTO: 0 10^3/UL
NRBC BLD QL AUTO: 0.1
PLATELET # BLD AUTO: 236 10^3/UL (ref 150–450)
POTASSIUM SERPL-SCNC: 5.5 MMOL/L (ref 3.5–5)
RBC # BLD AUTO: 2.56 10^6 /UL (ref 4.18–5.48)
RBC UR QL AUTO: (no result)
SODIUM SERPL-SCNC: 140 MMOL/L (ref 135–145)
WBC # BLD AUTO: 11.8 10^3/UL (ref 3.5–10.8)
WBC UR QL AUTO: (no result)

## 2019-10-07 RX ADMIN — CARVEDILOL SCH MG: 25 TABLET, FILM COATED ORAL at 09:08

## 2019-10-07 RX ADMIN — AMLODIPINE BESYLATE SCH MG: 5 TABLET ORAL at 09:08

## 2019-10-07 RX ADMIN — LEVETIRACETAM SCH MG: 500 TABLET, FILM COATED ORAL at 09:08

## 2019-10-07 RX ADMIN — CARVEDILOL SCH MG: 6.25 TABLET, FILM COATED ORAL at 20:57

## 2019-10-07 RX ADMIN — PIPERACILLIN AND TAZOBACTAM SCH MLS/HR: 3; .375 INJECTION, POWDER, LYOPHILIZED, FOR SOLUTION INTRAVENOUS; PARENTERAL at 13:34

## 2019-10-07 RX ADMIN — PANTOPRAZOLE SODIUM SCH MG: 40 TABLET, DELAYED RELEASE ORAL at 00:48

## 2019-10-07 RX ADMIN — Medication SCH UNITS: at 09:08

## 2019-10-07 RX ADMIN — PIPERACILLIN AND TAZOBACTAM SCH MLS/HR: 3; .375 INJECTION, POWDER, LYOPHILIZED, FOR SOLUTION INTRAVENOUS; PARENTERAL at 20:54

## 2019-10-07 RX ADMIN — COLLAGENASE SANTYL SCH APPLIC: 250 OINTMENT TOPICAL at 10:30

## 2019-10-07 RX ADMIN — LEVORPHANOL TARTRATE SCH MG: 2 TABLET ORAL at 09:02

## 2019-10-07 RX ADMIN — PREGABALIN SCH MG: 50 CAPSULE ORAL at 20:58

## 2019-10-07 RX ADMIN — ISOSORBIDE DINITRATE SCH MG: 10 TABLET ORAL at 09:11

## 2019-10-07 RX ADMIN — HYDRALAZINE HYDROCHLORIDE SCH MG: 25 TABLET ORAL at 00:48

## 2019-10-07 RX ADMIN — ASPIRIN SCH MG: 81 TABLET, CHEWABLE ORAL at 09:08

## 2019-10-07 RX ADMIN — HYDRALAZINE HYDROCHLORIDE SCH MG: 25 TABLET ORAL at 09:08

## 2019-10-07 RX ADMIN — LEVETIRACETAM SCH MG: 500 TABLET, FILM COATED ORAL at 20:57

## 2019-10-07 RX ADMIN — SODIUM CHLORIDE SCH MLS/HR: 900 IRRIGANT IRRIGATION at 18:25

## 2019-10-07 RX ADMIN — HEPARIN SODIUM SCH UNITS: 5000 INJECTION INTRAVENOUS; SUBCUTANEOUS at 13:35

## 2019-10-07 RX ADMIN — LEVORPHANOL TARTRATE SCH MG: 2 TABLET ORAL at 22:19

## 2019-10-07 RX ADMIN — HEPARIN SODIUM SCH UNITS: 5000 INJECTION INTRAVENOUS; SUBCUTANEOUS at 05:54

## 2019-10-07 RX ADMIN — ISOSORBIDE DINITRATE SCH MG: 10 TABLET ORAL at 13:35

## 2019-10-07 RX ADMIN — DOXAZOSIN MESYLATE SCH MG: 2 TABLET ORAL at 20:57

## 2019-10-07 RX ADMIN — ISOSORBIDE DINITRATE SCH MG: 10 TABLET ORAL at 20:59

## 2019-10-07 RX ADMIN — CARVEDILOL SCH MG: 25 TABLET, FILM COATED ORAL at 00:47

## 2019-10-07 RX ADMIN — PANTOPRAZOLE SODIUM SCH MG: 40 TABLET, DELAYED RELEASE ORAL at 09:08

## 2019-10-07 RX ADMIN — HYDRALAZINE HYDROCHLORIDE SCH: 25 TABLET ORAL at 14:19

## 2019-10-07 RX ADMIN — SODIUM CHLORIDE SCH MLS/HR: 900 IRRIGANT IRRIGATION at 00:57

## 2019-10-07 RX ADMIN — PIPERACILLIN AND TAZOBACTAM SCH MLS/HR: 3; .375 INJECTION, POWDER, LYOPHILIZED, FOR SOLUTION INTRAVENOUS; PARENTERAL at 05:51

## 2019-10-07 RX ADMIN — HEPARIN SODIUM SCH UNITS: 5000 INJECTION INTRAVENOUS; SUBCUTANEOUS at 00:49

## 2019-10-07 RX ADMIN — PREGABALIN SCH MG: 50 CAPSULE ORAL at 13:35

## 2019-10-07 RX ADMIN — PREGABALIN SCH MG: 50 CAPSULE ORAL at 09:02

## 2019-10-07 RX ADMIN — HEPARIN SODIUM SCH UNITS: 5000 INJECTION INTRAVENOUS; SUBCUTANEOUS at 22:19

## 2019-10-07 RX ADMIN — DOXAZOSIN MESYLATE SCH MG: 2 TABLET ORAL at 00:47

## 2019-10-07 RX ADMIN — PREGABALIN SCH MG: 50 CAPSULE ORAL at 00:49

## 2019-10-07 RX ADMIN — PANTOPRAZOLE SODIUM SCH MG: 40 TABLET, DELAYED RELEASE ORAL at 20:59

## 2019-10-07 RX ADMIN — CYANOCOBALAMIN TAB 500 MCG SCH MCG: 500 TAB at 09:08

## 2019-10-07 NOTE — CONS
CONSULTATION REPORT:

 

DATE OF CONSULT:  10/07/19

 

REQUESTING PHYSICIAN:  Dr. Coates.

 

CONSULTING SERVICE:  Infectious Disease.

 

REASON FOR CONSULT:  Catheter-associated urinary tract infection.

 

IMPRESSION:

1.  Chronic Rodriguez catheter in the setting of paraplegia, developed fever and 
leukocytosis over the weekend.  The urine culture is growing greater than 100,
000 colonies of Pseudomonas aeruginosa and greater than 75,000 colonies of 
Klebsiella pneumoniae.  He has had no fever since being treated with Zosyn.  
His white count was 13,000 on 10/05/19, was down to 11,000 today.  His wife 
thinks he has been more comfortable, though he is still sleepy.

2.  Paraplegia with neurogenic bladder and chronic Rodriguez catheter.

3.  Left ischial decubitus ulcer, chronic, treated with debridement and long 
course of IV antibiotics at [facility] over the last couple of months.

4.  History of seizure while on linezolid at [facility].

5.  Chronic kidney disease, stage 3.

6.  Recent posterior reversible encephalopathy syndrome.

 

RECOMMENDATIONS:

1.  Continue Zosyn, as his white count is improving and the fever seemed to 
have resolved, we will await sensitivity data.

2.  Continue wound care for his decubitus ulceration, which has been improving.

 

HISTORY OF PRESENT ILLNESS:  This is a 74-year-old man with paraplegia, chronic 
decubitus ulceration of the left ischium for which he has been followed at 
[facility] for about 7 weeks where he had debridement of the ulcer and 
was on linezolid, had a seizure, was intubated for about a week, had a 
prolonged convalescence there.  He was transferred back here to the rehab unit 
and then over the weekend developed fever.  His labs were checked and they 
found he had a leukocytosis as noted above.  Urine cultures were taken and he 
was started on IV antibiotics, which he has tolerated well.  His fevers have 
abated, his white count is coming down.  His wife says he is no longer flushed 
and seems more comfortable.  He cannot provide much of the details, which we 
obtained instead from discussion with his wife, review of the medical records 
and discussion with Dr. Coates.  He has not had any dental issues; any sinus pain 
or pressure; any chest pain, cough, or shortness of breath.  No abdominal pain.
  No diarrhea through his ostomy.

 

PAST MEDICAL HISTORY:

1.  Paraplegia with neurogenic bladder and chronic Rodriguez catheter.

2.  Left ischial ulceration.

3.  History of sacral decubitus ulcer, treated with a flap at [facility].

4.  PRES.

5.  Seizure while on linezolid.

6.  Obstructive sleep apnea.

7.  Hypertension.

8.  Stage 3 chronic kidney disease.

9.  Anemia.

10.  Chronic pain.

11.  Coronary artery disease.

 

ALLERGIES:  STATIN and LINEZOLID.

 

MEDICATIONS:

1.  Tylenol.

2.  Amlodipine.

3.  Aspirin.

4.  Coreg.

5.  Cholecalciferol.

6.  Collagenase topical.

7.  Cyanocobalamin.

8.  Docusate.

9.  Doxazosin.

10.  Heparin subcutaneous injection.

11.  Hydralazine.

12.  Imdur.

13.  Keppra.

14.  Levorphanol.

15.  Magnesium hydroxide.

16.  Zosyn 3.375 g IV every 8 hours by extended infusion.

17.  Pregabalin.

18.  Senna.

 

SOCIAL HISTORY:  He lives with his wife in [place], though in the last couple 
of months has been at [facility].  He is a nonsmoker.

 

FAMILY HISTORY:  No recurrent infections.

 

REVIEW OF SYSTEMS:  All negative except as noted above to a 12-point review.

 

PHYSICAL EXAM:  Vital Signs:  Temperature 37, heart rate 77, respiratory rate 16
, blood pressure 101/35, oxygen saturation 92% on 3 L.  In general, he is awake
, but drowsy, answers most questions, follows commands, moves both upper 
extremities. HEENT:  There is no conjunctival hemorrhage.  Oropharynx without 
lesions.  Neck is supple without mass.  Heart is regular rate and rhythm 
without murmurs, rubs, or gallops.  Lungs are clear to auscultation 
bilaterally.  Abdomen is soft, nontender. There are bowel sounds present.  
There is a left lower quadrant ostomy with air and liquid stool.  Skin:  There 
is no rash or splinter hemorrhage.  The left ischial bandage is not taken down 
as it was just placed.  Musculoskeletal:  There is no spine tenderness to 
palpation.  There are no foot wounds.

 

LABORATORY DATA:  White blood cell count 11.8, hemoglobin 7.4, platelets 236. 
Creatinine is 2.07.  Blood cultures are no growth after 24 hours.

 

Please see impression and recommendations outlined above, which I discussed 
with Dr. Coates.

 

Thanks for asking me to see Mr. Pryor in consultation.

 

 [id number]/CPS #: [id number]

Seaview HospitalJENNIFER

## 2019-10-07 NOTE — PN
Subjective


Date of Service: 10/07/19


Interval History: 








Tmax 100.8 at 7pm, HR to 115. Both resolved. 


"had been doing really well" at [facility] with rehab initially per wife Dahlia- would 

be able to sit on side of bed briefly. Prior to admission 8 weeks ago he was 

able to transfer independently to , cook for himself. 


having chronic pains in legs. 


denies chest pain, abdominal pain, chills, subjective fevers. 


His mentation has waxed and waned since [place] admission but generally 

improving trajectory per wife. 





son also at bedside. 














Objective


Active Medications: 








Acetaminophen (Tylenol Tab*)  650 mg PO Q6H PRN


   PRN Reason: MILD PAIN or TEMP > 100.4


Amlodipine Besylate (Norvasc Tab*)  10 mg PO DAILY [facility]


   Last Admin: 10/07/19 09:08 Dose:  10 mg


Aspirin (Aspirin 81 Mg Chew Tab*)  81 mg PO DAILY [facility]


   Last Admin: 10/07/19 09:08 Dose:  81 mg


Carvedilol (Coreg Tab*)  12.5 mg PO BID [facility]


Cholecalciferol (Vitamin D Tab*)  1,000 units PO DAILY [facility]


   Last Admin: 10/07/19 09:08 Dose:  1,000 units


Collagenase (Santyl 250 Units/Gm Oint*)  1 applic TOPICAL DAILY [facility]


   Last Admin: 10/07/19 10:30 Dose:  1 applic


Cyanocobalamin (Vitamin B12 Tab*)  1,000 mcg PO DAILY [facility]


   Last Admin: 10/07/19 09:08 Dose:  1,000 mcg


Docusate Sodium (Colace Cap*)  100 mg PO BID PRN


   PRN Reason: CONSTIPATION


Doxazosin Mesylate (Cardura Tab*)  2 mg PO BEDTIME [facility]


   Last Admin: 10/07/19 00:47 Dose:  2 mg


Heparin Sodium (Porcine) (Heparin Vial(*))  5,000 units SUBCUT Q8HR [facility]


   Last Admin: 10/07/19 13:35 Dose:  5,000 units


Sodium Chloride (Ns 0.9% 1000 Ml**)  1,000 mls @ 75 mls/hr IV PER RATE [facility]


   Last Admin: 10/07/19 00:57 Dose:  75 mls/hr


Piperacillin Sod/Tazobactam (Sod 3.375 gm/ Sodium Chloride)  100 mls @ 25 mls/

hr IVPB Q8H [facility]


   Last Admin: 10/07/19 13:34 Dose:  25 mls/hr


Isosorbide Dinitrate (Isordil Tab*)  10 mg PO TID [facility]


   Last Admin: 10/07/19 13:35 Dose:  10 mg


Levetiracetam (Keppra Tab*)  500 mg PO BID [facility]


   Last Admin: 10/07/19 09:08 Dose:  500 mg


Levorphanol Tartrate (Levorphanol 2 Mg (Nf))  1 mg PO Q12HR [facility]


   Last Admin: 10/07/19 09:02 Dose:  1 mg


Magnesium Hydroxide (Milk Of Magnesia Liq*)  30 ml PO Q6H PRN


   PRN Reason: CONSTIPATION


Pantoprazole Sodium (Protonix Tab*)  40 mg PO BID [facility]


   Last Admin: 10/07/19 09:08 Dose:  40 mg


Pharmacy Consult (Zosyn Per Pharmacy*)  1 note FOLLOW UP .ZOSYN PER PHARMACY [facility]


Pregabalin (Lyrica Cap(*))  50 mg PO TID [facility]


   Last Admin: 10/07/19 13:35 Dose:  50 mg


Senna (Senokot 8.6 Mg Tab*)  2 tab PO BEDTIME PRN


   PRN Reason: CONSTIPATION








 Vital Signs - 8 hr











  10/07/19 10/07/19 10/07/19





  07:35 08:00 09:02


 


Temperature 97.3 F  


 


Pulse Rate 74  


 


Respiratory 18 18 18





Rate   


 


Blood Pressure 121/49  





(mmHg)   


 


O2 Sat by Pulse 95  





Oximetry   














  10/07/19 10/07/19 10/07/19





  11:04 11:15 13:35


 


Temperature 98.4 F  


 


Pulse Rate 77  


 


Respiratory 16 22 20





Rate   


 


Blood Pressure 101/35  





(mmHg)   


 


O2 Sat by Pulse 92  





Oximetry   











Oxygen Devices in Use Now: Nasal Cannula


Appearance: NAD, fatigued appearing. chronically ill.


Eyes: No Scleral Icterus, PERRLA


Ears/Nose/Mouth/Throat: NL Teeth, Lips, Gums


Neck: NL Appearance and Movements; NL JVP


Respiratory: Symmetrical Chest Expansion and Respiratory Effort, Clear to 

Auscultation


Cardiovascular: NL Sounds; No Murmurs; No JVD, RRR, No Edema


Abdominal: NL Sounds; No Tenderness; No Distention, - - opaque colostomy


Extremities: No Edema


Skin: No Rash or Ulcers, - - reported improving pressure ulcer on left ischium 


Neurological: - - Thinks at [facility], oriented to situation. 

paraplegia


Lines/Tubes/Other Access: Clean, Dry and Intact Rodriguez


Nutrition: Taking PO's





- Nutrition: Malnutrition Diagnosis/Plan


Malnutrition Assessment by Registered Dietitian: 


Malnutrition Assessment





Clinical Characteristics         Acute,Severe


Malnutrition Assessment:         Moderate muscle/fat loss


Criteria                         Severe acute weight loss (15.9% over 1 1/2


                                 months)


                                 Severe deficit in energy intake (<50% -


                                 essentially just 1 to 2 servings Boost per day)


                                 


Malnutrition Assessment:         Ensure enlive 3 times daily (350 kcals, 20 gms


Interventions                    protein each)


                                 [place] diet (regular at present)


                                 Selection of menu to maximize tolerance/


                                 acceptance


Malnutrition Assessment: Goals   1.  Improved oral intake to >75% to support


                                 wound healing, maintenance of lean body mass,


                                 adequate hydration w/o contributing to


                                 undesirable weight gain.


                                 2.  Improvement of K levels to WNL.


                                 3.  Evidence of wound healing and no new areas


                                 of skin breakdown.








Result Diagrams: 


 10/07/19 07:26





 10/07/19 07:26


Additional Lab and Data: 





 Laboratory Results - last 24 hr











  10/07/19 10/07/19 10/07/19





  07:26 07:26 16:20


 


WBC   11.8 H 


 


RBC   2.56 L 


 


Hgb   7.4 L 


 


Hct   23 L 


 


MCV   88 


 


MCH   29 


 


MCHC   33 


 


RDW   23 H 


 


Plt Count   236 


 


MPV   8.2 


 


Neut % (Auto)   72.6 


 


Lymph % (Auto)   11.0 


 


Mono % (Auto)   12.4 


 


Eos % (Auto)   3.1 


 


Baso % (Auto)   0.9 


 


Absolute Neuts (auto)   8.6 H 


 


Absolute Lymphs (auto)   1.3 


 


Absolute Monos (auto)   1.5 H 


 


Absolute Eos (auto)   0.4 


 


Absolute Basos (auto)   0.1 


 


Absolute Nucleated RBC   0.0 


 


Nucleated RBC %   0.1 


 


Hem Pathologist Commnt    


 


Sodium  140  


 


Potassium  5.5 H  


 


Chloride  111  


 


Carbon Dioxide  21 L  


 


Anion Gap  8  


 


BUN  80 H  


 


Creatinine  2.07 H  


 


Est GFR ( Amer)  38.3  


 


Est GFR (Non-Af Amer)  31.6  


 


BUN/Creatinine Ratio  38.6 H  


 


Glucose  102 H  


 


Calcium  10.5 H  


 


C-Reactive Protein  226.12 H  


 


B-Natriuretic Peptide   


 


Urine Color    Yellow


 


Urine Appearance    Cloudy


 


Urine pH    5.0


 


Ur Specific Gravity    1.016


 


Urine Protein    Negative


 


Urine Ketones    Negative


 


Urine Blood    2+ A


 


Urine Nitrate    Negative


 


Urine Bilirubin    Negative


 


Urine Urobilinogen    Negative


 


Ur Leukocyte Esterase    3+ A


 


Urine WBC (Auto)    3+(>20/hpf) A


 


Urine RBC (Auto)    3+(>10/hpf) A


 


Urine Bacteria    1+ A


 


Urine Glucose    Negative














  10/07/19





  16:24


 


WBC 


 


RBC 


 


Hgb 


 


Hct 


 


MCV 


 


MCH 


 


MCHC 


 


RDW 


 


Plt Count 


 


MPV 


 


Neut % (Auto) 


 


Lymph % (Auto) 


 


Mono % (Auto) 


 


Eos % (Auto) 


 


Baso % (Auto) 


 


Absolute Neuts (auto) 


 


Absolute Lymphs (auto) 


 


Absolute Monos (auto) 


 


Absolute Eos (auto) 


 


Absolute Basos (auto) 


 


Absolute Nucleated RBC 


 


Nucleated RBC % 


 


Hem Pathologist Commnt 


 


Sodium 


 


Potassium 


 


Chloride 


 


Carbon Dioxide 


 


Anion Gap 


 


BUN 


 


Creatinine 


 


Est GFR ( Amer) 


 


Est GFR (Non-Af Amer) 


 


BUN/Creatinine Ratio 


 


Glucose 


 


Calcium 


 


C-Reactive Protein 


 


B-Natriuretic Peptide  258 H


 


Urine Color 


 


Urine Appearance 


 


Urine pH 


 


Ur Specific Gravity 


 


Urine Protein 


 


Urine Ketones 


 


Urine Blood 


 


Urine Nitrate 


 


Urine Bilirubin 


 


Urine Urobilinogen 


 


Ur Leukocyte Esterase 


 


Urine WBC (Auto) 


 


Urine RBC (Auto) 


 


Urine Bacteria 


 


Urine Glucose 














Assess/Plan/Problems-Billing


Assessment: 





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 

bladder, chronic sacral wounds, s/p colostomy for fecal incontinence, CAD, 

CKDIII, with recent 7 week hospitalization for infected ischium wound c/b 

seizures (attributed to linezolid), PRES and ARF. Transferred from [facility] 10/6 

for sepsis 2/2 klebsieall/PSDA CAUTI. 





- Patient Problems


(1) Sepsis


Current Visit: No   Status: Acute   Comment: Likely 2/2 Klebsiella/PSDA CAUTI. 

Appreciate ID recs. Continue zosyn for now and await sensitivites. Improving. (

SIRS criteria: Fever, tachycardia, leukocytosis)   





(2) Catheter-associated urinary tract infection


Current Visit: No   Status: Acute   Code(s): T83.511A - I/I REACT D/T 

INDWELLING URETHRAL CATHETER, INIT; N39.0 - URINARY TRACT INFECTION, SITE NOT 

SPECIFIED   SNOMED Code(s): 160654444


   Comment: Urine culture positive for pseudomonas and klebsiella- 75,000-100,

000 CFU. Continue zosyn for now.


Catheter was changed almost 1 month ago and will change today.    





(3) Chronic pain


Current Visit: No   Status: Acute   Code(s): G89.29 - OTHER CHRONIC PAIN   

SNOMED Code(s): 64419495


   Comment: - Continue levorphanol    





(4) Lethargy


Current Visit: No   Status: Acute   Code(s): R53.83 - OTHER FATIGUE   SNOMED 

Code(s): 887036465


   Comment: ? secondary to UTI vs re-instating lyrica, levorphanol. Doses were 

reduced by Dr. Reynoso to reduce the likelihood is secondary to medications.  

  





(5) Pressure ulcer of sacral region, stage 3


Current Visit: No   Status: Acute   Code(s): L89.153 - PRESSURE ULCER OF SACRAL 

REGION, STAGE 3   SNOMED Code(s): 100462813


   Comment: Pressure ulcer over ischium. Overall improving per report.  He was 

initially hospitalized in [place] secondary to infected decubitus.    





(6) Seizures


Current Visit: No   Status: Acute   Code(s): R56.9 - UNSPECIFIED CONVULSIONS   

SNOMED Code(s): 93673117


   Comment: Pt diganosed with seizures during admission in [place]-? 

secondary to linezolid. Tapering off antiepileptics.


no observed arm shaking today. 


Continue to monitor. May need neuro evaluation if any concerns for seizure.    





(7) Anemia


Current Visit: No   Status: Chronic   Code(s): D64.9 - ANEMIA, UNSPECIFIED   

SNOMED Code(s): 272967685


   Comment: H/H much lower than in 7/2019 but stable over the last 3 days. 

Likely anemia related to his severe acute illness over the last 2 months. Add 

iron panel/ferritin as RDW quite elevated.    





(8) HTN (hypertension)


Current Visit: No   Status: Chronic   Code(s): I10 - ESSENTIAL (PRIMARY) 

HYPERTENSION   SNOMED Code(s): 78306063


   Comment: Pt diagnosed with PRES during hospitalization in [place]. BP has 

been stable to hypotensive today in setting of sepsis. 


Continue amlodipine


cut coreg to 12.5mg BID


continue cardura


continue isordil 


stop hydralazine.    


Status and Disposition: 





medicine inpatient. Likely dispo back to [facility] once improved enough to resume 

rehabilitation.

## 2019-10-07 NOTE — HP
CC:  Dr. Luis Felipe Reynoso, [facility]; Dr. Rafy Vicente *

 

HISTORY AND PHYSICAL:

 

DATE OF ADMISSION:  10/06/19

 

ATTENDING PHYSICIAN:  Dr. Disha Duncan.* (DICTATED BY GRAYSON NOLASCO NP)

 

PRIMARY CARE PROVIDER:  Dr. Rafy Vicente.

 

CHIEF COMPLAINT:  Hypotension and fever while doing rehab in [facility].

 

HISTORY OF PRESENT ILLNESS:  Mr. Pryor is a 74-year-old male patient known to 
our service from previous admissions with a past medical history significant 
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 
wound, requiring plastics and flap surgery, diverting colostomy secondary to 
chronic diarrhea.  He also has seizures, chronic kidney disease, anemia, 
chronic pain, and a recent diagnosis of sepsis and PRES out at [place] [facility].  The 
patient was at [facility] for approximately 42 days, being treated for sepsis and had 
a very complicated course and then was transferred to [facility] for rehab.  Please 
see notes from Dr. Reynoso for his [facility] course, but in short, he had been in 
[facility] from 10/02/19 up until today; however, on 10/04/19 in the evening, there 
was an overhead CAT call for the patient having fever, rigors, and an increased 
temperature.  In that occasion, the patient also had some hypotension.  He 
received a fluid bolus of 1 L of normal saline and some Tylenol.  He was 
pancultured, lactic acid was drawn, which was not elevated. The patient 
responded favorably to food bolus.  His temperature came down very quickly.  
His blood pressure came up very quickly and he overall improved in a very short 
period of time.  We continued to follow Mr. Pryor while he was in [facility]; however
, over the next 24 hours, he still began to have increasing fevers and then 
ultimately his urine did show klebsiella and also pseudomonas.  Now, the 
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  He does have a chronic 
indwelling Rodriguez catheter secondary to his paraplegia.  Given the constellation 
of symptoms, his fever and urinary tract infection, at this time, after much 
discussion with Dr. Reynoso, we felt it would be better for the patient to 
reside in the inpatient medical unit rather in [facility] as this current urinary 
tract infection and fever are inhibiting the patient's ability to participate 
in the vigorous physical rehab portion of his program in [facility].  He was seen 
earlier in the day by Dr. Duncan.  He was quite sleepy this morning, but he is 
otherwise mentating appropriately and in no acute distress.  Plan is for the 
patient to be transferred to [address] [place] [address] for IV antibiotics, fluids, 
and continued medical management.

 

PAST MEDICAL HISTORY:  As noted above is complex and includes:  Ependymoma; 
lower extremity paralysis; neurogenic bladder with chronic Rodriguez; PRES; seizure 
disorder; obstructive sleep apnea; chronic non-healing sacral wound, status 
post recent debridement; hypertension secondary to PRES; chronic kidney disease
, stage 3; chronic anemia; chronic pain syndrome; coronary artery disease.

 

HOME MEDICATIONS:  Include:

1.  Tylenol 650 mg p.o. q.6 hours as needed.

2.  Amlodipine 10 mg p.o. daily.

3.  Aspirin 81 mg daily.

4.  Carvedilol 25 mg p.o. b.i.d.

5.  Vitamin D3 1000 units p.o. daily.

6.  Collagenase 250 units to his wound topical daily.

7.  Cranberry extract 1 tablet daily.

8.  Vitamin B12 1000 mcg daily.

9.  Docusate 100 mg p.o. b.i.d.

10.  Doxazosin 2 mg p.o. at bedtime.

11.  Ferrous gluconate 324 mg p.o. daily.

12.  Folic acid 0.5 mg p.o. daily.

13.  Furosemide 40 mg p.o. daily.

14.  Isosorbide dinitrate 10 mg p.o. 3 times a day.

15.  Levorphanol 1 mg p.o. q.12 hours, titrated down from 2 mg p.o. q.8 hours.

16.  Omeprazole 40 mg p.o. daily.

17.  Pantoprazole 40 mg p.o. b.i.d.

18.  Lyrica 200 mg p.o. 3 times daily.

19.  Valsartan/hydrochlorothiazide 1 tab p.o. daily.

20.  Amlodipine 10 mg p.o. daily.

21.  Keppra 500 mg p.o. b.i.d.

 

ALLERGIES:  Include STATINS.

 

SOCIAL HISTORY:  The patient does live with his wife, who is his healthcare 
proxy. She is currently at the bedside.  Denies any alcohol use.  He does not 
use tobacco products and there is no illicit drug use.

 

REVIEW OF SYSTEMS:  The patient denies any shortness of breath or chest pain.  
He does endorse some flushed fever and chills.  He is endorsing some fatigue.  
No nausea, no vomiting.  He does have chronic generalized pain, which is 
currently well controlled and no further constitutional complaints.

 

                               PHYSICAL EXAMINATION

 

GENERAL:  He is an older gentleman in no acute distress.

 

VITAL SIGNS:  Blood pressure 140/52, temperature 100.8, heart rate 92, O2 
saturation 96% on 2 L nasal cannula with respiratory rate of 18.

 

HEENT:  The patient is atraumatic, normocephalic.  PERRLA.  Nonicteric sclerae. 
Oral mucosa is moist.  Tongue is midline.

 

NECK:  Supple, nontender.  No JVD noted.  No carotid bruits auscultated.

 

LUNGS:  Clear bilaterally to auscultation with no wheezing, rhonchi, or rales.

 

CARDIOVASCULAR:  Rate and rhythm are regular.  No murmurs, gallops, or rubs 
noted. Positive S1, S2.

 

ABDOMEN:  Soft, nontender, nondistended.  Positive bowel sounds in all 4 
quadrants. He does have a colostomy, which shows medium brown stool.  There is 
no clubbing and no cyanosis.

 

NEUROLOGIC:  He does appear fatigued and sleepy, but he is arousable, alert, 
and oriented x3 and appropriate.

 

SKIN:  He does have chronic wounds, which are currently dressed, I did not take 
down the dressings on the sacral area.

 

 DIAGNOSTIC STUDIES/LAB DATA:  There are no current labs at the time of transfer
, but he did have laboratories drawn in [facility] showed a leukocytosis with white 
count of 12.9, hemoglobin of 7.7, hematocrit of 39%, platelets of 241.  Sodium 
of 138, potassium 5.4, CO2 24, chloride 108, BUN 84, and creatinine of 2.27, 
which is currently the patient's baseline.  Urinalysis did not appear to be 
acutely infected when it was drawn on 10/05/19, with 3+ leukocyte esterase, 3+ 
wbc's, negative for nitrites.  Microbiology, however, is showing pseudomonas 
and klebsiella with colony counts of 75,000 to 100,000.

 

Chest x-ray dated 10/05/19 shows no evidence for any active cardiopulmonary 
disease.

 

IMPRESSION:  Mr. Pryor is a gentleman with a very complex medical history, who 
is residing in [facility] for physical rehab and then presented with lethargy and 
fever, now being transferred to Inpatient Medical for symptomatic urinary tract 
infection and possible sepsis.

 

DIAGNOSES:

1.  Lethargy.  This is difficult to say whether his lethargy is secondary to 
recurrent urinary tract infection or restarting his pain medications.  The 
patient had been off his pain meds for significant period of time when he was 
in [place]. His medications have been titrated down by Dr. Reynoso.  We will 
continue to monitor his mental status.  His levorphanol and Lyrica had been 
titrated down and this will continue to be monitored closely.

2.  Systemic inflammatory response syndrome versus sepsis.  The patient is not 
having hypotensive right now.  He is still mounting fevers, however.  He has 
been pancultured.  Blood cultures are negative at this time.  He did receive 
vancomycin and Zosyn.  At this time, we will keep him on Zosyn.  This will 
cover him for klebsiella and pseudomonas and continue to monitor his 
susceptibilities in his urine.  The urine is likely a catheter-associated 
urinary tract infection.  His catheter is set to be changed on Wednesday of 
this week.  It is unclear whether this is colonization versus active infection; 
however, this does seem to be the most likely source given that his chest x-ray 
is negative and his blood cultures are also negative, also the wound bed on his 
sacral wound does appear to be cleaned.

3.  Hypertension secondary to posterior reversible encephalopathy syndrome.  
The patient was on significantly larger doses of blood pressure meds before 
being admitted to [facility] now coming to the inpatient side.  These have been 
titrated somewhat, we will continue to monitor the patient's blood pressure and 
titrate medications down or up as needed.

4.  Seizure disorder.  He is currently on Keppra, supposedly he has had a 
seizure after taking an antibiotic that he had a reaction to.  He is on 500 
Keppra 2 times a day.  This will be continued.  If he has any further focal 
neuro deficits, we would may consider having Neurology see the patient.  He did 
have some shaking in his arm, today of his left arm, yesterday was his right 
arm.  It is unclear whether this is representing any new seizure activity, 
again this will be monitored closely.

5.  History of chronic kidney disease, stage 3.  His renal function appears to 
be at his baseline.

6.  Anemia, which is secondary to chronic disease.  We will continue to monitor 
his renal function.

7.  Chronic pressure wounds, stage 3.  The patient was debrided at [place]. 
Wound bed does appear to be cleaned.  He was seen by Luisana Brian, 
who had made wound care recommendations.  We will continue his Santyl and daily 
dressing changes.

8.  Chronic pain.  Pain medications and titration as noted above.

9.  History of coronary artery disease.  He will be continued on aspirin, 
isosorbide dinitrate, and his Coreg.

10.  Paraplegia secondary to spinal tumor.  I have placed consultation for 
Physical Therapy when the patient is feeling a bit stronger from this 
infection.  He can work with PT.

11.  DVT prophylaxis will be with heparin subcu q.8 hours.

12.  Diet.  Regular diet as tolerated.

13.  Activity.  Out of bed to chair as tolerated with PT evaluation.

 

The rest of the patient's course will be determined by further diagnostics, 
laboratories, and any other input from other providers as warranted during this 
admission.  This plan of care has been discussed with Dr. Duncan, the attending 
on this admission, she is in agreement with plan of care.

 

TIME SPENT:  Seventy minutes on admission and transfer planning.

 

 ____________________________________ GRAYSON NOLASCO NP

 

[id number]/CPS #: [id number]

ELISABET

## 2019-10-08 LAB
ANION GAP SERPL CALC-SCNC: 6 MMOL/L (ref 2–11)
BASOPHILS # BLD AUTO: 0.1 10^3/UL (ref 0–0.2)
BUN SERPL-MCNC: 70 MG/DL (ref 6–24)
BUN/CREAT SERPL: 38.5 (ref 8–20)
CALCIUM SERPL-MCNC: 10.4 MG/DL (ref 8.6–10.3)
CHLORIDE SERPL-SCNC: 113 MMOL/L (ref 101–111)
EOSINOPHIL # BLD AUTO: 0.2 10^3/UL (ref 0–0.6)
FERRITIN SERPL IA-MCNC: 507.6 NG/ML (ref 24–336)
GLUCOSE SERPL-MCNC: 108 MG/DL (ref 70–100)
HCO3 SERPL-SCNC: 23 MMOL/L (ref 22–32)
HCT VFR BLD AUTO: 21 % (ref 42–52)
HGB BLD-MCNC: 7.2 G/DL (ref 14–18)
IRON SERPL-MCNC: 24 UG/DL (ref 50–212)
LYMPHOCYTES # BLD AUTO: 1.7 10^3/UL (ref 1–4.8)
MCH RBC QN AUTO: 29 PG (ref 27–31)
MCHC RBC AUTO-ENTMCNC: 34 G/DL (ref 31–36)
MCV RBC AUTO: 85 FL (ref 80–94)
MONOCYTES # BLD AUTO: 1.3 10^3/UL (ref 0–0.8)
NEUTROPHILS # BLD AUTO: 7.6 10^3/UL (ref 1.5–7.7)
NRBC # BLD AUTO: 0 10^3/UL
NRBC BLD QL AUTO: 0
PLATELET # BLD AUTO: 231 10^3/UL (ref 150–450)
POTASSIUM SERPL-SCNC: 5.6 MMOL/L (ref 3.5–5)
RBC # BLD AUTO: 2.5 10^6 /UL (ref 4.18–5.48)
SODIUM SERPL-SCNC: 142 MMOL/L (ref 135–145)
TIBC SERPL-MCNC: 162 MCG/DL (ref 250–450)
TRANSFERRIN SERPL-MCNC: 116 MG/DL (ref 203–362)
WBC # BLD AUTO: 10.8 10^3/UL (ref 3.5–10.8)

## 2019-10-08 RX ADMIN — CARVEDILOL SCH MG: 6.25 TABLET, FILM COATED ORAL at 08:19

## 2019-10-08 RX ADMIN — HEPARIN SODIUM SCH UNITS: 5000 INJECTION INTRAVENOUS; SUBCUTANEOUS at 05:55

## 2019-10-08 RX ADMIN — PREGABALIN SCH MG: 50 CAPSULE ORAL at 08:18

## 2019-10-08 RX ADMIN — PREGABALIN SCH MG: 50 CAPSULE ORAL at 23:09

## 2019-10-08 RX ADMIN — DOXAZOSIN MESYLATE SCH MG: 2 TABLET ORAL at 23:06

## 2019-10-08 RX ADMIN — AMLODIPINE BESYLATE SCH MG: 5 TABLET ORAL at 08:19

## 2019-10-08 RX ADMIN — HEPARIN SODIUM SCH UNITS: 5000 INJECTION INTRAVENOUS; SUBCUTANEOUS at 23:11

## 2019-10-08 RX ADMIN — PANTOPRAZOLE SODIUM SCH MG: 40 TABLET, DELAYED RELEASE ORAL at 23:06

## 2019-10-08 RX ADMIN — PIPERACILLIN AND TAZOBACTAM SCH MLS/HR: 3; .375 INJECTION, POWDER, LYOPHILIZED, FOR SOLUTION INTRAVENOUS; PARENTERAL at 05:07

## 2019-10-08 RX ADMIN — CARVEDILOL SCH MG: 6.25 TABLET, FILM COATED ORAL at 23:10

## 2019-10-08 RX ADMIN — PREGABALIN SCH MG: 50 CAPSULE ORAL at 13:33

## 2019-10-08 RX ADMIN — ASPIRIN SCH MG: 81 TABLET, CHEWABLE ORAL at 08:18

## 2019-10-08 RX ADMIN — PIPERACILLIN AND TAZOBACTAM SCH MLS/HR: 3; .375 INJECTION, POWDER, LYOPHILIZED, FOR SOLUTION INTRAVENOUS; PARENTERAL at 13:33

## 2019-10-08 RX ADMIN — ISOSORBIDE DINITRATE SCH MG: 10 TABLET ORAL at 23:07

## 2019-10-08 RX ADMIN — Medication SCH UNITS: at 08:18

## 2019-10-08 RX ADMIN — LEVORPHANOL TARTRATE SCH MG: 2 TABLET ORAL at 08:18

## 2019-10-08 RX ADMIN — PIPERACILLIN AND TAZOBACTAM SCH MLS/HR: 3; .375 INJECTION, POWDER, LYOPHILIZED, FOR SOLUTION INTRAVENOUS; PARENTERAL at 23:22

## 2019-10-08 RX ADMIN — PANTOPRAZOLE SODIUM SCH MG: 40 TABLET, DELAYED RELEASE ORAL at 08:18

## 2019-10-08 RX ADMIN — CYANOCOBALAMIN TAB 500 MCG SCH MCG: 500 TAB at 08:19

## 2019-10-08 RX ADMIN — HEPARIN SODIUM SCH UNITS: 5000 INJECTION INTRAVENOUS; SUBCUTANEOUS at 13:34

## 2019-10-08 RX ADMIN — COLLAGENASE SANTYL SCH APPLIC: 250 OINTMENT TOPICAL at 13:00

## 2019-10-08 RX ADMIN — LEVORPHANOL TARTRATE SCH MG: 2 TABLET ORAL at 23:05

## 2019-10-08 RX ADMIN — ISOSORBIDE DINITRATE SCH MG: 10 TABLET ORAL at 08:18

## 2019-10-08 RX ADMIN — LEVETIRACETAM SCH MG: 500 TABLET, FILM COATED ORAL at 08:19

## 2019-10-08 RX ADMIN — LEVETIRACETAM SCH MG: 500 TABLET, FILM COATED ORAL at 23:08

## 2019-10-08 RX ADMIN — ISOSORBIDE DINITRATE SCH MG: 10 TABLET ORAL at 13:34

## 2019-10-08 NOTE — PN
Subjective


Date of Service: 10/08/19


Interval History: 





Pt is very somnolent during my evaluation. See with wife by the bedside. As per 

wife, he ate a good breakfast and lunch, but still low PO fluids intake





Objective


Active Medications: 








Acetaminophen (Tylenol Tab*)  650 mg PO Q6H PRN


   PRN Reason: MILD PAIN or TEMP > 100.4


Amlodipine Besylate (Norvasc Tab*)  10 mg [facility] DAILY [facility]


   Last Admin: 10/08/19 08:19 Dose:  10 mg


Aspirin (Aspirin 81 Mg Chew Tab*)  81 mg PO DAILY [facility]


   Last Admin: 10/08/19 08:18 Dose:  81 mg


Carvedilol (Coreg Tab*)  12.5 mg PO BID [facility]


   Last Admin: 10/08/19 08:19 Dose:  12.5 mg


Cholecalciferol (Vitamin D Tab*)  1,000 units PO DAILY [facility]


   Last Admin: 10/08/19 08:18 Dose:  1,000 units


Collagenase (Santyl 250 Units/Gm Oint*)  1 applic TOPICAL DAILY [facility]


   Last Admin: 10/08/19 13:00 Dose:  1 applic


Cyanocobalamin (Vitamin B12 Tab*)  1,000 mcg PO DAILY [facility]


   Last Admin: 10/08/19 08:19 Dose:  1,000 mcg


Docusate Sodium (Colace Cap*)  100 mg PO BID PRN


   PRN Reason: CONSTIPATION


Doxazosin Mesylate (Cardura Tab*)  2 mg PO BEDTIME [facility]


   Last Admin: 10/07/19 20:57 Dose:  2 mg


Heparin Sodium (Porcine) (Heparin Vial(*))  5,000 units SUBCUT Q8HR [facility]


   Last Admin: 10/08/19 13:34 Dose:  5,000 units


Piperacillin Sod/Tazobactam (Sod 3.375 gm/ Sodium Chloride)  100 mls @ 25 mls/

hr IVPB Q8H [facility]


   Last Admin: 10/08/19 13:33 Dose:  25 mls/hr


Isosorbide Dinitrate (Isordil Tab*)  10 mg PO TID [facility]


   Last Admin: 10/08/19 13:34 Dose:  10 mg


Levetiracetam (Keppra Tab*)  500 mg PO BID [facility]


   Last Admin: 10/08/19 08:19 Dose:  500 mg


Levorphanol Tartrate (Levorphanol 2 Mg (Nf))  1 mg PO Q12HR [facility]


   Last Admin: 10/08/19 08:18 Dose:  1 mg


Magnesium Hydroxide (Milk Of Magnesia Liq*)  30 ml PO Q6H PRN


   PRN Reason: CONSTIPATION


Pantoprazole Sodium (Protonix Tab*)  40 mg PO BID [facility]


   Last Admin: 10/08/19 08:18 Dose:  40 mg


Patiromer (Veltassa Powder*)  8.4 gm PO DAILY [facility]


   Stop: 10/08/19 23:59


   Last Admin: 10/08/19 10:38 Dose:  8.4 gm


Pharmacy Consult (Zosyn Per Pharmacy*)  1 note FOLLOW UP .ZOSYN PER [facility]ARM[facility]


Pregabalin (Lyrica Cap(*))  50 mg PO TID [facility]


   Last Admin: 10/08/19 13:33 Dose:  50 mg


Senna (Senokot 8.6 Mg Tab*)  2 tab PO BEDTIME PRN


   PRN Reason: CONSTIPATION








 Vital Signs - 8 hr











  10/08/19 10/08/19 10/08/19





  10:20 11:47 13:33


 


Temperature  98.4 F 


 


Pulse Rate  79 


 


Respiratory 16 18 16





Rate   


 


Blood Pressure  114/44 





(mmHg)   


 


O2 Sat by Pulse  94 





Oximetry   














  10/08/19 10/08/19





  15:30 15:31


 


Temperature  98.1 F


 


Pulse Rate  80


 


Respiratory 14 14





Rate  


 


Blood Pressure  110/37





(mmHg)  


 


O2 Sat by Pulse  96





Oximetry  











Oxygen Devices in Use Now: Nasal Cannula


Appearance: 73 yo M in nAD, aAOx2


Eyes: No Scleral Icterus, PERRLA


Ears/Nose/Mouth/Throat: NL Teeth, Lips, Gums, Mucous Membranes Moist


Neck: NL Appearance and Movements; NL JVP, Trachea Midline


Respiratory: Symmetrical Chest Expansion and Respiratory Effort, Clear to 

Auscultation


Cardiovascular: NL Sounds; No Murmurs; No JVD, RRR


Abdominal: NL Sounds; No Tenderness; No Distention, - - colostomy in place


Lymphatic: No Cervical Adenopathy


Extremities: No Edema, No Clubbing, Cyanosis


Skin: - - b/l heels ulcer-shallow, covered with eschar, R lateral malleolus 

ulcer at 2 cm stage 1-2, L ischial region , deep unstageable ulcer with packing 

in place -tunnel like with 3 cm in diam, no discharge noted


Neurological: - - paraplegia, somnolence noted, but oriented x 2





- Nutrition: Malnutrition Diagnosis/Plan


Malnutrition Assessment by Registered Dietitian: 


Malnutrition Assessment





Clinical Characteristics         Acute,Severe


Malnutrition Assessment:         Moderate muscle/fat loss


Criteria                         Severe acute weight loss (15.9% over 1 1/2


                                 months)


                                 Severe deficit in energy intake (<50% -


                                 essentially just 1 to 2 servings Boost per day)


                                 


Malnutrition Assessment:         Ensure enlive 3 times daily (350 kcals, 20 gms


Interventions                    protein each)


                                 [place] diet (regular at present)


                                 Selection of menu to maximize tolerance/


                                 acceptance


Malnutrition Assessment: Goals   1.  Improved oral intake to >75% to support


                                 wound healing, maintenance of lean body mass,


                                 adequate hydration w/o contributing to


                                 undesirable weight gain.


                                 2.  Improvement of K levels to WNL.


                                 3.  Evidence of wound healing and no new areas


                                 of skin breakdown.








Result Diagrams: 


 10/08/19 06:38





 10/08/19 06:38


Additional Lab and Data: 





 Laboratory Results - last 24 hr











  10/07/19 10/07/19 10/07/19





  07:26 07:26 16:20


 


WBC   11.8 H 


 


RBC   2.56 L 


 


Hgb   7.4 L 


 


Hct   23 L 


 


MCV   88 


 


MCH   29 


 


MCHC   33 


 


RDW   23 H 


 


Plt Count   236 


 


MPV   8.2 


 


Neut % (Auto)   72.6 


 


Lymph % (Auto)   11.0 


 


Mono % (Auto)   12.4 


 


Eos % (Auto)   3.1 


 


Baso % (Auto)   0.9 


 


Absolute Neuts (auto)   8.6 H 


 


Absolute Lymphs (auto)   1.3 


 


Absolute Monos (auto)   1.5 H 


 


Absolute Eos (auto)   0.4 


 


Absolute Basos (auto)   0.1 


 


Absolute Nucleated RBC   0.0 


 


Nucleated RBC %   0.1 


 


Hem Pathologist Commnt    


 


Sodium  140  


 


Potassium  5.5 H  


 


Chloride  111  


 


Carbon Dioxide  21 L  


 


Anion Gap  8  


 


BUN  80 H  


 


Creatinine  2.07 H  


 


Est GFR ( Amer)  38.3  


 


Est GFR (Non-Af Amer)  31.6  


 


BUN/Creatinine Ratio  38.6 H  


 


Glucose  102 H  


 


Calcium  10.5 H  


 


C-Reactive Protein  226.12 H  


 


B-Natriuretic Peptide   


 


Urine Color    Yellow


 


Urine Appearance    Cloudy


 


Urine pH    5.0


 


Ur Specific Gravity    1.016


 


Urine Protein    Negative


 


Urine Ketones    Negative


 


Urine Blood    2+ A


 


Urine Nitrate    Negative


 


Urine Bilirubin    Negative


 


Urine Urobilinogen    Negative


 


Ur Leukocyte Esterase    3+ A


 


Urine WBC (Auto)    3+(>20/hpf) A


 


Urine RBC (Auto)    3+(>10/hpf) A


 


Urine Bacteria    1+ A


 


Urine Glucose    Negative














  10/07/19





  16:24


 


WBC 


 


RBC 


 


Hgb 


 


Hct 


 


MCV 


 


MCH 


 


MCHC 


 


RDW 


 


Plt Count 


 


MPV 


 


Neut % (Auto) 


 


Lymph % (Auto) 


 


Mono % (Auto) 


 


Eos % (Auto) 


 


Baso % (Auto) 


 


Absolute Neuts (auto) 


 


Absolute Lymphs (auto) 


 


Absolute Monos (auto) 


 


Absolute Eos (auto) 


 


Absolute Basos (auto) 


 


Absolute Nucleated RBC 


 


Nucleated RBC % 


 


Hem Pathologist Commnt 


 


Sodium 


 


Potassium 


 


Chloride 


 


Carbon Dioxide 


 


Anion Gap 


 


BUN 


 


Creatinine 


 


Est GFR ( Amer) 


 


Est GFR (Non-Af Amer) 


 


BUN/Creatinine Ratio 


 


Glucose 


 


Calcium 


 


C-Reactive Protein 


 


B-Natriuretic Peptide  258 H


 


Urine Color 


 


Urine Appearance 


 


Urine pH 


 


Ur Specific Gravity 


 


Urine Protein 


 


Urine Ketones 


 


Urine Blood 


 


Urine Nitrate 


 


Urine Bilirubin 


 


Urine Urobilinogen 


 


Ur Leukocyte Esterase 


 


Urine WBC (Auto) 


 


Urine RBC (Auto) 


 


Urine Bacteria 


 


Urine Glucose 














Assess/Plan/Problems-Billing


Assessment: 





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 

bladder, chronic sacral wounds, s/p colostomy for fecal incontinence, CAD, 

CKDIII, with recent 7 week hospitalization for infected ischium wound c/b 

seizures (attributed to linezolid), PRES and ARF. Transferred from [facility] 10/6 

for sepsis 2/2 klebsiella CAUTI. 





- Patient Problems


(1) Catheter-associated urinary tract infection


Comment: Urine culture positive for pseudomonas and klebsiella- 75,000-100,000 

CFU. Continue zosyn for now.


Catheter was changed 10/7/19. 


appreciate ID consult


sepsis at admission-resolved   





(2) Chronic pain


Comment: - Continue levorphanol    





(3) Pressure ulcer of sacral region, stage 3


Comment: Pressure ulcer over ischium. Overall improving per report.  He was 

initially hospitalized in [place] secondary to infected decubitus. 


wound care consult appreciated   





(4) Seizures


Current Visit: No   Comment: Pt diganosed with seizures during admission in 

[place]-? secondary to linezolid. Tapering off antiepileptics.


no observed seizures 


Continue to monitor. May need neuro evaluation if any concerns for seizure.    





(5) Anemia


Comment: H/H much lower than in 7/2019 but stable over the last 3 days. Likely 

anemia related to his severe acute illness over the last 2 months and iron 

studies consistent wioth ACD.   





(6) CKD (chronic kidney disease), stage III


Comment: Baseline creatinine difficult to determine but likely around 1.5. Now 

elevated (likely secondary to severe acute illness over last 2 months) but very 

slowly trending down. K elevated . will tx with patiromer.   





(7) HTN (hypertension)


Comment: Pt diagnosed with PRESS during hospitalization in [place]. BP has 

been stable 


Continue amlodipine


cut coreg to 12.5mg BID


continue cardura


continue isordil 


stop hydralazine.    





(8) Neurogenic bladder


Comment: 


- Rodriguez care; Rodriguez changed due to UTI this hospital stay   





(9) DVT prophylaxis


Comment: HSQ   


Status and Disposition: 





medicine inpatient. Likely dispo back to [facility] once improved enough to resume 

rehabilitation.

## 2019-10-08 NOTE — CONSULT
Subjective


Date of Service: 10/08/19


Interval History: 





Mr. Pryor is a 75 yo male with PMH significant for paraplegia secondary to 

spinal ependymoma, asthma, CAD, CKD stage 3, GERD, HLD, HTN, PVD, neurogenic 

bladder with chronic indwelling urinary catheter, BONIFACIO, hx sacral stage 4 

pressure injury s/p flap reconstruction, and diverting colostomy. He presented 

to [facility] on 19 with complaints of fever for 3 days 

and concerns of an infected pressure injury and consult with Dr. Luis Felipe Chirinos (

previously performed flap reconstruction). He was admitted to [facility] for a sacral pressure injury and underwent debridement (by Dr. Marge Giron

) and ABX. He developed Posterior Reversible Encephalopathy Syndrome (PRES) and 

was intubated, and also found to have seizures. He stayed at [place] for a 

total of 44 days. He was felt to require rehab and was admitted to [facility] on 

10/2/19. Over the weekend he developed sepsis secondary to a UTI and was 

admitted to the inpatient unit.  





Patient presented to [facility] from [facility] with multiple pressure 

injuries including; bilateral heels, left ischium, right hip, sacrum. Also 

noted to have ecchymosis to the right ankle. He has developed deep tissue 

injuries to the right heel and buttocks with some shearing injury to the 

buttocks.





Patient seen and examined at bedside. 





Family History: Unchanged from Admission


Social History: Unchanged from Admission


Past Medical History: Unchanged from Admission





Review of Systems





- Measurements


Intake and Output: 


Intake and Output Last 24 Hours











 10/06/19 10/07/19 10/08/19 10/09/19





 06:59 06:59 06:59 06:59


 


Intake Total  550 3461 590


 


Output Total  0 2000 1250


 


Balance  550 1461 -660


 


Weight  217 lb  


 


Intake:    


 


  IV Fluids   1203 


 


    NS (0.9%)   1203 


 


  IVPB   110 110


 


    ABX - ZOSYN   110 110


 


  Medicated IV   218 


 


    Piperacillin   218 


 


  Oral  550 1930 480


 


Output:    


 


  Urine  0 1000 


 


  Rodriguez   1000 1250


 


Other:    


 


  Estimated Void   Large 


 


  # Bowel Movements  0 0 


 


  # Voids   1 














- Review of Systems


Constitutional Symptoms: 


   Negative: Fever, Other - Chills


Dermatology: Positive: Other - Multiple wounds





Objective


Active Medications: 





Acetaminophen (Tylenol Tab*)  650 mg PO Q6H PRN Reason: MILD PAIN or TEMP > 

100.4


Amlodipine Besylate (Norvasc Tab*)  10 mg PO DAILY [facility]


Aspirin (Aspirin 81 Mg Chew Tab*)  81 mg PO DAILY [facility]


Carvedilol (Coreg Tab*)  12.5 mg PO BID [facility]


Cholecalciferol (Vitamin D Tab*)  1,000 units PO DAILY [facility]


Collagenase (Santyl 250 Units/Gm Oint*)  1 applic TOPICAL DAILY [facility]


Cyanocobalamin (Vitamin B12 Tab*)  1,000 mcg PO DAILY [facility]


Docusate Sodium (Colace Cap*)  100 mg PO BID PRN Reason: CONSTIPATION


Doxazosin Mesylate (Cardura Tab*)  2 mg PO BEDTIME [facility]


Heparin Sodium (Porcine) (Heparin Vial(*))  5,000 units SUBCUT Q8HR [facility]


Piperacillin Sod/Tazobactam (Sod 3.375 gm/ Sodium Chloride)  100 mls @ 25 mls/

hr IVPB Q8H [facility]


Isosorbide Dinitrate (Isordil Tab*)  10 mg PO TID [facility]


Levetiracetam (Keppra Tab*)  500 mg PO BID [facility]


Levorphanol Tartrate (Levorphanol 2 Mg (Nf))  1 mg PO Q12HR [facility]


Magnesium Hydroxide (Milk Of Magnesia Liq*)  30 ml PO Q6H PRN Reason: 

CONSTIPATION


Pantoprazole Sodium (Protonix Tab*)  40 mg PO BID [facility]


Patiromer (Veltassa Powder*)  8.4 gm PO DAILY [facility]


   Stop: 10/08/19 23:59


Pharmacy Consult (Zosyn Per Pharmacy*)  1 note FOLLOW UP .ZOSYN PER PHARMACY [facility]


Pregabalin (Lyrica Cap(*))  50 mg PO TID [facility]


Senna (Senokot 8.6 Mg Tab*)  2 tab PO BEDTIME PRN Reason: CONSTIPATION





 Vital Signs 











  10/08/19 10/08/19





  15:30 15:31


 


Temperature  98.1 F


 


Pulse Rate  80


 


Respiratory 14 14





Rate  


 


Blood Pressure  110/37





(mmHg)  


 


O2 Sat by Pulse  96





Oximetry  











Oxygen Devices in Use Now: Nasal Cannula


Appearance: NAD, laying in bed


Ears/Nose/Mouth/Throat: Mucous Membranes Moist


Respiratory: Symmetrical Chest Expansion and Respiratory Effort


Skin: - - See skin note below


Neurological: Alert and Oriented x 3





- Nutrition: Malnutrition Diagnosis/Plan


Malnutrition Assessment by Registered Dietitian: 


Malnutrition Assessment





Clinical Characteristics         Acute,Severe


Malnutrition Assessment:         Moderate muscle/fat loss


Criteria                         Severe acute weight loss (15.9% over 1 1/2


                                 months)


                                 Severe deficit in energy intake (<50% -


                                 essentially just 1 to 2 servings Boost per day)


                                 


Malnutrition Assessment:         Ensure enlive 3 times daily (350 kcals, 20 gms


Interventions                    protein each)


                                 Trosper diet (regular at present)


                                 Selection of menu to maximize tolerance/


                                 acceptance


Malnutrition Assessment: Goals   1.  Improved oral intake to >75% to support


                                 wound healing, maintenance of lean body mass,


                                 adequate hydration w/o contributing to


                                 undesirable weight gain.


                                 2.  Improvement of K levels to WNL.


                                 3.  Evidence of wound healing and no new areas


                                 of skin breakdown.








Result Diagrams: 


 10/13/19 07:35





 10/13/19 07:35


Additional Lab and Data: 





 Above labs were pulled into the chart when edited prior to signing, please see 

below for labs from the day of wound consultation.





 Laboratory Tests











  10/07/19 10/08/19 10/08/19





  07:26 06:38 06:38


 


WBC   10.8 


 


Hgb   7.2 L 


 


Hct   21 L 


 


Plt Count   231 


 


Sodium    142


 


Potassium    5.6 H


 


Chloride    113 H


 


Carbon Dioxide    23


 


BUN    70 H


 


Creatinine    1.82 H


 


Glucose    108 H


 


C-Reactive Protein  226.12 H  








 Laboratory Tests











  19





  16:25


 


Prealbumin  22











Diagnostic Imagin. Exam Date: 19 - VL ANK/BRACHIAL INDICES





FINDINGS:  The ankle brachial index on the right was 0.39 and on the left was 

0.80. The ankle-brachial indices on the prior study were 0.93 and 0.91 

respectively. There is monophasic flow within the right posterior tibial artery 

and monophasic flow within the right dorsalis pedis artery.  There is biphasic 

flow within the left posterior tibial artery and biphasic flow within the left 

dorsalis pedis artery.





IMPRESSION:  SIGNIFICANTLY REDUCED ANKLE-BRACHIAL INDICES AND WAVEFORMS MORE 

SEVERE IN THE RIGHT LOWER EXTREMITY. CONSIDER A CT ANGIOGRAM OF THE AORTA AND 

LOWER EXTREMITIES FOR FURTHER EVALUATION.





2. Exam Date: 19 - MRI LOWER EXTREMITY RIGHT W/O





IMPRESSION:  SOFT TISSUE SWELLING AND SOFT TISSUE ULCER, NO SPECIFIC EVIDENCE 

FOR OSTEOMYELITIS.








Skin Deviation Note





- Skin Deviation Findings





Left lateral heel - There is a wound, measures 2 cm x 1.5 cm x 0.1 cm. The 

wound base with area of slough, and small amount of pink granulation tissue. 

The surrounding skin is intact. No drainage noted. 


Right medial heel - No open area. There is an area of dark purplish 

discoloration, measures 1.5 cm x 3 cm. The surrounding skin is intact with 

blanchable erythema. 


Right lateral heel - There is a blister with dark discoloration, measures 2 cm 

x 2.2 cm. The surrounding skin with slight blanchable erythema. The surrounding 

skin is intact. 


Right lateral ankle - There is an area of discoloration, measures 7.5 cm x 2 

cm. There is a small area at the distal aspect that is fluctuation, measures 1 

cm x 1.3 cm. 


Right lateral hip - Wound measures 1.2 cm x 1.2 cm, the wound base is 100% 

yellow slough. The periwound with slight erythema. The surrounding skin is 

intact. No drainage noted.


Lower back, upper sacrum - White exudate present in the natural cleft in the 

back. There is blanchable erythema. There is a natural "divot" that measures 1 

cm x 0.4 cm x 0.2 cm, this doesn't have any open skin. There is erythema 

surrounding, measures 4 cm x 3.5 cm. 


Left ischium - Wound measures 4.2 cm x 4.5 cm x 2 cm. The wound base is beefy 

red granulation tissue. The periwound with slight maceration. The wounding skin 

is intact. Serous drainage noted on old dressing. 


Buttocks - There is a superficial open area to the left upper buttock, measures 

1.5 cm x 2.5 cm x 0.1 cm. The wound base is red granulation tissue, the 

surrounding skin is intact. There is a line of dark purple discoloration, total 

area measures 16 cm x 4 cm. There is a small superficial open area at about 11 o

'clock from the anus, measures 1 cm x 1.3 cm x 0.1 cm. 


Right buttock/sacrum - Dark purplish discoloration, measures 2.5 cm x 3 cm. The 

surrounding skin is intact. 








Wound Problem/Plan


Assessment: 


Mr. Pyror is a 75 yo male with PMH significant for paraplegia secondary to 

spinal ependymoma, asthma, CAD, CKD stage 3, GERD, HLD, HTN, PVD, neurogenic 

bladder with chronic indwelling urinary catheter, BONIFACIO, hx sacral stage 4 

pressure injury s/p flap reconstruction, and diverting colostomy. He presented 

to [facility] on 19 with complaints of fever for 3 days 

and concerns of an infected pressure injury and consult with Dr. Luis Felipe Chirinos (

previously performed flap reconstruction). He was admitted to [facility] for a sacral pressure injury and underwent debridement (by Dr. Marge Giron

) and ABX. He developed Posterior Reversible Encephalopathy Syndrome (PRES) and 

was intubated, and also found to have seizures. He stayed at [place] for a 

total of 44 days. He was felt to require rehab and was admitted to [facility] on 

10/2/19. Over the weekend he developed sepsis and was admitted to the inpatient 

unit.  Patient presented to [facility] from [facility] with multiple 

pressure injuries including; bilateral heels, left ischium, right hip, sacrum. 

Also noted to have ecchymosis to the right ankle. He has developed deep tissue 

injuries to the right heel and buttocks.





1. Right medial heel stage 3 pressure injury, healed with new deep tissue 

injury. This has been present since May 2019, he started to follow with the 

[facility] for this wound in 2019. Underwent ABIs in 

August showing reduced ABIs right > left. Also had an MRI of the foot in 2019, showing no sign of osteomyelitis. He was suppose to have a CTA with 

runoff and referral to Dr. Smith, but it appears that was neither were ever 

completed. While in [facility] this wound was being treated with a 

border foam dressing. The wound is healing when compared to initial 

measurements from 19, at which time the wound measured 1.7 cm x 3 cm x 0.1 

cm. Recommend applying a border foam dressing (Optifoam), and change every 3 

days or as needed for drainage. Keep the heels elevated off the bed. Consider 

checking a prealbumin to evaluate nutritional status. Consider referral to 

Vascular outpatient as previously recommended. 


2. Right lateral ankle and right posterior/lateral heel. There is an area of 

discoloration, this appears to be ecchymosis, suspect this represents deep 

tissue injuries. While in [facility] the right lateral ankle was being 

treated with a border foam dressing. The right posterior/lateral heel deep 

tissue injury has developed over the past week. Recommend using a border foam 

dressing (Optifoam) to protect the area, but could consider leaving open to air 

as there is no open areas.


3. Left medial heel unstageable pressure injury. Unclear how long this has been 

present. Ms. Pryor reports this has been present since July, but was not 

previously documented by the [facility] in July and August when he was 

seen there last. See CHARMAINE results above. While in [facility] this wound 

was being treated with Medihoney and a border foam dressing and being changed 

every other day. Recommend Medihoney alginate followed by a border foam 

dressing (Optifoam), and change every other day or as needed for drainage. Keep 

the heels elevated off the bed. Consider checking a prealbumin to evaluate 

nutritional status.  Consider referral to Vascular outpatient. If wound 

continues to be present and not healing, should consider MRI to evaluate for 

Osteomyelitis. 


4. Left ischium stage 3 pressure injury. This has been present since May 2019, 

he started to follow with the [facility] for this wound in 

2019. While in [facility] this wound was being treated with a wound 

vac, and then changed to Santyl BID with wet to dry dressing at the time of 

discharge. This is reported to have wound cultures with MRSA and VRE while in 

[place].  He was treated with Linezolid and Zosyn, unclear for how long. 

Recommend gently packing the wound with calcium alginate rope, and cover with a 

border foam gauze (Optifoam) and change daily or as needed for drainage. 

Frequent turning and repositioning. Do not sit up in a chair for more than 1-2 

hours at a time if possible. Use Roho cushion in wheel chair when up OOB. 

Consider checking a prealbumin to evaluate nutritional status. Should be 

referred back to the [facility] (or wound clinic of choice

) at discharge. 


5. Sacral/lower back stage 2 pressure injury, healed. Unclear how long this has 

been present, but Ms. Pryor reports new since 2019. While at [facility] had a sacral MRI on 19 showing "Lumbosacral spinal canal 

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 Fracture of the L3 

vertebral body. There is perhaps slightly marrow signal abnormality within the 

posterior elements of the sacrum at the level of S3, which is nonspecific and 

may be reactive secondary to the adjacent mass, however early/mild changes 

related to osteomyelitis cannot be entirely excluded".   The wound has healed, 

and there is excoriated skin. Recommend applying barrier cream (orange top) as 

needed. If the area appears to have a fungal infection recommend discontinuing 

barrier cream and use Nystatin powder twice daily. Frequent turning and 

repositioning. Consider checking a prealbumin to evaluate nutritional status.


6. Right hip unstageble pressure injury. Unclear how long this has been present

, but developed after last seen at the wound clinic in August as not documented 

at that time (19). While at [facility] this was being treated with 

Santyl and a border foam dressing. Recommend applying Santyl to the wound base 

once daily, followed by a border foam dressing (Optifoam) and change daily or 

as needed for drainage. Frequent turning and repositioning. Do not sit up in a 

chair for more than 1-2 hours at a time if possible. Use Roho cushion in wheel 

chair when up OOB. Consider checking a prealbumin to evaluate nutritional 

status.


7. Anterior urethral injury; acquired latrogenic hypospadias. Medical device 

related pressure injury to the penis secondary to the urinary catheter. This is 

not new and has been documented by Urology in the past. Use caution to keep 

pressure off the urinary catheter tube to prevent further breakdown.


8. 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 With neurogenic bladder and a 

chronic indwelling urinary catheter. 


9. Diet. Regular diet.


10. Code Status. DNR


11. Disposition. Inpatient, disposition per Primary team. 








Is Patient a Wound Clinic Patient: Yes


Counseling and/or Coordination of Care Minutes: 50


Points of Discussion: 





TIME SPENT: Time for this wound consultation was 50 minutes and 40 minutes was 

spent at bedside with the patient and wife discussing events of the past week; 

removing dressings; assessing, measuring, and photographing the wounds; and 

reapplying new dressings





Attending: Le Wright

## 2019-10-09 LAB
ANION GAP SERPL CALC-SCNC: 5 MMOL/L (ref 2–11)
BASOPHILS # BLD AUTO: 0.1 10^3/UL (ref 0–0.2)
BUN SERPL-MCNC: 57 MG/DL (ref 6–24)
BUN/CREAT SERPL: 36.8 (ref 8–20)
CALCIUM SERPL-MCNC: 10.2 MG/DL (ref 8.6–10.3)
CHLORIDE SERPL-SCNC: 113 MMOL/L (ref 101–111)
EOSINOPHIL # BLD AUTO: 0.4 10^3/UL (ref 0–0.6)
GLUCOSE SERPL-MCNC: 83 MG/DL (ref 70–100)
HCO3 SERPL-SCNC: 23 MMOL/L (ref 22–32)
HCT VFR BLD AUTO: 20 % (ref 42–52)
HGB BLD-MCNC: 6.7 G/DL (ref 14–18)
LYMPHOCYTES # BLD AUTO: 2 10^3/UL (ref 1–4.8)
MCH RBC QN AUTO: 29 PG (ref 27–31)
MCHC RBC AUTO-ENTMCNC: 34 G/DL (ref 31–36)
MCV RBC AUTO: 86 FL (ref 80–94)
MONOCYTES # BLD AUTO: 1 10^3/UL (ref 0–0.8)
NEUTROPHILS # BLD AUTO: 4.2 10^3/UL (ref 1.5–7.7)
NRBC # BLD AUTO: 0 10^3/UL
NRBC BLD QL AUTO: 0
PLATELET # BLD AUTO: 214 10^3/UL (ref 150–450)
POTASSIUM SERPL-SCNC: 5.5 MMOL/L (ref 3.5–5)
RBC # BLD AUTO: 2.34 10^6 /UL (ref 4.18–5.48)
SODIUM SERPL-SCNC: 141 MMOL/L (ref 135–145)
WBC # BLD AUTO: 7.6 10^3/UL (ref 3.5–10.8)

## 2019-10-09 PROCEDURE — 30233N1 TRANSFUSION OF NONAUTOLOGOUS RED BLOOD CELLS INTO PERIPHERAL VEIN, PERCUTANEOUS APPROACH: ICD-10-PCS | Performed by: HOSPITALIST

## 2019-10-09 RX ADMIN — HEPARIN SODIUM SCH UNITS: 5000 INJECTION INTRAVENOUS; SUBCUTANEOUS at 21:28

## 2019-10-09 RX ADMIN — LEVETIRACETAM SCH MG: 500 TABLET, FILM COATED ORAL at 10:32

## 2019-10-09 RX ADMIN — PANTOPRAZOLE SODIUM SCH MG: 40 TABLET, DELAYED RELEASE ORAL at 21:27

## 2019-10-09 RX ADMIN — ISOSORBIDE DINITRATE SCH MG: 10 TABLET ORAL at 14:21

## 2019-10-09 RX ADMIN — PIPERACILLIN AND TAZOBACTAM SCH MLS/HR: 3; .375 INJECTION, POWDER, LYOPHILIZED, FOR SOLUTION INTRAVENOUS; PARENTERAL at 03:58

## 2019-10-09 RX ADMIN — HEPARIN SODIUM SCH UNITS: 5000 INJECTION INTRAVENOUS; SUBCUTANEOUS at 14:22

## 2019-10-09 RX ADMIN — PREGABALIN SCH MG: 50 CAPSULE ORAL at 10:30

## 2019-10-09 RX ADMIN — PIPERACILLIN AND TAZOBACTAM SCH MLS/HR: 3; .375 INJECTION, POWDER, LYOPHILIZED, FOR SOLUTION INTRAVENOUS; PARENTERAL at 14:22

## 2019-10-09 RX ADMIN — CARVEDILOL SCH MG: 6.25 TABLET, FILM COATED ORAL at 21:27

## 2019-10-09 RX ADMIN — CARVEDILOL SCH MG: 6.25 TABLET, FILM COATED ORAL at 10:29

## 2019-10-09 RX ADMIN — PREGABALIN SCH MG: 50 CAPSULE ORAL at 14:21

## 2019-10-09 RX ADMIN — PANTOPRAZOLE SODIUM SCH MG: 40 TABLET, DELAYED RELEASE ORAL at 10:35

## 2019-10-09 RX ADMIN — LEVETIRACETAM SCH MG: 500 TABLET, FILM COATED ORAL at 21:28

## 2019-10-09 RX ADMIN — CYANOCOBALAMIN TAB 500 MCG SCH MCG: 500 TAB at 10:34

## 2019-10-09 RX ADMIN — CYANOCOBALAMIN TAB 500 MCG SCH MCG: 500 TAB at 10:35

## 2019-10-09 RX ADMIN — HEPARIN SODIUM SCH UNITS: 5000 INJECTION INTRAVENOUS; SUBCUTANEOUS at 06:14

## 2019-10-09 RX ADMIN — LEVORPHANOL TARTRATE SCH MG: 2 TABLET ORAL at 10:28

## 2019-10-09 RX ADMIN — COLLAGENASE SANTYL SCH: 250 OINTMENT TOPICAL at 11:23

## 2019-10-09 RX ADMIN — ISOSORBIDE DINITRATE SCH MG: 10 TABLET ORAL at 22:22

## 2019-10-09 RX ADMIN — PIPERACILLIN AND TAZOBACTAM SCH MLS/HR: 3; .375 INJECTION, POWDER, LYOPHILIZED, FOR SOLUTION INTRAVENOUS; PARENTERAL at 21:18

## 2019-10-09 RX ADMIN — LEVORPHANOL TARTRATE SCH MG: 2 TABLET ORAL at 21:25

## 2019-10-09 RX ADMIN — ASPIRIN SCH MG: 81 TABLET, CHEWABLE ORAL at 10:35

## 2019-10-09 RX ADMIN — ISOSORBIDE DINITRATE SCH MG: 10 TABLET ORAL at 10:33

## 2019-10-09 RX ADMIN — PREGABALIN SCH MG: 50 CAPSULE ORAL at 21:26

## 2019-10-09 RX ADMIN — Medication SCH UNITS: at 11:17

## 2019-10-09 RX ADMIN — AMLODIPINE BESYLATE SCH MG: 5 TABLET ORAL at 10:33

## 2019-10-09 RX ADMIN — DOXAZOSIN MESYLATE SCH MG: 2 TABLET ORAL at 21:26

## 2019-10-09 NOTE — PN
Subjective


Date of Service: 10/09/19


Interval History: 





Pt see with wife by the bedside. Much more awake and interactive today. no 

complaints


Family History: Unchanged from Admission


Social History: Unchanged from Admission


Past Medical History: Unchanged from Admission





Objective


Active Medications: 








Acetaminophen (Tylenol Tab*)  650 mg PO Q6H PRN


   PRN Reason: MILD PAIN or TEMP > 100.4


Amlodipine Besylate (Norvasc Tab*)  10 mg PO DAILY [facility]


   Last Admin: 10/09/19 10:33 Dose:  10 mg


Aspirin (Aspirin 81 Mg Chew Tab*)  81 mg PO DAILY [facility]


   Last Admin: 10/09/19 10:35 Dose:  81 mg


Carvedilol (Coreg Tab*)  12.5 mg PO BID [facility]


   Last Admin: 10/09/19 10:29 Dose:  12.5 mg


Cholecalciferol (Vitamin D Tab*)  1,000 units PO DAILY [facility]


   Last Admin: 10/09/19 11:17 Dose:  1,000 units


Collagenase (Santyl 250 Units/Gm Oint*)  1 applic TOPICAL DAILY [facility]


   Last Admin: 10/09/19 11:23 Dose:  Not Given


Cyanocobalamin (Vitamin B12 Tab*)  1,000 mcg PO DAILY [facility]


   Last Admin: 10/09/19 10:35 Dose:  1,000 mcg


Docusate Sodium (Colace Cap*)  100 mg PO BID PRN


   PRN Reason: CONSTIPATION


Doxazosin Mesylate (Cardura Tab*)  2 mg PO BEDTIME [facility]


   Last Admin: 10/08/19 23:06 Dose:  2 mg


Heparin Sodium (Porcine) (Heparin Vial(*))  5,000 units SUBCUT Q8HR [facility]


   Last Admin: 10/09/19 06:14 Dose:  5,000 units


Piperacillin Sod/Tazobactam (Sod 3.375 gm/ Sodium Chloride)  100 mls @ 25 mls/

hr IVPB Q8H [facility]


   Last Admin: 10/09/19 03:58 Dose:  25 mls/hr


Isosorbide Dinitrate (Isordil Tab*)  10 mg PO TID [facility]


   Last Admin: 10/09/19 10:33 Dose:  10 mg


Levetiracetam (Keppra Tab*)  500 mg PO BID [facility]


   Last Admin: 10/09/19 10:32 Dose:  500 mg


Levorphanol Tartrate (Levorphanol 2 Mg (Nf))  1 mg PO Q12HR [facility]


   Last Admin: 10/09/19 10:28 Dose:  1 mg


Magnesium Hydroxide (Milk Of Magnesia Liq*)  30 ml PO Q6H PRN


   PRN Reason: CONSTIPATION


Pantoprazole Sodium (Protonix Tab*)  40 mg PO BID [facility]


   Last Admin: 10/09/19 10:35 Dose:  40 mg


Pharmacy Consult (Zosyn Per Pharmacy*)  1 note FOLLOW UP .ZOSYN PER PHARMACY [facility]


Pregabalin (Lyrica Cap(*))  50 mg PO TID [facility]


   Last Admin: 10/09/19 10:30 Dose:  50 mg


Senna (Senokot 8.6 Mg Tab*)  2 tab PO BEDTIME PRN


   PRN Reason: CONSTIPATION








 Vital Signs - 8 hr











  10/09/19 10/09/19 10/09/19





  04:21 08:00 10:30


 


Temperature 97.3 F 98.4 F 


 


Pulse Rate 65 70 


 


Respiratory 19 12 16





Rate   


 


Blood Pressure 132/48 128/58 





(mmHg)   


 


O2 Sat by Pulse 96 99 





Oximetry   














  10/09/19





  12:00


 


Temperature 98.5 F


 


Pulse Rate 77


 


Respiratory 12





Rate 


 


Blood Pressure 128/55





(mmHg) 


 


O2 Sat by Pulse 99





Oximetry 











Oxygen Devices in Use Now: Nasal Cannula


Appearance: 75 yo m in nAD, aAOx2


Eyes: No Scleral Icterus, PERRLA


Ears/Nose/Mouth/Throat: NL Teeth, Lips, Gums, Mucous Membranes Moist


Neck: NL Appearance and Movements; NL JVP, Trachea Midline


Respiratory: Symmetrical Chest Expansion and Respiratory Effort, Clear to 

Auscultation


Cardiovascular: NL Sounds; No Murmurs; No JVD, RRR


Abdominal: NL Sounds; No Tenderness; No Distention, - - colostomy in place


Lymphatic: No Cervical Adenopathy


Extremities: No Edema


Skin: No Nodules or Sclerosis, - - muliple decubiti on sacrum and b/l ischial 

region, stage 1-2 measuring 2-3 cm. On deep tunnel like on left ischium-stage 3

, with packing in place. b/l heel decubiti unchanged


Neurological: - - paraplegia at baseline, speech clear





- Nutrition: Malnutrition Diagnosis/Plan


Malnutrition Assessment by Registered Dietitian: 


Malnutrition Assessment





Clinical Characteristics         Acute,Severe


Malnutrition Assessment:         Moderate muscle/fat loss


Criteria                         Severe acute weight loss (15.9% over 1 1/2


                                 months)


                                 Severe deficit in energy intake (<50% -


                                 essentially just 1 to 2 servings Boost per day)


                                 


Malnutrition Assessment:         Ensure enlive 3 times daily (350 kcals, 20 gms


Interventions                    protein each)


                                 [place] diet (regular at present)


                                 Selection of menu to maximize tolerance/


                                 acceptance


Malnutrition Assessment: Goals   1.  Improved oral intake to >75% to support


                                 wound healing, maintenance of lean body mass,


                                 adequate hydration w/o contributing to


                                 undesirable weight gain.


                                 2.  Improvement of K levels to WNL.


                                 3.  Evidence of wound healing and no new areas


                                 of skin breakdown.








Result Diagrams: 


 10/09/19 05:36





 10/09/19 05:36


Additional Lab and Data: 





 Laboratory Results - last 24 hr











  10/07/19 10/07/19 10/07/19





  07:26 07:26 16:20


 


WBC   11.8 H 


 


RBC   2.56 L 


 


Hgb   7.4 L 


 


Hct   23 L 


 


MCV   88 


 


MCH   29 


 


MCHC   33 


 


RDW   23 H 


 


Plt Count   236 


 


MPV   8.2 


 


Neut % (Auto)   72.6 


 


Lymph % (Auto)   11.0 


 


Mono % (Auto)   12.4 


 


Eos % (Auto)   3.1 


 


Baso % (Auto)   0.9 


 


Absolute Neuts (auto)   8.6 H 


 


Absolute Lymphs (auto)   1.3 


 


Absolute Monos (auto)   1.5 H 


 


Absolute Eos (auto)   0.4 


 


Absolute Basos (auto)   0.1 


 


Absolute Nucleated RBC   0.0 


 


Nucleated RBC %   0.1 


 


Hem Pathologist Commnt    


 


Sodium  140  


 


Potassium  5.5 H  


 


Chloride  111  


 


Carbon Dioxide  21 L  


 


Anion Gap  8  


 


BUN  80 H  


 


Creatinine  2.07 H  


 


Est GFR ( Amer)  38.3  


 


Est GFR (Non-Af Amer)  31.6  


 


BUN/Creatinine Ratio  38.6 H  


 


Glucose  102 H  


 


Calcium  10.5 H  


 


C-Reactive Protein  226.12 H  


 


B-Natriuretic Peptide   


 


Urine Color    Yellow


 


Urine Appearance    Cloudy


 


Urine pH    5.0


 


Ur Specific Gravity    1.016


 


Urine Protein    Negative


 


Urine Ketones    Negative


 


Urine Blood    2+ A


 


Urine Nitrate    Negative


 


Urine Bilirubin    Negative


 


Urine Urobilinogen    Negative


 


Ur Leukocyte Esterase    3+ A


 


Urine WBC (Auto)    3+(>20/hpf) A


 


Urine RBC (Auto)    3+(>10/hpf) A


 


Urine Bacteria    1+ A


 


Urine Glucose    Negative














  10/07/19





  16:24


 


WBC 


 


RBC 


 


Hgb 


 


Hct 


 


MCV 


 


MCH 


 


MCHC 


 


RDW 


 


Plt Count 


 


MPV 


 


Neut % (Auto) 


 


Lymph % (Auto) 


 


Mono % (Auto) 


 


Eos % (Auto) 


 


Baso % (Auto) 


 


Absolute Neuts (auto) 


 


Absolute Lymphs (auto) 


 


Absolute Monos (auto) 


 


Absolute Eos (auto) 


 


Absolute Basos (auto) 


 


Absolute Nucleated RBC 


 


Nucleated RBC % 


 


Hem Pathologist Commnt 


 


Sodium 


 


Potassium 


 


Chloride 


 


Carbon Dioxide 


 


Anion Gap 


 


BUN 


 


Creatinine 


 


Est GFR ( Amer) 


 


Est GFR (Non-Af Amer) 


 


BUN/Creatinine Ratio 


 


Glucose 


 


Calcium 


 


C-Reactive Protein 


 


B-Natriuretic Peptide  258 H


 


Urine Color 


 


Urine Appearance 


 


Urine pH 


 


Ur Specific Gravity 


 


Urine Protein 


 


Urine Ketones 


 


Urine Blood 


 


Urine Nitrate 


 


Urine Bilirubin 


 


Urine Urobilinogen 


 


Ur Leukocyte Esterase 


 


Urine WBC (Auto) 


 


Urine RBC (Auto) 


 


Urine Bacteria 


 


Urine Glucose 











Diagnostic Imagin. Exam Date: 19 - VL ANK/BRACHIAL INDICES





FINDINGS:  The ankle brachial index on the right was 0.39 and on the left was 

0.80. The ankle-brachial indices on the prior study were 0.93 and 0.91 

respectively. There is monophasic flow within the right posterior tibial artery 

and monophasic flow within the right dorsalis pedis artery.  There is biphasic 

flow within the left posterior tibial artery and biphasic flow within the left 

dorsalis pedis artery.





IMPRESSION:  SIGNIFICANTLY REDUCED ANKLE-BRACHIAL INDICES AND WAVEFORMS MORE 

SEVERE IN THE RIGHT LOWER EXTREMITY. CONSIDER A CT ANGIOGRAM OF THE AORTA AND 

LOWER EXTREMITIES FOR FURTHER EVALUATION.





2. Exam Date: 19 - MRI LOWER EXTREMITY RIGHT W/O





IMPRESSION:  SOFT TISSUE SWELLING AND SOFT TISSUE ULCER, NO SPECIFIC EVIDENCE 

FOR OSTEOMYELITIS.








Assess/Plan/Problems-Billing


Assessment: 





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 

bladder, chronic sacral wounds, s/p colostomy for fecal incontinence, CAD, 

CKDIII, with recent 7 week hospitalization for infected ischium wound c/b 

seizures (attributed to linezolid), PRES and ARF. Transferred from [facility] 10/6 

for sepsis 2/2 klebsiella CAUTI. 





- Patient Problems


(1) Catheter-associated urinary tract infection


Comment: Urine culture positive for pseudomonas and klebsiella- 75,000-100,000 

CFU. Continue zosyn for now.


Catheter was changed 10/7/19. 


appreciate ID consult


sepsis at admission-resolved   





(2) Chronic pain


Comment: - Continue levorphanol    





(3) Pressure ulcer of sacral region, stage 3


Comment: Pressure ulcer over ischium. Overall improving per report.  He was 

initially hospitalized in [place] secondary to infected decubitus. 


wound care consult appreciated   





(4) Seizures


Comment: Pt diagnosed with seizures during admission in [place]-? secondary 

to linezolid. Tapering off antiepileptics.


no observed seizures 


Continue to monitor. May need neuro evaluation if any concerns for seizure.    





(5) Anemia


Comment: H/H much lower than in 2019 . Likely anemia related to his severe 

acute illness over the last 2 months and iron studies consistent with ACD.


today Hb <7, will transfuse 1 u PRBC   





(6) CKD (chronic kidney disease), stage III


Comment: Baseline creatinine difficult to determine but likely around 1.5. Now 

elevated (likely secondary to severe acute illness over last 2 months) but very 

slowly trending down. K elevated . Patiromer did not change the potassium level 

significantly. will place pt on K low diet.   





(7) HTN (hypertension)


Comment: Pt diagnosed with PRESS during hospitalization in [place]. BP has 

been stable 


Continue amlodipine


 coreg 12.5mg BID


continue cardura


continue isordil 


    





(8) Neurogenic bladder


Comment: 


- Rodriguez care; Rodriguez changed due to UTI this hospital stay   





(9) DVT prophylaxis


Comment: HSQ   


Status and Disposition: 





medicine inpatient. awaiting PMRU eval for possible transfer back

## 2019-10-10 LAB
ANION GAP SERPL CALC-SCNC: 4 MMOL/L (ref 2–11)
BASOPHILS # BLD AUTO: 0.1 10^3/UL (ref 0–0.2)
BUN SERPL-MCNC: 50 MG/DL (ref 6–24)
BUN/CREAT SERPL: 36 (ref 8–20)
CALCIUM SERPL-MCNC: 10.2 MG/DL (ref 8.6–10.3)
CHLORIDE SERPL-SCNC: 113 MMOL/L (ref 101–111)
EOSINOPHIL # BLD AUTO: 0.4 10^3/UL (ref 0–0.6)
GLUCOSE SERPL-MCNC: 87 MG/DL (ref 70–100)
HCO3 SERPL-SCNC: 25 MMOL/L (ref 22–32)
HCT VFR BLD AUTO: 24 % (ref 42–52)
HGB BLD-MCNC: 7.9 G/DL (ref 14–18)
LYMPHOCYTES # BLD AUTO: 2 10^3/UL (ref 1–4.8)
MCH RBC QN AUTO: 28 PG (ref 27–31)
MCHC RBC AUTO-ENTMCNC: 33 G/DL (ref 31–36)
MCV RBC AUTO: 85 FL (ref 80–94)
MONOCYTES # BLD AUTO: 0.9 10^3/UL (ref 0–0.8)
NEUTROPHILS # BLD AUTO: 4 10^3/UL (ref 1.5–7.7)
NRBC # BLD AUTO: 0 10^3/UL
NRBC BLD QL AUTO: 0
PLATELET # BLD AUTO: 219 10^3/UL (ref 150–450)
POTASSIUM SERPL-SCNC: 5.1 MMOL/L (ref 3.5–5)
RBC # BLD AUTO: 2.81 10^6 /UL (ref 4.18–5.48)
SODIUM SERPL-SCNC: 142 MMOL/L (ref 135–145)
WBC # BLD AUTO: 7.4 10^3/UL (ref 3.5–10.8)

## 2019-10-10 RX ADMIN — HEPARIN SODIUM SCH UNITS: 5000 INJECTION INTRAVENOUS; SUBCUTANEOUS at 21:31

## 2019-10-10 RX ADMIN — COLLAGENASE SANTYL SCH APPLIC: 250 OINTMENT TOPICAL at 09:03

## 2019-10-10 RX ADMIN — CARVEDILOL SCH MG: 6.25 TABLET, FILM COATED ORAL at 21:31

## 2019-10-10 RX ADMIN — LEVETIRACETAM SCH MG: 500 TABLET, FILM COATED ORAL at 09:00

## 2019-10-10 RX ADMIN — DOXAZOSIN MESYLATE SCH MG: 2 TABLET ORAL at 21:31

## 2019-10-10 RX ADMIN — Medication SCH UNITS: at 08:58

## 2019-10-10 RX ADMIN — HEPARIN SODIUM SCH UNITS: 5000 INJECTION INTRAVENOUS; SUBCUTANEOUS at 14:26

## 2019-10-10 RX ADMIN — PIPERACILLIN AND TAZOBACTAM SCH MLS/HR: 3; .375 INJECTION, POWDER, LYOPHILIZED, FOR SOLUTION INTRAVENOUS; PARENTERAL at 04:55

## 2019-10-10 RX ADMIN — CARVEDILOL SCH MG: 6.25 TABLET, FILM COATED ORAL at 08:58

## 2019-10-10 RX ADMIN — PANTOPRAZOLE SODIUM SCH MG: 40 TABLET, DELAYED RELEASE ORAL at 09:00

## 2019-10-10 RX ADMIN — ISOSORBIDE DINITRATE SCH MG: 10 TABLET ORAL at 21:31

## 2019-10-10 RX ADMIN — PREGABALIN SCH MG: 50 CAPSULE ORAL at 21:31

## 2019-10-10 RX ADMIN — LEVORPHANOL TARTRATE SCH MG: 2 TABLET ORAL at 21:45

## 2019-10-10 RX ADMIN — Medication SCH MG: at 16:53

## 2019-10-10 RX ADMIN — PIPERACILLIN AND TAZOBACTAM SCH MLS/HR: 3; .375 INJECTION, POWDER, LYOPHILIZED, FOR SOLUTION INTRAVENOUS; PARENTERAL at 12:21

## 2019-10-10 RX ADMIN — PIPERACILLIN AND TAZOBACTAM SCH MLS/HR: 3; .375 INJECTION, POWDER, LYOPHILIZED, FOR SOLUTION INTRAVENOUS; PARENTERAL at 21:30

## 2019-10-10 RX ADMIN — LEVETIRACETAM SCH MG: 500 TABLET, FILM COATED ORAL at 21:31

## 2019-10-10 RX ADMIN — ASPIRIN SCH MG: 81 TABLET, CHEWABLE ORAL at 09:00

## 2019-10-10 RX ADMIN — PREGABALIN SCH MG: 50 CAPSULE ORAL at 09:00

## 2019-10-10 RX ADMIN — PREGABALIN SCH MG: 50 CAPSULE ORAL at 14:26

## 2019-10-10 RX ADMIN — AMLODIPINE BESYLATE SCH MG: 5 TABLET ORAL at 08:59

## 2019-10-10 RX ADMIN — ISOSORBIDE DINITRATE SCH MG: 10 TABLET ORAL at 08:59

## 2019-10-10 RX ADMIN — PANTOPRAZOLE SODIUM SCH MG: 40 TABLET, DELAYED RELEASE ORAL at 21:31

## 2019-10-10 RX ADMIN — HEPARIN SODIUM SCH UNITS: 5000 INJECTION INTRAVENOUS; SUBCUTANEOUS at 06:12

## 2019-10-10 RX ADMIN — ISOSORBIDE DINITRATE SCH MG: 10 TABLET ORAL at 14:26

## 2019-10-10 RX ADMIN — LEVORPHANOL TARTRATE SCH MG: 2 TABLET ORAL at 08:57

## 2019-10-10 NOTE — PN
Subjective


Date of Service: 10/10/19


Interval History: 





Pt is more awake today. As per d/w wife: able to eat by himself. No new 

complaints


Family History: Unchanged from Admission


Social History: Unchanged from Admission


Past Medical History: Unchanged from Admission





Objective


Active Medications: 








Acetaminophen (Tylenol Tab*)  650 mg PO Q6H PRN


   PRN Reason: MILD PAIN or TEMP > 100.4


Amlodipine Besylate (Norvasc Tab*)  10 mg PO DAILY [facility]


   Last Admin: 10/10/19 08:59 Dose:  10 mg


Aspirin (Aspirin 81 Mg Chew Tab*)  81 mg PO DAILY [facility]


   Last Admin: 10/10/19 09:00 Dose:  81 mg


Carvedilol (Coreg Tab*)  12.5 mg PO BID [facility]


   Last Admin: 10/10/19 08:58 Dose:  12.5 mg


Cholecalciferol (Vitamin D Tab*)  1,000 units PO DAILY [facility]


   Last Admin: 10/10/19 08:58 Dose:  1,000 units


Collagenase (Santyl 250 Units/Gm Oint*)  1 applic TOPICAL DAILY [facility]


   Last Admin: 10/10/19 09:03 Dose:  1 applic


Cyanocobalamin (Vitamin B12 Tab*)  1,000 mcg PO DAILY [facility]


   Last Admin: 10/09/19 10:35 Dose:  1,000 mcg


Docusate Sodium (Colace Cap*)  100 mg PO BID PRN


   PRN Reason: CONSTIPATION


Doxazosin Mesylate (Cardura Tab*)  2 mg PO BEDTIME [facility]


   Last Admin: 10/09/19 21:26 Dose:  2 mg


Heparin Sodium (Porcine) (Heparin Vial(*))  5,000 units SUBCUT Q8HR [facility]


   Last Admin: 10/10/19 14:26 Dose:  5,000 units


Piperacillin Sod/Tazobactam (Sod 3.375 gm/ Sodium Chloride)  100 mls @ 25 mls/

hr IVPB Q8H [facility]


   Last Admin: 10/10/19 12:21 Dose:  25 mls/hr


Isosorbide Dinitrate (Isordil Tab*)  10 mg PO TID [facility]


   Last Admin: 10/10/19 14:26 Dose:  10 mg


Levetiracetam (Keppra Tab*)  500 mg PO BID [facility]


   Last Admin: 10/10/19 09:00 Dose:  500 mg


Levorphanol Tartrate (Levorphanol 2 Mg (Nf))  1 mg PO Q12HR [facility]


   Last Admin: 10/10/19 08:57 Dose:  1 mg


Magnesium Hydroxide (Milk Of Magnesia Liq*)  30 ml PO Q6H PRN


   PRN Reason: CONSTIPATION


Pantoprazole Sodium (Protonix Tab*)  40 mg PO BID [facility]


   Last Admin: 10/10/19 09:00 Dose:  40 mg


Pharmacy Consult (Zosyn Per Pharmacy*)  1 note FOLLOW UP .ZOSYN PER PHARMACY [facility]


Pregabalin (Lyrica Cap(*))  50 mg PO TID [facility]


   Last Admin: 10/10/19 14:26 Dose:  50 mg


Senna (Senokot 8.6 Mg Tab*)  2 tab PO BEDTIME PRN


   PRN Reason: CONSTIPATION








 Vital Signs - 8 hr











  10/10/19 10/10/19 10/10/19





  08:10 09:00 11:47


 


Temperature 97.3 F  98.2 F


 


Pulse Rate 64  74


 


Respiratory 18 18 20





Rate   


 


Blood Pressure 125/41  120/54





(mmHg)   


 


O2 Sat by Pulse 99  96





Oximetry   














  10/10/19 10/10/19





  12:09 14:26


 


Temperature  


 


Pulse Rate  


 


Respiratory 17 19





Rate  


 


Blood Pressure  





(mmHg)  


 


O2 Sat by Pulse  





Oximetry  











Oxygen Devices in Use Now: Nasal Cannula


Appearance: 75 yo M in nAD, AAOx3


Eyes: No Scleral Icterus, PERRLA


Ears/Nose/Mouth/Throat: NL Teeth, Lips, Gums, Mucous Membranes Moist


Neck: NL Appearance and Movements; NL JVP, No Thyroid Enlargement, Masses


Respiratory: Symmetrical Chest Expansion and Respiratory Effort, Clear to 

Auscultation


Cardiovascular: NL Sounds; No Murmurs; No JVD, RRR


Abdominal: NL Sounds; No Tenderness; No Distention, - - colostomy in place


Extremities: - - heel decubiti unchanged


Skin: - - secral and left ischial decubiti unchanged


Neurological: - - paraplegia-chronic





- Nutrition: Malnutrition Diagnosis/Plan


Malnutrition Assessment by Registered Dietitian: 


Malnutrition Assessment





Clinical Characteristics         Acute,Severe


Malnutrition Assessment:         Moderate muscle/fat loss


Criteria                         Severe acute weight loss (15.9% over 1 1/2


                                 months)


                                 Severe deficit in energy intake (<50% -


                                 essentially just 1 to 2 servings Boost per day)


                                 


Malnutrition Assessment:         Ensure enlive 3 times daily (350 kcals, 20 gms


Interventions                    protein each)


                                 [place] diet (regular at present)


                                 Selection of menu to maximize tolerance/


                                 acceptance


Malnutrition Assessment: Goals   1.  Improved oral intake to >75% to support


                                 wound healing, maintenance of lean body mass,


                                 adequate hydration w/o contributing to


                                 undesirable weight gain.


                                 2.  Improvement of K levels to WNL.


                                 3.  Evidence of wound healing and no new areas


                                 of skin breakdown.








Result Diagrams: 


 10/10/19 05:20





 10/10/19 05:20


Additional Lab and Data: 





 Laboratory Results - last 24 hr











  10/07/19 10/07/19 10/07/19





  07:26 07:26 16:20


 


WBC   11.8 H 


 


RBC   2.56 L 


 


Hgb   7.4 L 


 


Hct   23 L 


 


MCV   88 


 


MCH   29 


 


MCHC   33 


 


RDW   23 H 


 


Plt Count   236 


 


MPV   8.2 


 


Neut % (Auto)   72.6 


 


Lymph % (Auto)   11.0 


 


Mono % (Auto)   12.4 


 


Eos % (Auto)   3.1 


 


Baso % (Auto)   0.9 


 


Absolute Neuts (auto)   8.6 H 


 


Absolute Lymphs (auto)   1.3 


 


Absolute Monos (auto)   1.5 H 


 


Absolute Eos (auto)   0.4 


 


Absolute Basos (auto)   0.1 


 


Absolute Nucleated RBC   0.0 


 


Nucleated RBC %   0.1 


 


Hem Pathologist Commnt    


 


Sodium  140  


 


Potassium  5.5 H  


 


Chloride  111  


 


Carbon Dioxide  21 L  


 


Anion Gap  8  


 


BUN  80 H  


 


Creatinine  2.07 H  


 


Est GFR ( Amer)  38.3  


 


Est GFR (Non-Af Amer)  31.6  


 


BUN/Creatinine Ratio  38.6 H  


 


Glucose  102 H  


 


Calcium  10.5 H  


 


C-Reactive Protein  226.12 H  


 


B-Natriuretic Peptide   


 


Urine Color    Yellow


 


Urine Appearance    Cloudy


 


Urine pH    5.0


 


Ur Specific Gravity    1.016


 


Urine Protein    Negative


 


Urine Ketones    Negative


 


Urine Blood    2+ A


 


Urine Nitrate    Negative


 


Urine Bilirubin    Negative


 


Urine Urobilinogen    Negative


 


Ur Leukocyte Esterase    3+ A


 


Urine WBC (Auto)    3+(>20/hpf) A


 


Urine RBC (Auto)    3+(>10/hpf) A


 


Urine Bacteria    1+ A


 


Urine Glucose    Negative














  10/07/19





  16:24


 


WBC 


 


RBC 


 


Hgb 


 


Hct 


 


MCV 


 


MCH 


 


MCHC 


 


RDW 


 


Plt Count 


 


MPV 


 


Neut % (Auto) 


 


Lymph % (Auto) 


 


Mono % (Auto) 


 


Eos % (Auto) 


 


Baso % (Auto) 


 


Absolute Neuts (auto) 


 


Absolute Lymphs (auto) 


 


Absolute Monos (auto) 


 


Absolute Eos (auto) 


 


Absolute Basos (auto) 


 


Absolute Nucleated RBC 


 


Nucleated RBC % 


 


Hem Pathologist Commnt 


 


Sodium 


 


Potassium 


 


Chloride 


 


Carbon Dioxide 


 


Anion Gap 


 


BUN 


 


Creatinine 


 


Est GFR ( Amer) 


 


Est GFR (Non-Af Amer) 


 


BUN/Creatinine Ratio 


 


Glucose 


 


Calcium 


 


C-Reactive Protein 


 


B-Natriuretic Peptide  258 H


 


Urine Color 


 


Urine Appearance 


 


Urine pH 


 


Ur Specific Gravity 


 


Urine Protein 


 


Urine Ketones 


 


Urine Blood 


 


Urine Nitrate 


 


Urine Bilirubin 


 


Urine Urobilinogen 


 


Ur Leukocyte Esterase 


 


Urine WBC (Auto) 


 


Urine RBC (Auto) 


 


Urine Bacteria 


 


Urine Glucose 











Microbiology and Other Data: 


 Microbiology











 10/07/19 16:20 Urine Culture - Final





 Urine    Diana Albicans





    Normal Stacy











Diagnostic Imagin. Exam Date: 19 - VL ANK/BRACHIAL INDICES





FINDINGS:  The ankle brachial index on the right was 0.39 and on the left was 

0.80. The ankle-brachial indices on the prior study were 0.93 and 0.91 

respectively. There is monophasic flow within the right posterior tibial artery 

and monophasic flow within the right dorsalis pedis artery.  There is biphasic 

flow within the left posterior tibial artery and biphasic flow within the left 

dorsalis pedis artery.





IMPRESSION:  SIGNIFICANTLY REDUCED ANKLE-BRACHIAL INDICES AND WAVEFORMS MORE 

SEVERE IN THE RIGHT LOWER EXTREMITY. CONSIDER A CT ANGIOGRAM OF THE AORTA AND 

LOWER EXTREMITIES FOR FURTHER EVALUATION.





2. Exam Date: 19 - MRI LOWER EXTREMITY RIGHT W/O





IMPRESSION:  SOFT TISSUE SWELLING AND SOFT TISSUE ULCER, NO SPECIFIC EVIDENCE 

FOR OSTEOMYELITIS.








Assess/Plan/Problems-Billing


Assessment: 





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 

bladder, chronic sacral wounds, s/p colostomy for fecal incontinence, CAD, 

CKDIII, with recent 7 week hospitalization for infected ischium wound c/b 

seizures (attributed to linezolid), PRES and ARF. Transferred from [facility] 10/6 

for sepsis 2/2 klebsiella CAUTI. 





- Patient Problems


(1) Catheter-associated urinary tract infection


Comment: Urine culture positive for pseudomonas and klebsiella- 75,000-100,000 

CFU. Continue zosyn for next 2 days-as per d/w Dr. Nath -last dose on Sat 10

/12/19 night


Catheter was changed 10/7/19. 


appreciate ID consult


sepsis at admission-resolved   





(2) Chronic pain


Comment: - Continue levorphanol    





(3) Pressure ulcer of sacral region, stage 3


Comment: Pressure ulcer over ischium. Overall improving per report.  He was 

initially hospitalized in [place] secondary to infected decubitus. 


wound care consult appreciated   





(4) Seizures


Comment: Pt diagnosed with seizures during admission in [place]-? secondary 

to linezolid. Cont on antiepileptics.


no observed seizures 


Continue to monitor. May need neuro evaluation if any concerns for seizure.    





(5) Anemia


Comment: H/H much lower than in 2019 . Likely anemia related to his severe 

acute illness over the last 2 months and iron studies consistent with ACD.


 transfused 1 u PRBC on 10/9/19


will not do bloodwork tomorrow to minimize blood loss   





(6) CKD (chronic kidney disease), stage III


Comment: Baseline creatinine difficult to determine but likely around 1.5. 

Elevated at admission (likely secondary to severe acute illness over last 2 

months) but very slowly trending down. K elevated . Patiromer did not change 

the potassium level significantly. Placed pt on K low diet.   





(7) HTN (hypertension)


Comment: Pt diagnosed with PRESS during hospitalization in [place]. BP has 

been stable 


Continue amlodipine


 coreg 12.5mg BID


continue cardura


continue isordil 


    





(8) Neurogenic bladder


Comment: 


- Rodriguez care; Rodriguez changed due to UTI this hospital stay   





(9) DVT prophylaxis


Comment: HSQ   


Status and Disposition: 





medicine inpatient. awaiting PMRU eval for possible transfer back

## 2019-10-11 RX ADMIN — PANTOPRAZOLE SODIUM SCH MG: 40 TABLET, DELAYED RELEASE ORAL at 10:43

## 2019-10-11 RX ADMIN — ACETAMINOPHEN PRN MG: 325 TABLET ORAL at 16:49

## 2019-10-11 RX ADMIN — PIPERACILLIN AND TAZOBACTAM SCH MLS/HR: 3; .375 INJECTION, POWDER, LYOPHILIZED, FOR SOLUTION INTRAVENOUS; PARENTERAL at 20:48

## 2019-10-11 RX ADMIN — PANTOPRAZOLE SODIUM SCH MG: 40 TABLET, DELAYED RELEASE ORAL at 20:47

## 2019-10-11 RX ADMIN — Medication SCH MG: at 10:44

## 2019-10-11 RX ADMIN — LEVORPHANOL TARTRATE SCH: 2 TABLET ORAL at 12:58

## 2019-10-11 RX ADMIN — CARVEDILOL SCH MG: 6.25 TABLET, FILM COATED ORAL at 20:47

## 2019-10-11 RX ADMIN — LEVETIRACETAM SCH MG: 500 TABLET, FILM COATED ORAL at 10:45

## 2019-10-11 RX ADMIN — ISOSORBIDE DINITRATE SCH MG: 10 TABLET ORAL at 20:47

## 2019-10-11 RX ADMIN — CYANOCOBALAMIN TAB 500 MCG SCH MCG: 500 TAB at 10:44

## 2019-10-11 RX ADMIN — PIPERACILLIN AND TAZOBACTAM SCH MLS/HR: 3; .375 INJECTION, POWDER, LYOPHILIZED, FOR SOLUTION INTRAVENOUS; PARENTERAL at 06:26

## 2019-10-11 RX ADMIN — CARVEDILOL SCH MG: 6.25 TABLET, FILM COATED ORAL at 10:43

## 2019-10-11 RX ADMIN — HEPARIN SODIUM SCH UNITS: 5000 INJECTION INTRAVENOUS; SUBCUTANEOUS at 16:42

## 2019-10-11 RX ADMIN — DOXAZOSIN MESYLATE SCH MG: 2 TABLET ORAL at 20:46

## 2019-10-11 RX ADMIN — PREGABALIN SCH MG: 50 CAPSULE ORAL at 20:48

## 2019-10-11 RX ADMIN — LEVETIRACETAM SCH MG: 500 TABLET, FILM COATED ORAL at 20:46

## 2019-10-11 RX ADMIN — PREGABALIN SCH MG: 50 CAPSULE ORAL at 16:42

## 2019-10-11 RX ADMIN — AMLODIPINE BESYLATE SCH MG: 5 TABLET ORAL at 10:44

## 2019-10-11 RX ADMIN — HEPARIN SODIUM SCH UNITS: 5000 INJECTION INTRAVENOUS; SUBCUTANEOUS at 20:46

## 2019-10-11 RX ADMIN — ISOSORBIDE DINITRATE SCH MG: 10 TABLET ORAL at 10:45

## 2019-10-11 RX ADMIN — LEVORPHANOL TARTRATE SCH MG: 2 TABLET ORAL at 11:53

## 2019-10-11 RX ADMIN — PIPERACILLIN AND TAZOBACTAM SCH MLS/HR: 3; .375 INJECTION, POWDER, LYOPHILIZED, FOR SOLUTION INTRAVENOUS; PARENTERAL at 16:42

## 2019-10-11 RX ADMIN — COLLAGENASE SANTYL SCH APPLIC: 250 OINTMENT TOPICAL at 16:20

## 2019-10-11 RX ADMIN — PREGABALIN SCH MG: 50 CAPSULE ORAL at 10:45

## 2019-10-11 RX ADMIN — HEPARIN SODIUM SCH UNITS: 5000 INJECTION INTRAVENOUS; SUBCUTANEOUS at 06:28

## 2019-10-11 RX ADMIN — ASPIRIN SCH MG: 81 TABLET, CHEWABLE ORAL at 10:45

## 2019-10-11 RX ADMIN — ISOSORBIDE DINITRATE SCH MG: 10 TABLET ORAL at 16:41

## 2019-10-11 RX ADMIN — Medication SCH UNITS: at 10:43

## 2019-10-11 RX ADMIN — LEVORPHANOL TARTRATE SCH MG: 2 TABLET ORAL at 20:46

## 2019-10-11 NOTE — PN
Subjective


Date of Service: 10/11/19


Interval History: 





HD 5 on 10/11


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

, chronic sacral wounds, s/p colostomy for fecal incontinence, CAD, CKDIII, 

with recent 7 week hospitalization at [facility] for infected ischium wound c/b 

seizures (attributed to linezolid), PRES and ARF, sent to [facility] for rehab but 

readmitted to hospital 10/6 for sepsis 2/2 klebsiella CAUTI. 





VSS overnight, no acute issues





This morning: Sleepy but pleasant, wife at bedside, c/o "total body pain" and 

wife reports his home medications were lowered. Otherwise eager to trial eating

, willing to work with PT. 


Family History: Unchanged from Admission


Social History: Unchanged from Admission


Past Medical History: Unchanged from Admission





Objective


Active Medications: 








Acetaminophen (Tylenol Tab*)  650 mg PO Q6H PRN


   PRN Reason: MILD PAIN or TEMP > 100.4


Amlodipine Besylate (Norvasc Tab*)  10 mg PO DAILY Count includes the [facility]


   Last Admin: 10/10/19 08:59 Dose:  10 mg


Aspirin (Aspirin 81 Mg Chew Tab*)  81 mg PO DAILY Count includes the [facility]


   Last Admin: 10/10/19 09:00 Dose:  81 mg


Carvedilol (Coreg Tab*)  12.5 mg PO BID Count includes the [facility]


   Last Admin: 10/10/19 21:31 Dose:  12.5 mg


Cholecalciferol (Vitamin D Tab*)  1,000 units PO DAILY Count includes the [facility]


   Last Admin: 10/10/19 08:58 Dose:  1,000 units


Collagenase (Santyl 250 Units/Gm Oint*)  1 applic TOPICAL DAILY Count includes the [facility]


   Last Admin: 10/10/19 09:03 Dose:  1 applic


Cyanocobalamin (Vitamin B12 Tab*)  1,000 mcg PO DAILY Count includes the [facility]


   Last Admin: 10/09/19 10:35 Dose:  1,000 mcg


Docusate Sodium (Colace Cap*)  100 mg PO BID PRN


   PRN Reason: CONSTIPATION


Doxazosin Mesylate (Cardura Tab*)  2 mg PO BEDTIME Count includes the [facility]


   Last Admin: 10/10/19 21:31 Dose:  2 mg


Ferrous Gluconate (Fergon Tab*)  324 mg PO DAILY Count includes the [facility]


   Last Admin: 10/10/19 16:53 Dose:  324 mg


Heparin Sodium (Porcine) (Heparin Vial(*))  5,000 units SUBCUT Q8HR Count includes the [facility]


   Last Admin: 10/11/19 06:28 Dose:  5,000 units


Piperacillin Sod/Tazobactam (Sod 3.375 gm/ Sodium Chloride)  100 mls @ 25 mls/

hr IVPB Q8H Count includes the [facility]


   Stop: 10/12/19 23:59


   Last Admin: 10/11/19 06:26 Dose:  25 mls/hr


Isosorbide Dinitrate (Isordil Tab*)  10 mg PO TID Count includes the [facility]


   Last Admin: 10/10/19 21:31 Dose:  10 mg


Levetiracetam (Keppra Tab*)  500 mg PO BID Count includes the [facility]


   Last Admin: 10/10/19 21:31 Dose:  500 mg


Levorphanol Tartrate (Levorphanol 2 Mg (Nf))  1 mg PO Q12HR Count includes the [facility]


   Last Admin: 10/10/19 21:45 Dose:  1 mg


Magnesium Hydroxide (Milk Of Magnesia Liq*)  30 ml PO Q6H PRN


   PRN Reason: CONSTIPATION


Pantoprazole Sodium (Protonix Tab*)  40 mg PO BID Count includes the [facility]


   Last Admin: 10/10/19 21:31 Dose:  40 mg


Pharmacy Consult (Zosyn Per Pharmacy*)  1 note FOLLOW UP .ZOSYN PER PHARMACY Count includes the [facility]


Pregabalin (Lyrica Cap(*))  50 mg PO TID Count includes the [facility]


   Last Admin: 10/10/19 21:31 Dose:  50 mg


Senna (Senokot 8.6 Mg Tab*)  2 tab PO BEDTIME PRN


   PRN Reason: CONSTIPATION








 Vital Signs - 8 hr











  10/11/19 10/11/19





  03:33 07:20


 


Temperature 97.5 F 98.4 F


 


Pulse Rate 72 65


 


Respiratory 18 16





Rate  


 


Blood Pressure 138/54 129/58





(mmHg)  


 


O2 Sat by Pulse 97 97





Oximetry  











Oxygen Devices in Use Now: Nasal Cannula


Appearance: Pleasant man, sleepy, sitting up in bed


Eyes: No Scleral Icterus, PERRLA


Ears/Nose/Mouth/Throat: NL Teeth, Lips, Gums


Respiratory: Symmetrical Chest Expansion and Respiratory Effort, Clear to 

Auscultation


Cardiovascular: NL Sounds; No Murmurs; No JVD, RRR


Abdominal: NL Sounds; No Tenderness; No Distention, - - Ostomy


Lymphatic: No Cervical Adenopathy


Extremities: No Edema, - - Wounds wrapped


Skin: No Rash or Ulcers


Neurological: Alert and Oriented x 3, - - sleepy


Lines/Tubes/Other Access: Clean, Dry and Intact Rodriguez





- Nutrition: Malnutrition Diagnosis/Plan


Malnutrition Assessment by Registered Dietitian: 


Malnutrition Assessment





Clinical Characteristics         Acute,Severe


Malnutrition Assessment:         Moderate muscle/fat loss


Criteria                         Severe acute weight loss (15.9% over 1 1/2


                                 months)


                                 Severe deficit in energy intake (<50% -


                                 essentially just 1 to 2 servings Boost per day)


                                 


Malnutrition Assessment:         Ensure enlive 3 times daily (350 kcals, 20 gms


Interventions                    protein each)


                                 [place] diet (regular at present)


                                 Selection of menu to maximize tolerance/


                                 acceptance


Malnutrition Assessment: Goals   1.  Improved oral intake to >75% to support


                                 wound healing, maintenance of lean body mass,


                                 adequate hydration w/o contributing to


                                 undesirable weight gain.


                                 2.  Improvement of K levels to WNL.


                                 3.  Evidence of wound healing and no new areas


                                 of skin breakdown.








Result Diagrams: 


 10/10/19 05:20





 10/10/19 05:20


Microbiology and Other Data: 


 Microbiology











 10/07/19 16:20 Urine Culture - Final





 Urine    Diana Albicans





    Normal Stacy














Assess/Plan/Problems-Billing


Assessment: 


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

, chronic sacral wounds, s/p colostomy for fecal incontinence, CAD, CKDIII, 

with recent 7 week hospitalization at [facility] for infected ischium wound c/b 

seizures (attributed to linezolid), PRES and ARF, sent to [facility] for rehab but 

readmitted to hospital 10/6 for sepsis 2/2 klebsiella CAUTI. 








- Patient Problems


(1) Catheter-associated urinary tract infection


Current Visit: No   Status: Acute   Code(s): T83.511A - I/I REACT D/T 

INDWELLING URETHRAL CATHETER, INIT; N39.0 - URINARY TRACT INFECTION, SITE NOT 

SPECIFIED   SNOMED Code(s): 839458384


   Comment: 


- Urine culture positive for pseudomonas and klebsiella- 75,000-100,000 CFU. 


- Continue zosyn per d/w Dr. Nath -last dose on Sat 10/12/19 night


- Catheter was changed 10/7/19. 


- Sepsis resolved   





(2) Anemia


Current Visit: No   Status: Chronic   Code(s): D64.9 - ANEMIA, UNSPECIFIED   

SNOMED Code(s): 999064711


   Comment: 


- H/H much lower than in 7/2019 . Likely anemia related to his severe acute 

illness over the last 2 months and iron studies consistent with ACD.


- Transfused 1 u PRBC on 10/9/19


- will not do bloodwork tomorrow to minimize blood loss   





(3) Seizures


Current Visit: No   Status: Acute   Code(s): R56.9 - UNSPECIFIED CONVULSIONS   

SNOMED Code(s): 28236932


   Comment: 


- Pt diagnosed with seizures during admission in [place]-? secondary to 

linezolid. Cont on antiepileptics.


- No observed seizures 


- Continue to monitor. May need neuro evaluation if any concerns for seizure.  

  





(4) HTN (hypertension)


Current Visit: No   Status: Chronic   Code(s): I10 - ESSENTIAL (PRIMARY) 

HYPERTENSION   SNOMED Code(s): 06549016


   Comment: 


- Pt diagnosed with PRESS during hospitalization in [place]. BP has been 

stable 


- Continue amlodipine 10, Cardura, Coreg 12.5 BID, Isordil





    





(5) Pressure ulcer of sacral region, stage 3


Current Visit: No   Status: Acute   Code(s): L89.153 - PRESSURE ULCER OF SACRAL 

REGION, STAGE 3   SNOMED Code(s): 545752850


   Comment: - Pressure ulcer over ischium. Overall improving per report.  He 

was initially hospitalized in [place] secondary to infected decubitus. 


- Wound care consult appreciated   





(6) CAD (coronary artery disease)


Current Visit: No   Status: Chronic   Code(s): I25.10 - ATHSCL HEART DISEASE OF 

NATIVE CORONARY ARTERY W/O ANG PCTRS   SNOMED Code(s): 23992872


   Comment: - Continue ASA, coreg and isordil.    





(7) CKD (chronic kidney disease), stage III


Current Visit: No   Status: Chronic   Priority: High   Code(s): N18.3 - CHRONIC 

KIDNEY DISEASE, STAGE 3 (MODERATE)   SNOMED Code(s): 697494285


   Comment: - Baseline creatinine difficult to determine but likely around 1.5. 


- Elevated at admission (likely secondary to severe acute illness over last 2 

months) but very slowly trending down. K elevated not responsive to patiromer


- Low K diet   





(8) Chronic back pain


Current Visit: No   Status: Chronic   Code(s): M54.9 - DORSALGIA, UNSPECIFIED; 

G89.29 - OTHER CHRONIC PAIN   SNOMED Code(s): 672210664


   Comment: 


- Secondary to ependymoma.  


- Continue reduced dose lyrica and increase levorphanol.    





(9) BONIFACIO (obstructive sleep apnea)


Current Visit: No   Status: Chronic   Code(s): G47.33 - OBSTRUCTIVE SLEEP APNEA 

(ADULT) (PEDIATRIC)   SNOMED Code(s): 90277931


   Comment: 


- Home BIPAP   





(10) Paraplegia


Current Visit: No   Status: Chronic   Code(s): G82.20 - PARAPLEGIA, UNSPECIFIED

   SNOMED Code(s): 77549951


   Comment: 


- 2/2 to Ependyoma (dx in his 20s)


- PT/OT, working with home care coord to get home hospital bed and PMRU for 

home teaching s/p d/c   





(11) DVT prophylaxis


Current Visit: No   Status: Acute   Code(s): SZL1747 -    SNOMED Code(s): 

376507406


   Comment: 


 - HSQ   





(12) DNR (do not resuscitate)


Current Visit: No   Status: Acute   


Status and Disposition: 





Possibly to PMRU on Monday 10/14

## 2019-10-11 NOTE — PN
Subjective


Date of Service: 10/11/19


Interval History: 


HOSPITALIST PROGRESS NOTE


Patient seen and examined at bedside. Care reviewed and d/w





Family History: Unchanged from Admission


Social History: Unchanged from Admission


Past Medical History: Unchanged from Admission





Objective


Active Medications: 








Acetaminophen (Tylenol Tab*)  650 mg PO Q6H PRN


   PRN Reason: MILD PAIN or TEMP > 100.4


Amlodipine Besylate (Norvasc Tab*)  10 mg PO DAILY [facility]


   Last Admin: 10/10/19 08:59 Dose:  10 mg


Aspirin (Aspirin 81 Mg Chew Tab*)  81 mg PO DAILY [facility]


   Last Admin: 10/10/19 09:00 Dose:  81 mg


Carvedilol (Coreg Tab*)  12.5 mg PO BID [facility]


   Last Admin: 10/10/19 21:31 Dose:  12.5 mg


Cholecalciferol (Vitamin D Tab*)  1,000 units PO DAILY [facility]


   Last Admin: 10/10/19 08:58 Dose:  1,000 units


Collagenase (Santyl 250 Units/Gm Oint*)  1 applic TOPICAL DAILY [facility]


   Last Admin: 10/10/19 09:03 Dose:  1 applic


Cyanocobalamin (Vitamin B12 Tab*)  1,000 mcg PO DAILY [facility]


   Last Admin: 10/09/19 10:35 Dose:  1,000 mcg


Docusate Sodium (Colace Cap*)  100 mg PO BID PRN


   PRN Reason: CONSTIPATION


Doxazosin Mesylate (Cardura Tab*)  2 mg PO BEDTIME [facility]


   Last Admin: 10/10/19 21:31 Dose:  2 mg


Ferrous Gluconate (Fergon Tab*)  324 mg PO DAILY [facility]


   Last Admin: 10/10/19 16:53 Dose:  324 mg


Heparin Sodium (Porcine) (Heparin Vial(*))  5,000 units SUBCUT Q8HR [facility]


   Last Admin: 10/11/19 06:28 Dose:  5,000 units


Piperacillin Sod/Tazobactam (Sod 3.375 gm/ Sodium Chloride)  100 mls @ 25 mls/

hr IVPB Q8H [facility]


   Stop: 10/12/19 23:59


   Last Admin: 10/11/19 06:26 Dose:  25 mls/hr


Isosorbide Dinitrate (Isordil Tab*)  10 mg PO TID [facility]


   Last Admin: 10/10/19 21:31 Dose:  10 mg


Levetiracetam (Keppra Tab*)  500 mg PO BID [facility]


   Last Admin: 10/10/19 21:31 Dose:  500 mg


Levorphanol Tartrate (Levorphanol 2 Mg (Nf))  1 mg PO Q12HR [facility]


   Last Admin: 10/10/19 21:45 Dose:  1 mg


Magnesium Hydroxide (Milk Of Magnesia Liq*)  30 ml PO Q6H PRN


   PRN Reason: CONSTIPATION


Pantoprazole Sodium (Protonix Tab*)  40 mg PO BID [facility]


   Last Admin: 10/10/19 21:31 Dose:  40 mg


Pharmacy Consult (Zosyn Per Pharmacy*)  1 note FOLLOW UP .ZOSYN PER PHARMACY [facility]


Pregabalin (Lyrica Cap(*))  50 mg PO TID [facility]


   Last Admin: 10/10/19 21:31 Dose:  50 mg


Senna (Senokot 8.6 Mg Tab*)  2 tab PO BEDTIME PRN


   PRN Reason: CONSTIPATION








 Vital Signs - 8 hr











  10/11/19 10/11/19





  03:33 07:20


 


Temperature 97.5 F 98.4 F


 


Pulse Rate 72 65


 


Respiratory 18 16





Rate  


 


Blood Pressure 138/54 129/58





(mmHg)  


 


O2 Sat by Pulse 97 97





Oximetry  











Oxygen Devices in Use Now: Nasal Cannula





- Nutrition: Malnutrition Diagnosis/Plan


Malnutrition Assessment by Registered Dietitian: 


Malnutrition Assessment





Clinical Characteristics         Acute,Severe


Malnutrition Assessment:         Moderate muscle/fat loss


Criteria                         Severe acute weight loss (15.9% over 1 1/2


                                 months)


                                 Severe deficit in energy intake (<50% -


                                 essentially just 1 to 2 servings Boost per day)


                                 


Malnutrition Assessment:         Ensure enlive 3 times daily (350 kcals, 20 gms


Interventions                    protein each)


                                 [place] diet (regular at present)


                                 Selection of menu to maximize tolerance/


                                 acceptance


Malnutrition Assessment: Goals   1.  Improved oral intake to >75% to support


                                 wound healing, maintenance of lean body mass,


                                 adequate hydration w/o contributing to


                                 undesirable weight gain.


                                 2.  Improvement of K levels to WNL.


                                 3.  Evidence of wound healing and no new areas


                                 of skin breakdown.








Result Diagrams: 


 10/10/19 05:20





 10/10/19 05:20


Additional Lab and Data: 





 Laboratory Results - last 24 hr











  10/07/19 10/07/19 10/07/19





  07:26 07:26 16:20


 


WBC   11.8 H 


 


RBC   2.56 L 


 


Hgb   7.4 L 


 


Hct   23 L 


 


MCV   88 


 


MCH   29 


 


MCHC   33 


 


RDW   23 H 


 


Plt Count   236 


 


MPV   8.2 


 


Neut % (Auto)   72.6 


 


Lymph % (Auto)   11.0 


 


Mono % (Auto)   12.4 


 


Eos % (Auto)   3.1 


 


Baso % (Auto)   0.9 


 


Absolute Neuts (auto)   8.6 H 


 


Absolute Lymphs (auto)   1.3 


 


Absolute Monos (auto)   1.5 H 


 


Absolute Eos (auto)   0.4 


 


Absolute Basos (auto)   0.1 


 


Absolute Nucleated RBC   0.0 


 


Nucleated RBC %   0.1 


 


Hem Pathologist Commnt    


 


Sodium  140  


 


Potassium  5.5 H  


 


Chloride  111  


 


Carbon Dioxide  21 L  


 


Anion Gap  8  


 


BUN  80 H  


 


Creatinine  2.07 H  


 


Est GFR ( Amer)  38.3  


 


Est GFR (Non-Af Amer)  31.6  


 


BUN/Creatinine Ratio  38.6 H  


 


Glucose  102 H  


 


Calcium  10.5 H  


 


C-Reactive Protein  226.12 H  


 


B-Natriuretic Peptide   


 


Urine Color    Yellow


 


Urine Appearance    Cloudy


 


Urine pH    5.0


 


Ur Specific Gravity    1.016


 


Urine Protein    Negative


 


Urine Ketones    Negative


 


Urine Blood    2+ A


 


Urine Nitrate    Negative


 


Urine Bilirubin    Negative


 


Urine Urobilinogen    Negative


 


Ur Leukocyte Esterase    3+ A


 


Urine WBC (Auto)    3+(>20/hpf) A


 


Urine RBC (Auto)    3+(>10/hpf) A


 


Urine Bacteria    1+ A


 


Urine Glucose    Negative














  10/07/19





  16:24


 


WBC 


 


RBC 


 


Hgb 


 


Hct 


 


MCV 


 


MCH 


 


MCHC 


 


RDW 


 


Plt Count 


 


MPV 


 


Neut % (Auto) 


 


Lymph % (Auto) 


 


Mono % (Auto) 


 


Eos % (Auto) 


 


Baso % (Auto) 


 


Absolute Neuts (auto) 


 


Absolute Lymphs (auto) 


 


Absolute Monos (auto) 


 


Absolute Eos (auto) 


 


Absolute Basos (auto) 


 


Absolute Nucleated RBC 


 


Nucleated RBC % 


 


Hem Pathologist Commnt 


 


Sodium 


 


Potassium 


 


Chloride 


 


Carbon Dioxide 


 


Anion Gap 


 


BUN 


 


Creatinine 


 


Est GFR ( Amer) 


 


Est GFR (Non-Af Amer) 


 


BUN/Creatinine Ratio 


 


Glucose 


 


Calcium 


 


C-Reactive Protein 


 


B-Natriuretic Peptide  258 H


 


Urine Color 


 


Urine Appearance 


 


Urine pH 


 


Ur Specific Gravity 


 


Urine Protein 


 


Urine Ketones 


 


Urine Blood 


 


Urine Nitrate 


 


Urine Bilirubin 


 


Urine Urobilinogen 


 


Ur Leukocyte Esterase 


 


Urine WBC (Auto) 


 


Urine RBC (Auto) 


 


Urine Bacteria 


 


Urine Glucose 











Microbiology and Other Data: 


 Microbiology











 10/07/19 16:20 Urine Culture - Final





 Urine    Diana Albicans





    Normal Stacy











Diagnostic Imagin. Exam Date: 19 - VL ANK/BRACHIAL INDICES





FINDINGS:  The ankle brachial index on the right was 0.39 and on the left was 

0.80. The ankle-brachial indices on the prior study were 0.93 and 0.91 

respectively. There is monophasic flow within the right posterior tibial artery 

and monophasic flow within the right dorsalis pedis artery.  There is biphasic 

flow within the left posterior tibial artery and biphasic flow within the left 

dorsalis pedis artery.





IMPRESSION:  SIGNIFICANTLY REDUCED ANKLE-BRACHIAL INDICES AND WAVEFORMS MORE 

SEVERE IN THE RIGHT LOWER EXTREMITY. CONSIDER A CT ANGIOGRAM OF THE AORTA AND 

LOWER EXTREMITIES FOR FURTHER EVALUATION.





2. Exam Date: 19 - MRI LOWER EXTREMITY RIGHT W/O





IMPRESSION:  SOFT TISSUE SWELLING AND SOFT TISSUE ULCER, NO SPECIFIC EVIDENCE 

FOR OSTEOMYELITIS.








Assess/Plan/Problems-Billing


Assessment: 





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 

bladder, chronic sacral wounds, s/p colostomy for fecal incontinence, CAD, 

CKDIII, with recent 7 week hospitalization for infected ischium wound c/b 

seizures (attributed to linezolid), PRES and ARF. Transferred from [facility] 10/6 

for sepsis 2/2 klebsiella CAUTI. 





- Patient Problems


(1) Catheter-associated urinary tract infection


Current Visit: No   Status: Acute   Code(s): T83.511A - I/I REACT D/T 

INDWELLING URETHRAL CATHETER, INIT; N39.0 - URINARY TRACT INFECTION, SITE NOT 

SPECIFIED   SNOMED Code(s): 030640665


   Comment: Urine culture positive for pseudomonas and klebsiella- 75,000-100,

000 CFU. Continue zosyn for next 2 days-as per d/w Dr. Nath -last dose on 

Sat 10/12/19 night


Catheter was changed 10/7/19. 


appreciate ID consult


sepsis at admission-resolved   





(2) Chronic pain


Current Visit: No   Status: Acute   Code(s): G89.29 - OTHER CHRONIC PAIN   

SNOMED Code(s): 36115497


   Comment: - Continue levorphanol    





(3) DNR (do not resuscitate)


Current Visit: No   Status: Acute   





(4) DVT prophylaxis


Current Visit: No   Status: Acute   Code(s): YLB4361 -    SNOMED Code(s): 

483192357


   Comment: HSQ   





(5) Pressure ulcer of sacral region, stage 3


Current Visit: No   Status: Acute   Code(s): L89.153 - PRESSURE ULCER OF SACRAL 

REGION, STAGE 3   SNOMED Code(s): 903836061


   Comment: Pressure ulcer over ischium. Overall improving per report.  He was 

initially hospitalized in [place] secondary to infected decubitus. 


wound care consult appreciated   





(6) Seizures


Current Visit: No   Status: Acute   Code(s): R56.9 - UNSPECIFIED CONVULSIONS   

SNOMED Code(s): 31739051


   Comment: Pt diagnosed with seizures during admission in [place]-? 

secondary to linezolid. Cont on antiepileptics.


no observed seizures 


Continue to monitor. May need neuro evaluation if any concerns for seizure.    





(7) Anemia


Current Visit: No   Status: Chronic   Code(s): D64.9 - ANEMIA, UNSPECIFIED   

SNOMED Code(s): 045775766


   Comment: H/H much lower than in 2019 . Likely anemia related to his severe 

acute illness over the last 2 months and iron studies consistent with ACD.


 transfused 1 u PRBC on 10/9/19


will not do bloodwork tomorrow to minimize blood loss   





(8) CKD (chronic kidney disease), stage III


Current Visit: No   Status: Chronic   Priority: High   Code(s): N18.3 - CHRONIC 

KIDNEY DISEASE, STAGE 3 (MODERATE)   SNOMED Code(s): 733211993


   Comment: Baseline creatinine difficult to determine but likely around 1.5. 

Elevated at admission (likely secondary to severe acute illness over last 2 

months) but very slowly trending down. K elevated . Patiromer did not change 

the potassium level significantly. Placed pt on K low diet.   





(9) HTN (hypertension)


Current Visit: No   Status: Chronic   Code(s): I10 - ESSENTIAL (PRIMARY) 

HYPERTENSION   SNOMED Code(s): 47669581


   Comment: Pt diagnosed with PRESS during hospitalization in [place]. BP has 

been stable 


Continue amlodipine


 coreg 12.5mg BID


continue cardura


continue isordil 


    





(10) Neurogenic bladder


Current Visit: No   Status: Chronic   Priority: High   Code(s): N31.9 - 

NEUROMUSCULAR DYSFUNCTION OF BLADDER, UNSPECIFIED   SNOMED Code(s): 927993537


   Comment: 


- Rodriguez care; Rodriguez changed due to UTI this hospital stay   


Status and Disposition: 





medicine inpatient. awaiting PMRU eval for possible transfer back

## 2019-10-11 NOTE — DS
DISCHARGE SUMMARY:

 

DATE OF ADMISSION:  10/02/19

 

DATE OF DISCHARGE:  10/06/19

 

DISCHARGE DIAGNOSES:

1.  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

2.  Probable sepsis.

3.  Posterior reversible encephalopathy syndrome.

4.  Hypotension.

5.  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

6.  Acute on chronic renal failure.

7.  Sleep apnea.

8.  Coronary artery disease.

9.  History of severe sepsis.

10.  Seizure presumed to be from [employer].

 

HISTORY OF ILLNESS AND HOSPITAL COURSE:  For complete history of the events 
leading up to his rehab stay, please see the history and physical dictated by 
me on 10/02/19.  While on the rehab unit, the patient initially seemed to be 
stable.  The skin over his buttocks was tenuous and he developed other small 
areas of erythema. It was unclear if this was from the rehab process or from 
the transportation from [facility].  He did have a 4-cm left ischial 
ulcer noted as well as ulcers over his heel on the right and left side as well 
as the right lateral ankle.  The patient did have a wound consult and 
appropriate wound care was done for the affected areas.  On Saturday, 10/05/19, 
the patient had developed an altered mental status with the fever to 102 and 
low blood pressures.  A CAT team was called. Chest x-ray was done which was 
largely unrevealing.  Urinalysis and culture were drawn and blood cultures were 
drawn.  The patient was started on antibiotics and given 2 fluid boluses.  He 
was put on IV vancomycin and IV Zosyn.  By Sunday, 10/06/19, the patient was 
initially doing better but his temperature celeste again to 100.8.  His white 
blood cell count was unchanged from 10/05/19.  When his temperature came up 
Sunday evening, even with the patient on IV Zosyn, it was decided to move the 
patient to the acute medical service.

 

DISCHARGE DIET:  Regular.

 

DISCHARGE MEDICATIONS:  Included:

1.  Zosyn intravenously per the pharmacy dosing.

2.  Colace 100 mg twice daily.

3.  Santyl topically to his ischial pressure ulcer.

4.  Coreg 25 mg twice daily.

5.  Aspirin 81 mg daily.

6.  Norvasc 10 mg daily.

7.  Cardura 2 mg at bedtime.

8.  Hydralazine 25 mg every 8 hours.

9.  Isordil 10 mg 3 times a day.

10.  Keppra 500 mg twice daily.

11.  Levorphanol 1 mg every 12 hours.

12.  Protonix 40 mg twice daily.

13.  Lyrica 50 mg 3 times a day.

 

His levorphanol and Lyrica doses were cut back in attempt to improve his mental 
status.

 

DISPOSITION:  The patient was admitted to [facility]'s acute 
hospital.

 

CONDITION AT DISCHARGE:  Guarded.

 

 [id number]/CPS #: [id number]

MTDD

## 2019-10-12 RX ADMIN — HEPARIN SODIUM SCH UNITS: 5000 INJECTION INTRAVENOUS; SUBCUTANEOUS at 05:34

## 2019-10-12 RX ADMIN — LEVETIRACETAM SCH MG: 500 TABLET, FILM COATED ORAL at 21:49

## 2019-10-12 RX ADMIN — PANTOPRAZOLE SODIUM SCH MG: 40 TABLET, DELAYED RELEASE ORAL at 21:48

## 2019-10-12 RX ADMIN — LEVORPHANOL TARTRATE SCH MG: 2 TABLET ORAL at 14:36

## 2019-10-12 RX ADMIN — Medication SCH MG: at 09:36

## 2019-10-12 RX ADMIN — HEPARIN SODIUM SCH UNITS: 5000 INJECTION INTRAVENOUS; SUBCUTANEOUS at 14:37

## 2019-10-12 RX ADMIN — PIPERACILLIN AND TAZOBACTAM SCH MLS/HR: 3; .375 INJECTION, POWDER, LYOPHILIZED, FOR SOLUTION INTRAVENOUS; PARENTERAL at 23:03

## 2019-10-12 RX ADMIN — ISOSORBIDE DINITRATE SCH MG: 10 TABLET ORAL at 15:31

## 2019-10-12 RX ADMIN — ASPIRIN SCH MG: 81 TABLET, CHEWABLE ORAL at 09:36

## 2019-10-12 RX ADMIN — DOXAZOSIN MESYLATE SCH MG: 2 TABLET ORAL at 21:48

## 2019-10-12 RX ADMIN — LEVETIRACETAM SCH MG: 500 TABLET, FILM COATED ORAL at 09:37

## 2019-10-12 RX ADMIN — PIPERACILLIN AND TAZOBACTAM SCH MLS/HR: 3; .375 INJECTION, POWDER, LYOPHILIZED, FOR SOLUTION INTRAVENOUS; PARENTERAL at 04:43

## 2019-10-12 RX ADMIN — ISOSORBIDE DINITRATE SCH MG: 10 TABLET ORAL at 21:48

## 2019-10-12 RX ADMIN — COLLAGENASE SANTYL SCH APPLIC: 250 OINTMENT TOPICAL at 12:40

## 2019-10-12 RX ADMIN — LEVORPHANOL TARTRATE SCH MG: 2 TABLET ORAL at 09:34

## 2019-10-12 RX ADMIN — Medication SCH UNITS: at 09:36

## 2019-10-12 RX ADMIN — LEVORPHANOL TARTRATE SCH MG: 2 TABLET ORAL at 21:48

## 2019-10-12 RX ADMIN — PREGABALIN SCH MG: 50 CAPSULE ORAL at 15:31

## 2019-10-12 RX ADMIN — PREGABALIN SCH MG: 50 CAPSULE ORAL at 14:36

## 2019-10-12 RX ADMIN — PIPERACILLIN AND TAZOBACTAM SCH MLS/HR: 3; .375 INJECTION, POWDER, LYOPHILIZED, FOR SOLUTION INTRAVENOUS; PARENTERAL at 14:37

## 2019-10-12 RX ADMIN — AMLODIPINE BESYLATE SCH MG: 5 TABLET ORAL at 09:35

## 2019-10-12 RX ADMIN — PREGABALIN SCH MG: 50 CAPSULE ORAL at 21:49

## 2019-10-12 RX ADMIN — CYANOCOBALAMIN TAB 500 MCG SCH MCG: 500 TAB at 09:35

## 2019-10-12 RX ADMIN — PREGABALIN SCH MG: 50 CAPSULE ORAL at 09:34

## 2019-10-12 RX ADMIN — LEVORPHANOL TARTRATE SCH MG: 2 TABLET ORAL at 15:31

## 2019-10-12 RX ADMIN — CARVEDILOL SCH MG: 6.25 TABLET, FILM COATED ORAL at 21:47

## 2019-10-12 RX ADMIN — ISOSORBIDE DINITRATE SCH MG: 10 TABLET ORAL at 14:36

## 2019-10-12 RX ADMIN — CARVEDILOL SCH MG: 6.25 TABLET, FILM COATED ORAL at 09:33

## 2019-10-12 RX ADMIN — HEPARIN SODIUM SCH UNITS: 5000 INJECTION INTRAVENOUS; SUBCUTANEOUS at 21:50

## 2019-10-12 RX ADMIN — PANTOPRAZOLE SODIUM SCH MG: 40 TABLET, DELAYED RELEASE ORAL at 09:36

## 2019-10-12 RX ADMIN — ISOSORBIDE DINITRATE SCH MG: 10 TABLET ORAL at 09:34

## 2019-10-12 NOTE — PN
Subjective


Date of Service: 10/12/19


Interval History: 


HD 7 on 10/12


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

, chronic sacral wounds, s/p colostomy for fecal incontinence, CAD, CKDIII, 

with recent 7 week hospitalization at [facility] for infected ischium wound c/b 

seizures (attributed to linezolid), PRES and ARF, sent to [facility] for rehab but 

readmitted to hospital 10/6 for sepsis 2/2 klebsiella CAUTI. 





VSS overnight, no acute issues





Labs: None





This morning, sleeping in bed, spoke with wife at bedside, she felt pain still 

a big issue throughout the day, we discuss trading off pain control with 

wakefulness and she is aware he may be more sleepy if we increase his pain meds 

and she feels worth the trade off. Pt is resting at this time, easily rouses 

during my exam, with no complaints. Tolerating diet, some diarrhea in ostomy, 

clear urine in london bag from SPC.


Family History: Unchanged from Admission


Social History: Unchanged from Admission


Past Medical History: Unchanged from Admission





Objective


Active Medications: 








Acetaminophen (Tylenol Tab*)  650 mg PO Q6H PRN


   PRN Reason: MILD PAIN or TEMP > 100.4


   Last Admin: 10/11/19 16:49 Dose:  650 mg


Amlodipine Besylate (Norvasc Tab*)  10 mg PO DAILY [facility]


   Last Admin: 10/11/19 10:44 Dose:  10 mg


Aspirin (Aspirin 81 Mg Chew Tab*)  81 mg PO DAILY [facility]


   Last Admin: 10/11/19 10:45 Dose:  81 mg


Carvedilol (Coreg Tab*)  12.5 mg PO BID [facility]


   Last Admin: 10/11/19 20:47 Dose:  12.5 mg


Cholecalciferol (Vitamin D Tab*)  1,000 units PO DAILY [facility]


   Last Admin: 10/11/19 10:43 Dose:  1,000 units


Collagenase (Santyl 250 Units/Gm Oint*)  1 applic TOPICAL DAILY [facility]


   Last Admin: 10/11/19 16:20 Dose:  1 applic


Cyanocobalamin (Vitamin B12 Tab*)  1,000 mcg PO DAILY [facility]


   Last Admin: 10/11/19 10:44 Dose:  1,000 mcg


Docusate Sodium (Colace Cap*)  100 mg PO BID PRN


   PRN Reason: CONSTIPATION


Doxazosin Mesylate (Cardura Tab*)  2 mg PO BEDTIME [facility]


   Last Admin: 10/11/19 20:46 Dose:  2 mg


Ferrous Gluconate (Fergon Tab*)  324 mg PO DAILY [facility]


   Last Admin: 10/11/19 10:44 Dose:  324 mg


Heparin Sodium (Porcine) (Heparin Vial(*))  5,000 units SUBCUT Q8HR [facility]


   Last Admin: 10/12/19 05:34 Dose:  5,000 units


Piperacillin Sod/Tazobactam (Sod 3.375 gm/ Sodium Chloride)  100 mls @ 25 mls/

hr IVPB Q8H [facility]


   Stop: 10/12/19 23:59


   Last Admin: 10/12/19 04:43 Dose:  25 mls/hr


Isosorbide Dinitrate (Isordil Tab*)  10 mg PO TID [facility]


   Last Admin: 10/11/19 20:47 Dose:  10 mg


Levetiracetam (Keppra Tab*)  500 mg PO BID [facility]


   Last Admin: 10/11/19 20:46 Dose:  500 mg


Levorphanol Tartrate (Levorphanol 2 Mg (Nf))  2 mg PO Q12HR [facility]


   Last Admin: 10/11/19 20:46 Dose:  2 mg


Magnesium Hydroxide (Milk Of Magnesia Liq*)  30 ml PO Q6H PRN


   PRN Reason: CONSTIPATION


Pantoprazole Sodium (Protonix Tab*)  40 mg PO BID [facility]


   Last Admin: 10/11/19 20:47 Dose:  40 mg


Pharmacy Consult (Zosyn Per Pharmacy*)  1 note FOLLOW UP .ZOSYN PER PHARMACY [facility]


Pregabalin (Lyrica Cap(*))  50 mg PO TID [facility]


   Last Admin: 10/11/19 20:48 Dose:  50 mg


Senna (Senokot 8.6 Mg Tab*)  2 tab PO BEDTIME PRN


   PRN Reason: CONSTIPATION








 Vital Signs - 8 hr











  10/11/19 10/11/19 10/12/19





  22:57 23:00 03:00


 


Temperature  98.7 F 97.9 F


 


Pulse Rate  74 66


 


Respiratory 14 16 18





Rate   


 


Blood Pressure  132/50 128/49





(mmHg)   


 


O2 Sat by Pulse  96 97





Oximetry   











Oxygen Devices in Use Now: Nasal Cannula


Appearance: Resting man in NAD


Eyes: No Scleral Icterus, PERRLA


Ears/Nose/Mouth/Throat: NL Teeth, Lips, Gums


Neck: NL Appearance and Movements; NL JVP


Respiratory: Symmetrical Chest Expansion and Respiratory Effort, Clear to 

Auscultation


Cardiovascular: RRR


Abdominal: NL Sounds; No Tenderness; No Distention, No Hepatosplenomegaly, - - 

Ostomy CDI


Lymphatic: No Cervical Adenopathy


Extremities: No Edema


Skin: No Rash or Ulcers


Neurological: - - Sleeping, rouses but no formal orientation done


Lines/Tubes/Other Access: Clean, Dry and Intact [place] - SP





- Nutrition: Malnutrition Diagnosis/Plan


Malnutrition Assessment by Registered Dietitian: 


Malnutrition Assessment


See prior notes


Result Diagrams: 


 10/10/19 05:20





 10/10/19 05:20


Microbiology and Other Data: 


 Microbiology











 10/07/19 16:20 Urine Culture - Final





 Urine    Diana Albicans





    Normal Stacy














Assess/Plan/Problems-Billing


Assessment: 


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

, chronic sacral wounds, s/p colostomy for fecal incontinence, CAD, CKDIII, 

with recent 7 week hospitalization at [facility] for infected ischium wound c/b 

seizures (attributed to linezolid), PRES and ARF, sent to [facility] for rehab but 

readmitted to hospital 10/6 for sepsis 2/2 klebsiella CAUTI. 








- Patient Problems


(1) Catheter-associated urinary tract infection


Current Visit: No   Status: Acute   Code(s): T83.511A - I/I REACT D/T 

INDWELLING URETHRAL CATHETER, INIT; N39.0 - URINARY TRACT INFECTION, SITE NOT 

SPECIFIED   SNOMED Code(s): 229523275


   Comment: 


- Urine culture positive for pseudomonas and klebsiella- 75,000-100,000 CFU. 


- Continue zosyn per d/w Dr. Nath -last dose on Sat 10/12/19 night


- Catheter was changed 10/7/19. 


- Sepsis resolved   





(2) Anemia


Current Visit: No   Status: Chronic   Code(s): D64.9 - ANEMIA, UNSPECIFIED   

SNOMED Code(s): 382438984


   Comment: 


- H/H much lower than in 7/2019 . Likely anemia related to his severe acute 

illness over the last 2 months and iron studies consistent with ACD.


- Transfused 1 u PRBC on 10/9/19


- will not do bloodwork tomorrow to minimize blood loss   





(3) Seizures


Current Visit: No   Status: Acute   Code(s): R56.9 - UNSPECIFIED CONVULSIONS   

SNOMED Code(s): 94515560


   Comment: 


- Pt diagnosed with seizures during admission in [facility]-? secondary to 

linezolid. Cont on antiepileptics.


- No observed seizures 


- Continue to monitor. May need neuro evaluation if any concerns for seizure.  

  





(4) HTN (hypertension)


Current Visit: No   Status: Chronic   Code(s): I10 - ESSENTIAL (PRIMARY) 

HYPERTENSION   SNOMED Code(s): 22234063


   Comment: 


- Pt diagnosed with PRESS during hospitalization in [facility]. BP has been 

stable 


- Continue amlodipine 10, Cardura, Coreg 12.5 BID, Isordil


   





(5) Pressure ulcer of sacral region, stage 3


Current Visit: No   Status: Acute   Code(s): L89.153 - PRESSURE ULCER OF SACRAL 

REGION, STAGE 3   SNOMED Code(s): 709109878


   Comment: - Pressure ulcer over ischium. Overall improving per report.  He 

was initially hospitalized in [facility] secondary to infected decubitus. 


- Wound care consult appreciated   





(6) CAD (coronary artery disease)


Current Visit: No   Status: Chronic   Code(s): I25.10 - ATHSCL HEART DISEASE OF 

NATIVE CORONARY ARTERY W/O ANG PCTRS   SNOMED Code(s): 79238766


   Comment: - Continue ASA, coreg and isordil.    





(7) CKD (chronic kidney disease), stage III


Current Visit: No   Status: Chronic   Priority: High   Code(s): N18.3 - CHRONIC 

KIDNEY DISEASE, STAGE 3 (MODERATE)   SNOMED Code(s): 182179303


   Comment: - Baseline creatinine difficult to determine but likely around 1.5. 


- Elevated at admission (likely secondary to severe acute illness over last 2 

months) but very slowly trending down. K elevated not responsive to patiromer


- Low K diet   





(8) Chronic back pain


Current Visit: No   Status: Chronic   Code(s): M54.9 - DORSALGIA, UNSPECIFIED; 

G89.29 - OTHER CHRONIC PAIN   SNOMED Code(s): 406069089


   Comment: 


- Secondary to ependymoma.  


- Continue lyrica 50 TID and increase levorphanol to home dose of 2mg q 8 hr    





(9) BONIFACIO (obstructive sleep apnea)


Current Visit: No   Status: Chronic   Code(s): G47.33 - OBSTRUCTIVE SLEEP APNEA 

(ADULT) (PEDIATRIC)   SNOMED Code(s): 05551343


   Comment: 


- Home BIPAP   





(10) Paraplegia


Current Visit: No   Status: Chronic   Code(s): G82.20 - PARAPLEGIA, UNSPECIFIED

   SNOMED Code(s): 92707827


   Comment: 


- 2/2 to Ependyoma (dx in his 20s)


- PT/OT, working with home care coord to get home hospital bed and [facility] for 

home teaching s/p d/c   





(11) DVT prophylaxis


Current Visit: No   Status: Acute   Code(s): ICM9019 -    SNOMED Code(s): 

290972794


   Comment: 


 - HSQ   





(12) DNR (do not resuscitate)


Current Visit: No   Status: Acute   


Status and Disposition: 





Possibly to [facility] on Monday 10/14

## 2019-10-13 LAB
ANION GAP SERPL CALC-SCNC: 4 MMOL/L (ref 2–11)
BUN SERPL-MCNC: 33 MG/DL (ref 6–24)
BUN/CREAT SERPL: 24.4 (ref 8–20)
CALCIUM SERPL-MCNC: 9.9 MG/DL (ref 8.6–10.3)
CHLORIDE SERPL-SCNC: 111 MMOL/L (ref 101–111)
GLUCOSE SERPL-MCNC: 99 MG/DL (ref 70–100)
HCO3 SERPL-SCNC: 24 MMOL/L (ref 22–32)
HCT VFR BLD AUTO: 25 % (ref 42–52)
HGB BLD-MCNC: 8.2 G/DL (ref 14–18)
MCH RBC QN AUTO: 29 PG (ref 27–31)
MCHC RBC AUTO-ENTMCNC: 34 G/DL (ref 31–36)
MCV RBC AUTO: 85 FL (ref 80–94)
PLATELET # BLD AUTO: 231 10^3/UL (ref 150–450)
POTASSIUM SERPL-SCNC: 4.4 MMOL/L (ref 3.5–5)
RBC # BLD AUTO: 2.87 10^6 /UL (ref 4.18–5.48)
SODIUM SERPL-SCNC: 139 MMOL/L (ref 135–145)
WBC # BLD AUTO: 10.1 10^3/UL (ref 3.5–10.8)

## 2019-10-13 RX ADMIN — LEVORPHANOL TARTRATE SCH MG: 2 TABLET ORAL at 21:01

## 2019-10-13 RX ADMIN — PREGABALIN SCH MG: 50 CAPSULE ORAL at 15:11

## 2019-10-13 RX ADMIN — DOXAZOSIN MESYLATE SCH MG: 2 TABLET ORAL at 20:33

## 2019-10-13 RX ADMIN — PREGABALIN SCH MG: 50 CAPSULE ORAL at 10:26

## 2019-10-13 RX ADMIN — Medication SCH MG: at 10:25

## 2019-10-13 RX ADMIN — LEVORPHANOL TARTRATE SCH MG: 2 TABLET ORAL at 15:38

## 2019-10-13 RX ADMIN — ACETAMINOPHEN PRN MG: 325 TABLET ORAL at 10:41

## 2019-10-13 RX ADMIN — Medication SCH UNITS: at 10:27

## 2019-10-13 RX ADMIN — PANTOPRAZOLE SODIUM SCH MG: 40 TABLET, DELAYED RELEASE ORAL at 20:32

## 2019-10-13 RX ADMIN — ISOSORBIDE DINITRATE SCH MG: 10 TABLET ORAL at 16:19

## 2019-10-13 RX ADMIN — HEPARIN SODIUM SCH UNITS: 5000 INJECTION INTRAVENOUS; SUBCUTANEOUS at 06:16

## 2019-10-13 RX ADMIN — LEVORPHANOL TARTRATE SCH MG: 2 TABLET ORAL at 06:16

## 2019-10-13 RX ADMIN — ASPIRIN SCH MG: 81 TABLET, CHEWABLE ORAL at 10:25

## 2019-10-13 RX ADMIN — CARVEDILOL SCH MG: 6.25 TABLET, FILM COATED ORAL at 10:27

## 2019-10-13 RX ADMIN — ISOSORBIDE DINITRATE SCH MG: 10 TABLET ORAL at 20:33

## 2019-10-13 RX ADMIN — PREGABALIN SCH MG: 50 CAPSULE ORAL at 20:32

## 2019-10-13 RX ADMIN — COLLAGENASE SANTYL SCH APPLIC: 250 OINTMENT TOPICAL at 10:29

## 2019-10-13 RX ADMIN — HEPARIN SODIUM SCH UNITS: 5000 INJECTION INTRAVENOUS; SUBCUTANEOUS at 15:12

## 2019-10-13 RX ADMIN — ISOSORBIDE DINITRATE SCH MG: 10 TABLET ORAL at 13:27

## 2019-10-13 RX ADMIN — LEVETIRACETAM SCH MG: 500 TABLET, FILM COATED ORAL at 10:27

## 2019-10-13 RX ADMIN — CARVEDILOL SCH MG: 6.25 TABLET, FILM COATED ORAL at 20:33

## 2019-10-13 RX ADMIN — CYANOCOBALAMIN TAB 500 MCG SCH MCG: 500 TAB at 10:25

## 2019-10-13 RX ADMIN — PANTOPRAZOLE SODIUM SCH MG: 40 TABLET, DELAYED RELEASE ORAL at 10:27

## 2019-10-13 RX ADMIN — AMLODIPINE BESYLATE SCH MG: 5 TABLET ORAL at 10:27

## 2019-10-13 RX ADMIN — LEVETIRACETAM SCH MG: 500 TABLET, FILM COATED ORAL at 20:33

## 2019-10-13 RX ADMIN — ACETAMINOPHEN PRN MG: 325 TABLET ORAL at 19:59

## 2019-10-13 RX ADMIN — HEPARIN SODIUM SCH UNITS: 5000 INJECTION INTRAVENOUS; SUBCUTANEOUS at 20:32

## 2019-10-13 NOTE — DS
CC:  Dr. Rafy Vicente *

 

DISCHARGE SUMMARY:

 

DATE OF ADMISSION:  10/06/19

 

ANTICIPATED DATE OF DISCHARGE:  10/14/19

 

PRIMARY CARE PROVIDER:  Dr. Rafy Vicente, who the patient to follow up 
with in 1 to 2 weeks

 

DISPOSITION AT THE TIME OF DISCHARGE:  Stable to be discharged to [facility] unit.

 

PRIMARY DIAGNOSES:  Catheter-associated urinary tract infection and sepsis, 
resolved.

 

SECONDARY DIAGNOSES:

1.  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

2.  Neurogenic bladder and bowel, status post ostomy and Rodriguez catheter.

3.  Chronic sacral, hip and heel wounds.

4.  Coronary artery disease.

5.  Chronic kidney disease stage 3.

6.  Chronic pain.

7.  Hypertension, recently complicated by posterior reversible encephalopathy 
or PRES.

8.  Obstructive sleep apnea, on BiPAP.

9.  Recent diagnosis of seizure disorder thought to be secondary to posterior 
reversible encephalopathy syndrome or medication related, currently on 
titrating down doses of Keppra.

10.  Anemia.

11.  Recent 7-week hospitalization prior to this hospitalization at [place] 
for infected ischium wound complicated by seizures attributed to linezolid 
versus posterior reversible encephalopathy syndrome and acute renal failure, 
admitted to [facility] on 10/02/19 and readmitted to hospital on 10/06/19 for CAUTI.

 

MEDICATIONS AT THE TIME OF DISCHARGE:

1.  Acetaminophen 650 mg p.o. q.6 hours p.r.n. for pain.

2.  Amlodipine 10 mg p.o. daily.

3.  Aspirin 81 mg p.o. daily.

4.  Carvedilol 12.5 mg p.o. b.i.d.

5.  Vitamin D 1000 units p.o. daily.

6.  Santyl ointment application topically as per wound care recommendations 
included in this packet.

7.  Cyanocobalamin 1000 mg p.o. daily.

8.  Docusate 100 mg p.o. b.i.d.

9.  Doxazosin 2 mg p.o. q.h.s.

10.  Ferrous gluconate 324 mg p.o. daily.

11.  Isordil 10 mg p.o. t.i.d.

12.  Keppra 250 mg p.o. b.i.d. for an additional 6 days status post discharge, 
then 250 mg daily for an additional 7 days after that, then stop.

13.  Levorphanol 2 mg p.o. q.8 hours.

14.  Lyrica 50 mg p.o. t.i.d.

15.  Pantoprazole 40 mg p.o. b.i.d.

16.  Senna 2 tabs p.o. q.h.s.

17.  Cranberry tablet 1 tab p.o. daily.

18.  Folic acid 0.5 mg p.o. daily.

 

Medication changes on this hospitalization:  Include the taper of Keppra has 
included per above, otherwise these medications reflect his recent discharge 
medications from [facility] when he was admitted to [facility] on 

10/02/19.

 

HISTORY OF PRESENT ILLNESS AND HOSPITAL COURSE:  This is a 74-year-old male 
with a complex past medical history and as per above, but briefly paraplegic at 
baseline complicated by neurogenic bladder and bowel status post colostomy and 
Rodriguez catheter, not currently with suprapubic catheter as well as chronic wound
, obstructive sleep apnea requiring nightly CPAP/BiPAP, CKD, CAD, recently 
hospitalized at [facility] for 7 weeks, being treated for sepsis with a 
complicated hospital course consisting of PRES and seizures thought to be from 
linezolid and worsening acute renal failure.  The patient rehabbed enough to 
discharge to [facility] although his course in [facility] was complicated by fever, lethargy
, and chills and thus he was referred back to the emergency room on 10/06/19 
after he had fever, rigors and hypotension.  In the ER, he was mildly febrile, 
but normotensive and tachycardic was determined to have a urinary tract 
infection and thought to have sepsis secondary to this and he was admitted to 
the hospital from 10/06/19 to 10/14/19, being treated for catheter-associated 
urinary tract infection and his hospital course by problem is as follows:



1.  Sepsis.  The patient was given fluid bolus and was treated broadly 
initially with vanc and Zosyn while urine cultures were pending, blood cultures 
were drawn and chest x-ray was done, although all culture data was negative 
with the exception of Klebsiella and Pseudomonas in urinary tract.

2.  CAUTI.  The patient was seen by Infectious Disease who recommended coverage 
with Zosyn for Pseudomonas and Klebsiella urinary tract infection associated 
with catheterization and treatment was completed on the evening of 10/12/19.  
The patient did very well and catheter was exchanged on 10/07/19.  Blood 
cultures from 10/05/19 remained with no growth to date.

3.  Recently diagnosed posterior reversible encephalopathy syndrome with 
hypertension.  The patient is on significantly higher doses of blood pressure 
medications since his hospitalization at [facility] compared to 
when he initially had presented 8 weeks prior.  His medications were continued 
and not adjusted and on discharge he should go on medications that he was 
currently being given in the hospital as he remained normotensive in the 
systolic 130s at goal.

4.  Possible seizure disorder.  This was thought to be secondary to PRES or 
linezolid as diagnosed by team at [facility] for PRES and 
seizure disorder as a drug side effect.  It is appropriate to begin to taper 
Keppra, he is on 500 mg p.o. b.i.d. on admission and this was lowered to 250 mg 
b.i.d. on 10/13/19, a taper schedule is left as per guideline.

5.  History of CKD.  His renal function over the course of his hospitalization 
appears to be returning to baseline.  He had no complications during this 
hospitalization.  His medications are currently renally dosed.

6.  CAD.  His home medications aspirin and statin were continued.  He is 
optimized from a secondary prevention standpoint.

7.  Anemia. This is thought to be secondary to chronic disease and actually 
improved over the course of this hospitalization.  He has no evidence of active 
or acute bleed.

8.  Chronic pain.  His levorphanol was lowered initially given his lethargy and 
sepsis, this was increased on 10/12/19 with good effect.  The patient and his 
wife feel he is returning to baseline.

9.  Chronic pressure wounds.  Wound Care was consulted during this 
hospitalization and made specific recommendations, which are included in this 
packet under wound care.

10.  Paraplegia status.  The patient is to return to [facility] for several days of 
home teaching while the patient and his family prepare to take him home, they 
understand this has been a deviation from his baseline functioning status and 
that they will need increased help at home along with equipment such as a 
hospital bed and possible Bill lift, which can be arranged for them by [facility] 
care management.  This plan was discussed with Case Management on 10/11/19 and 
plan is to return to [facility] on 10/14/19 for a brief education on how to use home 
equipment and to anticipate goals of care as he returns to home after his 
complicated hospital stays at both [facility] and [facility].

11.  DVT prophylaxis.  The patient was placed on heparin subcu.

12.  Diet.  The patient is placed on low potassium diet given his acute renal 
failure and mildly high potassium on admission, it has resolved, although the 
patient continued to be counseled on low potassium foods given borderline renal 
function with known CKD at baseline.

 

LABS AND STUDIES DONE DURING THIS HOSPITALIZATION:  Labs on 10/13/19 show white 
blood cell count of 10.1, hemoglobin of 8.2, hematocrit of 25, and platelets of 
231.  BMP on 10/13/19 shows sodium 139, potassium 4.4, chloride 111, carbon 
dioxide 24, BUN 33, creatinine 1.35.

 

Imaging was none.

 

CONSULTANTS:  Included Infectious Disease and Wound Care.

 

ITEMS TO FOLLOW UP ON STATUS POST DISCHARGE:

1.  Recently diagnosed seizure disorder, thought to be medication side effect 
versus PRES, please titrate down Keppra as per discharge summary instructions 
and informed the family this titration can be done safely at home as Keppra has 
a long half-life.

2.  Medication reconciliation.  The patient's medications appear to have 
changed drastically as a result of his hospitalization at [facility], ensure the patient and family have new prescriptions and old 
prescriptions were cancelled out.  His current medications given in hospital 
have kept blood pressure within goal and he has been afebrile with improving 
labs.  Plan of care was discussed with patient and family who agreed to return 
to [facility] on 

10/14/19 for home teaching and while preparations are being made in the home 
for patient to continue his extensive rehabilitation from complicated stay at 
[facility].  They have no further questions and are in agreeance with the plan.

 

This discharge summary is a distillation of a 8-day hospitalization.  If there 
are any specific questions, please refer to the medical chart directly or reach 
out to me, my cell phone is [phone].  A physical exam is done on 10/13/19.

 

TIME SPENT:  60 minutes was spent on planning of this discharge with over half 
of that spent directly at the bedside of patient providing direct patient care.
  On day of discharge, the patient is tolerating diet and voiding freely, he 
and family have no further questions.

 

 [id number]/CPS #: [id number]

ELISABET

## 2019-10-13 NOTE — PN
Subjective


Date of Service: 10/13/19


Interval History: 





HD 8 on 10/13





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

, chronic sacral wounds, s/p colostomy for fecal incontinence, CAD, CKDIII, 

with recent 7 week hospitalization at [place] for infected ischium wound c/b 

seizures (attributed to linezolid), PRES and ARF, sent to [facility] for rehab but 

readmitted to hospital 10/6 for sepsis 2/2 klebsiella CAUTI. 





VSS overnight no acute events


Labs stable





This morning, some leaking from urinary tube but no other symptoms, wife 

reports pain better controlled compared to yesterday.  No other complaints, did 

eat a large breakfast. Otherwise pleasant and well, looking forward to PMRU 

tomorrow.


Family History: Unchanged from Admission


Social History: Unchanged from Admission


Past Medical History: Unchanged from Admission





Objective


Active Medications: 








Acetaminophen (Tylenol Tab*)  650 mg PO Q6H PRN


   PRN Reason: MILD PAIN or TEMP > 100.4


   Last Admin: 10/11/19 16:49 Dose:  650 mg


Amlodipine Besylate (Norvasc Tab*)  10 mg PO DAILY [facility]


   Last Admin: 10/12/19 09:35 Dose:  10 mg


Aspirin (Aspirin 81 Mg Chew Tab*)  81 mg PO DAILY [facility]


   Last Admin: 10/12/19 09:36 Dose:  81 mg


Carvedilol (Coreg Tab*)  12.5 mg PO BID [facility]


   Last Admin: 10/12/19 21:47 Dose:  12.5 mg


Cholecalciferol (Vitamin D Tab*)  1,000 units PO DAILY [facility]


   Last Admin: 10/12/19 09:36 Dose:  1,000 units


Collagenase (Santyl 250 Units/Gm Oint*)  1 applic TOPICAL DAILY [facility]


   Last Admin: 10/12/19 12:40 Dose:  1 applic


Cyanocobalamin (Vitamin B12 Tab*)  1,000 mcg PO DAILY [facility]


   Last Admin: 10/12/19 09:35 Dose:  1,000 mcg


Docusate Sodium (Colace Cap*)  100 mg PO BID PRN


   PRN Reason: CONSTIPATION


Doxazosin Mesylate (Cardura Tab*)  2 mg PO BEDTIME [facility]


   Last Admin: 10/12/19 21:48 Dose:  2 mg


Ferrous Gluconate (Fergon Tab*)  324 mg PO DAILY [facility]


   Last Admin: 10/12/19 09:36 Dose:  324 mg


Heparin Sodium (Porcine) (Heparin Vial(*))  5,000 units SUBCUT Q8HR [facility]


   Last Admin: 10/13/19 06:16 Dose:  5,000 units


Isosorbide Dinitrate (Isordil Tab*)  10 mg PO TID [facility]


   Last Admin: 10/12/19 21:48 Dose:  10 mg


Levetiracetam (Keppra Tab*)  500 mg PO BID [facility]


   Last Admin: 10/12/19 21:49 Dose:  500 mg


Levorphanol Tartrate (Levorphanol 2 Mg (Nf))  2 mg PO Q8HR [facility]


   Last Admin: 10/13/19 06:16 Dose:  2 mg


Magnesium Hydroxide (Milk Of Magnesia Liq*)  30 ml PO Q6H PRN


   PRN Reason: CONSTIPATION


Pantoprazole Sodium (Protonix Tab*)  40 mg PO BID [facility]


   Last Admin: 10/12/19 21:48 Dose:  40 mg


Pharmacy Consult (Zosyn Per Pharmacy*)  1 note FOLLOW UP .ZOSYN PER PHARMACY [facility]


Pregabalin (Lyrica Cap(*))  50 mg PO TID [facility]


   Last Admin: 10/12/19 21:49 Dose:  50 mg


Senna (Senokot 8.6 Mg Tab*)  2 tab PO BEDTIME PRN


   PRN Reason: CONSTIPATION








 Vital Signs - 8 hr











  10/12/19 10/13/19





  23:50 03:08


 


Temperature  97.8 F


 


Pulse Rate  65


 


Respiratory 20 18





Rate  


 


Blood Pressure  137/54





(mmHg)  


 


O2 Sat by Pulse  97





Oximetry  











Oxygen Devices in Use Now: Nasal Cannula


Appearance: No acute distress


Eyes: No Scleral Icterus, PERRLA


Neck: NL Appearance and Movements; NL JVP


Respiratory: Symmetrical Chest Expansion and Respiratory Effort, Clear to 

Auscultation


Cardiovascular: NL Sounds; No Murmurs; No JVD, RRR


Abdominal: NL Sounds; No Tenderness; No Distention, No Hepatosplenomegaly, - - 

Ostomy


Lymphatic: No Cervical Adenopathy


Extremities: No Edema


Skin: No Rash or Ulcers


Neurological: Alert and Oriented x 3


Lines/Tubes/Other Access: Clean, Dry and Intact Rodriguez


Result Diagrams: 


 10/13/19 07:35





 10/13/19 07:35


Microbiology and Other Data: 


 Microbiology











 10/07/19 16:20 Urine Culture - Final





 Urine    Diana Albicans





    Normal Stacy














Assess/Plan/Problems-Billing


Assessment: 


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

, chronic sacral wounds, s/p colostomy for fecal incontinence, CAD, CKDIII, 

with recent 7 week hospitalization at [facility] for infected ischium wound c/b 

seizures (attributed to linezolid), PRES and ARF, sent to [facility] for rehab but 

readmitted to hospital 10/6 for sepsis 2/2 klebsiella CAUTI. 








- Patient Problems


(1) Catheter-associated urinary tract infection


Current Visit: No   Status: Acute   Code(s): T83.511A - I/I REACT D/T 

INDWELLING URETHRAL CATHETER, INIT; N39.0 - URINARY TRACT INFECTION, SITE NOT 

SPECIFIED   SNOMED Code(s): 931882823


   Comment: 


- Urine culture positive for pseudomonas and klebsiella- 75,000-100,000 CFU. 


- Completed Zosyn -last dose on Sat 10/12/19 night


- Catheter was changed 10/7/19. 


- Sepsis resolved   





(2) Anemia


Current Visit: No   Status: Chronic   Code(s): D64.9 - ANEMIA, UNSPECIFIED   

SNOMED Code(s): 289411887


   Comment: 


- H/H much lower than in 7/2019 . Likely anemia related to his severe acute 

illness over the last 2 months and iron studies consistent with ACD.


- Transfused 1 u PRBC on 10/9/19


- Stable on 10/13   





(3) Seizures


Current Visit: No   Status: Acute   Code(s): R56.9 - UNSPECIFIED CONVULSIONS   

SNOMED Code(s): 15128736


   Comment: 


- Pt diagnosed with seizures during admission in [place]-? secondary to 

linezolid or PRES. Pt has been being tapered and can be tapered further, in 

PRES can be tapered within one to two weeks, will go to 250mg for afternoon 

dose and then 250 BID x 1 week, then 250 QDay x1 week then stop


- No observed seizures 


- Continue to monitor. May need neuro evaluation if any concerns for seizure.  

  





(4) HTN (hypertension)


Current Visit: No   Status: Chronic   Code(s): I10 - ESSENTIAL (PRIMARY) 

HYPERTENSION   SNOMED Code(s): 59922892


   Comment: 


- Pt diagnosed with PRESS during hospitalization in [place]. BP has been 

stable 


- AED taper as above


- Continue amlodipine 10, Cardura, Coreg 12.5 BID, Isordil


   





(5) Pressure ulcer of sacral region, stage 3


Current Visit: No   Status: Acute   Code(s): L89.153 - PRESSURE ULCER OF SACRAL 

REGION, STAGE 3   SNOMED Code(s): 380352874


   Comment: 


- Pressure ulcer over ischium. Overall improving per report.  He was initially 

hospitalized in [place] secondary to infected decubitus. 


- Wound care consult appreciated   





(6) CAD (coronary artery disease)


Current Visit: No   Status: Chronic   Code(s): I25.10 - ATHSCL HEART DISEASE OF 

NATIVE CORONARY ARTERY W/O ANG PCTRS   SNOMED Code(s): 48862897


   Comment: 


- Continue ASA, coreg and isordil.    





(7) CKD (chronic kidney disease), stage III


Current Visit: No   Status: Chronic   Priority: High   Code(s): N18.3 - CHRONIC 

KIDNEY DISEASE, STAGE 3 (MODERATE)   SNOMED Code(s): 720226518


   Comment: 


- Baseline creatinine difficult to determine but likely around 1.5. 


- Elevated at admission (likely secondary to severe acute illness over last 2 

months) but very slowly trending down. K elevated not responsive to patiromer


- Low K diet   





(8) Chronic back pain


Current Visit: No   Status: Chronic   Code(s): M54.9 - DORSALGIA, UNSPECIFIED; 

G89.29 - OTHER CHRONIC PAIN   SNOMED Code(s): 601263248


   Comment: 


- Secondary to ependymoma.  


- Continue lyrica 50 TID and levorphanol at home dose of 2mg q 8 hr    





(9) BONIFACIO (obstructive sleep apnea)


Current Visit: No   Status: Chronic   Code(s): G47.33 - OBSTRUCTIVE SLEEP APNEA 

(ADULT) (PEDIATRIC)   SNOMED Code(s): 22023080


   Comment: 


- Home BIPAP   





(10) Paraplegia


Current Visit: No   Status: Chronic   Code(s): G82.20 - PARAPLEGIA, UNSPECIFIED

   SNOMED Code(s): 88862684


   Comment: 


- 2/2 to Ependyoma (dx in his 20s)


- PT/OT, working with home care coord to get home hospital bed and PMRU for 

home teaching s/p d/c   





(11) DVT prophylaxis


Current Visit: No   Status: Acute   Code(s): KXE0153 -    SNOMED Code(s): 

463601623


   Comment: 


 - HSQ   





(12) DNR (do not resuscitate)


Current Visit: No   Status: Acute   


Status and Disposition: 





 PMRU on Monday 10/14

## 2019-10-14 RX ADMIN — LEVETIRACETAM SCH MG: 500 TABLET, FILM COATED ORAL at 08:02

## 2019-10-14 RX ADMIN — HEPARIN SODIUM SCH UNITS: 5000 INJECTION INTRAVENOUS; SUBCUTANEOUS at 20:31

## 2019-10-14 RX ADMIN — AMLODIPINE BESYLATE SCH MG: 5 TABLET ORAL at 08:02

## 2019-10-14 RX ADMIN — HEPARIN SODIUM SCH UNITS: 5000 INJECTION INTRAVENOUS; SUBCUTANEOUS at 05:42

## 2019-10-14 RX ADMIN — CARVEDILOL SCH MG: 6.25 TABLET, FILM COATED ORAL at 20:26

## 2019-10-14 RX ADMIN — ASPIRIN SCH MG: 81 TABLET, CHEWABLE ORAL at 08:04

## 2019-10-14 RX ADMIN — LEVORPHANOL TARTRATE SCH MG: 2 TABLET ORAL at 15:16

## 2019-10-14 RX ADMIN — ACETAMINOPHEN PRN MG: 325 TABLET ORAL at 20:25

## 2019-10-14 RX ADMIN — LEVORPHANOL TARTRATE SCH MG: 2 TABLET ORAL at 21:49

## 2019-10-14 RX ADMIN — LEVETIRACETAM SCH MG: 500 TABLET, FILM COATED ORAL at 20:26

## 2019-10-14 RX ADMIN — DOXAZOSIN MESYLATE SCH MG: 2 TABLET ORAL at 20:27

## 2019-10-14 RX ADMIN — CYANOCOBALAMIN TAB 500 MCG SCH MCG: 500 TAB at 08:00

## 2019-10-14 RX ADMIN — COLLAGENASE SANTYL SCH APPLIC: 250 OINTMENT TOPICAL at 14:55

## 2019-10-14 RX ADMIN — PREGABALIN SCH MG: 50 CAPSULE ORAL at 08:04

## 2019-10-14 RX ADMIN — ISOSORBIDE DINITRATE SCH MG: 10 TABLET ORAL at 20:27

## 2019-10-14 RX ADMIN — LEVORPHANOL TARTRATE SCH MG: 2 TABLET ORAL at 05:42

## 2019-10-14 RX ADMIN — COLLAGENASE SANTYL SCH: 250 OINTMENT TOPICAL at 14:54

## 2019-10-14 RX ADMIN — PREGABALIN SCH MG: 50 CAPSULE ORAL at 20:27

## 2019-10-14 RX ADMIN — CARVEDILOL SCH MG: 6.25 TABLET, FILM COATED ORAL at 08:01

## 2019-10-14 RX ADMIN — ISOSORBIDE DINITRATE SCH MG: 10 TABLET ORAL at 08:04

## 2019-10-14 RX ADMIN — HEPARIN SODIUM SCH UNITS: 5000 INJECTION INTRAVENOUS; SUBCUTANEOUS at 15:16

## 2019-10-14 RX ADMIN — PANTOPRAZOLE SODIUM SCH MG: 40 TABLET, DELAYED RELEASE ORAL at 07:59

## 2019-10-14 RX ADMIN — PREGABALIN SCH MG: 50 CAPSULE ORAL at 15:16

## 2019-10-14 RX ADMIN — ISOSORBIDE DINITRATE SCH MG: 10 TABLET ORAL at 15:40

## 2019-10-14 RX ADMIN — Medication SCH UNITS: at 07:59

## 2019-10-14 RX ADMIN — PANTOPRAZOLE SODIUM SCH MG: 40 TABLET, DELAYED RELEASE ORAL at 20:26

## 2019-10-14 RX ADMIN — Medication SCH MG: at 08:01

## 2019-10-14 NOTE — CONS
CONSULTATION NOTE:

 

DATE OF CONSULT:  10/14/19

 

HISTORY OF PRESENT ILLNESS:  Mr. Pryor is a 74-year-old white male who I have 
been following for many years because of neurogenic bladder secondary to  a 
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

 

The patient was previously managed with intermittent self catheterizations; 
however, with the worsening of his medical condition and of his neurological 
condition, he has been on chronic catheter drainage.  The patient had diverting 
colostomy in the left lower quadrant performed to avoid contamination of 
recurrent decubitus ulcers of the buttocks and sacral areas.

 

Because of recurrent urinary tract infections, development of iatrogenic 
hypospadias from the chronic Rodriguez catheter, and because of some difficulty 
replacing his Rodriguez catheter, the plan in the summer was to attempt to place a 
suprapubic catheter.

Two months ago, I took him to the operating room where he underwent a 
cystoscopy. There was no urethral stricture, no prostate enlargement, and the 
bladder neck was wide open.  A suprapubic catheter could not be placed because 
the patient could not hold more than 75 cc in his bladder.  Whenever the 
bladder was distended, there was pericatheter leakage.  Because of that, it 
would have been difficult to place a percutaneous suprapubic catheter and the 
other concern was even if the catheter is placed he will continue to have 
urinary incontinence considering that he had a small capacity and by cystoscopy 
poor sphincteric function. Another concern was potential contamination of the 
suprapubic tract because of its close proximity to the colostomy.

 

The patient had a recent episode of urinary tract infection requiring IV 
antibiotics.  His Rodriguez catheter was replaced 6 days ago.

 consultation was obtained today because of pericatheter leakage.  Most of 
the urine was coming in diapers around the Rodriguez catheter.

 

On  examination today, he is lying comfortably in bed.  He has a Rodriguez 
catheter that is draining small amount.  An iatrogenic hypospadias was noted 
extending all the way into the perineum.  There are wet pads underneath him.

 

I repositioned the Rodriguez catheter so that the catheter will come below rather 
than above his leg, hopefully achieving a more dependent drainage.  If that 
does not help with the incontinence, I will replace his Rodriguez catheter myself.  
If all of that is not successful, attempt should be made for suprapubic 
catheter placement, although I am a concerned because of the proximity of the 
site of the suprapubic catheter to the colostomy and the increased risk of 
having urinary infections contaminated by the colostomy bag.

 

I discussed the above with Ms. Pryor.I will recheck tomorrow morning on his 
condition.

 

 [id number]/CPS #: [id number]

MTDD

## 2019-10-14 NOTE — PN
Subjective


Date of Service: 10/14/19


Interval History: 





Wife reports london leaking continuously around tubing for 3 days. She is 

concerned that UTI is returning.


Dr. Wallis is patient's urologist. Wife declines having london changed to 

larger size by nurse.


Eating well, otherwise ready to return to rehab.


Family History: Unchanged from Admission


Social History: Unchanged from Admission


Past Medical History: Unchanged from Admission





Objective


Active Medications: 








Acetaminophen (Tylenol Tab*)  650 mg PO Q6H PRN


   PRN Reason: MILD PAIN or TEMP > 100.4


   Last Admin: 10/13/19 19:59 Dose:  650 mg


Amlodipine Besylate (Norvasc Tab*)  10 mg PO DAILY [facility]


   Last Admin: 10/14/19 08:02 Dose:  10 mg


Aspirin (Aspirin 81 Mg Chew Tab*)  81 mg PO DAILY [facility]


   Last Admin: 10/14/19 08:04 Dose:  81 mg


Carvedilol (Coreg Tab*)  12.5 mg PO BID [facility]


   Last Admin: 10/14/19 08:01 Dose:  12.5 mg


Cholecalciferol (Vitamin D Tab*)  1,000 units PO DAILY [facility]


   Last Admin: 10/14/19 07:59 Dose:  1,000 units


Collagenase (Santyl 250 Units/Gm Oint*)  1 applic TOPICAL DAILY [facility]


   Last Admin: 10/14/19 14:55 Dose:  1 applic


Cyanocobalamin (Vitamin B12 Tab*)  1,000 mcg PO DAILY [facility]


   Last Admin: 10/14/19 08:00 Dose:  1,000 mcg


Docusate Sodium (Colace Cap*)  100 mg PO BID PRN


   PRN Reason: CONSTIPATION


Doxazosin Mesylate (Cardura Tab*)  2 mg PO BEDTIME [facility]


   Last Admin: 10/13/19 20:33 Dose:  2 mg


Ferrous Gluconate (Fergon Tab*)  324 mg PO DAILY [facility]


   Last Admin: 10/14/19 08:01 Dose:  324 mg


Heparin Sodium (Porcine) (Heparin Vial(*))  5,000 units SUBCUT Q8HR [facility]


   Last Admin: 10/14/19 15:16 Dose:  5,000 units


Isosorbide Dinitrate (Isordil Tab*)  10 mg PO TID [facility]


   Last Admin: 10/14/19 08:04 Dose:  10 mg


Levetiracetam (Keppra Tab*)  250 mg PO BID [facility]


   Last Admin: 10/14/19 08:02 Dose:  250 mg


Levorphanol Tartrate (Levorphanol 2 Mg (Nf))  2 mg PO Q8HR [facility]


   Last Admin: 10/14/19 15:16 Dose:  2 mg


Magnesium Hydroxide (Milk Of Magnesia Liq*)  30 ml PO Q6H PRN


   PRN Reason: CONSTIPATION


Pantoprazole Sodium (Protonix Tab*)  40 mg PO BID [facility]


   Last Admin: 10/14/19 07:59 Dose:  40 mg


Pregabalin (Lyrica Cap(*))  50 mg PO TID [facility]


   Last Admin: 10/14/19 15:16 Dose:  50 mg


Senna (Senokot 8.6 Mg Tab*)  2 tab PO BEDTIME PRN


   PRN Reason: CONSTIPATION








 Vital Signs - 8 hr











  10/14/19 10/14/19 10/14/19





  08:00 08:04 10:52


 


Temperature 36.6 C  


 


Pulse Rate 81  


 


Respiratory 16 20 18





Rate   


 


Blood Pressure 138/60  





(mmHg)   


 


O2 Sat by Pulse 93  





Oximetry   














  10/14/19 10/14/19





  12:00 15:16


 


Temperature 36.4 C 


 


Pulse Rate 65 


 


Respiratory 16 20





Rate  


 


Blood Pressure 121/59 





(mmHg)  


 


O2 Sat by Pulse 96 





Oximetry  











Oxygen Devices in Use Now: Nasal Cannula


Appearance: alert, no distress


Neck: NL Appearance and Movements; NL JVP


Respiratory: Symmetrical Chest Expansion and Respiratory Effort, Clear to 

Auscultation


Cardiovascular: NL Sounds; No Murmurs; No JVD


Abdominal: NL Sounds; No Tenderness; No Distention, - - : catheter present, 

has chronically eroded ventral aspect of glans, and eroded to LT, leaking clear/

yellow urine


Neurological: Alert and Oriented x 3


Lines/Tubes/Other Access: Clean, Dry and Intact Peripheral IV


Nutrition: Taking PO's





- Nutrition: Malnutrition Diagnosis/Plan


Malnutrition Assessment by Registered Dietitian: 


Malnutrition Assessment





Clinical Characteristics         Acute,Severe


Malnutrition Assessment:         Moderate muscle/fat loss


Criteria                         Severe acute weight loss (15.9% over 1 1/2


                                 months)


                                 Severe deficit in energy intake (<50% -


                                 essentially just 1 to 2 servings Boost per day)


                                 


Malnutrition Assessment:         Ensure enlive 3 times daily (350 kcals, 20 gms


Interventions                    protein each)


                                 [place] diet (regular at present)


                                 Selection of menu to maximize tolerance/


                                 acceptance


Malnutrition Assessment: Goals   1.  Improved oral intake to >75% to support


                                 wound healing, maintenance of lean body mass,


                                 adequate hydration w/o contributing to


                                 undesirable weight gain.


                                 2.  Improvement of K levels to WNL.


                                 3.  Evidence of wound healing and no new areas


                                 of skin breakdown.








Result Diagrams: 


 10/13/19 07:35





 10/13/19 07:35


Microbiology and Other Data: 


 Microbiology











 10/07/19 16:20 Urine Culture - Final





 Urine    Diana Albicans





    Normal Stacy














Assess/Plan/Problems-Billing


Assessment: 


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

, chronic sacral wounds, s/p colostomy for fecal incontinence, CAD, CKDIII, 

with recent 7 week hospitalization at [place] for infected ischium wound c/b 

seizures (attributed to linezolid), PRES and ARF, sent to [facility] for rehab but 

readmitted to hospital 10/6 for sepsis 2/2 klebsiella CAUTI. 








- Patient Problems


(1) Catheter-associated urinary tract infection


Current Visit: Yes   Status: Acute   Priority: High   Code(s): T83.511A - I/I 

REACT D/T INDWELLING URETHRAL CATHETER, INIT; N39.0 - URINARY TRACT INFECTION, 

SITE NOT SPECIFIED   SNOMED Code(s): 921469290


   Comment: - Completed Zosyn -last dose on Sat 10/12/19 night for klebseilla 

and pseudomonas


- Catheter will be assessed due to leakage by Dr. Wallis


- Sepsis resolved   





(2) Pressure ulcer of sacral region, stage 3


Current Visit: Yes   Status: Acute   Priority: Medium   Code(s): L89.153 - 

PRESSURE ULCER OF SACRAL REGION, STAGE 3   SNOMED Code(s): 838903390


   Comment: 


- Pressure ulcer over ischium. Overall improving per report.  He was initially 

hospitalized in [place] secondary to infected decubitus. 


- Wound care consult appreciated   





(3) Seizures


Current Visit: No   Status: Acute   Priority: High   Code(s): R56.9 - 

UNSPECIFIED CONVULSIONS   SNOMED Code(s): 05556049


   Comment: - New onset seizures secondary to linezolid or PRES. 


- Will taper, as cause likely reversible, within one to two weeks, will go to 

250mg for afternoon dose and then 250 BID x 1 week, then 250 QDay x1 week then 

stop


- No observed seizures 


- Will need neuro evaluation if any concerns for recurrent seizure.    





(4) DNR (do not resuscitate)


Current Visit: Yes   Status: Acute   Priority: Low   Comment: -appropriate end 

of live decision-making   





(5) DVT prophylaxis


Current Visit: Yes   Status: Acute   Priority: Medium   Code(s): ZMU4032 -    

SNOMED Code(s): 118868777


   Comment: 


 - SC heparin   


Status and Disposition: 





 PMRU on Tuesday 10/15

## 2019-10-15 ENCOUNTER — HOSPITAL ENCOUNTER (INPATIENT)
Dept: HOSPITAL 25 - PMRU | Age: 74
LOS: 3 days | Discharge: NURSING, REHAB OR LONG-TERM CARE | DRG: 52 | End: 2019-10-18
Attending: PHYSICAL MEDICINE & REHABILITATION | Admitting: PHYSICAL MEDICINE & REHABILITATION
Payer: MEDICARE

## 2019-10-15 VITALS — SYSTOLIC BLOOD PRESSURE: 124 MMHG | DIASTOLIC BLOOD PRESSURE: 53 MMHG

## 2019-10-15 DIAGNOSIS — I12.9: ICD-10-CM

## 2019-10-15 DIAGNOSIS — Z88.8: ICD-10-CM

## 2019-10-15 DIAGNOSIS — B37.2: ICD-10-CM

## 2019-10-15 DIAGNOSIS — I67.83: ICD-10-CM

## 2019-10-15 DIAGNOSIS — L89.896: ICD-10-CM

## 2019-10-15 DIAGNOSIS — Z79.899: ICD-10-CM

## 2019-10-15 DIAGNOSIS — G82.21: Primary | ICD-10-CM

## 2019-10-15 DIAGNOSIS — N18.3: ICD-10-CM

## 2019-10-15 DIAGNOSIS — L89.223: ICD-10-CM

## 2019-10-15 DIAGNOSIS — L89.156: ICD-10-CM

## 2019-10-15 DIAGNOSIS — G89.29: ICD-10-CM

## 2019-10-15 DIAGNOSIS — L89.626: ICD-10-CM

## 2019-10-15 DIAGNOSIS — C70.1: ICD-10-CM

## 2019-10-15 DIAGNOSIS — G40.909: ICD-10-CM

## 2019-10-15 DIAGNOSIS — D63.1: ICD-10-CM

## 2019-10-15 DIAGNOSIS — L89.326: ICD-10-CM

## 2019-10-15 DIAGNOSIS — Z79.82: ICD-10-CM

## 2019-10-15 DIAGNOSIS — N31.9: ICD-10-CM

## 2019-10-15 DIAGNOSIS — I47.2: ICD-10-CM

## 2019-10-15 DIAGNOSIS — I25.10: ICD-10-CM

## 2019-10-15 DIAGNOSIS — L89.616: ICD-10-CM

## 2019-10-15 DIAGNOSIS — L89.229: ICD-10-CM

## 2019-10-15 DIAGNOSIS — L89.210: ICD-10-CM

## 2019-10-15 DIAGNOSIS — Z93.3: ICD-10-CM

## 2019-10-15 DIAGNOSIS — N17.9: ICD-10-CM

## 2019-10-15 DIAGNOSIS — G47.33: ICD-10-CM

## 2019-10-15 LAB — PREALB SERPL-MCNC: 28 MG/DL (ref 18–38)

## 2019-10-15 PROCEDURE — 0T9B70Z DRAINAGE OF BLADDER WITH DRAINAGE DEVICE, VIA NATURAL OR ARTIFICIAL OPENING: ICD-10-PCS | Performed by: PHYSICAL MEDICINE & REHABILITATION

## 2019-10-15 PROCEDURE — 36415 COLL VENOUS BLD VENIPUNCTURE: CPT

## 2019-10-15 PROCEDURE — F07Z9ZZ GAIT TRAINING/FUNCTIONAL AMBULATION TREATMENT: ICD-10-PCS | Performed by: PHYSICAL MEDICINE & REHABILITATION

## 2019-10-15 PROCEDURE — F07Z8ZZ TRANSFER TRAINING TREATMENT: ICD-10-PCS | Performed by: PHYSICAL MEDICINE & REHABILITATION

## 2019-10-15 PROCEDURE — F07Z4ZZ WHEELCHAIR MOBILITY TREATMENT: ICD-10-PCS | Performed by: PHYSICAL MEDICINE & REHABILITATION

## 2019-10-15 PROCEDURE — F08Z1ZZ DRESSING TECHNIQUES TREATMENT: ICD-10-PCS | Performed by: PHYSICAL MEDICINE & REHABILITATION

## 2019-10-15 PROCEDURE — F07Z5ZZ BED MOBILITY TREATMENT: ICD-10-PCS | Performed by: PHYSICAL MEDICINE & REHABILITATION

## 2019-10-15 PROCEDURE — F08Z0ZZ BATHING/SHOWERING TECHNIQUES TREATMENT: ICD-10-PCS | Performed by: PHYSICAL MEDICINE & REHABILITATION

## 2019-10-15 PROCEDURE — F08Z3ZZ FEEDING/EATING TREATMENT: ICD-10-PCS | Performed by: PHYSICAL MEDICINE & REHABILITATION

## 2019-10-15 PROCEDURE — 80053 COMPREHEN METABOLIC PANEL: CPT

## 2019-10-15 PROCEDURE — 85025 COMPLETE CBC W/AUTO DIFF WBC: CPT

## 2019-10-15 RX ADMIN — ISOSORBIDE DINITRATE SCH MG: 10 TABLET ORAL at 20:58

## 2019-10-15 RX ADMIN — PANTOPRAZOLE SODIUM SCH MG: 40 TABLET, DELAYED RELEASE ORAL at 20:56

## 2019-10-15 RX ADMIN — HEPARIN SODIUM SCH UNITS: 5000 INJECTION INTRAVENOUS; SUBCUTANEOUS at 14:16

## 2019-10-15 RX ADMIN — ISOSORBIDE DINITRATE SCH MG: 10 TABLET ORAL at 14:15

## 2019-10-15 RX ADMIN — PANTOPRAZOLE SODIUM SCH MG: 40 TABLET, DELAYED RELEASE ORAL at 07:43

## 2019-10-15 RX ADMIN — ASPIRIN SCH MG: 81 TABLET, CHEWABLE ORAL at 07:46

## 2019-10-15 RX ADMIN — CARVEDILOL SCH MG: 6.25 TABLET, FILM COATED ORAL at 07:44

## 2019-10-15 RX ADMIN — PREGABALIN SCH MG: 50 CAPSULE ORAL at 14:15

## 2019-10-15 RX ADMIN — HEPARIN SODIUM SCH UNITS: 5000 INJECTION INTRAVENOUS; SUBCUTANEOUS at 06:03

## 2019-10-15 RX ADMIN — Medication SCH UNITS: at 07:43

## 2019-10-15 RX ADMIN — LEVETIRACETAM SCH MG: 500 TABLET, FILM COATED ORAL at 07:44

## 2019-10-15 RX ADMIN — HEPARIN SODIUM SCH UNITS: 5000 INJECTION INTRAVENOUS; SUBCUTANEOUS at 22:01

## 2019-10-15 RX ADMIN — PREGABALIN SCH MG: 50 CAPSULE ORAL at 20:49

## 2019-10-15 RX ADMIN — CARVEDILOL SCH MG: 6.25 TABLET, FILM COATED ORAL at 20:57

## 2019-10-15 RX ADMIN — LEVETIRACETAM SCH MG: 500 TABLET, FILM COATED ORAL at 20:50

## 2019-10-15 RX ADMIN — PREGABALIN SCH MG: 50 CAPSULE ORAL at 07:43

## 2019-10-15 RX ADMIN — DOXAZOSIN MESYLATE SCH MG: 2 TABLET ORAL at 20:58

## 2019-10-15 RX ADMIN — Medication SCH MG: at 07:44

## 2019-10-15 RX ADMIN — LEVORPHANOL TARTRATE SCH MG: 2 TABLET ORAL at 14:16

## 2019-10-15 RX ADMIN — LEVORPHANOL TARTRATE SCH MG: 2 TABLET ORAL at 22:01

## 2019-10-15 RX ADMIN — AMLODIPINE BESYLATE SCH MG: 5 TABLET ORAL at 07:45

## 2019-10-15 RX ADMIN — LEVORPHANOL TARTRATE SCH MG: 2 TABLET ORAL at 06:02

## 2019-10-15 RX ADMIN — CYANOCOBALAMIN TAB 500 MCG SCH MCG: 500 TAB at 07:43

## 2019-10-15 RX ADMIN — NYSTATIN SCH APPLIC: 100000 POWDER TOPICAL at 21:00

## 2019-10-15 NOTE — DS
CC:  Dr. Wallis; Dr. Vicente *

 

DISCHARGE SUMMARY:

 

DATE OF ADMISSION:  10/06/19

 

DATE OF DISCHARGE:  10/15/19

 

ADDENDUM:

 

HOSPITAL COURSE:  The patient's planned discharge to [facility] on the 10/14/19 was 
delayed due to insurance questions.  The patient also had leakage around the 
urinary catheter, which had been present for 3 days.  He was of course admitted 
for sepsis with UTI and had a repeat urine culture on 10/07/19 that grew small 
amounts of Candida albicans.  Earlier urine culture from the [facility] stay grew 
Klebsiella and Pseudomonas.  The patient completed a week of Zosyn prior to the 
planned discharge yesterday and because of leakage, possibility of infection 
was discussed with the patient's wife.  Consultation was obtained with Dr. Wallis, Urology regarding the leakage.  He assessed the patient and stated 
that he had iatrogenic hypospadia as well as a neurogenic bladder, which was 
not easily distensible making him a poor candidate for suprapubic catheter.  
The patient has essentially no sphincter at the bladder outlet and so some 
leakage happen on the tubing.  He replaced the Rodriguez on the morning of 
discharge and positioned it in a different angle to try to help the catheter 
catch more of the drainage.

 

MEDICATION LIST:  Unchanged.

 

DISCHARGE DIAGNOSES:  List to include, iatrogenic hypospadia as well as 
colostomy.

 

DISPOSITION:  To [facility].

 

CONDITION:  Fair.

 

STATUS:  Inpatient.

 

DIET:  Low potassium.

 

ACTIVITIES:  Out of bed to chair as tolerated.

 

 [id number]/CPS #: [id number]

MTDD

## 2019-10-15 NOTE — CONS
CONSULTATION NOTE:

 

DATE OF CONSULT:  10/15/19

 

The patient now is in the rehab unit.

 

PROCEDURE:  Complex placement of coude ureteral catheter.

 

SUMMARY:  This is a follow-up note on my visit and consultation on Mr. Pryor 
yesterday.  He has a chronic Rodriguez catheter and the catheter has been draining 
minimally and there was a large amount of pericatheter leakage.  Yesterday, I 
repositioned his drainage bag.  I checked on him this morning and he continues 
to have the same degree of pericatheter leakage.

 

Decided to replace his Rodriguez.  I placed an 18 [place] coude catheter. There was 
slight resistance at the bladder neck, but successfully introduced the catheter 
all the way inside his bladder.  A total of about 100 cc of clear urine was 
then drained promptly.

 

With the idea of checking if he would be a candidate for suprapubic catheter 
placement, I wanted to measure his bladder capacity.  The bladder was filled by 
gravity and he could not hold more than 75 cc.  With bladder capacity only 75 
cc it would not be safe enough to preform percutaneous suprapubic catheter 
placement.

 

He was kept on catheter drainage.  I checked back later on in the afternoon 
after he was transferred to the Rehab. Unit.  The Rodriguez catheter has been 
draining well clear urine, and there has not been any recurrence of the 
pericatheter drainage.  He has been dry.

 

The plan is to keep the present Rodriguez until it is time to replace it in a few 
weeks.  Will hold off on the attempt of placing the suprapubic catheter at this 
time.

 

 [id number]/CPS #: [id number]

ELISABET

## 2019-10-15 NOTE — PMRUTEAM
PMRU: Team Meeting


Current Status: 


 Physical Therapy: Current Status











Current Rolling Status         Dependent


 


Current Supine <-> Sit Status  Dependent


 


Current Sit <-> Stand Status   Not Applicable


 


Current Bed <-> Chair Status   Dependent


 


Transfer/Bed Mobility          Bill Lift





Recommended Devices            


 


Current Picking Up Object      Not attempted





Status                         


 


Current Car Transfer Status    Not attempted


 


Current Ambulation Assistance  Not Applicable





Status                         


 


Manual Wheelchair Control/     Right UE





Technique                      


 


Current Wheelchair Propulsion  Partial/Moderate





Ability Status                 


 


Wheelchair Distance (ft)       15


 


Current Stair Climbing Status  Not Applicable














 Occupational Therapy: Current Status











Current Upper Body Dressing    Substantial/Maximal





Status                         


 


Current Lower Body Dressing    Dependent





Status                         


 


Current Footwear Status        Dependent


 


Current Bathing Status         Dependent


 


Bathing Progress               2 max-totalA for rolling left and right in supine


 


Current Toileting Status       Dependent


 


Toileting Progress             totalA for hygiene, clothing management, emptying





 ostomy/london


 


Current Toilet Transfer Status Not Applicable


 


Current Eating Status          Setup or Clean-up Assist














 Nursing: Current Status











Skin Deviations [Right Lateral Pressure Ulcer





Hip]                           


 


Skin Deviations [Sacrum]       Wound


 


Skin Deviations [Left Heel]    Other


 


Skin Deviations [Bilateral     Other





Ankle]                         


 


Skin Deviation Description [   optifoam in place





Right Lateral Hip]             


 


Skin Deviation Description [   optifoam in place





Sacrum]                        


 


Skin Deviation Description [   per report Stage 1 with Optifoam





Left Heel]                     


 


Skin Deviation Description [   per report stage 1 wiht Optifoam





Bilateral Ankle]               


 


Bladder Current Status         l[place] inserted by Dr. Wallis this am prior to





 admit to PMRU


 


Bowel Current Status           colostomy patent for soft brown stool


 


Nutrition Current Status       appetite fair


 


Medication Current Status      needs reinforcement














 Rec Therapy: Current Status











Summary of Assessment and      RT assessment complete and pt. is aware of RT





Clinical Impression            services.  Pt. is engaged in leisure sessions.

















Goals: 


 Physical Therapy:  Goals











Goals to Be Accomplished in (  2





Days)                          


 


Goal: Rolling Assistance       Substantial/Maximal


 


Goal Supine <-> Sit Status     Dependent


 


Goal Sit <-> Stand Status      Not Applicable


 


Goal Bed <-> Chair Status      Dependent


 


Transfer/Bed Mobility          Bill Lift





Recommended Devices            


 


Goal: Car Transfer Status      Not attempted


 


Goal: Ambulation Assistance    Not Applicable


 


Ambulation Assistive Devices   Forearm Crutches


 


Goal: Wheelchair Propulsion    Partial/Moderate





Ability                        


 


Wheelchair Distance (ft)       50


 


Goal: Stairs Assistance        Not Applicable


 


Goal: Curb Assistance          Not Applicable


 


Goal: Home Exercise Program    Partial/Moderate





Assistance                     














 Occupational Therapy: Goals











Goals to be Completed in (Days 2-3





)                              


 


Goal Upper Body Dressing       Partial/Moderate





Routine                        


 


Goal Lower Body Dressing       Dependent





Routine                        


 


Goal Footwear Status           Dependent


 


Goal Bathing Routine (OT)      Dependent


 


Goal Grooming Routine          Setup or Clean-up Assist


 


Goal Toilet Hygiene and        Dependent





Clothing Management Routine    


 


Goal Toilet Transfer Routine   Not Applicable


 


Goal Functional Transfers for  Dependent





ADL                            


 


Goal Feeding Routine           Setup or Clean-up Assist














 Nursing: Goals











Bladder Goal                   indwelling at home with supervision


 


Bowel Goal                     supervision


 


Nutrition Goal                 consume 100% of all meals


 


Medication Goal                supervision

















Care Plan: 


 Care Plan





DVT Prophylaxis- Improve/Maintain       Start:  10/15/19 11:30              


Freq:   DAILY@0700,1900                 Status: Active      Target: 10/16/19


Protocol:                                                                   








Activity Type Activity Date Activity User E-Sign Co-Sign Detail





Recorded Client Recorded Date Recorded By   


 


Document 10/15/19 11:31 [id number]   





PMRU-C14 10/15/19 11:34 [id number]   














  10/15/19





  11:31


 


PMRU Outcome: DVT Prophylaxis 


 


Current DVT Outcome/Goals Remains Free of





 DVT





 Complies with





 DVT Prophylaxis





 /Treatment





 Demonstrates





 Knowledge of





 DVT Prevention/





 Treatment


 


Progression Toward Outcome/Goals Goal Initiation








Discharge Planning - Improve/Maintain   Start:  10/15/19 11:30              


Freq:   DAILY@0700,1900                 Status: Active      Target: 10/16/19


Protocol:                                                                   








Activity Type Activity Date Activity User E-Sign Co-Sign Detail





Recorded Client Recorded Date Recorded By   


 


Document 10/15/19 11:31 [id number]   





[id number]-C14 10/15/19 11:34 [id number]   














  10/15/19





  11:31


 


PMRU Outcome: Discharge Planning 


 


Update Patient Family Yes


 


Current Discharge Planning Outcome/Goals Demonstrates





 Understanding





 of Discharge





 Plan





 Homecare





 Referral - See





 Comment


 


Progression Toward Outcome/Goals Goal Initiation








Education-Improve/Maintain              Start:  10/15/19 11:30              


Freq:   DAILY@0700,1900                 Status: Active      Target: 10/16/19


Protocol:                                                                   








Activity Type Activity Date Activity User E-Sign Co-Sign Detail





Recorded Client Recorded Date Recorded By   


 


Document 10/15/19 11:31 [id number]   





PMRU-C14 10/15/19 11:34 [id number]   














  10/15/19





  11:31


 


PMRU Outcome: Education 


 


Current Education Outcome/Goals Demonstrate/





 Verbalize





 Understanding





 of Written





 Discharge





 Instructions





 Demonstrates





 Skills





 Encourage





 Questions


 


Progression Toward Outcome/Goals Goal Initiation








/GI-Improve/Maintain                  Start:  10/15/19 11:30              


Freq:   DAILY@0700,1900                 Status: Active      Target: 10/16/19


Protocol:                                                                   








Activity Type Activity Date Activity User E-Sign Co-Sign Detail





Recorded Client Recorded Date Recorded By   


 


Document 10/15/19 11:31 [id number]   





PMRU-C14 10/15/19 11:34 [id number]   














  10/15/19





  11:31


 


PMRU Outcome: Genitourinary/ 





Gastrointestinal 


 


Current Gastrointestinal Outcome/Goals Maintain/





 Achieve Bowel





 Regularity in





 Accordance with





 Pt's Baseline





 Remain Free of





 Emesis





 Prevent





 Constipation





 Laxatives as





 Ordered


 


Progression Toward Outcome/Goals Goal Initiation


 


Current Genitourinary Outcome/Goals Maintain/





 Achieve





 Adequate





 Urinary Output





 Remain Free of





 Hospital-





 Acquired UTI


 


Progression Toward Outcome/Goals Goal Initiation








Medication Administration               Start:  10/15/19 11:30              


Freq:   DAILY@0700,1900                 Status: Active      Target: 10/16/19


Protocol:                                                                   








Activity Type Activity Date Activity User E-Sign Co-Sign Detail





Recorded Client Recorded Date Recorded By   


 


Document 10/15/19 11:31 [id number]   





[id number]-C14 10/15/19 11:34 [id number]   














  10/15/19





  11:31


 


PMRU Outcome: Medication Administration 


 


Assess Patient Knowledge/Teach Med Yes





Education for all Meds 


 


Current Med Admin Outcome/Goals Family/





 Caregiver





 Administer





 Medications at





 Home





 Demonstrates





 Understanding


 


Progression Towards Outcome/Goals Goal Initiation


 


Is Patient Going Home on Lovenox? No








Neurological- Improve/Maintain          Start:  10/15/19 11:30              


Freq:   DAILY@0700,1900                 Status: Active      Target: 10/16/19


Protocol:                                                                   








Activity Type Activity Date Activity User E-Sign Co-Sign Detail





Recorded Client Recorded Date Recorded By   


 


Document 10/15/19 11:31 [id number]   





PMRU-C14 10/15/19 11:34 [id number]   














  10/15/19





  11:31


 


PMRU Outcome: Neurological 


 


Weakness/Aphasia Weakness


 


Weakness/Aphasia Comment paraplegia


 


Current Neurological Outcome/Goals Maintain/





 Achieve





 Baseline





 Neurological





 Status





 Prevent





 Avoidable





 Neurological





 Decline





 Maintain/





 Improve





 Strength/ROM


 


Progression Toward Outcome/Goals Goal Initiation








Nutrition/Swallowing- Improve/Maintain  Start:  10/15/19 11:30              


Freq:   DAILY@0700,1900                 Status: Active      Target: 10/16/19


Protocol:                                                                   








Activity Type Activity Date Activity User E-Sign Co-Sign Detail





Recorded Client Recorded Date Recorded By   


 


Document 10/15/19 11:31 [id number]   





[id number]-C14 10/15/19 11:34 [id number]   














  10/15/19





  11:31


 


PMRU Outcome: Nutrition/Swallowing 


 


Current Nutrition/Swallowing Outcome/ Demonstrates





Goals Adequate





 Hydration/





 Prevents





 Dehydration





 Maintain/





 Improve





 Nutritional





 Status


 


Progression Toward Outcome/Goals Goal Initiation








Pain/Comfort- Improve/Maintain          Start:  10/15/19 11:30              


Freq:   DAILY@0700,1900                 Status: Active      Target: 10/16/19


Protocol:                                                                   








Activity Type Activity Date Activity User E-Sign Co-Sign Detail





Recorded Client Recorded Date Recorded By   


 


Document 10/15/19 11:31 [id number]   





[id number]-C14 10/15/19 11:34 [id number]   














  10/15/19





  11:31


 


PMRU Outcome: Pain/Comfort 


 


Current Pain/Comfort Outcome/Goals Demonstrates





 Knowledge and





 Use of





 Available





 Comfort





 Measures





 Achieves





 Acceptable





 Comfort/Pain





 Level as





 Determined by





 Patient/Condit





 Maintain





 Comfort Level





 Allowing





 Patient to





 Fully





 Participate in





 Rehab


 


Progression Toward Outcome/Goals Goal Initiation








Safety- Improve/Maintain                Start:  10/15/19 09:08              


Freq:   DAILY@0700,1900                 Status: Active      Target: 10/16/19


Protocol:                                                                   








Activity Type Activity Date Activity User E-Sign Co-Sign Detail





Recorded Client Recorded Date Recorded By   


 


Document 10/15/19 11:31 [id number]   





PMRU-C14 10/15/19 11:34 [id number]   














  10/15/19





  11:31


 


PMRU Outcome: Safety 


 


Current Safety Outcome/Goals Remain Free of





 Injury or Harm





 Cooperates with





 Safety





 Measures for





 Least





 Restrictive





 Environment





 Prevent Falls/





 Injury


 


Progression Toward Outcome/Goals Goal Initiation








Skin- Improve/Maintain                  Start:  10/15/19 11:30              


Freq:   DAILY@0700,1900                 Status: Active      Target: 10/16/19


Protocol:                                                                   








Activity Type Activity Date Activity User E-Sign Co-Sign Detail





Recorded Client Recorded Date Recorded By   


 


Document 10/15/19 11:31 [id number]   





PMRU-C14 10/15/19 11:34 [id number]   














  10/15/19





  11:31


 


PMRU Outcome: Skin 


 


Skin Risk Level Very High Risk


 


Skin Orders Dressing Change





 Multipodus Boot





 Heels Off Bed





 Turn/Position





 q2hr While in





 Bed





 Other


 


Skin Orders Comment patient in ICU





 bed. will need





 to teach wife





 drsg changes


 


Current Skin Outcome/Goals Maintain/





 Improve Skin





 Integrity





 Maintain/





 Improve Wound





 Status


 


Progression Toward Outcome/Goals Goal Initiation

















- Interdisciplinary Staff Present


/Social Work Staff Present: Asya Delacruz LMSW


Nursing Staff Present: Mara Gonzalez RN


OT Staff Present: Magui Marr


PT Staff Present: Jaimie Spaulding


Rec Therapy Staff Present: Radha Owens


Medicine Note: 








Length of Stay:  3 days





Anticipated Discharge Destination: Home 





Tentative Discharge Date:  10/18/19





Discharged to:  Home

## 2019-10-15 NOTE — CONSULT
Subjective


Date of Service: 10/15/19


Interval History: 





Mr. Pryor is a 75 yo male with PMH significant for paraplegia secondary to 

spinal ependymoma, asthma, CAD, CKD stage 3, GERD, HLD, HTN, PVD, neurogenic 

bladder with chronic indwelling urinary catheter, BONIFACIO, hx sacral stage 4 

pressure injury s/p flap reconstruction, and diverting colostomy. He presented 

to [facility] on 19 with complaints of fever for 3 days 

and concerns of an infected pressure injury and consult with Dr. Luis Felipe Chirinos (

previously performed flap reconstruction). He was admitted to [facility] for a sacral pressure injury and underwent debridement (by Dr. Marge Giron

) and ABX. He developed Posterior Reversible Encephalopathy Syndrome (PRES) and 

seizures felt to be secondary to Linezolid, and was intubated for 1 week for 

acute respiratory failure. He stayed at [facility] for a total of 44 days. He 

was felt to require rehab and was admitted to [facility] on 10/2/19. He developed 

sepsis secondary to a UTI and was transferred to the medical floor on 10/6/19, 

he was discharged back to [facility] today. 





Patient presented to [facility] from [facility] with multiple pressure 

injuries including; bilateral heels, left ischium, right hip, sacrum. Also 

noted to have ecchymosis to the right ankle. He has developed deep tissue 

injuries to the right heel, pressure injury to the right heel, and buttocks 

with some shearing and deep tissue injuries to the buttocks during his 

hospitalization.





Patient seen and examined at bedside. 





Family History: Unchanged from Admission


Social History: Unchanged from Admission


Past Medical History: Unchanged from Admission





Review of Systems





- Measurements


Intake and Output: 


Intake and Output Last 24 Hours











 10/13/19 10/14/19 10/15/19 10/16/19





 06:59 06:59 06:59 06:59


 


Output Total    550


 


Balance    -550


 


Weight    217 lb


 


Output:    


 


  Rodriguez    450


 


  Colostomy    100


 


Other:    


 


  Estimated Stool Amount    Medium














- Review of Systems


Constitutional Symptoms: 


   Negative: Fever, Other - Chills


Dermatology: Positive: Other - Multiple wounds


Neurology: Positive: Other - Paraplegic





Objective


Active Medications: 





Acetaminophen (Tylenol Tab*)  650 mg PO Q6H PRN Reason: MILD PAIN or TEMP > 

100.4


Amlodipine Besylate (Norvasc Tab*)  10 mg PO DAILY CHELSEA


Aspirin (Aspirin Ec Tab*)  81 mg PO DAILY CHELSEA


Carvedilol (Coreg Tab*)  12.5 mg PO BID CHELSEA


Cholecalciferol (Vitamin D Tab*)  1,000 units PO DAILY CHELSEA


Cyanocobalamin (Vitamin B12 Tab*)  1,000 mcg PO DAILY CHELSEA


Doxazosin Mesylate (Cardura Tab*)  2 mg PO BEDTIME CHELSEA


Ferrous Gluconate (Fergon Tab*)  324 mg PO DAILY CHELSEA


Heparin Sodium (Porcine) (Heparin Vial(*))  5,000 units SUBCUT Q8HR CHELSEA


Isosorbide Dinitrate (Isordil Tab*)  10 mg PO TID CHELSEA


Levetiracetam (Keppra Tab*)  250 mg PO BID CHELSEA


Levorphanol Tartrate (Levorphanol 2 Mg (Nf))  2 mg PO Q8H CHELSEA


Pantoprazole Sodium (Protonix Tab*)  40 mg PO BID CHELSEA


Pregabalin (Lyrica Cap(*))  50 mg PO TID CHELSEA





               Vital Sign











  10/15/19





  09:08


 


Temperature 98.7 F


 


Temperature Oral





Source 


 


Pulse Rate 66


 


Respiratory 20





Rate 


 


Blood Pressure 131/41





(mmHg) 


 


O2 Sat by Pulse 95





Oximetry 











Oxygen Devices in Use Now: None


Appearance: NAD, laying in bed


Ears/Nose/Mouth/Throat: Mucous Membranes Moist


Respiratory: Symmetrical Chest Expansion and Respiratory Effort


Cardiovascular: No Edema


Extremities: No Edema, - - Weak bilateral DP pulses


Skin: - - See skin note below


Neurological: Alert and Oriented x 3


Result Diagrams: 


 10/16/19 06:31





 10/16/19 06:31


Additional Lab and Data: 





Above labs were pulled into the note, when the note was edited prior to 

signing. There were no labs from the day of the consultation. 





Diagnostic Imagin. Exam Date: 19 - VL ANK/BRACHIAL INDICES





FINDINGS:  The ankle brachial index on the right was 0.39 and on the left was 

0.80. The ankle-brachial indices on the prior study were 0.93 and 0.91 

respectively. There is monophasic flow within the right posterior tibial artery 

and monophasic flow within the right dorsalis pedis artery.  There is biphasic 

flow within the left posterior tibial artery and biphasic flow within the left 

dorsalis pedis artery.





IMPRESSION:  SIGNIFICANTLY REDUCED ANKLE-BRACHIAL INDICES AND WAVEFORMS MORE 

SEVERE IN THE RIGHT LOWER EXTREMITY. CONSIDER A CT ANGIOGRAM OF THE AORTA AND 

LOWER EXTREMITIES FOR FURTHER EVALUATION.





2. Exam Date: 19 - MRI LOWER EXTREMITY RIGHT W/O





IMPRESSION:  SOFT TISSUE SWELLING AND SOFT TISSUE ULCER, NO SPECIFIC EVIDENCE 

FOR OSTEOMYELITIS.








Skin Deviation Note





- Skin Deviation Findings





Right posterior/lateral heel (achilles) - There is an intact blister, measures 

2.5 cm x 2.5 cm. There is purplish discoloration to the skin. The surrounding 

skin intact. No drainage. 


Right buttock/hip - There is a superficial open area, measures 2 cm x 1.5 cm x 

0.1 cm. There is an area of dark discoloration (line going up towards the hip), 

measures 9 cm x 3.5 cm. The surrounding skin is intact. There is no drainage. 

See below for furhter documentation of the sacrum. 


Right lateral hip - There is a shallow wound, measures 1.4 cm x 1.2 cm x 0.1 

cm. The wound base is mostly adherent yellow slough, with a small amount of red 

granulation tissue. The surrounding skin is intact. There is no drainage. 


Left buttocks/hip and sacrum - There is a large area of erythema, that is 

slightly blanchable. There is a superficial open area on the left that measures 

8 cm x 4 cm x 0.1 cm. There is an area of dark purple discoloration at 12 o'

clock above the anus. There is a small area of dark purplish discoloration just 

left of the midline back. The  cleft above with slight blanchable 

erythema. There is a small amount of bloody drainage noted from the superficial 

open areas. The surrounding skin is intact. 


Left ischium - The wound measures 4 cm x 3 cm x 2 cm at 3 o'clock and 2.5 cm at 

6 o'clock. The wound base initially had yellow slough present, this was easily 

removed with a cotton tipped applicator. The wound bed is beefy red granulation 

tissue. The periwound is slightly macerated. The surrounding skin with slight 

blanchable erythema. There is a small amount of clear drainage. No odor. 


Right medial 1st toe - The is a small u shaped area of dark purple discoloration

, the skin is intact. This measures 1 cm x 1 cm. No drainage. The surrounding 

skin is intact. 


Right lateral ankle - There is an area of slight purplish discoloration, 

measures 2.5 cm x 2 cm. There is a small area of fluctuance, measures 1 cm x 

1cm. The surrounding skin is intact, with some dry and flaky skin. No drainage. 


Left medial heel - There is a superficial open area, measures 0.9 cm x 0.6 cm x 

0.1 cm. There is adherent white slough in the wound base and a small amount of 

pink granulation tissue. The surrounding skin is intact, pink and slightly 

blanchable. The heel is also boggy. 


Right medial heel - There is a wound that measures 3 cm x 2 cm x 0.1 cm. The 

wound base is adherent white slough. There is dark purplish discoloration in 

the wound base and to the intact skin towards the plantar aspect of the foot. 

The surrounding skin is intact.





Not pictured: On the left upper medial thigh there is a candidiasis rash 

present.  








Wound Problem/Plan


Assessment: 


Mr. Pryor is a 75 yo male with PMH significant for paraplegia secondary to 

spinal ependymoma, asthma, CAD, CKD stage 3, GERD, HLD, HTN, PVD, neurogenic 

bladder with chronic indwelling urinary catheter, BONIFACIO, hx sacral stage 4 

pressure injury s/p flap reconstruction, and diverting colostomy. He presented 

to [facility] on 19 with complaints of fever for 3 days 

and concerns of an infected pressure injury and consult with Dr. Luis Felipe Chirinos (

previously performed flap reconstruction). He was admitted to [facility] for a sacral pressure injury and underwent debridement (by Dr. Marge Giron

) and ABX. He was felt to require rehab and was admitted to [facility] on 10/2/

19. He developed sepsis secondary to a UTI and was transferred to the medical 

floor on 10/6/19, he was discharged back to [facility] today. Patient presented to 

[facility] from [facility] with multiple pressure injuries including; 

bilateral heels, left ischium, right hip, sacrum. Also noted to have ecchymosis 

to the right ankle. He has developed deep tissue injuries to the right heel, 

pressure injury to the right heel, and buttocks with some shearing and deep 

tissue injuries to the buttocks during his hospitalization.





1. Right medial heel unstageable pressure injury, with associated deep tissue 

injury. The original wound has been present since May 2019, he started to 

follow with the [facility] for this wound in 2019. 

Underwent ABIs in August showing reduced ABIs right > left. Also had an MRI of 

the foot in 2019, showing no sign of osteomyelitis. He was suppose to 

have a CTA with runoff and referral to Dr. Smith, but it appears that was 

neither were ever completed. While in [facility] this wound was being 

treated with a border foam dressing. The initial wound had healed last week, 

but is now unstageable with an associated deep tissue injury. Recommend 

applying Santyl to the area of slough, followed by calcium alginate, and rolled 

gauze, and change daily or as needed for drainage. Keep the heels elevated off 

the bed, use multipodus boots and spankos. Will add a prealbumin to the last 

labs. Consider referral to Vascular outpatient as previously recommended. 

Should follow with wound clinic as outpatient. 


2. Right lateral ankle and right posterior/lateral heel. There is an area of 

discoloration, this represents deep tissue injuries. While in [facility] 

the right lateral ankle was being treated with a border foam dressing. The 

right posterior/lateral heel deep tissue injury has developed over the past 

week. Recommend using a border foam dressing (Optifoam) to protect the area, 

but could consider leaving open to air as there are no open areas. Keep the 

heels elevated off the bed, use multipodus boots and spankos. Will add a 

prealbumin to the last labs. Consider referral to Vascular outpatient as 

previously recommended. Should follow with wound clinic as outpatient. 


3. Left medial heel unstageable pressure injury. Unclear how long this has been 

present. Ms. Pryor reports this has been present since July, but was not 

previously documented by the [facility] in July and August when he was 

seen there last. See CHARMAINE results above. While in [facility] this wound 

was being treated with Medihoney and a border foam dressing and being changed 

every other day. Recommend applying Santyl followed by calcium alginate, and 

rolled gauze, and change every day or as needed for drainage. Keep the heels 

elevated off the bed, use multipodus boots and spankos. Will add a prealbumin 

to the last labs. Consider referral to Vascular outpatient. If wound continues 

to be present and not healing, should consider MRI to evaluate for 

Osteomyelitis. 


4. Left ischium stage 3 pressure injury. This has been present since May 2019, 

he started to follow with the [facility] for this wound in 

2019. While in [facility] this wound was being treated with a wound 

vac, and then changed to Santyl BID with wet to dry dressing at the time of 

discharge. This is reported to have wound cultures with MRSA and VRE while in 

[facility].  He was treated with Linezolid and Zosyn, unclear for how long. 

Recommend gently packing the wound with calcium alginate rope, and cover with a 

border foam gauze (Optifoam) and change daily or as needed for drainage. 

Frequent turning and repositioning. Do not sit up in a chair for more than 1-2 

hours at a time if possible. Use Roho cushion in wheel chair when up OOB. Will 

add on a prealbumin to evaluate nutritional status. Should be referred back to 

the [facility] (or wound clinic of choice) at discharge. 


5. Sacral/lower back stage 2 pressure injury, healed. While at [facility] had a sacral MRI on 19 showing "Lumbosacral spinal canal 

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 Fracture of the L3 

vertebral body. There is perhaps slightly marrow signal abnormality within the 

posterior elements of the sacrum at the level of S3, which is nonspecific and 

may be reactive secondary to the adjacent mass, however early/mild changes 

related to osteomyelitis cannot be entirely excluded".   The wound has healed, 

and there is slight erythema to the skin in this area. Recommend applying 

barrier cream (orange top) as needed. If the area appears to have a fungal 

infection recommend discontinuing barrier cream and use Nystatin powder twice 

daily. Frequent turning and repositioning. Will add a prealbumin to the last 

labs. 


6. Sacral/buttocks superficial wounds with deep tissue injury. These have been 

present since 10/5/19. Recommend using barrier cream as needed. Use a lifting 

device to move in bed. Frequent turning and repositioning. If he develops more 

shearing injuries, can consider using a large Opifoam to the area and change 

every 3 days or as needed for soiling. 


7. Right hip unstageble pressure injury. Unclear how long this has been present

, but developed after last seen at the wound clinic in August as not documented 

at that time (19). While at [facility] this was being treated with 

Santyl and a border foam dressing. Recommend applying Santyl to the wound base 

once daily, followed by a border foam dressing (Optifoam) and change daily or 

as needed for drainage. Frequent turning and repositioning. Do not sit up in a 

chair for more than 1-2 hours at a time if possible. Use Roho cushion in wheel 

chair when up OOB. Will add on a prealbumin to evaluate nutritional status.


8. Right 1st toe, deep tissue injury. Unclear what caused this injury, may have 

been a toe nail. Recommend protecting the area. 


9. Left upper thigh rash. Suspect this is candidiasis. Recommend applying 

Nystatin powder to the area. 


10. Anterior urethral injury; acquired latrogenic hypospadias. Medical device 

related pressure injury to the penis secondary to the urinary catheter. This is 

not new and has been documented by Urology in the past. Use caution to keep 

pressure off the urinary catheter tube to prevent further breakdown.


11. 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 With neurogenic bladder and a 

chronic indwelling urinary catheter. 


12. Diet. Regular diet.


13. Code Status. DNR.


14. Disposition. Inpatient, disposition per Primary team. 





TIME SPENT: Time for this wound consultation was 100 minutes and 90 minutes was 

spent at bedside with the patient and wife discussing progression of wounds; 

removing old dressings; assessing, measuring, and photographing the wounds; 

reapplying new dressings. 





Is Patient a Wound Clinic Patient: [facility]


Current Treatment: See skin note above for details.


Attending: Le Wright

## 2019-10-16 LAB
ALBUMIN SERPL BCG-MCNC: 2.9 G/DL (ref 3.2–5.2)
ALBUMIN/GLOB SERPL: 1 {RATIO} (ref 1–3)
ALP SERPL-CCNC: 188 U/L (ref 34–104)
ALT SERPL W P-5'-P-CCNC: 31 U/L (ref 7–52)
ANION GAP SERPL CALC-SCNC: 6 MMOL/L (ref 2–11)
AST SERPL-CCNC: 17 U/L (ref 13–39)
BASOPHILS # BLD AUTO: 0.1 10^3/UL (ref 0–0.2)
BUN SERPL-MCNC: 28 MG/DL (ref 6–24)
BUN/CREAT SERPL: 26.4 (ref 8–20)
CALCIUM SERPL-MCNC: 10.1 MG/DL (ref 8.6–10.3)
CHLORIDE SERPL-SCNC: 110 MMOL/L (ref 101–111)
EOSINOPHIL # BLD AUTO: 0.6 10^3/UL (ref 0–0.6)
GLOBULIN SER CALC-MCNC: 3 G/DL (ref 2–4)
GLUCOSE SERPL-MCNC: 78 MG/DL (ref 70–100)
HCO3 SERPL-SCNC: 22 MMOL/L (ref 22–32)
HCT VFR BLD AUTO: 27 % (ref 42–52)
HGB BLD-MCNC: 9.4 G/DL (ref 14–18)
LYMPHOCYTES # BLD AUTO: 2.8 10^3/UL (ref 1–4.8)
MCH RBC QN AUTO: 29 PG (ref 27–31)
MCHC RBC AUTO-ENTMCNC: 34 G/DL (ref 31–36)
MCV RBC AUTO: 85 FL (ref 80–94)
MONOCYTES # BLD AUTO: 1 10^3/UL (ref 0–0.8)
NEUTROPHILS # BLD AUTO: 5.4 10^3/UL (ref 1.5–7.7)
NRBC # BLD AUTO: 0 10^3/UL
NRBC BLD QL AUTO: 0
PLATELET # BLD AUTO: 285 10^3/UL (ref 150–450)
POTASSIUM SERPL-SCNC: 4.3 MMOL/L (ref 3.5–5)
PROT SERPL-MCNC: 5.9 G/DL (ref 6.4–8.9)
RBC # BLD AUTO: 3.22 10^6 /UL (ref 4.18–5.48)
SODIUM SERPL-SCNC: 138 MMOL/L (ref 135–145)
WBC # BLD AUTO: 9.9 10^3/UL (ref 3.5–10.8)

## 2019-10-16 RX ADMIN — ISOSORBIDE DINITRATE SCH MG: 10 TABLET ORAL at 14:44

## 2019-10-16 RX ADMIN — LEVETIRACETAM SCH MG: 500 TABLET, FILM COATED ORAL at 20:14

## 2019-10-16 RX ADMIN — CARVEDILOL SCH MG: 6.25 TABLET, FILM COATED ORAL at 20:15

## 2019-10-16 RX ADMIN — LEVORPHANOL TARTRATE SCH MG: 2 TABLET ORAL at 21:13

## 2019-10-16 RX ADMIN — ISOSORBIDE DINITRATE SCH MG: 10 TABLET ORAL at 11:02

## 2019-10-16 RX ADMIN — PREGABALIN SCH MG: 50 CAPSULE ORAL at 11:04

## 2019-10-16 RX ADMIN — NYSTATIN SCH APPLIC: 100000 POWDER TOPICAL at 11:04

## 2019-10-16 RX ADMIN — Medication SCH MG: at 11:02

## 2019-10-16 RX ADMIN — LEVORPHANOL TARTRATE SCH MG: 2 TABLET ORAL at 07:15

## 2019-10-16 RX ADMIN — HEPARIN SODIUM SCH UNITS: 5000 INJECTION INTRAVENOUS; SUBCUTANEOUS at 21:13

## 2019-10-16 RX ADMIN — HEPARIN SODIUM SCH UNITS: 5000 INJECTION INTRAVENOUS; SUBCUTANEOUS at 14:45

## 2019-10-16 RX ADMIN — PANTOPRAZOLE SODIUM SCH MG: 40 TABLET, DELAYED RELEASE ORAL at 20:15

## 2019-10-16 RX ADMIN — CARVEDILOL SCH MG: 6.25 TABLET, FILM COATED ORAL at 11:01

## 2019-10-16 RX ADMIN — ISOSORBIDE DINITRATE SCH MG: 10 TABLET ORAL at 20:15

## 2019-10-16 RX ADMIN — NYSTATIN SCH APPLIC: 100000 POWDER TOPICAL at 20:44

## 2019-10-16 RX ADMIN — HEPARIN SODIUM SCH UNITS: 5000 INJECTION INTRAVENOUS; SUBCUTANEOUS at 05:38

## 2019-10-16 RX ADMIN — ASPIRIN SCH MG: 81 TABLET, COATED ORAL at 11:01

## 2019-10-16 RX ADMIN — AMLODIPINE BESYLATE SCH MG: 5 TABLET ORAL at 11:01

## 2019-10-16 RX ADMIN — PREGABALIN SCH MG: 50 CAPSULE ORAL at 14:44

## 2019-10-16 RX ADMIN — DOXAZOSIN MESYLATE SCH MG: 2 TABLET ORAL at 20:15

## 2019-10-16 RX ADMIN — Medication SCH UNITS: at 11:02

## 2019-10-16 RX ADMIN — LEVORPHANOL TARTRATE SCH MG: 2 TABLET ORAL at 14:44

## 2019-10-16 RX ADMIN — CYANOCOBALAMIN TAB 500 MCG SCH MCG: 500 TAB at 11:02

## 2019-10-16 RX ADMIN — LEVETIRACETAM SCH MG: 500 TABLET, FILM COATED ORAL at 11:03

## 2019-10-16 RX ADMIN — PREGABALIN SCH MG: 50 CAPSULE ORAL at 20:15

## 2019-10-16 RX ADMIN — PANTOPRAZOLE SODIUM SCH MG: 40 TABLET, DELAYED RELEASE ORAL at 11:04

## 2019-10-16 NOTE — PN
Progress Note


Date of Service: 10/16/19


Note: 


GETACHEW FLORES was visited. Therapy notes read and reviewed. Training is 

going fairly well. He is more aware but tires easily but able to stay awake for 

longer periods of time. 








Current Medications: 


 Active Medications











Generic Name Dose Route Start Last Admin





  Trade Name Freq  PRN Reason Stop Dose Admin


 


Acetaminophen  650 mg  10/15/19 11:28  





  Tylenol Tab*  PO   





  Q6H PRN   





  MILD PAIN or TEMP > 100.4   





     





     





     


 


Amlodipine Besylate  10 mg  10/16/19 09:00  10/16/19 11:01





  Norvasc Tab*  PO   10 mg





  DAILY CHELSEA   Administration





     





     





     





     


 


Aspirin  81 mg  10/16/19 09:00  10/16/19 11:01





  Aspirin Ec Tab*  PO   81 mg





  DAILY CHELSEA   Administration





     





     





     





     


 


Carvedilol  12.5 mg  10/15/19 21:00  10/16/19 11:01





  Coreg Tab*  PO   12.5 mg





  BID CHELSEA   Administration





     





     





     





     


 


Cholecalciferol  1,000 units  10/16/19 09:00  10/16/19 11:02





  Vitamin D Tab*  PO   1,000 units





  DAILY CHELSEA   Administration





     





     





     





     


 


Cyanocobalamin  1,000 mcg  10/16/19 09:00  10/16/19 11:02





  Vitamin B12 Tab*  PO   1,000 mcg





  DAILY CHELSEA   Administration





     





     





     





     


 


Doxazosin Mesylate  2 mg  10/15/19 21:00  10/15/19 20:58





  Cardura Tab*  PO   2 mg





  BEDTIME CHELSEA   Administration





     





     





     





     


 


Ferrous Gluconate  324 mg  10/16/19 09:00  10/16/19 11:02





  Fergon Tab*  PO   324 mg





  DAILY CHELSEA   Administration





     





     





     





     


 


Heparin Sodium (Porcine)  5,000 units  10/15/19 14:00  10/16/19 14:45





  Heparin Vial(*)  SUBCUT   5,000 units





  Q8HR CHELSEA   Administration





     





     





     





     


 


Isosorbide Dinitrate  10 mg  10/15/19 14:00  10/16/19 14:44





  Isordil Tab*  PO   10 mg





  TID CHELSEA   Administration





     





     





     





     


 


Levetiracetam  250 mg  10/15/19 21:00  10/16/19 11:03





  Keppra Tab*  PO   250 mg





  BID CHELSEA   Administration





     





     





     





     


 


Levorphanol Tartrate  2 mg  10/15/19 14:00  10/16/19 14:44





  Levorphanol 2 Mg (Nf)  PO   2 mg





  Q8H CHELSEA   Administration





     





     





     





     


 


Nystatin  1 applic  10/15/19 21:00  10/16/19 11:04





  Nystatin Top Powder*  TOPICAL   1 applic





  BID CHELSEA   Administration





     





     





     





     


 


Pantoprazole Sodium  40 mg  10/15/19 21:00  10/16/19 11:04





  Protonix Tab*  PO   40 mg





  BID CHELSEA   Administration





     





     





     





     


 


Pregabalin  50 mg  10/15/19 14:00  10/16/19 14:44





  Lyrica Cap(*)  PO   50 mg





  TID CHELSEA   Administration





     





     





     





     











Vital Signs: 


 Vital Signs











Temp Pulse Resp BP Pulse Ox


 


 98.4 F   66   16   142/57   96 


 


 10/16/19 05:06  10/16/19 05:06  10/16/19 15:33  10/16/19 05:06  10/16/19 08:00











Lab Results: 


 Laboratory Results - last 24 hr











  10/16/19 10/16/19





  06:31 06:31


 


WBC  9.9 


 


RBC  3.22 L 


 


Hgb  9.4 L 


 


Hct  27 L 


 


MCV  85 


 


MCH  29 


 


MCHC  34 


 


RDW  21 H 


 


Plt Count  285 


 


MPV  7.1 L 


 


Neut % (Auto)  54.7 


 


Lymph % (Auto)  28.5 


 


Mono % (Auto)  9.9 


 


Eos % (Auto)  5.5 


 


Baso % (Auto)  1.4 


 


Absolute Neuts (auto)  5.4 


 


Absolute Lymphs (auto)  2.8 


 


Absolute Monos (auto)  1.0 H 


 


Absolute Eos (auto)  0.6 


 


Absolute Basos (auto)  0.1 


 


Absolute Nucleated RBC  0.0 


 


Nucleated RBC %  0.0 


 


Sodium   138


 


Potassium   4.3


 


Chloride   110


 


Carbon Dioxide   22


 


Anion Gap   6


 


BUN   28 H


 


Creatinine   1.06


 


Est GFR ( Amer)   82.9


 


Est GFR (Non-Af Amer)   68.5


 


BUN/Creatinine Ratio   26.4 H


 


Glucose   78


 


Calcium   10.1


 


Total Bilirubin   0.30


 


AST   17


 


ALT   31


 


Alkaline Phosphatase   188 H


 


Total Protein   5.9 L


 


Albumin   2.9 L


 


Globulin   3.0


 


Albumin/Globulin Ratio   1.0











Exam: 





HEENT: EOMI


LUNGS: Clear anteriorly


HEART: Reg rhythm


ABDOMEN: Soft, +BS


EXTREMITIES: tone decreased in LEs


NEUROLOGIC: Greatly diminished sensation below waist, no active movement in legs


SKIN: Left ischial ulcer about the same. 


Assessment/Plan: 





1. Ependymoma with paraplegia: PT/OT. Family training


2. Acute on chronic renal failure: Cr 1.06


3. Anemia of chronic disease: Hb 9.4


4. Pressure ulcers: Local care outlined by wound team. Cannot have angiogram 

until sitting tolerance improves


5. Seizure on Zyvox: Tapering Keppra


6. Chronic Pain: Levorphanol 2 mg Q8. Lyrica 50 TID, may increase to 100 


7. PRES: BP being monitored on Amlodipine/Coreg/Isordil


8. History of NSVT: Coreg








10/16/19 16:22





10/16/19 16:23





10/16/19 16:25





10/16/19 16:26

## 2019-10-17 RX ADMIN — NYSTATIN SCH APPLIC: 100000 POWDER TOPICAL at 21:47

## 2019-10-17 RX ADMIN — Medication SCH UNITS: at 09:52

## 2019-10-17 RX ADMIN — PREGABALIN SCH MG: 50 CAPSULE ORAL at 21:28

## 2019-10-17 RX ADMIN — ISOSORBIDE DINITRATE SCH MG: 10 TABLET ORAL at 14:04

## 2019-10-17 RX ADMIN — LEVETIRACETAM SCH MG: 500 TABLET, FILM COATED ORAL at 09:54

## 2019-10-17 RX ADMIN — ISOSORBIDE DINITRATE SCH MG: 10 TABLET ORAL at 09:54

## 2019-10-17 RX ADMIN — CARVEDILOL SCH MG: 6.25 TABLET, FILM COATED ORAL at 09:52

## 2019-10-17 RX ADMIN — LEVORPHANOL TARTRATE SCH MG: 2 TABLET ORAL at 14:05

## 2019-10-17 RX ADMIN — PREGABALIN SCH MG: 50 CAPSULE ORAL at 14:04

## 2019-10-17 RX ADMIN — NYSTATIN SCH APPLIC: 100000 POWDER TOPICAL at 09:51

## 2019-10-17 RX ADMIN — PANTOPRAZOLE SODIUM SCH MG: 40 TABLET, DELAYED RELEASE ORAL at 21:28

## 2019-10-17 RX ADMIN — PREGABALIN SCH MG: 50 CAPSULE ORAL at 09:55

## 2019-10-17 RX ADMIN — PANTOPRAZOLE SODIUM SCH MG: 40 TABLET, DELAYED RELEASE ORAL at 09:53

## 2019-10-17 RX ADMIN — HEPARIN SODIUM SCH UNITS: 5000 INJECTION INTRAVENOUS; SUBCUTANEOUS at 05:37

## 2019-10-17 RX ADMIN — Medication SCH MG: at 09:52

## 2019-10-17 RX ADMIN — HEPARIN SODIUM SCH UNITS: 5000 INJECTION INTRAVENOUS; SUBCUTANEOUS at 14:01

## 2019-10-17 RX ADMIN — ISOSORBIDE DINITRATE SCH MG: 10 TABLET ORAL at 21:28

## 2019-10-17 RX ADMIN — ASPIRIN SCH MG: 81 TABLET, COATED ORAL at 09:52

## 2019-10-17 RX ADMIN — CARVEDILOL SCH MG: 6.25 TABLET, FILM COATED ORAL at 21:28

## 2019-10-17 RX ADMIN — CYANOCOBALAMIN TAB 500 MCG SCH MCG: 500 TAB at 09:54

## 2019-10-17 RX ADMIN — HEPARIN SODIUM SCH UNITS: 5000 INJECTION INTRAVENOUS; SUBCUTANEOUS at 21:28

## 2019-10-17 RX ADMIN — AMLODIPINE BESYLATE SCH MG: 5 TABLET ORAL at 09:53

## 2019-10-17 RX ADMIN — DOXAZOSIN MESYLATE SCH MG: 2 TABLET ORAL at 21:28

## 2019-10-17 RX ADMIN — LEVORPHANOL TARTRATE SCH MG: 2 TABLET ORAL at 05:37

## 2019-10-17 RX ADMIN — LEVETIRACETAM SCH MG: 500 TABLET, FILM COATED ORAL at 21:28

## 2019-10-17 NOTE — PN
Progress Note


Date of Service: 10/17/19


Note: 


GETACHEW FLORES was visited. Therapy notes read and reviewed. His family 

training has gone okay but his family is having trouble with getting a power 

noemy lift. He cannot go home without it. 








Current Medications: 


 Active Medications











Generic Name Dose Route Start Last Admin





  Trade Name Freq  PRN Reason Stop Dose Admin


 


Acetaminophen  650 mg  10/15/19 11:28  





  Tylenol Tab*  PO   





  Q6H PRN   





  MILD PAIN or TEMP > 100.4   





     





     





     


 


Amlodipine Besylate  10 mg  10/16/19 09:00  10/17/19 09:53





  Norvasc Tab*  PO   10 mg





  DAILY CHELSEA   Administration





     





     





     





     


 


Aspirin  81 mg  10/16/19 09:00  10/17/19 09:52





  Aspirin Ec Tab*  PO   81 mg





  DAILY CHELSEA   Administration





     





     





     





     


 


Carvedilol  12.5 mg  10/15/19 21:00  10/17/19 09:52





  Coreg Tab*  PO   12.5 mg





  BID CHELSEA   Administration





     





     





     





     


 


Cholecalciferol  1,000 units  10/16/19 09:00  10/17/19 09:52





  Vitamin D Tab*  PO   1,000 units





  DAILY CHELSEA   Administration





     





     





     





     


 


Cyanocobalamin  1,000 mcg  10/16/19 09:00  10/17/19 09:54





  Vitamin B12 Tab*  PO   1,000 mcg





  DAILY CHELSEA   Administration





     





     





     





     


 


Doxazosin Mesylate  2 mg  10/15/19 21:00  10/16/19 20:15





  Cardura Tab*  PO   2 mg





  BEDTIME CHELSEA   Administration





     





     





     





     


 


Ferrous Gluconate  324 mg  10/16/19 09:00  10/17/19 09:52





  Fergon Tab*  PO   324 mg





  DAILY CHELSEA   Administration





     





     





     





     


 


Heparin Sodium (Porcine)  5,000 units  10/15/19 14:00  10/17/19 14:01





  Heparin Vial(*)  SUBCUT   5,000 units





  Q8HR CHELSEA   Administration





     





     





     





     


 


Isosorbide Dinitrate  10 mg  10/15/19 14:00  10/17/19 14:04





  Isordil Tab*  PO   10 mg





  TID CHELSEA   Administration





     





     





     





     


 


Levetiracetam  250 mg  10/15/19 21:00  10/17/19 09:54





  Keppra Tab*  PO   250 mg





  BID CHELSEA   Administration





     





     





     





     


 


Levorphanol Tartrate  2 mg  10/17/19 22:00  





  Levorphanol 2 Mg (Nf)  PO   





  Q8H CHELSEA   





     





     





     





     


 


Nystatin  1 applic  10/15/19 21:00  10/17/19 09:51





  Nystatin Top Powder*  TOPICAL   1 applic





  BID CHELSEA   Administration





     





     





     





     


 


Pantoprazole Sodium  40 mg  10/15/19 21:00  10/17/19 09:53





  Protonix Tab*  PO   40 mg





  BID CHELSEA   Administration





     





     





     





     


 


Pregabalin  50 mg  10/15/19 14:00  10/17/19 14:04





  Lyrica Cap(*)  PO   50 mg





  TID CHELSEA   Administration





     





     





     





     











Vital Signs: 


 Vital Signs











Temp Pulse Resp BP Pulse Ox


 


 98.3 F   75   18   139/52   95 


 


 10/17/19 16:21  10/17/19 16:21  10/17/19 16:34  10/17/19 16:21  10/17/19 16:35











Exam: 





HEENT: EOMI


LUNGS: Clear anteriorly


HEART: Reg rhythm


ABDOMEN: Soft, +BS


EXTREMITIES: tone decreased in LEs


NEUROLOGIC: Greatly diminished sensation below waist, no active movement in legs


SKIN: Left ischial ulcer about the same. 


Assessment/Plan: 





1. Ependymoma with paraplegia: PT/OT. Family training


2. Acute on chronic renal failure: Cr 1.06


3. Anemia of chronic disease: Hb 9.4


4. Pressure ulcers: Local care outlined by wound team. Cannot have angiogram 

until sitting tolerance improves


5. Seizure on Zyvox: Tapering Keppra will d/c after tomorrow's last dose


6. Chronic Pain: Levorphanol 2 mg Q8. Lyrica 50 TID, may increase to 100 


7. PRES: BP being monitored on Amlodipine/Coreg/Isordil


8. History of NSVT: Coreg











10/17/19 19:56

## 2019-10-18 ENCOUNTER — HOSPITAL ENCOUNTER (INPATIENT)
Dept: HOSPITAL 25 - MED | Age: 74
LOS: 10 days | Discharge: HOME | DRG: 542 | End: 2019-10-28
Attending: HOSPITALIST | Admitting: HOSPITALIST
Payer: MEDICARE

## 2019-10-18 VITALS — SYSTOLIC BLOOD PRESSURE: 126 MMHG | DIASTOLIC BLOOD PRESSURE: 52 MMHG

## 2019-10-18 DIAGNOSIS — E78.5: ICD-10-CM

## 2019-10-18 DIAGNOSIS — G89.29: ICD-10-CM

## 2019-10-18 DIAGNOSIS — I67.83: ICD-10-CM

## 2019-10-18 DIAGNOSIS — C41.2: Primary | ICD-10-CM

## 2019-10-18 DIAGNOSIS — N18.9: ICD-10-CM

## 2019-10-18 DIAGNOSIS — G82.20: ICD-10-CM

## 2019-10-18 DIAGNOSIS — Z66: ICD-10-CM

## 2019-10-18 DIAGNOSIS — G47.33: ICD-10-CM

## 2019-10-18 DIAGNOSIS — I12.9: ICD-10-CM

## 2019-10-18 DIAGNOSIS — L89.153: ICD-10-CM

## 2019-10-18 DIAGNOSIS — G40.909: ICD-10-CM

## 2019-10-18 DIAGNOSIS — T83.098D: ICD-10-CM

## 2019-10-18 DIAGNOSIS — L89.226: ICD-10-CM

## 2019-10-18 DIAGNOSIS — N31.9: ICD-10-CM

## 2019-10-18 DIAGNOSIS — Z93.3: ICD-10-CM

## 2019-10-18 DIAGNOSIS — L89.216: ICD-10-CM

## 2019-10-18 DIAGNOSIS — Z75.1: ICD-10-CM

## 2019-10-18 DIAGNOSIS — D63.1: ICD-10-CM

## 2019-10-18 DIAGNOSIS — I25.10: ICD-10-CM

## 2019-10-18 DIAGNOSIS — Z82.0: ICD-10-CM

## 2019-10-18 DIAGNOSIS — I65.29: ICD-10-CM

## 2019-10-18 DIAGNOSIS — K59.2: ICD-10-CM

## 2019-10-18 DIAGNOSIS — L89.516: ICD-10-CM

## 2019-10-18 DIAGNOSIS — L89.616: ICD-10-CM

## 2019-10-18 DIAGNOSIS — L89.626: ICD-10-CM

## 2019-10-18 PROCEDURE — 85014 HEMATOCRIT: CPT

## 2019-10-18 PROCEDURE — 85018 HEMOGLOBIN: CPT

## 2019-10-18 PROCEDURE — 85025 COMPLETE CBC W/AUTO DIFF WBC: CPT

## 2019-10-18 PROCEDURE — 80048 BASIC METABOLIC PNL TOTAL CA: CPT

## 2019-10-18 PROCEDURE — 36415 COLL VENOUS BLD VENIPUNCTURE: CPT

## 2019-10-18 PROCEDURE — 82565 ASSAY OF CREATININE: CPT

## 2019-10-18 PROCEDURE — 85049 AUTOMATED PLATELET COUNT: CPT

## 2019-10-18 PROCEDURE — 83735 ASSAY OF MAGNESIUM: CPT

## 2019-10-18 RX ADMIN — PREGABALIN SCH MG: 50 CAPSULE ORAL at 20:33

## 2019-10-18 RX ADMIN — PREGABALIN SCH MG: 50 CAPSULE ORAL at 09:23

## 2019-10-18 RX ADMIN — Medication SCH MG: at 09:22

## 2019-10-18 RX ADMIN — DOXAZOSIN MESYLATE SCH MG: 2 TABLET ORAL at 21:49

## 2019-10-18 RX ADMIN — CARVEDILOL SCH MG: 6.25 TABLET, FILM COATED ORAL at 09:21

## 2019-10-18 RX ADMIN — PANTOPRAZOLE SODIUM SCH MG: 40 TABLET, DELAYED RELEASE ORAL at 09:26

## 2019-10-18 RX ADMIN — PREGABALIN SCH MG: 50 CAPSULE ORAL at 13:56

## 2019-10-18 RX ADMIN — Medication SCH UNITS: at 09:21

## 2019-10-18 RX ADMIN — CYANOCOBALAMIN TAB 500 MCG SCH MCG: 500 TAB at 09:21

## 2019-10-18 RX ADMIN — LEVORPHANOL TARTRATE SCH MG: 2 TABLET ORAL at 13:43

## 2019-10-18 RX ADMIN — LEVETIRACETAM SCH MG: 500 TABLET, FILM COATED ORAL at 09:22

## 2019-10-18 RX ADMIN — ENOXAPARIN SODIUM SCH MG: 40 INJECTION SUBCUTANEOUS at 21:51

## 2019-10-18 RX ADMIN — LEVORPHANOL TARTRATE SCH MG: 2 TABLET ORAL at 21:48

## 2019-10-18 RX ADMIN — PANTOPRAZOLE SODIUM SCH MG: 40 TABLET, DELAYED RELEASE ORAL at 21:50

## 2019-10-18 RX ADMIN — ASPIRIN SCH MG: 81 TABLET, COATED ORAL at 09:21

## 2019-10-18 RX ADMIN — AMLODIPINE BESYLATE SCH MG: 5 TABLET ORAL at 09:21

## 2019-10-18 RX ADMIN — HEPARIN SODIUM SCH UNITS: 5000 INJECTION INTRAVENOUS; SUBCUTANEOUS at 05:56

## 2019-10-18 RX ADMIN — LEVORPHANOL TARTRATE SCH MG: 2 TABLET ORAL at 09:26

## 2019-10-18 RX ADMIN — NYSTATIN SCH APPLIC: 100000 POWDER TOPICAL at 10:27

## 2019-10-18 RX ADMIN — CARVEDILOL SCH MG: 6.25 TABLET, FILM COATED ORAL at 21:49

## 2019-10-18 RX ADMIN — ISOSORBIDE DINITRATE SCH MG: 10 TABLET ORAL at 09:22

## 2019-10-18 RX ADMIN — NYSTATIN SCH APPLIC: 100000 POWDER TOPICAL at 21:51

## 2019-10-18 RX ADMIN — ISOSORBIDE DINITRATE SCH MG: 10 TABLET ORAL at 13:42

## 2019-10-18 RX ADMIN — HEPARIN SODIUM SCH UNITS: 5000 INJECTION INTRAVENOUS; SUBCUTANEOUS at 13:43

## 2019-10-18 RX ADMIN — ISOSORBIDE DINITRATE SCH MG: 10 TABLET ORAL at 21:48

## 2019-10-18 NOTE — HP
CC:  Dr. Rafy Vicente *

 

ADMISSION HISTORY AND PHYSICAL:

 

DATE OF ADMISSION:  10/18/19

 

PRIMARY CARE PROVIDER:  Dr. Rafy Vicente.

 

MY ATTENDING WHILE IN THE HOSPITAL:  Dr. Coral Ramirez.* (DICTATED BY MATEUS PEARSON)

 

CHIEF COMPLAINT:  Ambulatory function, swing status.

 

HISTORY OF PRESENT ILLNESS:  Mr. Pryor is a 74-year-old male with past medical 
history significant for ependymoma with lower extremity paralysis and chronic 
nonhealing sacral wounds for which he has been hospitalized for many months 
over the past several years as well as chronic pain and recent diagnosis of 
PRES with seizures, who has been initially in [facility] for 
several months on antibiotics related to his infection of his sacral wound.  
During that time, he had very high blood pressure, seizures and was started on 
Keppra.  This was believed to have been possibly related to an adverse 
linezolid effect.  The patient was in [facility] for several months.  
During that time, he was started on several blood pressure medications and his 
blood pressure has remained in the normal range.  He was transferred to the PMR 
unit at [facility] where he was undergoing physical and occupational 
therapy.  While there, he developed urosepsis related to a catheter and spent 8 
days on the medical unit and was treated with Zosyn for pseudomonas and 
klebsiella, which grew in his urine.  He returned to his baseline after that 
and was able to go back down to [facility].  During that time, he continued to 
progress with physical therapy and was deemed ready to return home with the 
assistance of only his wife; however, she needs an electrically powered 
mechanical Bill lift to make this happen, which is currently on order and is 
being delivered from [employer].  Due to being unsafe for the patient to be 
returned home, the patient was transferred back up to the medical floor for 
swing status.  The patient is currently asymptomatic and his pain is under good 
control with a combination of levorphanol, Lyrica and his dorsal spinal 
stimulator.

 

PAST MEDICAL HISTORY:  Ependymoma; lower extremity paralysis; neurogenic bladder
, chronic Rodriguez catheter; numerous catheter-associated urinary tract infections
; history of PRES; seizures, possible drug effect; obstructive sleep apnea; 
chronic nonhealing sacral wounds; hypertension; chronic kidney disease; anemia 
of chronic disease; chronic pain; history of carotid artery stenosis; possible 
coronary artery disease.

 

PAST SURGICAL HISTORY:  Multiple spinal surgeries, colostomy placement, 
muscular flap of his sacral wound, multiple debridements of his sacral wound.

 

MEDICATIONS:  Given on the PMR unit:

1.  Tylenol 650 mg p.o. q.6 hours as needed.

2.  Amlodipine 10 mg p.o. daily.

3.  Aspirin 81 mg p.o. daily.

4.  Carvedilol 12.5 mg p.o. b.i.d.

5.  Vitamin D3 1000 units p.o. daily.

6.  Vitamin B12 1000 mcg p.o. daily.

7.  Doxazosin 2 mg p.o. at bedtime.

8.  Ferrous gluconate 325 mg p.o. at bedtime.

9.  Heparin vial 5000 units subcutaneous daily.

10.  Isosorbide dinitrate 10 mg p.o. t.i.d.

11.  Keppra 250 mg p.o. b.i.d., last dose 10/18/19.

12.  Levorphanol 2 mg p.o. t.i.d.

13.  Nystatin topical powder 1 application topical b.i.d.

14.  Pantoprazole 40 mg p.o. daily.

15.  Pregabalin 50 mg p.o. t.i.d.

 

ALLERGIES:  STATINS.

 

FAMILY HISTORY:  The patient's father  of Parkinson's.  The patient's 
mother  recently near 100 of old age.

 

SOCIAL HISTORY:  The patient lives with his wife, who is his healthcare proxy 
and primary caregiver.  The patient used to work as a farmer.  The patient 
denies any alcohol abuse, tobacco abuse, or drug abuse.  The patient's 
surrogate decision maker will be his wife, Johnathan Pryor.

 

REVIEW OF SYSTEMS:  A 10-point review of systems was reviewed and is negative 
except as above in the HPI.

 

                               PHYSICAL EXAMINATION

 

GENERAL:  The patient is a 74-year-old male, who appears stated age and sitting 
comfortably in bed, in no acute distress.

 

VITAL SIGNS:  At the time of evaluation, temperature 98.0, pulse rate 61, 
respiratory rate 12, oxygen saturation 95% on room air, blood pressure 126/52.

 

HEENT:  Head:  Normocephalic, atraumatic.  Sclerae anicteric.  No conjunctival 
injection.  Nasal mucosa moist.  Oral mucosa moist.  No pharyngeal erythema, 
discharge, or exudate.

 

NECK:  Supple, nontender.  No lymphadenopathy.  No carotid bruits auscultated.  
No JVD.

 

RESPIRATORY:  Clear to auscultation bilaterally.  No wheezes, rales, or 
rhonchi. Good air exchange bilaterally.

 

CARDIAC:  Regular rate and rhythm.  No clicks, murmurs, gallops, or rubs.  
Pulses are 2+ in the bilateral dorsalis pedis, posterior tibialis, and radial 
areas. Trace bilateral lower extremity edema noted.

 

ABDOMEN:  Soft, nontender, nondistended.  Bowel sounds present and normoactive 
in all 4 quadrants.  No hepatosplenomegaly.  No abdominal bruits auscultated. 
Colostomy in place draining formed brown stool.

 

GENITOURINARY:  No suprapubic or CVA tenderness.  Rodriguez in place draining clear 
yellow urine.

 

NEURO:  No feeling below the waist consistent with prior exams.  Strength 4/5 
in bilateral upper extremities.  Cranial nerves II through XII intact.  Alert 
and oriented x3.

 

PSYCHIATRIC:  Pleasant and cooperative.

 

 LABORATORY DATA:  Most recent laboratory data:  White blood cell count 9.9, 
hemoglobin 9.4, platelet count 285.  Sodium 138, potassium 4.3, chloride 110, 
carbon dioxide 22, anion gap 6, BUN 28, creatinine 1.06, glucose 78.  AST 17, 
ALT 31, alkaline phosphatase 188.  Protein 5.9, albumin 3.9, globulin 3.0.

 

ASSESSMENT AND PLAN:  Impression:  Mr. Pryor is a 74-year-old male with a long 
complex past medical history mainly stemming from complications of an 
ependymoma leaving him with bilateral lower extremity paralysis.  These 
complications include infections of his sacral wound, high blood pressure and 
seizure disorder from posterior reversible encephalopathy syndrome as well as 
chronic pain.  The patient has been undergoing rehab and is improving, but is 
in need of a Bill lift, which will be delivered next Wednesday which is 10/23/
19 and until that time he will be monitored on swing status on the medical unit.

1.  Debility related to chronic long-term hospitalization.  The patient is 
improving, but is still needing Bill lift for transfers, which will be 
delivered to his home next week.  The patient has been on PMR unit and has been 
progressing. The patient has lost much of his upper body strength.  The goal is 
for him to be able to return home to continue to work towards being able to 
transfer on a slide board which he is not able to do, until that time he will 
need a Bill lift as above.

2.  Ependymoma with chronic pain syndrome.  Continue the patient's levorphanol, 
Lyrica and dorsal spinal stimulator, which worked to control his pain down to 
approximately a 4.  Without these interventions or with alternative 
interventions, the patient's pain can go up to a 10 or the patient gets 
significantly more sedated leading to worsening in his sacral wounds.  The 
patient should follow up outpatient with the wound clinic.

3.  Neurogenic bladder.  Continue the patient's indwelling Rodriguez catheter.  
Monitor closely for catheter-assisted urinary tract infections with frequent 
vital signs.

4.  Seizure disorder.  The patient is being discontinued on his Keppra after 
tonight's dose.  This is believed to be a medication side effect in combination 
with posterior reversible encephalopathy syndrome.  The patient's blood 
pressure is currently very well controlled.  The patient's mental state is 
approximately back to where it was previously.

5.  Chronic lower extremity wounds.  Appreciate Wound Care consult.  Continue 
dressing changes as previously elucidated on PMRU.

6.  Chronic kidney disease, at baseline.  Check labs in the morning.

7.  Coronary artery disease/carotid artery disease.  The patient is currently 
being treated with aspirin and Coreg for secondary prevention.  The patient has 
a STATIN allergy.

8.  Neurogenic bowel.  Due to concerns about healing of his sacral wound, the 
patient previously had a colostomy placed which is functioning.

9.  Hypertension.  The patient has a history of posterior reversible 
encephalopathy syndrome.  Continue the patient's amlodipine, doxazosin, 
isosorbide dinitrate, and carvedilol.

10.  Anemia of chronic disease.  Check labs in the morning.  The patient's 
hemoglobin has been increasing since he has been in the hospital.  Continue the 
patient's ferrous gluconate, though this is likely of limited utility.

11.  FEN:  The patient will have a regular unrestricted diet.  The patient has 
no indications for fluids at this time.

12.  DVT prophylaxis:  Lovenox subcu.

13.  Disposition:  Swing status.

 

TIME SPENT:  Approximately 60 minutes was spent on the admission of this patient
, 30 of which was spent face-to-face with the patient obtaining history and 
physical and discussing treatment plan.

 

This plan was discussed with my attending, Dr. Coral Ramirze, and she is in 
agreement.

 

 ____________________________________ MATEUS PEARSON

 

[id number]/CPS #: [id number]

ELISABET

## 2019-10-19 LAB
ANION GAP SERPL CALC-SCNC: 4 MMOL/L (ref 2–11)
BASOPHILS # BLD AUTO: 0.1 10^3/UL (ref 0–0.2)
BUN SERPL-MCNC: 34 MG/DL (ref 6–24)
BUN/CREAT SERPL: 31.5 (ref 8–20)
CALCIUM SERPL-MCNC: 10.3 MG/DL (ref 8.6–10.3)
CHLORIDE SERPL-SCNC: 108 MMOL/L (ref 101–111)
EOSINOPHIL # BLD AUTO: 0.5 10^3/UL (ref 0–0.6)
GLUCOSE SERPL-MCNC: 80 MG/DL (ref 70–100)
HCO3 SERPL-SCNC: 26 MMOL/L (ref 22–32)
HCT VFR BLD AUTO: 26 % (ref 42–52)
HGB BLD-MCNC: 8.8 G/DL (ref 14–18)
LYMPHOCYTES # BLD AUTO: 3.1 10^3/UL (ref 1–4.8)
MAGNESIUM SERPL-MCNC: 1.9 MG/DL (ref 1.9–2.7)
MCH RBC QN AUTO: 29 PG (ref 27–31)
MCHC RBC AUTO-ENTMCNC: 34 G/DL (ref 31–36)
MCV RBC AUTO: 85 FL (ref 80–94)
MONOCYTES # BLD AUTO: 1 10^3/UL (ref 0–0.8)
NEUTROPHILS # BLD AUTO: 4 10^3/UL (ref 1.5–7.7)
NRBC # BLD AUTO: 0 10^3/UL
NRBC BLD QL AUTO: 0.1
PLATELET # BLD AUTO: 313 10^3/UL (ref 150–450)
POTASSIUM SERPL-SCNC: 4.3 MMOL/L (ref 3.5–5)
RBC # BLD AUTO: 3.03 10^6 /UL (ref 4.18–5.48)
SODIUM SERPL-SCNC: 138 MMOL/L (ref 135–145)
WBC # BLD AUTO: 8.8 10^3/UL (ref 3.5–10.8)

## 2019-10-19 RX ADMIN — CARVEDILOL SCH MG: 6.25 TABLET, FILM COATED ORAL at 09:57

## 2019-10-19 RX ADMIN — NYSTATIN SCH APPLIC: 100000 POWDER TOPICAL at 19:41

## 2019-10-19 RX ADMIN — PREGABALIN SCH MG: 50 CAPSULE ORAL at 19:38

## 2019-10-19 RX ADMIN — ENOXAPARIN SODIUM SCH MG: 40 INJECTION SUBCUTANEOUS at 19:40

## 2019-10-19 RX ADMIN — CARVEDILOL SCH MG: 6.25 TABLET, FILM COATED ORAL at 19:39

## 2019-10-19 RX ADMIN — PREGABALIN SCH MG: 50 CAPSULE ORAL at 14:55

## 2019-10-19 RX ADMIN — ASPIRIN SCH MG: 81 TABLET, COATED ORAL at 09:57

## 2019-10-19 RX ADMIN — PANTOPRAZOLE SODIUM SCH MG: 40 TABLET, DELAYED RELEASE ORAL at 19:38

## 2019-10-19 RX ADMIN — ISOSORBIDE DINITRATE SCH MG: 10 TABLET ORAL at 09:58

## 2019-10-19 RX ADMIN — Medication SCH UNITS: at 09:57

## 2019-10-19 RX ADMIN — Medication SCH MG: at 09:57

## 2019-10-19 RX ADMIN — PANTOPRAZOLE SODIUM SCH MG: 40 TABLET, DELAYED RELEASE ORAL at 09:58

## 2019-10-19 RX ADMIN — AMLODIPINE BESYLATE SCH MG: 5 TABLET ORAL at 09:57

## 2019-10-19 RX ADMIN — LEVORPHANOL TARTRATE SCH MG: 2 TABLET ORAL at 14:55

## 2019-10-19 RX ADMIN — NYSTATIN SCH APPLIC: 100000 POWDER TOPICAL at 10:01

## 2019-10-19 RX ADMIN — PREGABALIN SCH MG: 50 CAPSULE ORAL at 09:58

## 2019-10-19 RX ADMIN — LEVORPHANOL TARTRATE SCH MG: 2 TABLET ORAL at 09:57

## 2019-10-19 RX ADMIN — ISOSORBIDE DINITRATE SCH MG: 10 TABLET ORAL at 14:55

## 2019-10-19 RX ADMIN — CYANOCOBALAMIN TAB 500 MCG SCH MCG: 500 TAB at 09:58

## 2019-10-19 RX ADMIN — COLLAGENASE SANTYL SCH APPLIC: 250 OINTMENT TOPICAL at 09:35

## 2019-10-19 RX ADMIN — LEVORPHANOL TARTRATE SCH MG: 2 TABLET ORAL at 19:40

## 2019-10-19 RX ADMIN — DOXAZOSIN MESYLATE SCH MG: 2 TABLET ORAL at 19:40

## 2019-10-19 RX ADMIN — ISOSORBIDE DINITRATE SCH MG: 10 TABLET ORAL at 19:39

## 2019-10-20 RX ADMIN — PREGABALIN SCH MG: 50 CAPSULE ORAL at 20:33

## 2019-10-20 RX ADMIN — CARVEDILOL SCH MG: 6.25 TABLET, FILM COATED ORAL at 09:05

## 2019-10-20 RX ADMIN — ENOXAPARIN SODIUM SCH MG: 40 INJECTION SUBCUTANEOUS at 20:35

## 2019-10-20 RX ADMIN — Medication SCH MG: at 09:05

## 2019-10-20 RX ADMIN — COLLAGENASE SANTYL SCH APPLIC: 250 OINTMENT TOPICAL at 14:06

## 2019-10-20 RX ADMIN — PREGABALIN SCH MG: 50 CAPSULE ORAL at 14:06

## 2019-10-20 RX ADMIN — PANTOPRAZOLE SODIUM SCH MG: 40 TABLET, DELAYED RELEASE ORAL at 09:05

## 2019-10-20 RX ADMIN — LEVORPHANOL TARTRATE SCH MG: 2 TABLET ORAL at 20:33

## 2019-10-20 RX ADMIN — PANTOPRAZOLE SODIUM SCH MG: 40 TABLET, DELAYED RELEASE ORAL at 20:33

## 2019-10-20 RX ADMIN — ASPIRIN SCH MG: 81 TABLET, COATED ORAL at 09:05

## 2019-10-20 RX ADMIN — CYANOCOBALAMIN TAB 500 MCG SCH MCG: 500 TAB at 09:05

## 2019-10-20 RX ADMIN — ISOSORBIDE DINITRATE SCH MG: 10 TABLET ORAL at 14:06

## 2019-10-20 RX ADMIN — AMLODIPINE BESYLATE SCH MG: 5 TABLET ORAL at 09:05

## 2019-10-20 RX ADMIN — PREGABALIN SCH MG: 50 CAPSULE ORAL at 09:05

## 2019-10-20 RX ADMIN — LEVORPHANOL TARTRATE SCH MG: 2 TABLET ORAL at 14:06

## 2019-10-20 RX ADMIN — Medication SCH UNITS: at 09:05

## 2019-10-20 RX ADMIN — ISOSORBIDE DINITRATE SCH MG: 10 TABLET ORAL at 09:06

## 2019-10-20 RX ADMIN — NYSTATIN SCH APPLIC: 100000 POWDER TOPICAL at 20:41

## 2019-10-20 RX ADMIN — LEVORPHANOL TARTRATE SCH MG: 2 TABLET ORAL at 09:05

## 2019-10-20 RX ADMIN — CARVEDILOL SCH MG: 6.25 TABLET, FILM COATED ORAL at 20:33

## 2019-10-20 RX ADMIN — NYSTATIN SCH APPLIC: 100000 POWDER TOPICAL at 09:06

## 2019-10-20 RX ADMIN — DOXAZOSIN MESYLATE SCH MG: 2 TABLET ORAL at 20:33

## 2019-10-20 RX ADMIN — ISOSORBIDE DINITRATE SCH MG: 10 TABLET ORAL at 20:33

## 2019-10-21 RX ADMIN — NYSTATIN SCH APPLIC: 100000 POWDER TOPICAL at 09:06

## 2019-10-21 RX ADMIN — Medication SCH UNITS: at 08:55

## 2019-10-21 RX ADMIN — PANTOPRAZOLE SODIUM SCH MG: 40 TABLET, DELAYED RELEASE ORAL at 08:55

## 2019-10-21 RX ADMIN — COLLAGENASE SANTYL SCH APPLIC: 250 OINTMENT TOPICAL at 09:06

## 2019-10-21 RX ADMIN — LEVORPHANOL TARTRATE SCH MG: 2 TABLET ORAL at 08:53

## 2019-10-21 RX ADMIN — ISOSORBIDE DINITRATE SCH MG: 10 TABLET ORAL at 20:50

## 2019-10-21 RX ADMIN — CARVEDILOL SCH MG: 6.25 TABLET, FILM COATED ORAL at 20:48

## 2019-10-21 RX ADMIN — ISOSORBIDE DINITRATE SCH MG: 10 TABLET ORAL at 08:55

## 2019-10-21 RX ADMIN — PANTOPRAZOLE SODIUM SCH MG: 40 TABLET, DELAYED RELEASE ORAL at 20:49

## 2019-10-21 RX ADMIN — PREGABALIN SCH MG: 50 CAPSULE ORAL at 08:56

## 2019-10-21 RX ADMIN — LEVORPHANOL TARTRATE SCH MG: 2 TABLET ORAL at 20:48

## 2019-10-21 RX ADMIN — ISOSORBIDE DINITRATE SCH MG: 10 TABLET ORAL at 13:53

## 2019-10-21 RX ADMIN — LEVORPHANOL TARTRATE SCH MG: 2 TABLET ORAL at 13:52

## 2019-10-21 RX ADMIN — PREGABALIN SCH MG: 50 CAPSULE ORAL at 13:53

## 2019-10-21 RX ADMIN — NYSTATIN SCH APPLIC: 100000 POWDER TOPICAL at 20:53

## 2019-10-21 RX ADMIN — ASPIRIN SCH MG: 81 TABLET, COATED ORAL at 08:53

## 2019-10-21 RX ADMIN — AMLODIPINE BESYLATE SCH MG: 5 TABLET ORAL at 08:53

## 2019-10-21 RX ADMIN — CARVEDILOL SCH MG: 6.25 TABLET, FILM COATED ORAL at 08:54

## 2019-10-21 RX ADMIN — Medication SCH MG: at 08:55

## 2019-10-21 RX ADMIN — PREGABALIN SCH MG: 50 CAPSULE ORAL at 20:49

## 2019-10-21 RX ADMIN — CYANOCOBALAMIN TAB 500 MCG SCH MCG: 500 TAB at 08:56

## 2019-10-21 RX ADMIN — DOXAZOSIN MESYLATE SCH MG: 2 TABLET ORAL at 20:49

## 2019-10-21 RX ADMIN — ENOXAPARIN SODIUM SCH MG: 40 INJECTION SUBCUTANEOUS at 20:51

## 2019-10-22 RX ADMIN — CYANOCOBALAMIN TAB 500 MCG SCH MCG: 500 TAB at 15:15

## 2019-10-22 RX ADMIN — ASPIRIN SCH MG: 81 TABLET, COATED ORAL at 10:13

## 2019-10-22 RX ADMIN — LEVORPHANOL TARTRATE SCH MG: 2 TABLET ORAL at 20:36

## 2019-10-22 RX ADMIN — ENOXAPARIN SODIUM SCH MG: 40 INJECTION SUBCUTANEOUS at 20:36

## 2019-10-22 RX ADMIN — PREGABALIN SCH MG: 50 CAPSULE ORAL at 15:15

## 2019-10-22 RX ADMIN — LEVORPHANOL TARTRATE SCH MG: 2 TABLET ORAL at 10:13

## 2019-10-22 RX ADMIN — PREGABALIN SCH MG: 50 CAPSULE ORAL at 10:14

## 2019-10-22 RX ADMIN — NYSTATIN SCH APPLIC: 100000 POWDER TOPICAL at 20:37

## 2019-10-22 RX ADMIN — ISOSORBIDE DINITRATE SCH MG: 10 TABLET ORAL at 20:36

## 2019-10-22 RX ADMIN — LEVORPHANOL TARTRATE SCH MG: 2 TABLET ORAL at 15:16

## 2019-10-22 RX ADMIN — ISOSORBIDE DINITRATE SCH MG: 10 TABLET ORAL at 10:13

## 2019-10-22 RX ADMIN — COLLAGENASE SANTYL SCH APPLIC: 250 OINTMENT TOPICAL at 10:18

## 2019-10-22 RX ADMIN — CARVEDILOL SCH MG: 6.25 TABLET, FILM COATED ORAL at 20:36

## 2019-10-22 RX ADMIN — ISOSORBIDE DINITRATE SCH MG: 10 TABLET ORAL at 15:15

## 2019-10-22 RX ADMIN — PREGABALIN SCH MG: 50 CAPSULE ORAL at 20:36

## 2019-10-22 RX ADMIN — DOXAZOSIN MESYLATE SCH MG: 2 TABLET ORAL at 20:36

## 2019-10-22 RX ADMIN — NYSTATIN SCH APPLIC: 100000 POWDER TOPICAL at 10:21

## 2019-10-22 RX ADMIN — PANTOPRAZOLE SODIUM SCH MG: 40 TABLET, DELAYED RELEASE ORAL at 10:14

## 2019-10-22 RX ADMIN — PANTOPRAZOLE SODIUM SCH MG: 40 TABLET, DELAYED RELEASE ORAL at 20:36

## 2019-10-22 RX ADMIN — Medication SCH UNITS: at 10:13

## 2019-10-22 RX ADMIN — Medication SCH MG: at 10:14

## 2019-10-22 RX ADMIN — CARVEDILOL SCH MG: 6.25 TABLET, FILM COATED ORAL at 10:13

## 2019-10-22 RX ADMIN — AMLODIPINE BESYLATE SCH MG: 5 TABLET ORAL at 10:14

## 2019-10-22 NOTE — CONSULT
Subjective


Date of Service: 10/22/19


Interval History: 





Mr. Pryor is a 73 yo male with PMH significant for paraplegia secondary to 

spinal ependymoma, asthma, CAD, CKD stage 3, GERD, HLD, HTN, PVD, neurogenic 

bladder with chronic indwelling urinary catheter, BONIFACIO, hx sacral stage 4 

pressure injury s/p flap reconstruction, and diverting colostomy. He presented 

to [facility] on 19 with complaints of fever for 3 days 

and concerns of an infected pressure injury and consult with Dr. Luis Felipe Chirinos (

previously performed flap reconstruction). He was admitted to [facility] for a sacral pressure injury and underwent debridement (by Dr. Marge Giron

) and ABX. He developed Posterior Reversible Encephalopathy Syndrome (PRES) and 

seizures felt to be secondary to Linezolid, and was intubated for 1 week for 

acute respiratory failure. He stayed at [facility] for over 40 days. He was felt 

to require rehab and was admitted to [facility] on 10/2/19. He developed sepsis 

secondary to a UTI and was transferred to the medical floor on 10/6/19, he was 

discharged back to 10/15/19. He was discharged from [facility] to Swing status on 10/

18/19. 





Patient presented to [facility] from [facility] with multiple pressure 

injuries including; bilateral heels, left ischium, right hip, sacrum. Also 

noted to have ecchymosis to the right ankle. He has developed deep tissue 

injuries to the right heel, pressure injury to the right heel, and buttocks 

with some shearing and deep tissue injuries to the buttocks during his 

hospitalization.





Patient seen and examined at bedside. 





Family History: Unchanged from Admission


Social History: Unchanged from Admission


Past Medical History: Unchanged from Admission





Review of Systems





- Measurements


Intake and Output: 


Intake and Output Last 24 Hours











 10/20/19 10/21/19 10/22/19 10/23/19





 06:59 06:59 06:59 06:59


 


Intake Total 6147 826 5521 710


 


Output Total 1890 1400 2025 300


 


Balance -560 -1160 -825 410


 


Intake:    


 


  Oral 3620 828 3684 710


 


Output:    


 


  Urine   350 


 


  Rodriguez 0 1400 1275 300


 


  Colostomy  0 400 


 


Other:    


 


  Estimated Void   Small Medium


 


  Date of Last Bowel [id number]   





  Movement    


 


  # Bowel Movements 1   


 


  Estimated Stool Amount Medium   


 


  # Voids   1 














- Review of Systems


Constitutional Symptoms: Positive: Other - Chills


   Negative: Fever


Dermatology: Positive: Other - Multiple skin issues





Objective


Active Medications: 





Acetaminophen (Tylenol Tab*)  650 mg PO Q6H PRN Reason: MILD PAIN or TEMP > 

100.4


Amlodipine Besylate (Norvasc Tab*)  10 mg PO DAILY [facility]


Aspirin (Aspirin Ec Tab*)  81 mg PO DAILY [facility]


Carvedilol (Coreg Tab*)  12.5 mg PO BID [facility]


Cholecalciferol (Vitamin D Tab*)  1,000 units PO DAILY [facility]


Collagenase (Santyl 250 Units/Gm Oint*)  1 applic TOPICAL DAILY [facility]


Cyanocobalamin (Vitamin B12 Tab*)  1,000 mcg PO DAILY [facility]


Doxazosin Mesylate (Cardura Tab*)  2 mg PO BEDTIME [facility]


Enoxaparin Sodium (Lovenox(*))  40 mg SUBCUT 2100 [facility]


Ferrous Gluconate (Fergon Tab*)  324 mg PO DAILY [facility]


Influenza Virus Vaccine (Fluarix Quad 2335-3300 Syr)  0.5 ml IM .ONCE ONE


Isosorbide Dinitrate (Isordil Tab*)  10 mg PO TID [facility]


Levorphanol Tartrate (Levorphanol 2 Mg (Nf))  2 mg PO TID [facility]


Nystatin (Nystatin Top Powder*)  1 applic TOPICAL BID [facility]


Ondansetron HCl (Zofran Inj*)  4 mg IV Q6H PRN Reason: NAUSEA


Pantoprazole Sodium (Protonix Tab*)  40 mg PO BID [facility]


Pregabalin (Lyrica Cap(*))  100 mg PO TID [facility]





 


 Vital Signs











  10/22/19





  07:00


 


Temperature 98.3 F


 


Temperature Oral





Source 


 


Pulse Rate 58


 


Respiratory 18





Rate 


 


Blood Pressure 123/58





(mmHg) 


 


Blood Pressure 79





Mean 


 


O2 Sat by Pulse 95





Oximetry 


 


Patient on Room Yes





Air 








Oxygen Devices in Use Now: None


Appearance: NAD, laying in bed


Ears/Nose/Mouth/Throat: Mucous Membranes Moist


Respiratory: Symmetrical Chest Expansion and Respiratory Effort


Extremities: No Edema


Skin: - - See skin note below


Neurological: Alert and Oriented x 3


Result Diagrams: 


 10/27/19 06:19





 10/27/19 06:19


Additional Lab and Data: 





 Laboratory Tests











  10/13/19





  07:35


 


Prealbumin  28











Diagnostic Imagin. Exam Date: 19 - VL ANK/BRACHIAL INDICES





FINDINGS:  The ankle brachial index on the right was 0.39 and on the left was 

0.80. The ankle-brachial indices on the prior study were 0.93 and 0.91 

respectively. There is monophasic flow within the right posterior tibial artery 

and monophasic flow within the right dorsalis pedis artery.  There is biphasic 

flow within the left posterior tibial artery and biphasic flow within the left 

dorsalis pedis artery.





IMPRESSION:  SIGNIFICANTLY REDUCED ANKLE-BRACHIAL INDICES AND WAVEFORMS MORE 

SEVERE IN THE RIGHT LOWER EXTREMITY. CONSIDER A CT ANGIOGRAM OF THE AORTA AND 

LOWER EXTREMITIES FOR FURTHER EVALUATION.





2. Exam Date: 19 - MRI LOWER EXTREMITY RIGHT W/O





IMPRESSION:  SOFT TISSUE SWELLING AND SOFT TISSUE ULCER, NO SPECIFIC EVIDENCE 

FOR OSTEOMYELITIS.








Skin Deviation Note





- Skin Deviation Findings





Left medial heel (please note that the picture is incorrectly labeled right heel

) - There is a superficial open area, measures 1 cm x 0.6 cm x 0.1 cm. There is 

adherent white slough in the wound base and a small amount of pink granulation 

tissue. The surrounding skin is intact, pink and slightly blanchable. The heel 

is also boggy. 


Right medial heel - There is a wound that measures 2.2 cm x 2.7 cm x 0.1 cm. 

The wound base is mostly pink granulation tissue. There is a small area of 

honey colored slough (in the darker area). There is dark purplish discoloration 

in the wound base and to the intact skin towards the plantar aspect of the 

foot. The surrounding skin is intact.


Right medial 1st toe - The is a small u shaped area of dark purple discoloration

, the skin is intact. This measures 0.8 cm x 0.9 cm. No drainage. The 

surrounding skin is intact. 


Right posterior/lateral heel (achilles) - There is an intact blister, measures 

2.5 cm x 2.5 cm. There is purplish discoloration to the skin. In the center of 

this is a white/gray area that is fluctulant, measures 1.4 cm x 1.2 cm. The 

surrounding skin intact. No drainage. 


Right lateral ankle - There is an area of slight purplish discoloration, 

measures 2.5 cm x 2 cm. There is a small area of fluctuance, measures 0.5 cm x 

0.5 cm. The surrounding skin is intact, with some dry and flaky skin. No 

drainage. 


Right lateral hip - There is a shallow wound, measures 1.2 cm x 1.2 cm x 0.1 

cm. The wound base is mostly pink granulation tissue, with a small amount of 

adherent yellow slough. The surrounding skin is intact. There is no drainage. 


Buttocks - The total areas of erythema measures 7 cm x 31 cm. There is a 

superficial open areas, the area near midline is 4. cm x 2.2 cm x 0.1 cm. The 

wound base is red granulation tissue. The area on the right measures, 1 cm x 2 

cm. measures 2 cm x 1.5 cm x 0.1 cm. The wound bed with red granulation tissue. 

The surrounding skin is intact, but with erythema. and macerated. There is a 

small amount of serous drainage. 


Sacrum/lower back - There is mild erythema that is blanchable in the cleft that 

is present, this area measures 6 cm x 4 cm. The skin is intact. No drainage. 


Left ischium - The wound measures 2 cm x 4 cm x 2 cm. The wound base initially 

had yellow slough present, this was easily removed with a cotton tipped 

applicator. The wound bed is beefy red granulation tissue. The periwound is 

macerated. The surrounding skin with slight blanchable erythema. There is a 

small amount of clear drainage. No odor. 








Wound Problem/Plan


Assessment: 


Mr. Pryor is a 73 yo male with PMH significant for paraplegia secondary to 

spinal ependymoma, asthma, CAD, CKD stage 3, GERD, HLD, HTN, PVD, neurogenic 

bladder with chronic indwelling urinary catheter, BONIFACIO, hx sacral stage 4 

pressure injury s/p flap reconstruction, and diverting colostomy. He presented 

to [facility] on 19 with complaints of fever for 3 days 

and concerns of an infected pressure injury and consult with Dr. Luis Felipe Chirinos (

previously performed flap reconstruction). He was admitted to [facility] for a sacral pressure injury and underwent debridement (by Dr. Marge Giron

) and ABX. He developed Posterior Reversible Encephalopathy Syndrome (PRES) and 

seizures felt to be secondary to Linezolid, and was intubated for 1 week for 

acute respiratory failure. He stayed at [facility] for over 40 days. He was felt 

to require rehab and was admitted to [facility] on 10/2/19. He developed sepsis 

secondary to a UTI and was transferred to the medical floor on 10/6/19, he was 

discharged back to 10/15/19. He was discharged from [facility] to Swing status on 10/

18/19. He has multiple areas of skin breakdown. 





1. Right medial heel unstageable pressure injury, with associated deep tissue 

injury. The original wound has been present since May 2019, he started to 

follow with the [facility] for this wound in 2019. 

Underwent ABIs in August showing reduced ABIs right > left. Also had an MRI of 

the foot in 2019, showing no sign of osteomyelitis. He was suppose to 

have a CTA with runoff and referral to Dr. Smith, but it appears that was 

neither were ever completed. The initial wound had healed last week, but is now 

unstageable with an associated deep tissue injury. Recommend applying Santyl to 

the area of slough, followed by calcium alginate, and rolled gauze, and change 

daily or as needed for drainage. Keep the heels elevated off the bed, use 

multipodus boots and spankos. Consider referral to Vascular outpatient as 

previously recommended. Should follow with wound clinic as outpatient. 


2. Right lateral ankle and right posterior/lateral heel. There is an area of 

discoloration, this represents deep tissue injuries. The right posterior/

lateral heel deep tissue injury has developed 1-2 weeks ago. Recommend leaving 

open to air as there are no open areas or cover with rolled gauze from the heel 

dressing. Keep the heels elevated off the bed, use multipodus boots and 

spankos. Consider referral to Vascular outpatient as previously recommended. 

Should follow with wound clinic as outpatient. 


3. Left medial heel unstageable pressure injury. Unclear how long this has been 

present. Ms. Pryor reports this has been present since July, but was not 

previously documented by the [facility] in July and August when he was 

seen there last. See CHARMAINE results above. Recommend applying Santyl followed by 

calcium alginate, and rolled gauze, and change every day or as needed for 

drainage. Keep the heels elevated off the bed, use multipodus boots and 

spankos. Consider referral to Vascular outpatient. If wound continues to be 

present and not healing, should consider MRI to evaluate for Osteomyelitis. 


4. Left ischium stage 3 pressure injury. This has been present since May 2019, 

he started to follow with the [facility] for this wound in 

2019. This is reported to have wound cultures with MRSA and VRE while in 

[facility].  He was treated with Linezolid and Zosyn, unclear for how long. The 

surrounding skin is slightly macerated today. Recommend gently packing the 

wound with calcium alginate rope, and cover with a border foam gauze (Optifoam) 

and change daily or as needed for drainage. The the surrounding skin remains 

macerated, change to ABD pad dressing in place of border gauze. Frequent 

turning and repositioning. Do not sit up in a chair for more than 1-2 hours at 

a time if possible. Use Roho cushion in wheel chair when up OOB. Should be 

referred back to the [facility] (or wound clinic of choice

) at discharge. 


5. Sacral/lower back stage 2 pressure injury, healed. The wound has healed, and 

there is slight erythema to the skin in this area. Recommend applying barrier 

cream (orange top) as needed. If the area appears to have a fungal infection 

recommend discontinuing barrier cream and use Nystatin powder twice daily. 

Frequent turning and repositioning. 


6. Sacral/buttocks superficial wounds with deep tissue injury. Secondary to 

pressure, shearing, and moisture. These have been present since 10/5/19. 

Prealbumin 28 on 10/13/19. Recommend using barrier cream as needed. Do NOT use 

a border foam gauze (Optifoam) in this area, if there is a large amount of 

drainage from the wound use an ABD pad and set over the wound, avoid use of 

tape if possible. Use a lifting device to move in bed to prevent further 

shearing or friction injuries. Frequent turning and repositioning. 


7. Right hip unstageble pressure injury. Unclear how long this has been present

, but developed after last seen at the wound clinic in August as not documented 

at that time (19). Recommend applying Santyl to the wound base once daily, 

followed by a border foam dressing (Optifoam) and change daily or as needed for 

drainage. Discontinue Santyl once the slough is gone and cover with a border 

foam gauze (Optifoam) and change every every day or as needed. Frequent turning 

and repositioning. Do not sit up in a chair for more than 1-2 hours at a time 

if possible. Use Roho cushion in wheel chair when up OOB. 


8. Right 1st toe, deep tissue injury. Suspect injury is secondary to the metal 

clip attached to his bed controls. Recommend protecting the area. 


9. Anterior urethral injury; acquired latrogenic hypospadias. Medical device 

related pressure injury to the penis secondary to the urinary catheter. This is 

not new and has been documented by Urology in the past. Use caution to keep 

pressure off the urinary catheter tube to prevent further breakdown.


10. 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 With neurogenic bladder and a 

chronic indwelling urinary catheter. Colostomy present. 


11. Diet. Regular diet.


12. Code Status. DNR.


13. Disposition. Inpatient, Swing status, disposition per Primary team. 





TIME SPENT: Time for this wound consultation was 70 minutes and 60 minutes was 

spent at bedside with the patient and wife discussing progression of wounds; 

removing old dressings; assessing, measuring, and photographing the wounds; 

reapplying new dressings. 





Is Patient a Wound Clinic Patient: Yes


Attending: Le Wright

## 2019-10-23 RX ADMIN — ISOSORBIDE DINITRATE SCH MG: 10 TABLET ORAL at 21:31

## 2019-10-23 RX ADMIN — ENOXAPARIN SODIUM SCH MG: 40 INJECTION SUBCUTANEOUS at 21:31

## 2019-10-23 RX ADMIN — NYSTATIN SCH: 100000 POWDER TOPICAL at 13:59

## 2019-10-23 RX ADMIN — COLLAGENASE SANTYL SCH APPLIC: 250 OINTMENT TOPICAL at 15:36

## 2019-10-23 RX ADMIN — PANTOPRAZOLE SODIUM SCH MG: 40 TABLET, DELAYED RELEASE ORAL at 10:01

## 2019-10-23 RX ADMIN — CYANOCOBALAMIN TAB 500 MCG SCH MCG: 500 TAB at 10:02

## 2019-10-23 RX ADMIN — Medication SCH UNITS: at 10:00

## 2019-10-23 RX ADMIN — LEVORPHANOL TARTRATE SCH MG: 2 TABLET ORAL at 13:56

## 2019-10-23 RX ADMIN — PREGABALIN SCH MG: 50 CAPSULE ORAL at 10:01

## 2019-10-23 RX ADMIN — ISOSORBIDE DINITRATE SCH MG: 10 TABLET ORAL at 10:01

## 2019-10-23 RX ADMIN — NYSTATIN SCH APPLIC: 100000 POWDER TOPICAL at 21:31

## 2019-10-23 RX ADMIN — PREGABALIN SCH MG: 50 CAPSULE ORAL at 21:29

## 2019-10-23 RX ADMIN — Medication SCH MG: at 10:00

## 2019-10-23 RX ADMIN — PREGABALIN SCH MG: 50 CAPSULE ORAL at 13:56

## 2019-10-23 RX ADMIN — CARVEDILOL SCH MG: 6.25 TABLET, FILM COATED ORAL at 21:31

## 2019-10-23 RX ADMIN — DOXAZOSIN MESYLATE SCH MG: 2 TABLET ORAL at 21:31

## 2019-10-23 RX ADMIN — ISOSORBIDE DINITRATE SCH MG: 10 TABLET ORAL at 13:56

## 2019-10-23 RX ADMIN — LEVORPHANOL TARTRATE SCH MG: 2 TABLET ORAL at 21:31

## 2019-10-23 RX ADMIN — LEVORPHANOL TARTRATE SCH MG: 2 TABLET ORAL at 10:27

## 2019-10-23 RX ADMIN — PANTOPRAZOLE SODIUM SCH MG: 40 TABLET, DELAYED RELEASE ORAL at 21:31

## 2019-10-23 RX ADMIN — AMLODIPINE BESYLATE SCH MG: 5 TABLET ORAL at 10:00

## 2019-10-23 RX ADMIN — ASPIRIN SCH MG: 81 TABLET, COATED ORAL at 10:00

## 2019-10-23 RX ADMIN — CARVEDILOL SCH MG: 6.25 TABLET, FILM COATED ORAL at 10:00

## 2019-10-24 RX ADMIN — PANTOPRAZOLE SODIUM SCH MG: 40 TABLET, DELAYED RELEASE ORAL at 08:54

## 2019-10-24 RX ADMIN — ISOSORBIDE DINITRATE SCH MG: 10 TABLET ORAL at 13:51

## 2019-10-24 RX ADMIN — LEVORPHANOL TARTRATE SCH MG: 2 TABLET ORAL at 20:58

## 2019-10-24 RX ADMIN — PREGABALIN SCH MG: 50 CAPSULE ORAL at 13:51

## 2019-10-24 RX ADMIN — PANTOPRAZOLE SODIUM SCH MG: 40 TABLET, DELAYED RELEASE ORAL at 20:59

## 2019-10-24 RX ADMIN — NYSTATIN SCH: 100000 POWDER TOPICAL at 10:39

## 2019-10-24 RX ADMIN — ENOXAPARIN SODIUM SCH MG: 40 INJECTION SUBCUTANEOUS at 20:59

## 2019-10-24 RX ADMIN — PREGABALIN SCH MG: 50 CAPSULE ORAL at 20:58

## 2019-10-24 RX ADMIN — CARVEDILOL SCH MG: 6.25 TABLET, FILM COATED ORAL at 08:53

## 2019-10-24 RX ADMIN — NYSTATIN SCH APPLIC: 100000 POWDER TOPICAL at 21:18

## 2019-10-24 RX ADMIN — Medication SCH UNITS: at 08:53

## 2019-10-24 RX ADMIN — COLLAGENASE SANTYL SCH: 250 OINTMENT TOPICAL at 10:39

## 2019-10-24 RX ADMIN — LEVORPHANOL TARTRATE SCH MG: 2 TABLET ORAL at 13:51

## 2019-10-24 RX ADMIN — Medication SCH MG: at 08:54

## 2019-10-24 RX ADMIN — CYANOCOBALAMIN TAB 500 MCG SCH MCG: 500 TAB at 08:53

## 2019-10-24 RX ADMIN — DOXAZOSIN MESYLATE SCH MG: 2 TABLET ORAL at 20:59

## 2019-10-24 RX ADMIN — LEVORPHANOL TARTRATE SCH MG: 2 TABLET ORAL at 08:53

## 2019-10-24 RX ADMIN — CARVEDILOL SCH MG: 6.25 TABLET, FILM COATED ORAL at 20:59

## 2019-10-24 RX ADMIN — PREGABALIN SCH MG: 50 CAPSULE ORAL at 08:52

## 2019-10-24 RX ADMIN — AMLODIPINE BESYLATE SCH MG: 5 TABLET ORAL at 08:54

## 2019-10-24 RX ADMIN — ISOSORBIDE DINITRATE SCH MG: 10 TABLET ORAL at 20:59

## 2019-10-24 RX ADMIN — ISOSORBIDE DINITRATE SCH MG: 10 TABLET ORAL at 08:54

## 2019-10-24 RX ADMIN — ASPIRIN SCH MG: 81 TABLET, COATED ORAL at 08:54

## 2019-10-25 RX ADMIN — COLLAGENASE SANTYL SCH APPLIC: 250 OINTMENT TOPICAL at 11:38

## 2019-10-25 RX ADMIN — Medication SCH UNITS: at 09:20

## 2019-10-25 RX ADMIN — PREGABALIN SCH MG: 50 CAPSULE ORAL at 22:41

## 2019-10-25 RX ADMIN — CARVEDILOL SCH MG: 6.25 TABLET, FILM COATED ORAL at 22:43

## 2019-10-25 RX ADMIN — ISOSORBIDE DINITRATE SCH MG: 10 TABLET ORAL at 09:20

## 2019-10-25 RX ADMIN — CARVEDILOL SCH MG: 6.25 TABLET, FILM COATED ORAL at 09:20

## 2019-10-25 RX ADMIN — Medication SCH MG: at 09:21

## 2019-10-25 RX ADMIN — PANTOPRAZOLE SODIUM SCH MG: 40 TABLET, DELAYED RELEASE ORAL at 22:43

## 2019-10-25 RX ADMIN — CYANOCOBALAMIN TAB 500 MCG SCH MCG: 500 TAB at 09:26

## 2019-10-25 RX ADMIN — DOXAZOSIN MESYLATE SCH MG: 2 TABLET ORAL at 22:42

## 2019-10-25 RX ADMIN — NYSTATIN SCH APPLIC: 100000 POWDER TOPICAL at 10:39

## 2019-10-25 RX ADMIN — NYSTATIN SCH APPLIC: 100000 POWDER TOPICAL at 22:44

## 2019-10-25 RX ADMIN — AMLODIPINE BESYLATE SCH MG: 5 TABLET ORAL at 09:21

## 2019-10-25 RX ADMIN — ISOSORBIDE DINITRATE SCH MG: 10 TABLET ORAL at 22:43

## 2019-10-25 RX ADMIN — LEVORPHANOL TARTRATE SCH MG: 2 TABLET ORAL at 15:26

## 2019-10-25 RX ADMIN — ASPIRIN SCH MG: 81 TABLET, COATED ORAL at 09:21

## 2019-10-25 RX ADMIN — ENOXAPARIN SODIUM SCH MG: 40 INJECTION SUBCUTANEOUS at 22:43

## 2019-10-25 RX ADMIN — LEVORPHANOL TARTRATE SCH MG: 2 TABLET ORAL at 09:21

## 2019-10-25 RX ADMIN — PANTOPRAZOLE SODIUM SCH MG: 40 TABLET, DELAYED RELEASE ORAL at 09:21

## 2019-10-25 RX ADMIN — ISOSORBIDE DINITRATE SCH MG: 10 TABLET ORAL at 15:26

## 2019-10-25 RX ADMIN — PREGABALIN SCH MG: 50 CAPSULE ORAL at 09:20

## 2019-10-25 RX ADMIN — PREGABALIN SCH MG: 50 CAPSULE ORAL at 15:26

## 2019-10-25 RX ADMIN — LEVORPHANOL TARTRATE SCH MG: 2 TABLET ORAL at 22:43

## 2019-10-26 RX ADMIN — PREGABALIN SCH MG: 50 CAPSULE ORAL at 14:06

## 2019-10-26 RX ADMIN — LEVORPHANOL TARTRATE SCH MG: 2 TABLET ORAL at 10:17

## 2019-10-26 RX ADMIN — PREGABALIN SCH MG: 50 CAPSULE ORAL at 21:14

## 2019-10-26 RX ADMIN — ENOXAPARIN SODIUM SCH MG: 40 INJECTION SUBCUTANEOUS at 21:14

## 2019-10-26 RX ADMIN — AMLODIPINE BESYLATE SCH MG: 5 TABLET ORAL at 10:17

## 2019-10-26 RX ADMIN — CARVEDILOL SCH MG: 6.25 TABLET, FILM COATED ORAL at 21:14

## 2019-10-26 RX ADMIN — CARVEDILOL SCH MG: 6.25 TABLET, FILM COATED ORAL at 10:17

## 2019-10-26 RX ADMIN — NYSTATIN SCH APPLIC: 100000 POWDER TOPICAL at 10:18

## 2019-10-26 RX ADMIN — ISOSORBIDE DINITRATE SCH MG: 10 TABLET ORAL at 14:07

## 2019-10-26 RX ADMIN — LEVORPHANOL TARTRATE SCH MG: 2 TABLET ORAL at 21:14

## 2019-10-26 RX ADMIN — Medication SCH UNITS: at 10:17

## 2019-10-26 RX ADMIN — PREGABALIN SCH MG: 50 CAPSULE ORAL at 10:17

## 2019-10-26 RX ADMIN — DOXAZOSIN MESYLATE SCH MG: 2 TABLET ORAL at 21:14

## 2019-10-26 RX ADMIN — LEVORPHANOL TARTRATE SCH MG: 2 TABLET ORAL at 14:07

## 2019-10-26 RX ADMIN — ASPIRIN SCH MG: 81 TABLET, COATED ORAL at 10:17

## 2019-10-26 RX ADMIN — ISOSORBIDE DINITRATE SCH MG: 10 TABLET ORAL at 21:14

## 2019-10-26 RX ADMIN — CYANOCOBALAMIN TAB 500 MCG SCH MCG: 500 TAB at 10:18

## 2019-10-26 RX ADMIN — NYSTATIN SCH APPLIC: 100000 POWDER TOPICAL at 23:01

## 2019-10-26 RX ADMIN — PANTOPRAZOLE SODIUM SCH MG: 40 TABLET, DELAYED RELEASE ORAL at 21:14

## 2019-10-26 RX ADMIN — ISOSORBIDE DINITRATE SCH MG: 10 TABLET ORAL at 10:17

## 2019-10-26 RX ADMIN — Medication SCH MG: at 10:17

## 2019-10-26 RX ADMIN — PANTOPRAZOLE SODIUM SCH MG: 40 TABLET, DELAYED RELEASE ORAL at 10:18

## 2019-10-26 RX ADMIN — COLLAGENASE SANTYL SCH APPLIC: 250 OINTMENT TOPICAL at 10:18

## 2019-10-26 NOTE — PN
Subjective


Date of Service: 10/26/19


Interval History: 





Bill lift was delivered to wrong address. Hopefully will arrive at patient's 

home sometime next week. 





Raad tolerated breakfast well. Denies pain, fevers, chills, SOB.





Two days ago, Dr. Reynoso increased pregabalin to 150mg tid - pt tolerating 

well.








Objective


Active Medications: 








Acetaminophen (Tylenol Tab*)  650 mg PO Q6H PRN


   PRN Reason: MILD PAIN or TEMP > 100.4


   Last Admin: 10/21/19 19:44 Dose:  650 mg


Amlodipine Besylate (Norvasc Tab*)  10 mg PO DAILY [facility]


   Last Admin: 10/26/19 10:17 Dose:  10 mg


Aspirin (Aspirin Ec Tab*)  81 mg PO DAILY [facility]


   Last Admin: 10/26/19 10:17 Dose:  81 mg


Carvedilol (Coreg Tab*)  12.5 mg PO BID [facility]


   Last Admin: 10/26/19 10:17 Dose:  12.5 mg


Cholecalciferol (Vitamin D Tab*)  1,000 units PO DAILY [facility]


   Last Admin: 10/26/19 10:17 Dose:  1,000 units


Collagenase (Santyl 250 Units/Gm Oint*)  1 applic TOPICAL DAILY [facility]


   Last Admin: 10/26/19 10:18 Dose:  1 applic


Cyanocobalamin (Vitamin B12 Tab*)  1,000 mcg PO DAILY [facility]


   Last Admin: 10/26/19 10:18 Dose:  1,000 mcg


Doxazosin Mesylate (Cardura Tab*)  2 mg PO BEDTIME [facility]


   Last Admin: 10/25/19 22:42 Dose:  2 mg


Enoxaparin Sodium (Lovenox(*))  40 mg SUBCUT 2100 [facility]


   Last Admin: 10/25/19 22:43 Dose:  40 mg


Ferrous Gluconate (Fergon Tab*)  324 mg PO DAILY [facility]


   Last Admin: 10/26/19 10:17 Dose:  324 mg


Isosorbide Dinitrate (Isordil Tab*)  10 mg PO TID [facility]


   Last Admin: 10/26/19 10:17 Dose:  10 mg


Levorphanol Tartrate (Levorphanol 2 Mg (Nf))  2 mg PO TID [facility]


   Stop: 19 20:59


   Last Admin: 10/26/19 10:17 Dose:  2 mg


Nystatin (Nystatin Top Powder*)  1 applic TOPICAL BID [facility]


   Last Admin: 10/26/19 10:18 Dose:  1 applic


Ondansetron HCl (Zofran Inj*)  4 mg IV Q6H PRN


   PRN Reason: NAUSEA


Pantoprazole Sodium (Protonix Tab*)  40 mg PO BID [facility]


   Last Admin: 10/26/19 10:18 Dose:  40 mg


Pregabalin (Lyrica Cap(*))  150 mg PO TID [facility]


   Last Admin: 10/26/19 10:17 Dose:  150 mg








 Vital Signs - 8 hr











  10/26/19 10/26/19





  07:00 10:17


 


Temperature 98.3 F 


 


Pulse Rate 61 


 


Respiratory 18 17





Rate  


 


Blood Pressure 132/61 





(mmHg)  


 


O2 Sat by Pulse 98 





Oximetry  











Oxygen Devices in Use Now: None


Appearance: comfortable appearing, alert and interactive


Eyes: No Scleral Icterus


Ears/Nose/Mouth/Throat: Clear Oropharnyx, Mucous Membranes Moist


Neck: NL Appearance and Movements; NL JVP, Trachea Midline


Respiratory: Symmetrical Chest Expansion and Respiratory Effort, Clear to 

Auscultation - anteriorly


Cardiovascular: NL Sounds; No Murmurs; No JVD, RRR


Abdominal: NL Sounds; No Tenderness; No Distention, No Hepatosplenomegaly, - - 

colostomy bag with semi-formed brown stool and gas


Extremities: - - 1+ edema over LEs; superficial abrasion to posterior L heel; 

wound over R heel with granulation tissue and without malodor or drainage


Skin: - - sacral erythema improving, open area with granulation tissue obscured 

by topical cream


Neurological: Alert and Oriented x 3, - - nearly absent sensation and absent 

strength of LEs


Result Diagrams: 


 10/19/19 05:44





 10/19/19 05:44


Additional Lab and Data: 





 Laboratory Tests











  10/13/19





  07:35


 


Prealbumin  28











Diagnostic Imagin. Exam Date: 19 - VL ANK/BRACHIAL INDICES





FINDINGS:  The ankle brachial index on the right was 0.39 and on the left was 

0.80. The ankle-brachial indices on the prior study were 0.93 and 0.91 

respectively. There is monophasic flow within the right posterior tibial artery 

and monophasic flow within the right dorsalis pedis artery.  There is biphasic 

flow within the left posterior tibial artery and biphasic flow within the left 

dorsalis pedis artery.





IMPRESSION:  SIGNIFICANTLY REDUCED ANKLE-BRACHIAL INDICES AND WAVEFORMS MORE 

SEVERE IN THE RIGHT LOWER EXTREMITY. CONSIDER A CT ANGIOGRAM OF THE AORTA AND 

LOWER EXTREMITIES FOR FURTHER EVALUATION.





2. Exam Date: 19 - MRI LOWER EXTREMITY RIGHT W/O





IMPRESSION:  SOFT TISSUE SWELLING AND SOFT TISSUE ULCER, NO SPECIFIC EVIDENCE 

FOR OSTEOMYELITIS.








Assess/Plan/Problems-Billing


Assessment: 





74M with ependymoma c/b paraplegia, neurogenic bladder, and stage 4 pressure 

injury s/p flap reconstruction and diverting colostomy, CAD, CKD, GERD, HTN, 

and asthma, with recent hospitalization at [facility] for sepsis due to 

sacral ulcer c/b PRES likely from linezolid, discharged to [facility], now admitted 

under swing status pending Bill delivery to home.








- Patient Problems


(1) Paraplegia


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


- pending delivery of Bill lift at home - swing status for now


- PT/OT   





(2) Pressure ulcer of sacral region, stage 3


Comment: s/p flap, diverting colostomy. He recently hospitalized in [place] 

secondary to infected decubitus. 


- Wound care consult appreciated   





(3) Chronic back pain


Comment: Secondary to ependymoma.  


- Continue Lyrica 150 TID and levorphanol at home dose of 2mg q 8 hr 


- Dr. Reynoso is longtime pain management provider, slowly increasing 

pregabalin dosing   





(4) CAD (coronary artery disease)


Current Visit: No   Status: Chronic   Code(s): I25.10 - ATHSCL HEART DISEASE OF 

NATIVE CORONARY ARTERY W/O ANG PCTRS   SNOMED Code(s): 31136390


   Comment: 


- Continue ASA, coreg and isordil.    





(5) HTN (hypertension)


Comment: Pt diagnosed with PRESS during hospitalization in [place]. BP has 

been stable 


- continue amlodipine 10mg qd, doxazosin 2mg qhs, carvedilol 12.5 q12h, 

isosorbide dinatrate 10mg tid   





(6) DVT prophylaxis


Comment: 


- enoxaparin subq

## 2019-10-27 LAB
HCT VFR BLD AUTO: 30 % (ref 42–52)
HGB BLD-MCNC: 10 G/DL (ref 14–18)
PLATELET # BLD AUTO: 269 10^3/UL (ref 150–450)

## 2019-10-27 RX ADMIN — PREGABALIN SCH MG: 50 CAPSULE ORAL at 09:08

## 2019-10-27 RX ADMIN — Medication SCH UNITS: at 09:08

## 2019-10-27 RX ADMIN — NYSTATIN SCH APPLIC: 100000 POWDER TOPICAL at 20:30

## 2019-10-27 RX ADMIN — ISOSORBIDE DINITRATE SCH MG: 10 TABLET ORAL at 20:30

## 2019-10-27 RX ADMIN — PANTOPRAZOLE SODIUM SCH MG: 40 TABLET, DELAYED RELEASE ORAL at 09:10

## 2019-10-27 RX ADMIN — LEVORPHANOL TARTRATE SCH MG: 2 TABLET ORAL at 20:31

## 2019-10-27 RX ADMIN — CARVEDILOL SCH MG: 6.25 TABLET, FILM COATED ORAL at 09:08

## 2019-10-27 RX ADMIN — DOXAZOSIN MESYLATE SCH MG: 2 TABLET ORAL at 20:31

## 2019-10-27 RX ADMIN — LEVORPHANOL TARTRATE SCH MG: 2 TABLET ORAL at 09:10

## 2019-10-27 RX ADMIN — AMLODIPINE BESYLATE SCH MG: 5 TABLET ORAL at 09:09

## 2019-10-27 RX ADMIN — LEVORPHANOL TARTRATE SCH MG: 2 TABLET ORAL at 14:12

## 2019-10-27 RX ADMIN — PREGABALIN SCH MG: 50 CAPSULE ORAL at 20:30

## 2019-10-27 RX ADMIN — CYANOCOBALAMIN TAB 500 MCG SCH MCG: 500 TAB at 09:10

## 2019-10-27 RX ADMIN — PREGABALIN SCH MG: 50 CAPSULE ORAL at 14:12

## 2019-10-27 RX ADMIN — ASPIRIN SCH MG: 81 TABLET, COATED ORAL at 09:08

## 2019-10-27 RX ADMIN — ISOSORBIDE DINITRATE SCH MG: 10 TABLET ORAL at 17:25

## 2019-10-27 RX ADMIN — CARVEDILOL SCH MG: 6.25 TABLET, FILM COATED ORAL at 20:30

## 2019-10-27 RX ADMIN — PANTOPRAZOLE SODIUM SCH MG: 40 TABLET, DELAYED RELEASE ORAL at 20:31

## 2019-10-27 RX ADMIN — Medication SCH MG: at 09:08

## 2019-10-27 RX ADMIN — NYSTATIN SCH APPLIC: 100000 POWDER TOPICAL at 09:11

## 2019-10-27 RX ADMIN — ENOXAPARIN SODIUM SCH MG: 40 INJECTION SUBCUTANEOUS at 20:32

## 2019-10-27 RX ADMIN — COLLAGENASE SANTYL SCH APPLIC: 250 OINTMENT TOPICAL at 10:20

## 2019-10-27 RX ADMIN — ISOSORBIDE DINITRATE SCH MG: 10 TABLET ORAL at 11:11

## 2019-10-28 VITALS — SYSTOLIC BLOOD PRESSURE: 112 MMHG | DIASTOLIC BLOOD PRESSURE: 40 MMHG

## 2019-10-28 RX ADMIN — ASPIRIN SCH MG: 81 TABLET, COATED ORAL at 10:14

## 2019-10-28 RX ADMIN — AMLODIPINE BESYLATE SCH MG: 5 TABLET ORAL at 10:14

## 2019-10-28 RX ADMIN — Medication SCH MG: at 10:14

## 2019-10-28 RX ADMIN — PREGABALIN SCH MG: 50 CAPSULE ORAL at 10:13

## 2019-10-28 RX ADMIN — NYSTATIN SCH APPLIC: 100000 POWDER TOPICAL at 10:15

## 2019-10-28 RX ADMIN — PANTOPRAZOLE SODIUM SCH MG: 40 TABLET, DELAYED RELEASE ORAL at 10:13

## 2019-10-28 RX ADMIN — ISOSORBIDE DINITRATE SCH MG: 10 TABLET ORAL at 10:14

## 2019-10-28 RX ADMIN — LEVORPHANOL TARTRATE SCH MG: 2 TABLET ORAL at 14:43

## 2019-10-28 RX ADMIN — CARVEDILOL SCH MG: 6.25 TABLET, FILM COATED ORAL at 10:14

## 2019-10-28 RX ADMIN — ISOSORBIDE DINITRATE SCH MG: 10 TABLET ORAL at 14:43

## 2019-10-28 RX ADMIN — LEVORPHANOL TARTRATE SCH MG: 2 TABLET ORAL at 10:14

## 2019-10-28 RX ADMIN — Medication SCH UNITS: at 10:12

## 2019-10-28 RX ADMIN — CYANOCOBALAMIN TAB 500 MCG SCH MCG: 500 TAB at 10:13

## 2019-10-28 RX ADMIN — COLLAGENASE SANTYL SCH APPLIC: 250 OINTMENT TOPICAL at 10:15

## 2019-10-28 RX ADMIN — PREGABALIN SCH MG: 50 CAPSULE ORAL at 14:43

## 2022-12-19 NOTE — DS
CC:  Rafy Vicente MD *

 

DISCHARGE SUMMARY/HISTORY & PHYISICAL:

 

DATE OF ADMISSION:  09/21/17

 

DATE OF DISCHARGE:  10/12/17 - To swing bed status.

 

ATTENDING PHYSICIAN:  Abdi Duval MD * (dictated by Luisana Jasmine NP).

 

PRIMARY CARE PROVIDER:  Rafy Vicente MD

 

PRIMARY DIAGNOSES:

1.  Acute osteomyelitis of the sacrum.

2.  Stage 4 sacral decubitus ulcer.

3.  Sepsis secondary to decubitus ulcer, now resolved.

4.  Diarrhea associated with antibiotics (not Clostridium difficile).

 

SECONDARY DIAGNOSES:

1.  Ependymoma.

2.  Neurogenic bowel and bladder.

3.  History of multiple back surgeries.

4.  Obstructive sleep apnea, on home BiPAP.

5.  Chronic renal failure, stage 3.

6.  Intrinsic asthma.

 

CONSULTATIONS WHILE IN THE HOSPITAL:

1.  Aaron Darby MD with Infectious Disease.

2.  Alberto Holley MD with General Surgery.

 

PROCEDURES WHILE IN THE HOSPITAL:  Status post bedside debridement by Dr. Holley on 10/04/17.

 

CURRENT HOSPITAL MEDICATIONS:

1.  Acetaminophen 650 mg oral every 6 hours as needed for fever or pain.

2.  Ascorbic acid 500 mg oral daily.

3.  Aspirin 81 mg oral daily.

4.  Dulcolax suppository 10 mg p.r. daily as needed for constipation.

5.  Vitamin D 5000 units oral daily.

6.  Vitamin B12, 1000 mcg oral daily.

7.  Lomotil 2.5/0.025 mg 1 tablet oral 4 times daily.

8.  Colace 100 mg oral twice daily as needed for constipation.

9.  Econazole 1% cream apply topical daily between toes and to the rash on hip.

10.  Lovenox 40 mg subcutaneous daily.

11.  Ferrous gluconate 324 mg oral twice daily.

12.  Folic acid 0.5 mg oral daily.

13.  Furosemide 40 mg oral daily.

14.  Heparin flush 1 mg flush IV twice daily.

15.  Hydrocortisone 2.5% cream apply topical 3 times daily to hemorrhoids.

16.  Lactobacillus 1 capsule oral twice daily.

17.  Nystatin applied topical twice daily to groin.

18.  Omeprazole 40 mg oral daily.

19.  Zofran 4 mg oral every 8 hours as needed for nausea or vomiting.

20.  Potassium chloride 10 mEq oral daily.

21.  Pregabalin 150 mg oral 3 times daily.

22.  Zinc sulfate 220 mg oral daily.

23.  Ceftriaxone 2 g intravenous daily.

24.  Fentanyl patch 75 mcg transdermal every 3 days.

25.  Metronidazole 500 mg oral twice daily.

26.  Oxycodone oral solution 10 mg oral with meals.

 

HISTORY OF PRESENT ILLNESS:  Mr. Pryor is a 72-year-old male with past medical 
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 
diagnosed in 1971.  The patient has had multiple debulking surgeries in the 
past and radiation and chemotherapy, paraplegia secondary to tumor, hypertension
, GERD, obstructive sleep apnea, coronary artery disease, chronic kidney 
disease stage 3, neurogenic bowel and bladder who presented initially to the 
emergency room with sepsis secondary to an infected sacral decubitus ulcer.  
The patient was initially brought to the hospital for an acute stay on 09/02/17 
when he was found to be febrile and confused.  The patient's wife was having 
difficulty keeping his bedsores clean, as the patient was having diarrhea at 
home.  She brought the patient to the emergency room for evaluation, at which 
time he was admitted for probable sepsis that was felt to be secondary to his 
infected sacral decubitus ulcer.  The patient was placed on broad spectrum 
antibiotics, was seen in consultations by General Surgery and Dr. Darby with 
Infectious Disease.  The patient underwent surgical debridement on 3 occasions 
and had a wound VAC placed. The patient was positive for C. diff, but Dr. Darby felt that was a false positive given that the patient's stools were 
becoming more formed.  The patient was continued on IV Zosyn.  During the 
patient's acute stay, he became more weak, losing a great deal of strength in 
his upper body, was no longer able to transfer out of bed.  He was evaluated by 
Physiatry and felt that he could benefit from an inpatient rehab admission to 
work on transfers and sitting balance, so that he may go home.  In addition, 
the plan was to train his wife in helping to manage the patient in bed and 
keeping his sacral area clean.  The patient was discharged from acute stay to 
the rehabilitation unit on 09/13/17.  



The patient remained on the rehabilitation unit through 09/21/17.  During his 
stay, his wound did not change significantly with a wound VAC.  His dressing 
was then changed to wet-to-dry normal packing dressing for wound care.  The 
patient was also noted to have a rash over his skin that appeared either 
allergic or fungal in appearance.  He was given steroid creams, triamcinolone 
and later on antifungal creams were tried.  Due to the amount of pain the 
patient was having, he was limitedly able to sit up.  The patient was able to 
make some gains in his rehabilitation and strength, but the patient continued 
to remain totally dependent on lower body dressing.  Due to the patient's 
continued diarrhea, he was having a lot of difficulty toileting himself and was 
not able to make gains in this area.  He continued to have an indwelling 
urinary catheter in place.  It was felt ultimately that the patient should be 
transferred back to an acute stay so the patient could undergo continued 
nursing care and assist the wife and learning how to care for him at home.  



The patient was readmitted as an acute inpatient on 09/21/17 to receive ongoing 
wound care and engage in ongoing rehabilitation effort for the ultimate goal of 
the patient returning to an ambulatory environment.  The patient was requiring 
a level of care that was not possible to deliver with his current home 
resources.  Mr. Pryor was also felt to be depressed and debilitated, but 
otherwise at his baseline.  They were able to achieve an effective pain 
regimen.  The patient also required an aggressive turning and repositioning 
schedule.  During his acute stay, he last had a surgical bedside debridement 
done of his stage 4 sacral wound on 10/04/17 by Dr. Holley at which time his 
dressings were changed from Santyl to Medihoney.  The patient was continued on 
IV ceftriaxone and oral Flagyl per infectious disease recommendations.  The 
patient continued to be treated for tinea corporis with his home antifungal 
cream.  It was felt that the patient's diarrhea was likely due to antibiotics 
and not C. diff.  He was continued on Flagyl, probiotics and Lomotil. He was 
continued with this chronic indwelling Rodriguez catheter with last change of his 
catheter being on 10/02/17.  The patient's chronic kidney disease appeared to 
be at baseline.  Again, his chronic back pain appeared to be managed with a 
regimen of pregabalin, acetaminophen, oxycodone and a fentanyl patch.  The 
patient was able to sit on the side of the bed for meals, was working on 
transferring with his wife. The patient's wife had a phone consultation with 
Dr. Luis Felipe Chirinos with Plastic Surgery at [facility] who plans to admit 
the patient on 10/25/17 for an incision and debridement flap closure of his 
sacral decubitus and wound VAC placement.  It was decided that the patient was 
stable for discharge to swing bed status.

 

Mr. Pryor is stable for discharge to swing bed status today.  Vital signs are 
as follows: Temperature 97.2, heart rate 77, respiratory rate 18, O2 sat 97% on 
room air, blood pressure 159/71.

 

DISCHARGE PLAN:  Mr. Pryor will be discharged to swing bed status.  He will be 
activity as tolerated with assist to a chair.  He should sit up for no longer 
than 45 minutes at a time.  He should be out of bed at least once daily as 
tolerated. He should continue OT and PT.  As far as the patient's acute sacral 
osteomyelitis, he will be continued on ceftriaxone. Please see the assessment/
plan below for full details.



IMPRESSION:  Mr. Pryor is a 72-year-old male with past medical history 
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 
in 1971.  The patient has had multiple debulking surgeries in the past and 
radiation and chemotherapy, paraplegia secondary to tumor, hypertension, GERD, 
obstructive sleep apnea, coronary artery disease, chronic kidney disease stage 3
, neurogenic bowel and bladder who presented initially to the emergency room 
with sepsis secondary to an infected sacral decubitus ulcer. 



ASSESSMENT/PLAN:

1.  Acute osteomyelitis of the sacrum.  Continue IV ceftriaxone and Flagyl per 
infectious disease recommendations.

2.  Stage 4 sacral decubitus ulcer.  The plan is for the patient to be 
transferred on 10/25/17 for an incision and debridement, flap closure and wound 
VAC placement with Dr. Luis Felipe Chirinos at [facility].  The patient will be 
continued on 6 small protein-rich meals daily and continued on his vitamins, 
including vitamin C. Will continue the patient's wound care per Surgery 
recommendations, which include Medihoney gel to the sacral wound bed and walls 
of the ulcer twice daily in addition to pack with a dry Kerlix gauze and cover 
with an ABD pad.  We will continue to defer any questions regarding wound care 
to Dr. Holley.  The patient will continue to be followed by the wound clinic 
for weekly wound measurements. This morning, the patient's wound is 9 cm x 5.3 
cm x 3.9 cm at the deepest point. The periwound skin that was previously 
excoriated at base of the wound at approximately 6 p.m. has since healed.  
There was a thin line of maceration noted at the very edge of the wound 
periphery.  The wound bed was approximately 75% slough and 25% red granulation 
with exposed bone to the medial side of the wound. Undermining noted from 12 
p.m. to 10 p.m. going clockwise with a maximum distance of 1 cm.

2.  Tinea corporis.  The patient has improving scaly lesion to his hip.  We 
will continue the patient's home antifungal.

3.  Physical deconditioning.  The patient will continue to receive physial 
therapy and occupational therapy in addition to mobility training with the wife 
in preparation for discharge to home.

4.  Diarrhea.  Will continue assiduous perineum care in addition to Flagyl 
probiotics and Lomotil.  The patient's bowel movements are becoming less 
frequent and more soft.

5.  Chronic indwelling urinary catheter secondary to neurogenic bladder.  The 
patient will be continued on his chronic indwelling urinary catheter.  He has 
no signs of urinary tract infection at this time.

6.  Neurogenic bowel.  The patient's bowel movements are becoming more formed.  
He requires frequent lidia care to prevent skin breakdown.  Will be continued on 
Flagyl and p.r.n. Lomotil for diarrhea.

7.  History of coronary artery disease.  The patient is currently asymptomatic.
  He will be continued on his home aspirin.

8.  Chronic kidney disease stage 3.  The patient appears to be at his baseline. 
Will avoid nephrotoxic medications.

9.  Ependymoma.  Will continue the patient on his current pain regimen.  The 
patient's back anatomy puts him at a much higher risk for pressure ulcer 
formation. Again, the patient will have aggressive turning and positioning.

10.  Chronic pain secondary to ependymoma and sacral decubitus.  The patient 
will be continued on Lyrica, acetaminophen, oxycodone and a fentanyl patch.

11.  Hypertension.  The patient's blood pressures have been mostly normotensive
, he will be continued on Lasix.

12.  Obstructive sleep apnea.  The patient will be continued on his home BiPAP.

13.  Fluids, electrolytes and nutrition:  The patient will be on a regular diet 
with a goal of 6 small protein-rich meals daily.

14.  Code status:  Full code.

15.  DVT prophylaxis:  The patient is at high risk and will be continued on 
Lovenox and NEW stockings.

16.  Disposition:  Inpatient for swing bed status.

 

TIME SPENT:  Time for this discharge and admission H and P was approximately 75 
minutes, greater than half of that was spent with the patient discussing the 
discharge plans, his current medication regimen, medical history, the events 
leading up to his arrival, his current hospital stay and performing a physical 
examination.

 

The case has been reviewed with the attending, Dr. Duval, who agrees with the 
plan of care.

 

____________________________________ DORA BRAMBILA  10/13/
17  [id number]

 

[id number]/[facility] #: [id number]

[email or web address]
Term

## 2023-07-26 NOTE — DS
DISCHARGE SUMMARY:

 

DATE OF ADMISSION:  10/15/19

 

DATE OF DISCHARGE:  10/18/19

 

DISCHARGE DIAGNOSES:

1.  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

2.  Pressure ulcer, left ischium.

3.  Urinary tract infection.

4.  Hypertension.

5.  Sleep apnea.

6.  Chronic kidney disease.

7.  Coronary artery disease.

8.  Neurogenic bladder.

9.  Seizure secondary to Zyvox.

10.  Posterior reversible encephalopathy syndrome.

11.  History of non-sustained ventricular tachycardia.

 

HISTORY OF ILLNESS AND HOSPITAL COURSE:  For complete history of the events 
leading up to his rehab stay, please see the history and physical dictated by 
me on 10/15/19.  While on the rehab unit, the patient underwent family training 
with his wife, Kimmy.  On a daily basis, the patient was seen by physical and 
occupational therapy with his wife training him on how to transfer from the bed 
to the wheelchair and she was also trained in how to use a Bill.  Unfortunately
, we could not get a power Bill to send the patient home with.  The patient is 
being transferred to the swing bed status to continue rehab on a slightly 
slower scale until he is able return home with his power Bill.

 

DISCHARGE DIET:  Regular.



DISPOSITION: Swing bed unit at [facility]



CONDITION AT DISCHARGE: Stable

 

DISCHARGE MEDICATIONS:

1.  Coreg 12.5 mg twice daily.

2.  Cardura 2 mg at bedtime.

3.  Heparin 5000 units subcutaneously every 8 hours.

4.  Isordil 10 mg 3 times a day.

5.  Keppra 250 mg at bedtime tonight, then stopping.

6.  Levorphanol 2 mg every 8 hours scheduled.

7.  Aspirin 81 mg daily.

8.  Norvasc 10 mg daily. 

9.  The patient will have ongoing family training with Physical Therapy and 
Occupational Therapy.

 

FOLLOWUP:  The patient will follow up with his pain doctor, Dr. Reynoso as 
well as his urologist, Dr. Wallis as well as his primary care provider, Dr. Vicente after discharge.

 

 [id number]/[facility] #: [id number]

ELISABET Normal vision: sees adequately in most situations; can see medication labels, newsprint